# Patient Record
Sex: MALE | Race: WHITE | NOT HISPANIC OR LATINO | Employment: OTHER | URBAN - NONMETROPOLITAN AREA
[De-identification: names, ages, dates, MRNs, and addresses within clinical notes are randomized per-mention and may not be internally consistent; named-entity substitution may affect disease eponyms.]

---

## 2017-02-16 DIAGNOSIS — R69 DIAGNOSIS UNKNOWN: ICD-10-CM

## 2017-02-17 RX ORDER — BUPROPION HYDROCHLORIDE 300 MG/1
TABLET ORAL
Qty: 90 TABLET | Refills: 0 | Status: SHIPPED | OUTPATIENT
Start: 2017-02-17 | End: 2017-05-17

## 2017-03-01 ENCOUNTER — TELEPHONE (OUTPATIENT)
Dept: PEDIATRICS | Facility: OTHER | Age: 62
End: 2017-03-01

## 2017-03-01 DIAGNOSIS — K22.70 BARRETT ESOPHAGUS: ICD-10-CM

## 2017-03-01 RX ORDER — LANSOPRAZOLE 30 MG/1
CAPSULE, DELAYED RELEASE ORAL
Qty: 90 CAPSULE | Refills: 0 | Status: SHIPPED | OUTPATIENT
Start: 2017-03-01 | End: 2017-09-12

## 2017-03-01 NOTE — TELEPHONE ENCOUNTER
Reason for call:  Medication    1. Medication Name? Lansoprazole  2. Is this request for a refill? Yes  3. What Pharmacy do you use? [facility] in [place], [place]  4. Have you contacted your pharmacy? Yes    5. If yes, when?  (Please note that the turn-around-time for prescriptions is 72 business hours; I am sending your request at this time. SEND TO  [facility]  )  Description: Pt stated pharmacy had told her med needed prior auth. I informed pt I did not see a request for this med from pharmacy yet. Please call pt back at  [phone]  Was an appointment offered for this a call? No   Preferred method for responding to this messageTelephone Call  If we cannot reach you directly, may we leave a detailed response at the number you provided? Yes  Can this message wait until your PCP/Provider returns if not available today? Not applicable

## 2017-05-17 DIAGNOSIS — E78.2 MIXED HYPERLIPIDEMIA: Primary | ICD-10-CM

## 2017-05-17 DIAGNOSIS — R69 DIAGNOSIS UNKNOWN: ICD-10-CM

## 2017-05-18 NOTE — TELEPHONE ENCOUNTER
wellbutrin       Last Written Prescription Date: 2/17/17  Last Fill Quantity: 90; # refills: 0  Last Office Visit with [facility] or  Health prescribing provider:  5/11/16        Last PHQ-9 score on record= No flowsheet data found.    Lab Results   Component Value Date    AST 14 05/11/2016     Lab Results   Component Value Date    ALT 28 05/11/2016     lipitor     Last Written Prescription Date: unknown  Last Fill Quantity: 0, # refills: 0  Last Office Visit with [facility] or  Health prescribing provider: 5/11/16       Lab Results   Component Value Date    CHOL 175 05/11/2016     Lab Results   Component Value Date    HDL 52 05/11/2016     Lab Results   Component Value Date    LDL 86 05/11/2016     Lab Results   Component Value Date    TRIG 186 05/11/2016     No results found for: SYDNIE

## 2017-05-19 RX ORDER — ATORVASTATIN CALCIUM 20 MG/1
TABLET, FILM COATED ORAL
Qty: 90 TABLET | Refills: 0 | Status: SHIPPED | OUTPATIENT
Start: 2017-05-19 | End: 2017-08-26

## 2017-05-19 RX ORDER — BUPROPION HYDROCHLORIDE 300 MG/1
TABLET ORAL
Qty: 90 TABLET | Refills: 0 | Status: SHIPPED | OUTPATIENT
Start: 2017-05-19 | End: 2017-08-26

## 2017-05-19 NOTE — TELEPHONE ENCOUNTER
Due for follow up for wellbutrin, no appointment scheduled.  Lipitor not on med list. Please advise. thank you

## 2017-08-26 DIAGNOSIS — R69 DIAGNOSIS UNKNOWN: ICD-10-CM

## 2017-08-26 DIAGNOSIS — E78.2 MIXED HYPERLIPIDEMIA: ICD-10-CM

## 2017-08-28 RX ORDER — BUPROPION HYDROCHLORIDE 300 MG/1
TABLET ORAL
Qty: 30 TABLET | Refills: 0 | Status: SHIPPED | OUTPATIENT
Start: 2017-08-28 | End: 2017-09-12

## 2017-08-28 RX ORDER — ATORVASTATIN CALCIUM 20 MG/1
TABLET, FILM COATED ORAL
Qty: 30 TABLET | Refills: 0 | Status: SHIPPED | OUTPATIENT
Start: 2017-08-28 | End: 2017-09-19

## 2017-09-12 ENCOUNTER — OFFICE VISIT (OUTPATIENT)
Dept: PEDIATRICS | Facility: OTHER | Age: 62
End: 2017-09-12
Attending: INTERNAL MEDICINE
Payer: COMMERCIAL

## 2017-09-12 VITALS
DIASTOLIC BLOOD PRESSURE: 80 MMHG | TEMPERATURE: 98.8 F | HEIGHT: 69 IN | OXYGEN SATURATION: 95 % | WEIGHT: 247 LBS | HEART RATE: 78 BPM | SYSTOLIC BLOOD PRESSURE: 140 MMHG | RESPIRATION RATE: 20 BRPM | BODY MASS INDEX: 36.58 KG/M2

## 2017-09-12 DIAGNOSIS — R03.0 BORDERLINE HYPERTENSION: ICD-10-CM

## 2017-09-12 DIAGNOSIS — K22.70 BARRETT'S ESOPHAGUS WITHOUT DYSPLASIA: ICD-10-CM

## 2017-09-12 DIAGNOSIS — E78.2 MIXED HYPERLIPIDEMIA: ICD-10-CM

## 2017-09-12 DIAGNOSIS — F33.1 MODERATE EPISODE OF RECURRENT MAJOR DEPRESSIVE DISORDER (H): ICD-10-CM

## 2017-09-12 DIAGNOSIS — I71.40 ABDOMINAL AORTIC ANEURYSM (AAA) WITHOUT RUPTURE (H): Primary | ICD-10-CM

## 2017-09-12 PROCEDURE — 99214 OFFICE O/P EST MOD 30 MIN: CPT | Performed by: INTERNAL MEDICINE

## 2017-09-12 RX ORDER — BUPROPION HYDROCHLORIDE 300 MG/1
300 TABLET ORAL DAILY
Qty: 90 TABLET | Refills: 3 | Status: SHIPPED | OUTPATIENT
Start: 2017-09-12 | End: 2017-10-11

## 2017-09-12 ASSESSMENT — ENCOUNTER SYMPTOMS
ABDOMINAL PAIN: 0
HEARTBURN: 0
DIARRHEA: 0
VOMITING: 0
NAUSEA: 0
WHEEZING: 0
COUGH: 0
CONSTIPATION: 0
HEMATURIA: 0
CHILLS: 0
MYALGIAS: 1
BLOOD IN STOOL: 0
FEVER: 0
DIZZINESS: 0
DYSURIA: 0
PALPITATIONS: 0
HEADACHES: 0
SHORTNESS OF BREATH: 0

## 2017-09-12 ASSESSMENT — PAIN SCALES - GENERAL: PAINLEVEL: MILD PAIN (2)

## 2017-09-12 NOTE — MR AVS SNAPSHOT
After Visit Summary   9/12/2017    Gilberto Camilo    MRN: [id number]           Patient Information     Date Of Birth          1955        Visit Information        Provider Department      9/12/2017 2:40 PM Luiz Carcamo DO [facility]        Today's Diagnoses     Abdominal aortic aneurysm (AAA) without rupture (H)    -  1    Mixed hyperlipidemia        Diagnosis unknown        Moderate episode of recurrent major depressive disorder (H)        Mohan's esophagus without dysplasia           Follow-ups after your visit        Additional Services     GENERAL SURG ADULT REFERRAL       Your provider has referred you to: PREFERRED PROVIDERS:  [facility]     Please be aware that coverage of these services is subject to the terms and limitations of your health insurance plan.  Call member services at your health plan with any benefit or coverage questions.      Please bring the following with you to your appointment:    (1) Any X-Rays, CTs or MRIs which have been performed.  Contact the facility where they were done to arrange for  prior to your scheduled appointment.   (2) List of current medications   (3) This referral request   (4) Any documents/labs given to you for this referral                  Follow-up notes from your care team     Return in about 3 weeks (around 10/3/2017), or Hypertension.      Future tests that were ordered for you today     Open Future Orders        Priority Expected Expires Ordered    Lipid Profile (Chol, Trig, HDL, LDL calc) Routine 9/13/2017 9/12/2018 9/12/2017            Who to contact     If you have questions or need follow up information about today's clinic visit or your schedule please contact [facility] directly at [phone].  Normal or non-critical lab and imaging results will be communicated to you by MyChart, letter or phone within 4 business days after the clinic has received the results. If you do not  "hear from us within 7 days, please contact the clinic through [employer] or phone. If you have a critical or abnormal lab result, we will notify you by phone as soon as possible.  Submit refill requests through [employer] or call your pharmacy and they will forward the refill request to us. Please allow 3 business days for your refill to be completed.          Additional Information About Your Visit        [email or web address] Information     [employer] lets you send messages to your doctor, view your test results, renew your prescriptions, schedule appointments and more. To sign up, go to [email or web address] . Click on \"Log in\" on the left side of the screen, which will take you to the Welcome page. Then click on \"Sign up Now\" on the right side of the page.     You will be asked to enter the access code listed below, as well as some personal information. Please follow the directions to create your username and password.     Your access code is: [email or web address]  Expires: 2017  3:24 PM     Your access code will  in 90 days. If you need help or a new code, please call your [place] clinic or [phone].        Care EveryWhere ID     This is your Care EveryWhere ID. This could be used by other organizations to access your [facility] medical records  [id number]        Your Vitals Were     Pulse Temperature Respirations Height Pulse Oximetry BMI (Body Mass Index)    78 98.8  F (37.1  C) (Tympanic) 20 5' 9\" (1.753 m) 95% 36.48 kg/m2       Blood Pressure from Last 3 Encounters:   17 140/80   16 132/78   16 138/76    Weight from Last 3 Encounters:   17 247 lb (112 kg)   16 245 lb (111.1 kg)   16 252 lb (114.3 kg)              We Performed the Following     GENERAL SURG ADULT REFERRAL          Today's Medication Changes          These changes are accurate as of: 17  3:24 PM.  If you have any questions, ask your nurse or doctor.               These medicines have changed or have updated " prescriptions.        Dose/Directions    buPROPion 300 MG 24 hr tablet   Commonly known as:  WELLBUTRIN XL   This may have changed:  See the new instructions.   Used for:  Moderate episode of recurrent major depressive disorder (H)   Changed by:  Luiz Carcamo DO        Dose:  300 mg   Take 1 tablet (300 mg) by mouth daily   Quantity:  90 tablet   Refills:  3         Stop taking these medicines if you haven't already. Please contact your care team if you have questions.     ezetimibe-simvastatin 10-40 MG per tablet   Commonly known as:  VYTORIN   Stopped by:  Luiz Carcamo DO           heparin (porcine) 10,000 Units, gentamicin 80 mg, triamcinolone acetonide 200 mg, lidocaine (PF) 10 mL, dose administered = solution   Stopped by:  Luiz Carcamo DO           LANsoprazole 30 MG CR capsule   Commonly known as:  PREVACID   Stopped by:  Luiz Carcamo DO                Where to get your medicines      These medications were sent to [facility] [address] [facility], [place] - [address] AT [facility] of [facility] [address], [facility] [address]     Phone:  [phone]     buPROPion 300 MG 24 hr tablet                Primary Care Provider Office Phone # Fax #    Luiz Carcamo [phone]       [facility] [address]  [facility] [address]        Equal Access to Services     RASHAWN PEDRAZA AH: Hadii aad ku hadasho Soomaali, waaxda luqadaha, qaybta kaalmada adeegyada, nitin lott haykarina rojo . So [facility] [phone].    ATENCIÓN: Si habla español, tiene a caldwell disposición servicios gratuitos de asistencia lingüística. Liu al [phone].    We comply with applicable federal civil rights laws and [place] laws. We do not discriminate on the basis of race, color, national origin, age, disability sex, sexual orientation or gender identity.            Thank you!     Thank you for choosing [facility]  for  your care. Our goal is always to provide you with excellent care. Hearing back from our patients is one way we can continue to improve our services. Please take a few minutes to complete the written survey that you may receive in the mail after your visit with us. Thank you!             Your Updated Medication List - Protect others around you: Learn how to safely use, store and throw away your medicines at www.disposemymeds.org.          This list is accurate as of: 9/12/17  3:24 PM.  Always use your most recent med list.                   Brand Name Dispense Instructions for use Diagnosis    aspirin 162 MG EC tablet     30 tablet    Take 1 tablet (162 mg) by mouth daily        atorvastatin 20 MG tablet    LIPITOR    30 tablet    TAKE 1 TABLET BY MOUTH DAILY    Mixed hyperlipidemia       buPROPion 300 MG 24 hr tablet    WELLBUTRIN XL    90 tablet    Take 1 tablet (300 mg) by mouth daily    Moderate episode of recurrent major depressive disorder (H)       loratadine 10 MG tablet    CLARITIN     Take 10 mg by mouth daily        MULTIVITAMIN ADULT PO           OMEPRAZOLE PO      Take 20 mg by mouth every morning

## 2017-09-12 NOTE — NURSING NOTE
"Chief Complaint   Patient presents with     Recheck Medication       Initial /80 (BP Location: Right arm, Patient Position: Chair, Cuff Size: Adult Large)  Pulse 78  Temp 98.8  F (37.1  C) (Tympanic)  Resp 20  Ht 5' 9\" (1.753 m)  Wt 247 lb (112 kg)  SpO2 95%  BMI 36.48 kg/m2 Estimated body mass index is 36.48 kg/(m^2) as calculated from the following:    Height as of this encounter: 5' 9\" (1.753 m).    Weight as of this encounter: 247 lb (112 kg).  Medication Reconciliation: complete   Kena Temple LPN        "

## 2017-09-12 NOTE — PROGRESS NOTES
"HPI  Patient is a 61 yo male with hyperlipidemia, Mohan esophagus  and AAA who presents for and exam in order to get his medications.  He has no other concerns today.    Past Medical History:   Diagnosis Date     AAA (abdominal aortic aneurysm) (H)      Mohan esophagus      Closed rib fracture      DNS (deviated nasal septum)      Past Surgical History:   Procedure Laterality Date     APPENDECTOMY       COLONOSCOPY  2013       BP Readings from Last 3 Encounters:   09/12/17 140/80   05/11/16 132/78   04/06/16 138/76       Review of Systems   Constitutional: Negative for chills and fever.   Respiratory: Negative for cough, shortness of breath and wheezing.    Cardiovascular: Negative for chest pain, palpitations and leg swelling.   Gastrointestinal: Negative for abdominal pain, blood in stool, constipation, diarrhea, heartburn, melena, nausea and vomiting.   Genitourinary: Negative for dysuria and hematuria.   Musculoskeletal: Positive for myalgias.        Foot pain when working or walking on concrete floor.     Neurological: Negative for dizziness and headaches.       /80 (BP Location: Right arm, Patient Position: Chair, Cuff Size: Adult Large)  Pulse 78  Temp 98.8  F (37.1  C) (Tympanic)  Resp 20  Ht 5' 9\" (1.753 m)  Wt 247 lb (112 kg)  SpO2 95%  BMI 36.48 kg/m2     Physical Exam   Constitutional: He is oriented to person, place, and time and well-developed, well-nourished, and in no distress. No distress.   HENT:   Head: Normocephalic.   Mouth/Throat: No oropharyngeal exudate.   Eyes: Conjunctivae are normal. No scleral icterus.   Neck: Neck supple. Carotid bruit is not present. No thyromegaly present.   Cardiovascular: Normal rate, regular rhythm, normal heart sounds and intact distal pulses.    No murmur heard.  Pulses:       Radial pulses are 2+ on the right side, and 2+ on the left side.   Pulmonary/Chest: Effort normal and breath sounds normal. He has no wheezes. He has no rales.   Abdominal: " Soft. Bowel sounds are normal. He exhibits no shifting dullness, no distension, no abdominal bruit, no pulsatile midline mass and no mass. There is no hepatosplenomegaly. There is no tenderness.   Musculoskeletal: He exhibits no edema.   Neurological: He is alert and oriented to person, place, and time.   Psychiatric: Mood, memory, affect and judgment normal.     Labs:  Recent Labs   Lab Test  09/14/17   0912  05/11/16   1055   CHOL  181  175   HDL  50  52   LDL  97  86   TRIG  169*  186*             Imaging:    Results for orders placed or performed during the hospital encounter of 05/17/16   US Aortic Imaging    Narrative    ULTRASOUND OF ABDOMINAL AORTA    REPORT:  Ultrasound of the abdominal aorta shows maximal transverse  diameter of 4.8 cm.    IMPRESSION:  4.8 CM ABDOMINAL AORTIC ANEURYSM.    ALLOWING FOR  DIFFERENCES IN MEASURING POINTS, THERE HAS NOT BEEN A SIGNIFICANT  CHANGE FROM MARCH OF 2014.  Exam Date: May 17, 2016 01:04:00 PM  Author: NHUNG KUMAR  This report is final and signed         Repeat AAA pended      ASSESSMENT /PLAN:    (R03.0) Borderline hypertension  Comment: Patient has bordeline blood pressure control.  Ideally given that he has a AAA he should be on a beta blocker.  Plan:   Consider Toprol XL    (E78.2) Mixed hyperlipidemia  Comment: The 10-year ASCVD risk score (Leida DC Jr, et al., 2013) is: 12.7%    Values used to calculate the score:      Age: 62 years      Sex: Male      Is Non- : No      Diabetic: No      Tobacco smoker: No      Systolic Blood Pressure: 140 mmHg      Is BP treated: No      HDL Cholesterol: 50 mg/dL      Total Cholesterol: 181 mg/dL    He could have better control of his cholesterol  Plan:   Increase Lipitor to 40 mg       (F33.1) Moderate episode of recurrent major depressive disorder (H)  Comment: He reports minimal to no symptoms with his Wellbutrin.  No PHQ-9 taken today.  Plan  He will continue: buPROPion (WELLBUTRIN XL) 300 MG 24 hr  tablet      (K22.70) Mohan's esophagus without dysplasia  Comment: Patient needs a EGD to follow up on his dysplasia.  His last EGD was in in 2013.  Plan:   GENERAL SURG ADULT REFERRAL          (I71.4) Abdominal aortic aneurysm (AAA) without rupture (H)  (primary encounter diagnosis)  Comment: Patient needs a repeat US of the aorta as his last US showed a AAA of 4.8 cm.  Plan:   US Aorta imaging.  Continue  mg daily.      Follow up with Provider - 1 months for review of US aorta and hypertension.       Luizjeanne Carcamo DO

## 2017-09-14 DIAGNOSIS — E78.2 MIXED HYPERLIPIDEMIA: ICD-10-CM

## 2017-09-14 LAB
CHOLEST SERPL-MCNC: 181 MG/DL
HDLC SERPL-MCNC: 50 MG/DL
LDLC SERPL CALC-MCNC: 97 MG/DL
NONHDLC SERPL-MCNC: 131 MG/DL
TRIGL SERPL-MCNC: 169 MG/DL

## 2017-09-14 PROCEDURE — 80061 LIPID PANEL: CPT | Performed by: INTERNAL MEDICINE

## 2017-09-14 PROCEDURE — 36415 COLL VENOUS BLD VENIPUNCTURE: CPT | Performed by: INTERNAL MEDICINE

## 2017-09-19 PROBLEM — R03.0 BORDERLINE HYPERTENSION: Status: ACTIVE | Noted: 2017-09-12

## 2017-09-19 RX ORDER — ATORVASTATIN CALCIUM 40 MG/1
40 TABLET, FILM COATED ORAL DAILY
Qty: 90 TABLET | Refills: 3 | Status: SHIPPED | OUTPATIENT
Start: 2017-09-19 | End: 2017-10-11

## 2017-09-20 ENCOUNTER — HOSPITAL ENCOUNTER (OUTPATIENT)
Dept: ULTRASOUND IMAGING | Facility: HOSPITAL | Age: 62
Discharge: HOME | End: 2017-09-20
Attending: INTERNAL MEDICINE | Admitting: INTERNAL MEDICINE
Payer: COMMERCIAL

## 2017-09-20 PROCEDURE — 76775 US EXAM ABDO BACK WALL LIM: CPT | Mod: TC

## 2017-09-20 NOTE — PROGRESS NOTES
Referral faxed to [facility]. See referral for additional information.

## 2017-09-21 DIAGNOSIS — I71.40 ABDOMINAL AORTIC ANEURYSM (AAA) WITHOUT RUPTURE (H): Primary | ICD-10-CM

## 2017-09-21 DIAGNOSIS — I10 ESSENTIAL HYPERTENSION: ICD-10-CM

## 2017-09-21 RX ORDER — METOPROLOL SUCCINATE 50 MG/1
50 TABLET, EXTENDED RELEASE ORAL DAILY
Qty: 30 TABLET | Refills: 5 | Status: SHIPPED | OUTPATIENT
Start: 2017-09-21 | End: 2017-10-03 | Stop reason: SINTOL

## 2017-10-01 ENCOUNTER — TRANSFERRED RECORDS (OUTPATIENT)
Dept: HEALTH INFORMATION MANAGEMENT | Facility: HOSPITAL | Age: 62
End: 2017-10-01

## 2017-10-01 LAB
ALT SERPL-CCNC: 32 U/L (ref 0–41)
AST SERPL-CCNC: 21 U/L (ref 0–37)
CREAT SERPL-MCNC: 1 MG/DL (ref 0.7–1.2)
GLUCOSE SERPL-MCNC: 96 MG/DL (ref 64–109)
POTASSIUM SERPL-SCNC: 4.2 MMOL/L (ref 3.5–5.1)

## 2017-10-02 ENCOUNTER — TELEPHONE (OUTPATIENT)
Dept: INTERNAL MEDICINE | Facility: OTHER | Age: 62
End: 2017-10-02

## 2017-10-02 NOTE — TELEPHONE ENCOUNTER
No we will need a sooner F/U for ER.  Lets get him in 9:40 arrive 9:20 tomorrow. I did not update the patient on time yet.

## 2017-10-02 NOTE — TELEPHONE ENCOUNTER
1:09 PM    Reason for Call: Phone Call    Description:  seeing in the Er for fatigue and pulse issues really low. He does have an appt on 2/10 is this ok for his follow up from the er.    Was an appointment offered for this call? No  If yes : Appointment type              Date    Preferred method for responding to this message: Telephone Call  What is your phone number ?    If we cannot reach you directly, may we leave a detailed response at the number you provided? Yes    Can this message wait until your PCP/provider returns, if available today? No,     Yanet Hernández

## 2017-10-03 ENCOUNTER — OFFICE VISIT (OUTPATIENT)
Dept: PEDIATRICS | Facility: OTHER | Age: 62
End: 2017-10-03
Attending: INTERNAL MEDICINE
Payer: COMMERCIAL

## 2017-10-03 VITALS
DIASTOLIC BLOOD PRESSURE: 80 MMHG | BODY MASS INDEX: 35.41 KG/M2 | WEIGHT: 239.8 LBS | SYSTOLIC BLOOD PRESSURE: 148 MMHG | OXYGEN SATURATION: 97 % | HEART RATE: 55 BPM | RESPIRATION RATE: 20 BRPM | TEMPERATURE: 97.5 F

## 2017-10-03 DIAGNOSIS — I10 BENIGN ESSENTIAL HYPERTENSION: Primary | ICD-10-CM

## 2017-10-03 DIAGNOSIS — I71.40 ABDOMINAL AORTIC ANEURYSM (AAA) WITHOUT RUPTURE (H): ICD-10-CM

## 2017-10-03 PROCEDURE — 99213 OFFICE O/P EST LOW 20 MIN: CPT | Performed by: INTERNAL MEDICINE

## 2017-10-03 RX ORDER — LISINOPRIL 10 MG/1
10 TABLET ORAL DAILY
Qty: 90 TABLET | Refills: 1 | Status: SHIPPED | OUTPATIENT
Start: 2017-10-03 | End: 2018-03-21

## 2017-10-03 RX ORDER — METOPROLOL SUCCINATE 25 MG/1
25 TABLET, EXTENDED RELEASE ORAL DAILY
Qty: 90 TABLET | Refills: 3 | Status: SHIPPED | OUTPATIENT
Start: 2017-10-03 | End: 2018-06-27

## 2017-10-03 ASSESSMENT — ENCOUNTER SYMPTOMS
FEVER: 0
NAUSEA: 1
DIARRHEA: 0
ABDOMINAL PAIN: 0
PALPITATIONS: 0
WHEEZING: 0
BLOOD IN STOOL: 0
CHILLS: 0
VOMITING: 0
DIZZINESS: 1
SHORTNESS OF BREATH: 0
COUGH: 0
CONSTIPATION: 0
HEADACHES: 0

## 2017-10-03 ASSESSMENT — PAIN SCALES - GENERAL: PAINLEVEL: NO PAIN (0)

## 2017-10-03 NOTE — NURSING NOTE
"Chief Complaint   Patient presents with     ER F/U     fatigue and low pulse       Initial /80 (BP Location: Right arm, Patient Position: Chair, Cuff Size: Adult Large)  Pulse 55  Temp 97.5  F (36.4  C) (Tympanic)  Resp 20  Wt 239 lb 12.8 oz (108.8 kg)  SpO2 97%  BMI 35.41 kg/m2 Estimated body mass index is 35.41 kg/(m^2) as calculated from the following:    Height as of 9/12/17: 5' 9\" (1.753 m).    Weight as of this encounter: 239 lb 12.8 oz (108.8 kg).  Medication Reconciliation: complete   Kena Temple LPN      "

## 2017-10-03 NOTE — PROGRESS NOTES
HPI  Patient is a 63 yo male who presents for a follow up on his HTN and his AA screen.  He reports that he was seen in the ER for fatigue.  He was noted to have a low pulse in the ED.  He has been measuring his blood pressure and his pulse at home.  He has recorded 132-157/ 63-92.  His pulse has been 48 to 54 BPM.  He continues to have some fatigue.      Past Medical History:   Diagnosis Date     AAA (abdominal aortic aneurysm) (H) 09/20/2017    5.3 cm      Mohan esophagus      Closed rib fracture      DNS (deviated nasal septum)      Past Surgical History:   Procedure Laterality Date     APPENDECTOMY       COLONOSCOPY  2013       Review of Systems   Constitutional: Positive for malaise/fatigue. Negative for chills and fever.   Respiratory: Negative for cough, shortness of breath and wheezing.    Cardiovascular: Negative for chest pain, palpitations and leg swelling.   Gastrointestinal: Positive for nausea. Negative for abdominal pain, blood in stool, constipation, diarrhea and vomiting.   Neurological: Positive for dizziness. Negative for headaches.     BP Readings from Last 3 Encounters:   10/03/17 148/80   09/12/17 140/80   05/11/16 132/78     /80 (BP Location: Right arm, Patient Position: Chair, Cuff Size: Adult Large)  Pulse 55  Temp 97.5  F (36.4  C) (Tympanic)  Resp 20  Wt 239 lb 12.8 oz (108.8 kg)  SpO2 97%  BMI 35.41 kg/m2        Physical Exam   Constitutional: He is oriented to person, place, and time and well-developed, well-nourished, and in no distress. No distress.   HENT:   Head: Normocephalic.   Mouth/Throat: No oropharyngeal exudate.   Cardiovascular: Normal rate, regular rhythm, normal heart sounds and intact distal pulses.    No murmur heard.  Pulses:       Radial pulses are 2+ on the right side, and 2+ on the left side.   Pulmonary/Chest: Effort normal and breath sounds normal. He has no wheezes. He has no rales.   Abdominal: Soft. Bowel sounds are normal. He exhibits no shifting  dullness, no distension, no abdominal bruit, no pulsatile midline mass and no mass. There is no hepatosplenomegaly. There is no tenderness.   Musculoskeletal: He exhibits no edema.   Neurological: He is alert and oriented to person, place, and time.     Labs:  NA      Imaging:  NA      ASSESSMENT /PLAN:    (I10) Benign essential hypertension  (primary encounter diagnosis)  Comment: Not at goal.  He has had some issues with severe fatigue on his beta blocker and bradycardia.  Plan:   Reduce metoprolol (TOPROL-XL) from 50 to 25 MG 24 hr tablet, Start lisinopril (PRINIVIL/ZESTRIL) 10 MG tablet      (I71.4) Abdominal aortic aneurysm (AAA) without rupture (H)  Comment: As noted above.  Plan:   metoprolol (TOPROL-XL) 25 MG 24 hr tablet, lisinopril (PRINIVIL/ZESTRIL) 10 MG tablet.  Repeat Ultrasound in 6 months.      Follow up with Provider - one week for HTN        Luiz Carcamo DO

## 2017-10-03 NOTE — MR AVS SNAPSHOT
"              After Visit Summary   10/3/2017    Gilberto Camilo    MRN: [id number]           Patient Information     Date Of Birth          1955        Visit Information        Provider Department      10/3/2017 9:40 AM Luiz Carcamo DO [facility]        Today's Diagnoses     Benign essential hypertension    -  1    Abdominal aortic aneurysm (AAA) without rupture (H)           Follow-ups after your visit        Your next 10 appointments already scheduled     Oct 11, 2017 10:40 AM CDT   (Arrive by 10:20 AM)   SHORT with Luiz Carcamo DO   [facility] [place] ([facility] [place] )    [address]  [place] [address]   [phone]              Who to contact     If you have questions or need follow up information about today's clinic visit or your schedule please contact [facility] directly at [phone].  Normal or non-critical lab and imaging results will be communicated to you by MyChart, letter or phone within 4 business days after the clinic has received the results. If you do not hear from us within 7 days, please contact the clinic through MyChart or phone. If you have a critical or abnormal lab result, we will notify you by phone as soon as possible.  Submit refill requests through [employer] or call your pharmacy and they will forward the refill request to us. Please allow 3 business days for your refill to be completed.          Additional Information About Your Visit        MyChart Information     S[employer]thold lets you send messages to your doctor, view your test results, renew your prescriptions, schedule appointments and more. To sign up, go to [email or web address] . Click on \"Log in\" on the left side of the screen, which will take you to the Welcome page. Then click on \"Sign up Now\" on the right side of the page.     You will be asked to enter the access code listed below, as well as some personal information. Please follow the " directions to create your username and password.     Your access code is: [email or web address]  Expires: 2017  3:24 PM     Your access code will  in 90 days. If you need help or a new code, please call your [place] clinic or [phone].        Care EveryWhere ID     This is your Care EveryWhere ID. This could be used by other organizations to access your [place] medical records  [id number]        Your Vitals Were     Pulse Temperature Respirations Pulse Oximetry BMI (Body Mass Index)       55 97.5  F (36.4  C) (Tympanic) 20 97% 35.41 kg/m2        Blood Pressure from Last 3 Encounters:   10/03/17 148/80   17 140/80   16 132/78    Weight from Last 3 Encounters:   10/03/17 239 lb 12.8 oz (108.8 kg)   17 247 lb (112 kg)   16 245 lb (111.1 kg)              Today, you had the following     No orders found for display         Today's Medication Changes          These changes are accurate as of: 10/3/17  9:43 AM.  If you have any questions, ask your nurse or doctor.               Start taking these medicines.        Dose/Directions    lisinopril 10 MG tablet   Commonly known as:  PRINIVIL/ZESTRIL   Used for:  Benign essential hypertension, Abdominal aortic aneurysm (AAA) without rupture (H)   Started by:  Luiz Carcamo DO        Dose:  10 mg   Take 1 tablet (10 mg) by mouth daily   Quantity:  90 tablet   Refills:  1         These medicines have changed or have updated prescriptions.        Dose/Directions    metoprolol 25 MG 24 hr tablet   Commonly known as:  TOPROL-XL   This may have changed:    - medication strength  - how much to take   Used for:  Benign essential hypertension, Abdominal aortic aneurysm (AAA) without rupture (H)   Changed by:  Luiz Carcamo DO        Dose:  25 mg   Take 1 tablet (25 mg) by mouth daily   Quantity:  90 tablet   Refills:  3            Where to get your medicines      These medications were sent to [id number] - [place],  [place] - [address] AT [facility] [address]  [address], [place] [address]     Phone:  [phone]     lisinopril 10 MG tablet    metoprolol 25 MG 24 hr tablet                Primary Care Provider Office Phone # Fax #    Luiz Carcamo [phone]       [facility] [place] [address]  [place] [address]        Equal Access to Services     ANJU PEDRAZA AH: Hadii aad ku hadasho Soomaali, waaxda luqadaha, qaybta kaalmada adeegyada, waxay idiin hayaan adeeg kharash la'aan ah. So [facility] [phone].    ATENCIÓN: Si habla español, tiene a caldwell disposición servicios gratuitos de asistencia lingüística. [facility] [phone].    We comply with applicable federal civil rights laws and [place] laws. We do not discriminate on the basis of race, color, national origin, age, disability, sex, sexual orientation, or gender identity.            Thank you!     Thank you for choosing [facility]  for your care. Our goal is always to provide you with excellent care. Hearing back from our patients is one way we can continue to improve our services. Please take a few minutes to complete the written survey that you may receive in the mail after your visit with us. Thank you!             Your Updated Medication List - Protect others around you: Learn how to safely use, store and throw away your medicines at www.disposemymeds.org.          This list is accurate as of: 10/3/17  9:43 AM.  Always use your most recent med list.                   Brand Name Dispense Instructions for use Diagnosis    aspirin 162 MG EC tablet     30 tablet    Take 1 tablet (162 mg) by mouth daily        atorvastatin 40 MG tablet    LIPITOR    90 tablet    Take 1 tablet (40 mg) by mouth daily    Mixed hyperlipidemia, Abdominal aortic aneurysm (AAA) without rupture (H), Borderline hypertension       buPROPion 300 MG 24 hr tablet    WELLBUTRIN XL    90 tablet    Take 1 tablet (300 mg) by mouth daily     Moderate episode of recurrent major depressive disorder (H)       lisinopril 10 MG tablet    PRINIVIL/ZESTRIL    90 tablet    Take 1 tablet (10 mg) by mouth daily    Benign essential hypertension, Abdominal aortic aneurysm (AAA) without rupture (H)       loratadine 10 MG tablet    CLARITIN     Take 10 mg by mouth daily        metoprolol 25 MG 24 hr tablet    TOPROL-XL    90 tablet    Take 1 tablet (25 mg) by mouth daily    Benign essential hypertension, Abdominal aortic aneurysm (AAA) without rupture (H)       MULTIVITAMIN ADULT PO           OMEPRAZOLE PO      Take 20 mg by mouth every morning

## 2017-10-11 ENCOUNTER — OFFICE VISIT (OUTPATIENT)
Dept: PEDIATRICS | Facility: OTHER | Age: 62
End: 2017-10-11
Attending: INTERNAL MEDICINE
Payer: COMMERCIAL

## 2017-10-11 VITALS
WEIGHT: 242 LBS | DIASTOLIC BLOOD PRESSURE: 76 MMHG | OXYGEN SATURATION: 96 % | TEMPERATURE: 97.6 F | RESPIRATION RATE: 20 BRPM | BODY MASS INDEX: 35.74 KG/M2 | SYSTOLIC BLOOD PRESSURE: 130 MMHG | HEART RATE: 58 BPM

## 2017-10-11 DIAGNOSIS — I71.40 ABDOMINAL AORTIC ANEURYSM (AAA) WITHOUT RUPTURE (H): Chronic | ICD-10-CM

## 2017-10-11 DIAGNOSIS — I10 ESSENTIAL HYPERTENSION: Primary | ICD-10-CM

## 2017-10-11 PROCEDURE — 99213 OFFICE O/P EST LOW 20 MIN: CPT | Performed by: INTERNAL MEDICINE

## 2017-10-11 ASSESSMENT — PAIN SCALES - GENERAL: PAINLEVEL: NO PAIN (0)

## 2017-10-11 NOTE — PROGRESS NOTES
SUBJECTIVE:   Gilberto Camilo is a 62 year old male who presents to clinic today for the following health issues:      Hypertension Follow-up      Outpatient blood pressures are being checked at home.  Results are 119-154/64-93.  Heart rates have been in the mid 50 s    Low Salt Diet: no added salt    His fatigue is better.        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  Additional history: as documented    Patient Active Problem List   Diagnosis     Abdominal aortic aneurysm (H)     ED (erectile dysfunction)     ACP (advance care planning)     Routine general medical examination at a health care facility     Borderline hypertension     Past Surgical History:   Procedure Laterality Date     APPENDECTOMY       COLONOSCOPY  2013       Social History   Substance Use Topics     Smoking status: Former Smoker     Packs/day: 0.00     Types: Cigarettes     Smokeless tobacco: Never Used      Comment: Feb. 2014     Alcohol use Yes      Comment: occasional     Family History   Problem Relation Age of Onset     C.A.D. Father      CABG in late 40's     C.A.D. Brother      MI in 50's             Reviewed and updated as needed this visit by clinical staffTobacco  Allergies  Meds  Med Hx  Surg Hx  Fam Hx  Soc Hx      Reviewed and updated as needed this visit by Provider         ROS:  C: NEGATIVE for fever, chills, change in weight  E: NEGATIVE for vision changes or irritation  E/M: NEGATIVE for ear, mouth and throat problems  R: NEGATIVE for significant cough or SOB  CV: NEGATIVE for chest pain, palpitations or peripheral edema  GI: NEGATIVE for nausea, abdominal pain, heartburn, or change in bowel  habits  : NEGATIVE for frequency, dysuria, or hematuria  M: NEGATIVE for significant arthralgias or myalgia  N: NEGATIVE for weakness, dizziness or paresthesias    OBJECTIVE:     /76 (BP Location: Left arm, Patient Position: Chair, Cuff Size: Adult Large)  Pulse 58  Temp 97.6  F (36.4  C) (Tympanic)  Resp 20  Wt 242 lb (109.8 kg)  SpO2 96%  BMI 35.74 kg/m2  Body mass index is 35.74 kg/(m^2).  GENERAL: healthy, alert and no distress  EYES: Eyes grossly normal to inspection  HENT: oropharynx clear and oral mucous membranes moist  NECK: no adenopathy, no asymmetry, masses, or scars and thyroid normal to palpation  RESP: lungs clear to auscultation - no rales, rhonchi or wheezes  CV: bradycardia, normal S1 S2, no S3 or S4, no murmur, click or rub, peripheral pulses strong and no peripheral edema  ABDOMEN: soft, nontender, no hepatosplenomegaly, no masses and bowel sounds normal  ABDOMEN: no palpable or pulsatile masses and no bruits heard  MS: no gross musculoskeletal defects noted, no edema    Diagnostic Test Results:  none     ASSESSMENT/PLAN:   (I10) Essential hypertension  (primary encounter diagnosis)  Comment: At goal without further dizziness or fatigue.  He is beta blocked with heart rate goal in the 50s  Plan:   Toprol XL 25 mg and Lisinopril 10 mg.    (I71.4) Abdominal aortic aneurysm (AAA) without rupture (H)  Comment: He will need a follow up on his AAA.  His blood pressure is well controlled.  Plan:   US Aortic Imaging for May 2018.                  FUTURE APPOINTMENTS:       - Follow-up visit in 5 weeks for HTN    Luiz Carcamo DO, DO  [facility]

## 2017-10-11 NOTE — NURSING NOTE
"Chief Complaint   Patient presents with     Hypertension       Initial /76 (BP Location: Left arm, Patient Position: Chair, Cuff Size: Adult Large)  Pulse 58  Temp 97.6  F (36.4  C) (Tympanic)  Resp 20  Wt 242 lb (109.8 kg)  SpO2 96%  BMI 35.74 kg/m2 Estimated body mass index is 35.74 kg/(m^2) as calculated from the following:    Height as of 9/12/17: 5' 9\" (1.753 m).    Weight as of this encounter: 242 lb (109.8 kg).  Medication Reconciliation: complete   Kena Temple LPN      "

## 2017-10-11 NOTE — MR AVS SNAPSHOT
"              After Visit Summary   10/11/2017    Gilberto Camilo    MRN: [id number]           Patient Information     Date Of Birth          1955        Visit Information        Provider Department      10/11/2017 10:40 AM [facility]         Follow-ups after your visit        Follow-up notes from your care team     Return in about 5 weeks (around 11/15/2017), or HTN.      Who to contact     If you have questions or need follow up information about today's clinic visit or your schedule please [facility] at [phone].  Normal or non-critical lab and imaging results will be communicated to you by MyChart, letter or phone within 4 business days after the clinic has received the results. If you do not hear from us within 7 days, please contact the clinic through [employer] or phone. If you have a critical or abnormal lab result, we will notify you by phone as soon as possible.  Submit refill requests through K[employer] or call your pharmacy and they will forward the refill request to us. Please allow 3 business days for your refill to be completed.          Additional Information About Your Visit        MyChart Information     K[employer] lets you send messages to your doctor, view your test results, renew your prescriptions, schedule appointments and more. To sign up, go to [email or web address] . Click on \"Log in\" on the left side of the screen, which will take you to the Welcome page. Then click on \"Sign up Now\" on the right side of the page.     You will be asked to enter the access code listed below, as well as some personal information. Please follow the directions to create your username and password.     Your access code is: [email or web address]  Expires: 2017  3:24 PM     Your access code will  in 90 days. If you need help or a new code, please call your [facility] or [phone].        Care EveryWhere ID     This is your Care " EveryWhere ID. This could be used by other organizations to access your [place] medical records  [id number]        Your Vitals Were     Pulse Temperature Respirations Pulse Oximetry BMI (Body Mass Index)       58 97.6  F (36.4  C) (Tympanic) 20 96% 35.74 kg/m2        Blood Pressure from Last 3 Encounters:   10/11/17 130/76   10/03/17 148/80   09/12/17 140/80    Weight from Last 3 Encounters:   10/11/17 242 lb (109.8 kg)   10/03/17 239 lb 12.8 oz (108.8 kg)   09/12/17 247 lb (112 kg)              Today, you had the following     No orders found for display         Today's Medication Changes          These changes are accurate as of: 10/11/17 11:03 AM.  If you have any questions, ask your nurse or doctor.               These medicines have changed or have updated prescriptions.        Dose/Directions    atorvastatin 40 MG tablet   Commonly known as:  LIPITOR   This may have changed:  Another medication with the same name was removed. Continue taking this medication, and follow the directions you see here.   Used for:  Mixed hyperlipidemia   Changed by:  Luiz Carcamo DO        Dose:  40 mg   Take 1 tablet (40 mg) by mouth daily   Quantity:  90 tablet   Refills:  3       buPROPion 300 MG 24 hr tablet   Commonly known as:  WELLBUTRIN XL   This may have changed:  Another medication with the same name was removed. Continue taking this medication, and follow the directions you see here.   Used for:  Diagnosis unknown   Changed by:  Luiz Carcamo DO        TAKE 1 TABLET BY MOUTH DAILY   Quantity:  30 tablet   Refills:  0                Primary Care Provider Office Phone # Fax #    Luiz Carcamo [phone]       [facility] [place] [address]  [place] [address]        Equal Access to Services     [facility] MILO AH: Anoop Mckeon, waaxda luqadaha, qaybta kaalmada adenicholas, nitin bryant. So [facility] [place]  [phone].    ATENCIÓN: Si carla scanlon, tiene a caldwell disposición servicios gratuitos de asistencia lingüística. Liu connell [phone].    We comply with applicable federal civil rights laws and [place] laws. We do not discriminate on the basis of race, color, national origin, age, disability, sex, sexual orientation, or gender identity.            Thank you!     Thank you for choosing [facility]  for your [facility]. Our goal is always to provide you with excellent care. Hearing back from our patients is one way we can continue to improve our services. Please take a few minutes to complete the written survey that you may receive in the mail after your visit with us. Thank you!             Your Updated Medication List - Protect others around you: Learn how to safely use, store and throw away your medicines at www.disposemymeds.org.          This list is accurate as of: 10/11/17 11:03 AM.  Always use your most recent med list.                   Brand Name Dispense Instructions for use Diagnosis    aspirin 162 MG EC tablet     30 tablet    Take 1 tablet (162 mg) by mouth daily        atorvastatin 40 MG tablet    LIPITOR    90 tablet    Take 1 tablet (40 mg) by mouth daily    Mixed hyperlipidemia       buPROPion 300 MG 24 hr tablet    WELLBUTRIN XL    30 tablet    TAKE 1 TABLET BY MOUTH DAILY    Diagnosis unknown       lisinopril 10 MG tablet    PRINIVIL/ZESTRIL    90 tablet    Take 1 tablet (10 mg) by mouth daily    Benign essential hypertension, Abdominal aortic aneurysm (AAA) without rupture (H)       loratadine 10 MG tablet    CLARITIN     Take 10 mg by mouth daily        metoprolol 25 MG 24 hr tablet    TOPROL-XL    90 tablet    Take 1 tablet (25 mg) by mouth daily    Benign essential hypertension, Abdominal aortic aneurysm (AAA) without rupture (H)       MULTIVITAMIN ADULT PO           OMEPRAZOLE PO      Take 20 mg by mouth every morning

## 2017-11-07 DIAGNOSIS — R69 DIAGNOSIS UNKNOWN: ICD-10-CM

## 2017-11-08 NOTE — TELEPHONE ENCOUNTER
wellbutrin       Last Written Prescription Date: 10/04/2017  Last Fill Quantity: 30,  # refills: 0   Last Office Visit with [facility], [facility] or [facility] prescribing provider: 10/11/2017                                         Next 5 appointments (look out 90 days)     Nov 15, 2017 10:40 AM CST   (Arrive by 10:20 AM)   SHORT with Luiz Carcamo DO   [facility] [place] [facility] ([facility] [place] [facility] )    [address] [place] [address]   [phone]

## 2017-11-10 RX ORDER — BUPROPION HYDROCHLORIDE 300 MG/1
TABLET ORAL
Qty: 30 TABLET | Refills: 0 | Status: SHIPPED | OUTPATIENT
Start: 2017-11-10 | End: 2018-06-26

## 2017-11-13 DIAGNOSIS — R69 DIAGNOSIS UNKNOWN: ICD-10-CM

## 2017-11-14 NOTE — TELEPHONE ENCOUNTER
Wellbutrin      Last Written Prescription Date: 11/10/17  Last Fill Quantity: 30,  # refills: 0   Last Office Visit with G, P or [facility] prescribing provider: 10/11/17

## 2017-11-15 ENCOUNTER — OFFICE VISIT (OUTPATIENT)
Dept: PEDIATRICS | Facility: OTHER | Age: 62
End: 2017-11-15
Attending: INTERNAL MEDICINE
Payer: COMMERCIAL

## 2017-11-15 VITALS
WEIGHT: 240 LBS | BODY MASS INDEX: 35.44 KG/M2 | OXYGEN SATURATION: 96 % | TEMPERATURE: 98.6 F | HEART RATE: 58 BPM | SYSTOLIC BLOOD PRESSURE: 142 MMHG | DIASTOLIC BLOOD PRESSURE: 84 MMHG

## 2017-11-15 DIAGNOSIS — I10 ESSENTIAL HYPERTENSION: Primary | ICD-10-CM

## 2017-11-15 DIAGNOSIS — Z87.891 HISTORY OF TOBACCO USE: ICD-10-CM

## 2017-11-15 DIAGNOSIS — Z23 NEED FOR VACCINATION: ICD-10-CM

## 2017-11-15 PROCEDURE — 99213 OFFICE O/P EST LOW 20 MIN: CPT | Mod: 25 | Performed by: INTERNAL MEDICINE

## 2017-11-15 PROCEDURE — 90471 IMMUNIZATION ADMIN: CPT | Performed by: INTERNAL MEDICINE

## 2017-11-15 PROCEDURE — 90732 PPSV23 VACC 2 YRS+ SUBQ/IM: CPT | Performed by: INTERNAL MEDICINE

## 2017-11-15 RX ORDER — BUPROPION HYDROCHLORIDE 300 MG/1
TABLET ORAL
Qty: 30 TABLET | Refills: 4 | Status: SHIPPED | OUTPATIENT
Start: 2017-11-15 | End: 2018-06-13

## 2017-11-15 ASSESSMENT — ANXIETY QUESTIONNAIRES
3. WORRYING TOO MUCH ABOUT DIFFERENT THINGS: NOT AT ALL
4. TROUBLE RELAXING: NOT AT ALL
GAD7 TOTAL SCORE: 0
1. FEELING NERVOUS, ANXIOUS, OR ON EDGE: NOT AT ALL
5. BEING SO RESTLESS THAT IT IS HARD TO SIT STILL: NOT AT ALL
7. FEELING AFRAID AS IF SOMETHING AWFUL MIGHT HAPPEN: NOT AT ALL
6. BECOMING EASILY ANNOYED OR IRRITABLE: NOT AT ALL
2. NOT BEING ABLE TO STOP OR CONTROL WORRYING: NOT AT ALL

## 2017-11-15 ASSESSMENT — PATIENT HEALTH QUESTIONNAIRE - PHQ9: SUM OF ALL RESPONSES TO PHQ QUESTIONS 1-9: 0

## 2017-11-15 ASSESSMENT — PAIN SCALES - GENERAL: PAINLEVEL: NO PAIN (0)

## 2017-11-15 NOTE — MR AVS SNAPSHOT
"              After Visit Summary   11/15/2017    Gilberto Camilo    MRN: [id number]           Patient Information     Date Of Birth          1955        Visit Information        Provider Department      11/15/2017 10:40 AM Luiz Carcamo, DO [facility]        Today's Diagnoses     Essential hypertension    -  1    History of tobacco use        Need for vaccination           Follow-ups after your visit        Who to contact     If you have questions or need follow up information about today's clinic visit or your schedule please contact [facility] [place] directly at [phone].  Normal or non-critical lab and imaging results will be communicated to you by [employer], letter or phone within 4 business days after the clinic has received the results. If you do not hear from us within 7 days, please contact the clinic through [employer] or phone. If you have a critical or abnormal lab result, we will notify you by phone as soon as possible.  Submit refill requests through [employer] or call your pharmacy and they will forward the refill request to us. Please allow 3 business days for your refill to be completed.          Additional Information About Your Visit        MyChart Information     [employer] lets you send messages to your doctor, view your test results, renew your prescriptions, schedule appointments and more. To sign up, go to [email or web address] . Click on \"Log in\" on the left side of the screen, which will take you to the Welcome page. Then click on \"Sign up Now\" on the right side of the page.     You will be asked to enter the access code listed below, as well as some personal information. Please follow the directions to create your username and password.     Your access code is: [email or web address]  Expires: 2017  2:24 PM     Your access code will  in 90 days. If you need help or a new code, please call your [place] clinic or [phone].        Care EveryWhere ID     " This is your Care EveryWhere ID. This could be used by other organizations to access your [place] medical records  [id number]        Your Vitals Were     Pulse Temperature Pulse Oximetry BMI (Body Mass Index)          58 98.6  F (37  C) (Tympanic) 96% 35.44 kg/m2         Blood Pressure from Last 3 Encounters:   11/15/17 142/84   10/11/17 130/76   10/03/17 148/80    Weight from Last 3 Encounters:   11/15/17 240 lb (108.9 kg)   10/11/17 242 lb (109.8 kg)   10/03/17 239 lb 12.8 oz (108.8 kg)              We Performed the Following     ADMIN: Vaccine, Initial (42074)     Pneumococcal vaccine 23 valent PPSV23  (Pneumovax) [77387]        Primary Care Provider Office Phone # Fax #    Luiz Carcamo  [phone]       [facility] [place] [address]  [place] [address]        Equal Access to Services     [facility] AH: Hadii aad ku hadasho Soomaali, waaxda luqadaha, qaybta kaalmada adeegyada, waxay idiin hayaan david rojo . So [facility] [phone].    ATENCIÓN: Si habla español, tiene a caldwell disposición servicios gratuitos de asistencia lingüística. Llame al [phone].    We comply with applicable federal civil rights laws and [place] laws. We do not discriminate on the basis of race, color, national origin, age, disability, sex, sexual orientation, or gender identity.            Thank you!     Thank you for choosing [facility] [place]  for your care. Our goal is always to provide you with excellent care. Hearing back from our patients is one way we can continue to improve our services. Please take a few minutes to complete the written survey that you may receive in the mail after your visit with us. Thank you!             Your Updated Medication List - Protect others around you: Learn how to safely use, store and throw away your medicines at www.disposemymeds.org.          This list is accurate as of: 11/15/17 11:25 AM.  Always use your most recent med list.                    Brand Name Dispense Instructions for use Diagnosis    aspirin 162 MG EC tablet     30 tablet    Take 1 tablet (162 mg) by mouth daily        atorvastatin 40 MG tablet    LIPITOR    90 tablet    Take 1 tablet (40 mg) by mouth daily    Mixed hyperlipidemia       buPROPion 300 MG 24 hr tablet    WELLBUTRIN XL    30 tablet    TAKE 1 TABLET BY MOUTH DAILY    Diagnosis unknown       lisinopril 10 MG tablet    PRINIVIL/ZESTRIL    90 tablet    Take 1 tablet (10 mg) by mouth daily    Benign essential hypertension, Abdominal aortic aneurysm (AAA) without rupture (H)       loratadine 10 MG tablet    CLARITIN     Take 10 mg by mouth daily        metoprolol 25 MG 24 hr tablet    TOPROL-XL    90 tablet    Take 1 tablet (25 mg) by mouth daily    Benign essential hypertension, Abdominal aortic aneurysm (AAA) without rupture (H)       MULTIVITAMIN ADULT PO           OMEPRAZOLE PO      Take 20 mg by mouth every morning

## 2017-11-15 NOTE — NURSING NOTE
"Chief Complaint   Patient presents with     Hypertension     follow up       Initial /86 (BP Location: Right arm, Patient Position: Chair, Cuff Size: Adult Large)  Pulse 58  Temp 98.6  F (37  C) (Tympanic)  Wt 240 lb (108.9 kg)  SpO2 96%  BMI 35.44 kg/m2 Estimated body mass index is 35.44 kg/(m^2) as calculated from the following:    Height as of 9/12/17: 5' 9\" (1.753 m).    Weight as of this encounter: 240 lb (108.9 kg).  Medication Reconciliation: complete     Nguyen Stovall    "

## 2017-11-15 NOTE — PROGRESS NOTES
SUBJECTIVE:                                                    Gilberto Camilo is a 62 year old male who presents to clinic today for the following health issues:      Hypertension Follow-up      Outpatient blood pressures are being checked at home.  Results are 120-130/  lowest 90/60.    Low Salt Diet: no added salt        Amount of exercise or physical activity: 2-3 days/week for an average of 15-30 minutes    Problems taking medications regularly: No    Medication side effects: none    Diet: regular (no restrictions)    BP Readings from Last 6 Encounters:   11/15/17 138/86   10/11/17 130/76   10/03/17 148/80   09/12/17 140/80   05/11/16 132/78   04/06/16 138/76       -------------------------------------    Problem list and histories reviewed & adjusted, as indicated.  Additional history: as documented    Patient Active Problem List   Diagnosis     Abdominal aortic aneurysm (H)     ED (erectile dysfunction)     ACP (advance care planning)     Routine general medical examination at a health care facility     Borderline hypertension     Essential hypertension     Past Surgical History:   Procedure Laterality Date     APPENDECTOMY       COLONOSCOPY  2013       Social History   Substance Use Topics     Smoking status: Former Smoker     Packs/day: 0.00     Types: Cigarettes     Smokeless tobacco: Never Used      Comment: Feb. 2014     Alcohol use Yes      Comment: occasional     Family History   Problem Relation Age of Onset     C.A.D. Father      CABG in late 40's     C.A.D. Brother      MI in 50's             ROS:  C: NEGATIVE for fever, chills, change in weight  E: NEGATIVE for vision changes or irritation  E/M: NEGATIVE for ear, mouth and throat problems  R: NEGATIVE for significant cough or SOB  CV: NEGATIVE for chest pain, palpitations or peripheral edema  GI: NEGATIVE for nausea, abdominal pain, heartburn, or change in bowel habits  : NEGATIVE for frequency, dysuria, or hematuria  M: NEGATIVE for  significant arthralgias or myalgia  N: NEGATIVE for weakness, dizziness or paresthesias    OBJECTIVE:     /86 (BP Location: Right arm, Patient Position: Chair, Cuff Size: Adult Large)  Pulse 58  Temp 98.6  F (37  C) (Tympanic)  Wt 240 lb (108.9 kg)  SpO2 96%  BMI 35.44 kg/m2  Body mass index is 35.44 kg/(m^2).  GENERAL: healthy, alert and no distress  EYES: Eyes grossly normal to inspection  NECK: no adenopathy, no asymmetry, masses, or scars and thyroid normal to palpation  NECK: no carotid bruits  RESP: lungs clear to auscultation - no rales, rhonchi or wheezes  CV: regular rate and rhythm, normal S1 S2, no S3 or S4, no murmur, click or rub, no peripheral edema and peripheral pulses strong  ABDOMEN: soft, nontender, no hepatosplenomegaly, no masses and bowel sounds normal  ABDOMEN: no bruits heard  MS: no gross musculoskeletal defects noted, no edema    Diagnostic Test Results:  none     ASSESSMENT/PLAN:   (I10) Essential hypertension  (primary encounter diagnosis)  Comment: At goal on home monitoring.  Plan:   He will continue lisinopril 10 mg and Loprerssote 25 mg daily.    (Z87.891) History of tobacco use  Comment: Greater than 30 pack years  Plan:   Pneumococcal vaccine 23 valent PPSV23 (Pneumovax) [99011], He will need low dose CT scan next year which we discussed in the office today.      (Z23) Need for vaccination  Comment:   Plan: Pneumococcal vaccine 23 valent PPSV23          (Pneumovax) [24584], ADMIN: Vaccine, Initial         (71219)                  FUTURE APPOINTMENTS:       - Follow-up visit in 6 months for an annual physical.    Luiz Carcamo DO,   [facility] [place]

## 2017-11-16 ASSESSMENT — ANXIETY QUESTIONNAIRES: GAD7 TOTAL SCORE: 0

## 2018-03-05 ENCOUNTER — DOCUMENTATION ONLY (OUTPATIENT)
Dept: FAMILY MEDICINE | Facility: OTHER | Age: 63
End: 2018-03-05

## 2018-03-21 DIAGNOSIS — I10 BENIGN ESSENTIAL HYPERTENSION: ICD-10-CM

## 2018-03-21 DIAGNOSIS — I71.40 ABDOMINAL AORTIC ANEURYSM (AAA) WITHOUT RUPTURE (H): ICD-10-CM

## 2018-03-22 RX ORDER — LISINOPRIL 10 MG/1
TABLET ORAL
Qty: 90 TABLET | Refills: 0 | Status: SHIPPED | OUTPATIENT
Start: 2018-03-22 | End: 2018-06-27

## 2018-05-07 ENCOUNTER — HOSPITAL ENCOUNTER (OUTPATIENT)
Dept: ULTRASOUND IMAGING | Facility: HOSPITAL | Age: 63
Discharge: HOME | End: 2018-05-07
Attending: INTERNAL MEDICINE | Admitting: INTERNAL MEDICINE
Payer: MEDICARE

## 2018-05-07 DIAGNOSIS — I71.40 ABDOMINAL AORTIC ANEURYSM (AAA) WITHOUT RUPTURE (H): Chronic | ICD-10-CM

## 2018-05-07 PROCEDURE — 76775 US EXAM ABDO BACK WALL LIM: CPT | Mod: TC

## 2018-06-13 DIAGNOSIS — R69 DIAGNOSIS UNKNOWN: ICD-10-CM

## 2018-06-14 RX ORDER — BUPROPION HYDROCHLORIDE 300 MG/1
TABLET ORAL
Qty: 90 TABLET | Refills: 0 | Status: SHIPPED | OUTPATIENT
Start: 2018-06-14 | End: 2018-06-26

## 2018-06-26 ENCOUNTER — OFFICE VISIT (OUTPATIENT)
Dept: PEDIATRICS | Facility: OTHER | Age: 63
End: 2018-06-26
Attending: INTERNAL MEDICINE
Payer: MEDICARE

## 2018-06-26 VITALS
HEART RATE: 55 BPM | SYSTOLIC BLOOD PRESSURE: 138 MMHG | DIASTOLIC BLOOD PRESSURE: 62 MMHG | BODY MASS INDEX: 36.39 KG/M2 | OXYGEN SATURATION: 94 % | WEIGHT: 246.4 LBS | TEMPERATURE: 97.6 F

## 2018-06-26 DIAGNOSIS — I73.9 CLAUDICATION OF LEFT LOWER EXTREMITY (H): ICD-10-CM

## 2018-06-26 DIAGNOSIS — L98.9 PRECANCEROUS SKIN LESION: ICD-10-CM

## 2018-06-26 DIAGNOSIS — R39.11 URINARY HESITANCY: ICD-10-CM

## 2018-06-26 DIAGNOSIS — K21.9 GASTROESOPHAGEAL REFLUX DISEASE, ESOPHAGITIS PRESENCE NOT SPECIFIED: ICD-10-CM

## 2018-06-26 DIAGNOSIS — E78.2 MIXED HYPERLIPIDEMIA: ICD-10-CM

## 2018-06-26 DIAGNOSIS — R03.0 BORDERLINE HYPERTENSION: Primary | ICD-10-CM

## 2018-06-26 DIAGNOSIS — L98.9 SKIN LESION: ICD-10-CM

## 2018-06-26 DIAGNOSIS — Z87.891 PERSONAL HISTORY OF TOBACCO USE: ICD-10-CM

## 2018-06-26 DIAGNOSIS — Z12.5 SCREENING PSA (PROSTATE SPECIFIC ANTIGEN): ICD-10-CM

## 2018-06-26 DIAGNOSIS — I10 BENIGN ESSENTIAL HYPERTENSION: ICD-10-CM

## 2018-06-26 DIAGNOSIS — I10 ESSENTIAL HYPERTENSION: ICD-10-CM

## 2018-06-26 DIAGNOSIS — I71.40 ABDOMINAL AORTIC ANEURYSM (AAA) WITHOUT RUPTURE (H): Chronic | ICD-10-CM

## 2018-06-26 LAB
ALBUMIN SERPL-MCNC: 4.1 G/DL (ref 3.4–5)
ALP SERPL-CCNC: 93 U/L (ref 40–150)
ALT SERPL W P-5'-P-CCNC: 44 U/L (ref 0–70)
ANION GAP SERPL CALCULATED.3IONS-SCNC: 6 MMOL/L (ref 3–14)
AST SERPL W P-5'-P-CCNC: 27 U/L (ref 0–45)
BILIRUB SERPL-MCNC: 0.7 MG/DL (ref 0.2–1.3)
BUN SERPL-MCNC: 20 MG/DL (ref 7–30)
CALCIUM SERPL-MCNC: 9.1 MG/DL (ref 8.5–10.1)
CHLORIDE SERPL-SCNC: 105 MMOL/L (ref 94–109)
CO2 SERPL-SCNC: 28 MMOL/L (ref 20–32)
CREAT SERPL-MCNC: 0.95 MG/DL (ref 0.66–1.25)
ERYTHROCYTE [DISTWIDTH] IN BLOOD BY AUTOMATED COUNT: 13.1 % (ref 10–15)
GFR SERPL CREATININE-BSD FRML MDRD: 79 ML/MIN/1.7M2
GLUCOSE SERPL-MCNC: 99 MG/DL (ref 70–99)
HCT VFR BLD AUTO: 43.8 % (ref 40–53)
HGB BLD-MCNC: 14.5 G/DL (ref 13.3–17.7)
MCH RBC QN AUTO: 30 PG (ref 26.5–33)
MCHC RBC AUTO-ENTMCNC: 33.1 G/DL (ref 31.5–36.5)
MCV RBC AUTO: 91 FL (ref 78–100)
PLATELET # BLD AUTO: 239 10E9/L (ref 150–450)
POTASSIUM SERPL-SCNC: 4.3 MMOL/L (ref 3.4–5.3)
PROT SERPL-MCNC: 7.5 G/DL (ref 6.8–8.8)
PSA SERPL-ACNC: 3.43 UG/L (ref 0–4)
RBC # BLD AUTO: 4.83 10E12/L (ref 4.4–5.9)
SODIUM SERPL-SCNC: 139 MMOL/L (ref 133–144)
WBC # BLD AUTO: 9.1 10E9/L (ref 4–11)

## 2018-06-26 PROCEDURE — 85027 COMPLETE CBC AUTOMATED: CPT | Mod: ZL | Performed by: INTERNAL MEDICINE

## 2018-06-26 PROCEDURE — G0296 VISIT TO DETERM LDCT ELIG: HCPCS | Performed by: INTERNAL MEDICINE

## 2018-06-26 PROCEDURE — G0103 PSA SCREENING: HCPCS | Mod: ZL | Performed by: INTERNAL MEDICINE

## 2018-06-26 PROCEDURE — 99214 OFFICE O/P EST MOD 30 MIN: CPT | Performed by: INTERNAL MEDICINE

## 2018-06-26 PROCEDURE — G0463 HOSPITAL OUTPT CLINIC VISIT: HCPCS | Mod: 25

## 2018-06-26 PROCEDURE — G0463 HOSPITAL OUTPT CLINIC VISIT: HCPCS

## 2018-06-26 PROCEDURE — 36415 COLL VENOUS BLD VENIPUNCTURE: CPT | Mod: ZL | Performed by: INTERNAL MEDICINE

## 2018-06-26 PROCEDURE — 80053 COMPREHEN METABOLIC PANEL: CPT | Mod: ZL | Performed by: INTERNAL MEDICINE

## 2018-06-26 ASSESSMENT — ANXIETY QUESTIONNAIRES
1. FEELING NERVOUS, ANXIOUS, OR ON EDGE: NOT AT ALL
GAD7 TOTAL SCORE: 0
4. TROUBLE RELAXING: NOT AT ALL
3. WORRYING TOO MUCH ABOUT DIFFERENT THINGS: NOT AT ALL
5. BEING SO RESTLESS THAT IT IS HARD TO SIT STILL: NOT AT ALL
7. FEELING AFRAID AS IF SOMETHING AWFUL MIGHT HAPPEN: NOT AT ALL
2. NOT BEING ABLE TO STOP OR CONTROL WORRYING: NOT AT ALL
6. BECOMING EASILY ANNOYED OR IRRITABLE: NOT AT ALL

## 2018-06-26 ASSESSMENT — PAIN SCALES - GENERAL: PAINLEVEL: NO PAIN (0)

## 2018-06-26 NOTE — PATIENT INSTRUCTIONS

## 2018-06-26 NOTE — PROGRESS NOTES
SUBJECTIVE:   Gilberto Camilo is a 63 year old male who presents to clinic today for the following health issues:      Patient is being seen today in the clinic for     Hyperlipidemia Follow-Up      Rate your low fat/cholesterol diet?: fair    Taking statin?  Yes, no muscle aches from statin    Other lipid medications/supplements?:  none    Hypertension Follow-up      Outpatient blood pressures are being checked at home.  Results are 110-120s/ 70-80s    Low Salt Diet: no added salt    BP Readings from Last 6 Encounters:   06/26/18 138/62   11/15/17 142/84   10/11/17 130/76   10/03/17 148/80   09/12/17 140/80   05/11/16 132/78     Vascular Disease Follow-up:  Peripheral Vascular Disease (PVD) and  AAA      Chest pain or pressure, left side neck or arm pain: No    Shortness of breath/increased sweats/nausea with exertion: No    Pain in calves walking 1-2 blocks: No    Worsened or new symptoms since last visit: No    Nitroglycerin use: NA    Daily aspirin use: Yes    His most recent AAA screem showed a 0.1 cm increase.  He reports a history of peripheral vascular disease and he complains of burning in his left foot along with having cold feet.    Problem list and histories reviewed & adjusted, as indicated.  Additional history: as documented    Patient Active Problem List   Diagnosis     Abdominal aortic aneurysm (H)     Erectile dysfunction     ACP (advance care planning)     Routine general medical examination at a health care facility     Borderline hypertension     Essential hypertension     Mixed hyperlipidemia     Skin lesion     Past Surgical History:   Procedure Laterality Date     APPENDECTOMY       COLONOSCOPY  2013       Social History   Substance Use Topics     Smoking status: Former Smoker     Packs/day: 0.00     Types: Cigarettes     Smokeless tobacco: Never Used      Comment: Feb. 2014     Alcohol use Yes      Comment: occasional     Family History   Problem Relation Age of Onset     C.A.D. Father       CABG in late 40's     JULIETA. Brother      MI in 50's           Reviewed and updated as needed this visit by clinical staff  Tobacco  Allergies  Meds  Med Hx  Surg Hx  Fam Hx  Soc Hx      Reviewed and updated as needed this visit by Provider         ROS:  CONSTITUTIONAL: NEGATIVE for fever, chills, change in weight  EYES: NEGATIVE for vision changes or irritation  ENT/MOUTH: NEGATIVE for ear, mouth and throat problems  RESP: NEGATIVE for significant cough or SOB  CV: NEGATIVE for chest pain, palpitations or peripheral edema, he reports left lower extremity is often cold  GI: NEGATIVE for nausea, abdominal pain, heartburn, or change in bowel habits   male :negative for dysuria, hematuria, decreased urinary stream, erectile dysfunction and hesitancy  MUSCULOSKELETAL: NEGATIVE for significant arthralgias or myalgia  NEURO: NEGATIVE for weakness, dizziness Positive for paresthesias as in the HPI  ENDOCRINE: NEGATIVE for temperature intolerance, skin/hair changes  PSYCHIATRIC: NEGATIVE for changes in mood or affect    OBJECTIVE:     /62 (BP Location: Left arm, Patient Position: Chair, Cuff Size: Adult Regular)  Pulse 55  Temp 97.6  F (36.4  C) (Tympanic)  Wt 246 lb 6.4 oz (111.8 kg)  SpO2 94%  BMI 36.39 kg/m2  Body mass index is 36.39 kg/(m^2).  GENERAL: healthy, alert and no distress  EYES: Eyes grossly normal to inspection, PERRL and conjunctivae and sclerae normal  NECK: no adenopathy, no asymmetry, masses, or scars and thyroid normal to palpation  NECK: no carotid bruits  RESP: lungs clear to auscultation - no rales, rhonchi or wheezes  CV: regular rate and rhythm, normal S1 S2, no S3 or S4, no murmur, click or rub, no peripheral edema and peripheral pulses strong on the right he has1 plus pedal pulses on the left.  ABDOMEN: soft, nontender, no hepatosplenomegaly, no masses and bowel sounds normal  ABDOMEN: no palpable or pulsatile masses and no bruits heard  MS: no gross musculoskeletal defects  noted, no edema  MS: normal muscle tone, no edema   SKIN: no suspicious lesions or rashes and Left nasal lesion with kirk- like dome  NEURO: Normal strength and tone, mentation intact and speech normal  NEURO: Normal strength and tone, speech normal and cranial nerves 3-12 intact    Diagnostic Test Results:  Results for orders placed or performed in visit on 06/26/18 (from the past 24 hour(s))   PSA, screen   Result Value Ref Range    PSA 3.43 0 - 4 ug/L   Comprehensive metabolic panel   Result Value Ref Range    Sodium 139 133 - 144 mmol/L    Potassium 4.3 3.4 - 5.3 mmol/L    Chloride 105 94 - 109 mmol/L    Carbon Dioxide 28 20 - 32 mmol/L    Anion Gap 6 3 - 14 mmol/L    Glucose 99 70 - 99 mg/dL    Urea Nitrogen 20 7 - 30 mg/dL    Creatinine 0.95 0.66 - 1.25 mg/dL    GFR Estimate 79 >60 mL/min/1.7m2    GFR Estimate If Black >90 >60 mL/min/1.7m2    Calcium 9.1 8.5 - 10.1 mg/dL    Bilirubin Total 0.7 0.2 - 1.3 mg/dL    Albumin 4.1 3.4 - 5.0 g/dL    Protein Total 7.5 6.8 - 8.8 g/dL    Alkaline Phosphatase 93 40 - 150 U/L    ALT 44 0 - 70 U/L    AST 27 0 - 45 U/L   CBC with platelets   Result Value Ref Range    WBC 9.1 4.0 - 11.0 10e9/L    RBC Count 4.83 4.4 - 5.9 10e12/L    Hemoglobin 14.5 13.3 - 17.7 g/dL    Hematocrit 43.8 40.0 - 53.0 %    MCV 91 78 - 100 fl    MCH 30.0 26.5 - 33.0 pg    MCHC 33.1 31.5 - 36.5 g/dL    RDW 13.1 10.0 - 15.0 %    Platelet Count 239 150 - 450 10e9/L       Recent Labs   Lab Test  09/14/17   0912  05/11/16   1055   CHOL  181  175   HDL  50  52   LDL  97  86   TRIG  169*  186*         ASSESSMENT/PLAN:   (R03.0) Borderline hypertension  (primary encounter diagnosis)  Comment: His blood pressure is under 140 SBP and he has normal renal function.  I would like to see his BP lower due to his AAA.  He had a former problem with higher dosing of Toprol so we will need to address his IVELISSE with increasing his Lisinopril.  Plan:   Increase Lisinopril to 20 mg and continue Toprol XL 25 mg  daily.    (I71.4) Abdominal aortic aneurysm (AAA) without rupture (H)  Comment: Stable.  Repeat next spring.  Plan:   Blood pressure and betablocker as above.  He will continue Lipitor 40 mg and  mg.    (E78.2) Mixed hyperlipidemia  Comment:  His last lipid panel was not ideal and his Lipitor was raised to 40 mg   Plan:  He will continue Lipitor 40 mg and have a fasting  Lipid Profile (Chol, Trig, HDL, LDL calc) at a hospital near his home.    (L98.9) Skin lesion  Comment: This appear to be a likely basal cell lesion on the left side on his nose.  Plan:   DERMATOLOGY REFERRAL    (L98.9) Precancerous skin lesion  Comment: Basal cell lesion  Plan:   DERMATOLOGY REFERRAL    (Z12.5) Screening PSA (prostate specific antigen)  Comment: His PSA is tested today due to some hesitancy.  It is normal.  Plan:   Continue annual PSA, screen       (R39.11) Urinary hesitancy  Comment: Likely some BPH as his PSA is normal.  Plan:   No medications is needed at ths time as his stream and bladder emptying are normal.    (Z87.891) Personal history of tobacco use  Comment: Patient has a 40 plus year habit and has been tobacco free for 4-5 years.  He reports Wellbutrin had helped him quit and he would like to come off the medications.  He is agreeable to getting a CT screen of his lungs.  Plan:   Prof Fee: Shared Decision Making Visit for Lung        Cancer Screening, CT Chest Lung Cancer Scrn Low        Dose wo  He will continue Wellbutrin 150 mg daily for two weeks and then every other day for 2 weeks and then discontinue.    (I73.9) Claudication of left lower extremity (H)  Comment: He has reduced blood flow and he will get CTA of the legs when he dose his lung cancer screen.  Plan:   CTA Angiogram Lower Extremity Bilateral                        FUTURE APPOINTMENTS:       - Follow-up visit in 6- 8 weeks for HTN    Luiz Carcamo DO,   [facility] [place]    Lung Cancer Screening Shared Decision Making Visit      Gilberto Camilo is eligible for lung cancer screening on the basis of the information provided in my signed lung cancer screening order.     I have discussed with patient the risks and benefits of screening for lung cancer with low-dose CT.       The benefit of early detection of lung cancer is contingent upon adherence to annual screening or more frequent follow up if indicated.     Furthermore, reaping the benefits of screening requires Gilberto Camilo to be willing and physically able to undergo diagnostic procedures, if indicated. Although no specific guide is available for determining severity of comorbidities, it is reasonable to withhold screening in patients who have greater mortality risk from other diseases.     We did discuss that the only way to prevent lung cancer is to not smoke.     I did not offer risk estimation using a calculator such as this one:    ShouldIScreen

## 2018-06-26 NOTE — MR AVS SNAPSHOT
After Visit Summary   6/26/2018    Gilberto Camilo    MRN: [id number]           Patient Information     Date Of Birth          1955        Visit Information        Provider Department      6/26/2018 2:20 PM Luiz Carcamo, DO [facility] [place]        Today's Diagnoses     Borderline hypertension    -  1    Abdominal aortic aneurysm (AAA) without rupture (H)        Essential hypertension        Mixed hyperlipidemia        Skin lesion        Screening PSA (prostate specific antigen)        Urinary hesitancy        Personal history of tobacco use        Claudication of left lower extremity (H)        Precancerous skin lesion          Care Instructions      Lung Cancer Screening   Frequently Asked Questions  If you are at high-risk for lung cancer, getting screened with low-dose computed tomography (LDCT) every year can help save your life. This handout offers answers to some of the most common questions about lung cancer screening. If you have other questions, please call [phone] ([phone]).     What is it?  Lung cancer screening uses special X-ray technology to create an image of your lung tissue. The exam is quick and easy and takes less than 10 seconds. We don t give you any medicine or use any needles. You can eat before and after the exam. You don t need to change your clothes as long as the clothing on your chest doesn t contain metal. But, you do need to be able to hold your breath for at least 6 seconds during the exam.    What is the goal of lung cancer screening?  The goal of lung cancer screening is to save lives. Many times, lung cancer is not found until a person starts having physical symptoms. Lung cancer screening can help detect lung cancer in the earliest stages when it may be easier to treat.    Who should be screened for lung cancer?  We suggest lung cancer screening for anyone who is at high-risk for lung cancer. You are in the high-risk group if  you:      are between the ages of 55 and 79, and    have smoked at least 1 pack of cigarettes a day for 30 or more years, and    still smoke or have quit within the past 15 years.    However, if you have a new cough or shortness of breath, you should talk to your doctor before being screened.    Some national lung health advocacy groups also recommend screening for people ages 50 to 79 who have smoked an average of 1 pack of cigarettes a day for 20 years. They must also have at least 1 other risk factor for lung cancer, not including exposure to secondhand smoke. Other risk factors are having had cancer in the past, emphysema, pulmonary fibrosis, COPD, a family history of lung cancer, or exposure to certain materials such as arsenic, asbestos, beryllium, cadmium, chromium, diesel fumes, nickel, radon or silica. Your care team can help you know if you have one of these risk factors.     Why does it matter if I have symptoms?  Certain symptoms can be a sign that you have a condition in your lungs that should be checked and treated by your doctor. These symptoms include fever, chest pain, a new or changing cough, shortness of breath that you have never felt before, coughing up blood or unexplained weight loss. Having any of these symptoms can greatly affect the results of lung cancer screening.       Should all smokers get an LDCT lung cancer screening exam?  It depends. Lung cancer screening is for a very specific group of men and women who have a history of heavy smoking over a long period of time (see  Who should be screened for lung cancer  above).  I am in the high-risk group, but have been diagnosed with cancer in the past. Is LDCT lung cancer screening right for me?  In some cases, you should not have LDCT lung screening, such as when your doctor is already following your cancer with CT scan studies. Your doctor will help you decide if LDCT lung screening is right for you.  Do I need to have a screening exam  every year?  Yes. If you are in the high-risk group described earlier, you should get an LDCT lung cancer screening exam every year until you are 79, or are no longer willing or able to undergo screening and possible procedures to diagnose and treat lung cancer.  How effective is LDCT at preventing death from lung cancer?  Studies have shown that LDCT lung cancer screening can lower the risk of death from lung cancer by 20 percent in people who are at high-risk.  What are the risks?  There are some risks and limitations of LDCT lung cancer screening. We want to make sure you understand the risks and benefits, so please let us know if you have any questions. Your doctor may want to talk with you more about these risks.    Radiation exposure: As with any exam that uses radiation, there is a very small increased risk of cancer. The amount of radiation in LDCT is small--about the same amount a person would get from a mammogram. Your doctor orders the exam when he or she feels the potential benefits outweigh the risks.    False negatives: No test is perfect, including LDCT. It is possible that you may have a medical condition, including lung cancer, that is not found during your exam. This is called a false negative result.    False positives and more testing: LDCT very often finds something in the lung that could be cancer, but in fact is not. This is called a false positive result. False positive tests often cause anxiety. To make sure these findings are not cancer, you may need to have more tests. These tests will be done only if you give us permission. Sometimes patients need a treatment that can have side effects, such as a biopsy. For more information on false positives, see  What can I expect from the results?     Findings not related to lung cancer: Your LDCT exam also takes pictures of areas of your body next to your lungs. In a very small number of cases, the CT scan will show an abnormal finding in one of  these areas, such as your kidneys, adrenal glands, liver or thyroid. This finding may not be serious, but you may need more tests. Your doctor can help you decide what other tests you may need, if any.  What can I expect from the results?  About 1 out of 4 LDCT exams will find something that may need more tests. Most of the time, these findings are lung nodules. Lung nodules are very small collections of tissue in the lung. These nodules are very common, and the vast majority--more than 97 percent--are not cancer (benign). Most are normal lymph nodes or small areas of scarring from past infections.  But, if a small lung nodule is found to be cancer, the cancer can be cured more than 90 percent of the time. To know if the nodule is cancer, we may need to get more images before your next yearly screening exam. If the nodule has suspicious features (for example, it is large, has an odd shape or grows over time), we will refer you to a specialist for further testing.  Will my doctor also get the results?  Yes. Your doctor will get a copy of your results.  Is it okay to keep smoking now that there s a cancer screening exam?  No. Tobacco is one of the strongest cancer-causing agents. It causes not only lung cancer, but other cancers and cardiovascular (heart) diseases as well. The damage caused by smoking builds over time. This means that the longer you smoke, the higher your risk of disease. While it is never too late to quit, the sooner you quit, the better.  Where can I find help to quit smoking?  The best way to prevent lung cancer is to stop smoking. If you have already quit smoking, congratulations and keep it up! For help on quitting smoking, please call [employer] at 3-088-961-YUYS ([phone]) or the American Cancer Society at 1-146.393.1495 to find local resources near you.  One-on-one health coaching:  If you d prefer to work individually with a health care provider on tobacco cessation, we offer:      Medication  Therapy Management:  Our specially trained pharmacists work closely with you and your doctor to help you quit smoking.  Call [phone] or [phone] (toll free).     Can Do: Health coaching offered by [facility].  [email or web address]            Follow-ups after your visit        Additional Services     DERMATOLOGY REFERRAL       Your provider has referred you to: PREFERRED PROVIDERS:  [facility]    Please be aware that coverage of these services is subject to the terms and limitations of your health insurance plan.  Call member services at your health plan with any benefit or coverage questions.      Please bring the following with you to your appointment:    (1) Any X-Rays, CTs or MRIs which have been performed.  Contact the facility where they were done to arrange for  prior to your scheduled appointment.    (2) List of current medications  (3) This referral request   (4) Any documents/labs given to you for this referral                  Future tests that were ordered for you today     Open Future Orders        Priority Expected Expires Ordered    CTA Angiogram Lower Extremity Bilateral Routine  6/26/2019 6/26/2018    CT Chest Lung Cancer Scrn Low Dose wo Routine  6/26/2019 6/26/2018    Lipid Profile (Chol, Trig, HDL, LDL calc) Routine  6/26/2019 6/26/2018            Who to contact     If you have questions or need follow up information about today's clinic visit or your schedule please contact [facility] directly at [phone].  Normal or non-critical lab and imaging results will be communicated to you by MyChart, letter or phone within 4 business days after the clinic has received the results. If you do not hear from us within 7 days, please contact the clinic through MyChart or phone. If you have a critical or abnormal lab result, we will notify you by phone as soon as possible.  Submit refill requests through [employer] or call your pharmacy and they will forward the  refill request to us. Please allow 3 business days for your refill to be completed.          Additional Information About Your Visit        Care EveryWhere ID     This is your Care EveryWhere ID. This could be used by other organizations to access your [facility] medical records  [id number]        Your Vitals Were     Pulse Temperature Pulse Oximetry BMI (Body Mass Index)          55 97.6  F (36.4  C) (Tympanic) 94% 36.39 kg/m2         Blood Pressure from Last 3 Encounters:   06/26/18 138/62   11/15/17 142/84   10/11/17 130/76    Weight from Last 3 Encounters:   06/26/18 246 lb 6.4 oz (111.8 kg)   11/15/17 240 lb (108.9 kg)   10/11/17 242 lb (109.8 kg)              We Performed the Following     CBC with platelets     Comprehensive metabolic panel     DERMATOLOGY REFERRAL     Prof Fee: Shared Decision Making Visit for Lung Cancer Screening     PSA, screen        Primary Care Provider Office Phone # Fax #    Luiz Jason Carcamo,  [phone]       [facility] [place] [address]        Equal Access to Services     [facility] : Hadii raphael esteban hadasho Soshaina, waaxda luqadaha, qaybta kaalmada irish, nitin rojo . So [facility] [phone].    ATENCIÓN: Si habla español, tiene a [place] disposición servicios gratuitos de asistencia lingüística. Llame al [phone].    We comply with applicable federal civil rights laws and [place] laws. We do not discriminate on the basis of race, color, national origin, age, disability, sex, sexual orientation, or gender identity.            Thank you!     Thank you for choosing [facility]  for your care. Our goal is always to provide you with excellent care. Hearing back from our patients is one way we can continue to improve our services. Please take a few minutes to complete the written survey that you may receive in the mail after your visit with us. Thank you!             Your Updated Medication List - Protect  others around you: Learn how to safely use, store and throw away your medicines at www.disposemymeds.org.          This list is accurate as of 6/26/18  3:07 PM.  Always use your most recent med list.                   Brand Name Dispense Instructions for use Diagnosis    aspirin 162 MG EC tablet     30 tablet    Take 1 tablet (162 mg) by mouth daily        atorvastatin 40 MG tablet    LIPITOR    90 tablet    Take 1 tablet (40 mg) by mouth daily    Mixed hyperlipidemia       lisinopril 10 MG tablet    PRINIVIL/ZESTRIL    90 tablet    TAKE 1 TABLET(10 MG) BY MOUTH DAILY    Benign essential hypertension, Abdominal aortic aneurysm (AAA) without rupture (H)       loratadine 10 MG tablet    CLARITIN     Take 10 mg by mouth daily        metoprolol succinate 25 MG 24 hr tablet    TOPROL-XL    90 tablet    Take 1 tablet (25 mg) by mouth daily    Benign essential hypertension, Abdominal aortic aneurysm (AAA) without rupture (H)       MULTIVITAMIN ADULT PO           OMEPRAZOLE PO      Take 20 mg by mouth every morning

## 2018-06-27 ENCOUNTER — TRANSFERRED RECORDS (OUTPATIENT)
Dept: HEALTH INFORMATION MANAGEMENT | Facility: CLINIC | Age: 63
End: 2018-06-27

## 2018-06-27 DIAGNOSIS — E78.2 MIXED HYPERLIPIDEMIA: ICD-10-CM

## 2018-06-27 LAB
CHOLEST SERPL-MCNC: 183 MG/DL (ref 0–199)
HDLC SERPL-MCNC: 47 MG/DL
LDLC SERPL CALC-MCNC: ABNORMAL MG/DL (ref 0–129)
TRIGL SERPL-MCNC: 214 MG/DL (ref 0–199)

## 2018-06-27 RX ORDER — ATORVASTATIN CALCIUM 40 MG/1
40 TABLET, FILM COATED ORAL DAILY
Qty: 90 TABLET | Refills: 3 | Status: SHIPPED | OUTPATIENT
Start: 2018-06-27 | End: 2018-06-27

## 2018-06-27 RX ORDER — LISINOPRIL 20 MG/1
20 TABLET ORAL DAILY
Qty: 90 TABLET | Refills: 3 | Status: SHIPPED | OUTPATIENT
Start: 2018-06-27 | End: 2018-09-07

## 2018-06-27 RX ORDER — ATORVASTATIN CALCIUM 80 MG/1
80 TABLET, FILM COATED ORAL DAILY
Qty: 90 TABLET | Refills: 3 | Status: SHIPPED | OUTPATIENT
Start: 2018-06-27 | End: 2018-08-23

## 2018-06-27 RX ORDER — METOPROLOL SUCCINATE 25 MG/1
25 TABLET, EXTENDED RELEASE ORAL DAILY
Qty: 90 TABLET | Refills: 3 | Status: SHIPPED | OUTPATIENT
Start: 2018-06-27 | End: 2019-10-02

## 2018-06-27 ASSESSMENT — ANXIETY QUESTIONNAIRES: GAD7 TOTAL SCORE: 0

## 2018-06-27 ASSESSMENT — PATIENT HEALTH QUESTIONNAIRE - PHQ9: SUM OF ALL RESPONSES TO PHQ QUESTIONS 1-9: 0

## 2018-08-23 DIAGNOSIS — E78.2 MIXED HYPERLIPIDEMIA: ICD-10-CM

## 2018-08-23 RX ORDER — ATORVASTATIN CALCIUM 80 MG/1
80 TABLET, FILM COATED ORAL DAILY
Qty: 90 TABLET | Refills: 3 | Status: SHIPPED | OUTPATIENT
Start: 2018-08-23 | End: 2019-07-23

## 2018-08-23 NOTE — TELEPHONE ENCOUNTER
Atorvastatin 80mg (Take 1-80mg tablet by mouth daily is the current Rx)    The paper rx states 40mg but the pharmacy states that patient reported an increase in dose which is correct.        Last Written Prescription Date:  6/27/18  Last Fill Quantity: 90,   # refills: 3  Last Office Visit: 6/26/18  Future Office visit:    Next 5 appointments (look out 90 days)     Sep 07, 2018  3:20 PM CDT   (Arrive by 3:00 PM)   SHORT with Luiz Carcamo DO   [facility] ([facility] )    [address] [place] [address]   [phone]

## 2018-09-04 NOTE — PROGRESS NOTES
SUBJECTIVE:   Gilberto Camilo is a 63 year old male who presents to clinic today for the following health issues:      Hypertension Follow-up      Outpatient blood pressures are being checked at home.  Results are ( cannot remember and forgot slip of blood pressures) Is checking once a week.    Low Salt Diet: low salt      Amount of exercise or physical activity: 6-7 days/week for an average of greater than 60 minutes    Problems taking medications regularly: No    Medication side effects: none    Diet: low salt      BP Readings from Last 6 Encounters:   09/07/18 140/76   06/26/18 138/62   11/15/17 142/84   10/11/17 130/76   10/03/17 148/80   09/12/17 140/80         -------------------------------------    Problem list and histories reviewed & adjusted, as indicated.  Additional history: as documented    Patient Active Problem List   Diagnosis     Abdominal aortic aneurysm (H)     Erectile dysfunction     ACP (advance care planning)     Routine general medical examination at a health care facility     Borderline hypertension     Essential hypertension     Mixed hyperlipidemia     Skin lesion     Past Surgical History:   Procedure Laterality Date     APPENDECTOMY       COLONOSCOPY  2013       Social History   Substance Use Topics     Smoking status: Former Smoker     Packs/day: 0.00     Types: Cigarettes     Smokeless tobacco: Never Used      Comment: Feb. 2014     Alcohol use Yes      Comment: occasional     Family History   Problem Relation Age of Onset     C.A.D. Father      CABG in late 40's     C.A.D. Brother      MI in 50's           Reviewed and updated as needed this visit by clinical staff       Reviewed and updated as needed this visit by Provider         ROS:  CONSTITUTIONAL: NEGATIVE for fever, chills, change in weight  EYES: NEGATIVE for vision changes or irritation  ENT/MOUTH: POSITIVE for nasal congestion, postnasal drainage and sinus pressure and NEGATIVE for sore throat  RESP: NEGATIVE for  significant cough or SOB  CV: NEGATIVE for chest pain, palpitations or peripheral edema  GI: NEGATIVE for nausea, abdominal pain, heartburn, or change in bowel habits  : NEGATIVE for frequency, dysuria, or hematuria  MUSCULOSKELETAL: NEGATIVE for significant arthralgias or myalgia  NEURO: NEGATIVE for weakness, dizziness or paresthesias  ENDOCRINE: NEGATIVE for temperature intolerance, skin/hair changes  PSYCHIATRIC: NEGATIVE for changes in mood or affect    OBJECTIVE:     /76 (BP Location: Left arm, Patient Position: Chair, Cuff Size: Adult Large)  Pulse 58  Temp 99  F (37.2  C) (Tympanic)  Resp 20  Wt 235 lb (106.6 kg)  SpO2 96%  BMI 34.7 kg/m2  Body mass index is 34.7 kg/(m^2).  GENERAL: healthy, alert and no distress  EYES: Eyes grossly normal to inspection and conjunctivae and sclerae normal  HENT: normal cephalic/atraumatic, both ears: clear effusion, oropharynx clear and oral mucous membranes moist  NECK: no adenopathy, no asymmetry, masses, or scars and thyroid normal to palpation  RESP: lungs clear to auscultation - no rales, rhonchi or wheezes  CV: regular rate and rhythm, normal S1 S2, no S3 or S4, no murmur, click or rub, no peripheral edema and peripheral pulses strong  ABDOMEN: soft, nontender, no hepatosplenomegaly, no masses and bowel sounds normal  ABDOMEN: no palpable or pulsatile masses and no bruits heard  MS: no gross musculoskeletal defects noted, no edema  PSYCH: mentation appears normal, affect normal/bright    Diagnostic Test Results:  Results for orders placed or performed in visit on 09/07/18   Lipid Profile (Chol, Trig, HDL, LDL calc)   Result Value Ref Range    Cholesterol 156 <200 mg/dL    Triglycerides 177 (H) <150 mg/dL    HDL Cholesterol 46 >39 mg/dL    LDL Cholesterol Calculated 75 <100 mg/dL    Non HDL Cholesterol 110 <130 mg/dL   Basic metabolic panel   Result Value Ref Range    Sodium 139 133 - 144 mmol/L    Potassium 4.2 3.4 - 5.3 mmol/L    Chloride 105 94 - 109  mmol/L    Carbon Dioxide 28 20 - 32 mmol/L    Anion Gap 6 3 - 14 mmol/L    Glucose 91 70 - 99 mg/dL    Urea Nitrogen 18 7 - 30 mg/dL    Creatinine 0.93 0.66 - 1.25 mg/dL    GFR Estimate 82 >60 mL/min/1.7m2    GFR Estimate If Black >90 >60 mL/min/1.7m2    Calcium 9.7 8.5 - 10.1 mg/dL       ASSESSMENT/PLAN:   (I10) Benign essential hypertension  Comment: Not at goal and he should have strict goal requirements due to his AAA.  His renal function is normal.  Plan:   Increase lisinopril (PRINIVIL,ZESTRIL) 30 MG tablet and continue Toprol XL 25 mg daily.         (I71.4) Abdominal aortic aneurysm (AAA) without rupture (H)  Comment: As noted above.  His cholesterol ius at goal on non-fasting sample.  In May 2018 he had a 5.4 cm AAA which has been stable.  Plan:   Increase lisinopril (PRINIVIL,ZESTRIL) 30 MG tablet and continue Toprol XL 25 mg daily.  Lipitor 80 mg daily..Repeat in one year.    (E78.2) Mixed hyperlipidemia  Comment: at goal.  Plan:       He will continue Lipitor 80 mg daily.    (R09.81) Congestion of paranasal sinus  (primary encounter diagnosis)  Comment: He has heavy post nasal drainage.  Plan:   fluticasone (VERAMYST) 27.5 MCG/SPRAY spray, 2 sprays in each nostril daily.    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  He has anger which comes out in verbal outbursts  Plan:  Increase  buPROPion (WELLBUTRIN XL) 150 MG 24 hr tablet and  buPROPion (WELLBUTRIN XL) 300 MG 24 hr tablet for a total dose of 450 mg daily.      FUTURE APPOINTMENTS:       - Follow-up visit in 8 months when he returns to [facility].    Luiz Carcamo DO,   [facility]

## 2018-09-07 ENCOUNTER — OFFICE VISIT (OUTPATIENT)
Dept: PEDIATRICS | Facility: OTHER | Age: 63
End: 2018-09-07
Attending: INTERNAL MEDICINE
Payer: MEDICARE

## 2018-09-07 VITALS
RESPIRATION RATE: 20 BRPM | OXYGEN SATURATION: 96 % | TEMPERATURE: 99 F | SYSTOLIC BLOOD PRESSURE: 142 MMHG | DIASTOLIC BLOOD PRESSURE: 80 MMHG | BODY MASS INDEX: 34.7 KG/M2 | WEIGHT: 235 LBS | HEART RATE: 58 BPM

## 2018-09-07 DIAGNOSIS — I71.40 ABDOMINAL AORTIC ANEURYSM (AAA) WITHOUT RUPTURE (H): Chronic | ICD-10-CM

## 2018-09-07 DIAGNOSIS — R09.81 CONGESTION OF PARANASAL SINUS: Primary | ICD-10-CM

## 2018-09-07 DIAGNOSIS — F33.1 MODERATE RECURRENT MAJOR DEPRESSION (H): ICD-10-CM

## 2018-09-07 DIAGNOSIS — K22.719 BARRETT'S ESOPHAGUS WITH DYSPLASIA: ICD-10-CM

## 2018-09-07 DIAGNOSIS — I10 BENIGN ESSENTIAL HYPERTENSION: ICD-10-CM

## 2018-09-07 DIAGNOSIS — E78.2 MIXED HYPERLIPIDEMIA: ICD-10-CM

## 2018-09-07 LAB
ANION GAP SERPL CALCULATED.3IONS-SCNC: 6 MMOL/L (ref 3–14)
BUN SERPL-MCNC: 18 MG/DL (ref 7–30)
CALCIUM SERPL-MCNC: 9.7 MG/DL (ref 8.5–10.1)
CHLORIDE SERPL-SCNC: 105 MMOL/L (ref 94–109)
CHOLEST SERPL-MCNC: 156 MG/DL
CO2 SERPL-SCNC: 28 MMOL/L (ref 20–32)
CREAT SERPL-MCNC: 0.93 MG/DL (ref 0.66–1.25)
GFR SERPL CREATININE-BSD FRML MDRD: 82 ML/MIN/1.7M2
GLUCOSE SERPL-MCNC: 91 MG/DL (ref 70–99)
HDLC SERPL-MCNC: 46 MG/DL
LDLC SERPL CALC-MCNC: 75 MG/DL
NONHDLC SERPL-MCNC: 110 MG/DL
POTASSIUM SERPL-SCNC: 4.2 MMOL/L (ref 3.4–5.3)
SODIUM SERPL-SCNC: 139 MMOL/L (ref 133–144)
TRIGL SERPL-MCNC: 177 MG/DL

## 2018-09-07 PROCEDURE — 80048 BASIC METABOLIC PNL TOTAL CA: CPT | Mod: ZL | Performed by: INTERNAL MEDICINE

## 2018-09-07 PROCEDURE — G0463 HOSPITAL OUTPT CLINIC VISIT: HCPCS

## 2018-09-07 PROCEDURE — 80061 LIPID PANEL: CPT | Mod: ZL | Performed by: INTERNAL MEDICINE

## 2018-09-07 PROCEDURE — 83721 ASSAY OF BLOOD LIPOPROTEIN: CPT | Mod: ZL | Performed by: INTERNAL MEDICINE

## 2018-09-07 PROCEDURE — 99214 OFFICE O/P EST MOD 30 MIN: CPT | Performed by: INTERNAL MEDICINE

## 2018-09-07 PROCEDURE — 36415 COLL VENOUS BLD VENIPUNCTURE: CPT | Mod: ZL | Performed by: INTERNAL MEDICINE

## 2018-09-07 RX ORDER — BUPROPION HYDROCHLORIDE 150 MG/1
150 TABLET ORAL EVERY MORNING
Qty: 90 TABLET | Refills: 3 | Status: SHIPPED | OUTPATIENT
Start: 2018-09-07 | End: 2019-10-28

## 2018-09-07 RX ORDER — LISINOPRIL 30 MG/1
30 TABLET ORAL DAILY
Qty: 90 TABLET | Refills: 3 | Status: SHIPPED | OUTPATIENT
Start: 2018-09-07 | End: 2018-10-16

## 2018-09-07 RX ORDER — BUPROPION HYDROCHLORIDE 300 MG/1
300 TABLET ORAL DAILY
Qty: 90 TABLET | Refills: 3 | Status: SHIPPED | OUTPATIENT
Start: 2018-09-07 | End: 2019-09-24

## 2018-09-07 RX ORDER — LISINOPRIL 10 MG/1
10 TABLET ORAL DAILY
Qty: 20 TABLET | Refills: 0 | Status: SHIPPED | OUTPATIENT
Start: 2018-09-07 | End: 2018-09-07

## 2018-09-07 RX ORDER — BUPROPION HYDROCHLORIDE 300 MG/1
300 TABLET ORAL DAILY
Refills: 0 | COMMUNITY
Start: 2018-05-03 | End: 2018-09-07

## 2018-09-07 ASSESSMENT — ANXIETY QUESTIONNAIRES
5. BEING SO RESTLESS THAT IT IS HARD TO SIT STILL: SEVERAL DAYS
3. WORRYING TOO MUCH ABOUT DIFFERENT THINGS: MORE THAN HALF THE DAYS
1. FEELING NERVOUS, ANXIOUS, OR ON EDGE: MORE THAN HALF THE DAYS
GAD7 TOTAL SCORE: 11
6. BECOMING EASILY ANNOYED OR IRRITABLE: NEARLY EVERY DAY
7. FEELING AFRAID AS IF SOMETHING AWFUL MIGHT HAPPEN: MORE THAN HALF THE DAYS
IF YOU CHECKED OFF ANY PROBLEMS ON THIS QUESTIONNAIRE, HOW DIFFICULT HAVE THESE PROBLEMS MADE IT FOR YOU TO DO YOUR WORK, TAKE CARE OF THINGS AT HOME, OR GET ALONG WITH OTHER PEOPLE: SOMEWHAT DIFFICULT
2. NOT BEING ABLE TO STOP OR CONTROL WORRYING: NOT AT ALL

## 2018-09-07 ASSESSMENT — PATIENT HEALTH QUESTIONNAIRE - PHQ9: 5. POOR APPETITE OR OVEREATING: SEVERAL DAYS

## 2018-09-07 NOTE — NURSING NOTE
"Chief Complaint   Patient presents with     Hypertension       Initial /76 (BP Location: Left arm, Patient Position: Chair, Cuff Size: Adult Large)  Pulse 58  Temp 99  F (37.2  C) (Tympanic)  Resp 20  Wt 235 lb (106.6 kg)  SpO2 96%  BMI 34.7 kg/m2 Estimated body mass index is 34.7 kg/(m^2) as calculated from the following:    Height as of 9/12/17: 5' 9\" (1.753 m).    Weight as of this encounter: 235 lb (106.6 kg).  Medication Reconciliation: complete    Kena Temple LPN    "

## 2018-09-07 NOTE — MR AVS SNAPSHOT
After Visit Summary   9/7/2018    Gilberto Camilo    MRN: [id number]           Patient Information     Date Of Birth          1955        Visit Information        Provider Department      9/7/2018 3:20 PM Luiz Carcamo DO [facility]        Today's Diagnoses     Congestion of paranasal sinus    -  1    Moderate recurrent major depression (H)        Benign essential hypertension        Abdominal aortic aneurysm (AAA) without rupture (H)        Mixed hyperlipidemia           Follow-ups after your visit        Your next 10 appointments already scheduled     Oct 16, 2018  9:30 AM CDT   (Arrive by 9:15 AM)   New Visit with KEEGAN Ricketts MD   [facility] ([facility] [place] )    [address]  [place] [id number] [phone]            May 20, 2019  9:20 AM CDT   (Arrive by 9:00 AM)   SHORT with Luiz Carcamo DO   [facility] [place] ([facility] [place] )    [address]  [place] [address]   [phone]              Who to contact     If you have questions or need follow up information about today's clinic visit or your schedule please contact [facility] directly at [phone].  Normal or non-critical lab and imaging results will be communicated to you by MyChart, letter or phone within 4 business days after the clinic has received the results. If you do not hear from us within 7 days, please contact the clinic through MyChart or phone. If you have a critical or abnormal lab result, we will notify you by phone as soon as possible.  Submit refill requests through [facility] or call your pharmacy and they will forward the refill request to us. Please allow 3 business days for your refill to be completed.          Additional Information About Your Visit        Care EveryWhere ID     This is your Care EveryWhere ID. This could be used by other organizations to access your [facility]  records  [id number]        Your Vitals Were     Pulse Temperature Respirations Pulse Oximetry BMI (Body Mass Index)       58 99  F (37.2  C) (Tympanic) 20 96% 34.7 kg/m2        Blood Pressure from Last 3 Encounters:   09/07/18 142/80   06/26/18 138/62   11/15/17 142/84    Weight from Last 3 Encounters:   09/07/18 235 lb (106.6 kg)   06/26/18 246 lb 6.4 oz (111.8 kg)   11/15/17 240 lb (108.9 kg)              We Performed the Following     Basic metabolic panel     LDL cholesterol direct     Lipid Profile (Chol, Trig, HDL, LDL calc)          Today's Medication Changes          These changes are accurate as of 9/7/18  3:54 PM.  If you have any questions, ask your nurse or doctor.               Start taking these medicines.        Dose/Directions    fluticasone 27.5 MCG/SPRAY spray   Commonly known as:  VERAMYST   Used for:  Congestion of paranasal sinus   Started by:  Luiz Carcamo DO        Dose:  2 spray   Spray 2 sprays into both nostrils daily   Quantity:  30 g   Refills:  3         These medicines have changed or have updated prescriptions.        Dose/Directions    * buPROPion 150 MG 24 hr tablet   Commonly known as:  WELLBUTRIN XL   This may have changed:  You were already taking a medication with the same name, and this prescription was added. Make sure you understand how and when to take each.   Used for:  Moderate recurrent major depression (H)   Changed by:  Luiz Carcamo DO        Dose:  150 mg   Take 1 tablet (150 mg) by mouth every morning   Quantity:  90 tablet   Refills:  3       * buPROPion 300 MG 24 hr tablet   Commonly known as:  WELLBUTRIN XL   This may have changed:  Another medication with the same name was added. Make sure you understand how and when to take each.   Used for:  Moderate recurrent major depression (H)   Changed by:  Luiz Carcamo DO        Dose:  300 mg   Take 1 tablet (300 mg) by mouth daily   Quantity:  90 tablet   Refills:  3       * lisinopril  30 MG tablet   Commonly known as:  PRINIVIL,ZESTRIL   This may have changed:    - medication strength  - how much to take   Used for:  Benign essential hypertension, Abdominal aortic aneurysm (AAA) without rupture (H)   Changed by:  Luiz Carcamo DO        Dose:  30 mg   Take 1 tablet (30 mg) by mouth daily   Quantity:  90 tablet   Refills:  3       * lisinopril 10 MG tablet   Commonly known as:  PRINIVIL/ZESTRIL   This may have changed:  You were already taking a medication with the same name, and this prescription was added. Make sure you understand how and when to take each.   Used for:  Benign essential hypertension   Changed by:  Luiz Carcamo DO        Dose:  10 mg   Take 1 tablet (10 mg) by mouth daily   Quantity:  20 tablet   Refills:  0       * Notice:  This list has 4 medication(s) that are the same as other medications prescribed for you. Read the directions carefully, and ask your doctor or other care provider to review them with you.         Where to get your medicines      These medications were sent to [facility] [address] [place] [address] [address]  AT [facility] [address] [facility] [address]     Phone:  [phone]     buPROPion 150 MG 24 hr tablet    buPROPion 300 MG 24 hr tablet    fluticasone 27.5 MCG/SPRAY spray    lisinopril 10 MG tablet    lisinopril 30 MG tablet                Primary Care Provider Office Phone # Fax #    Luiz Jason Carcamo [phone] [address] [facility] [address]        Equal Access to Services     [facility] MILO AH: Hadii aad ku hadasho Soomaali, waaxda luqadaha, qaybta kaalmada adeegyada, waxay roxannein hayshawandan david bryant. So [facility] [phone].    ATENCIÓN: Si habla español, tiene a caldwell disposición servicios gratuitos de asistencia lingüística. Llame al [phone].    We comply with applicable federal civil rights laws and [place] laws. We do not discriminate on the basis of  race, color, national origin, age, disability, sex, sexual orientation, or gender identity.            Thank you!     Thank you for choosing [facility]  [facility]. Our goal is always to provide you with excellent care. Hearing back from our patients is one way we can continue to improve our services. Please take a few minutes to complete the written survey that you may receive in the mail after your visit with us. Thank you!             Your Updated Medication List - Protect others around you: Learn how to safely use, store and throw away your medicines at www.disposemymeds.org.          This list is accurate as of 9/7/18  3:54 PM.  Always use your most recent med list.                   Brand Name Dispense Instructions for use Diagnosis    aspirin 162 MG EC tablet     30 tablet    Take 1 tablet (162 mg) by mouth daily        atorvastatin 80 MG tablet    LIPITOR    90 tablet    Take 1 tablet (80 mg) by mouth daily    Mixed hyperlipidemia       * buPROPion 150 MG 24 hr tablet    WELLBUTRIN XL    90 tablet    Take 1 tablet (150 mg) by mouth every morning    Moderate recurrent major depression (H)       * buPROPion 300 MG 24 hr tablet    WELLBUTRIN XL    90 tablet    Take 1 tablet (300 mg) by mouth daily    Moderate recurrent major depression (H)       fluticasone 27.5 MCG/SPRAY spray    VERAMYST    30 g    Spray 2 sprays into both nostrils daily    Congestion of paranasal sinus       * lisinopril 30 MG tablet    PRINIVIL,ZESTRIL    90 tablet    Take 1 tablet (30 mg) by mouth daily    Benign essential hypertension, Abdominal aortic aneurysm (AAA) without rupture (H)       * lisinopril 10 MG tablet    PRINIVIL/ZESTRIL    20 tablet    Take 1 tablet (10 mg) by mouth daily    Benign essential hypertension       loratadine 10 MG tablet    CLARITIN     Take 10 mg by mouth daily        metoprolol succinate 25 MG 24 hr tablet    TOPROL-XL    90 tablet    Take 1 tablet (25 mg) by mouth daily    Benign  essential hypertension, Abdominal aortic aneurysm (AAA) without rupture (H)       MULTIVITAMIN ADULT PO           omeprazole 20 MG CR capsule    priLOSEC    90 capsule    Take 1 capsule (20 mg) by mouth every morning    Gastroesophageal reflux disease, esophagitis presence not specified       * Notice:  This list has 4 medication(s) that are the same as other medications prescribed for you. Read the directions carefully, and ask your doctor or other care provider to review them with you.

## 2018-09-08 ASSESSMENT — PATIENT HEALTH QUESTIONNAIRE - PHQ9: SUM OF ALL RESPONSES TO PHQ QUESTIONS 1-9: 13

## 2018-09-08 ASSESSMENT — ANXIETY QUESTIONNAIRES: GAD7 TOTAL SCORE: 11

## 2018-09-10 LAB — LDLC SERPL DIRECT ASSAY-MCNC: 81 MG/DL

## 2018-09-10 ASSESSMENT — ANXIETY QUESTIONNAIRES
GAD7 TOTAL SCORE: 11
5. BEING SO RESTLESS THAT IT IS HARD TO SIT STILL: SEVERAL DAYS
3. WORRYING TOO MUCH ABOUT DIFFERENT THINGS: MORE THAN HALF THE DAYS
7. FEELING AFRAID AS IF SOMETHING AWFUL MIGHT HAPPEN: MORE THAN HALF THE DAYS
2. NOT BEING ABLE TO STOP OR CONTROL WORRYING: NOT AT ALL
6. BECOMING EASILY ANNOYED OR IRRITABLE: NEARLY EVERY DAY
1. FEELING NERVOUS, ANXIOUS, OR ON EDGE: MORE THAN HALF THE DAYS

## 2018-09-10 ASSESSMENT — PATIENT HEALTH QUESTIONNAIRE - PHQ9: 5. POOR APPETITE OR OVEREATING: SEVERAL DAYS

## 2018-09-11 ENCOUNTER — TELEPHONE (OUTPATIENT)
Dept: INTERNAL MEDICINE | Facility: OTHER | Age: 63
End: 2018-09-11

## 2018-09-11 NOTE — TELEPHONE ENCOUNTER
11:11 AM    Reason for Call: Phone Call    Description: Pts wife called and states that she would like to see if she could get a call back regarding the gastro appointment that they want him to make.    Was an appointment offered for this call? No  If yes : Appointment type              Date    Preferred method for responding to this message: Telephone Call  What is your phone number ?[phone]    If we cannot reach you directly, may we leave a detailed response at the number you provided? Yes    Can this message wait until your PCP/provider returns, if available today? Not applicable, pcp is in     [facility]

## 2018-09-12 NOTE — TELEPHONE ENCOUNTER
Wife was contacted she stated that patient had an endoscopy in [place] last fall but received a phone call stating that patient is needing another endoscopy per . Patient and wife are wondering if patient really needs to have another endoscopy done and why. Please advise.  Victorina Lazcano, CMA

## 2018-10-16 ENCOUNTER — OFFICE VISIT (OUTPATIENT)
Dept: DERMATOLOGY | Facility: OTHER | Age: 63
End: 2018-10-16
Attending: INTERNAL MEDICINE
Payer: MEDICARE

## 2018-10-16 VITALS
BODY MASS INDEX: 31.83 KG/M2 | DIASTOLIC BLOOD PRESSURE: 72 MMHG | WEIGHT: 235 LBS | OXYGEN SATURATION: 96 % | SYSTOLIC BLOOD PRESSURE: 136 MMHG | HEART RATE: 63 BPM | TEMPERATURE: 98.2 F | HEIGHT: 72 IN

## 2018-10-16 DIAGNOSIS — L82.1 SEBORRHEIC KERATOSES: ICD-10-CM

## 2018-10-16 DIAGNOSIS — L57.0 AK (ACTINIC KERATOSIS): Primary | ICD-10-CM

## 2018-10-16 PROCEDURE — 17000 DESTRUCT PREMALG LESION: CPT | Performed by: DERMATOLOGY

## 2018-10-16 PROCEDURE — G0463 HOSPITAL OUTPT CLINIC VISIT: HCPCS | Mod: 25 | Performed by: DERMATOLOGY

## 2018-10-16 PROCEDURE — 99202 OFFICE O/P NEW SF 15 MIN: CPT | Mod: 25 | Performed by: DERMATOLOGY

## 2018-10-16 ASSESSMENT — PAIN SCALES - GENERAL: PAINLEVEL: NO PAIN (0)

## 2018-10-16 NOTE — PATIENT INSTRUCTIONS
The small lesion on your nose is likely a pre-cancer called an actinic keratosis. We will freeze it off with liquid nitrogen. The brownish growths on your lower face and chest are called seborrheic keratoses and are harmless.

## 2018-10-16 NOTE — MR AVS SNAPSHOT
After Visit Summary   10/16/2018    Gilberto Camilo    MRN: [id number]           Patient Information     Date Of Birth          1955        Visit Information        Provider Department      10/16/2018 9:30 AM KEEGAN Ricketts MD [facility]        Care Instructions    The small lesion on your nose is likely a pre-cancer called an actinic keratosis. We will freeze it off with liquid nitrogen. The brownish growths on your lower face and chest are called seborrheic keratoses and are harmless.           Follow-ups after your visit        Your next 10 appointments already scheduled     May 20, 2019  9:20 AM CDT   (Arrive by 9:00 AM)   SHORT with Luiz Carcamo,    [facility]    [address] [place] [address]   [phone]              Who to contact     If you have questions or need follow up information about today's clinic visit or your schedule please contact [facility] directly at [phone].  Normal or non-critical lab and imaging results will be communicated to you by MyChart, letter or phone within 4 business days after the clinic has received the results. If you do not hear from us within 7 days, please contact the clinic through MyChart or phone. If you have a critical or abnormal lab result, we will notify you by phone as soon as possible.  Submit refill requests through Track or call your pharmacy and they will forward the refill request to us. Please allow 3 business days for your refill to be completed.          Additional Information About Your Visit        Care EveryWhere ID     This is your Care EveryWhere ID. This could be used by other organizations to access your [facility] medical records  [id number]        Your Vitals Were     Pulse Temperature Height Pulse Oximetry BMI (Body Mass Index)       63 98.2  F (36.8  C) (Tympanic) 1.829 m (6') 96% 31.87 kg/m2         Blood Pressure from Last 3 Encounters:   10/16/18 136/72   09/07/18 142/80   06/26/18 138/62    Weight from Last 3 Encounters:   10/16/18 106.6 kg (235 lb)   09/07/18 106.6 kg (235 lb)   06/26/18 111.8 kg (246 lb 6.4 oz)              Today, you had the following     No orders found for display         Today's Medication Changes          These changes are accurate as of 10/16/18 10:31 AM.  If you have any questions, ask your nurse or doctor.               These medicines have changed or have updated prescriptions.        Dose/Directions    lisinopril 10 MG tablet   Commonly known as:  PRINIVIL/ZESTRIL   This may have changed:  Another medication with the same name was removed. Continue taking this medication, and follow the directions you see here.   Used for:  Benign essential hypertension   Changed by:  KEEGAN Ricketts MD        TAKE 1 TABLET BY MOUTH DAILY. TAKE WITH 20 MG TABLET.   Quantity:  90 tablet   Refills:  0         Stop taking these medicines if you haven't already. Please contact your care team if you have questions.     fluticasone 27.5 MCG/SPRAY spray   Commonly known as:  VERAMYST   Stopped by:  KEEGAN Ricketts MD                    Primary Care Provider Office Phone # Fax #    Luiz Jason DO Carlos Alberto [phone]       [facility] [place] [address]        Equal Access to Services     [facility]: Hadii raphael esteban hadasho Soomaali, waaxda luqadaha, qaybta kaalmada irish, nitin bryant. So [facility] [phone].    ATENCIÓN: Si habla español, tiene a caldwell disposición servicios gratuitos de asistencia lingüística. Liu al [phone].    We comply with applicable federal civil rights laws and [place] laws. We do not discriminate on the basis of race, color, national origin, age, disability, sex, sexual orientation, or gender identity.            Thank you!     Thank you for choosing [facility]  for your care. Our goal  is always to provide you with excellent care. Hearing back from our patients is one way we can continue to improve our services. Please take a few minutes to complete the written survey that you may receive in the mail after your visit with us. Thank you!             Your Updated Medication List - Protect others around you: Learn how to safely use, store and throw away your medicines at www.disposemymeds.org.          This list is accurate as of 10/16/18 10:31 AM.  Always use your most recent med list.                   Brand Name Dispense Instructions for use Diagnosis    aspirin 162 MG EC tablet     30 tablet    Take 1 tablet (162 mg) by mouth daily        atorvastatin 80 MG tablet    LIPITOR    90 tablet    Take 1 tablet (80 mg) by mouth daily    Mixed hyperlipidemia       * buPROPion 150 MG 24 hr tablet    WELLBUTRIN XL    90 tablet    Take 1 tablet (150 mg) by mouth every morning    Moderate recurrent major depression (H)       * buPROPion 300 MG 24 hr tablet    WELLBUTRIN XL    90 tablet    Take 1 tablet (300 mg) by mouth daily    Moderate recurrent major depression (H)       lisinopril 10 MG tablet    PRINIVIL/ZESTRIL    90 tablet    TAKE 1 TABLET BY MOUTH DAILY. TAKE WITH 20 MG TABLET.    Benign essential hypertension       loratadine 10 MG tablet    CLARITIN     Take 10 mg by mouth daily        metoprolol succinate 25 MG 24 hr tablet    TOPROL-XL    90 tablet    Take 1 tablet (25 mg) by mouth daily    Benign essential hypertension, Abdominal aortic aneurysm (AAA) without rupture (H)       MULTIVITAMIN ADULT PO           omeprazole 20 MG CR capsule    priLOSEC    90 capsule    Take 1 capsule (20 mg) by mouth every morning    Gastroesophageal reflux disease, esophagitis presence not specified       * Notice:  This list has 2 medication(s) that are the same as other medications prescribed for you. Read the directions carefully, and ask your doctor or other care provider to review them with you.

## 2018-10-16 NOTE — NURSING NOTE
Chief Complaint   Patient presents with     Skin Check     Skin lesion Right side of nose and some on his back-Dr Carcamo is primary.         Initial /72 (BP Location: Left arm, Patient Position: Chair, Cuff Size: Adult Regular)  Pulse 63  Temp 98.2  F (36.8  C) (Tympanic)  Ht 1.829 m (6')  Wt 106.6 kg (235 lb)  SpO2 96%  BMI 31.87 kg/m2 Estimated body mass index is 31.87 kg/(m^2) as calculated from the following:    Height as of this encounter: 1.829 m (6').    Weight as of this encounter: 106.6 kg (235 lb).  Medication Reconciliation: complete    SAYRA MOLINA LPN

## 2018-10-16 NOTE — LETTER
10/16/2018       RE: Gilberto Martinjude  [address] [facility] [address]     Dear Colleague,    Thank you for referring your patient, Gilberto Camilo, to the [facility]. Please see a copy of my visit note below.    Dictated and edited    Again, thank you for allowing me to participate in the care of your patient.      Sincerely,    KEEGAN Ricketts MD

## 2018-10-17 NOTE — CONSULTS
Consult Date:  10/16/2018      SUBJECTIVE:  Feliciano has noted some lesions on his face, back and chest, and he would like them to be examined.     OBJECTIVE/ASSESSMENT:  Shows a healthy gentleman in no distress.  The lesion under his left eye, which is one of most concern, appears to be a typical  4 mm. seborrheic keratosis.  On his right jawline was a small actinic keratosis.      I checked his neck, chest, and trunk  and saw a few scattered minor seborrheic keratoses but  no skin cancers. He was not aware of any lesions growing or being symptomatic.      I discussed what to look for with skin cancer, and I simply treated the actinic keratosis with cryotherapy today.        MEDICATIONS AND ALLERGIES:  Reviewed.      Return p.r.n. concerns.         H RANJITH CHOI MD             D: 10/16/2018   T: 10/16/2018   MT: FELIX      Name:     FELICIANO LITTLE   MRN:      [id number]        Account:       [id number]   :      1955           Consult Date:  10/16/2018      Document: [id number]       cc: Luiz Carcamo DO

## 2019-07-22 DIAGNOSIS — E78.2 MIXED HYPERLIPIDEMIA: ICD-10-CM

## 2019-07-22 DIAGNOSIS — K21.9 GASTROESOPHAGEAL REFLUX DISEASE, ESOPHAGITIS PRESENCE NOT SPECIFIED: ICD-10-CM

## 2019-07-22 NOTE — TELEPHONE ENCOUNTER
Omeprazole      Last Written Prescription Date:  6/27/18  Last Fill Quantity: 90,   # refills: 3  Last Office Visit: 9/7/19    Lipitor      Last Written Prescription Date:  8/23/18  Last Fill Quantity: 90,   # refills: 3  Last Office Visit: 9/7/19  Future Office visit:       Routing refill request to provider for review/approval because:

## 2019-07-23 RX ORDER — ATORVASTATIN CALCIUM 80 MG/1
80 TABLET, FILM COATED ORAL DAILY
Qty: 30 TABLET | Refills: 0 | Status: SHIPPED | OUTPATIENT
Start: 2019-07-23 | End: 2019-10-02

## 2019-07-31 DIAGNOSIS — K21.9 GASTROESOPHAGEAL REFLUX DISEASE, ESOPHAGITIS PRESENCE NOT SPECIFIED: ICD-10-CM

## 2019-07-31 NOTE — TELEPHONE ENCOUNTER
Omeprazole   Last visit date with prescribing provider: 9-7-2018  Last refill date: 7-  Quantity: 30, Refills: 0    Nikki Murray

## 2019-09-04 ENCOUNTER — TELEPHONE (OUTPATIENT)
Dept: PEDIATRICS | Facility: OTHER | Age: 64
End: 2019-09-04

## 2019-09-04 NOTE — TELEPHONE ENCOUNTER
1:53 PM    Reason for Call: OVERBOOK    Patient is having the following symptoms: med check for 1 months.    The patient is requesting an appointment for 10/09/2019 with . His wife has one at 2:40 pm would like to try to get in at the same time.     Was an appointment offered for this call? No  If yes : Appointment type              Date    Preferred method for responding to this message: Telephone Call  What is your phone number ?[phone]    If we cannot reach you directly, may we leave a detailed response at the number you provided? Yes    Can this message wait until your PCP/provider returns, if unavailable today? Not applicable, pcp is in     [place]

## 2019-09-05 DIAGNOSIS — K21.9 GASTROESOPHAGEAL REFLUX DISEASE, ESOPHAGITIS PRESENCE NOT SPECIFIED: ICD-10-CM

## 2019-09-09 ENCOUNTER — HOSPITAL ENCOUNTER (OUTPATIENT)
Dept: ULTRASOUND IMAGING | Facility: HOSPITAL | Age: 64
Discharge: HOME | End: 2019-09-09
Attending: INTERNAL MEDICINE | Admitting: INTERNAL MEDICINE
Payer: COMMERCIAL

## 2019-09-09 DIAGNOSIS — I71.40 ABDOMINAL AORTIC ANEURYSM (AAA) WITHOUT RUPTURE (H): Chronic | ICD-10-CM

## 2019-09-09 PROCEDURE — 76706 US ABDL AORTA SCREEN AAA: CPT | Mod: TC

## 2019-09-09 NOTE — TELEPHONE ENCOUNTER
Omeprazole  Last Written Prescription Date: 7/23/19  Last Fill Quantity: 30 # of Refills: 0  Last Office Visit: 10/16/18

## 2019-09-10 DIAGNOSIS — I71.40 ABDOMINAL AORTIC ANEURYSM (AAA) WITHOUT RUPTURE (H): Primary | ICD-10-CM

## 2019-09-24 DIAGNOSIS — F33.1 MODERATE RECURRENT MAJOR DEPRESSION (H): ICD-10-CM

## 2019-09-24 RX ORDER — BUPROPION HYDROCHLORIDE 300 MG/1
TABLET ORAL
Qty: 90 TABLET | Refills: 0 | Status: SHIPPED | OUTPATIENT
Start: 2019-09-24 | End: 2019-12-31

## 2019-09-26 NOTE — PROGRESS NOTES
Subjective     Gilberto Camilo is a 64 year old male who presents to clinic today for the following health issues:    HPI   Hypertension Follow-up      Do you check your blood pressure regularly outside of the clinic? No     Are you following a low salt diet? No    Are your blood pressures ever more than 140 on the top number (systolic) OR more   than 90 on the bottom number (diastolic), for example 140/90? No       BP Readings from Last 6 Encounters:   10/02/19 138/88   10/16/18 136/72   09/07/18 142/80   06/26/18 138/62   11/15/17 142/84   10/11/17 130/76     Hyperlipidemia Follow-Up      Are you having any of the following symptoms? (Select all that apply)  Pain in calves when walking 1-2 blocks    Are you regularly taking any medication or supplement to lower your cholesterol?   Yes- lipitor     Are you having muscle aches or other side effects that you think could be caused by your cholesterol lowering medication?  No          How many servings of fruits and vegetables do you eat daily?  2-3    On average, how many sweetened beverages do you drink each day (soda, juice, sweet tea, etc)?   0    How many days per week do you miss taking your medication? 0    Recent Labs   Lab Test 09/07/18  1600 06/27/18   CHOL 156 183   HDL 46 47   LDL 75  81  --    TRIG 177* 214*                  -------------------------------------    Patient Active Problem List   Diagnosis     Abdominal aortic aneurysm (H)     Erectile dysfunction     ACP (advance care planning)     Routine general medical examination at a health care facility     Essential hypertension     Mixed hyperlipidemia     Skin lesion     Past Surgical History:   Procedure Laterality Date     APPENDECTOMY       COLONOSCOPY  2013     ESOPHAGOGASTRODUODENOSCOPY  2013       Social History     Tobacco Use     Smoking status: Former Smoker     Packs/day: 0.00     Types: Cigarettes     Smokeless tobacco: Never Used     Tobacco comment: Feb. 2014   Substance Use Topics      Alcohol use: Yes     Comment: occasional     Family History   Problem Relation Age of Onset     C.A.DANA. Father         CABG in late 40's     C.AMARY BETH. Brother         MI in 50's         Left leg cramping:    He notices the pain when he is walking on uneven ground.  The pain is over the posterior lower left leg.  He denies any tingling or numbness.  He denies any limping.      -------------------------------------  Reviewed and updated as needed this visit by Provider         Review of Systems   ROS COMP: Constitutional, HEENT, cardiovascular, pulmonary, gi and gu systems are negative, except as otherwise noted.        Objective    /88 (BP Location: Left arm, Patient Position: Chair, Cuff Size: Adult Large)   Pulse 60   Temp 97.3  F (36.3  C) (Tympanic)   Wt 106.6 kg (235 lb)   SpO2 96%   BMI 31.87 kg/m    Body mass index is 31.87 kg/m .  Physical Exam   GENERAL: healthy, alert and no distress  EYES: Eyes grossly normal to inspection and conjunctivae and sclerae normal  NECK: no adenopathy, no asymmetry, masses, or scars and thyroid normal to palpation  RESP: lungs clear to auscultation - no rales, rhonchi or wheezes  CV: regular rate and rhythm, normal S1 S2, no S3 or S4, no murmur, click or rub, no peripheral edema and peripheral pulses strong  ABDOMEN: soft, nontender, no hepatosplenomegaly, no masses and bowel sounds normal  ABDOMEN: no palpable or pulsatile masses and no bruits heard  MS: no gross musculoskeletal defects noted, no edema  MS:   The left innominate is upslipped by 1-2 cm as confirmed by a superior medial malleolus, ASIS, and PSIS on the left/right.    NEURO: Normal strength and tone, mentation intact and speech normal  PSYCH: mentation appears normal, affect normal/bright    Diagnostic Test Results:  Labs reviewed in Epic  Results for orders placed or performed in visit on 10/02/19   Lipid Profile (Chol, Trig, HDL, LDL calc)   Result Value Ref Range    Cholesterol 190 <200 mg/dL     Triglycerides 226 (H) <150 mg/dL    HDL Cholesterol 48 >39 mg/dL    LDL Cholesterol Calculated 97 <100 mg/dL    Non HDL Cholesterol 142 (H) <130 mg/dL   Comprehensive metabolic panel   Result Value Ref Range    Sodium 139 133 - 144 mmol/L    Potassium 4.3 3.4 - 5.3 mmol/L    Chloride 109 94 - 109 mmol/L    Carbon Dioxide 23 20 - 32 mmol/L    Anion Gap 7 3 - 14 mmol/L    Glucose 129 (H) 70 - 99 mg/dL    Urea Nitrogen 17 7 - 30 mg/dL    Creatinine 0.91 0.66 - 1.25 mg/dL    GFR Estimate 87 >60 mL/min/[1.73_m2]    GFR Estimate If Black >90 >60 mL/min/[1.73_m2]    Calcium 9.3 8.5 - 10.1 mg/dL    Bilirubin Total 0.5 0.2 - 1.3 mg/dL    Albumin 3.8 3.4 - 5.0 g/dL    Protein Total 7.5 6.8 - 8.8 g/dL    Alkaline Phosphatase 92 40 - 150 U/L    ALT 34 0 - 70 U/L    AST 24 0 - 45 U/L             ASSESSMENT /PLAN:    (I10) Essential hypertension  (primary encounter diagnosis)  Comment: Stable and at goal.  Plan:   He will continue Lisinopril 10 mg and Toprol XL 25 mg daily     (E78.2) Mixed hyperlipidemia  Comment: Fair control.  He has been out of his medication for the past 6 weeks.  Plan:   He needs to assure that he takes his atorvastatin (LIPITOR) 80 MG tablet daily    (I71.4) Abdominal aortic aneurysm (AAA) without rupture (H)  Comment: Stable.  Plan:  US Aortic Imaging to be repeated in April 2019.    (M99.05) Somatic dysfunction of pelvis region  Comment: He has an left up slipped innominate which is causing him to walk unevenly and putting some extra strain on the left calf muscle.  Plan:   OMT of the pelvis          Innominate upslip procedure:  The left innominate is upslipped as confirmed by a superior ASIS, PSIS and medial malleolus on the indicated side.    The patient was placed in the supine position.  The examiner placed one hand on the posterior calf of the left leg and the other hand on the anterior distal tibia of the leg.  The patient was asked to breath deeply and allow the examiner to hold the full weight  of his left leg and thigh.  Traction was applied in a caudad direction until the examiner sensed that the patient had relaxed the hip.  Then, a quick caudad force was applied to the left leg.    Post treatment assessment showed that the left and right innominates were inline as confirmed by the anatomical landmarks of the ASIS, PSIS, and the medial malleoli.  The patient reported that he is unsure at this time if the manipulation helped.              Follow up with Provider - 7 months for HTN and Hyperlipidemia.         Luiz Carcamo DO, DO

## 2019-10-02 ENCOUNTER — OFFICE VISIT (OUTPATIENT)
Dept: INTERNAL MEDICINE | Facility: OTHER | Age: 64
End: 2019-10-02
Attending: INTERNAL MEDICINE
Payer: COMMERCIAL

## 2019-10-02 VITALS
WEIGHT: 235 LBS | HEART RATE: 60 BPM | TEMPERATURE: 97.3 F | DIASTOLIC BLOOD PRESSURE: 88 MMHG | SYSTOLIC BLOOD PRESSURE: 138 MMHG | BODY MASS INDEX: 31.87 KG/M2 | OXYGEN SATURATION: 96 %

## 2019-10-02 DIAGNOSIS — M99.05 SOMATIC DYSFUNCTION OF PELVIS REGION: ICD-10-CM

## 2019-10-02 DIAGNOSIS — E78.2 MIXED HYPERLIPIDEMIA: ICD-10-CM

## 2019-10-02 DIAGNOSIS — I10 BENIGN ESSENTIAL HYPERTENSION: ICD-10-CM

## 2019-10-02 DIAGNOSIS — I10 ESSENTIAL HYPERTENSION: Primary | ICD-10-CM

## 2019-10-02 DIAGNOSIS — I71.40 ABDOMINAL AORTIC ANEURYSM (AAA) WITHOUT RUPTURE (H): Chronic | ICD-10-CM

## 2019-10-02 LAB
ALBUMIN SERPL-MCNC: 3.8 G/DL (ref 3.4–5)
ALP SERPL-CCNC: 92 U/L (ref 40–150)
ALT SERPL W P-5'-P-CCNC: 34 U/L (ref 0–70)
ANION GAP SERPL CALCULATED.3IONS-SCNC: 7 MMOL/L (ref 3–14)
AST SERPL W P-5'-P-CCNC: 24 U/L (ref 0–45)
BILIRUB SERPL-MCNC: 0.5 MG/DL (ref 0.2–1.3)
BUN SERPL-MCNC: 17 MG/DL (ref 7–30)
CALCIUM SERPL-MCNC: 9.3 MG/DL (ref 8.5–10.1)
CHLORIDE SERPL-SCNC: 109 MMOL/L (ref 94–109)
CHOLEST SERPL-MCNC: 190 MG/DL
CO2 SERPL-SCNC: 23 MMOL/L (ref 20–32)
CREAT SERPL-MCNC: 0.91 MG/DL (ref 0.66–1.25)
GFR SERPL CREATININE-BSD FRML MDRD: 87 ML/MIN/{1.73_M2}
GLUCOSE SERPL-MCNC: 129 MG/DL (ref 70–99)
HDLC SERPL-MCNC: 48 MG/DL
LDLC SERPL CALC-MCNC: 97 MG/DL
NONHDLC SERPL-MCNC: 142 MG/DL
POTASSIUM SERPL-SCNC: 4.3 MMOL/L (ref 3.4–5.3)
PROT SERPL-MCNC: 7.5 G/DL (ref 6.8–8.8)
SODIUM SERPL-SCNC: 139 MMOL/L (ref 133–144)
TRIGL SERPL-MCNC: 226 MG/DL

## 2019-10-02 PROCEDURE — 80061 LIPID PANEL: CPT | Mod: ZL | Performed by: INTERNAL MEDICINE

## 2019-10-02 PROCEDURE — 98925 OSTEOPATH MANJ 1-2 REGIONS: CPT | Performed by: INTERNAL MEDICINE

## 2019-10-02 PROCEDURE — 80053 COMPREHEN METABOLIC PANEL: CPT | Mod: ZL | Performed by: INTERNAL MEDICINE

## 2019-10-02 PROCEDURE — 36415 COLL VENOUS BLD VENIPUNCTURE: CPT | Mod: ZL | Performed by: INTERNAL MEDICINE

## 2019-10-02 PROCEDURE — G0463 HOSPITAL OUTPT CLINIC VISIT: HCPCS

## 2019-10-02 PROCEDURE — G0463 HOSPITAL OUTPT CLINIC VISIT: HCPCS | Mod: 25

## 2019-10-02 PROCEDURE — 99214 OFFICE O/P EST MOD 30 MIN: CPT | Mod: 25 | Performed by: INTERNAL MEDICINE

## 2019-10-02 RX ORDER — ATORVASTATIN CALCIUM 80 MG/1
80 TABLET, FILM COATED ORAL DAILY
Qty: 30 TABLET | Refills: 0 | Status: SHIPPED | OUTPATIENT
Start: 2019-10-02 | End: 2019-10-02

## 2019-10-02 RX ORDER — LISINOPRIL 10 MG/1
TABLET ORAL
Qty: 90 TABLET | Refills: 3 | Status: SHIPPED | OUTPATIENT
Start: 2019-10-02 | End: 2020-05-15 | Stop reason: ALTCHOICE

## 2019-10-02 RX ORDER — METOPROLOL SUCCINATE 25 MG/1
25 TABLET, EXTENDED RELEASE ORAL DAILY
Qty: 90 TABLET | Refills: 3 | Status: SHIPPED | OUTPATIENT
Start: 2019-10-02 | End: 2020-09-28

## 2019-10-02 ASSESSMENT — ANXIETY QUESTIONNAIRES
3. WORRYING TOO MUCH ABOUT DIFFERENT THINGS: SEVERAL DAYS
4. TROUBLE RELAXING: NOT AT ALL
1. FEELING NERVOUS, ANXIOUS, OR ON EDGE: SEVERAL DAYS
6. BECOMING EASILY ANNOYED OR IRRITABLE: SEVERAL DAYS
5. BEING SO RESTLESS THAT IT IS HARD TO SIT STILL: NOT AT ALL
IF YOU CHECKED OFF ANY PROBLEMS ON THIS QUESTIONNAIRE, HOW DIFFICULT HAVE THESE PROBLEMS MADE IT FOR YOU TO DO YOUR WORK, TAKE CARE OF THINGS AT HOME, OR GET ALONG WITH OTHER PEOPLE: NOT DIFFICULT AT ALL
GAD7 TOTAL SCORE: 4
2. NOT BEING ABLE TO STOP OR CONTROL WORRYING: SEVERAL DAYS
7. FEELING AFRAID AS IF SOMETHING AWFUL MIGHT HAPPEN: NOT AT ALL

## 2019-10-02 ASSESSMENT — PAIN SCALES - GENERAL: PAINLEVEL: NO PAIN (0)

## 2019-10-02 ASSESSMENT — PATIENT HEALTH QUESTIONNAIRE - PHQ9: SUM OF ALL RESPONSES TO PHQ QUESTIONS 1-9: 4

## 2019-10-03 ASSESSMENT — ANXIETY QUESTIONNAIRES: GAD7 TOTAL SCORE: 4

## 2019-10-04 RX ORDER — ATORVASTATIN CALCIUM 80 MG/1
TABLET, FILM COATED ORAL
Qty: 90 TABLET | Refills: 0 | Status: SHIPPED | OUTPATIENT
Start: 2019-10-04 | End: 2019-12-31

## 2019-10-28 DIAGNOSIS — F33.1 MODERATE RECURRENT MAJOR DEPRESSION (H): ICD-10-CM

## 2019-10-29 RX ORDER — BUPROPION HYDROCHLORIDE 150 MG/1
TABLET ORAL
Qty: 180 TABLET | Refills: 0 | Status: SHIPPED | OUTPATIENT
Start: 2019-10-29 | End: 2020-05-11

## 2019-12-28 DIAGNOSIS — E78.2 MIXED HYPERLIPIDEMIA: ICD-10-CM

## 2019-12-28 DIAGNOSIS — F33.1 MODERATE RECURRENT MAJOR DEPRESSION (H): ICD-10-CM

## 2019-12-31 RX ORDER — ATORVASTATIN CALCIUM 80 MG/1
TABLET, FILM COATED ORAL
Qty: 90 TABLET | Refills: 2 | Status: SHIPPED | OUTPATIENT
Start: 2019-12-31 | End: 2020-06-19

## 2019-12-31 RX ORDER — BUPROPION HYDROCHLORIDE 300 MG/1
TABLET ORAL
Qty: 90 TABLET | Refills: 1 | Status: SHIPPED | OUTPATIENT
Start: 2019-12-31 | End: 2020-06-19

## 2019-12-31 NOTE — TELEPHONE ENCOUNTER
wellbutrin      Last Written Prescription Date:  10/29/19  Last Fill Quantity: 180,   # refills: 0  Last Office Visit: 10/2/19  Future Office visit:         lipitor      Last Written Prescription Date:  10/4/19  Last Fill Quantity: 90,   # refills: 0  Last Office Visit: 10/2/19  Future Office visit:

## 2020-03-02 DIAGNOSIS — K21.9 GASTROESOPHAGEAL REFLUX DISEASE, ESOPHAGITIS PRESENCE NOT SPECIFIED: ICD-10-CM

## 2020-03-03 NOTE — TELEPHONE ENCOUNTER
Omeprazole 20 MG       Last Written Prescription Date:  12-3-19  Last Fill Quantity: 90,   # refills: 0  Last Office Visit: 10-2-19  Future Office visit:    Next 5 appointments (look out 90 days)    May 04, 2020 10:20 AM CDT  (Arrive by 10:00 AM)  Office Visit with Luiz Carcamo DO  [facility] ([facility] [place] ) [address]  [place] [address]  [phone]           Routing refill request to provider for review/approval because:

## 2020-04-29 ENCOUNTER — VIRTUAL VISIT (OUTPATIENT)
Dept: INTERNAL MEDICINE | Facility: OTHER | Age: 65
End: 2020-04-29
Attending: INTERNAL MEDICINE
Payer: COMMERCIAL

## 2020-04-29 VITALS — WEIGHT: 240 LBS | HEIGHT: 72 IN | BODY MASS INDEX: 32.51 KG/M2

## 2020-04-29 DIAGNOSIS — I10 BENIGN ESSENTIAL HYPERTENSION: Primary | ICD-10-CM

## 2020-04-29 DIAGNOSIS — E78.2 MIXED HYPERLIPIDEMIA: ICD-10-CM

## 2020-04-29 DIAGNOSIS — I71.40 ABDOMINAL AORTIC ANEURYSM (AAA) WITHOUT RUPTURE (H): ICD-10-CM

## 2020-04-29 PROCEDURE — 99213 OFFICE O/P EST LOW 20 MIN: CPT | Mod: 95 | Performed by: INTERNAL MEDICINE

## 2020-04-29 ASSESSMENT — PAIN SCALES - GENERAL: PAINLEVEL: NO PAIN (0)

## 2020-04-29 ASSESSMENT — MIFFLIN-ST. JEOR: SCORE: 1906.63

## 2020-04-29 NOTE — PROGRESS NOTES
"Gilberto Camilo is a 64 year old male who is being evaluated via a billable telephone visit.      The patient has been notified of following:     \"This telephone visit will be conducted via a call between you and your physician/provider. We have found that certain health care needs can be provided without the need for a physical exam.  This service lets us provide the care you need with a short phone conversation.  If a prescription is necessary we can send it directly to your pharmacy.  If lab work is needed we can place an order for that and you can then stop by our lab to have the test done at a later time.    Telephone visits are billed at different rates depending on your insurance coverage. During this emergency period, for some insurers they may be billed the same as an in-person visit.  Please reach out to your insurance provider with any questions.    If during the course of the call the physician/provider feels a telephone visit is not appropriate, you will not be charged for this service.\"    Patient has given verbal consent for Telephone visit?  Yes    How would you like to obtain your AVS? Mail a copy    Subjective     Gilberto Camilo is a 64 year old male who presents to clinic today for the following health issues:    HPI  Hyperlipidemia Follow-Up      Are you regularly taking any medication or supplement to lower your cholesterol?   Yes- lipitor    Are you having muscle aches or other side effects that you think could be caused by your cholesterol lowering medication?  No     Recent Labs   Lab Test 10/02/19  1120 09/07/18  1600   CHOL 190 156   HDL 48 46   LDL 97 75  81   TRIG 226* 177*       He is taking Lipitor 80 mg daily.      Hypertension Follow-up      Do you check your blood pressure regularly outside of the clinic? No     Are you following a low salt diet? Yes    Are your blood pressures ever more than 140 on the top number (systolic) OR more   than 90 on the bottom number (diastolic), for " example 140/90? No      How many servings of fruits and vegetables do you eat daily?  4 or more    On average, how many sweetened beverages do you drink each day (Examples: soda, juice, sweet tea, etc.  Do NOT count diet or artificially sweetened beverages)?   0    How many days per week do you exercise enough to make your heart beat faster? 3 or less    How many minutes a day do you exercise enough to make your heart beat faster? 9 or less    How many days per week do you miss taking your medication? 0      BP Readings from Last 6 Encounters:   10/02/19 138/88   10/16/18 136/72   09/07/18 142/80   06/26/18 138/62   11/15/17 142/84   10/11/17 130/76          Patient Active Problem List   Diagnosis     Abdominal aortic aneurysm (H)     Erectile dysfunction     ACP (advance care planning)     Routine general medical examination at a health care facility     Essential hypertension     Mixed hyperlipidemia     Skin lesion     Past Surgical History:   Procedure Laterality Date     APPENDECTOMY       COLONOSCOPY  2013     ESOPHAGOGASTRODUODENOSCOPY  2013       Social History     Tobacco Use     Smoking status: Former Smoker     Packs/day: 0.00     Types: Cigarettes     Smokeless tobacco: Never Used     Tobacco comment: Feb. 2014   Substance Use Topics     Alcohol use: Yes     Comment: occasional     Family History   Problem Relation Age of Onset     C.A.D. Father         CABG in late 40's     C.A.D. Brother         MI in 50's           Reviewed and updated as needed this visit by Provider         Review of Systems   ROS COMP: Constitutional, HEENT, cardiovascular, pulmonary, gi and gu systems are negative, except as otherwise noted.       Objective   Reported vitals:  Ht 1.829 m (6')   Wt 108.9 kg (240 lb)   BMI 32.55 kg/m       PSYCH: Alert and oriented times 3; coherent speech, normal   rate and volume, able to articulate logical thoughts, able   to abstract reason, no tangential thoughts, no hallucinations   or  delusions  His affect is normal and pleasant  RESP: No cough, no audible wheezing, able to talk in full sentences  Remainder of exam unable to be completed due to telephone visits    Diagnostic Test Results:  Labs reviewed in Epic  none       Recent Labs   Lab Test 10/02/19  1120 09/07/18  1600    139   POTASSIUM 4.3 4.2   CHLORIDE 109 105   CO2 23 28   ANIONGAP 7 6   * 91   BUN 17 18   CR 0.91 0.93   SARI 9.3 9.7         Assessment/Plan:  (I10) Benign essential hypertension  (primary encounter diagnosis)  Comment: At goal, but borderline historically   Plan:   He will continue Lisinopril 10 mg and Toprol XL 25 mg daily.  He will monitor his BP at home daily at least 3 hours after taking medications.    (E78.2) Mixed hyperlipidemia  Comment: At goal last fall and he has been consistent in his medication administration.  Plan:   He will continue Lipitor 80 mg daily.    (I71.4) Abdominal aortic aneurysm (AAA) without rupture (H)  Comment: He is in need or his 6 month screen.  Plan:   AAA screen to be done iin the next week.        No follow-ups on file.  Follow up in 2 weeks for HTN    Phone call duration:  7 minutes    Luiz Carcamo, , DO

## 2020-05-06 ENCOUNTER — TELEPHONE (OUTPATIENT)
Dept: PEDIATRICS | Facility: OTHER | Age: 65
End: 2020-05-06

## 2020-05-06 DIAGNOSIS — I71.40 ABDOMINAL AORTIC ANEURYSM (AAA) WITHOUT RUPTURE (H): Primary | ICD-10-CM

## 2020-05-06 NOTE — TELEPHONE ENCOUNTER
Call from patient inquiring on orders for AAA Screen.     Patient in on 4/29/20 states he was to be contacted and has not received a call. Patient stating he contacted hospital staff (unsure who he spoke with) and was notified they have no orders for AAA Screen.     Please advise.

## 2020-05-07 ENCOUNTER — TELEPHONE (OUTPATIENT)
Dept: PEDIATRICS | Facility: OTHER | Age: 65
End: 2020-05-07

## 2020-05-08 ENCOUNTER — HOSPITAL ENCOUNTER (OUTPATIENT)
Dept: ULTRASOUND IMAGING | Facility: HOSPITAL | Age: 65
Discharge: HOME | End: 2020-05-08
Attending: INTERNAL MEDICINE | Admitting: INTERNAL MEDICINE
Payer: COMMERCIAL

## 2020-05-08 DIAGNOSIS — I71.40 ABDOMINAL AORTIC ANEURYSM (AAA) WITHOUT RUPTURE (H): Chronic | ICD-10-CM

## 2020-05-08 PROCEDURE — 76775 US EXAM ABDO BACK WALL LIM: CPT | Mod: TC

## 2020-05-08 NOTE — TELEPHONE ENCOUNTER
Call placed to patient to notify Dr. Carcamo has reordered testing. Patient reports he was contacted and came in to have the US completed this am (5/8/20).

## 2020-05-10 DIAGNOSIS — F33.1 MODERATE RECURRENT MAJOR DEPRESSION (H): ICD-10-CM

## 2020-05-11 RX ORDER — BUPROPION HYDROCHLORIDE 150 MG/1
TABLET ORAL
Qty: 180 TABLET | Refills: 0 | Status: SHIPPED | OUTPATIENT
Start: 2020-05-11 | End: 2020-06-19

## 2020-05-11 NOTE — TELEPHONE ENCOUNTER
Wellbutrin  Last Written Prescription Date: 12/31/19  Last Fill Quantity: 90 # of Refills: 1  Last Office Visit: 4/29/20

## 2020-05-11 NOTE — TELEPHONE ENCOUNTER
Medication failed protocol due to PHQ9  PHQ-9 score:    PHQ 10/2/2019   PHQ-9 Total Score 4   Q9: Thoughts of better off dead/self-harm past 2 weeks Not at all

## 2020-05-14 ENCOUNTER — TELEPHONE (OUTPATIENT)
Dept: PEDIATRICS | Facility: OTHER | Age: 65
End: 2020-05-14

## 2020-05-14 DIAGNOSIS — I71.40 ABDOMINAL AORTIC ANEURYSM, WITHOUT RUPTURE: ICD-10-CM

## 2020-05-14 DIAGNOSIS — I71.40 ABDOMINAL AORTIC ANEURYSM (H): Primary | Chronic | ICD-10-CM

## 2020-05-14 NOTE — TELEPHONE ENCOUNTER
Patient had AAA US done. Needs to see vascular surgery. No referral was placed.   Order pending   MALINDA MELENDEZ LPN

## 2020-05-15 ENCOUNTER — VIRTUAL VISIT (OUTPATIENT)
Dept: INTERNAL MEDICINE | Facility: OTHER | Age: 65
End: 2020-05-15
Attending: INTERNAL MEDICINE
Payer: COMMERCIAL

## 2020-05-15 VITALS — SYSTOLIC BLOOD PRESSURE: 135 MMHG | DIASTOLIC BLOOD PRESSURE: 85 MMHG | HEART RATE: 63 BPM

## 2020-05-15 DIAGNOSIS — I10 BENIGN ESSENTIAL HYPERTENSION: Primary | ICD-10-CM

## 2020-05-15 PROCEDURE — 99213 OFFICE O/P EST LOW 20 MIN: CPT | Mod: 95 | Performed by: INTERNAL MEDICINE

## 2020-05-15 RX ORDER — LISINOPRIL AND HYDROCHLOROTHIAZIDE 12.5; 2 MG/1; MG/1
1 TABLET ORAL DAILY
Qty: 90 TABLET | Refills: 1 | Status: SHIPPED | OUTPATIENT
Start: 2020-05-15 | End: 2020-11-11

## 2020-05-15 ASSESSMENT — PAIN SCALES - GENERAL: PAINLEVEL: NO PAIN (0)

## 2020-05-15 NOTE — PROGRESS NOTES
LVM to CB to schedule T-visit for 2 wk F/U Hypertension. Mailed AVS with notation to such. Spoke with [facility] and yes confirmed they have received the faxed referral.  5/15 Dereck

## 2020-05-15 NOTE — PROGRESS NOTES
"Gilberto Camilo is a 65 year old male who is being evaluated via a billable telephone visit.      The patient has been notified of following:     \"This telephone visit will be conducted via a call between you and your physician/provider. We have found that certain health care needs can be provided without the need for a physical exam.  This service lets us provide the care you need with a short phone conversation.  If a prescription is necessary we can send it directly to your pharmacy.  If lab work is needed we can place an order for that and you can then stop by our lab to have the test done at a later time.    Telephone visits are billed at different rates depending on your insurance coverage. During this emergency period, for some insurers they may be billed the same as an in-person visit.  Please reach out to your insurance provider with any questions.    If during the course of the call the physician/provider feels a telephone visit is not appropriate, you will not be charged for this service.\"    Patient has given verbal consent for Telephone visit?  Yes    What phone number would you like to be contacted at? [phone]    How would you like to obtain your AVS? Angela Sanford     Gilberto Camilo is a 65 year old male who presents to clinic today for the following health issues:    HPI  Hypertension Follow-up      Do you check your blood pressure regularly outside of the clinic? Yes     Are you following a low salt diet? Yes    Are your blood pressures ever more than 140 on the top number (systolic) OR more   than 90 on the bottom number (diastolic), for example 140/90? Yes     BP numbers    Range :  119-148/70s  Herat rate: 56-60 BPM  BP Readings from Last 6 Encounters:   05/15/20 135/85   10/02/19 138/88   10/16/18 136/72   09/07/18 142/80   06/26/18 138/62   11/15/17 142/84     He denies any shortness of breath cough , wheezing, chest pain, leg edema, dizziness, headaches or vision " changes.  -------------------------------------    Patient Active Problem List   Diagnosis     Abdominal aortic aneurysm (H)     Erectile dysfunction     ACP (advance care planning)     Routine general medical examination at a health care facility     Essential hypertension     Mixed hyperlipidemia     Skin lesion     Past Surgical History:   Procedure Laterality Date     APPENDECTOMY       COLONOSCOPY  2013     ESOPHAGOGASTRODUODENOSCOPY  2013       Social History     Tobacco Use     Smoking status: Former Smoker     Packs/day: 0.00     Types: Cigarettes     Smokeless tobacco: Never Used     Tobacco comment: Feb. 2014   Substance Use Topics     Alcohol use: Yes     Comment: occasional     Family History   Problem Relation Age of Onset     C.A.D. Father         CABG in late 40's     C.A.D. Brother         MI in 50's           Reviewed and updated as needed this visit by Provider         Review of Systems   Constitutional, HEENT, cardiovascular, pulmonary, gi and gu systems are negative, except as otherwise noted.       Objective   Reported vitals:  /85   Pulse 63      PSYCH: Alert and oriented times 3; coherent speech, normal   rate and volume, able to articulate logical thoughts, able   to abstract reason, no tangential thoughts, no hallucinations   or delusions  His affect is normal and pleasant  RESP: No cough, no audible wheezing, able to talk in full sentences  Remainder of exam unable to be completed due to telephone visits    Diagnostic Test Results:  Labs reviewed in Epic  none         Assessment/Plan:  (I10) Benign essential hypertension  (primary encounter diagnosis)  Comment: Borderline control.  He is beta blocked.  Plan:  He will continue Toprol 25 mg daily and start lisinopril-hydrochlorothiazide (ZESTORETIC) 20-12.5 MG tablet.  He will stop his Lisinopril 30 mg daily.      No follow-ups on file.  Follow up with Provider - Via telephone call in 2 weeks for HTN             Phone call duration:  9  minutes    Luiz Carcamo, DO, DO

## 2020-05-29 ENCOUNTER — VIRTUAL VISIT (OUTPATIENT)
Dept: INTERNAL MEDICINE | Facility: OTHER | Age: 65
End: 2020-05-29
Attending: INTERNAL MEDICINE
Payer: COMMERCIAL

## 2020-05-29 ENCOUNTER — TRANSFERRED RECORDS (OUTPATIENT)
Dept: HEALTH INFORMATION MANAGEMENT | Facility: CLINIC | Age: 65
End: 2020-05-29

## 2020-05-29 VITALS — DIASTOLIC BLOOD PRESSURE: 78 MMHG | SYSTOLIC BLOOD PRESSURE: 128 MMHG

## 2020-05-29 DIAGNOSIS — I10 BENIGN ESSENTIAL HYPERTENSION: Primary | ICD-10-CM

## 2020-05-29 DIAGNOSIS — F33.1 MODERATE RECURRENT MAJOR DEPRESSION (H): ICD-10-CM

## 2020-05-29 PROCEDURE — 99213 OFFICE O/P EST LOW 20 MIN: CPT | Mod: 95 | Performed by: INTERNAL MEDICINE

## 2020-05-29 RX ORDER — ARIPIPRAZOLE 5 MG/1
5 TABLET ORAL DAILY
Qty: 90 TABLET | Refills: 0 | Status: SHIPPED | OUTPATIENT
Start: 2020-05-29 | End: 2020-08-19

## 2020-05-29 ASSESSMENT — PATIENT HEALTH QUESTIONNAIRE - PHQ9: SUM OF ALL RESPONSES TO PHQ QUESTIONS 1-9: 0

## 2020-05-29 ASSESSMENT — PAIN SCALES - GENERAL: PAINLEVEL: NO PAIN (0)

## 2020-05-29 NOTE — PROGRESS NOTES
"Gilberto Camilo is a 65 year old male who is being evaluated via a billable telephone visit.      The patient has been notified of following:     \"This telephone visit will be conducted via a call between you and your physician/provider. We have found that certain health care needs can be provided without the need for a physical exam.  This service lets us provide the care you need with a short phone conversation.  If a prescription is necessary we can send it directly to your pharmacy.  If lab work is needed we can place an order for that and you can then stop by our lab to have the test done at a later time.    Telephone visits are billed at different rates depending on your insurance coverage. During this emergency period, for some insurers they may be billed the same as an in-person visit.  Please reach out to your insurance provider with any questions.    If during the course of the call the physician/provider feels a telephone visit is not appropriate, you will not be charged for this service.\"    Patient has given verbal consent for Telephone visit?  Yes    What phone number would you like to be contacted at? [phone]    How would you like to obtain your AVS? Mail a copy    Subjective     Gilberto Camilo is a 65 year old male who presents via phone visit today for the following health issues:    HPI  Hypertension Follow-up      Do you check your blood pressure regularly outside of the clinic? Yes     Are you following a low salt diet? No    Are your blood pressures ever more than 140 on the top number (systolic) OR more   than 90 on the bottom number (diastolic), for example 140/90? No      BP:  115-135/ 70-80.  Heart rate 60-70.  He denies any chest pains, palpitation, shortness of breath, dizziness or headaches.    BP Readings from Last 6 Encounters:   05/29/20 128/78   05/15/20 135/85   10/02/19 138/88   10/16/18 136/72   09/07/18 142/80   06/26/18 138/62        He will have a CT scan today and he will " see the surgeon for his AAA this afternoon        Depression;  He feels that his depression is increasing.  He is taking his Wellbutrin daily.    Patient Active Problem List   Diagnosis     Abdominal aortic aneurysm (H)     Erectile dysfunction     ACP (advance care planning)     Routine general medical examination at a health care facility     Essential hypertension     Mixed hyperlipidemia     Skin lesion     Past Surgical History:   Procedure Laterality Date     APPENDECTOMY       COLONOSCOPY  2013     ESOPHAGOGASTRODUODENOSCOPY  2013       Social History     Tobacco Use     Smoking status: Former Smoker     Packs/day: 0.00     Types: Cigarettes     Smokeless tobacco: Never Used     Tobacco comment: Feb. 2014   Substance Use Topics     Alcohol use: Yes     Comment: occasional     Family History   Problem Relation Age of Onset     C.A.D. Father         CABG in late 40's     C.A.D. Brother         MI in 50's         Current Outpatient Medications   Medication Sig Dispense Refill     atorvastatin (LIPITOR) 80 MG tablet TAKE 1 TABLET(80 MG) BY MOUTH DAILY 90 tablet 2     buPROPion (WELLBUTRIN XL) 150 MG 24 hr tablet TAKE 2 TABLETS BY MOUTH EVERY  tablet 0     buPROPion (WELLBUTRIN XL) 300 MG 24 hr tablet TAKE 1 TABLET(300 MG) BY MOUTH DAILY 90 tablet 1     lisinopril-hydrochlorothiazide (ZESTORETIC) 20-12.5 MG tablet Take 1 tablet by mouth daily 90 tablet 1     loratadine (CLARITIN) 10 MG tablet Take 10 mg by mouth daily       metoprolol succinate ER (TOPROL-XL) 25 MG 24 hr tablet Take 1 tablet (25 mg) by mouth daily 90 tablet 3     Multiple Vitamins-Minerals (MULTIVITAMIN ADULT PO)        omeprazole (PRILOSEC) 20 MG DR capsule Take 1 capsule (20 mg) by mouth every morning 30 capsule 0       Reviewed and updated as needed this visit by Provider         Review of Systems   Constitutional, HEENT, cardiovascular, pulmonary, gi and gu systems are negative, except as otherwise noted.       Objective   Reported  vitals:  There were no vitals taken for this visit.     PSYCH: Alert and oriented times 3; coherent speech, normal   rate and volume, able to articulate logical thoughts, able   to abstract reason, no tangential thoughts, no hallucinations   or delusions  His affect is normal and pleasant  RESP: No cough, no audible wheezing, able to talk in full sentences  Remainder of exam unable to be completed due to telephone visits    Diagnostic Test Results:  Labs reviewed in Epic  none         Assessment/Plan:    (I10) Benign essential hypertension  (primary encounter diagnosis)  Comment: Improved with the addition of hydrochlorothiazide   Plan:   He will continue Toprol 25 mg daily and Lisinopril - HCTZ 20-12.5 mg daily     (F33.1) Moderate recurrent major depression (H)  Comment:   Plan:   Add ARIPiprazole (ABILIFY) 5 MG tablet and continue Wellbutrin 450 mg daily        Follow up with Provider - 3 weeks for depression           Service Time  Start time : 1035  Stop Time: 1046    Phone call duration:  11 minutes    Luiz Carcamo DO, DO

## 2020-05-29 NOTE — LETTER
My Depression Action Plan  Name: Gilberto Camilo   Date of Birth 1955  Date: 5/29/2020    My doctor: Luiz Carcamo   My clinic: [facility] - [place]  [address] [place] [address]  [phone]          GREEN    ZONE   Good Control    What it looks like:     Things are going generally well. You have normal ups and downs. You may even feel depressed from time to time, but bad moods usually last less than a day.   What you need to do:  1. Continue to care for yourself (see self care plan)  2. Check your depression survival kit and update it as needed  3. Follow your physician s recommendations including any medication.  4. Do not stop taking medication unless you consult with your physician first.           YELLOW         ZONE Getting Worse    What it looks like:     Depression is starting to interfere with your life.     It may be hard to get out of bed; you may be starting to isolate yourself from others.    Symptoms of depression are starting to last most all day and this has happened for several days.     You may have suicidal thoughts but they are not constant.   What you need to do:     1. Call your care team. Your response to treatment will improve if you keep your care team informed of your progress. Yellow periods are signs an adjustment may need to be made.     2. Continue your self-care.  Just get dressed and ready for the day.  Don't give yourself time to talk yourself out of it.    3. Talk to someone in your support network.    4. Open up your Depression Self-Care Plan/Wellness Kit.           RED    ZONE Medical Alert - Get Help    What it looks like:     Depression is seriously interfering with your life.     You may experience these or other symptoms: You can t get out of bed most days, can t work or engage in other necessary activities, you have trouble taking care of basic hygiene, or basic responsibilities, thoughts of suicide or death that will not go  away, self-injurious behavior.     What you need to do:  1. Call your care team and request a same-day appointment. If they are not available (weekends or after hours) call your local crisis line, emergency room or 911.            Depression Self-Care Plan / Wellness Kit    Self-Care for Depression  Here s the deal. Your body and mind are really not as separate as most people think.  What you do and think affects how you feel and how you feel influences what you do and think. This means if you do things that people who feel good do, it will help you feel better.  Sometimes this is all it takes.  There is also a place for medication and therapy depending on how severe your depression is, so be sure to consult with your medical provider and/ or Behavioral Health Consultant if your symptoms are worsening or not improving.     In order to better manage my stress, I will:    Exercise  Get some form of exercise, every day. This will help reduce pain and release endorphins, the  feel good  chemicals in your brain. This is almost as good as taking antidepressants!  This is not the same as joining a gym and then never going! (they count on that by the way ) It can be as simple as just going for a walk or doing some gardening, anything that will get you moving.      Hygiene   Maintain good hygiene (get out of bed in the morning, make your bed, brush your teeth, take a shower, and get dressed like you were going to work, even if you are unemployed).  If your clothes don't fit try to get ones that do.    Diet  Strive to eat foods that are good for me, drink plenty of water, and avoid excessive sugar, caffeine, alcohol, and other mood-altering substances.  Some foods that are helpful in depression are: complex carbohydrates, B vitamins, flaxseed, fish or fish oil, fresh fruits and vegetables.    Psychotherapy  Agree to participate in Individual Therapy (if recommended).    Medication  If prescribed medications, I agree to take  them.  Missing doses can result in serious side effects.  I understand that drinking alcohol, or other illicit drug use, may cause potential side effects.  I will not stop my medication abruptly without first discussing it with my provider.    Staying Connected With Others  Stay in touch with my friends, family members, and my primary care provider/team.    Use your imagination  Be creative.  We all have a creative side; it doesn t matter if it s oil painting, sand castles, or mud pies! This will also kick up the endorphins.    Witness Beauty  (AKA stop and smell the roses) Take a look outside, even in mid-winter. Notice colors, textures. Watch the squirrels and birds.     Service to others  Be of service to others.  There is always someone else in need.  By helping others we can  get out of ourselves  and remember the really important things.  This also provides opportunities for practicing all the other parts of the program.    Humor  Laugh and be silly!  Adjust your TV habits for less news and crime-drama and more comedy.    Control your stress  Try breathing deep, massage therapy, biofeedback, and meditation. Find time to relax each day.     Crisis Text Line  http://www.crisistextline.org    The Crisis Text Line serves anyone, in any type of crisis, providing access to free, 24/7 support and information via the medium people already use and trust:    Here's how it works:  1.  Text 419-484 from anywhere in the [place], anytime, about any type of crisis.  2.  A live, trained Crisis Counselor receives the text and responds quickly.  3.  The volunteer Crisis Counselor will help you move from a 'hot moment to a cool moment'.    My support system    Clinic Contact:  Phone number:    Contact 1:  Phone number:    Contact 2:  Phone number:    Voodoo/:  Phone number:    Therapist:  Phone number:    Local crisis center:    Phone number:    Other community support:  Phone number:

## 2020-06-03 ENCOUNTER — ANCILLARY PROCEDURE (OUTPATIENT)
Dept: GENERAL RADIOLOGY | Facility: OTHER | Age: 65
End: 2020-06-03
Attending: INTERNAL MEDICINE
Payer: COMMERCIAL

## 2020-06-03 ENCOUNTER — OFFICE VISIT (OUTPATIENT)
Dept: INTERNAL MEDICINE | Facility: OTHER | Age: 65
End: 2020-06-03
Attending: INTERNAL MEDICINE
Payer: COMMERCIAL

## 2020-06-03 VITALS
HEIGHT: 72 IN | OXYGEN SATURATION: 96 % | HEART RATE: 59 BPM | TEMPERATURE: 99 F | WEIGHT: 248 LBS | DIASTOLIC BLOOD PRESSURE: 76 MMHG | RESPIRATION RATE: 16 BRPM | SYSTOLIC BLOOD PRESSURE: 126 MMHG | BODY MASS INDEX: 33.59 KG/M2

## 2020-06-03 DIAGNOSIS — I10 ESSENTIAL HYPERTENSION: ICD-10-CM

## 2020-06-03 DIAGNOSIS — Z01.818 PREOP GENERAL PHYSICAL EXAM: Primary | ICD-10-CM

## 2020-06-03 DIAGNOSIS — Z01.818 PREOP GENERAL PHYSICAL EXAM: ICD-10-CM

## 2020-06-03 DIAGNOSIS — I71.40 ABDOMINAL AORTIC ANEURYSM (AAA) WITHOUT RUPTURE (H): Chronic | ICD-10-CM

## 2020-06-03 DIAGNOSIS — E78.2 MIXED HYPERLIPIDEMIA: ICD-10-CM

## 2020-06-03 LAB
ALBUMIN SERPL-MCNC: 4 G/DL (ref 3.4–5)
ALP SERPL-CCNC: 92 U/L (ref 40–150)
ALT SERPL W P-5'-P-CCNC: 48 U/L (ref 0–70)
ANION GAP SERPL CALCULATED.3IONS-SCNC: 4 MMOL/L (ref 3–14)
AST SERPL W P-5'-P-CCNC: 35 U/L (ref 0–45)
BASOPHILS # BLD AUTO: 0.1 10E9/L (ref 0–0.2)
BASOPHILS NFR BLD AUTO: 0.7 %
BILIRUB SERPL-MCNC: 0.7 MG/DL (ref 0.2–1.3)
BUN SERPL-MCNC: 18 MG/DL (ref 7–30)
CALCIUM SERPL-MCNC: 9.6 MG/DL (ref 8.5–10.1)
CHLORIDE SERPL-SCNC: 103 MMOL/L (ref 94–109)
CO2 SERPL-SCNC: 29 MMOL/L (ref 20–32)
CREAT SERPL-MCNC: 0.92 MG/DL (ref 0.66–1.25)
DIFFERENTIAL METHOD BLD: NORMAL
EOSINOPHIL # BLD AUTO: 0.4 10E9/L (ref 0–0.7)
EOSINOPHIL NFR BLD AUTO: 3.4 %
ERYTHROCYTE [DISTWIDTH] IN BLOOD BY AUTOMATED COUNT: 13 % (ref 10–15)
GFR SERPL CREATININE-BSD FRML MDRD: 85 ML/MIN/{1.73_M2}
GLUCOSE SERPL-MCNC: 105 MG/DL (ref 70–99)
HCT VFR BLD AUTO: 40.8 % (ref 40–53)
HGB BLD-MCNC: 13.6 G/DL (ref 13.3–17.7)
IMM GRANULOCYTES # BLD: 0 10E9/L (ref 0–0.4)
IMM GRANULOCYTES NFR BLD: 0.4 %
LYMPHOCYTES # BLD AUTO: 2.7 10E9/L (ref 0.8–5.3)
LYMPHOCYTES NFR BLD AUTO: 26.4 %
MCH RBC QN AUTO: 30.2 PG (ref 26.5–33)
MCHC RBC AUTO-ENTMCNC: 33.3 G/DL (ref 31.5–36.5)
MCV RBC AUTO: 91 FL (ref 78–100)
MONOCYTES # BLD AUTO: 1.1 10E9/L (ref 0–1.3)
MONOCYTES NFR BLD AUTO: 10.9 %
NEUTROPHILS # BLD AUTO: 5.9 10E9/L (ref 1.6–8.3)
NEUTROPHILS NFR BLD AUTO: 58.2 %
NRBC # BLD AUTO: 0 10*3/UL
NRBC BLD AUTO-RTO: 0 /100
PLATELET # BLD AUTO: 261 10E9/L (ref 150–450)
POTASSIUM SERPL-SCNC: 4.2 MMOL/L (ref 3.4–5.3)
PROT SERPL-MCNC: 7.7 G/DL (ref 6.8–8.8)
RBC # BLD AUTO: 4.5 10E12/L (ref 4.4–5.9)
SODIUM SERPL-SCNC: 136 MMOL/L (ref 133–144)
WBC # BLD AUTO: 10.2 10E9/L (ref 4–11)

## 2020-06-03 PROCEDURE — 80053 COMPREHEN METABOLIC PANEL: CPT | Mod: ZL | Performed by: INTERNAL MEDICINE

## 2020-06-03 PROCEDURE — 93005 ELECTROCARDIOGRAM TRACING: CPT

## 2020-06-03 PROCEDURE — 99214 OFFICE O/P EST MOD 30 MIN: CPT | Mod: 25 | Performed by: INTERNAL MEDICINE

## 2020-06-03 PROCEDURE — 71046 X-RAY EXAM CHEST 2 VIEWS: CPT | Mod: TC

## 2020-06-03 PROCEDURE — 85025 COMPLETE CBC W/AUTO DIFF WBC: CPT | Mod: ZL | Performed by: INTERNAL MEDICINE

## 2020-06-03 PROCEDURE — G0463 HOSPITAL OUTPT CLINIC VISIT: HCPCS | Mod: 25

## 2020-06-03 PROCEDURE — G0463 HOSPITAL OUTPT CLINIC VISIT: HCPCS

## 2020-06-03 PROCEDURE — 36415 COLL VENOUS BLD VENIPUNCTURE: CPT | Mod: ZL | Performed by: INTERNAL MEDICINE

## 2020-06-03 PROCEDURE — 93010 ELECTROCARDIOGRAM REPORT: CPT | Performed by: INTERNAL MEDICINE

## 2020-06-03 RX ORDER — MULTIVIT WITH MINERALS/LUTEIN
1000 TABLET ORAL EVERY MORNING
COMMUNITY

## 2020-06-03 ASSESSMENT — PAIN SCALES - GENERAL: PAINLEVEL: NO PAIN (0)

## 2020-06-03 ASSESSMENT — MIFFLIN-ST. JEOR: SCORE: 1937.92

## 2020-06-03 NOTE — NURSING NOTE
Chief Complaint   Patient presents with     Pre-Op Exam       Initial /76 (BP Location: Right arm, Patient Position: Sitting, Cuff Size: Adult Regular)   Pulse 59   Temp 99  F (37.2  C) (Tympanic)   Resp 16   Ht 1.829 m (6')   Wt 112.5 kg (248 lb)   SpO2 96%   BMI 33.63 kg/m   Estimated body mass index is 33.63 kg/m  as calculated from the following:    Height as of this encounter: 1.829 m (6').    Weight as of this encounter: 112.5 kg (248 lb).  Medication Reconciliation: complete  oL Landry MA

## 2020-06-03 NOTE — PATIENT INSTRUCTIONS
Only Medication to take morning of procedure is Metoprolol.      Before Your Surgery      Call your surgeon if there is any change in your health. This includes signs of a cold or flu (such as a sore throat, runny nose, cough, rash or fever).    Do not smoke, drink alcohol or take over the counter medicine (unless your surgeon or primary care doctor tells you to) for the 24 hours before and after surgery.    If you take prescribed drugs: Follow your doctor s orders about which medicines to take and which to stop until after surgery.    Eating and drinking prior to surgery: follow the instructions from your surgeon    Take a shower or bath the night before surgery. Use the soap your surgeon gave you to gently clean your skin. If you do not have soap from your surgeon, use your regular soap. Do not shave or scrub the surgery site.  Wear clean pajamas and have clean sheets on your bed.

## 2020-06-03 NOTE — PROGRESS NOTES
[facility] - [place]  [address]  [place] [address]  [phone]  Dept: [phone]    PRE-OP EVALUATION:  Today's date: 6/3/2020    Gilbertoisaiah Camilo (: 1955) presents for pre-operative evaluation assessment as requested by Dr. Davidson.  He requires evaluation and anesthesia risk assessment prior to undergoing surgery/procedure for treatment of aneurism .    Proposed Surgery/ Procedure: Aortic Aneurism stent placement  Date of Surgery/ Procedure: 20  Time of Surgery/ Procedure: [facility]  Hospital/Surgical Facility: [facility]  Primary Physician: Luiz Carcamo  Type of Anesthesia Anticipated: to be determined    Patient has a Health Care Directive or Living Will:  NO    1. NO - Do you have a history of heart attack, stroke, stent, bypass or surgery on an artery in the head, neck, heart or legs?  2. NO - Do you ever have any pain or discomfort in your chest?  3. NO - Do you have a history of  Heart Failure?  4. YES - ARE YOUR TROUBLED BY SHORTNESS OF BREATH WHEN WALKING ON THE LEVEL, UP A SLIGHT HILL OR AT NIGHT? On slight hill  5. NO - Do you currently have a cold, bronchitis or other respiratory infection?  6. NO - Do you have a cough, shortness of breath or wheezing?  7. YES - DO YOU SOMETIMES GET PAINS IN THE CALVES OF YOUR LEGS WHEN YOU WALK?   8. NO - Do you or anyone in your family have previous history of blood clots?  9. NO - Do you or does anyone in your family have a serious bleeding problem such as prolonged bleeding following surgeries or cuts?  10. NO - Have you ever had problems with anemia or been told to take iron pills?  11. NO - Have you had any abnormal blood loss such as black, tarry or bloody stools, or abnormal vaginal bleeding?  12. NO - Have you ever had a blood transfusion?  13. NO - Have you or any of your relatives ever had problems with anesthesia?  14. NO - Do you have sleep apnea, excessive snoring or daytime drowsiness?  15. NO - Do you have any  prosthetic heart valves?  16. NO - Do you have prosthetic joints?  17. NO - Is there any chance that you may be pregnant?      HPI:     HPI related to upcoming procedure: AAA Repair       HYPERLIPIDEMIA - Patient has a long history of significant Hyperlipidemia requiring medication for treatment with recent fair control. Patient reports no problems or side effects with the medication.     HYPERTENSION - Patient has longstanding history of HTN , currently denies any symptoms referable to elevated blood pressure. Specifically denies chest pain, palpitations, dyspnea, orthopnea, PND or peripheral edema. Blood pressure readings have been in normal range. Current medication regimen is as listed below. Patient denies any side effects of medication.       MEDICAL HISTORY:     Patient Active Problem List    Diagnosis Date Noted     Mixed hyperlipidemia 06/26/2018     Priority: Medium     Skin lesion 06/26/2018     Priority: Medium     Essential hypertension 10/11/2017     Priority: Medium     ACP (advance care planning) 05/11/2016     Priority: Medium     Advance Care Planning 5/11/2016: ACP Review of Chart / Resources Provided:  Reviewed chart for advance care plan.  Gilberto Camilo has no plan or code status on file. Discussed available resources and provided with information. Confirmed code status reflects current choices pending further ACP discussions.  Confirmed/documented legally designated decision makers.  Added by Lorenza Jordan             Routine general medical examination at a health care facility 05/11/2016     Priority: Medium     Erectile dysfunction 05/18/2015     Priority: Medium     Abdominal aortic aneurysm (H) 03/18/2014     Priority: Medium     Problem list name updated by automated process. Provider to review        Past Medical History:   Diagnosis Date     AAA (abdominal aortic aneurysm) (H) 09/20/2017    5.3 cm      Mohan esophagus      Closed rib fracture      DNS (deviated nasal septum)       Past Surgical History:   Procedure Laterality Date     APPENDECTOMY       COLONOSCOPY  2013     ESOPHAGOGASTRODUODENOSCOPY  2013     Current Outpatient Medications   Medication Sig Dispense Refill     ARIPiprazole (ABILIFY) 5 MG tablet Take 1 tablet (5 mg) by mouth daily 90 tablet 0     atorvastatin (LIPITOR) 80 MG tablet TAKE 1 TABLET(80 MG) BY MOUTH DAILY 90 tablet 2     buPROPion (WELLBUTRIN XL) 150 MG 24 hr tablet TAKE 2 TABLETS BY MOUTH EVERY  tablet 0     buPROPion (WELLBUTRIN XL) 300 MG 24 hr tablet TAKE 1 TABLET(300 MG) BY MOUTH DAILY 90 tablet 1     lisinopril-hydrochlorothiazide (ZESTORETIC) 20-12.5 MG tablet Take 1 tablet by mouth daily 90 tablet 1     loratadine (CLARITIN) 10 MG tablet Take 10 mg by mouth daily       metoprolol succinate ER (TOPROL-XL) 25 MG 24 hr tablet Take 1 tablet (25 mg) by mouth daily 90 tablet 3     Multiple Vitamins-Minerals (MULTIVITAMIN ADULT PO)        omeprazole (PRILOSEC) 20 MG DR capsule Take 1 capsule (20 mg) by mouth every morning 30 capsule 0     OTC products: None, except as noted above    No Known Allergies   Latex Allergy: NO    Social History     Tobacco Use     Smoking status: Former Smoker     Packs/day: 0.00     Types: Cigarettes     Smokeless tobacco: Never Used     Tobacco comment: Feb. 2014   Substance Use Topics     Alcohol use: Yes     Comment: occasional     History   Drug Use No       REVIEW OF SYSTEMS:   Constitutional, neuro, ENT, endocrine, pulmonary, cardiac, gastrointestinal, genitourinary, musculoskeletal, integument and psychiatric systems are negative, except as otherwise noted.    EXAM:   /76 (BP Location: Right arm, Patient Position: Sitting, Cuff Size: Adult Regular)   Pulse 59   Temp 99  F (37.2  C) (Tympanic)   Resp 16   Ht 1.829 m (6')   Wt 112.5 kg (248 lb)   SpO2 96%   BMI 33.63 kg/m          GENERAL APPEARANCE: healthy, alert and no distress     EYES: EOMI,  PERRL     HENT: ear canals and TM's normal and nose and  mouth without ulcers or lesions     RESP: lungs clear to auscultation - no rales, rhonchi or wheezes     CV: regular rates and rhythm, normal S1 S2, no S3 or S4 and no murmur, click or rub     ABDOMEN:  soft, nontender, no HSM or masses and bowel sounds normal     MS: extremities normal- no gross deformities noted, no evidence of inflammation in joints, FROM in all extremities.     SKIN: no suspicious lesions or rashes     NEURO: Normal strength and tone, sensory exam grossly normal, mentation intact and speech normal     PSYCH: mentation appears normal. and affect normal/bright    DIAGNOSTICS:   EKG: Sinus BVradycardia    Recent Labs   Lab Test 10/02/19  1120 09/07/18  1600 06/26/18  1515  05/11/16  1055 03/18/14  1755   HGB  --   --  14.5  --  15.5 14.4   PLT  --   --  239  --  251 234   INR  --   --   --   --   --  0.91    139 139  --  138 135*   POTASSIUM 4.3 4.2 4.3   < > 4.3 4.1   CR 0.91 0.93 0.95   < > 0.87 0.95    < > = values in this interval not displayed.    PROCEDURE: XR CHEST 2 VW 6/3/2020 3:07 PM     HISTORY: Preop general physical exam     COMPARISONS: None.     TECHNIQUE: 2 views.     FINDINGS: Heart and pulmonary vasculature are normal. There is some  elongation of the thoracic aorta. No confluent infiltrate or pleural  effusion is seen.                                                                        IMPRESSION: No acute infiltrate.     SUKHJINDER BISOHP MD    Results for orders placed or performed in visit on 06/03/20   XR CHEST 2 VW (Clinic Performed)     Status: None    Narrative    PROCEDURE: XR CHEST 2 VW 6/3/2020 3:07 PM    HISTORY: Preop general physical exam    COMPARISONS: None.    TECHNIQUE: 2 views.    FINDINGS: Heart and pulmonary vasculature are normal. There is some  elongation of the thoracic aorta. No confluent infiltrate or pleural  effusion is seen.         Impression    IMPRESSION: No acute infiltrate.    SUKHJINDER BISHOP MD   Results for orders placed or performed  in visit on 06/03/20   Comprehensive metabolic panel (BMP + Alb, Alk Phos, ALT, AST, Total. Bili, TP)     Status: Abnormal   Result Value Ref Range    Sodium 136 133 - 144 mmol/L    Potassium 4.2 3.4 - 5.3 mmol/L    Chloride 103 94 - 109 mmol/L    Carbon Dioxide 29 20 - 32 mmol/L    Anion Gap 4 3 - 14 mmol/L    Glucose 105 (H) 70 - 99 mg/dL    Urea Nitrogen 18 7 - 30 mg/dL    Creatinine 0.92 0.66 - 1.25 mg/dL    GFR Estimate 85 >60 mL/min/[1.73_m2]    GFR Estimate If Black >90 >60 mL/min/[1.73_m2]    Calcium 9.6 8.5 - 10.1 mg/dL    Bilirubin Total 0.7 0.2 - 1.3 mg/dL    Albumin 4.0 3.4 - 5.0 g/dL    Protein Total 7.7 6.8 - 8.8 g/dL    Alkaline Phosphatase 92 40 - 150 U/L    ALT 48 0 - 70 U/L    AST 35 0 - 45 U/L   CBC with platelets and differential     Status: None   Result Value Ref Range    WBC 10.2 4.0 - 11.0 10e9/L    RBC Count 4.50 4.4 - 5.9 10e12/L    Hemoglobin 13.6 13.3 - 17.7 g/dL    Hematocrit 40.8 40.0 - 53.0 %    MCV 91 78 - 100 fl    MCH 30.2 26.5 - 33.0 pg    MCHC 33.3 31.5 - 36.5 g/dL    RDW 13.0 10.0 - 15.0 %    Platelet Count 261 150 - 450 10e9/L    Diff Method Automated Method     % Neutrophils 58.2 %    % Lymphocytes 26.4 %    % Monocytes 10.9 %    % Eosinophils 3.4 %    % Basophils 0.7 %    % Immature Granulocytes 0.4 %    Nucleated RBCs 0 0 /100    Absolute Neutrophil 5.9 1.6 - 8.3 10e9/L    Absolute Lymphocytes 2.7 0.8 - 5.3 10e9/L    Absolute Monocytes 1.1 0.0 - 1.3 10e9/L    Absolute Eosinophils 0.4 0.0 - 0.7 10e9/L    Absolute Basophils 0.1 0.0 - 0.2 10e9/L    Abs Immature Granulocytes 0.0 0 - 0.4 10e9/L    Absolute Nucleated RBC 0.0        IMPRESSION:   Reason for surgery/procedure: AAA    The proposed surgical procedure is considered INTERMEDIATE risk.    REVISED CARDIAC RISK INDEX  The patient has the following serious cardiovascular risks for perioperative complications such as (MI, PE, VFib and 3  AV Block):  No serious cardiac risks  INTERPRETATION: 1 risks: Class II (low risk - 0.9%  complication rate)    The patient has the following additional risks for perioperative complications:  No identified additional risks  The 10-year ASCVD risk score ([place] TORRES Jr., et al., 2013) is: 16.4%    Values used to calculate the score:      Age: 65 years      Sex: Male      Is Non- : No      Diabetic: No      Tobacco smoker: No      Systolic Blood Pressure: 126 mmHg      Is BP treated: Yes      HDL Cholesterol: 48 mg/dL      Total Cholesterol: 190 mg/dL    No diagnosis found.    RECOMMENDATIONS:     --Consult hospital rounder / IM to assist post-op medical management    --Patient is to take Metoprolol with a small sip of water morning of procedure    APPROVAL GIVEN to proceed with proposed procedure, without further diagnostic evaluation       Signed Electronically by: Ayaan Blackwell DO    Copy of this evaluation report is provided to requesting physician.    [facility] Preop Guidelines    Revised Cardiac Risk Index

## 2020-06-12 ENCOUNTER — TELEPHONE (OUTPATIENT)
Dept: PEDIATRICS | Facility: OTHER | Age: 65
End: 2020-06-12

## 2020-06-12 NOTE — TELEPHONE ENCOUNTER
"Rec'd Fax Request from [facility] for Preop.   Resent preop to [phone]    Called and spoke with Ember at [facility]'s to confirm PT completed COVID test; \"yes at [facility]\".   "

## 2020-06-17 ENCOUNTER — TRANSFERRED RECORDS (OUTPATIENT)
Dept: HEALTH INFORMATION MANAGEMENT | Facility: CLINIC | Age: 65
End: 2020-06-17

## 2020-06-19 ENCOUNTER — VIRTUAL VISIT (OUTPATIENT)
Dept: PEDIATRICS | Facility: OTHER | Age: 65
End: 2020-06-19
Attending: INTERNAL MEDICINE
Payer: COMMERCIAL

## 2020-06-19 DIAGNOSIS — E78.2 MIXED HYPERLIPIDEMIA: ICD-10-CM

## 2020-06-19 DIAGNOSIS — F32.1 MODERATE MAJOR DEPRESSION (H): ICD-10-CM

## 2020-06-19 DIAGNOSIS — I10 ESSENTIAL HYPERTENSION: Primary | ICD-10-CM

## 2020-06-19 DIAGNOSIS — F33.1 MODERATE RECURRENT MAJOR DEPRESSION (H): ICD-10-CM

## 2020-06-19 PROCEDURE — 99213 OFFICE O/P EST LOW 20 MIN: CPT | Mod: 95 | Performed by: INTERNAL MEDICINE

## 2020-06-19 RX ORDER — ATORVASTATIN CALCIUM 80 MG/1
TABLET, FILM COATED ORAL
Qty: 90 TABLET | Refills: 3 | Status: SHIPPED | OUTPATIENT
Start: 2020-06-19 | End: 2021-01-05

## 2020-06-19 RX ORDER — BUPROPION HYDROCHLORIDE 300 MG/1
TABLET ORAL
Qty: 90 TABLET | Refills: 3 | Status: SHIPPED | OUTPATIENT
Start: 2020-06-19 | End: 2021-06-15

## 2020-06-19 RX ORDER — BUPROPION HYDROCHLORIDE 150 MG/1
TABLET ORAL
Qty: 180 TABLET | Refills: 3 | Status: SHIPPED | OUTPATIENT
Start: 2020-06-19 | End: 2020-09-02

## 2020-06-19 ASSESSMENT — PAIN SCALES - GENERAL: PAINLEVEL: MILD PAIN (2)

## 2020-06-19 ASSESSMENT — ANXIETY QUESTIONNAIRES
6. BECOMING EASILY ANNOYED OR IRRITABLE: NOT AT ALL
5. BEING SO RESTLESS THAT IT IS HARD TO SIT STILL: NOT AT ALL
2. NOT BEING ABLE TO STOP OR CONTROL WORRYING: NOT AT ALL
1. FEELING NERVOUS, ANXIOUS, OR ON EDGE: NOT AT ALL
GAD7 TOTAL SCORE: 0
3. WORRYING TOO MUCH ABOUT DIFFERENT THINGS: NOT AT ALL
7. FEELING AFRAID AS IF SOMETHING AWFUL MIGHT HAPPEN: NOT AT ALL

## 2020-06-19 ASSESSMENT — PATIENT HEALTH QUESTIONNAIRE - PHQ9
SUM OF ALL RESPONSES TO PHQ QUESTIONS 1-9: 2
5. POOR APPETITE OR OVEREATING: NOT AT ALL

## 2020-06-19 NOTE — PROGRESS NOTES
"Gilberto Camilo is a 65 year old male who is being evaluated via a billable telephone visit.      The patient has been notified of following:     \"This telephone visit will be conducted via a call between you and your physician/provider. We have found that certain health care needs can be provided without the need for a physical exam.  This service lets us provide the care you need with a short phone conversation.  If a prescription is necessary we can send it directly to your pharmacy.  If lab work is needed we can place an order for that and you can then stop by our lab to have the test done at a later time.    Telephone visits are billed at different rates depending on your insurance coverage. During this emergency period, for some insurers they may be billed the same as an in-person visit.  Please reach out to your insurance provider with any questions.    If during the course of the call the physician/provider feels a telephone visit is not appropriate, you will not be charged for this service.\"    Patient has given verbal consent for Telephone visit?  Yes    What phone number would you like to be contacted at? [phone]    How would you like to obtain your AVS? Mail a copy    Subjective     Gilberto Camilo is a 65 year old male who presents via phone visit today for the following health issues:    HPI  Depression and Anxiety Follow-Up    How are you doing with your depression since your last visit? Improved     How are you doing with your anxiety since your last visit?  Improved     Are you having other symptoms that might be associated with depression or anxiety? No    Have you had a significant life event? No     Do you have any concerns with your use of alcohol or other drugs? No    Social History     Tobacco Use     Smoking status: Former Smoker     Packs/day: 0.00     Types: Cigarettes     Smokeless tobacco: Never Used     Tobacco comment: Feb. 2014   Substance Use Topics     Alcohol use: Yes     " Comment: occasional     Drug use: No     PHQ 10/2/2019 5/29/2020 6/19/2020   PHQ-9 Total Score 4 0 2   Q9: Thoughts of better off dead/self-harm past 2 weeks Not at all Not at all Not at all     OFELIA-7 SCORE 9/10/2018 10/2/2019 6/19/2020   Total Score 11 4 0     Last PHQ-9 6/19/2020   1.  Little interest or pleasure in doing things 0   2.  Feeling down, depressed, or hopeless 0   3.  Trouble falling or staying asleep, or sleeping too much 1   4.  Feeling tired or having little energy 1   5.  Poor appetite or overeating 0   6.  Feeling bad about yourself 0   7.  Trouble concentrating 0   8.  Moving slowly or restless 0   Q9: Thoughts of better off dead/self-harm past 2 weeks 0   PHQ-9 Total Score 2   Difficulty at work, home, or with people -     OFELIA-7  6/19/2020   1. Feeling nervous, anxious, or on edge 0   2. Not being able to stop or control worrying 0   3. Worrying too much about different things 0   4. Trouble relaxing 0   5. Being so restless that it is hard to sit still 0   6. Becoming easily annoyed or irritable 0   7. Feeling afraid, as if something awful might happen 0   OFELIA-7 Total Score 0   If you checked any problems, how difficult have they made it for you to do your work, take care of things at home, or get along with other people? -     Patient had a Recent AAA repair.  At his last phone visit, he had Abilify added to his Wellbutrin 450 mg daily.    Suicide Assessment Five-step Evaluation and Treatment (SAFE-T)        Patient Active Problem List   Diagnosis     Abdominal aortic aneurysm (H)     Erectile dysfunction     ACP (advance care planning)     Routine general medical examination at a health care facility     Essential hypertension     Mixed hyperlipidemia     Skin lesion     Past Surgical History:   Procedure Laterality Date     AAA REPAIR  06/17/2020    [facility]     APPENDECTOMY       COLONOSCOPY  2013     ESOPHAGOGASTRODUODENOSCOPY  2013       Social History     Tobacco Use     Smoking  status: Former Smoker     Packs/day: 0.00     Types: Cigarettes     Smokeless tobacco: Never Used     Tobacco comment: Feb. 2014   Substance Use Topics     Alcohol use: Yes     Comment: occasional     Family History   Problem Relation Age of Onset     C.A.D. Father         CABG in late 40's     C.A.D. Brother         MI in 50's         Current Outpatient Medications   Medication Sig Dispense Refill     ARIPiprazole (ABILIFY) 5 MG tablet Take 1 tablet (5 mg) by mouth daily 90 tablet 0     aspirin (ASA) 81 MG EC tablet Take 81 mg by mouth daily       atorvastatin (LIPITOR) 80 MG tablet TAKE 1 TABLET(80 MG) BY MOUTH DAILY 90 tablet 2     buPROPion (WELLBUTRIN XL) 150 MG 24 hr tablet TAKE 2 TABLETS BY MOUTH EVERY  tablet 0     buPROPion (WELLBUTRIN XL) 300 MG 24 hr tablet TAKE 1 TABLET(300 MG) BY MOUTH DAILY 90 tablet 1     diphenhydrAMINE HCl (BENADRYL ALLERGY PO)        lisinopril-hydrochlorothiazide (ZESTORETIC) 20-12.5 MG tablet Take 1 tablet by mouth daily 90 tablet 1     loratadine (CLARITIN) 10 MG tablet Take 10 mg by mouth daily       metoprolol succinate ER (TOPROL-XL) 25 MG 24 hr tablet Take 1 tablet (25 mg) by mouth daily 90 tablet 3     Multiple Vitamins-Minerals (MULTIVITAMIN ADULT PO)        omeprazole (PRILOSEC) 20 MG DR capsule Take 1 capsule (20 mg) by mouth every morning 30 capsule 0     vitamin C (ASCORBIC ACID) 1000 MG TABS Take 1,000 mg by mouth daily         Reviewed and updated as needed this visit by Provider     Hypertension Follow up:  He is taking his BP daily while at rest and alleast 3 hours after his medications.    His BP over the first few days of the month were low 130s/70s and the past week 110-120 /70s    Review of Systems   Constitutional, HEENT, cardiovascular, pulmonary, gi and gu systems are negative, except as otherwise noted.       Objective   Reported vitals:  There were no vitals taken for this visit.   PSYCH: Alert and oriented times 3; coherent speech, normal   rate  and volume, able to articulate logical thoughts, able   to abstract reason, no tangential thoughts, no hallucinations   or delusions  His affect is normal and pleasant  RESP: No cough, no audible wheezing, able to talk in full sentences  Remainder of exam unable to be completed due to telephone visits    Diagnostic Test Results:  Labs reviewed in Epic  none         Recent Labs   Lab Test 06/03/20  1440 10/02/19  1120    139   POTASSIUM 4.2 4.3   CHLORIDE 103 109   CO2 29 23   ANIONGAP 4 7   * 129*   BUN 18 17   CR 0.92 0.91   SARI 9.6 9.3     Recent Labs   Lab Test 10/02/19  1120 09/07/18  1600   CHOL 190 156   HDL 48 46   LDL 97 75  81   TRIG 226* 177*         Assessment/Plan:    (F32.1) Moderate major depression (H)  Comment: Improved.  He is feeling well.  He has some concerns over the cost of his Abilify.  Plan:   Continue Wellbutrin 450 mg daily and Abilify 5 mg which he may need to change the later depending on his insurance.    (I10) Essential hypertension  (primary encounter diagnosis)  Comment: Improved and at goal.  He is very sensitive to beta blockers at higher doses.  Plan:   He will continue Toprol XL 25 mg and Lisinopril 20-12.5 mg daily.      Follow up with Provider - 2 months for HTN, obesity nd depression.             No follow-ups on file.    Service Time  Start time : 0951  Stop Time: 0956      Phone call duration:  5  minutes    Luiz Carcamo DO, DO

## 2020-06-20 DIAGNOSIS — F33.1 MODERATE RECURRENT MAJOR DEPRESSION (H): ICD-10-CM

## 2020-06-20 ASSESSMENT — ANXIETY QUESTIONNAIRES: GAD7 TOTAL SCORE: 0

## 2020-06-22 ENCOUNTER — NURSE TRIAGE (OUTPATIENT)
Dept: PEDIATRICS | Facility: OTHER | Age: 65
End: 2020-06-22

## 2020-06-22 DIAGNOSIS — N39.0 URINARY TRACT INFECTION: Primary | ICD-10-CM

## 2020-06-22 DIAGNOSIS — N39.0 URINARY TRACT INFECTION: ICD-10-CM

## 2020-06-22 DIAGNOSIS — R30.0 DYSURIA: Primary | ICD-10-CM

## 2020-06-22 LAB
ALBUMIN UR-MCNC: NEGATIVE MG/DL
APPEARANCE UR: CLEAR
BILIRUB UR QL STRIP: NEGATIVE
COLOR UR AUTO: NORMAL
GLUCOSE UR STRIP-MCNC: NEGATIVE MG/DL
HGB UR QL STRIP: NEGATIVE
KETONES UR STRIP-MCNC: NEGATIVE MG/DL
LEUKOCYTE ESTERASE UR QL STRIP: NEGATIVE
NITRATE UR QL: NEGATIVE
PH UR STRIP: 6.5 PH (ref 4.7–8)
SOURCE: NORMAL
SP GR UR STRIP: 1.01 (ref 1–1.03)
UROBILINOGEN UR STRIP-MCNC: NORMAL MG/DL (ref 0–2)

## 2020-06-22 PROCEDURE — 81003 URINALYSIS AUTO W/O SCOPE: CPT | Mod: ZL | Performed by: INTERNAL MEDICINE

## 2020-06-22 RX ORDER — BUPROPION HYDROCHLORIDE 300 MG/1
TABLET ORAL
Qty: 90 TABLET | Refills: 3 | OUTPATIENT
Start: 2020-06-22

## 2020-06-22 NOTE — TELEPHONE ENCOUNTER
wellbutrin      Last Written Prescription Date:  6.19.200  Last Fill Quantity: 90,   # refills: 3  Last Office Visit: 6.19.2020

## 2020-06-22 NOTE — TELEPHONE ENCOUNTER
"Protocol advises patient to be seen within 24 hours for a UTI. Patient was at [facility] and had a catheter removed on Thursday. Patient states symptoms started after. Do you want patient to come in just for labs or telephone visit with available provider?  UA lab pended. Please advise, thank you.    Patient's phone number is [phone]    Additional Information    Negative: Shock suspected (e.g., cold/pale/clammy skin, too weak to stand, low BP, rapid pulse)    Negative: Sounds like a life-threatening emergency to the triager    Negative: Followed a genital injury (e.g., penis, scrotum)    Negative: Taking antibiotic for urinary tract infection (UTI)    Negative: Pain in scrotum is main symptom    Negative: [1] Unable to urinate (or only a few drops) > 4 hours AND     [2] bladder feels very full (e.g., feels blocked with strong urge to urinate; palpable bladder)    Negative: Swollen scrotum    Negative: [1] Constant pain in scrotum or testicle AND [2] present > 1 hour    Negative: Vomiting    Negative: Patient sounds very sick or weak to the triager    Negative: [1] SEVERE pain with urination  (e.g., excruciating) AND [2] not improved after 2 hours of pain medicine (e.g., acetaminophen or ibuprofen)    Negative: Fever > 100.5 F (38.1 C)    Negative: Side (flank) or lower back pain present    Negative: Diabetes mellitus or weak immune system (e.g., HIV positive, cancer chemo, splenectomy, organ transplant, chronic steroids)    Negative: Bedridden (e.g., nursing home patient, CVA, chronic illness, recovering from surgery)    Negative: Artificial heart valve or artificial joint    Negative: Blood in urine (red, pink, or tea-colored)    All other males with painful urination    Answer Assessment - Initial Assessment Questions  1. SEVERITY: \"How bad is the pain?\"  (e.g., Scale 1-10; mild, moderate, or severe)    - MILD (1-3): complains slightly about urination hurting    - MODERATE (4-7): interferes with " "normal activities      - SEVERE (8-10): excruciating, unwilling or unable to urinate because of the pain       8/10  2. FREQUENCY: \"How many times have you had painful urination today?\"       20  3. PATTERN: \"Is pain present every time you urinate or just sometimes?\"       everytime  4. ONSET: \"When did the painful urination start?\"       Unable to get answer patient's phone is cutting out. Had catheter taken out on Thursday and this started after.  5. FEVER: \"Do you have a fever?\" If so, ask: \"What is your temperature, how was it measured, and when did it start?\"      No  6. PAST UTI: \"Have you had a urine infection before?\" If so, ask: \"When was the last time?\" and \"What happened that time?\"       No  7. CAUSE: \"What do you think is causing the painful urination?\"       UTI  8. OTHER SYMPTOMS: \"Do you have any other symptoms?\" (e.g., flank pain, penile discharge, scrotal pain, blood in urine)      No    Protocols used: URINATION PAIN - MALE-A-AH      "

## 2020-06-23 DIAGNOSIS — R30.0 DYSURIA: Primary | ICD-10-CM

## 2020-06-23 RX ORDER — PHENAZOPYRIDINE HYDROCHLORIDE 100 MG/1
100 TABLET, FILM COATED ORAL 3 TIMES DAILY PRN
Qty: 9 TABLET | Refills: 0 | Status: SHIPPED | OUTPATIENT
Start: 2020-06-23 | End: 2020-06-26

## 2020-07-27 ENCOUNTER — TRANSFERRED RECORDS (OUTPATIENT)
Dept: HEALTH INFORMATION MANAGEMENT | Facility: CLINIC | Age: 65
End: 2020-07-27

## 2020-08-17 NOTE — PROGRESS NOTES
Subjective     Gilberto Camilo is a 65 year old male who presents to clinic today for the following health issues:    HPI     Hypertension Follow-up      Do you check your blood pressure regularly outside of the clinic? No     Are you following a low salt diet? No    Are your blood pressures ever more than 140 on the top number (systolic) OR more   than 90 on the bottom number (diastolic), for example 140/90? Yes      How many servings of fruits and vegetables do you eat daily?  2-3    On average, how many sweetened beverages do you drink each day (Examples: soda, juice, sweet tea, etc.  Do NOT count diet or artificially sweetened beverages)?   0    How many days per week do you exercise enough to make your heart beat faster? 3 or less    How many minutes a day do you exercise enough to make your heart beat faster? 9 or less    How many days per week do you miss taking your medication? 0       BP Readings from Last 6 Encounters:   08/21/20 130/62   06/03/20 126/76   05/29/20 128/78   05/15/20 135/85   10/02/19 138/88   10/16/18 136/72       Wt Readings from Last 5 Encounters:   08/21/20 118 kg (260 lb 3.2 oz)   06/03/20 112.5 kg (248 lb)   04/29/20 108.9 kg (240 lb)   10/02/19 106.6 kg (235 lb)   10/16/18 106.6 kg (235 lb)     Hyperlipidemia Follow-Up      Are you regularly taking any medication or supplement to lower your cholesterol?   Yes- statin    Are you having muscle aches or other side effects that you think could be caused by your cholesterol lowering medication?  No    Recent Labs   Lab Test 10/02/19  1120 09/07/18  1600   CHOL 190 156   HDL 48 46   LDL 97 75  81   TRIG 226* 177*       Insomnia:  He reports that he initiates sleep well.  He wakes up after 1 to 6 hours.  He has difficulty falling back to sleep.  He denies that he snores.  He denies any daytime fatigue.      Patient Active Problem List   Diagnosis     Abdominal aortic aneurysm (H)     Erectile dysfunction     ACP (advance care planning)      Routine general medical examination at a health care facility     Essential hypertension     Mixed hyperlipidemia     Skin lesion     Moderate major depression (H)     Morbid obesity (H)     Past Surgical History:   Procedure Laterality Date     [facility]     APPENDECTOMY       COLONOSCOPY  2013     ESOPHAGOGASTRODUODENOSCOPY  2013       Social History     Tobacco Use     Smoking status: Former Smoker     Packs/day: 0.00     Types: Cigarettes     Smokeless tobacco: Never Used     Tobacco comment: Feb. 2014   Substance Use Topics     Alcohol use: Yes     Comment: occasional     Family History   Problem Relation Age of Onset     C.A.D. Father         CABG in late 40's     C.A.D. Brother         MI in 50's         Current Outpatient Medications   Medication Sig Dispense Refill     ARIPiprazole (ABILIFY) 5 MG tablet TAKE 1 TABLET(5 MG) BY MOUTH DAILY 90 tablet 1     aspirin (ASA) 81 MG EC tablet Take 81 mg by mouth daily       atorvastatin (LIPITOR) 80 MG tablet TAKE 1 TABLET(80 MG) BY MOUTH DAILY 90 tablet 3     buPROPion (WELLBUTRIN XL) 150 MG 24 hr tablet TAKE 2 TABLETS BY MOUTH EVERY  tablet 3     buPROPion (WELLBUTRIN XL) 300 MG 24 hr tablet TAKE 1 TABLET(300 MG) BY MOUTH DAILY 90 tablet 3     lisinopril-hydrochlorothiazide (ZESTORETIC) 20-12.5 MG tablet Take 1 tablet by mouth daily 90 tablet 1     loratadine (CLARITIN) 10 MG tablet Take 10 mg by mouth daily       metoprolol succinate ER (TOPROL-XL) 25 MG 24 hr tablet Take 1 tablet (25 mg) by mouth daily 90 tablet 3     Multiple Vitamins-Minerals (MULTIVITAMIN ADULT PO)        omeprazole (PRILOSEC) 20 MG DR capsule Take 1 capsule (20 mg) by mouth every morning 30 capsule 0     vitamin C (ASCORBIC ACID) 1000 MG TABS Take 1,000 mg by mouth daily       diphenhydrAMINE HCl (BENADRYL ALLERGY PO)          Reviewed and updated as needed this visit by Provider         Review of Systems   CONSTITUTIONAL: NEGATIVE for fever, chills,  change in weight  EYES: NEGATIVE for vision changes or irritation  EYES: POSITIVE for blurred vision bilateral  ENT/MOUTH: NEGATIVE for ear, mouth and throat problems  RESP: NEGATIVE for significant cough or SOB  CV: NEGATIVE for chest pain, palpitations or peripheral edema  GI: NEGATIVE for nausea, abdominal pain, heartburn, or change in bowel habits  : NEGATIVE for frequency, dysuria, or hematuria  MUSCULOSKELETAL:POSITIVE  for back pain bilateral lower back,   Exacerbated by sitting too long and walking of variable distance.  The pain is sharp.  He denies leg pains.  He occasionally has buttock pain.  NEURO: NEGATIVE for weakness, dizziness or paresthesias  HEME: NEGATIVE for bleeding problems  PSYCHIATRIC: NEGATIVE for changes in mood or affect      Objective    /62 (BP Location: Right arm, Patient Position: Chair, Cuff Size: Adult Regular)   Pulse 53   Temp 96.5  F (35.8  C) (Tympanic)   Wt 118 kg (260 lb 3.2 oz)   SpO2 95%   BMI 35.29 kg/m    Body mass index is 35.29 kg/m .  Physical Exam   GENERAL: healthy, alert and no distress  EYES: Eyes grossly normal to inspection, PERRL and conjunctivae and sclerae normal  NECK: no adenopathy, no asymmetry, masses, or scars and thyroid normal to palpation  RESP: lungs clear to auscultation - no rales, rhonchi or wheezes  CV: regular rate and rhythm, normal S1 S2, no S3 or S4, no murmur, click or rub, no peripheral edema and peripheral pulses strong  ABDOMEN: soft, nontender, no hepatosplenomegaly, no masses and bowel sounds normal  ABDOMEN: no bruits heard  MS: no gross musculoskeletal defects noted, no edema  NEURO: Normal strength and tone, mentation intact and speech normal  PSYCH: mentation appears normal, affect normal/bright    Diagnostic Test Results:  Labs reviewed in Epic  Results for orders placed or performed in visit on 08/21/20   Comprehensive metabolic panel     Status: Abnormal   Result Value Ref Range    Sodium 136 133 - 144 mmol/L     Potassium 4.3 3.4 - 5.3 mmol/L    Chloride 102 94 - 109 mmol/L    Carbon Dioxide 29 20 - 32 mmol/L    Anion Gap 5 3 - 14 mmol/L    Glucose 114 (H) 70 - 99 mg/dL    Urea Nitrogen 24 7 - 30 mg/dL    Creatinine 0.92 0.66 - 1.25 mg/dL    GFR Estimate 86 >60 mL/min/[1.73_m2]    GFR Estimate If Black >90 >60 mL/min/[1.73_m2]    Calcium 9.7 8.5 - 10.1 mg/dL    Bilirubin Total 0.9 0.2 - 1.3 mg/dL    Albumin 4.1 3.4 - 5.0 g/dL    Protein Total 7.5 6.8 - 8.8 g/dL    Alkaline Phosphatase 94 40 - 150 U/L    ALT 31 0 - 70 U/L    AST 22 0 - 45 U/L   Lipid Profile (Chol, Trig, HDL, LDL calc)     Status: Abnormal   Result Value Ref Range    Cholesterol 161 <200 mg/dL    Triglycerides 194 (H) <150 mg/dL    HDL Cholesterol 54 >39 mg/dL    LDL Cholesterol Calculated 68 <100 mg/dL    Non HDL Cholesterol 107 <130 mg/dL   CK total     Status: None   Result Value Ref Range    CK Total 145 30 - 300 U/L   CBC with platelets     Status: None   Result Value Ref Range    WBC 8.4 4.0 - 11.0 10e9/L    RBC Count 4.46 4.4 - 5.9 10e12/L    Hemoglobin 13.5 13.3 - 17.7 g/dL    Hematocrit 40.5 40.0 - 53.0 %    MCV 91 78 - 100 fl    MCH 30.3 26.5 - 33.0 pg    MCHC 33.3 31.5 - 36.5 g/dL    RDW 13.3 10.0 - 15.0 %    Platelet Count 228 150 - 450 10e9/L   Prostate spec antigen screen - Future      Status: Abnormal   Result Value Ref Range    PSA 4.76 (H) 0 - 4 ug/L   UA with Microscopic reflex to Culture     Status: Abnormal    Specimen: Midstream Urine   Result Value Ref Range    Color Urine Straw     Appearance Urine Clear     Glucose Urine Negative NEG^Negative mg/dL    Bilirubin Urine Negative NEG^Negative    Ketones Urine Negative NEG^Negative mg/dL    Specific Gravity Urine 1.010 1.003 - 1.035    Blood Urine Negative NEG^Negative    pH Urine 5.5 4.7 - 8.0 pH    Protein Albumin Urine Negative NEG^Negative mg/dL    Urobilinogen mg/dL Normal 0.0 - 2.0 mg/dL    Nitrite Urine Negative NEG^Negative    Leukocyte Esterase Urine Negative NEG^Negative     Source Midstream Urine     WBC Urine <1 0 - 5 /HPF    RBC Urine <1 0 - 2 /HPF    Bacteria Urine None (A) NEG^Negative /HPF    Mucous Urine Present (A) NEG^Negative /LPF           No results found for: A1C          ASSESSMENT /PLAN:  (I10) Essential hypertension  (primary encounter diagnosis)  Comment: At goal.  His renal function is good.  Plan:   He will continue lisinopril-HCTZ 10-12.5 and Toprol -XL 25 mg daily    (E78.2) Mixed hyperlipidemia  Comment: Well controlled  Plan:  He will continue Lipitor 80 mg.  He will continue ASA 81 mg.    (F32.1) Moderate major depression (H)  Comment: He is doing well with the addition of Abilify   Plan:   He will continue Wellbutrin 450 mg and Abilify 5 mg daily    (G47.00) Mixed insomnia  Comment:   Plan:   Start zolpidem (AMBIEN) 10 MG tablet    (Z12.5) Screening for prostate cancer  Comment: His PSA is elevated  Plan:  Repeat level in 2 months.   Prostate spec antigen screen - Future     (E66.01) Morbid obesity (H)  Comment: Gilberto has truncal obesity.  I discussed the risk of diabetes, hernias, and breathing issues that may develop should he not reduce his weight.  He showed understanding.  Plan:   He will try to get at least 30 minutes of exercise in daily.    (Z23) Need for vaccination  Comment:   Plan:   Pneumococcal vaccine 13 valent PCV13 IM         (Prevnar) [86568], ADMIN: Vaccine, Initial         (45335)    (R73.09) Elevated random blood glucose level  Comment: Possible insulin resistance as he has continually gained weight for the past two years.  Plan:   Check A1c in 2 months.        Follow up with Provider - 6 months for an annual physical.    Luiz Carcamo, DO, DO

## 2020-08-21 ENCOUNTER — OFFICE VISIT (OUTPATIENT)
Dept: PEDIATRICS | Facility: OTHER | Age: 65
End: 2020-08-21
Attending: INTERNAL MEDICINE
Payer: COMMERCIAL

## 2020-08-21 VITALS
BODY MASS INDEX: 35.29 KG/M2 | OXYGEN SATURATION: 95 % | SYSTOLIC BLOOD PRESSURE: 130 MMHG | HEART RATE: 53 BPM | TEMPERATURE: 96.5 F | DIASTOLIC BLOOD PRESSURE: 62 MMHG | WEIGHT: 260.2 LBS

## 2020-08-21 DIAGNOSIS — E78.2 MIXED HYPERLIPIDEMIA: ICD-10-CM

## 2020-08-21 DIAGNOSIS — Z12.5 ENCOUNTER FOR SCREENING FOR MALIGNANT NEOPLASM OF PROSTATE: ICD-10-CM

## 2020-08-21 DIAGNOSIS — E66.01 MORBID OBESITY (H): ICD-10-CM

## 2020-08-21 DIAGNOSIS — Z23 NEED FOR VACCINATION: ICD-10-CM

## 2020-08-21 DIAGNOSIS — F32.1 MODERATE MAJOR DEPRESSION (H): ICD-10-CM

## 2020-08-21 DIAGNOSIS — G47.00 MIXED INSOMNIA: ICD-10-CM

## 2020-08-21 DIAGNOSIS — R97.20 ELEVATED PROSTATE SPECIFIC ANTIGEN (PSA): Primary | ICD-10-CM

## 2020-08-21 DIAGNOSIS — I10 ESSENTIAL HYPERTENSION: Primary | ICD-10-CM

## 2020-08-21 DIAGNOSIS — Z12.5 SCREENING FOR PROSTATE CANCER: ICD-10-CM

## 2020-08-21 DIAGNOSIS — R73.9 ELEVATED RANDOM BLOOD GLUCOSE LEVEL: ICD-10-CM

## 2020-08-21 LAB
ALBUMIN SERPL-MCNC: 4.1 G/DL (ref 3.4–5)
ALBUMIN UR-MCNC: NEGATIVE MG/DL
ALP SERPL-CCNC: 94 U/L (ref 40–150)
ALT SERPL W P-5'-P-CCNC: 31 U/L (ref 0–70)
ANION GAP SERPL CALCULATED.3IONS-SCNC: 5 MMOL/L (ref 3–14)
APPEARANCE UR: CLEAR
AST SERPL W P-5'-P-CCNC: 22 U/L (ref 0–45)
BACTERIA #/AREA URNS HPF: ABNORMAL /HPF
BILIRUB SERPL-MCNC: 0.9 MG/DL (ref 0.2–1.3)
BILIRUB UR QL STRIP: NEGATIVE
BUN SERPL-MCNC: 24 MG/DL (ref 7–30)
CALCIUM SERPL-MCNC: 9.7 MG/DL (ref 8.5–10.1)
CHLORIDE SERPL-SCNC: 102 MMOL/L (ref 94–109)
CHOLEST SERPL-MCNC: 161 MG/DL
CK SERPL-CCNC: 145 U/L (ref 30–300)
CO2 SERPL-SCNC: 29 MMOL/L (ref 20–32)
COLOR UR AUTO: ABNORMAL
CREAT SERPL-MCNC: 0.92 MG/DL (ref 0.66–1.25)
ERYTHROCYTE [DISTWIDTH] IN BLOOD BY AUTOMATED COUNT: 13.3 % (ref 10–15)
GFR SERPL CREATININE-BSD FRML MDRD: 86 ML/MIN/{1.73_M2}
GLUCOSE SERPL-MCNC: 114 MG/DL (ref 70–99)
GLUCOSE UR STRIP-MCNC: NEGATIVE MG/DL
HCT VFR BLD AUTO: 40.5 % (ref 40–53)
HDLC SERPL-MCNC: 54 MG/DL
HGB BLD-MCNC: 13.5 G/DL (ref 13.3–17.7)
HGB UR QL STRIP: NEGATIVE
KETONES UR STRIP-MCNC: NEGATIVE MG/DL
LDLC SERPL CALC-MCNC: 68 MG/DL
LEUKOCYTE ESTERASE UR QL STRIP: NEGATIVE
MCH RBC QN AUTO: 30.3 PG (ref 26.5–33)
MCHC RBC AUTO-ENTMCNC: 33.3 G/DL (ref 31.5–36.5)
MCV RBC AUTO: 91 FL (ref 78–100)
MUCOUS THREADS #/AREA URNS LPF: PRESENT /LPF
NITRATE UR QL: NEGATIVE
NONHDLC SERPL-MCNC: 107 MG/DL
PH UR STRIP: 5.5 PH (ref 4.7–8)
PLATELET # BLD AUTO: 228 10E9/L (ref 150–450)
POTASSIUM SERPL-SCNC: 4.3 MMOL/L (ref 3.4–5.3)
PROT SERPL-MCNC: 7.5 G/DL (ref 6.8–8.8)
PSA SERPL-ACNC: 4.76 UG/L (ref 0–4)
RBC # BLD AUTO: 4.46 10E12/L (ref 4.4–5.9)
RBC #/AREA URNS AUTO: <1 /HPF (ref 0–2)
SODIUM SERPL-SCNC: 136 MMOL/L (ref 133–144)
SOURCE: ABNORMAL
SP GR UR STRIP: 1.01 (ref 1–1.03)
TRIGL SERPL-MCNC: 194 MG/DL
UROBILINOGEN UR STRIP-MCNC: NORMAL MG/DL (ref 0–2)
WBC # BLD AUTO: 8.4 10E9/L (ref 4–11)
WBC #/AREA URNS AUTO: <1 /HPF (ref 0–5)

## 2020-08-21 PROCEDURE — 81001 URINALYSIS AUTO W/SCOPE: CPT | Mod: ZL | Performed by: INTERNAL MEDICINE

## 2020-08-21 PROCEDURE — G0103 PSA SCREENING: HCPCS | Mod: ZL | Performed by: INTERNAL MEDICINE

## 2020-08-21 PROCEDURE — 80053 COMPREHEN METABOLIC PANEL: CPT | Mod: ZL | Performed by: INTERNAL MEDICINE

## 2020-08-21 PROCEDURE — 80061 LIPID PANEL: CPT | Mod: ZL | Performed by: INTERNAL MEDICINE

## 2020-08-21 PROCEDURE — 36415 COLL VENOUS BLD VENIPUNCTURE: CPT | Mod: ZL | Performed by: INTERNAL MEDICINE

## 2020-08-21 PROCEDURE — G0463 HOSPITAL OUTPT CLINIC VISIT: HCPCS

## 2020-08-21 PROCEDURE — G0463 HOSPITAL OUTPT CLINIC VISIT: HCPCS | Mod: 25

## 2020-08-21 PROCEDURE — 99214 OFFICE O/P EST MOD 30 MIN: CPT | Performed by: INTERNAL MEDICINE

## 2020-08-21 PROCEDURE — 85027 COMPLETE CBC AUTOMATED: CPT | Mod: ZL | Performed by: INTERNAL MEDICINE

## 2020-08-21 PROCEDURE — 82550 ASSAY OF CK (CPK): CPT | Mod: ZL | Performed by: INTERNAL MEDICINE

## 2020-08-21 PROCEDURE — 90670 PCV13 VACCINE IM: CPT

## 2020-08-21 PROCEDURE — G0009 ADMIN PNEUMOCOCCAL VACCINE: HCPCS | Performed by: INTERNAL MEDICINE

## 2020-08-21 ASSESSMENT — PAIN SCALES - GENERAL: PAINLEVEL: SEVERE PAIN (6)

## 2020-08-21 NOTE — NURSING NOTE
Chief Complaint   Patient presents with     Hypertension     Depression       Initial /62 (BP Location: Right arm, Patient Position: Chair, Cuff Size: Adult Regular)   Pulse 53   Temp 96.5  F (35.8  C) (Tympanic)   Wt 118 kg (260 lb 3.2 oz)   SpO2 95%   BMI 35.29 kg/m   Estimated body mass index is 35.29 kg/m  as calculated from the following:    Height as of 6/3/20: 1.829 m (6').    Weight as of this encounter: 118 kg (260 lb 3.2 oz).  Medication Reconciliation: complete  Joce Wahl LPN

## 2020-08-24 ENCOUNTER — TELEPHONE (OUTPATIENT)
Dept: PEDIATRICS | Facility: OTHER | Age: 65
End: 2020-08-24

## 2020-08-24 RX ORDER — ZOLPIDEM TARTRATE 10 MG/1
10 TABLET ORAL
Qty: 30 TABLET | Refills: 0 | Status: SHIPPED | OUTPATIENT
Start: 2020-08-24 | End: 2020-12-14

## 2020-08-24 NOTE — TELEPHONE ENCOUNTER
Call back from patient to Dr. Caracmo's Nurse. Patient stating he missed a call with an update. Upon review of medical record, lab results were reviewed as per Dr. Carcamo's review/note. Patient confirmed receiving the lab results. No other encounter open to provide patient with an update. Will send a message to have Dr. Carcamo's nurse return a call to the patient.     Patient can be reached at: [phone]

## 2020-09-01 DIAGNOSIS — K21.9 GASTROESOPHAGEAL REFLUX DISEASE, ESOPHAGITIS PRESENCE NOT SPECIFIED: ICD-10-CM

## 2020-09-01 DIAGNOSIS — F33.1 MODERATE RECURRENT MAJOR DEPRESSION (H): ICD-10-CM

## 2020-09-02 RX ORDER — BUPROPION HYDROCHLORIDE 150 MG/1
TABLET ORAL
Qty: 180 TABLET | Refills: 3 | Status: SHIPPED | OUTPATIENT
Start: 2020-09-02 | End: 2021-08-17

## 2020-09-28 DIAGNOSIS — I71.40 ABDOMINAL AORTIC ANEURYSM (AAA) WITHOUT RUPTURE (H): Chronic | ICD-10-CM

## 2020-09-28 DIAGNOSIS — I10 BENIGN ESSENTIAL HYPERTENSION: ICD-10-CM

## 2020-09-28 RX ORDER — METOPROLOL SUCCINATE 25 MG/1
TABLET, EXTENDED RELEASE ORAL
Qty: 90 TABLET | Refills: 2 | Status: SHIPPED | OUTPATIENT
Start: 2020-09-28 | End: 2021-07-26

## 2020-11-10 DIAGNOSIS — I10 BENIGN ESSENTIAL HYPERTENSION: ICD-10-CM

## 2020-11-11 RX ORDER — LISINOPRIL AND HYDROCHLOROTHIAZIDE 12.5; 2 MG/1; MG/1
1 TABLET ORAL DAILY
Qty: 90 TABLET | Refills: 1 | Status: SHIPPED | OUTPATIENT
Start: 2020-11-11 | End: 2021-05-04

## 2020-11-11 NOTE — TELEPHONE ENCOUNTER
zestoric      Last Written Prescription Date:  5/15/2020  Last Fill Quantity: 90,   # refills: 1  Last Office Visit: 8/21/2020  Future Office visit:

## 2020-11-28 DIAGNOSIS — K21.9 GASTROESOPHAGEAL REFLUX DISEASE, UNSPECIFIED WHETHER ESOPHAGITIS PRESENT: ICD-10-CM

## 2020-11-30 NOTE — TELEPHONE ENCOUNTER
Omeprazole 20 mg      Last Written Prescription Date:  9/2/20  Last Fill Quantity: 90,   # refills: 0  Last Office Visit: 8/21/20  Future Office visit:    Next 5 appointments (look out 90 days)    Feb 24, 2021 10:20 AM  (Arrive by 10:05 AM)  PHYSICAL with Luiz Carcamo DO  [facility] ([facility] [place] ) [address]  [place] [address]  [phone]           Routing refill request to provider for review/approval because:

## 2020-12-07 ENCOUNTER — TELEPHONE (OUTPATIENT)
Dept: INTERNAL MEDICINE | Facility: OTHER | Age: 65
End: 2020-12-07

## 2020-12-07 NOTE — TELEPHONE ENCOUNTER
Patients wife called to schedule an appointment.  States patient was tested for Covid on 11/21 and it was negative.  Patient has had a cough ever since and it is the only symptom.  Patient has been home with wife quarantining and wife has not gotten sick.  Patients symptoms started one week before he was tested. Requesting an appointment.

## 2020-12-08 ENCOUNTER — OFFICE VISIT (OUTPATIENT)
Dept: FAMILY MEDICINE | Facility: OTHER | Age: 65
End: 2020-12-08
Attending: FAMILY MEDICINE
Payer: COMMERCIAL

## 2020-12-08 VITALS
BODY MASS INDEX: 32.78 KG/M2 | SYSTOLIC BLOOD PRESSURE: 120 MMHG | HEIGHT: 72 IN | TEMPERATURE: 101.6 F | DIASTOLIC BLOOD PRESSURE: 70 MMHG | OXYGEN SATURATION: 96 % | HEART RATE: 68 BPM | WEIGHT: 242 LBS

## 2020-12-08 DIAGNOSIS — J20.9 ACUTE BRONCHITIS, UNSPECIFIED ORGANISM: ICD-10-CM

## 2020-12-08 DIAGNOSIS — R07.0 THROAT PAIN: ICD-10-CM

## 2020-12-08 DIAGNOSIS — Z20.822 COVID-19 RULED OUT: ICD-10-CM

## 2020-12-08 DIAGNOSIS — R05.9 COUGH: Primary | ICD-10-CM

## 2020-12-08 LAB
SPECIMEN SOURCE: NORMAL
STREP GROUP A PCR: NOT DETECTED

## 2020-12-08 PROCEDURE — 87651 STREP A DNA AMP PROBE: CPT | Mod: ZL | Performed by: FAMILY MEDICINE

## 2020-12-08 PROCEDURE — G0463 HOSPITAL OUTPT CLINIC VISIT: HCPCS

## 2020-12-08 PROCEDURE — U0003 INFECTIOUS AGENT DETECTION BY NUCLEIC ACID (DNA OR RNA); SEVERE ACUTE RESPIRATORY SYNDROME CORONAVIRUS 2 (SARS-COV-2) (CORONAVIRUS DISEASE [COVID-19]), AMPLIFIED PROBE TECHNIQUE, MAKING USE OF HIGH THROUGHPUT TECHNOLOGIES AS DESCRIBED BY CMS-2020-01-R: HCPCS | Mod: ZL | Performed by: FAMILY MEDICINE

## 2020-12-08 PROCEDURE — 99213 OFFICE O/P EST LOW 20 MIN: CPT | Performed by: FAMILY MEDICINE

## 2020-12-08 RX ORDER — AZITHROMYCIN 250 MG/1
TABLET, FILM COATED ORAL
Qty: 6 TABLET | Refills: 0 | Status: SHIPPED | OUTPATIENT
Start: 2020-12-08 | End: 2021-02-15

## 2020-12-08 ASSESSMENT — PAIN SCALES - GENERAL: PAINLEVEL: NO PAIN (0)

## 2020-12-08 ASSESSMENT — MIFFLIN-ST. JEOR: SCORE: 1910.7

## 2020-12-08 NOTE — PROGRESS NOTES
Subjective     Gilberto Camilo is a 65 year old male who presents to clinic today for the following health issues:    HPI         Acute Illness  Acute illness concerns: cough  Onset/Duration: 2-3 weeks  Symptoms:  Fever: YES- 101.6 today  Chills/Sweats: no  Headache (location?): no  Sinus Pressure: YES  Conjunctivitis:  YES  Ear Pain: no  Rhinorrhea: YES  Congestion: YES  Sore Throat: no  Cough: YES  Wheeze: YES  Decreased Appetite: no  Nausea: no  Vomiting: no  Diarrhea: no  Dysuria/Freq.: no  Dysuria or Hematuria: no  Fatigue/Achiness: YES  Sick/Strep Exposure: no  Therapies tried and outcome: none      Review of Systems   Constitutional, HEENT, cardiovascular, pulmonary, gi and gu systems are negative, except as otherwise noted.      Objective    /70 (BP Location: Left arm, Patient Position: Sitting, Cuff Size: Adult Regular)   Pulse 68   Temp 101.6  F (38.7  C) (Tympanic)   Ht 1.829 m (6')   Wt 109.8 kg (242 lb)   SpO2 96%   BMI 32.82 kg/m    Body mass index is 32.82 kg/m .  Physical Exam   GENERAL: healthy, alert and no distress  EYES: Eyes grossly normal to inspection, PERRL and conjunctivae and sclerae normal  HENT: ear canals and TM's normal, nose and mouth without ulcers or lesions, oropharynx is erythematous no edema no exudate no sinus tenderness  NECK: Slight anterior adenopathy, no asymmetry, masses, or scars and thyroid normal to palpation  RESP: lungs clear to auscultation - no rales, rhonchi or wheezes  CV: regular rate and rhythm, normal S1 S2, no S3 or S4, no murmur, click or rub, no peripheral edema and peripheral pulses strong  ABDOMEN: soft, nontender, no hepatosplenomegaly, no masses and bowel sounds normal  MS: no gross musculoskeletal defects noted, no edema            Assessment & Plan     Cough      COVID-19 ruled out    - Symptomatic COVID-19 Virus (Coronavirus) by PCR  - Symptomatic COVID-19 Virus (Coronavirus) by PCR    Throat pain    - Group A Streptococcus PCR Throat Swab  ([place] ONLY)    Acute bronchitis, unspecified organism    - azithromycin (ZITHROMAX) 250 MG tablet  Dispense: 6 tablet; Refill: 0  Patient quit smoking 10 years ago.  No shortness of breath now is coughed up some green phlegm his sensation of smell and taste is still intact.  Has not been exposed to Covid.  Will check for Covid and also quick strep we will call him with both results.  For the bronchitis treat with a Z-Jong have him rest push fluids follow-up if symptoms do not resolve.  He was instructed to go to the emergency room if he develops difficulties breathing or other acute problems.     BMI:   Estimated body mass index is 32.82 kg/m  as calculated from the following:    Height as of this encounter: 1.829 m (6').    Weight as of this encounter: 109.8 kg (242 lb).                    ISIS Wall MD  [facility]

## 2020-12-09 LAB
SARS-COV-2 RNA SPEC QL NAA+PROBE: NOT DETECTED
SPECIMEN SOURCE: NORMAL

## 2020-12-12 DIAGNOSIS — G47.00 MIXED INSOMNIA: ICD-10-CM

## 2020-12-14 RX ORDER — ZOLPIDEM TARTRATE 10 MG/1
TABLET ORAL
Qty: 30 TABLET | Refills: 0 | Status: ON HOLD | OUTPATIENT
Start: 2020-12-14 | End: 2022-12-09

## 2020-12-14 NOTE — TELEPHONE ENCOUNTER
ambien 10 mg      Last Written Prescription Date:  8/24/20  Last Fill Quantity: 30,   # refills: 0  Last Office Visit: 12/8/20  Future Office visit:    Next 5 appointments (look out 90 days)    Feb 24, 2021 10:20 AM  (Arrive by 10:05 AM)  PHYSICAL with Luiz Carcamo DO  [facility] ([facility] [place] ) [address]  [place] [address]  [phone]           Routing refill request to provider for review/approval because:

## 2021-01-02 DIAGNOSIS — E78.2 MIXED HYPERLIPIDEMIA: ICD-10-CM

## 2021-01-04 NOTE — TELEPHONE ENCOUNTER
Atorvastatin 80 mg      Last Written Prescription Date:  6-  Last Fill Quantity: 90,   # refills: 3  Last Office Visit: 12-8-2020

## 2021-01-05 RX ORDER — ATORVASTATIN CALCIUM 80 MG/1
TABLET, FILM COATED ORAL
Qty: 90 TABLET | Refills: 3 | Status: SHIPPED | OUTPATIENT
Start: 2021-01-05 | End: 2022-01-24

## 2021-02-01 ENCOUNTER — TRANSFERRED RECORDS (OUTPATIENT)
Dept: HEALTH INFORMATION MANAGEMENT | Facility: CLINIC | Age: 66
End: 2021-02-01

## 2021-02-12 DIAGNOSIS — F33.1 MODERATE RECURRENT MAJOR DEPRESSION (H): ICD-10-CM

## 2021-02-12 RX ORDER — ARIPIPRAZOLE 5 MG/1
TABLET ORAL
Qty: 90 TABLET | Refills: 0 | Status: SHIPPED | OUTPATIENT
Start: 2021-02-12 | End: 2021-05-03

## 2021-02-12 NOTE — TELEPHONE ENCOUNTER
abilify      Last Written Prescription Date:  8/19/20  Last Fill Quantity: 90,   # refills: 1  Last Office Visit: 12/8/20  Future Office visit:       Routing refill request to provider for review/approval because:

## 2021-02-15 ENCOUNTER — OFFICE VISIT (OUTPATIENT)
Dept: FAMILY MEDICINE | Facility: OTHER | Age: 66
End: 2021-02-15
Attending: FAMILY MEDICINE
Payer: COMMERCIAL

## 2021-02-15 VITALS
OXYGEN SATURATION: 97 % | BODY MASS INDEX: 31.02 KG/M2 | SYSTOLIC BLOOD PRESSURE: 104 MMHG | HEART RATE: 54 BPM | DIASTOLIC BLOOD PRESSURE: 62 MMHG | TEMPERATURE: 97.6 F | HEIGHT: 72 IN | WEIGHT: 229 LBS

## 2021-02-15 DIAGNOSIS — E11.69 TYPE 2 DIABETES MELLITUS WITH OTHER SPECIFIED COMPLICATION, WITHOUT LONG-TERM CURRENT USE OF INSULIN (H): ICD-10-CM

## 2021-02-15 DIAGNOSIS — I10 ESSENTIAL HYPERTENSION: Primary | ICD-10-CM

## 2021-02-15 DIAGNOSIS — Z00.00 ENCOUNTER FOR MEDICAL EXAMINATION TO ESTABLISH CARE: ICD-10-CM

## 2021-02-15 DIAGNOSIS — R97.20 ELEVATED PROSTATE SPECIFIC ANTIGEN (PSA): ICD-10-CM

## 2021-02-15 DIAGNOSIS — R73.03 PREDIABETES: ICD-10-CM

## 2021-02-15 DIAGNOSIS — I10 ESSENTIAL HYPERTENSION: ICD-10-CM

## 2021-02-15 PROBLEM — E11.9 DIABETES MELLITUS, TYPE 2 (H): Status: ACTIVE | Noted: 2021-02-15

## 2021-02-15 LAB
ANION GAP SERPL CALCULATED.3IONS-SCNC: 2 MMOL/L (ref 3–14)
BUN SERPL-MCNC: 23 MG/DL (ref 7–30)
CALCIUM SERPL-MCNC: 9.4 MG/DL (ref 8.5–10.1)
CHLORIDE SERPL-SCNC: 102 MMOL/L (ref 94–109)
CO2 SERPL-SCNC: 31 MMOL/L (ref 20–32)
CREAT SERPL-MCNC: 1.08 MG/DL (ref 0.66–1.25)
EST. AVERAGE GLUCOSE BLD GHB EST-MCNC: 140 MG/DL
GFR SERPL CREATININE-BSD FRML MDRD: 70 ML/MIN/{1.73_M2}
GLUCOSE SERPL-MCNC: 109 MG/DL (ref 70–99)
HBA1C MFR BLD: 6.5 % (ref 0–5.6)
POTASSIUM SERPL-SCNC: 4.2 MMOL/L (ref 3.4–5.3)
PSA SERPL-MCNC: 5.41 UG/L (ref 0–4)
SODIUM SERPL-SCNC: 135 MMOL/L (ref 133–144)

## 2021-02-15 PROCEDURE — 84153 ASSAY OF PSA TOTAL: CPT | Mod: ZL | Performed by: FAMILY MEDICINE

## 2021-02-15 PROCEDURE — G0463 HOSPITAL OUTPT CLINIC VISIT: HCPCS

## 2021-02-15 PROCEDURE — 999N001182 HC STATISTIC ESTIMATED AVERAGE GLUCOSE: Mod: ZL | Performed by: FAMILY MEDICINE

## 2021-02-15 PROCEDURE — 99214 OFFICE O/P EST MOD 30 MIN: CPT | Performed by: FAMILY MEDICINE

## 2021-02-15 PROCEDURE — 83036 HEMOGLOBIN GLYCOSYLATED A1C: CPT | Mod: ZL | Performed by: FAMILY MEDICINE

## 2021-02-15 PROCEDURE — 80048 BASIC METABOLIC PNL TOTAL CA: CPT | Mod: ZL | Performed by: FAMILY MEDICINE

## 2021-02-15 PROCEDURE — 36415 COLL VENOUS BLD VENIPUNCTURE: CPT | Mod: ZL | Performed by: FAMILY MEDICINE

## 2021-02-15 ASSESSMENT — PATIENT HEALTH QUESTIONNAIRE - PHQ9: SUM OF ALL RESPONSES TO PHQ QUESTIONS 1-9: 0

## 2021-02-15 ASSESSMENT — ANXIETY QUESTIONNAIRES
7. FEELING AFRAID AS IF SOMETHING AWFUL MIGHT HAPPEN: NOT AT ALL
6. BECOMING EASILY ANNOYED OR IRRITABLE: NOT AT ALL
3. WORRYING TOO MUCH ABOUT DIFFERENT THINGS: NOT AT ALL
GAD7 TOTAL SCORE: 0
2. NOT BEING ABLE TO STOP OR CONTROL WORRYING: NOT AT ALL
4. TROUBLE RELAXING: NOT AT ALL
1. FEELING NERVOUS, ANXIOUS, OR ON EDGE: NOT AT ALL
5. BEING SO RESTLESS THAT IT IS HARD TO SIT STILL: NOT AT ALL

## 2021-02-15 ASSESSMENT — PAIN SCALES - GENERAL: PAINLEVEL: NO PAIN (0)

## 2021-02-15 ASSESSMENT — MIFFLIN-ST. JEOR: SCORE: 1851.74

## 2021-02-15 NOTE — PROGRESS NOTES
Assessment & Plan     Essential hypertension  Good control. Continue current medications and behavioral changes. F/u for minipx and fasting labs in 6 months   - Basic metabolic panel; Future    Elevated prostate specific antigen (PSA)  Still climbing. Will recheck in 6 months.   - PSA tumor marker; Future    Encounter for medical examination to establish care  Will assume care.     Type 2 diabetes mellitus with other specified complication, without long-term current use of insulin (H)  NEW diagnosis- right on the border. Discussed in detail. Handout given. Will let him try diet and exercise and follow-up in 6 month.  Long discussion on Carbs and how to lower carbs.               BMI:   Estimated body mass index is 31.06 kg/m  as calculated from the following:    Height as of this encounter: 1.829 m (6').    Weight as of this encounter: 103.9 kg (229 lb).           No follow-ups on file.    Rock Basilio MD  [facility] Garnican is a 65 year old who presents for the following health issues     HPI       New Patient/Transfer of Care  Hypertension Follow-up      Do you check your blood pressure regularly outside of the clinic? No     Are you following a low salt diet? Yes    Are your blood pressures ever more than 140 on the top number (systolic) OR more   than 90 on the bottom number (diastolic), for example 140/90? No    Vascular Disease Follow-up      How often do you take nitroglycerin? Never    Do you take an aspirin every day? Yes     Pt recovering from AAA intravascular repair in July - he is doing well.     Elevated BS and PSA in past - looks like needed a follow-up from what I can see from Dr Carcamo notes         Review of Systems   Constitutional, HEENT, cardiovascular, pulmonary, gi and gu systems are negative, except as otherwise noted.      Objective    /62   Pulse 54   Temp 97.6  F (36.4  C)   Ht 1.829 m (6')   Wt 103.9 kg (229 lb)   SpO2 97%   BMI  31.06 kg/m    Body mass index is 31.06 kg/m .  Physical Exam   GENERAL: healthy, alert and no distress  NECK: no adenopathy, no asymmetry, masses, or scars and thyroid normal to palpation  RESP: lungs clear to auscultation - no rales, rhonchi or wheezes  CV: regular rate and rhythm, normal S1 S2, no S3 or S4, 2/6 holosystolic  Murmur best heard of LLSB / apex, click or rub, no peripheral edema and peripheral pulses strong  ABDOMEN: soft, nontender, no hepatosplenomegaly, no masses and bowel sounds normal- small umbilical hernia   MS: no gross musculoskeletal defects noted, no edema    Results for orders placed or performed in visit on 02/15/21   Basic metabolic panel     Status: Abnormal   Result Value Ref Range    Sodium 135 133 - 144 mmol/L    Potassium 4.2 3.4 - 5.3 mmol/L    Chloride 102 94 - 109 mmol/L    Carbon Dioxide 31 20 - 32 mmol/L    Anion Gap 2 (L) 3 - 14 mmol/L    Glucose 109 (H) 70 - 99 mg/dL    Urea Nitrogen 23 7 - 30 mg/dL    Creatinine 1.08 0.66 - 1.25 mg/dL    GFR Estimate 70 >60 mL/min/[1.73_m2]    GFR Estimate If Black 81 >60 mL/min/[1.73_m2]    Calcium 9.4 8.5 - 10.1 mg/dL   PSA tumor marker     Status: Abnormal   Result Value Ref Range    PSA 5.41 (H) 0 - 4 ug/L   Hemoglobin A1c     Status: Abnormal   Result Value Ref Range    Hemoglobin A1C 6.5 (H) 0 - 5.6 %   Estimated Average Glucose     Status: None   Result Value Ref Range    Estimated Average Glucose 140 mg/dL

## 2021-02-15 NOTE — NURSING NOTE
Chief Complaint   Patient presents with     Establish Care       Initial /62   Pulse 54   Temp 97.6  F (36.4  C)   Ht 1.829 m (6')   Wt 103.9 kg (229 lb)   SpO2 97%   BMI 31.06 kg/m   Estimated body mass index is 31.06 kg/m  as calculated from the following:    Height as of this encounter: 1.829 m (6').    Weight as of this encounter: 103.9 kg (229 lb).  Medication Reconciliation: complete  Kaleb Paul LPN

## 2021-02-15 NOTE — PATIENT INSTRUCTIONS
Results for orders placed or performed in visit on 02/15/21   Basic metabolic panel     Status: Abnormal   Result Value Ref Range    Sodium 135 133 - 144 mmol/L    Potassium 4.2 3.4 - 5.3 mmol/L    Chloride 102 94 - 109 mmol/L    Carbon Dioxide 31 20 - 32 mmol/L    Anion Gap 2 (L) 3 - 14 mmol/L    Glucose 109 (H) 70 - 99 mg/dL    Urea Nitrogen 23 7 - 30 mg/dL    Creatinine 1.08 0.66 - 1.25 mg/dL    GFR Estimate 70 >60 mL/min/[1.73_m2]    GFR Estimate If Black 81 >60 mL/min/[1.73_m2]    Calcium 9.4 8.5 - 10.1 mg/dL   PSA tumor marker     Status: Abnormal   Result Value Ref Range    PSA 5.41 (H) 0 - 4 ug/L   Hemoglobin A1c     Status: Abnormal   Result Value Ref Range    Hemoglobin A1C 6.5 (H) 0 - 5.6 %   Estimated Average Glucose     Status: None   Result Value Ref Range    Estimated Average Glucose 140 mg/dL       Patient Education     Do You Have Diabetes?     When you have diabetes, your body has trouble using a sugar called glucose for energy. The sugar level in your blood becomes too high. Diabetes is a chronic (lifelong) condition. It can cause serious health problems I you don't get treatment. Or it can cause life-threatening conditions such as ketoacidosis.   Signs of diabetes  Are any of these questions true for you? If yes, see your healthcare provider.     Do you feel tired all the time?    Do you pee (urinate) often?    Do you feel thirsty or hungry all the time?    Are you losing weight for no reason?    Do cuts and bruises heal slowly?    Do you have numbness or tingling in your fingers or toes?    Do you have blurry vision?   What puts you at risk?  People of all backgrounds can get diabetes. But it mainly affects:     Americans    Alaskan Natives    Native Americans    Hispanics     Americans    Pacific Islanders  Other things can raise your risk. They include:    Having a family history of diabetes    Being overweight    Being over age 40    Having diabetes during pregnancy (gestational  diabetes)    Not getting enough physical activity    Taking certain medicines   Why worry about diabetes?  Here's why having diabetes is a problem:     Diabetes keeps your body from turning food into energy.    It can cause problems with your eyes, kidneys, nerves, and feet. It can also harm your heart and blood vessels.    Diabetes that is not under control can make it hard to live your life..  [employer] last reviewed this educational content on 11/1/2019 2000-2020 [employer]. [address] [place] [address], [place] [address]. All rights reserved. This information is not intended as a substitute for professional medical care. Always follow your healthcare professional's instructions.           Patient Education     Before You Start Your Diabetes Exercise Plan    Fitness has a special role for people with diabetes. Being fit means getting healthier by adding activity to your day. Talk with your healthcare provider before getting started. He or she may want you to have a checkup before you are more active. Also your provider may want you to have some tests first. This helps you and your provider learn how you will respond to a fitness program.     Your checkup  A checkup helps ensure that your fitness plan will bring you the most benefit. It also lowers the risks that may come with physical activity. As part of your checkup:     You may have a test called a hemoglobin A1C.  The A1C test measures your average sugar (glucose) level over 2 to 3 months. A1C is often given as a percentage. But it is now also given as a number that shows your estimated Average Glucose (eAG). The eAG gives you a number that is like the numbers listed on your daily glucose monitor.    Your healthcare provider may check your heart with a resting electrocardiogram.  Diabetes can cause heart problems. But a fitness program can help these problems get better. Being fit can also help improve your cholesterol and blood pressure  levels.    Your healthcare provider may check your feet.  People with diabetes can have problems with their feet. Your provider can check your feet before you become more active. Take time to find shoes that will support your feet well while you exercise. Wear socks that fit well. Wear socks that pull moisture away from your skin.  If you have an exercise stress test  An exercise stress test can show how your heart responds to activity. It can show what level of exercise is right for you. You will have small electrodes placed on your chest. You will then walk on a treadmill or ride an exercise bike. Your heart rate is checked. If you have this test, your provider will give you more details.   [employer] last reviewed this educational content on 12/1/2018 2000-2020 [employer]. [address] [place] [address], [place] [address]. All rights reserved. This information is not intended as a substitute for professional medical care. Always follow your healthcare professional's instructions.           Patient Education     Healthy Meals for Diabetes     A healthcare provider will help you develop a meal plan that fits your needs.   Ask your healthcare team to help you make a meal plan that fits your needs. Your meal plan tells you when to eat your meals and snacks, what kinds of foods to eat, and how much of each food to eat. You don t have to give up all the foods you like. But you do need to follow some guidelines.   Choose healthy carbohydrates  Starches, sugars, and fiber are all types of carbohydrates. Fiber can help lower your cholesterol and triglycerides. Fiber is also healthy for your heart. You should have 20 to 35 grams of total fiber each day. Fiber-rich foods include:     Whole-grain breads and cereals    Bulgur wheat    Brown rice   Whole-wheat pasta    Fruits and vegetables    Dry beans, and peas   Keep track of the amount of carbohydrates you eat. This can help you keep the right balance of physical  activity and medicine. The amount of carbohydrates needed will vary for each person. It depends on many things such as your health, the medicines you take, and how active you are. Your healthcare team will help you figure out the right amount of carbohydrates for you. You may start with around 45 to 60 grams of carbohydrates per meal, depending on your situation.    Here are some examples of foods containing about 15 grams of carbohydrates (1 serving of carbohydrates):     1/2 cup of canned or frozen fruit    A small piece of fresh fruit (4 ounces)    1 slice of bread    1/2 cup of oatmeal    1/3 cup of rice    4 to 6 crackers    1/2 English muffin    1/2 cup of black beans    1/4 of a large baked potato (3 ounces)    2/3 cup of plain fat-free yogurt    1 cup of soup    1/2 cup of casserole    6 chicken nuggets    2-inch-square brownie or cake without frosting    2 small cookies    1/2 cup of ice cream or sherbet  Choose healthy protein foods  Eating protein that is low in fat can help you control your weight. It also helps keep your heart healthy. Low-fat protein foods include:     Fish    Plant proteins, such as dry beans and peas, nuts, and soy products like tofu and soymilk    Lean meat with all visible fat removed    Poultry with the skin removed    Low-fat or nonfat milk, cheese, and yogurt  Limit unhealthy fats and sugar  Saturated and trans fats are unhealthy for your heart. They raise LDL (bad) cholesterol. Fat is also high in calories, so it can make you gain weight. To cut down on unhealthy fats and sugar, limit these foods:     Butter or margarine    Palm and palm kernel oils and coconut oil    Cream    Cheese    Mackey    Lunch meats   Ice cream    Sweet bakery goods such as pies, muffins, and donuts    Jams and jellies    Candy bars    Regular sodas   How much to eat  The amount of food you eat affects your blood sugar. It also affects your weight. Your healthcare team will tell you how much of each  type of food you should eat.     Use measuring cups and spoons and a food scale to measure serving sizes.    Learn what a correct serving size looks like on your plate. This will help when you are away from home and can t measure your servings. For example, a serving of meat is about the palm of your hand.    Eat only the number of servings given on your meal plan for each food. Don t take seconds.    Learn to read food labels. Be sure to look at serving size, total carbohydrates, fiber, calories, sugar, and saturated and trans fats. Look for healthier alternatives to foods that have added sugar.    Plan ahead for parties so you can still have a good time without going overboard with unhealthy food choices. Set a good example yourself by bringing a healthy dish to pot lucks.   Choose healthy snacks  When it comes to snacks, we usually think about foods with added sugar and fats. But there are many other options for healthier snack choices. Here are a few snack ideas to choose from:   Snacks with less than 5 grams of carbohydrates    1 piece of string cheese    3 celery sticks plus 1 tablespoon of peanut butter    5 cherry tomatoes plus 1 tablespoon of ranch dressing    1 hard-boiled egg    1/4 cup of fresh blueberries     5 baby carrots    1 cup of light popcorn    1/2 cup of sugar-free gelatin    15 almonds  Snacks with about 10 to 20 grams of carbohydrates    1/3 cup of hummus plus 1 cup of fresh cut nonstarchy vegetables (carrots, green peppers, broccoli, celery, or a combination)    1/2 cup of fresh or canned fruit plus 1/4 cup of cottage cheese    1/2 cup of tuna salad with 4 crackers    2 rice cakes and a tablespoon of peanut butter    1 small apple or orange    3 cups light popcorn    1/2 of a turkey sandwich (1 slice of whole-wheat bread, 2 ounces of turkey, and mustard)  Portion sizes are important to controlling your blood sugar and staying at a healthy weight. Stock up on healthy snack items so you always  have them on hand.   When to eat  Your meal plan will likely include breakfast, lunch, dinner, and some snacks.    Try to eat your meals and snacks at about the same times each day.    Eat all your meals and snacks. Skipping a meal or snack can make your blood sugar drop too low. It can also cause you to eat too much at the next meal or snack. Then your blood sugar could get too high.  [employer] last reviewed this educational content on 11/1/2018 2000-2020 [employer]. [address] [place] [id number]. All rights reserved. This information is not intended as a substitute for professional medical care. Always follow your healthcare professional's instructions.           Patient Education     Diabetes: Meal Planning    You can help keep your blood sugar level in your target range by eating healthy foods. Your healthcare team can help you create a low-fat, nutritious meal plan. Take an active role in your diabetes management. Follow your meal plan and work with your healthcare team.  Make your meal plan  A meal plan gives guidelines for the types and amounts of food you should eat. The goal is to balance food and insulin (or other diabetes medicines). That way, your blood sugars will be in your target range. Your dietitian will help you make a flexible meal plan that has many foods that you like.  Watch serving sizes  Your meal plan will group foods by servings. To learn how much a serving is, start by measuring food portions at each meal. Soon you ll know what a serving looks like on your plate. Ask your healthcare provider about how to balance servings of different foods.  Eat from all the food groups  The basis of a healthy meal plan is variety (eating lots of different foods). Choose lean meats, fresh fruits and vegetables, whole grains, and low-fat or nonfat dairy products. Eating a wide variety of foods gives your body the nutrients it needs. It can also keep you from getting bored with  "your meal plan.  Learn about carbohydrates, fats, and protein    Carbohydrates are starches, sugars, and fiber. They are found in many foods. These include fruit, bread, pasta, milk, and sweets. Of all the foods you eat, carbohydrates have the most effect on your blood sugar. Your dietitian may teach you about carb counting. This is a way to figure out the number of carbohydrates in a meal.    Fats have the most calories. They also have the most effect on your weight and your risk of heart disease. When you have diabetes, it s important to control your weight and protect your heart. Foods that are high in fat include whole milk, cheese, snack foods, and desserts. You can eat more of the \"heart-healthy\" fats such as avocados, salmon, tuna, and olive oil.    Protein is important for building and repairing muscles and bones. Choose low-fat protein sources, such as fish, egg whites, and skinless chicken.  Reduce liquid sugars  Extra calories from sodas, sports drinks, and fruit drinks make it hard to keep blood sugar in range. Cut as many liquid sugars from your meal plan as you can.  This includes most fruit juices. They are often high in natural or added sugar. Instead, drink plenty of water and other sugar-free beverages.  Eat less fat  If you need to lose weight, try to reduce the amount of fat in your diet. This can also help lower your cholesterol level to keep blood vessels healthier. Cut fat by using only small amounts of liquid oil for cooking. Read food labels carefully. Stay away from foods with unhealthy trans fats.  When it comes to blood sugar control, when you eat is as important as what you eat. You may need to eat several small meals spaced evenly throughout the day to stay in your target range. So don t skip breakfast or wait until late in the day to get most of your calories. Doing so can cause your blood sugar to rise too high or fall too low.  [employer] last reviewed this educational content on " 4/1/2019 2000-2020 [employer]. [address] [place] [address], [place] [address]. All rights reserved. This information is not intended as a substitute for professional medical care. Always follow your healthcare professional's instructions.           Patient Education     Diabetes: Learning About Serving and Portion Sizes   Servings and portions. What s the difference? These terms can be very confusing. But learning to measure serving sizes can help you figure out how many carbohydrates (carbs) and other foods you eat each day. They are also powerful tools for managing your weight.      A good rule of thumb: Devote half your plate to vegetables and green salad. Split the other half between protein and starchy carbohydrates. Fruit makes a good dessert.   Servings and portions   Many different words are used to describe amounts of food. If your healthcare provider uses a term you re not sure of, don t be afraid to ask. It helps to know the difference between servings and portions:     A serving size is a fixed size. Food producers use this term to describe their products. For example, the label on a cereal box could say that 1 cup of dry cereal = 1 serving.    A portion (also called a  helping ) is how much you eat or how much you put on your plate at a meal. For example, you might eat 2 cups of cereal at breakfast.  Watching serving sizes   The portion you choose to eat (such as 2 cups of cereal) may be more than 1 serving as listed on the food label (such as 1 cup of cereal). That s why it helps to measure or weigh the food you eat. Because the food label values are based on servings, you ll need to know how many servings you eat at 1 sitting.   When you re planning for a snack or a meal, keep servings in mind. If you don t have measuring cups or a scale handy, there are ways to  eyeball  serving sizes, such as comparing your food to the size of your hand (see pictures below).      Ounces: 2 to 3 ounces  is about the size of your palm.      1 cup: 1 cup (or a medium-sized piece) is about the size of your fist.      1/2 cup: 1/2 cup is about the size of your cupped hand.     Managing portion sizes   If your weight is a concern, reducing your portions can help. You can eat more than one serving of a food at once. But to keep from eating too much at one meal, learn how to manage your portions. A portion is the amount of each type of food on your plate.   [employer] last reviewed this educational content on 4/1/2019 2000-2020 [employer]. [address] [place] [address], [place] [address]. All rights reserved. This information is not intended as a substitute for professional medical care. Always follow your healthcare professional's instructions.           Patient Education     Diabetes: Understanding Carbohydrates, Fats, and Protein  Food is a source of fuel and nourishment for your body. It s also a source of pleasure. Having diabetes doesn t mean you have to eat special foods or give up desserts. Instead, your dietitian can show you how to plan meals to suit your body. To start, learn how different foods affect blood sugar.  Carbohydrates  Carbohydrates (carbs) are the main source of fuel for the body. They raise blood sugar. Many people think carbohydrates are only in pasta or bread. But carbohydrates are in many kinds of foods. Carbs include:    Sugars.These are naturally in foods such as fruit, milk, honey, and molasses. Sugars can also be added to many foods. They may be added to cereals and yogurt to candy and desserts. Sugars raise blood sugar.    Starches. These are in bread, cereals, pasta, and dried beans. They re found in corn, peas, potatoes, yam, acorn squash, and butternut squash. Starches raise blood sugar.     Fiber. This is in foods such as vegetables, fruits, beans, and whole grains. Unlike other carbs, fiber isn t digested or absorbed. So it doesn t raise blood sugar. In fact, fiber can help  "keep blood sugar from rising too fast. It helps keep blood cholesterol at a healthy level.  Did you know?  Even though carbohydrates raise blood sugar, it s best to have some in every meal. They are an important part of a healthy diet.  Fat  Fat is an energy source that can be stored until needed. Fat does not raise blood sugar. But it can raise blood cholesterol. This increases the risk of heart disease. Fat is high in calories. Eating too many calories can cause weight gain. Not all types of fat are the same.  More healthy:    Monounsaturated fats. These are mostly found in vegetable oils, such as olive, canola, and peanut oils. They are found in avocados and some nuts. Monounsaturated fats are healthy for your heart. That s because they lower LDL (unhealthy) cholesterol.    Polyunsaturated fats.These are mostly found in vegetable oils, such as corn, safflower, and soybean oils. They are found in some seeds, nuts, and fish. Polyunsaturated fats lower LDL (unhealthy) cholesterol. So, choosing them instead of saturated fats is healthy for your heart. Certain unsaturated fats can help lower triglycerides.   Less healthy:    Saturated fats.These are found in animal products, such as meat, poultry, whole milk, lard, and butter. Saturated fats raise LDL cholesterol.They are not healthy for your heart.    Hydrogenated oilsand trans fats. These are formed when vegetable oils are processed into solid fats. They are found in many processed foods. Hydrogenated oils and trans fats raise LDL (\"bad\") cholesterol and lower HDL (\"good\") cholesterol. They are not healthy for your heart.  Protein  Protein helps the body build and repair muscle and other tissue. Protein has little or no effect on blood sugar. But many foods that have protein also have saturated fat. By choosing low-fat protein sources, you can get the benefits of protein without the extra fat:    Plant protein. This is found in dry beans and peas, nuts, and soy " products, such as tofu and soymilk. These foods tend to have no cholesterol.Most are low in saturated fat.    Animal protein. This is found in fish, poultry, meat, cheese, milk, and eggs. These foods have cholesterol.They can be high in saturated fat. Aim for lean, lower-fat choices. Don't eat fried foods.  [employer] last reviewed this educational content on 4/1/2019 2000-2020 [employer]. [address] [place] [address], [place] [address]. All rights reserved. This information is not intended as a substitute for professional medical care. Always follow your healthcare professional's instructions.

## 2021-02-16 ASSESSMENT — ANXIETY QUESTIONNAIRES: GAD7 TOTAL SCORE: 0

## 2021-03-02 DIAGNOSIS — K21.9 GASTROESOPHAGEAL REFLUX DISEASE, UNSPECIFIED WHETHER ESOPHAGITIS PRESENT: ICD-10-CM

## 2021-03-02 NOTE — TELEPHONE ENCOUNTER
omeprazole      Last Written Prescription Date:  11/30/20  Last Fill Quantity: 90,   # refills: 0  Last Office Visit: 2/15/21  Future Office visit:

## 2021-04-01 ENCOUNTER — TRANSFERRED RECORDS (OUTPATIENT)
Dept: MULTI SPECIALTY CLINIC | Facility: CLINIC | Age: 66
End: 2021-04-01

## 2021-04-01 LAB — RETINOPATHY: NORMAL

## 2021-05-03 DIAGNOSIS — I10 BENIGN ESSENTIAL HYPERTENSION: ICD-10-CM

## 2021-05-04 RX ORDER — LISINOPRIL AND HYDROCHLOROTHIAZIDE 12.5; 2 MG/1; MG/1
1 TABLET ORAL DAILY
Qty: 90 TABLET | Refills: 1 | Status: SHIPPED | OUTPATIENT
Start: 2021-05-04 | End: 2021-11-26

## 2021-06-16 ENCOUNTER — HOSPITAL ENCOUNTER (EMERGENCY)
Facility: HOSPITAL | Age: 66
Discharge: HOME | End: 2021-06-16
Attending: NURSE PRACTITIONER | Admitting: NURSE PRACTITIONER
Payer: COMMERCIAL

## 2021-06-16 VITALS
HEART RATE: 61 BPM | TEMPERATURE: 98.1 F | DIASTOLIC BLOOD PRESSURE: 78 MMHG | OXYGEN SATURATION: 94 % | SYSTOLIC BLOOD PRESSURE: 134 MMHG | RESPIRATION RATE: 16 BRPM

## 2021-06-16 DIAGNOSIS — M54.50 ACUTE LEFT-SIDED LOW BACK PAIN, UNSPECIFIED WHETHER SCIATICA PRESENT: ICD-10-CM

## 2021-06-16 DIAGNOSIS — M54.50 ACUTE LEFT-SIDED LOW BACK PAIN: Primary | ICD-10-CM

## 2021-06-16 PROCEDURE — G0463 HOSPITAL OUTPT CLINIC VISIT: HCPCS | Mod: 25

## 2021-06-16 PROCEDURE — 250N000011 HC RX IP 250 OP 636: Performed by: NURSE PRACTITIONER

## 2021-06-16 PROCEDURE — 99213 OFFICE O/P EST LOW 20 MIN: CPT | Performed by: NURSE PRACTITIONER

## 2021-06-16 PROCEDURE — 96372 THER/PROPH/DIAG INJ SC/IM: CPT | Performed by: NURSE PRACTITIONER

## 2021-06-16 RX ORDER — METHOCARBAMOL 750 MG/1
750 TABLET, FILM COATED ORAL 3 TIMES DAILY PRN
Qty: 15 TABLET | Refills: 0 | Status: SHIPPED | OUTPATIENT
Start: 2021-06-16 | End: 2022-08-29

## 2021-06-16 RX ORDER — KETOROLAC TROMETHAMINE 15 MG/ML
15 INJECTION, SOLUTION INTRAMUSCULAR; INTRAVENOUS ONCE
Status: COMPLETED | OUTPATIENT
Start: 2021-06-16 | End: 2021-06-16

## 2021-06-16 RX ADMIN — KETOROLAC TROMETHAMINE 15 MG: 15 INJECTION, SOLUTION INTRAMUSCULAR; INTRAVENOUS at 17:08

## 2021-06-16 ASSESSMENT — ENCOUNTER SYMPTOMS: BACK PAIN: 1

## 2021-06-16 NOTE — ED PROVIDER NOTES
History     Chief Complaint   Patient presents with     Back Pain     HPI  Gilberto Camilo is a 66 year old male who presents ambulatory for evaluation of his left lower back.  Patient states he accidentally stepped into a hole in his basement and he fell forward into the side hitting an empty water cooler.  Denies hitting his head or LOC.  He has left lower back pain that is not radiating down his legs.  Denies saddle paresthesias, bowel or bladder incontinence.  He did take Advil at 1200 today.    Allergies:  No Known Allergies    Problem List:    Patient Active Problem List    Diagnosis Date Noted     Elevated prostate specific antigen (PSA) 02/15/2021     Priority: Medium     Prediabetes 02/15/2021     Priority: Medium     Diabetes mellitus, type 2 (H) 02/15/2021     Priority: Medium     Morbid obesity (H) 08/21/2020     Priority: Medium     Moderate major depression (H) 06/19/2020     Priority: Medium     Mixed hyperlipidemia 06/26/2018     Priority: Medium     Skin lesion 06/26/2018     Priority: Medium     Essential hypertension 10/11/2017     Priority: Medium     ACP (advance care planning) 05/11/2016     Priority: Medium     Advance Care Planning 5/11/2016: ACP Review of Chart / Resources Provided:  Reviewed chart for advance care plan.  Gilberto Camilo has no plan or code status on file. Discussed available resources and provided with information. Confirmed code status reflects current choices pending further ACP discussions.  Confirmed/documented legally designated decision makers.  Added by Lorenza Jordan             Routine general medical examination at a health care facility 05/11/2016     Priority: Medium     Erectile dysfunction 05/18/2015     Priority: Medium     Abdominal aortic aneurysm (H) 03/18/2014     Priority: Medium     Problem list name updated by automated process. Provider to review          Past Medical History:    Past Medical History:   Diagnosis Date     AAA (abdominal aortic  aneurysm) (H) 09/20/2017     Mohan esophagus      Closed rib fracture      DNS (deviated nasal septum)        Past Surgical History:    Past Surgical History:   Procedure Laterality Date     AAA REPAIR  06/17/2020    [facility]/ intravascular repair     APPENDECTOMY       COLONOSCOPY  2013     ESOPHAGOGASTRODUODENOSCOPY  2013       Family History:    Family History   Problem Relation Age of Onset     C.A.D. Father         CABG in late 40's     C.A.D. Brother         MI in 50's       Social History:  Marital Status:   [2]  Social History     Tobacco Use     Smoking status: Former Smoker     Packs/day: 0.00     Types: Cigarettes     Smokeless tobacco: Never Used     Tobacco comment: Feb. 2014   Substance Use Topics     Alcohol use: Yes     Comment: occasional     Drug use: Yes     Types: Marijuana        Medications:    ARIPiprazole (ABILIFY) 5 MG tablet  aspirin (ASA) 81 MG EC tablet  atorvastatin (LIPITOR) 80 MG tablet  buPROPion (WELLBUTRIN XL) 150 MG 24 hr tablet  buPROPion (WELLBUTRIN XL) 300 MG 24 hr tablet  diphenhydrAMINE HCl (BENADRYL ALLERGY PO)  lisinopril-hydrochlorothiazide (ZESTORETIC) 20-12.5 MG tablet  loratadine (CLARITIN) 10 MG tablet  methocarbamol (ROBAXIN) 750 MG tablet  metoprolol succinate ER (TOPROL-XL) 25 MG 24 hr tablet  Multiple Vitamins-Minerals (MULTIVITAMIN ADULT PO)  omeprazole (PRILOSEC) 20 MG DR capsule  vitamin C (ASCORBIC ACID) 1000 MG TABS  zolpidem (AMBIEN) 10 MG tablet          Review of Systems   Musculoskeletal: Positive for back pain. Negative for gait problem.   All other systems reviewed and are negative.      Physical Exam   BP: 134/78  Pulse: 61  Temp: 98.1  F (36.7  C)  Resp: 16  SpO2: 94 %      Physical Exam  Vitals signs and nursing note reviewed.   Constitutional:       Appearance: Normal appearance. He is not ill-appearing or toxic-appearing.   HENT:      Head: Normocephalic.   Eyes:      Pupils: Pupils are equal, round, and reactive to light.   Neck:       Musculoskeletal: Normal range of motion and neck supple. No muscular tenderness.   Cardiovascular:      Rate and Rhythm: Normal rate.   Pulmonary:      Effort: Pulmonary effort is normal.   Musculoskeletal:      Lumbar back: He exhibits decreased range of motion, tenderness and pain. He exhibits no swelling, no edema and no deformity.        Back:       Comments: TTP to left lower back.  No step-offs or edema.  No cervical spine tenderness.  Negative straight leg raise test.   Lymphadenopathy:      Cervical: No cervical adenopathy.   Skin:     General: Skin is warm and dry.      Capillary Refill: Capillary refill takes less than 2 seconds.   Neurological:      Mental Status: He is alert and oriented to person, place, and time.         ED Course        Procedures               No results found for this or any previous visit (from the past 24 hour(s)).    Medications   ketorolac (TORADOL) injection 15 mg (has no administration in time range)       Assessments & Plan (with Medical Decision Making)     I have reviewed the nursing notes.    I have reviewed the findings, diagnosis, plan and need for follow up with the patient.  66-year-old male that presented for evaluation of left lower back pain.  Patient stepped into a hole and fell forward hitting an empty water cooler with his left side.  He still ambulating with minimal difficulty.  He denies saddle paresthesias, bowel bladder incontinence.  No cervical spine tenderness.  Full range of motion to cervical spine.  No step-offs or edema.  Negative straight leg test.    No imaging done today as patient does note that it was an empty water cooler with minimal impact to his back.  Patient reports that he twisted his body when he fell.  Likely more of a muscular strain.  Low concern for fracture at this time.  Patient treated with Toradol.  Robaxin as prescribed.  Recommended he continue with APAP/ibuprofen as needed for the pain.  Apply cold compresses for the rest of  today.  Start alternating cold with heat tomorrow.  Follow-up with PCP if no improvement in symptoms.  Return to ED/UC for any concerning symptoms.  Patient verbalized understanding and is in agreement with plan of care.    This document was prepared using a combination of typing and voice generated software.  While every attempt was made for accuracy, spelling and grammatical errors may exist.    New Prescriptions    METHOCARBAMOL (ROBAXIN) 750 MG TABLET    Take 1 tablet (750 mg) by mouth 3 times daily as needed for muscle spasms       Final diagnoses:   Acute left-sided low back pain       6/16/2021   [facility]     Mpofu, Prudence, CNP  06/16/21 [id number]

## 2021-06-16 NOTE — ED TRIAGE NOTES
Stepped in a hole in the basement and fell forward to the left side. Hit the water cooler. Pain 9/10 in left flank lower back area

## 2021-06-16 NOTE — DISCHARGE INSTRUCTIONS
Continue taking Tylenol/Advil or ibuprofen as needed for your pain.  Take the muscle relaxant as prescribed.    Apply cold compresses to the affected side of your back for the rest of the day today.  Start alternating with heat tomorrow.    Follow-up with your doctor as needed if no improvement in your symptoms.    Return to emergency department for any concerning symptoms.

## 2021-07-26 DIAGNOSIS — I10 BENIGN ESSENTIAL HYPERTENSION: ICD-10-CM

## 2021-07-26 DIAGNOSIS — I71.40 ABDOMINAL AORTIC ANEURYSM (AAA) WITHOUT RUPTURE (H): Chronic | ICD-10-CM

## 2021-07-26 RX ORDER — METOPROLOL SUCCINATE 25 MG/1
TABLET, EXTENDED RELEASE ORAL
Qty: 90 TABLET | Refills: 2 | Status: SHIPPED | OUTPATIENT
Start: 2021-07-26 | End: 2022-05-04

## 2021-07-27 ENCOUNTER — TRANSFERRED RECORDS (OUTPATIENT)
Dept: HEALTH INFORMATION MANAGEMENT | Facility: CLINIC | Age: 66
End: 2021-07-27

## 2021-08-10 NOTE — PROGRESS NOTES
Assessment & Plan     Elevated prostate specific antigen (PSA)  Stable - recheck in a year   - PSA tumor marker; Future    Type 2 diabetes mellitus with other specified complication, without long-term current use of insulin (H)  Great control. Continue current medications and behavioral changes.   Follow-up in 6 months   - Comprehensive metabolic panel; Future  - Hemoglobin A1c; Future  - Lipid Profile; Future  - Albumin Random Urine Quantitative with Creat Ratio; Future    Essential hypertension  Stable - good control. Continue current medications and behavioral changes.   - Comprehensive metabolic panel; Future    Morbid obesity (H)  Wt down some - discussed . Keep up with wt loss    Hypersomnolence  High risk for EMANUEL. Will refer for sleep med eval.  - SLEEP EVALUATION & MANAGEMENT REFERRAL - ADULT -; Future    Hypertrophic toenail  Will refer Podiatry  - Orthopedic  Referral; Future             BMI:   Estimated body mass index is 31.87 kg/m  as calculated from the following:    Height as of this encounter: 1.829 m (6').    Weight as of this encounter: 106.6 kg (235 lb).           No follow-ups on file.    Rock Basilio MD  [facility] - KAIDEN Macias is a 66 year old who presents for the following health issues     HPI     Diabetes Follow-up      How often are you checking your blood sugar? Not at all    What concerns do you have today about your diabetes? None     Do you have any of these symptoms? (Select all that apply)  No numbness or tingling in feet.  No redness, sores or blisters on feet.  No complaints of excessive thirst.  No reports of blurry vision.  No significant changes to weight.    Have you had a diabetic eye exam in the last 12 months? Yes- Date of last eye exam: spring,  Location: [facility]    1. foot deformity   No  2. Current or previous foot ulceration     No  3. Current or previous pre-ulcerative calluses     No  4. previous partial amputation of one  or both feet or complete amputation of one foot     No  5. peripheral neuropathy with evidence of callus formation     No  6. poor circulation     No          Hyperlipidemia Follow-Up      Are you regularly taking any medication or supplement to lower your cholesterol?   Yes- lipitor    Are you having muscle aches or other side effects that you think could be caused by your cholesterol lowering medication?  No    Hypertension Follow-up      Do you check your blood pressure regularly outside of the clinic? Yes     Are you following a low salt diet? No    Are your blood pressures ever more than 140 on the top number (systolic) OR more   than 90 on the bottom number (diastolic), for example 140/90? No    BP Readings from Last 2 Encounters:   06/16/21 134/78   02/15/21 104/62     Hemoglobin A1C (%)   Date Value   02/15/2021 6.5 (H)     LDL Cholesterol Calculated (mg/dL)   Date Value   08/21/2020 68   10/02/2019 97             Review of Systems   Constitutional, HEENT, cardiovascular, pulmonary, gi and gu systems are negative, except as otherwise noted.    c/o daytime tiredness times 12 months or so.  Takes naps for 2-3 hrs.  Snores at night - no witnessed EMANUEL. No change in meds.   Objective    /68   Pulse 64   Temp (!) 96.3  F (35.7  C)   Resp 20   Ht 1.829 m (6')   Wt 106.6 kg (235 lb)   SpO2 98%   BMI 31.87 kg/m    Body mass index is 31.87 kg/m .  Physical Exam   GENERAL: healthy, alert and no distress  NECK: no adenopathy, no asymmetry, masses, or scars and thyroid normal to palpation  Very small oral-pharyngeal airway   RESP: lungs clear to auscultation - no rales, rhonchi or wheezes  CV: regular rate and rhythm, normal S1 S2, no S3 or S4, no murmur, click or rub, no peripheral edema and peripheral pulses strong  ABDOMEN: soft, nontender, no hepatosplenomegaly, no masses and bowel sounds normal  MS: no gross musculoskeletal defects noted, no edema  Diabetic foot exam: normal DP and PT pulses, no trophic  changes or ulcerative lesions, normal sensory exam and normal monofilament exam-- toenail hypertrophy    Results for orders placed or performed in visit on 08/17/21   Hemoglobin A1c     Status: Abnormal   Result Value Ref Range    Estimated Average Glucose 146 mg/dL    Hemoglobin A1C 6.7 (H) 0.0 - 5.6 %

## 2021-08-17 ENCOUNTER — LAB (OUTPATIENT)
Dept: LAB | Facility: OTHER | Age: 66
End: 2021-08-17
Attending: FAMILY MEDICINE
Payer: COMMERCIAL

## 2021-08-17 ENCOUNTER — OFFICE VISIT (OUTPATIENT)
Dept: FAMILY MEDICINE | Facility: OTHER | Age: 66
End: 2021-08-17
Attending: FAMILY MEDICINE
Payer: COMMERCIAL

## 2021-08-17 VITALS
HEART RATE: 64 BPM | HEIGHT: 72 IN | OXYGEN SATURATION: 98 % | WEIGHT: 235 LBS | RESPIRATION RATE: 20 BRPM | BODY MASS INDEX: 31.83 KG/M2 | SYSTOLIC BLOOD PRESSURE: 118 MMHG | DIASTOLIC BLOOD PRESSURE: 68 MMHG | TEMPERATURE: 96.3 F

## 2021-08-17 DIAGNOSIS — R97.20 ELEVATED PROSTATE SPECIFIC ANTIGEN (PSA): ICD-10-CM

## 2021-08-17 DIAGNOSIS — G47.10 HYPERSOMNOLENCE: ICD-10-CM

## 2021-08-17 DIAGNOSIS — E66.01 MORBID OBESITY (H): ICD-10-CM

## 2021-08-17 DIAGNOSIS — R97.20 ELEVATED PROSTATE SPECIFIC ANTIGEN (PSA): Primary | ICD-10-CM

## 2021-08-17 DIAGNOSIS — E11.69 TYPE 2 DIABETES MELLITUS WITH OTHER SPECIFIED COMPLICATION, WITHOUT LONG-TERM CURRENT USE OF INSULIN (H): ICD-10-CM

## 2021-08-17 DIAGNOSIS — I10 ESSENTIAL HYPERTENSION: ICD-10-CM

## 2021-08-17 DIAGNOSIS — L60.2 HYPERTROPHIC TOENAIL: ICD-10-CM

## 2021-08-17 LAB
ALBUMIN SERPL-MCNC: 3.6 G/DL (ref 3.4–5)
ALP SERPL-CCNC: 93 U/L (ref 40–150)
ALT SERPL W P-5'-P-CCNC: 28 U/L (ref 0–70)
ANION GAP SERPL CALCULATED.3IONS-SCNC: 5 MMOL/L (ref 3–14)
AST SERPL W P-5'-P-CCNC: 21 U/L (ref 0–45)
BILIRUB SERPL-MCNC: 0.6 MG/DL (ref 0.2–1.3)
BUN SERPL-MCNC: 19 MG/DL (ref 7–30)
CALCIUM SERPL-MCNC: 9.4 MG/DL (ref 8.5–10.1)
CHLORIDE BLD-SCNC: 105 MMOL/L (ref 94–109)
CHOLEST SERPL-MCNC: 168 MG/DL
CO2 SERPL-SCNC: 30 MMOL/L (ref 20–32)
CREAT SERPL-MCNC: 1.07 MG/DL (ref 0.66–1.25)
CREAT UR-MCNC: 211 MG/DL
EST. AVERAGE GLUCOSE BLD GHB EST-MCNC: 146 MG/DL
FASTING STATUS PATIENT QL REPORTED: YES
GFR SERPL CREATININE-BSD FRML MDRD: 71 ML/MIN/1.73M2
GLUCOSE BLD-MCNC: 132 MG/DL (ref 70–99)
HBA1C MFR BLD: 6.7 % (ref 0–5.6)
HDLC SERPL-MCNC: 49 MG/DL
LDLC SERPL CALC-MCNC: 76 MG/DL
MICROALBUMIN UR-MCNC: 11 MG/L
MICROALBUMIN/CREAT UR: 5.21 MG/G CR (ref 0–17)
NONHDLC SERPL-MCNC: 119 MG/DL
POTASSIUM BLD-SCNC: 4 MMOL/L (ref 3.4–5.3)
PROT SERPL-MCNC: 7 G/DL (ref 6.8–8.8)
PSA SERPL-MCNC: 5.52 UG/L (ref 0–4)
SODIUM SERPL-SCNC: 140 MMOL/L (ref 133–144)
TRIGL SERPL-MCNC: 214 MG/DL

## 2021-08-17 PROCEDURE — 84153 ASSAY OF PSA TOTAL: CPT | Mod: ZL

## 2021-08-17 PROCEDURE — 82043 UR ALBUMIN QUANTITATIVE: CPT | Mod: ZL

## 2021-08-17 PROCEDURE — 80053 COMPREHEN METABOLIC PANEL: CPT | Mod: ZL

## 2021-08-17 PROCEDURE — 99214 OFFICE O/P EST MOD 30 MIN: CPT | Performed by: FAMILY MEDICINE

## 2021-08-17 PROCEDURE — 83036 HEMOGLOBIN GLYCOSYLATED A1C: CPT | Mod: ZL

## 2021-08-17 PROCEDURE — 82465 ASSAY BLD/SERUM CHOLESTEROL: CPT | Mod: ZL

## 2021-08-17 PROCEDURE — G0463 HOSPITAL OUTPT CLINIC VISIT: HCPCS

## 2021-08-17 ASSESSMENT — ANXIETY QUESTIONNAIRES
3. WORRYING TOO MUCH ABOUT DIFFERENT THINGS: NOT AT ALL
1. FEELING NERVOUS, ANXIOUS, OR ON EDGE: NOT AT ALL
7. FEELING AFRAID AS IF SOMETHING AWFUL MIGHT HAPPEN: NOT AT ALL
GAD7 TOTAL SCORE: 0
6. BECOMING EASILY ANNOYED OR IRRITABLE: NOT AT ALL
2. NOT BEING ABLE TO STOP OR CONTROL WORRYING: NOT AT ALL
4. TROUBLE RELAXING: NOT AT ALL
IF YOU CHECKED OFF ANY PROBLEMS ON THIS QUESTIONNAIRE, HOW DIFFICULT HAVE THESE PROBLEMS MADE IT FOR YOU TO DO YOUR WORK, TAKE CARE OF THINGS AT HOME, OR GET ALONG WITH OTHER PEOPLE: NOT DIFFICULT AT ALL
5. BEING SO RESTLESS THAT IT IS HARD TO SIT STILL: NOT AT ALL

## 2021-08-17 ASSESSMENT — PATIENT HEALTH QUESTIONNAIRE - PHQ9: SUM OF ALL RESPONSES TO PHQ QUESTIONS 1-9: 4

## 2021-08-17 ASSESSMENT — MIFFLIN-ST. JEOR: SCORE: 1873.95

## 2021-08-17 ASSESSMENT — PAIN SCALES - GENERAL: PAINLEVEL: NO PAIN (0)

## 2021-08-17 NOTE — PATIENT INSTRUCTIONS
Results for orders placed or performed in visit on 08/17/21   Comprehensive metabolic panel     Status: Abnormal   Result Value Ref Range    Sodium 140 133 - 144 mmol/L    Potassium 4.0 3.4 - 5.3 mmol/L    Chloride 105 94 - 109 mmol/L    Carbon Dioxide (CO2) 30 20 - 32 mmol/L    Anion Gap 5 3 - 14 mmol/L    Urea Nitrogen 19 7 - 30 mg/dL    Creatinine 1.07 0.66 - 1.25 mg/dL    Calcium 9.4 8.5 - 10.1 mg/dL    Glucose 132 (H) 70 - 99 mg/dL    Alkaline Phosphatase 93 40 - 150 U/L    AST 21 0 - 45 U/L    ALT 28 0 - 70 U/L    Protein Total 7.0 6.8 - 8.8 g/dL    Albumin 3.6 3.4 - 5.0 g/dL    Bilirubin Total 0.6 0.2 - 1.3 mg/dL    GFR Estimate 71 >60 mL/min/1.73m2   Hemoglobin A1c     Status: Abnormal   Result Value Ref Range    Estimated Average Glucose 146 mg/dL    Hemoglobin A1C 6.7 (H) 0.0 - 5.6 %   Lipid Profile     Status: Abnormal   Result Value Ref Range    Cholesterol 168 <200 mg/dL    Triglycerides 214 (H) <150 mg/dL    Direct Measure HDL 49 >=40 mg/dL    LDL Cholesterol Calculated 76 <=100 mg/dL    Non HDL Cholesterol 119 <130 mg/dL    Patient Fasting > 8hrs? Yes    Albumin Random Urine Quantitative with Creat Ratio     Status: None   Result Value Ref Range    Creatinine Urine mg/dL 211 mg/dL    Albumin Urine mg/L 11 mg/L    Albumin Urine mg/g Cr 5.21 0.00 - 17.00 mg/g Cr

## 2021-08-17 NOTE — NURSING NOTE
Chief Complaint   Patient presents with     Diabetes       Initial /68   Pulse 64   Temp (!) 96.3  F (35.7  C)   Resp 20   Ht 1.829 m (6')   Wt 106.6 kg (235 lb)   SpO2 98%   BMI 31.87 kg/m   Estimated body mass index is 31.87 kg/m  as calculated from the following:    Height as of this encounter: 1.829 m (6').    Weight as of this encounter: 106.6 kg (235 lb).  Medication Reconciliation: complete  Kaleb Paul LPN

## 2021-08-18 ASSESSMENT — ANXIETY QUESTIONNAIRES: GAD7 TOTAL SCORE: 0

## 2021-08-23 ENCOUNTER — DOCUMENTATION ONLY (OUTPATIENT)
Dept: SLEEP MEDICINE | Facility: HOSPITAL | Age: 66
End: 2021-08-23
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
## 2021-08-23 NOTE — PROGRESS NOTES
STOP NYA       Name: Gilberto Camilo MRN# [id number]   Age: 66 year old YOB: 1955     Stop Bang questionnaire completed with a score of >3 to allow for HST     Have you been told you snore loudly (louder than talking or loud enough to be heard through doors)? YES    Do you often feel tired, fatigued, or sleepy during the daytime? YES    Has anyone observed you stop breathing during your sleep? YES    Do you have or are you being treated for high blood pressure? YES    Is your BMI greater than 35? NO    Is your neck size circumference 16 inches or greater? NO    Are you over 50 years old? YES    Stop Bang Score (# of yes): 6

## 2021-08-30 ENCOUNTER — OFFICE VISIT (OUTPATIENT)
Dept: PODIATRY | Facility: OTHER | Age: 66
End: 2021-08-30
Attending: FAMILY MEDICINE
Payer: COMMERCIAL

## 2021-08-30 VITALS
RESPIRATION RATE: 10 BRPM | TEMPERATURE: 97.9 F | BODY MASS INDEX: 31.83 KG/M2 | HEART RATE: 67 BPM | OXYGEN SATURATION: 95 % | SYSTOLIC BLOOD PRESSURE: 128 MMHG | WEIGHT: 235 LBS | DIASTOLIC BLOOD PRESSURE: 68 MMHG | HEIGHT: 72 IN

## 2021-08-30 DIAGNOSIS — L60.3 ONYCHODYSTROPHY: Primary | ICD-10-CM

## 2021-08-30 DIAGNOSIS — I73.9 PERIPHERAL ARTERIAL DISEASE (H): ICD-10-CM

## 2021-08-30 DIAGNOSIS — E11.9 DIABETES MELLITUS TYPE 2, NONINSULIN DEPENDENT (H): ICD-10-CM

## 2021-08-30 DIAGNOSIS — I70.213 INTERMITTENT CLAUDICATION OF BOTH LOWER EXTREMITIES DUE TO ATHEROSCLEROSIS (H): ICD-10-CM

## 2021-08-30 PROCEDURE — 11721 DEBRIDE NAIL 6 OR MORE: CPT | Performed by: PODIATRIST

## 2021-08-30 PROCEDURE — G0463 HOSPITAL OUTPT CLINIC VISIT: HCPCS | Mod: 25

## 2021-08-30 PROCEDURE — 99203 OFFICE O/P NEW LOW 30 MIN: CPT | Mod: 25 | Performed by: PODIATRIST

## 2021-08-30 ASSESSMENT — MIFFLIN-ST. JEOR: SCORE: 1873.95

## 2021-08-30 ASSESSMENT — PAIN SCALES - GENERAL: PAINLEVEL: NO PAIN (0)

## 2021-08-30 NOTE — LETTER
8/30/2021         RE: Gilberto Minayatom  [address] [facility] [place] [address]        Dear Colleague,    Thank you for referring your patient, Gilberto Camilo, to the [facility]. Please see a copy of my visit note below.    Chief complaint: Patient presents with:  Toenail      History of Present Illness: This 66 year old NIDDM II male is seen at the request of Dr. Basilio for evaluation and suggestions of management of elongated toenails. He says the toenails cause him pain when they get wet. The toenails are difficult to reach and to trim because of the thickness of the toenails.    He denies burning, tingling, and numbness in his feet. He does get leg cramps consistently every time he walks about one block. Patient follows up with Dr. Mcgrath at [facility]. Patient says he fixed his aortic aneurysm in the past and he is monitoring the leg cramps. He says he was told he doesn't have enough of a blockage to require intervention yet. He still follows up with Dr. Mcgrath every six months.    Patient denies burning, tingling, and numbness in his feet.    Last HbA1C was 6.7% on 08/17/2021.      No further pedal complaints today.       Patient quit smoking around 2011.        /68 (BP Location: Left arm, Patient Position: Sitting, Cuff Size: Adult Regular)   Pulse 67   Temp 97.9  F (36.6  C) (Tympanic)   Resp 10   Ht 1.829 m (6')   Wt 106.6 kg (235 lb)   SpO2 95%   BMI 31.87 kg/m      Patient Active Problem List   Diagnosis     Abdominal aortic aneurysm (H)     Erectile dysfunction     ACP (advance care planning)     Routine general medical examination at a health care facility     Essential hypertension     Mixed hyperlipidemia     Skin lesion     Moderate major depression (H)     Morbid obesity (H)     Elevated prostate specific antigen (PSA)     Prediabetes     Diabetes mellitus, type 2 (H)       Past Surgical History:   Procedure Laterality Date     AAA REPAIR  06/17/2020    [facility] [place]/  intravascular repair     APPENDECTOMY       COLONOSCOPY  2013     ESOPHAGOGASTRODUODENOSCOPY  2013       Current Outpatient Medications   Medication     ARIPiprazole (ABILIFY) 5 MG tablet     aspirin (ASA) 81 MG EC tablet     atorvastatin (LIPITOR) 80 MG tablet     buPROPion (WELLBUTRIN XL) 300 MG 24 hr tablet     diphenhydrAMINE HCl (BENADRYL ALLERGY PO)     lisinopril-hydrochlorothiazide (ZESTORETIC) 20-12.5 MG tablet     loratadine (CLARITIN) 10 MG tablet     methocarbamol (ROBAXIN) 750 MG tablet     metoprolol succinate ER (TOPROL-XL) 25 MG 24 hr tablet     Multiple Vitamins-Minerals (MULTIVITAMIN ADULT PO)     omeprazole (PRILOSEC) 20 MG DR capsule     vitamin C (ASCORBIC ACID) 1000 MG TABS     zolpidem (AMBIEN) 10 MG tablet     No current facility-administered medications for this visit.        No Known Allergies    Family History   Problem Relation Age of Onset     C.A.D. Father         CABG in late 40's     C.A.D. Brother         MI in 50's       Social History     Socioeconomic History     Marital status:      Spouse name: None     Number of children: None     Years of education: None     Highest education level: None   Occupational History     None   Tobacco Use     Smoking status: Former Smoker     Packs/day: 0.00     Types: Cigarettes     Smokeless tobacco: Never Used     Tobacco comment: Feb. 2014   Substance and Sexual Activity     Alcohol use: Yes     Comment: occasional     Drug use: Yes     Types: Marijuana     Sexual activity: Yes     Partners: Female   Other Topics Concern     Parent/sibling w/ CABG, MI or angioplasty before 65F 55M? Yes     Comment: dad had heart attack before 55. brother also had heart attack before 55.   Social History Narrative    .      Social Determinants of Health     Financial Resource Strain:      Difficulty of Paying Living Expenses:    Food Insecurity:      Worried About Running Out of Food in the Last Year:      Ran Out of Food in the Last Year:     Transportation Needs:      Lack of Transportation (Medical):      Lack of Transportation (Non-Medical):    Physical Activity:      Days of Exercise per Week:      Minutes of Exercise per Session:    Stress:      Feeling of Stress :    Social Connections:      Frequency of Communication with Friends and Family:      Frequency of Social Gatherings with Friends and Family:      Attends Alevism Services:      Active Member of Clubs or Organizations:      Attends Club or Organization Meetings:      Marital Status:    Intimate Partner Violence:      Fear of Current or Ex-Partner:      Emotionally Abused:      Physically Abused:      Sexually Abused:        ROS: 10 point ROS neg other than the symptoms noted above in the HPI.  EXAM  Constitutional: healthy, alert and no distress    Psychiatric: mentation appears normal and affect normal/bright    VASCULAR:  -Dorsalis pedis pulse +1/4 b/l  -Posterior tibial pulse +1/4 b/l  -Capillary refill time < 3 seconds to b/l hallux  -Hair growth Absent to b/l anterior legs and ankles  NEURO:  -Protective sensation intact with SWM +10/10 RIGHT and +10/10 LEFT on 08/30/2021  -Light touch sensation intact to b/l plantar forefoot  DERM:  -Skin temperature cool to bilateral foot  -Mild rubor with purple discoloration to bilateral foot  -Toenails elongated, thickened, dystrophic and discolored x 10  ---Bilateral hallux toenails significantly dystrophic  ---Mild incurvation of the bilateral toenail borders of the RIGHT hallux without open wounds to the bilateral toenail borders  MSK:  -Muscle strength of ankles +5/5 for dorsiflexion, plantarflexion, ABDUction and ADDuction b/l    ============================================================    ASSESSMENT:  (L60.3) Onychodystrophy  (primary encounter diagnosis)    (I70.213) Intermittent claudication of both lower extremities due to atherosclerosis (H)    (E11.9) Diabetes mellitus type 2, noninsulin dependent (H)    (I73.9) Peripheral  arterial disease (H)      PLAN:  -Patient evaluated and examined. Treatment options discussed with no educational barriers noted.  -High risk toenail debridement x 10 toenails without incident    -Diabetic Foot Education provided. This included checking the feet daily looking for new new blisters or wounds, wearing shoes at all times when walking including around the house, and avoiding lotion application between the toes. If there are any signs of infection, the patient should present to the ED as soon as possible. Infections of the foot can be life threatening or lead to amputations of the foot or leg.    -Intermittent claudication: Discussed the cause and treatment options for intermittent claudication. He says he has pain after one block of walking. This is only relieved with sitting. He says he recently had an DAVID study but he doesn't remember the results. He says he has already informed his vascular surgeon about this concern and he is following up with him every six months. Patient is advised to return to his vascular surgeon sooner if his pain and/or leg cramps worsen / become more frequent / become more painful. Discussed with the patient how the claudication affects the feet.    -Vascular appointment 7/28/2021 with Dr. Mcgrath:  ---Dr. Mcgrath's note:   Reviewed his DAVID. The right was 0.79, the left 0.59. This corresponds to his complaint of his symptoms.    Moderate disease bilaterally, worse on the left. Discussed that he has fairly long segment disease on the left. I would not recommend intervention unless he truly has lifestyle limiting/disabling claudication. Discussed that any rest pain or tissue loss would lead to prompt evaluation for intervention. I discussed structured exercise and the significant benefit to be had from this in his pain free walking distance. However, given the distance that he has to walk, this may not be practical for him.     -Patient in agreement with the above treatment plan  and all of patient's questions were answered.      RTC 3 months for diabetic foot exam and high risk nail debridement         Sierra Velásquez DPM      Again, thank you for allowing me to participate in the care of your patient.        Sincerely,        Sierra Velásquez DPM

## 2021-08-30 NOTE — PROGRESS NOTES
Chief complaint: Patient presents with:  Toenail      History of Present Illness: This 66 year old NIDDM II male is seen at the request of Dr. Basilio for evaluation and suggestions of management of elongated toenails. He says the toenails cause him pain when they get wet. The toenails are difficult to reach and to trim because of the thickness of the toenails.    He denies burning, tingling, and numbness in his feet. He does get leg cramps consistently every time he walks about one block. Patient follows up with Dr. Mcgrath at [facility]. Patient says he fixed his aortic aneurysm in the past and he is monitoring the leg cramps. He says he was told he doesn't have enough of a blockage to require intervention yet. He still follows up with Dr. Mcgrath every six months.    Patient denies burning, tingling, and numbness in his feet.    Last HbA1C was 6.7% on 08/17/2021.      No further pedal complaints today.       Patient quit smoking around 2011.        /68 (BP Location: Left arm, Patient Position: Sitting, Cuff Size: Adult Regular)   Pulse 67   Temp 97.9  F (36.6  C) (Tympanic)   Resp 10   Ht 1.829 m (6')   Wt 106.6 kg (235 lb)   SpO2 95%   BMI 31.87 kg/m      Patient Active Problem List   Diagnosis     Abdominal aortic aneurysm (H)     Erectile dysfunction     ACP (advance care planning)     Routine general medical examination at a health care facility     Essential hypertension     Mixed hyperlipidemia     Skin lesion     Moderate major depression (H)     Morbid obesity (H)     Elevated prostate specific antigen (PSA)     Prediabetes     Diabetes mellitus, type 2 (H)       Past Surgical History:   Procedure Laterality Date     AAA REPAIR  06/17/2020    [facility] [place]/ intravascular repair     APPENDECTOMY       COLONOSCOPY  2013     ESOPHAGOGASTRODUODENOSCOPY  2013       Current Outpatient Medications   Medication     ARIPiprazole (ABILIFY) 5 MG tablet     aspirin (ASA) 81 MG EC tablet     atorvastatin  (LIPITOR) 80 MG tablet     buPROPion (WELLBUTRIN XL) 300 MG 24 hr tablet     diphenhydrAMINE HCl (BENADRYL ALLERGY PO)     lisinopril-hydrochlorothiazide (ZESTORETIC) 20-12.5 MG tablet     loratadine (CLARITIN) 10 MG tablet     methocarbamol (ROBAXIN) 750 MG tablet     metoprolol succinate ER (TOPROL-XL) 25 MG 24 hr tablet     Multiple Vitamins-Minerals (MULTIVITAMIN ADULT PO)     omeprazole (PRILOSEC) 20 MG DR capsule     vitamin C (ASCORBIC ACID) 1000 MG TABS     zolpidem (AMBIEN) 10 MG tablet     No current facility-administered medications for this visit.        No Known Allergies    Family History   Problem Relation Age of Onset     C.A.D. Father         CABG in late 40's     C.A.D. Brother         MI in 50's       Social History     Socioeconomic History     Marital status:      Spouse name: None     Number of children: None     Years of education: None     Highest education level: None   Occupational History     None   Tobacco Use     Smoking status: Former Smoker     Packs/day: 0.00     Types: Cigarettes     Smokeless tobacco: Never Used     Tobacco comment: Feb. 2014   Substance and Sexual Activity     Alcohol use: Yes     Comment: occasional     Drug use: Yes     Types: Marijuana     Sexual activity: Yes     Partners: Female   Other Topics Concern     Parent/sibling w/ CABG, MI or angioplasty before 65F 55M? Yes     Comment: dad had heart attack before 55. brother also had heart attack before 55.   Social History Narrative    .      Social Determinants of Health     Financial Resource Strain:      Difficulty of Paying Living Expenses:    Food Insecurity:      Worried About Running Out of Food in the Last Year:      Ran Out of Food in the Last Year:    Transportation Needs:      Lack of Transportation (Medical):      Lack of Transportation (Non-Medical):    Physical Activity:      Days of Exercise per Week:      Minutes of Exercise per Session:    Stress:      Feeling of Stress :    Social  Connections:      Frequency of Communication with Friends and Family:      Frequency of Social Gatherings with Friends and Family:      Attends Hindu Services:      Active Member of Clubs or Organizations:      Attends Club or Organization Meetings:      Marital Status:    Intimate Partner Violence:      Fear of Current or Ex-Partner:      Emotionally Abused:      Physically Abused:      Sexually Abused:        ROS: 10 point ROS neg other than the symptoms noted above in the HPI.  EXAM  Constitutional: healthy, alert and no distress    Psychiatric: mentation appears normal and affect normal/bright    VASCULAR:  -Dorsalis pedis pulse +1/4 b/l  -Posterior tibial pulse +1/4 b/l  -Capillary refill time < 3 seconds to b/l hallux  -Hair growth Absent to b/l anterior legs and ankles  NEURO:  -Protective sensation intact with SWM +10/10 RIGHT and +10/10 LEFT on 08/30/2021  -Light touch sensation intact to b/l plantar forefoot  DERM:  -Skin temperature cool to bilateral foot  -Mild rubor with purple discoloration to bilateral foot  -Toenails elongated, thickened, dystrophic and discolored x 10  ---Bilateral hallux toenails significantly dystrophic  ---Mild incurvation of the bilateral toenail borders of the RIGHT hallux without open wounds to the bilateral toenail borders  MSK:  -Muscle strength of ankles +5/5 for dorsiflexion, plantarflexion, ABDUction and ADDuction b/l    ============================================================    ASSESSMENT:  (L60.3) Onychodystrophy  (primary encounter diagnosis)    (I70.213) Intermittent claudication of both lower extremities due to atherosclerosis (H)    (E11.9) Diabetes mellitus type 2, noninsulin dependent (H)    (I73.9) Peripheral arterial disease (H)      PLAN:  -Patient evaluated and examined. Treatment options discussed with no educational barriers noted.  -High risk toenail debridement x 10 toenails without incident    -Diabetic Foot Education provided. This included  checking the feet daily looking for new new blisters or wounds, wearing shoes at all times when walking including around the house, and avoiding lotion application between the toes. If there are any signs of infection, the patient should present to the ED as soon as possible. Infections of the foot can be life threatening or lead to amputations of the foot or leg.    -Intermittent claudication: Discussed the cause and treatment options for intermittent claudication. He says he has pain after one block of walking. This is only relieved with sitting. He says he recently had an DAVID study but he doesn't remember the results. He says he has already informed his vascular surgeon about this concern and he is following up with him every six months. Patient is advised to return to his vascular surgeon sooner if his pain and/or leg cramps worsen / become more frequent / become more painful. Discussed with the patient how the claudication affects the feet.    -Vascular appointment 7/28/2021 with Dr. Mcgrath:  ---Dr. Mcgrath's note:   Reviewed his DAVID. The right was 0.79, the left 0.59. This corresponds to his complaint of his symptoms.    Moderate disease bilaterally, worse on the left. Discussed that he has fairly long segment disease on the left. I would not recommend intervention unless he truly has lifestyle limiting/disabling claudication. Discussed that any rest pain or tissue loss would lead to prompt evaluation for intervention. I discussed structured exercise and the significant benefit to be had from this in his pain free walking distance. However, given the distance that he has to walk, this may not be practical for him.     -Patient in agreement with the above treatment plan and all of patient's questions were answered.      RTC 3 months for diabetic foot exam and high risk nail debridement         Sierra Velásquez DPM

## 2021-08-30 NOTE — NURSING NOTE
Chief Complaint   Patient presents with     Toenail       Initial /68 (BP Location: Left arm, Patient Position: Sitting, Cuff Size: Adult Regular)   Pulse 67   Temp 97.9  F (36.6  C) (Tympanic)   Resp 10   Ht 1.829 m (6')   Wt 106.6 kg (235 lb)   SpO2 95%   BMI 31.87 kg/m   Estimated body mass index is 31.87 kg/m  as calculated from the following:    Height as of this encounter: 1.829 m (6').    Weight as of this encounter: 106.6 kg (235 lb).  Medication Reconciliation: complete  ELIJAH MCCONNELL LPN

## 2021-09-21 ENCOUNTER — VIRTUAL VISIT (OUTPATIENT)
Dept: SLEEP MEDICINE | Facility: HOSPITAL | Age: 66
End: 2021-09-21
Attending: FAMILY MEDICINE
Payer: COMMERCIAL

## 2021-09-21 DIAGNOSIS — R06.81 APNEA: ICD-10-CM

## 2021-09-21 DIAGNOSIS — E11.9 TYPE 2 DIABETES MELLITUS WITHOUT COMPLICATION, UNSPECIFIED WHETHER LONG TERM INSULIN USE (H): ICD-10-CM

## 2021-09-21 DIAGNOSIS — I10 ESSENTIAL HYPERTENSION: Primary | ICD-10-CM

## 2021-09-21 DIAGNOSIS — R06.83 SNORING: ICD-10-CM

## 2021-09-21 DIAGNOSIS — E66.01 MORBID OBESITY (H): ICD-10-CM

## 2021-09-21 DIAGNOSIS — F32.1 MODERATE MAJOR DEPRESSION (H): ICD-10-CM

## 2021-09-21 PROCEDURE — 99204 OFFICE O/P NEW MOD 45 MIN: CPT | Mod: 95 | Performed by: FAMILY MEDICINE
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
## 2021-09-21 NOTE — PROGRESS NOTES
"Gilberto is a 66 year old who is being evaluated via a billable video visit.      How would you like to obtain your AVS? MyChart  If the video visit is dropped, the invitation should be resent by: Text to cell phone: [phone]  Will anyone else be joining your video visit? No  {If patient encounters technical issues they should call [phone] :[id number]}    Video Start Time: {video visit start/end time for provider to select:152948}    {PROVIDER CHARTING PREFERENCE:128904}    Subjective   Gilberto is a 66 year old who presents for the following health issues {ACCOMPANIED BY STATEMENT (Optional):700540}    HPI     ***    Review of Systems   {ROS COMP (Optional):481529}      Objective           Vitals:  No vitals were obtained today due to virtual visit.    Physical Exam   {video visit exam brief selected:588967::\"GENERAL: Healthy, alert and no distress\",\"EYES: Eyes grossly normal to inspection.  No discharge or erythema, or obvious scleral/conjunctival abnormalities.\",\"RESP: No audible wheeze, cough, or visible cyanosis.  No visible retractions or increased work of breathing.  \",\"SKIN: Visible skin clear. No significant rash, abnormal pigmentation or lesions.\",\"NEURO: Cranial nerves grossly intact.  Mentation and speech appropriate for age.\",\"PSYCH: Mentation appears normal, affect normal/bright, judgement and insight intact, normal speech and appearance well-groomed.\"}    {Diagnostic Test Results (Optional):564984}    {AMBULATORY ATTESTATION (Optional):457931}        Video-Visit Details    Type of service:  Video Visit    Video End Time:{video visit start/end time for provider to select:152948}    Originating Location (pt. Location): {video visit patient location:261167::\"Home\"}    Distant Location (provider location):  [facility]     Platform used for Video Visit: {Virtual Visit Platforms:181118::\"AmWell\"}    "

## 2021-10-19 PROBLEM — F32.9 MAJOR DEPRESSION: Status: ACTIVE | Noted: 2020-06-19

## 2021-11-23 DIAGNOSIS — I10 BENIGN ESSENTIAL HYPERTENSION: ICD-10-CM

## 2021-11-23 DIAGNOSIS — F33.1 MODERATE RECURRENT MAJOR DEPRESSION (H): ICD-10-CM

## 2021-11-23 RX ORDER — ARIPIPRAZOLE 5 MG/1
TABLET ORAL
Qty: 90 TABLET | Refills: 1 | Status: SHIPPED | OUTPATIENT
Start: 2021-11-23 | End: 2022-05-23

## 2021-11-23 NOTE — TELEPHONE ENCOUNTER
abilify      Last Written Prescription Date:  5/3/21  Last Fill Quantity: 90,   # refills: 1  Last Office Visit: 8/17/21  Future Office visit:    Next 5 appointments (look out 90 days)    Nov 30, 2021  9:00 AM  (Arrive by 8:45 AM)  Return Visit with Sierra Velásquez DPM  [facility] ([facility] ) [address] [place] [id number] [phone]   Feb 17, 2022  8:30 AM  (Arrive by 8:15 AM)  SHORT with Rock Basilio MD  [facility] ([facility] ) [address] [place] [id number]  [phone]

## 2021-11-26 RX ORDER — LISINOPRIL AND HYDROCHLOROTHIAZIDE 12.5; 2 MG/1; MG/1
1 TABLET ORAL DAILY
Qty: 90 TABLET | Refills: 1 | Status: SHIPPED | OUTPATIENT
Start: 2021-11-26 | End: 2022-05-23

## 2021-11-30 ENCOUNTER — OFFICE VISIT (OUTPATIENT)
Dept: PODIATRY | Facility: OTHER | Age: 66
End: 2021-11-30
Attending: PODIATRIST
Payer: COMMERCIAL

## 2021-11-30 VITALS
TEMPERATURE: 97.9 F | DIASTOLIC BLOOD PRESSURE: 89 MMHG | OXYGEN SATURATION: 98 % | HEART RATE: 66 BPM | SYSTOLIC BLOOD PRESSURE: 135 MMHG

## 2021-11-30 DIAGNOSIS — E11.9 DIABETES MELLITUS TYPE 2, NONINSULIN DEPENDENT (H): ICD-10-CM

## 2021-11-30 DIAGNOSIS — L60.0 INGROWN TOENAIL: ICD-10-CM

## 2021-11-30 DIAGNOSIS — E11.42 DIABETIC POLYNEUROPATHY ASSOCIATED WITH TYPE 2 DIABETES MELLITUS (H): ICD-10-CM

## 2021-11-30 DIAGNOSIS — L85.3 XEROSIS OF SKIN: ICD-10-CM

## 2021-11-30 DIAGNOSIS — I73.9 PERIPHERAL ARTERIAL DISEASE (H): ICD-10-CM

## 2021-11-30 DIAGNOSIS — L60.3 ONYCHODYSTROPHY: Primary | ICD-10-CM

## 2021-11-30 DIAGNOSIS — I70.213 INTERMITTENT CLAUDICATION OF BOTH LOWER EXTREMITIES DUE TO ATHEROSCLEROSIS (H): ICD-10-CM

## 2021-11-30 PROCEDURE — G0463 HOSPITAL OUTPT CLINIC VISIT: HCPCS | Mod: 25

## 2021-11-30 PROCEDURE — 11721 DEBRIDE NAIL 6 OR MORE: CPT | Performed by: PODIATRIST

## 2021-11-30 PROCEDURE — 99213 OFFICE O/P EST LOW 20 MIN: CPT | Mod: 25 | Performed by: PODIATRIST

## 2021-11-30 ASSESSMENT — PAIN SCALES - GENERAL: PAINLEVEL: MILD PAIN (2)

## 2021-11-30 NOTE — PROGRESS NOTES
Chief complaint: Patient presents with:  Toenail: Hypertrophic      History of Present Illness: This 66 year old NIDDM II male is seen for follow-up management of elongated toenails. The toenails are difficult to reach and to trim because of the thickness of the toenails.    He has been getting consistent tingling in the toes the past few months.    He does get leg cramps consistently every time he walks about one block. He says this has been about the same.  Patient follows up with Dr. Mcgrath at [facility] every six months (last appointment in July, 2021).    He has had an increased pain from the RIGHT hallux ingrown toenail. It hurts worse when he is wearing shoes. He would like to discuss treatment options to treat the ingrown.     Patients says he has not been applying lotion to his feet. He has had dry feet.      Last HbA1C was 6.7% on 08/17/2021.      No further pedal complaints today.     Patient quit smoking around 2011.        /89 (BP Location: Left arm, Patient Position: Sitting, Cuff Size: Adult Regular)   Pulse 66   Temp 97.9  F (36.6  C) (Tympanic)   SpO2 98%     Patient Active Problem List   Diagnosis     Abdominal aortic aneurysm (H)     Erectile dysfunction     ACP (advance care planning)     Routine general medical examination at a health care facility     Essential hypertension     Mixed hyperlipidemia     Skin lesion     Moderate major depression (H)     Morbid obesity (H)     Elevated prostate specific antigen (PSA)     Prediabetes     Diabetes mellitus, type 2 (H)       Past Surgical History:   Procedure Laterality Date     AAA REPAIR  06/17/2020    [facility] [place]/ intravascular repair     APPENDECTOMY       COLONOSCOPY  2013     ESOPHAGOGASTRODUODENOSCOPY  2013       Current Outpatient Medications   Medication     ARIPiprazole (ABILIFY) 5 MG tablet     aspirin (ASA) 81 MG EC tablet     atorvastatin (LIPITOR) 80 MG tablet     buPROPion (WELLBUTRIN XL) 300 MG 24 hr tablet      diphenhydrAMINE HCl (BENADRYL ALLERGY PO)     lisinopril-hydrochlorothiazide (ZESTORETIC) 20-12.5 MG tablet     loratadine (CLARITIN) 10 MG tablet     methocarbamol (ROBAXIN) 750 MG tablet     metoprolol succinate ER (TOPROL-XL) 25 MG 24 hr tablet     Multiple Vitamins-Minerals (MULTIVITAMIN ADULT PO)     omeprazole (PRILOSEC) 20 MG DR capsule     vitamin C (ASCORBIC ACID) 1000 MG TABS     zolpidem (AMBIEN) 10 MG tablet     No current facility-administered medications for this visit.        No Known Allergies    Family History   Problem Relation Age of Onset     C.A.D. Father         CABG in late 40's     C.A.D. Brother         MI in 50's       Social History     Socioeconomic History     Marital status:      Spouse name: None     Number of children: None     Years of education: None     Highest education level: None   Occupational History     None   Tobacco Use     Smoking status: Former Smoker     Packs/day: 0.00     Types: Cigarettes     Smokeless tobacco: Never Used     Tobacco comment: Feb. 2014   Substance and Sexual Activity     Alcohol use: Yes     Comment: occasional     Drug use: Yes     Types: Marijuana     Sexual activity: Yes     Partners: Female   Other Topics Concern     Parent/sibling w/ CABG, MI or angioplasty before 65F 55M? Yes     Comment: dad had heart attack before 55. brother also had heart attack before 55.   Social History Narrative    .      Social Determinants of Health     Financial Resource Strain:      Difficulty of Paying Living Expenses:    Food Insecurity:      Worried About Running Out of Food in the Last Year:      Ran Out of Food in the Last Year:    Transportation Needs:      Lack of Transportation (Medical):      Lack of Transportation (Non-Medical):    Physical Activity:      Days of Exercise per Week:      Minutes of Exercise per Session:    Stress:      Feeling of Stress :    Social Connections:      Frequency of Communication with Friends and Family:       Frequency of Social Gatherings with Friends and Family:      Attends Taoism Services:      Active Member of Clubs or Organizations:      Attends Club or Organization Meetings:      Marital Status:    Intimate Partner Violence:      Fear of Current or Ex-Partner:      Emotionally Abused:      Physically Abused:      Sexually Abused:        ROS: 10 point ROS neg other than the symptoms noted above in the HPI.  EXAM  Constitutional: healthy, alert and no distress    Psychiatric: mentation appears normal and affect normal/bright    VASCULAR:  -Dorsalis pedis pulse +1/4 b/l  -Posterior tibial pulse +1/4 b/l  -Capillary refill time < 3 seconds to b/l hallux  -Hair growth Absent to b/l anterior legs and ankles  NEURO:  -Protective sensation intact with SWM +9/10 RIGHT and +10/10 LEFT on 11/30/2021  -Protective sensation intact with SWM +10/10 RIGHT and +10/10 LEFT on 08/30/2021  -Light touch sensation intact to b/l plantar forefoot  DERM:  -Skin temperature cool to bilateral foot  -Mild rubor with purple discoloration to bilateral foot  -Skin diffusely dry, cracking and flaking to plantar foot (most cracking on heels) without open wounds or drainage at this time  -Toenails elongated, thickened, dystrophic and discolored x 10  ---Bilateral hallux toenails significantly dystrophic  ---Moderate incurvation of the lateral toenail border of the RIGHT hallux -- no open wounds, mild edema, no erythema, no drainage (No severe erythema, no ascending erythema, no calor, no purulence, no malodor, no other signs of infection).  MSK:  -Muscle strength of ankles +5/5 for dorsiflexion, plantarflexion, ABDUction and ADDuction b/l    ============================================================    ASSESSMENT:  (L60.3) Onychodystrophy  (primary encounter diagnosis)    (L60.0) Ingrown toenail    (L85.3) Xerosis of skin    (I70.213) Intermittent claudication of both lower extremities due to atherosclerosis (H)    (E11.9) Diabetes mellitus  type 2, noninsulin dependent (H)    (E11.42) Diabetic polyneuropathy associated with type 2 diabetes mellitus (H)    (I73.9) Peripheral arterial disease (H)      PLAN:  -Patient evaluated and examined. Treatment options discussed with no educational barriers noted.  -High risk toenail debridement x 10 toenails without incident    Ingrown toenail:  ---Moderate slant back with nail nipper and curette to the RIGHT hallux lateral toenail border. A significant corner of the ingrown toenail was successfully removed from the distal lateral toenail border. There was no bleeding post slant back, but a small amount of triple antibiotic cream and a band aid were applied to soften the nail and decrease irritation. Patient may remove this band aid tonight.  ---Due to patient's compromised arterial blood flow to the feet, a matrixectomy places him at high risk of a non-healing or infected wound which then places him at high risk for amputation. If the nail becomes intolerably painful, he is advised to call and the patient's vascular surgeon, Dr. Mcgrath, will be called for recommendations. If the ingrown becomes infected, it is advised to remove the toenail. At this time, there are no open wounds, there is no erythema or drainage or calor, and the pain from the ingrown toenail is only mild. He had adequate pain relief post slant back. Will continue to treat with a slant back unless the pain or ingrown toenail worsens. Patient expressed understanding and agreed with this plan.    -Xerosis of skin: Discussed xerosis of the skin including the risks of dry, cracking skin creating ulcerations on the feet. These can be painful or can become infected which can then potentially lead to life threatening wounds or amputation. It is important to continue with daily moisturizing lotion to prevent this. Patient expressed understanding.   ---Patient says he has a good moisturizing lotion at home, but he has not been using it. He will try this  before considering a prescription cream.    -Diabetic Foot Education provided. This included checking the feet daily looking for new new blisters or wounds, wearing shoes at all times when walking including around the house, and avoiding lotion application between the toes. If there are any signs of infection, the patient should present to the ED as soon as possible. Infections of the foot can be life threatening or lead to amputations of the foot or leg.       -Vascular appointment 7/28/2021 with Dr. Mcgrath:  ---Dr. Mcgrath's note:   Reviewed his DAVID. The right was 0.79, the left 0.59. This corresponds to his complaint of his symptoms.    Moderate disease bilaterally, worse on the left. Discussed that he has fairly long segment disease on the left. I would not recommend intervention unless he truly has lifestyle limiting/disabling claudication. Discussed that any rest pain or tissue loss would lead to prompt evaluation for intervention. I discussed structured exercise and the significant benefit to be had from this in his pain free walking distance. However, given the distance that he has to walk, this may not be practical for him.   ---Patient says he follows up with Dr. Mcgrath about every six months    -Patient in agreement with the above treatment plan and all of patient's questions were answered.      RTC 3 months for diabetic foot exam and high risk nail debridement         Sierra Velásquez DPM

## 2021-11-30 NOTE — NURSING NOTE
Chief Complaint   Patient presents with     Toenail     Hypertrophic       Initial /89 (BP Location: Left arm, Patient Position: Sitting, Cuff Size: Adult Regular)   Pulse 66   Temp 97.9  F (36.6  C) (Tympanic)   SpO2 98%  Estimated body mass index is 31.87 kg/m  as calculated from the following:    Height as of 8/30/21: 1.829 m (6').    Weight as of 8/30/21: 106.6 kg (235 lb).  Medication Reconciliation: yanet Schmidt

## 2022-01-22 DIAGNOSIS — E78.2 MIXED HYPERLIPIDEMIA: ICD-10-CM

## 2022-01-24 RX ORDER — ATORVASTATIN CALCIUM 80 MG/1
TABLET, FILM COATED ORAL
Qty: 90 TABLET | Refills: 3 | Status: SHIPPED | OUTPATIENT
Start: 2022-01-24 | End: 2022-08-29

## 2022-01-24 NOTE — TELEPHONE ENCOUNTER
LIPITOR 890 MG      Last Written Prescription Date:  1-5-2021  Last Fill Quantity: 90,   # refills: 3  Last Office Visit: 8-  Future Office visit:    Next 5 appointments (look out 90 days)    Feb 17, 2022  8:30 AM  (Arrive by 8:15 AM)  SHORT with Rock Basilio MD  [facility] ([facility] ) [address] [place] [id number]  [phone]   Mar 01, 2022  1:00 PM  (Arrive by 12:45 PM)  Return Visit with Sierra Velásquez DPM  [facility] ([facility] ) [address] [place] [id number] [phone]           Routing refill request to provider for review/approval because:

## 2022-02-17 ENCOUNTER — OFFICE VISIT (OUTPATIENT)
Dept: FAMILY MEDICINE | Facility: OTHER | Age: 67
End: 2022-02-17
Attending: FAMILY MEDICINE
Payer: COMMERCIAL

## 2022-02-17 VITALS
BODY MASS INDEX: 32.78 KG/M2 | TEMPERATURE: 97.3 F | DIASTOLIC BLOOD PRESSURE: 62 MMHG | SYSTOLIC BLOOD PRESSURE: 110 MMHG | WEIGHT: 242 LBS | HEIGHT: 72 IN | OXYGEN SATURATION: 97 % | HEART RATE: 57 BPM

## 2022-02-17 DIAGNOSIS — E11.69 TYPE 2 DIABETES MELLITUS WITH OTHER SPECIFIED COMPLICATION, WITHOUT LONG-TERM CURRENT USE OF INSULIN (H): Primary | ICD-10-CM

## 2022-02-17 DIAGNOSIS — I10 ESSENTIAL HYPERTENSION: ICD-10-CM

## 2022-02-17 LAB
EST. AVERAGE GLUCOSE BLD GHB EST-MCNC: 163 MG/DL
HBA1C MFR BLD: 7.3 % (ref 0–5.6)

## 2022-02-17 PROCEDURE — 36415 COLL VENOUS BLD VENIPUNCTURE: CPT | Mod: ZL | Performed by: FAMILY MEDICINE

## 2022-02-17 PROCEDURE — G0463 HOSPITAL OUTPT CLINIC VISIT: HCPCS

## 2022-02-17 PROCEDURE — 99213 OFFICE O/P EST LOW 20 MIN: CPT | Performed by: FAMILY MEDICINE

## 2022-02-17 PROCEDURE — 83036 HEMOGLOBIN GLYCOSYLATED A1C: CPT | Mod: ZL | Performed by: FAMILY MEDICINE

## 2022-02-17 RX ORDER — METFORMIN HCL 500 MG
1000 TABLET, EXTENDED RELEASE 24 HR ORAL
Qty: 180 TABLET | Refills: 2 | Status: SHIPPED | OUTPATIENT
Start: 2022-02-17 | End: 2022-08-29

## 2022-02-17 ASSESSMENT — ANXIETY QUESTIONNAIRES
5. BEING SO RESTLESS THAT IT IS HARD TO SIT STILL: NOT AT ALL
1. FEELING NERVOUS, ANXIOUS, OR ON EDGE: NOT AT ALL
4. TROUBLE RELAXING: NOT AT ALL
GAD7 TOTAL SCORE: 0
3. WORRYING TOO MUCH ABOUT DIFFERENT THINGS: NOT AT ALL
7. FEELING AFRAID AS IF SOMETHING AWFUL MIGHT HAPPEN: NOT AT ALL
2. NOT BEING ABLE TO STOP OR CONTROL WORRYING: NOT AT ALL
6. BECOMING EASILY ANNOYED OR IRRITABLE: NOT AT ALL

## 2022-02-17 ASSESSMENT — PATIENT HEALTH QUESTIONNAIRE - PHQ9: SUM OF ALL RESPONSES TO PHQ QUESTIONS 1-9: 0

## 2022-02-17 ASSESSMENT — PAIN SCALES - GENERAL: PAINLEVEL: NO PAIN (0)

## 2022-02-17 NOTE — NURSING NOTE
Chief Complaint   Patient presents with     Diabetes       Initial /62   Pulse 57   Temp 97.3  F (36.3  C)   Ht 1.829 m (6')   Wt 109.8 kg (242 lb)   SpO2 97%   BMI 32.82 kg/m   Estimated body mass index is 32.82 kg/m  as calculated from the following:    Height as of this encounter: 1.829 m (6').    Weight as of this encounter: 109.8 kg (242 lb).  Medication Reconciliation: complete  Kaleb Paul LPN

## 2022-02-17 NOTE — PROGRESS NOTES
Assessment & Plan     Type 2 diabetes mellitus with other specified complication, without long-term current use of insulin (H)  Stable in past. A1C climbing -- Will add Glucophage  Continue current medications and behavioral changes.   Follow-up in 6 months   - Hemoglobin A1c; Future  - Hemoglobin A1c    Essential hypertension  Stable - Continue current medications and behavioral changes.       SLEEP STUDY - delay looks like . Says has not been contacted. Will send msg.          BMI:   Estimated body mass index is 32.82 kg/m  as calculated from the following:    Height as of this encounter: 1.829 m (6').    Weight as of this encounter: 109.8 kg (242 lb).           No follow-ups on file.    Rock Basilio MD  [facility] [place]    Subjective   Gilberto is a 66 year old who presents for the following health issues     HPI     Diabetes Follow-up      How often are you checking your blood sugar? Not at all    What concerns do you have today about your diabetes? None     Do you have any of these symptoms? (Select all that apply)  Numbness in feet    Does not check sugars - watches diet  Doing well   No issues  ROS negative  Fairly active   BP Readings from Last 2 Encounters:   02/17/22 110/62   11/30/21 135/89     Hemoglobin A1C POCT (%)   Date Value   02/15/2021 6.5 (H)     Hemoglobin A1C (%)   Date Value   08/17/2021 6.7 (H)     LDL Cholesterol Calculated (mg/dL)   Date Value   08/17/2021 76   08/21/2020 68   10/02/2019 97                       Review of Systems   Constitutional, HEENT, cardiovascular, pulmonary, gi and gu systems are negative, except as otherwise noted.      Objective    /62   Pulse 57   Temp 97.3  F (36.3  C)   Ht 1.829 m (6')   Wt 109.8 kg (242 lb)   SpO2 97%   BMI 32.82 kg/m    Body mass index is 32.82 kg/m .  Physical Exam   GENERAL: healthy, alert and no distress  NECK: no adenopathy, no asymmetry, masses, or scars and thyroid normal to palpation  RESP: lungs clear to  auscultation - no rales, rhonchi or wheezes  CV: regular rate and rhythm, normal S1 S2, no S3 or S4, no murmur, click or rub, no peripheral edema and peripheral pulses strong  ABDOMEN: soft, nontender, no hepatosplenomegaly, no masses and bowel sounds normal  MS: no gross musculoskeletal defects noted, no edema

## 2022-02-18 ASSESSMENT — ANXIETY QUESTIONNAIRES: GAD7 TOTAL SCORE: 0

## 2022-03-01 ENCOUNTER — OFFICE VISIT (OUTPATIENT)
Dept: PODIATRY | Facility: OTHER | Age: 67
End: 2022-03-01
Attending: PODIATRIST
Payer: COMMERCIAL

## 2022-03-01 VITALS
TEMPERATURE: 98 F | WEIGHT: 242 LBS | DIASTOLIC BLOOD PRESSURE: 62 MMHG | HEART RATE: 59 BPM | SYSTOLIC BLOOD PRESSURE: 116 MMHG | BODY MASS INDEX: 32.82 KG/M2 | OXYGEN SATURATION: 95 % | RESPIRATION RATE: 20 BRPM

## 2022-03-01 DIAGNOSIS — E11.42 DIABETIC POLYNEUROPATHY ASSOCIATED WITH TYPE 2 DIABETES MELLITUS (H): ICD-10-CM

## 2022-03-01 DIAGNOSIS — L60.3 ONYCHODYSTROPHY: Primary | ICD-10-CM

## 2022-03-01 DIAGNOSIS — I73.9 PERIPHERAL ARTERIAL DISEASE (H): ICD-10-CM

## 2022-03-01 DIAGNOSIS — L60.0 INGROWN TOENAIL: ICD-10-CM

## 2022-03-01 DIAGNOSIS — L85.3 XEROSIS OF SKIN: ICD-10-CM

## 2022-03-01 DIAGNOSIS — I70.213 INTERMITTENT CLAUDICATION OF BOTH LOWER EXTREMITIES DUE TO ATHEROSCLEROSIS (H): ICD-10-CM

## 2022-03-01 PROCEDURE — 11721 DEBRIDE NAIL 6 OR MORE: CPT | Performed by: PODIATRIST

## 2022-03-01 PROCEDURE — G0463 HOSPITAL OUTPT CLINIC VISIT: HCPCS

## 2022-03-01 PROCEDURE — 99213 OFFICE O/P EST LOW 20 MIN: CPT | Mod: 25 | Performed by: PODIATRIST

## 2022-03-01 PROCEDURE — G0463 HOSPITAL OUTPT CLINIC VISIT: HCPCS | Mod: 25

## 2022-03-01 ASSESSMENT — PAIN SCALES - GENERAL: PAINLEVEL: NO PAIN (0)

## 2022-03-01 NOTE — PROGRESS NOTES
Chief complaint: Patient presents with:  RECHECK: Toenail trimming      History of Present Illness: This 66 year old NIDDM II male is seen for follow-up management of elongated toenails. The toenails are difficult to reach and to trim because of the thickness of the toenails.    He has been getting consistent tingling in the toes the past few months.    He does get leg cramps consistently every time he walks about one block. He says this has been about the same.  Patient follows up with Dr. Mcgrath at [facility] every six months (last appointment in July, 2021).    He has had an increased pain from the RIGHT hallux ingrown toenail. It hurts worse when he is wearing shoes. He would like to discuss treatment options to treat the ingrown.     Patients says he has not been applying lotion to his feet. He has had dry feet.      Last HbA1C was 6.7% on 08/17/2021.      No further pedal complaints today.     Patient quit smoking around 2011.        /62 (BP Location: Left arm, Patient Position: Sitting, Cuff Size: Adult Regular)   Pulse 59   Temp 98  F (36.7  C) (Tympanic)   Resp 20   Wt 109.8 kg (242 lb)   SpO2 95%   BMI 32.82 kg/m      Patient Active Problem List   Diagnosis     Abdominal aortic aneurysm (H)     Erectile dysfunction     ACP (advance care planning)     Routine general medical examination at a health care facility     Essential hypertension     Mixed hyperlipidemia     Skin lesion     Moderate major depression (H)     Morbid obesity (H)     Elevated prostate specific antigen (PSA)     Prediabetes     Diabetes mellitus, type 2 (H)       Past Surgical History:   Procedure Laterality Date     AAA REPAIR  06/17/2020    [facility] [place]/ intravascular repair     APPENDECTOMY       COLONOSCOPY  2013     ESOPHAGOGASTRODUODENOSCOPY  2013       Current Outpatient Medications   Medication     ARIPiprazole (ABILIFY) 5 MG tablet     aspirin (ASA) 81 MG EC tablet     atorvastatin (LIPITOR) 80 MG tablet      buPROPion (WELLBUTRIN XL) 300 MG 24 hr tablet     diphenhydrAMINE HCl (BENADRYL ALLERGY PO)     lisinopril-hydrochlorothiazide (ZESTORETIC) 20-12.5 MG tablet     loratadine (CLARITIN) 10 MG tablet     metFORMIN (GLUCOPHAGE-XR) 500 MG 24 hr tablet     methocarbamol (ROBAXIN) 750 MG tablet     metoprolol succinate ER (TOPROL-XL) 25 MG 24 hr tablet     Multiple Vitamins-Minerals (MULTIVITAMIN ADULT PO)     omeprazole (PRILOSEC) 20 MG DR capsule     vitamin C (ASCORBIC ACID) 1000 MG TABS     zolpidem (AMBIEN) 10 MG tablet     No current facility-administered medications for this visit.        No Known Allergies    Family History   Problem Relation Age of Onset     C.A.D. Father         CABG in late 40's     C.A.D. Brother         MI in 50's       Social History     Socioeconomic History     Marital status:      Spouse name: None     Number of children: None     Years of education: None     Highest education level: None   Occupational History     None   Tobacco Use     Smoking status: Former Smoker     Packs/day: 0.00     Types: Cigarettes     Smokeless tobacco: Never Used     Tobacco comment: Feb. 2014   Substance and Sexual Activity     Alcohol use: Yes     Comment: occasional     Drug use: Yes     Types: Marijuana     Sexual activity: Yes     Partners: Female   Other Topics Concern     Parent/sibling w/ CABG, MI or angioplasty before 65F 55M? Yes     Comment: dad had heart attack before 55. brother also had heart attack before 55.   Social History Narrative    .      Social Determinants of Health     Financial Resource Strain:      Difficulty of Paying Living Expenses:    Food Insecurity:      Worried About Running Out of Food in the Last Year:      Ran Out of Food in the Last Year:    Transportation Needs:      Lack of Transportation (Medical):      Lack of Transportation (Non-Medical):    Physical Activity:      Days of Exercise per Week:      Minutes of Exercise per Session:    Stress:       Feeling of Stress :    Social Connections:      Frequency of Communication with Friends and Family:      Frequency of Social Gatherings with Friends and Family:      Attends Buddhist Services:      Active Member of Clubs or Organizations:      Attends Club or Organization Meetings:      Marital Status:    Intimate Partner Violence:      Fear of Current or Ex-Partner:      Emotionally Abused:      Physically Abused:      Sexually Abused:        ROS: 10 point ROS neg other than the symptoms noted above in the HPI.  EXAM  Constitutional: healthy, alert and no distress    Psychiatric: mentation appears normal and affect normal/bright    VASCULAR:  -Dorsalis pedis pulse +1/4 b/l  -Posterior tibial pulse +1/4 b/l  -Capillary refill time < 3 seconds to b/l hallux  -Hair growth Absent to b/l anterior legs and ankles  NEURO:  -Positive for paresthesias to bilateral foot  -Protective sensation intact with SWM +9/10 RIGHT and +10/10 LEFT on 03/01/2022  -Protective sensation intact with SWM +9/10 RIGHT and +10/10 LEFT on 11/30/2021  -Protective sensation intact with SWM +10/10 RIGHT and +10/10 LEFT on 08/30/2021  -Light touch sensation intact to b/l plantar forefoot  DERM:  -Skin temperature cool to bilateral foot  -Mild rubor with purple discoloration to bilateral foot  -Skin diffusely dry, cracking and flaking to plantar foot (most cracking on heels) without open wounds or drainage at this time  -Toenails elongated, thickened, dystrophic and discolored x 10  ---Bilateral hallux toenails significantly dystrophic  ---Moderate incurvation of the lateral toenail border of the RIGHT hallux -- no open wounds, mild edema, no erythema, no drainage (No severe erythema, no ascending erythema, no calor, no purulence, no malodor, no other signs of infection).  MSK:  -Muscle strength of ankles +5/5 for dorsiflexion, plantarflexion, ABDUction and ADDuction  b/l    ============================================================    ASSESSMENT:  (L60.3) Onychodystrophy  (primary encounter diagnosis)    (L60.0) Ingrown toenail    (L85.3) Xerosis of skin    (I70.213) Intermittent claudication of both lower extremities due to atherosclerosis (H)    (E11.9) Diabetes mellitus type 2, noninsulin dependent (H)    (E11.42) Diabetic polyneuropathy associated with type 2 diabetes mellitus (H)    (I73.9) Peripheral arterial disease (H)      PLAN:  -Patient evaluated and examined. Treatment options discussed with no educational barriers noted.  -High risk toenail debridement x 10 toenails without incident    Ingrown toenail:  ---Moderate slant back with nail nipper and curette to the RIGHT hallux lateral toenail border. A significant corner of the ingrown toenail was successfully removed from the distal lateral toenail border. There was no bleeding post slant back, but a small amount of triple antibiotic cream and a band aid were applied to soften the nail and decrease irritation. Patient may remove this band aid tonight.  ---Due to patient's compromised arterial blood flow to the feet, a matrixectomy places him at high risk of a non-healing or infected wound which then places him at high risk for amputation. If the nail becomes intolerably painful, he is advised to call and the patient's vascular surgeon, Dr. Mcgrath, will be called for recommendations. If the ingrown becomes infected, it is advised to remove the toenail. At this time, there are no open wounds, there is no erythema or drainage or calor, and the pain from the ingrown toenail is only mild. He had adequate pain relief post slant back. Will continue to treat with a slant back unless the pain or ingrown toenail worsens. Patient expressed understanding and agreed with this plan.    -Diabetic Foot Education provided. This included checking the feet daily looking for new new blisters or wounds, wearing shoes at all times when  walking including around the house, and avoiding lotion application between the toes. If there are any signs of infection, the patient should present to the ED as soon as possible. Infections of the foot can be life threatening or lead to amputations of the foot or leg.     -Vascular appointment 7/28/2021 with Dr. Mcgrath:  ---Dr. Mcgrath's note:   Reviewed his DAVID. The right was 0.79, the left 0.59. This corresponds to his complaint of his symptoms.    Moderate disease bilaterally, worse on the left. Discussed that he has fairly long segment disease on the left. I would not recommend intervention unless he truly has lifestyle limiting/disabling claudication. Discussed that any rest pain or tissue loss would lead to prompt evaluation for intervention. I discussed structured exercise and the significant benefit to be had from this in his pain free walking distance. However, given the distance that he has to walk, this may not be practical for him.   ---Patient says he follows up with Dr. Mcgrath about every six months    -Patient in agreement with the above treatment plan and all of patient's questions were answered.      RTC 3 months for diabetic foot exam and high risk nail debridement         Sierra Velásquez DPM

## 2022-03-01 NOTE — NURSING NOTE
Chief Complaint   Patient presents with     RECHECK     Toenail trimming       Initial /62 (BP Location: Left arm, Patient Position: Sitting, Cuff Size: Adult Regular)   Pulse 59   Temp 98  F (36.7  C) (Tympanic)   Resp 20   Wt 109.8 kg (242 lb)   SpO2 95%   BMI 32.82 kg/m   Estimated body mass index is 32.82 kg/m  as calculated from the following:    Height as of 2/17/22: 1.829 m (6').    Weight as of this encounter: 109.8 kg (242 lb).  Medication Reconciliation: complete  DEBRA BILLS LPN

## 2022-03-07 DIAGNOSIS — K21.9 GASTROESOPHAGEAL REFLUX DISEASE, UNSPECIFIED WHETHER ESOPHAGITIS PRESENT: ICD-10-CM

## 2022-03-07 NOTE — TELEPHONE ENCOUNTER
Omeprazole      Last Written Prescription Date:  3/2/21  Last Fill Quantity: 90,   # refills: 3  Last Office Visit: 2/17/22  Future Office visit:    Next 5 appointments (look out 90 days)    Jun 02, 2022  1:30 PM  (Arrive by 1:15 PM)  Return Visit with Sierra Velásquez DPM  [facility] ([facility] [address] [place] [id number] [phone]           Routing refill request to provider for review/approval because:

## 2022-04-24 DIAGNOSIS — F33.1 MODERATE RECURRENT MAJOR DEPRESSION (H): ICD-10-CM

## 2022-04-27 RX ORDER — BUPROPION HYDROCHLORIDE 300 MG/1
TABLET ORAL
Qty: 90 TABLET | Refills: 1 | Status: SHIPPED | OUTPATIENT
Start: 2022-04-27 | End: 2022-08-29

## 2022-05-01 DIAGNOSIS — I71.40 ABDOMINAL AORTIC ANEURYSM (AAA) WITHOUT RUPTURE (H): Chronic | ICD-10-CM

## 2022-05-01 DIAGNOSIS — I10 BENIGN ESSENTIAL HYPERTENSION: ICD-10-CM

## 2022-05-04 RX ORDER — METOPROLOL SUCCINATE 25 MG/1
TABLET, EXTENDED RELEASE ORAL
Qty: 90 TABLET | Refills: 2 | Status: ON HOLD | OUTPATIENT
Start: 2022-05-04 | End: 2023-01-31

## 2022-05-19 DIAGNOSIS — F33.1 MODERATE RECURRENT MAJOR DEPRESSION (H): ICD-10-CM

## 2022-05-19 DIAGNOSIS — I10 BENIGN ESSENTIAL HYPERTENSION: ICD-10-CM

## 2022-05-23 RX ORDER — LISINOPRIL AND HYDROCHLOROTHIAZIDE 12.5; 2 MG/1; MG/1
1 TABLET ORAL DAILY
Qty: 90 TABLET | Refills: 0 | Status: SHIPPED | OUTPATIENT
Start: 2022-05-23 | End: 2022-08-29

## 2022-05-23 RX ORDER — ARIPIPRAZOLE 5 MG/1
TABLET ORAL
Qty: 90 TABLET | Refills: 1 | Status: SHIPPED | OUTPATIENT
Start: 2022-05-23 | End: 2022-08-29

## 2022-05-23 NOTE — TELEPHONE ENCOUNTER
Abilify      Last Written Prescription Date:  2.20.22  Last Fill Quantity: #90,   # refills: 0  Last Office Visit: 2.17.22  Future Office visit:    Next 5 appointments (look out 90 days)    Jun 02, 2022  1:30 PM  (Arrive by 1:15 PM)  Return Visit with Sierra Velásquez DPM  [facility] ([facility] ) [address] [place] [id number] [phone]           Routing refill request to provider for review/approval because:

## 2022-06-02 ENCOUNTER — OFFICE VISIT (OUTPATIENT)
Dept: PODIATRY | Facility: OTHER | Age: 67
End: 2022-06-02
Attending: PODIATRIST
Payer: COMMERCIAL

## 2022-06-02 VITALS
TEMPERATURE: 96.6 F | SYSTOLIC BLOOD PRESSURE: 112 MMHG | OXYGEN SATURATION: 95 % | HEART RATE: 52 BPM | DIASTOLIC BLOOD PRESSURE: 74 MMHG

## 2022-06-02 DIAGNOSIS — L85.3 XEROSIS OF SKIN: ICD-10-CM

## 2022-06-02 DIAGNOSIS — L60.3 ONYCHODYSTROPHY: Primary | ICD-10-CM

## 2022-06-02 DIAGNOSIS — L60.0 INGROWN TOENAIL: ICD-10-CM

## 2022-06-02 DIAGNOSIS — I70.213 INTERMITTENT CLAUDICATION OF BOTH LOWER EXTREMITIES DUE TO ATHEROSCLEROSIS (H): ICD-10-CM

## 2022-06-02 DIAGNOSIS — E11.42 DIABETIC POLYNEUROPATHY ASSOCIATED WITH TYPE 2 DIABETES MELLITUS (H): ICD-10-CM

## 2022-06-02 DIAGNOSIS — I73.9 PERIPHERAL ARTERIAL DISEASE (H): ICD-10-CM

## 2022-06-02 PROCEDURE — 11721 DEBRIDE NAIL 6 OR MORE: CPT | Performed by: PODIATRIST

## 2022-06-02 PROCEDURE — G0463 HOSPITAL OUTPT CLINIC VISIT: HCPCS | Mod: 25

## 2022-06-02 ASSESSMENT — PAIN SCALES - GENERAL: PAINLEVEL: NO PAIN (0)

## 2022-06-02 NOTE — PROGRESS NOTES
Chief complaint: Patient presents with:  Toenail: trimming      History of Present Illness: This 67 year old NIDDM II male is seen for follow-up management of elongated toenails. The toenails are difficult to reach and to trim because of the thickness of the toenails.    He has had consistent tingling in the toes, but it improved in the spring of 2022.    He does get leg cramps consistently every time he walks about one block. He says this has been about the same.  He says he should be called soon to schedule his appointment.    The RIGHT hallux ingrown toenail has not been painful.    Patients says he has not been applying lotion to his feet. He has had dry feet.      Last HbA1C was 7.3% on 02/17/2022.    ---Previously 6.7% on 08/17/2021.      No further pedal complaints today.     Patient quit smoking around 2011.        /74 (BP Location: Left arm, Patient Position: Sitting, Cuff Size: Adult Regular)   Pulse 52   Temp (!) 96.6  F (35.9  C) (Tympanic)   SpO2 95%     Patient Active Problem List   Diagnosis     Abdominal aortic aneurysm (H)     Erectile dysfunction     ACP (advance care planning)     Routine general medical examination at a health care facility     Essential hypertension     Mixed hyperlipidemia     Skin lesion     Moderate major depression (H)     Morbid obesity (H)     Elevated prostate specific antigen (PSA)     Prediabetes     Diabetes mellitus, type 2 (H)       Past Surgical History:   Procedure Laterality Date     AAA REPAIR  06/17/2020    [facility] [place]/ intravascular repair     APPENDECTOMY       COLONOSCOPY  2013     ESOPHAGOGASTRODUODENOSCOPY  2013       Current Outpatient Medications   Medication     ARIPiprazole (ABILIFY) 5 MG tablet     aspirin (ASA) 81 MG EC tablet     atorvastatin (LIPITOR) 80 MG tablet     buPROPion (WELLBUTRIN XL) 300 MG 24 hr tablet     diphenhydrAMINE HCl (BENADRYL ALLERGY PO)     lisinopril-hydrochlorothiazide (ZESTORETIC) 20-12.5 MG tablet     loratadine  (CLARITIN) 10 MG tablet     metFORMIN (GLUCOPHAGE-XR) 500 MG 24 hr tablet     methocarbamol (ROBAXIN) 750 MG tablet     metoprolol succinate ER (TOPROL-XL) 25 MG 24 hr tablet     Multiple Vitamins-Minerals (MULTIVITAMIN ADULT PO)     omeprazole (PRILOSEC) 20 MG DR capsule     vitamin C (ASCORBIC ACID) 1000 MG TABS     zolpidem (AMBIEN) 10 MG tablet     No current facility-administered medications for this visit.        No Known Allergies    Family History   Problem Relation Age of Onset     C.A.D. Father         CABG in late 40's     C.A.D. Brother         MI in 50's       Social History     Socioeconomic History     Marital status:      Spouse name: None     Number of children: None     Years of education: None     Highest education level: None   Occupational History     None   Tobacco Use     Smoking status: Former Smoker     Packs/day: 0.00     Types: Cigarettes     Smokeless tobacco: Never Used     Tobacco comment: Feb. 2014   Substance and Sexual Activity     Alcohol use: Yes     Comment: occasional     Drug use: Yes     Types: Marijuana     Sexual activity: Yes     Partners: Female   Other Topics Concern     Parent/sibling w/ CABG, MI or angioplasty before 65F 55M? Yes     Comment: dad had heart attack before 55. brother also had heart attack before 55.   Social History Narrative    .      Social Determinants of Health     Financial Resource Strain:      Difficulty of Paying Living Expenses:    Food Insecurity:      Worried About Running Out of Food in the Last Year:      Ran Out of Food in the Last Year:    Transportation Needs:      Lack of Transportation (Medical):      Lack of Transportation (Non-Medical):    Physical Activity:      Days of Exercise per Week:      Minutes of Exercise per Session:    Stress:      Feeling of Stress :    Social Connections:      Frequency of Communication with Friends and Family:      Frequency of Social Gatherings with Friends and Family:      Attends Yazdanism  Services:      Active Member of Clubs or Organizations:      Attends Club or Organization Meetings:      Marital Status:    Intimate Partner Violence:      Fear of Current or Ex-Partner:      Emotionally Abused:      Physically Abused:      Sexually Abused:        ROS: 10 point ROS neg other than the symptoms noted above in the HPI.  EXAM  Constitutional: healthy, alert and no distress    Psychiatric: mentation appears normal and affect normal/bright    VASCULAR:  -Dorsalis pedis pulse +1/4 b/l  -Posterior tibial pulse +1/4 b/l  -Capillary refill time < 3 seconds to b/l hallux  -Hair growth Absent to b/l anterior legs and ankles  NEURO:  -Positive for paresthesias to bilateral foot  -Protective sensation intact with SWM +10/10 RIGHT and +10/10 LEFT on 06/02/2022  -Protective sensation intact with SWM +9/10 RIGHT and +10/10 LEFT on 03/01/2022  -Protective sensation intact with SWM +9/10 RIGHT and +10/10 LEFT on 11/30/2021  -Protective sensation intact with SWM +10/10 RIGHT and +10/10 LEFT on 08/30/2021  -Light touch sensation intact to b/l plantar forefoot  DERM:  -Skin temperature cool to bilateral foot  -Mild rubor with purple discoloration to bilateral foot  -Skin diffusely dry, cracking and flaking to plantar foot (most cracking on heels) without open wounds or drainage at this time  -Toenails elongated, thickened, dystrophic and discolored x 10  ---Bilateral hallux toenails significantly dystrophic  ---Mild-to-moderate incurvation of the lateral toenail border of the RIGHT hallux -- no open wounds, mild edema, no erythema, no drainage (No severe erythema, no ascending erythema, no calor, no purulence, no malodor, no other signs of infection).  MSK:  -Muscle strength of ankles +5/5 for dorsiflexion, plantarflexion, ABDUction and ADDuction b/l    ============================================================    ASSESSMENT:  (L60.3) Onychodystrophy  (primary encounter diagnosis)    (L60.0) Ingrown toenail    (L85.3)  Xerosis of skin    (I70.213) Intermittent claudication of both lower extremities due to atherosclerosis (H)    (E11.9) Diabetes mellitus type 2, noninsulin dependent (H)    (E11.42) Diabetic polyneuropathy associated with type 2 diabetes mellitus (H)    (I73.9) Peripheral arterial disease (H)      PLAN:  -Patient evaluated and examined. Treatment options discussed with no educational barriers noted.  -High risk toenail debridement x 10 toenails without incident  ---Moderate slant back to bilateral borders of bilateral hallux  ---No open wounds and no signs infection (No severe erythema, no ascending erythema, no calor, no purulence, no malodor, no other signs of infection).    -Diabetic Foot Education provided. This included checking the feet daily looking for new new blisters or wounds, wearing shoes at all times when walking including around the house, and avoiding lotion application between the toes. If there are any signs of infection, the patient should present to the ED as soon as possible. Infections of the foot can be life threatening or lead to amputations of the foot or leg.     -Vascular appointment 7/28/2021 with Dr. Mcgrath:  ---Dr. Mcgrath's note:   Reviewed his DAVID. The right was 0.79, the left 0.59. This corresponds to his complaint of his symptoms.    Moderate disease bilaterally, worse on the left. Discussed that he has fairly long segment disease on the left. I would not recommend intervention unless he truly has lifestyle limiting/disabling claudication. Discussed that any rest pain or tissue loss would lead to prompt evaluation for intervention. I discussed structured exercise and the significant benefit to be had from this in his pain free walking distance. However, given the distance that he has to walk, this may not be practical for him.   ---Patient says he follows up with Dr. Mcgrath about every six months. He is waiting for a call for his summer appointment in 2022.    -Patient in  agreement with the above treatment plan and all of patient's questions were answered.      RTC 3 months for diabetic foot exam and high risk nail debridement         Sierra Velásquez DPM, NISHANTM

## 2022-06-02 NOTE — NURSING NOTE
Chief Complaint   Patient presents with     Toenail     trimming       Initial /74 (BP Location: Left arm, Patient Position: Sitting, Cuff Size: Adult Regular)   Pulse 52   Temp (!) 96.6  F (35.9  C) (Tympanic)   SpO2 95%  Estimated body mass index is 32.82 kg/m  as calculated from the following:    Height as of 2/17/22: 1.829 m (6').    Weight as of 3/1/22: 109.8 kg (242 lb).  Medication Reconciliation: yanet Schmidt

## 2022-06-08 ENCOUNTER — OFFICE VISIT (OUTPATIENT)
Dept: FAMILY MEDICINE | Facility: OTHER | Age: 67
End: 2022-06-08
Attending: FAMILY MEDICINE
Payer: COMMERCIAL

## 2022-06-08 ENCOUNTER — TELEPHONE (OUTPATIENT)
Dept: FAMILY MEDICINE | Facility: OTHER | Age: 67
End: 2022-06-08

## 2022-06-08 DIAGNOSIS — Z20.822 SUSPECTED COVID-19 VIRUS INFECTION: ICD-10-CM

## 2022-06-08 LAB — SARS-COV-2 RNA RESP QL NAA+PROBE: NEGATIVE

## 2022-06-08 PROCEDURE — U0003 INFECTIOUS AGENT DETECTION BY NUCLEIC ACID (DNA OR RNA); SEVERE ACUTE RESPIRATORY SYNDROME CORONAVIRUS 2 (SARS-COV-2) (CORONAVIRUS DISEASE [COVID-19]), AMPLIFIED PROBE TECHNIQUE, MAKING USE OF HIGH THROUGHPUT TECHNOLOGIES AS DESCRIBED BY CMS-2020-01-R: HCPCS | Mod: ZL

## 2022-06-08 NOTE — TELEPHONE ENCOUNTER
Call from patients wife inquiring on covid 19 results from 6/8/22.    Consent to Communicate located on file to communicate with patients wife, Marcella.     Notified of negative covid 19 result. Marcella verbalized understanding.

## 2022-06-10 ENCOUNTER — OFFICE VISIT (OUTPATIENT)
Dept: FAMILY MEDICINE | Facility: OTHER | Age: 67
End: 2022-06-10
Attending: NURSE PRACTITIONER
Payer: COMMERCIAL

## 2022-06-10 ENCOUNTER — NURSE TRIAGE (OUTPATIENT)
Dept: FAMILY MEDICINE | Facility: OTHER | Age: 67
End: 2022-06-10
Payer: COMMERCIAL

## 2022-06-10 VITALS
TEMPERATURE: 97.3 F | BODY MASS INDEX: 33.23 KG/M2 | HEART RATE: 65 BPM | DIASTOLIC BLOOD PRESSURE: 60 MMHG | OXYGEN SATURATION: 94 % | SYSTOLIC BLOOD PRESSURE: 110 MMHG | WEIGHT: 245 LBS

## 2022-06-10 DIAGNOSIS — H65.93 OME (OTITIS MEDIA WITH EFFUSION), BILATERAL: ICD-10-CM

## 2022-06-10 DIAGNOSIS — J06.9 VIRAL URI WITH COUGH: Primary | ICD-10-CM

## 2022-06-10 PROCEDURE — 99212 OFFICE O/P EST SF 10 MIN: CPT | Performed by: NURSE PRACTITIONER

## 2022-06-10 PROCEDURE — G0463 HOSPITAL OUTPT CLINIC VISIT: HCPCS

## 2022-06-10 ASSESSMENT — PAIN SCALES - GENERAL: PAINLEVEL: NO PAIN (0)

## 2022-06-10 NOTE — PROGRESS NOTES
Assessment & Plan     1. Viral URI with cough  No need for antibiotics at this time. However, instructed to call clinic if symptoms do not improve. Expect continued improvement over weekend. Supportive cares discussed and handout given. Highly encouraged sinus rinses and hydration.     No follow-ups on file.    Shazia Urena, CNP  [facility]   Gilberto is a 67 year old who presents for the following health issues     HPI     Acute Illness  Acute illness concerns: URI  Onset/Duration: Monday. Reports symptoms are generally improving.   Symptoms:  Fever: YES- no measurement. Felt warm to wife.   Chills/Sweats: no  Headache (location?): YES- forehead sinus area  Sinus Pressure: YES  Conjunctivitis:  YES  Ear Pain: YES: bilateral  Rhinorrhea: YES  Congestion: YES  Sore Throat: YES  Cough: YES  Wheeze: YES  Decreased Appetite: YES  Nausea: no  Vomiting: no  Diarrhea: no  Dysuria/Freq.: no  Dysuria or Hematuria: no  Fatigue/Achiness: no  Sick/Strep Exposure: no  Therapies tried and outcome: Oscillococcinum and black elderberry extract     No known sick contact.     Review of Systems   Constitutional, HEENT, cardiovascular, pulmonary, gi and gu systems are negative, except as otherwise noted.      Objective    /60 (BP Location: Right arm, Patient Position: Chair, Cuff Size: Adult Large)   Pulse 65   Temp 97.3  F (36.3  C) (Tympanic)   Wt 111.1 kg (245 lb)   SpO2 94%   BMI 33.23 kg/m    Body mass index is 33.23 kg/m .  Physical Exam   GENERAL: healthy, alert and no distress  EYES: Eyes grossly normal to inspection, PERRL and conjunctivae and sclerae normal  HENT: normal cephalic/atraumatic, ear canals normal. Clear effusion noted without retraction, bulge, or erythema. Nose and mouth without ulcers or lesions, oropharynx clear, oral mucous membranes moist, sinuses: not tender  NECK: no adenopathy, no asymmetry, masses, or scars and thyroid normal to palpation  RESP: lungs  clear to auscultation - no rales, rhonchi or wheezes  CV: regular rate and rhythm, normal S1 S2, no S3 or S4, no murmur, click or rub, no peripheral edema and peripheral pulses strong  NEURO: Normal strength and tone, mentation intact and speech normal  PSYCH: mentation appears normal, affect normal/bright

## 2022-06-10 NOTE — NURSING NOTE
Chief Complaint   Patient presents with     URI       Initial /60 (BP Location: Right arm, Patient Position: Chair, Cuff Size: Adult Large)   Pulse 65   Temp 97.3  F (36.3  C) (Tympanic)   Wt 111.1 kg (245 lb)   SpO2 94%   BMI 33.23 kg/m   Estimated body mass index is 33.23 kg/m  as calculated from the following:    Height as of 2/17/22: 1.829 m (6').    Weight as of this encounter: 111.1 kg (245 lb).  Medication Reconciliation: complete  MALINDA MELENDEZ LPN

## 2022-06-10 NOTE — TELEPHONE ENCOUNTER
"Patient tested negative for covid on 6/8/22    Answer Assessment - Initial Assessment Questions  1. COVID-19 DIAGNOSIS: \"Who made your COVID-19 diagnosis?\" \"Was it confirmed by a positive lab test or self-test?\" If not diagnosed by a doctor (or NP/PA), ask \"Are there lots of cases (community spread) where you live?\" Note: See public health department website, if unsure.      no  2. COVID-19 EXPOSURE: \"Was there any known exposure to COVID before the symptoms began?\" CDC Definition of close contact: within 6 feet (2 meters) for a total of 15 minutes or more over a 24-hour period.       no  3. ONSET: \"When did the COVID-19 symptoms start?\"       Tuesday  4. WORST SYMPTOM: \"What is your worst symptom?\" (e.g., cough, fever, shortness of breath, muscle aches)      Cough fever body aches  5. COUGH: \"Do you have a cough?\" If Yes, ask: \"How bad is the cough?\"        yes  6. FEVER: \"Do you have a fever?\" If Yes, ask: \"What is your temperature, how was it measured, and when did it start?\"      fever  7. RESPIRATORY STATUS: \"Describe your breathing?\" (e.g., shortness of breath, wheezing, unable to speak)       wheezing  8. BETTER-SAME-WORSE: \"Are you getting better, staying the same or getting worse compared to yesterday?\"  If getting worse, ask, \"In what way?\"      same  9. HIGH RISK DISEASE: \"Do you have any chronic medical problems?\" (e.g., asthma, heart or lung disease, weak immune system, obesity, etc.)      Pre diabetic heart stent AAA  10. VACCINE: \"Have you had the COVID-19 vaccine?\" If Yes, ask: \"Which one, how many shots, when did you get it?\"        Yes Pfizer  11. BOOSTER: \"Have you received your COVID-19 booster?\" If Yes, ask: \"Which one and when did you get it?\"        One booster  12. PREGNANCY: \"Is there any chance you are pregnant?\" \"When was your last menstrual period?\"        no  13. OTHER SYMPTOMS: \"Do you have any other symptoms?\"  (e.g., chills, fatigue, headache, loss of smell or taste, muscle pain, sore " "throat)        Fatigue headache sore throat   14. O2 SATURATION MONITOR:  \"Do you use an oxygen saturation monitor (pulse oximeter) at home?\" If Yes, ask \"What is your reading (oxygen level) today?\" \"What is your usual oxygen saturation reading?\" (e.g., 95%)        no    Protocols used: CORONAVIRUS (COVID-19) DIAGNOSED OR [id number] 1.18.2022    COVID 19 Nurse Triage Plan/Patient Instructions    Please be aware that novel coronavirus (COVID-19) may be circulating in the community. If you develop symptoms such as fever, cough, or SOB or if you have concerns about the presence of another infection including coronavirus (COVID-19), please contact your health care provider or visit [email or web address].     Disposition/Instructions    Additional COVID19 information to add for patients.   How can I protect others?  If you have symptoms (fever, cough, body aches or trouble breathing): Stay home and away from others (self-isolate) until:    At least 10 days have passed since your symptoms started, And     You ve had no fever--and no medicine that reduces fever--for 1 full day (24 hours), And      Your other symptoms have resolved (gotten better).     If you don t have symptoms, but a test showed that you have COVID-19 (you tested positive):    Stay home and away from others (self-isolate). Follow the tips under \"How do I self-isolate?\" below for 10 days (20 days if you have a weak immune system).    You don't need to be retested for COVID-19 before going back to school or work. As long as you're fever-free and feeling better, you can go back to school, work and other activities after waiting the 10 or 20 days.     How do I self-isolate?    Stay in your own room, even for meals. Use your own bathroom if you can.     Stay away from others in your home. No hugging, kissing or shaking hands. No visitors.    Don t go to work, school or anywhere else.     Clean  high touch  surfaces often (doorknobs, counters, " handles, etc.). Use a household cleaning spray or wipes. You ll find a full list on the EPA website:  www.epa.gov/pesticide-registration/list-n-disinfectants-use-against-sars-cov-2.    Cover your mouth and nose with a mask, tissue or washcloth to avoid spreading germs.    Wash your hands and face often. Use soap and water.    Caregivers in these groups are at risk for severe illness due to COVID-19:  o People 65 years and older  o People who live in a nursing home or long-term care facility  o People with chronic disease (lung, heart, cancer, diabetes, kidney, liver, immunologic)  o People who have a weakened immune system, including those who:  - Are in cancer treatment  - Take medicine that weakens the immune system, such as corticosteroids  - Had a bone marrow or organ transplant  - Have an immune deficiency  - Have poorly controlled HIV or AIDS  - Are obese (body mass index of 40 or higher)  - Smoke regularly    Caregivers should wear gloves while washing dishes, handling laundry and cleaning bedrooms and bathrooms.    Use caution when washing and drying laundry: Don t shake dirty laundry, and use the warmest water setting that you can.    For more tips, go to www.cdc.gov/coronavirus/2019-ncov/downloads/10Things.pdf.    How can I take care of myself?  1. Get lots of rest. Drink extra fluids (unless a doctor has told you not to).     2. Take Tylenol (acetaminophen) for fever or pain. If you have liver or kidney problems, ask your family doctor if it s okay to take Tylenol.     Adults can take either:     650 mg (two 325 mg pills) every 4 to 6 hours, or     1,000 mg (two 500 mg pills) every 8 hours as needed.     Note: Don t take more than 3,000 mg in one day.   Acetaminophen is found in many medicines (both prescribed and over-the-counter medicines). Read all labels to be sure you don t take too much.     For children, check the Tylenol bottle for the right dose. The dose is based on the child s age or  weight.    3. If you have other health problems (like cancer, heart failure, an organ transplant or severe kidney disease): Call your specialty clinic if you don t feel better in the next 2 days.    4. Know when to call 911: Emergency warning signs include:    Trouble breathing or shortness of breath    Pain or pressure in the chest that doesn t go away    Feeling confused like you haven t felt before, or not being able to wake up    Bluish-colored lips or face    What are the symptoms of COVID-19?     The most common symptoms are cough, fever and trouble breathing.     Less common symptoms include body aches, chills, diarrhea (loose, watery poops), fatigue (feeling very tired), headache, runny nose, sore throat and loss of smell.    COVID-19 can cause severe coughing (bronchitis) and lung infection (pneumonia).    How does it spread?     The virus may spread when a person coughs or sneezes into the air. The virus can travel about 6 feet this way, and it can live on surfaces.      Common  (household disinfectants) will kill the virus.    Who is at risk?  Anyone can catch COVID-19 if they re around someone who has the virus.    How can others protect themselves?     Stay away from people who have COVID-19 (or symptoms of COVID-19).    Wash hands often with soap and water. Or, use hand  with at least 60% alcohol.    Avoid touching the eyes, nose or mouth.     Wear a face mask when you go out in public, when sick or when caring for a sick person.    Where can I get more information?     [facility]: About COVID-19: [email or web address]    CDC: What to Do If You re Sick: www.cdc.gov/coronavirus/2019-ncov/about/steps-when-sick.html    CDC: Ending Home Isolation: www.cdc.gov/coronavirus/2019-ncov/hcp/disposition-in-home-patients.html     CDC: Caring for Someone: www.cdc.gov/coronavirus/2019-ncov/if-you-are-sick/care-for-someone.html     MD: Interim Guidance for Hospital Discharge to  Home: www.[facility]./diseases/coronavirus/hcp/hospdischarge.pdf    [facility] clinical trials (COVID-19 research studies): [email or web address]-clinical-trials     Below are the COVID-19 hotlines at the [place] Department of Health ([facility]). Interpreters are available.   o For health questions: Call 744-379-2481 or 1-613.998.6607 (7 a.m. to 7 p.m.)  o For questions about schools and childcare: Call 040-801-4332 or 1-581.624.9810 (7 a.m. to 7 p.m.)          Thank you for taking steps to prevent the spread of this virus.  o Limit your contact with others.  o Wear a simple mask to cover your cough.  o Wash your hands well and often.    Resources    [facility]: About COVID-19: [email or web address]    CDC: What to Do If You're Sick: www.cdc.gov/coronavirus/2019-ncov/about/steps-when-sick.html    CDC: Ending Home Isolation: www.cdc.gov/coronavirus/2019-ncov/hcp/disposition-in-home-patients.html     CDC: Caring for Someone: www.cdc.gov/coronavirus/2019-ncov/if-you-are-sick/care-for-someone.html     [facility]: Interim Guidance for Hospital Discharge to Home: www.[facility]./diseases/coronavirus/hcp/hospdischarge.pdf    [facility] clinical trials (COVID-19 research studies): [email or web address]-clinical-[email or web address]     Below are the COVID-19 hotlines at the [place] Department of [facility] ([facility]). Interpreters are available.   o For health questions: Call 010-343-9053 or 1-206.345.2576 (7 a.m. to 7 p.m.)  o For questions about schools and childcare: Call 026-239-6762 or 1-813.721.1549 (7 a.m. to 7 p.m.)

## 2022-08-25 NOTE — PROGRESS NOTES
Assessment & Plan     Type 2 diabetes mellitus with other specified complication, without long-term current use of insulin (H)  A1C ok /slight higher. Move more eat and sleep less. NO change in meds. Repeat in 6 months   - Comprehensive metabolic panel; Future  - CBC with Platelets & Differential; Future  - Lipid Profile; Future  - Hemoglobin A1c; Future  - TSH; Future  - Albumin Random Urine Quantitative with Creat Ratio; Future  - metFORMIN (GLUCOPHAGE XR) 500 MG 24 hr tablet; Take 2 tablets (1,000 mg) by mouth daily (with dinner)    Essential hypertension  Stable - Continue current medications and behavioral changes.   - Comprehensive metabolic panel; Future  - CBC with Platelets & Differential; Future  - Lipid Profile; Future  - Hemoglobin A1c; Future  - TSH; Future  - Albumin Random Urine Quantitative with Creat Ratio; Future    Elevated prostate specific antigen (PSA)  Slightly higher than 10-15% growth. Will recheck in 6 months   - PSA tumor marker; Future    Morbid obesity (H)  Discussed. Move more and less sleeping will help   - Comprehensive metabolic panel; Future  - CBC with Platelets & Differential; Future  - Lipid Profile; Future  - Hemoglobin A1c; Future  - TSH; Future    Recurrent major depressive disorder, in full remission (H)  Stable / Good control.  Continue current medications and behavioral changes.   - Comprehensive metabolic panel; Future  - CBC with Platelets & Differential; Future  - Lipid Profile; Future  - Hemoglobin A1c; Future  - TSH; Future    S/P AAA repair  Seeing Vascular on regular basis    Tobacco dependence in remission   discussed LDCT scan.  Pt agreed  - CT Chest Lung Cancer Scrn Low Dose wo; Future    Benign essential hypertension  Stable - RF given   - lisinopril-hydrochlorothiazide (ZESTORETIC) 20-12.5 MG tablet; Take 1 tablet by mouth daily    Mixed hyperlipidemia  Stable on statin. Continue current medications and behavioral changes.   - atorvastatin (LIPITOR) 80 MG  tablet; TAKE 1 TABLET(80 MG) BY MOUTH DAILY    Moderate recurrent major depression (H)  As above RF done   - buPROPion (WELLBUTRIN XL) 300 MG 24 hr tablet; Take 1 tablet (300 mg) by mouth every morning  - ARIPiprazole (ABILIFY) 5 MG tablet; TAKE 1 TABLET(5 MG) BY MOUTH DAILY    Personal history of nicotine dependence   As above. CT agreed on   - CT Chest Lung Cancer Scrn Low Dose wo; Future    Undiagnosed cardiac murmurs  Never heard before- pt newer to me. Last echo in 2014. Will repeat Echo.   - Echocardiogram Complete; Future             BMI:   Estimated body mass index is 32.69 kg/m  as calculated from the following:    Height as of 2/17/22: 1.829 m (6').    Weight as of this encounter: 109.3 kg (241 lb).           No follow-ups on file.    Rock Basilio MD  [facility] - KAIDEN Macias is a 67 year old, presenting for the following health issues:  Recheck Medication      HPI     Diabetes Follow-up    How often are you checking your blood sugar? A few times a month  What time of day are you checking your blood sugars (select all that apply)?  Before meals  Have you had any blood sugars above 200?  No  Have you had any blood sugars below 70?  No    What symptoms do you notice when your blood sugar is low?  None    What concerns do you have today about your diabetes? None     Do you have any of these symptoms? (Select all that apply)  Numbness in feet and Excessive thirst    Have you had a diabetic eye exam in the last 12 months? No          Hyperlipidemia Follow-Up      Are you regularly taking any medication or supplement to lower your cholesterol?   Yes- atorvastatin    Are you having muscle aches or other side effects that you think could be caused by your cholesterol lowering medication?  No    Hypertension Follow-up      Do you check your blood pressure regularly outside of the clinic? No     Are you following a low salt diet? No    Are your blood pressures ever more than 140 on  the top number (systolic) OR more   than 90 on the bottom number (diastolic), for example 140/90? No    BP Readings from Last 2 Encounters:   08/29/22 110/57   06/10/22 110/60     Hemoglobin A1C (%)   Date Value   02/17/2022 7.3 (H)   08/17/2021 6.7 (H)   02/15/2021 6.5 (H)     LDL Cholesterol Calculated (mg/dL)   Date Value   08/17/2021 76   08/21/2020 68   10/02/2019 97       Depression Followup    How are you doing with your depression since your last visit? No change    Are you having other symptoms that might be associated with depression? No    Have you had a significant life event?  No     Are you feeling anxious or having panic attacks?   No    Do you have any concerns with your use of alcohol or other drugs? No    Social History     Tobacco Use     Smoking status: Former Smoker     Packs/day: 0.00     Types: Cigarettes     Smokeless tobacco: Never Used     Tobacco comment: Feb. 2014   Vaping Use     Vaping Use: Never used   Substance Use Topics     Alcohol use: Yes     Comment: occasional     Drug use: Yes     Types: Marijuana     PHQ 2/15/2021 8/17/2021 2/17/2022   PHQ-9 Total Score 0 4 0   Q9: Thoughts of better off dead/self-harm past 2 weeks Not at all Not at all Not at all     OFELIA-7 SCORE 2/15/2021 8/17/2021 2/17/2022   Total Score 0 0 0         Suicide Assessment Five-step Evaluation and Treatment (SAFE-T)            Review of Systems   Constitutional, HEENT, cardiovascular, pulmonary, GI, , musculoskeletal, neuro, skin, endocrine and psych systems are negative, except as otherwise noted.    c/o sleeps a lot . napping plus 10 hrs at night - sounds to be more of boredom   Objective    /57 (BP Location: Right arm, Patient Position: Sitting, Cuff Size: Adult Large)   Pulse 52   Temp 96.8  F (36  C) (Tympanic)   Resp 20   Wt 109.3 kg (241 lb)   SpO2 98%   BMI 32.69 kg/m    Body mass index is 32.69 kg/m .  Physical Exam   GENERAL: healthy, alert and no distress  EYES: Eyes grossly normal to  inspection, PERRL and conjunctivae and sclerae normal  HENT: ear canals and TM's normal, nose and mouth without ulcers or lesions  NECK: no adenopathy, no asymmetry, masses, or scars and thyroid normal to palpation  RESP: lungs clear to auscultation - no rales, rhonchi or wheezes  CV: regular rate and rhythm, normal S1 S2, no S3 or S4, 2-3 high pitched systolic apical murmur, no click or rub, no peripheral edema and peripheral pulses strong  ABDOMEN: soft, nontender, no hepatosplenomegaly, no masses and bowel sounds normal   (male): normal male genitalia without lesions or urethral discharge, no hernia  MS: no gross musculoskeletal defects noted, no edema  SKIN: no suspicious lesions or rashes  NEURO: Normal strength and tone, mentation intact and speech normal  PSYCH: mentation appears normal, affect normal/bright  LYMPH: no cervical, supraclavicular, axillary, or inguinal adenopathy        Results for orders placed or performed in visit on 08/29/22   Albumin Random Urine Quantitative with Creat Ratio     Status: None   Result Value Ref Range    Creatinine Urine mg/dL 64 mg/dL    Albumin Urine mg/L 6 mg/L    Albumin Urine mg/g Cr 9.38 0.00 - 17.00 mg/g Cr   PSA tumor marker     Status: Abnormal   Result Value Ref Range    PSA Tumor Marker 6.80 (H) 0.00 - 4.00 ug/L    Narrative    Assay Method:  Chemiluminescence using Siemens   Vista analyzer.   TSH     Status: Normal   Result Value Ref Range    TSH 0.74 0.40 - 4.00 mU/L   Hemoglobin A1c     Status: Abnormal   Result Value Ref Range    Estimated Average Glucose 169 mg/dL    Hemoglobin A1C 7.5 (H) 0.0 - 5.6 %   Lipid Profile     Status: Abnormal   Result Value Ref Range    Cholesterol 153 <200 mg/dL    Triglycerides 198 (H) <150 mg/dL    Direct Measure HDL 57 >=40 mg/dL    LDL Cholesterol Calculated 56 <=100 mg/dL    Non HDL Cholesterol 96 <130 mg/dL    Patient Fasting > 8hrs? Yes     Narrative    Cholesterol  Desirable:  <200 mg/dL    Triglycerides  Normal:  Less  than 150 mg/dL  Borderline High:  150-199 mg/dL  High:  200-499 mg/dL  Very High:  Greater than or equal to 500 mg/dL    Direct Measure HDL  Female:  Greater than or equal to 50 mg/dL   Male:  Greater than or equal to 40 mg/dL    LDL Cholesterol  Desirable:  <100mg/dL  Above Desirable:  100-129 mg/dL   Borderline High:  130-159 mg/dL   High:  160-189 mg/dL   Very High:  >= 190 mg/dL    Non HDL Cholesterol  Desirable:  130 mg/dL  Above Desirable:  130-159 mg/dL  Borderline High:  160-189 mg/dL  High:  190-219 mg/dL  Very High:  Greater than or equal to 220 mg/dL   Comprehensive metabolic panel     Status: Abnormal   Result Value Ref Range    Sodium 136 133 - 144 mmol/L    Potassium 4.1 3.4 - 5.3 mmol/L    Chloride 102 94 - 109 mmol/L    Carbon Dioxide (CO2) 29 20 - 32 mmol/L    Anion Gap 5 3 - 14 mmol/L    Urea Nitrogen 24 7 - 30 mg/dL    Creatinine 0.92 0.66 - 1.25 mg/dL    Calcium 10.0 8.5 - 10.1 mg/dL    Glucose 140 (H) 70 - 99 mg/dL    Alkaline Phosphatase 100 40 - 150 U/L    AST 22 0 - 45 U/L    ALT 31 0 - 70 U/L    Protein Total 7.6 6.8 - 8.8 g/dL    Albumin 4.0 3.4 - 5.0 g/dL    Bilirubin Total 1.2 0.2 - 1.3 mg/dL    GFR Estimate 90 >60 mL/min/1.73m2   CBC with platelets and differential     Status: Abnormal   Result Value Ref Range    WBC Count 12.2 (H) 4.0 - 11.0 10e3/uL    RBC Count 4.86 4.40 - 5.90 10e6/uL    Hemoglobin 14.2 13.3 - 17.7 g/dL    Hematocrit 44.3 40.0 - 53.0 %    MCV 91 78 - 100 fL    MCH 29.2 26.5 - 33.0 pg    MCHC 32.1 31.5 - 36.5 g/dL    RDW 13.3 10.0 - 15.0 %    Platelet Count 249 150 - 450 10e3/uL    % Neutrophils 60 %    % Lymphocytes 27 %    % Monocytes 8 %    % Eosinophils 4 %    % Basophils 1 %    % Immature Granulocytes 0 %    NRBCs per 100 WBC 0 <1 /100    Absolute Neutrophils 7.2 1.6 - 8.3 10e3/uL    Absolute Lymphocytes 3.3 0.8 - 5.3 10e3/uL    Absolute Monocytes 1.0 0.0 - 1.3 10e3/uL    Absolute Eosinophils 0.5 0.0 - 0.7 10e3/uL    Absolute Basophils 0.1 0.0 - 0.2 10e3/uL     Absolute Immature Granulocytes 0.0 <=0.4 10e3/uL    Absolute NRBCs 0.0 10e3/uL   CBC with Platelets & Differential     Status: Abnormal    Narrative    The following orders were created for panel order CBC with Platelets & Differential.  Procedure                               Abnormality         Status                     ---------                               -----------         ------                     CBC with platelets and d...[[id number]]  Abnormal            Final result                 Please view results for these tests on the individual orders.                 .  ..

## 2022-08-29 ENCOUNTER — OFFICE VISIT (OUTPATIENT)
Dept: FAMILY MEDICINE | Facility: OTHER | Age: 67
End: 2022-08-29
Attending: FAMILY MEDICINE
Payer: COMMERCIAL

## 2022-08-29 ENCOUNTER — LAB (OUTPATIENT)
Dept: LAB | Facility: OTHER | Age: 67
End: 2022-08-29
Payer: COMMERCIAL

## 2022-08-29 VITALS
BODY MASS INDEX: 32.69 KG/M2 | SYSTOLIC BLOOD PRESSURE: 110 MMHG | HEART RATE: 52 BPM | TEMPERATURE: 96.8 F | RESPIRATION RATE: 20 BRPM | DIASTOLIC BLOOD PRESSURE: 57 MMHG | WEIGHT: 241 LBS | OXYGEN SATURATION: 98 %

## 2022-08-29 DIAGNOSIS — E66.01 MORBID OBESITY (H): ICD-10-CM

## 2022-08-29 DIAGNOSIS — F33.42 RECURRENT MAJOR DEPRESSIVE DISORDER, IN FULL REMISSION (H): ICD-10-CM

## 2022-08-29 DIAGNOSIS — Z86.79 S/P AAA REPAIR: ICD-10-CM

## 2022-08-29 DIAGNOSIS — I10 BENIGN ESSENTIAL HYPERTENSION: ICD-10-CM

## 2022-08-29 DIAGNOSIS — F33.1 MODERATE RECURRENT MAJOR DEPRESSION (H): ICD-10-CM

## 2022-08-29 DIAGNOSIS — I10 ESSENTIAL HYPERTENSION: ICD-10-CM

## 2022-08-29 DIAGNOSIS — F17.201 TOBACCO DEPENDENCE IN REMISSION: ICD-10-CM

## 2022-08-29 DIAGNOSIS — E78.2 MIXED HYPERLIPIDEMIA: ICD-10-CM

## 2022-08-29 DIAGNOSIS — Z98.890 S/P AAA REPAIR: ICD-10-CM

## 2022-08-29 DIAGNOSIS — R97.20 ELEVATED PROSTATE SPECIFIC ANTIGEN (PSA): ICD-10-CM

## 2022-08-29 DIAGNOSIS — E11.69 TYPE 2 DIABETES MELLITUS WITH OTHER SPECIFIED COMPLICATION, WITHOUT LONG-TERM CURRENT USE OF INSULIN (H): Primary | ICD-10-CM

## 2022-08-29 DIAGNOSIS — R01.1 UNDIAGNOSED CARDIAC MURMURS: ICD-10-CM

## 2022-08-29 DIAGNOSIS — Z87.891 PERSONAL HISTORY OF NICOTINE DEPENDENCE: ICD-10-CM

## 2022-08-29 DIAGNOSIS — E11.69 TYPE 2 DIABETES MELLITUS WITH OTHER SPECIFIED COMPLICATION, WITHOUT LONG-TERM CURRENT USE OF INSULIN (H): ICD-10-CM

## 2022-08-29 LAB
ALBUMIN SERPL-MCNC: 4 G/DL (ref 3.4–5)
ALP SERPL-CCNC: 100 U/L (ref 40–150)
ALT SERPL W P-5'-P-CCNC: 31 U/L (ref 0–70)
ANION GAP SERPL CALCULATED.3IONS-SCNC: 5 MMOL/L (ref 3–14)
AST SERPL W P-5'-P-CCNC: 22 U/L (ref 0–45)
BASOPHILS # BLD AUTO: 0.1 10E3/UL (ref 0–0.2)
BASOPHILS NFR BLD AUTO: 1 %
BILIRUB SERPL-MCNC: 1.2 MG/DL (ref 0.2–1.3)
BUN SERPL-MCNC: 24 MG/DL (ref 7–30)
CALCIUM SERPL-MCNC: 10 MG/DL (ref 8.5–10.1)
CHLORIDE BLD-SCNC: 102 MMOL/L (ref 94–109)
CHOLEST SERPL-MCNC: 153 MG/DL
CO2 SERPL-SCNC: 29 MMOL/L (ref 20–32)
CREAT SERPL-MCNC: 0.92 MG/DL (ref 0.66–1.25)
CREAT UR-MCNC: 64 MG/DL
EOSINOPHIL # BLD AUTO: 0.5 10E3/UL (ref 0–0.7)
EOSINOPHIL NFR BLD AUTO: 4 %
ERYTHROCYTE [DISTWIDTH] IN BLOOD BY AUTOMATED COUNT: 13.3 % (ref 10–15)
EST. AVERAGE GLUCOSE BLD GHB EST-MCNC: 169 MG/DL
FASTING STATUS PATIENT QL REPORTED: YES
GFR SERPL CREATININE-BSD FRML MDRD: 90 ML/MIN/1.73M2
GLUCOSE BLD-MCNC: 140 MG/DL (ref 70–99)
HBA1C MFR BLD: 7.5 % (ref 0–5.6)
HCT VFR BLD AUTO: 44.3 % (ref 40–53)
HDLC SERPL-MCNC: 57 MG/DL
HGB BLD-MCNC: 14.2 G/DL (ref 13.3–17.7)
IMM GRANULOCYTES # BLD: 0 10E3/UL
IMM GRANULOCYTES NFR BLD: 0 %
LDLC SERPL CALC-MCNC: 56 MG/DL
LYMPHOCYTES # BLD AUTO: 3.3 10E3/UL (ref 0.8–5.3)
LYMPHOCYTES NFR BLD AUTO: 27 %
MCH RBC QN AUTO: 29.2 PG (ref 26.5–33)
MCHC RBC AUTO-ENTMCNC: 32.1 G/DL (ref 31.5–36.5)
MCV RBC AUTO: 91 FL (ref 78–100)
MICROALBUMIN UR-MCNC: 6 MG/L
MICROALBUMIN/CREAT UR: 9.38 MG/G CR (ref 0–17)
MONOCYTES # BLD AUTO: 1 10E3/UL (ref 0–1.3)
MONOCYTES NFR BLD AUTO: 8 %
NEUTROPHILS # BLD AUTO: 7.2 10E3/UL (ref 1.6–8.3)
NEUTROPHILS NFR BLD AUTO: 60 %
NONHDLC SERPL-MCNC: 96 MG/DL
NRBC # BLD AUTO: 0 10E3/UL
NRBC BLD AUTO-RTO: 0 /100
PLATELET # BLD AUTO: 249 10E3/UL (ref 150–450)
POTASSIUM BLD-SCNC: 4.1 MMOL/L (ref 3.4–5.3)
PROT SERPL-MCNC: 7.6 G/DL (ref 6.8–8.8)
PSA SERPL-MCNC: 6.8 UG/L (ref 0–4)
RBC # BLD AUTO: 4.86 10E6/UL (ref 4.4–5.9)
SODIUM SERPL-SCNC: 136 MMOL/L (ref 133–144)
TRIGL SERPL-MCNC: 198 MG/DL
TSH SERPL DL<=0.005 MIU/L-ACNC: 0.74 MU/L (ref 0.4–4)
WBC # BLD AUTO: 12.2 10E3/UL (ref 4–11)

## 2022-08-29 PROCEDURE — 84443 ASSAY THYROID STIM HORMONE: CPT | Mod: ZL

## 2022-08-29 PROCEDURE — 80053 COMPREHEN METABOLIC PANEL: CPT | Mod: ZL

## 2022-08-29 PROCEDURE — 80061 LIPID PANEL: CPT | Mod: ZL

## 2022-08-29 PROCEDURE — 84153 ASSAY OF PSA TOTAL: CPT | Mod: ZL

## 2022-08-29 PROCEDURE — 99214 OFFICE O/P EST MOD 30 MIN: CPT | Performed by: FAMILY MEDICINE

## 2022-08-29 PROCEDURE — 36415 COLL VENOUS BLD VENIPUNCTURE: CPT | Mod: ZL

## 2022-08-29 PROCEDURE — 82043 UR ALBUMIN QUANTITATIVE: CPT | Mod: ZL

## 2022-08-29 PROCEDURE — 83036 HEMOGLOBIN GLYCOSYLATED A1C: CPT | Mod: ZL

## 2022-08-29 PROCEDURE — G0463 HOSPITAL OUTPT CLINIC VISIT: HCPCS

## 2022-08-29 PROCEDURE — 85014 HEMATOCRIT: CPT | Mod: ZL

## 2022-08-29 RX ORDER — LISINOPRIL AND HYDROCHLOROTHIAZIDE 12.5; 2 MG/1; MG/1
1 TABLET ORAL DAILY
Qty: 90 TABLET | Refills: 3 | Status: ON HOLD | OUTPATIENT
Start: 2022-08-29 | End: 2022-12-09

## 2022-08-29 RX ORDER — ARIPIPRAZOLE 5 MG/1
TABLET ORAL
Qty: 90 TABLET | Refills: 3 | Status: ON HOLD | OUTPATIENT
Start: 2022-08-29 | End: 2023-01-31

## 2022-08-29 RX ORDER — ATORVASTATIN CALCIUM 80 MG/1
TABLET, FILM COATED ORAL
Qty: 90 TABLET | Refills: 3 | Status: ON HOLD | OUTPATIENT
Start: 2022-08-29 | End: 2023-01-31

## 2022-08-29 RX ORDER — METFORMIN HCL 500 MG
1000 TABLET, EXTENDED RELEASE 24 HR ORAL
Qty: 180 TABLET | Refills: 2 | Status: ON HOLD | OUTPATIENT
Start: 2022-08-29 | End: 2023-01-31

## 2022-08-29 RX ORDER — BUPROPION HYDROCHLORIDE 300 MG/1
300 TABLET ORAL EVERY MORNING
Qty: 90 TABLET | Refills: 3 | Status: ON HOLD | OUTPATIENT
Start: 2022-08-29 | End: 2023-01-31

## 2022-08-29 ASSESSMENT — PAIN SCALES - GENERAL: PAINLEVEL: NO PAIN (0)

## 2022-08-29 NOTE — PATIENT INSTRUCTIONS
Results for orders placed or performed in visit on 08/29/22   Albumin Random Urine Quantitative with Creat Ratio     Status: None   Result Value Ref Range    Creatinine Urine mg/dL 64 mg/dL    Albumin Urine mg/L 6 mg/L    Albumin Urine mg/g Cr 9.38 0.00 - 17.00 mg/g Cr   PSA tumor marker     Status: Abnormal   Result Value Ref Range    PSA Tumor Marker 6.80 (H) 0.00 - 4.00 ug/L    Narrative    Assay Method:  Chemiluminescence using Siemens   Vista analyzer.   TSH     Status: Normal   Result Value Ref Range    TSH 0.74 0.40 - 4.00 mU/L   Lipid Profile     Status: Abnormal   Result Value Ref Range    Cholesterol 153 <200 mg/dL    Triglycerides 198 (H) <150 mg/dL    Direct Measure HDL 57 >=40 mg/dL    LDL Cholesterol Calculated 56 <=100 mg/dL    Non HDL Cholesterol 96 <130 mg/dL    Patient Fasting > 8hrs? Yes     Narrative    Cholesterol  Desirable:  <200 mg/dL    Triglycerides  Normal:  Less than 150 mg/dL  Borderline High:  150-199 mg/dL  High:  200-499 mg/dL  Very High:  Greater than or equal to 500 mg/dL    Direct Measure HDL  Female:  Greater than or equal to 50 mg/dL   Male:  Greater than or equal to 40 mg/dL    LDL Cholesterol  Desirable:  <100mg/dL  Above Desirable:  100-129 mg/dL   Borderline High:  130-159 mg/dL   High:  160-189 mg/dL   Very High:  >= 190 mg/dL    Non HDL Cholesterol  Desirable:  130 mg/dL  Above Desirable:  130-159 mg/dL  Borderline High:  160-189 mg/dL  High:  190-219 mg/dL  Very High:  Greater than or equal to 220 mg/dL   Comprehensive metabolic panel     Status: Abnormal   Result Value Ref Range    Sodium 136 133 - 144 mmol/L    Potassium 4.1 3.4 - 5.3 mmol/L    Chloride 102 94 - 109 mmol/L    Carbon Dioxide (CO2) 29 20 - 32 mmol/L    Anion Gap 5 3 - 14 mmol/L    Urea Nitrogen 24 7 - 30 mg/dL    Creatinine 0.92 0.66 - 1.25 mg/dL    Calcium 10.0 8.5 - 10.1 mg/dL    Glucose 140 (H) 70 - 99 mg/dL    Alkaline Phosphatase 100 40 - 150 U/L    AST 22 0 - 45 U/L    ALT 31 0 - 70 U/L    Protein  Total 7.6 6.8 - 8.8 g/dL    Albumin 4.0 3.4 - 5.0 g/dL    Bilirubin Total 1.2 0.2 - 1.3 mg/dL    GFR Estimate 90 >60 mL/min/1.73m2   CBC with platelets and differential     Status: Abnormal   Result Value Ref Range    WBC Count 12.2 (H) 4.0 - 11.0 10e3/uL    RBC Count 4.86 4.40 - 5.90 10e6/uL    Hemoglobin 14.2 13.3 - 17.7 g/dL    Hematocrit 44.3 40.0 - 53.0 %    MCV 91 78 - 100 fL    MCH 29.2 26.5 - 33.0 pg    MCHC 32.1 31.5 - 36.5 g/dL    RDW 13.3 10.0 - 15.0 %    Platelet Count 249 150 - 450 10e3/uL    % Neutrophils 60 %    % Lymphocytes 27 %    % Monocytes 8 %    % Eosinophils 4 %    % Basophils 1 %    % Immature Granulocytes 0 %    NRBCs per 100 WBC 0 <1 /100    Absolute Neutrophils 7.2 1.6 - 8.3 10e3/uL    Absolute Lymphocytes 3.3 0.8 - 5.3 10e3/uL    Absolute Monocytes 1.0 0.0 - 1.3 10e3/uL    Absolute Eosinophils 0.5 0.0 - 0.7 10e3/uL    Absolute Basophils 0.1 0.0 - 0.2 10e3/uL    Absolute Immature Granulocytes 0.0 <=0.4 10e3/uL    Absolute NRBCs 0.0 10e3/uL   CBC with Platelets & Differential     Status: Abnormal    Narrative    The following orders were created for panel order CBC with Platelets & Differential.  Procedure                               Abnormality         Status                     ---------                               -----------         ------                     CBC with platelets and d...[[id number]]  Abnormal            Final result                 Please view results for these tests on the individual orders.

## 2022-08-29 NOTE — NURSING NOTE
Chief Complaint   Patient presents with     Recheck Medication       Initial /57 (BP Location: Right arm, Patient Position: Sitting, Cuff Size: Adult Large)   Pulse 52   Temp 96.8  F (36  C) (Tympanic)   Resp 20   Wt 109.3 kg (241 lb)   SpO2 98%   BMI 32.69 kg/m   Estimated body mass index is 32.69 kg/m  as calculated from the following:    Height as of 2/17/22: 1.829 m (6').    Weight as of this encounter: 109.3 kg (241 lb).  Medication Reconciliation: complete  Jose Mckeon LPN

## 2022-08-30 DIAGNOSIS — I10 BENIGN ESSENTIAL HYPERTENSION: ICD-10-CM

## 2022-08-31 RX ORDER — LISINOPRIL AND HYDROCHLOROTHIAZIDE 12.5; 2 MG/1; MG/1
1 TABLET ORAL DAILY
Qty: 90 TABLET | Refills: 3 | OUTPATIENT
Start: 2022-08-31

## 2022-09-06 ENCOUNTER — OFFICE VISIT (OUTPATIENT)
Dept: PODIATRY | Facility: OTHER | Age: 67
End: 2022-09-06
Attending: PODIATRIST
Payer: COMMERCIAL

## 2022-09-06 VITALS
SYSTOLIC BLOOD PRESSURE: 113 MMHG | OXYGEN SATURATION: 95 % | TEMPERATURE: 98.1 F | DIASTOLIC BLOOD PRESSURE: 74 MMHG | HEART RATE: 62 BPM

## 2022-09-06 DIAGNOSIS — I73.9 PERIPHERAL ARTERIAL DISEASE (H): ICD-10-CM

## 2022-09-06 DIAGNOSIS — I70.213 INTERMITTENT CLAUDICATION OF BOTH LOWER EXTREMITIES DUE TO ATHEROSCLEROSIS (H): ICD-10-CM

## 2022-09-06 DIAGNOSIS — L85.3 XEROSIS OF SKIN: ICD-10-CM

## 2022-09-06 DIAGNOSIS — L60.3 ONYCHODYSTROPHY: ICD-10-CM

## 2022-09-06 DIAGNOSIS — L60.0 INGROWN TOENAIL: ICD-10-CM

## 2022-09-06 DIAGNOSIS — E11.9 DIABETES MELLITUS TYPE 2, NONINSULIN DEPENDENT (H): Primary | ICD-10-CM

## 2022-09-06 DIAGNOSIS — E11.42 DIABETIC POLYNEUROPATHY ASSOCIATED WITH TYPE 2 DIABETES MELLITUS (H): ICD-10-CM

## 2022-09-06 PROCEDURE — 11721 DEBRIDE NAIL 6 OR MORE: CPT | Performed by: PODIATRIST

## 2022-09-06 PROCEDURE — G0463 HOSPITAL OUTPT CLINIC VISIT: HCPCS | Mod: 25

## 2022-09-06 ASSESSMENT — PAIN SCALES - GENERAL: PAINLEVEL: NO PAIN (0)

## 2022-09-06 NOTE — PROGRESS NOTES
Chief complaint: Patient presents with:  Toenail: Trimming        History of Present Illness: This 67 year old NIDDM II male is seen for follow-up management of elongated toenails. The toenails are difficult to reach and to trim because of the thickness of the toenails.    He has had consistent tingling in the toes.    He has a vascular follow-up with Dr. Mcgrath at [facility] on 09/15/2022.    He still gets some pain from bilateral hallux ingrown toenails, but he says it usually only hurts when his dogs or goats step on his toes.    Patients says he has not been applying lotion to his feet.        Last HbA1C was 7.5% on 08/29/2022.    ---Previously 7.3% on 02/17/2022.    ---Previously 6.7% on 08/17/2021.      No further pedal complaints today.     Patient quit smoking around 2011.        /74 (BP Location: Left arm, Patient Position: Sitting, Cuff Size: Adult Regular)   Pulse 62   Temp 98.1  F (36.7  C) (Tympanic)   SpO2 95%     Patient Active Problem List   Diagnosis     Abdominal aortic aneurysm (H)     Erectile dysfunction     ACP (advance care planning)     Routine general medical examination at a health care facility     Essential hypertension     Mixed hyperlipidemia     Skin lesion     Moderate major depression (H)     Morbid obesity (H)     Elevated prostate specific antigen (PSA)     Prediabetes     Diabetes mellitus, type 2 (H)     S/P AAA repair     Tobacco dependence in remission       Past Surgical History:   Procedure Laterality Date     AAA REPAIR  06/17/2020    [facility] [place]/ intravascular repair     APPENDECTOMY       COLONOSCOPY  2013     ESOPHAGOGASTRODUODENOSCOPY  2013       Current Outpatient Medications   Medication     ARIPiprazole (ABILIFY) 5 MG tablet     aspirin (ASA) 81 MG EC tablet     atorvastatin (LIPITOR) 80 MG tablet     buPROPion (WELLBUTRIN XL) 300 MG 24 hr tablet     diphenhydrAMINE HCl (BENADRYL ALLERGY PO)     lisinopril-hydrochlorothiazide (ZESTORETIC) 20-12.5 MG  tablet     loratadine (CLARITIN) 10 MG tablet     metFORMIN (GLUCOPHAGE XR) 500 MG 24 hr tablet     metoprolol succinate ER (TOPROL-XL) 25 MG 24 hr tablet     Multiple Vitamins-Minerals (MULTIVITAMIN ADULT PO)     omeprazole (PRILOSEC) 20 MG DR capsule     vitamin C (ASCORBIC ACID) 1000 MG TABS     zolpidem (AMBIEN) 10 MG tablet     No current facility-administered medications for this visit.        No Known Allergies    Family History   Problem Relation Age of Onset     C.A.D. Father         CABG in late 40's     C.A.D. Brother         MI in 50's       Social History     Socioeconomic History     Marital status:      Spouse name: None     Number of children: None     Years of education: None     Highest education level: None   Occupational History     None   Tobacco Use     Smoking status: Former Smoker     Packs/day: 0.00     Types: Cigarettes     Smokeless tobacco: Never Used     Tobacco comment: Feb. 2014   Substance and Sexual Activity     Alcohol use: Yes     Comment: occasional     Drug use: Yes     Types: Marijuana     Sexual activity: Yes     Partners: Female   Other Topics Concern     Parent/sibling w/ CABG, MI or angioplasty before 65F 55M? Yes     Comment: dad had heart attack before 55. brother also had heart attack before 55.   Social History Narrative    .      Social Determinants of Health     Financial Resource Strain:      Difficulty of Paying Living Expenses:    Food Insecurity:      Worried About Running Out of Food in the Last Year:      Ran Out of Food in the Last Year:    Transportation Needs:      Lack of Transportation (Medical):      Lack of Transportation (Non-Medical):    Physical Activity:      Days of Exercise per Week:      Minutes of Exercise per Session:    Stress:      Feeling of Stress :    Social Connections:      Frequency of Communication with Friends and Family:      Frequency of Social Gatherings with Friends and Family:      Attends Shinto Services:       Active Member of Clubs or Organizations:      Attends Club or Organization Meetings:      Marital Status:    Intimate Partner Violence:      Fear of Current or Ex-Partner:      Emotionally Abused:      Physically Abused:      Sexually Abused:        ROS: 10 point ROS neg other than the symptoms noted above in the HPI.  EXAM  Constitutional: healthy, alert and no distress    Psychiatric: mentation appears normal and affect normal/bright    VASCULAR:  -Dorsalis pedis pulse +1/4 b/l  -Posterior tibial pulse +1/4 b/l  -Capillary refill time < 3 seconds to b/l hallux  -Hair growth Absent to b/l anterior legs and ankles  NEURO:  -Positive for paresthesias to bilateral foot  -Protective sensation intact with SWM +10/10 RIGHT and +10/10 LEFT on 06/02/2022  -Protective sensation intact with SWM +9/10 RIGHT and +10/10 LEFT on 03/01/2022  -Protective sensation intact with SWM +9/10 RIGHT and +10/10 LEFT on 11/30/2021  -Protective sensation intact with SWM +10/10 RIGHT and +10/10 LEFT on 08/30/2021  -Light touch sensation intact to b/l plantar forefoot  DERM:  -Skin temperature cool to bilateral foot  -Mild rubor with purple discoloration to bilateral foot  -Skin diffusely dry, cracking and flaking to plantar foot (most cracking on heels) without open wounds or drainage at this time  -Toenails elongated, thickened, dystrophic and discolored x 10  ---Bilateral hallux toenails significantly dystrophic  ---Mild-to-moderate incurvation of the lateral toenail border of the RIGHT hallux -- no open wounds, mild edema, no erythema, no drainage (No severe erythema, no ascending erythema, no calor, no purulence, no malodor, no other signs of infection).  MSK:  -Muscle strength of ankles +5/5 for dorsiflexion, plantarflexion, ABDUction and ADDuction b/l    ============================================================    ASSESSMENT:  (L60.3) Onychodystrophy  (primary encounter diagnosis)    (L60.0) Ingrown toenail    (L85.3) Xerosis of  skin    (I70.213) Intermittent claudication of both lower extremities due to atherosclerosis (H)    (E11.9) Diabetes mellitus type 2, noninsulin dependent (H)    (E11.42) Diabetic polyneuropathy associated with type 2 diabetes mellitus (H)    (I73.9) Peripheral arterial disease (H)      PLAN:  -Patient evaluated and examined. Treatment options discussed with no educational barriers noted.  -High risk toenail debridement x 10 toenails without incident  ---Moderate slant back to bilateral borders of bilateral hallux  ---No open wounds and no signs infection (No severe erythema, no ascending erythema, no calor, no purulence, no malodor, no other signs of infection).    -Diabetic Foot Education provided. This included checking the feet daily looking for new new blisters or wounds, wearing shoes at all times when walking including around the house, and avoiding lotion application between the toes. If there are any signs of infection, the patient should present to the ED as soon as possible. Infections of the foot can be life threatening or lead to amputations of the foot or leg.     -Vascular appointment -- coming up on 09/15/2021 with Dr. Mcgrath:    ---Dr. Mcgrath's note in July, 2021:   Reviewed his DAVID. The right was 0.79, the left 0.59. This corresponds to his complaint of his symptoms.    Moderate disease bilaterally, worse on the left. Discussed that he has fairly long segment disease on the left. I would not recommend intervention unless he truly has lifestyle limiting/disabling claudication. Discussed that any rest pain or tissue loss would lead to prompt evaluation for intervention. I discussed structured exercise and the significant benefit to be had from this in his pain free walking distance. However, given the distance that he has to walk, this may not be practical for him.   ---Patient says he follows up with Dr. Mcgrath about every six months. He is waiting for a call for his summer appointment in  2022.    -Patient in agreement with the above treatment plan and all of patient's questions were answered.      RTC 3 months for diabetic foot exam and high risk nail debridement         Sierra Velásquez, NAP[place], DPM

## 2022-09-06 NOTE — NURSING NOTE
Chief Complaint   Patient presents with     Toenail     Trimming         Initial /74 (BP Location: Left arm, Patient Position: Sitting, Cuff Size: Adult Regular)   Pulse 62   Temp 98.1  F (36.7  C) (Tympanic)   SpO2 95%  Estimated body mass index is 32.69 kg/m  as calculated from the following:    Height as of 2/17/22: 1.829 m (6').    Weight as of 8/29/22: 109.3 kg (241 lb).  Medication Reconciliation: complete  Yamilet Paniagua LPN

## 2022-09-06 NOTE — PATIENT INSTRUCTIONS
Thank you for allowing  and our Podiatry team to participate in your care. Please call our office at [phone] with scheduling questions or with any other questions or concerns.

## 2022-09-15 ENCOUNTER — TRANSFERRED RECORDS (OUTPATIENT)
Dept: HEALTH INFORMATION MANAGEMENT | Facility: CLINIC | Age: 67
End: 2022-09-15

## 2022-09-29 ENCOUNTER — HOSPITAL ENCOUNTER (OUTPATIENT)
Dept: CARDIOLOGY | Facility: HOSPITAL | Age: 67
Discharge: HOME | End: 2022-09-29
Attending: FAMILY MEDICINE
Payer: COMMERCIAL

## 2022-09-29 ENCOUNTER — HOSPITAL ENCOUNTER (OUTPATIENT)
Dept: CT IMAGING | Facility: HOSPITAL | Age: 67
Discharge: HOME | End: 2022-09-29
Attending: FAMILY MEDICINE
Payer: COMMERCIAL

## 2022-09-29 DIAGNOSIS — R01.1 UNDIAGNOSED CARDIAC MURMURS: ICD-10-CM

## 2022-09-29 DIAGNOSIS — F17.201 TOBACCO DEPENDENCE IN REMISSION: ICD-10-CM

## 2022-09-29 DIAGNOSIS — Z87.891 PERSONAL HISTORY OF NICOTINE DEPENDENCE: ICD-10-CM

## 2022-09-29 LAB — LVEF ECHO: NORMAL

## 2022-09-29 PROCEDURE — 93306 TTE W/DOPPLER COMPLETE: CPT

## 2022-09-29 PROCEDURE — 93306 TTE W/DOPPLER COMPLETE: CPT | Mod: 26 | Performed by: INTERNAL MEDICINE

## 2022-09-29 PROCEDURE — 71271 CT THORAX LUNG CANCER SCR C-: CPT

## 2022-09-30 DIAGNOSIS — I34.1 MITRAL VALVE PROLAPSE: Primary | ICD-10-CM

## 2022-10-11 NOTE — PROGRESS NOTES
[facility] HEART CARE   CARDIOLOGY CONSULT     Gilberto aCmilo   [address]  [facility] [address]      [place]     Chief Complaint   Patient presents with     New Patient        HPI:   Gilberto Camilo is a 67-year old male who presents for cardiology consult with recent moderate-severe mitral regurgitation on echocardiogram. He has a cardiac history including abdominal aortic aneurysm s/p repair in 2017 at [facility] in [facility], essential hypertension, mixed hyperlipidemia, coronary calcifications on CT, PAD with claudication (follows with [facility] vascular). He has a noncardiac history including DM- type II, ED, depression, former smoker.       Mr. Camilo has had fatigue in the last 6 months. Per wife- increased napping. Sleeps at least 10 hours per night and several naps throughout the day. This is new and worrisome to wife Marcella. She endorses that he previously would sleep 8 hours and be up and ready to go, rare naps.     He denies chest pain, exertional chest pain, dyspnea at rest or with conversation. He has had dyspnea on exertion which seems to be progressively getting worse. He [place] about [place].25 miles and he had to stop multiple times due to dyspnea. Per his wife he previously had to stop due to symptoms of bilateral LE claudication versus dyspnea; however, now he is becoming dyspneic before the claudication starts. Denies any LE edema, abdominal bloating.     Per his wife he has had some lightheaded dizziness with postural change from sitting to standing and with bending over to standing. This is newer for him. He has felt near-syncopal a couple times. He has not had a syncopal episode.      Smoking History: Started smoking at age 18, quit around 2015 (about age 60 years old). Smoking up to 3 ppd.    Alcohol History: None    Substance Abuse History: None    Sleep History: Sleeps in bed, naps in the chair. Typically goes to bed around 10 pm, wakes  "up around 8 am. Wakes up a couple times per night to void, able to fall right back to sleep. He does snore \"if allergies are acting up.\"   Per his wife he was supposed to have a sleep study but never went.  She has witnessed apneic periods. Wakes up most mornings feeling rested but within 2-3 hours feeling tired and ready for a nap. Denies orthopnea, PND.       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  Global and regional left ventricular function is normal with an EF of 55-60%.  Moderate left ventricular dilation is present.  The right ventricle is normal size.  Global right ventricular function is normal.  Moderate left atrial enlargement is present.  The posterior MV leaflet is slightly thickened and shortened with prolapse and  the valves do not coapt. There is significant MR jet towrds the interatrial  septum.  Moderate to severe mitral insufficiency is present.   Trace aortic insufficiency is present.  No aortic stenosis is present.  Trace tricuspid insufficiency is present.  Trace pulmonic insufficiency is present.       PAST MEDICAL HISTORY:   Past Medical History:   Diagnosis Date     AAA (abdominal aortic aneurysm) 2017    5.3 cm      Mohan esophagus      Closed rib fracture      DNS (deviated nasal septum)           FAMILY HISTORY:   Family History   Problem Relation Age of Onset     C.A.D. Father         CABG in late 40's     C.A.D. Brother         MI in 50's          PAST SURGICAL HISTORY:   Past Surgical History:   Procedure Laterality Date     AAA REPAIR  2020     [facility]/ " intravascular repair     APPENDECTOMY       COLONOSCOPY  2013     ESOPHAGOGASTRODUODENOSCOPY  2013          SOCIAL HISTORY:   Social History     Socioeconomic History     Marital status:    Tobacco Use     Smoking status: Former     Packs/day: 1.00     Years: 25.00     Pack years: 25.00     Types: Cigarettes     Smokeless tobacco: Never     Tobacco comments:     Feb. 2014   Vaping Use     Vaping Use: Never used   Substance and Sexual Activity     Alcohol use: Yes     Comment: occasional     Drug use: Yes     Types: Marijuana     Sexual activity: Yes     Partners: Female   Other Topics Concern     Parent/sibling w/ CABG, MI or angioplasty before 65F 55M? Yes     Comment: dad had heart attack before 55. brother also had heart attack before 55.   Social History Narrative    .           CURRENT MEDICATIONS:   Current Outpatient Medications   Medication     ARIPiprazole (ABILIFY) 5 MG tablet     aspirin (ASA) 81 MG EC tablet     atorvastatin (LIPITOR) 80 MG tablet     buPROPion (WELLBUTRIN XL) 300 MG 24 hr tablet     diphenhydrAMINE HCl (BENADRYL ALLERGY PO)     lisinopril (ZESTRIL) 10 MG tablet     lisinopril-hydrochlorothiazide (ZESTORETIC) 20-12.5 MG tablet     loratadine (CLARITIN) 10 MG tablet     metFORMIN (GLUCOPHAGE XR) 500 MG 24 hr tablet     metFORMIN (GLUCOPHAGE XR) 500 MG 24 hr tablet     metoprolol succinate ER (TOPROL-XL) 25 MG 24 hr tablet     Multiple Vitamins-Minerals (MULTIVITAMIN ADULT PO)     omeprazole (PRILOSEC) 20 MG DR capsule     vitamin C (ASCORBIC ACID) 1000 MG TABS     zolpidem (AMBIEN) 10 MG tablet     No current facility-administered medications for this visit.            ALLERGIES:   No Known Allergies       ROS:   CONSTITUTIONAL: +fatigue. No reported fever or chills. No changes in weight.  ENT: No visual disturbance, ear ache, epistaxis or sore throat.   CARDIOVASCULAR: +claudication bilateral LE. No chest pain, chest pressure or chest discomfort. No palpitations or lower  extremity edema.   RESPIRATORY: +Dyspnea on exertion. No cough, wheezing or hemoptysis.   GI: No abdominal pain. No nausea, vomiting or diarrhea. No melena or hematochezia. No changes in bowel habits.   : No hematuria or dysuria. No hesitancy or incontinence.   NEUROLOGICAL: No headache, lightheadedness, dizziness, syncope, ataxia, paresthesias or weakness.   HEMATOLOGIC: No bleeding or excessive bruising. No history of blood clots.   MUSCULOSKELETAL: No joint pain or swelling, no muscle pain.  ENDOCRINOLOGIC: No temperature intolerance. No hair or skin changes.  SKIN: No abnormal rashes or sores, no unusual itching.  PSYCHIATRIC: No changes in mood, feeling down or anxious. No changes in sleep.      PHYSICAL EXAM:     Vitals: /63 (BP Location: Right arm)   Pulse 51   Temp 98  F (36.7  C) (Tympanic)   Ht 1.829 m (6')   Wt 105.2 kg (232 lb)   SpO2 96%   BMI 31.46 kg/m    BMI= Body mass index is 31.46 kg/m .    GENERAL: The patient is a well-developed, well-nourished, in no apparent distress.  HEENT: Head is normocephalic and atraumatic. Eyes are symmetrical with normal visual tracking. No icterus, no xanthelasmas.   NECK: Supple. No adenopathy, asymmetry or masses. Carotid upstroke are normal bilaterally, no cervical bruits, JVP not visible.   CHEST/ LUNGS: Lungs clear to auscultation, no rales, rhonchi or wheezes, no use of accessory muscles, no retractions, respirations unlabored and normal respiratory rate.   CARDIO: Regular rate and rhythm normal. 3/6 systolic murmur   ABD: Abdomen is soft and nontender, nondistended. no palpable masses and no abdominal bruits heard.   EXTREMITIES: No clubbing, cyanosis or edema present.   VASC: Radial, femoral, popliteal pulses normal and symmetric with no vascular bruits heard. 0+posterior tibialis pulses, 1+ DP pulses. Feet cooler than legs with normal capillary refill.   MUSCULOSKELETAL: No joint swelling.   NEUROLOGIC: Alert and oriented X3. Normal speech, gait  and affect. No focal neurologic deficits.   SKIN: No jaundice. No rashes or visible skin lesions present. No ecchymosis.            EKG Interpretation:      Rhythm: sinus  Rate: bradycardia- 53 bpm  Axis: LEFT  Ectopy: None  Conduction: normal KLEBER 176 ms, normal QRS duration 118 ms,normal  ms, normal QTc 435 ms  ST Segments/ T Waves: No ST-T wave changes and No acute ischemic changes  Q Waves: None      LAB RESULTS:   Orders placed or performed in visit on 10/18/22     metFORMIN (GLUCOPHAGE XR) 500 MG 24 hr tablet     lisinopril (ZESTRIL) 10 MG tablet          ASSESSMENT:   Gilberto Camilo is a 67-year old male who presents for cardiology consult with recent moderate-severe mitral regurgitation on echocardiogram. He has a cardiac history including abdominal aortic aneurysm s/p repair in 2017 at [facility] in [facility], essential hypertension, mixed hyperlipidemia, coronary calcifications on CT, PAD with claudication (follows with [facility] vascular). He has a noncardiac history including DM- type II, ED, depression, former smoker.     Mr. Camilo has had progressive fatigue and dyspnea on exertion over the last several months. Recently had an echocardiogram showing moderate-severe mitral regurgitation. Family history of father needing mitral valve surgery.     PLAN:   1. Mitral valve prolapse (moderate-severe) with left ventricular dilation (moderate) on TTE 9/2022    Symptomatic with increasing fatigue, dyspnea on exertion    Plan to scheduled with structural heart clinic in November at [facility]    2. Left ventricular dilation    Continue with metoprolol XL 25 mg     3. Left atrial enlargement    Risk factor modification    Moderate-severe mitral regurgitation    4. Abdominal aortic aneurysm s/p AAA repair 2017 at [facility]    Recent MR at [facility] and per patient report no change in size    Continue to follow with vascular at [facility]    5. Essential hypertension, controlled   6. Orthostatic hypotension  lying 97/58, HR 51; sitting 89/54, HR 53; standing 71/36, HR 61    Stop combination hydrochlorothiazide-lisinopril    Increase hydration: drinks coffee and coke during the day. Increase hydrating fluids- water, flavored water.    Start lisinopril 10 mg once daily once blood pressure is above 110 systolic.       7. Mixed hyperlipidemia with LDL goal less than 70, controlled  8. coronary calcification (moderte-severe) on recent chest CT    Continue Atorvastatin 80 mg once daily    Aspirin 81 mg once daily  Recent Labs   Lab Test 08/29/22  1441 08/17/21  0826   CHOL 153 168   HDL 57 49   LDL 56 76   TRIG 198* 214*       9. Diabetes Mellitus, type II    Poorly controlled with recent A1c of 7.5%    He and his wife are concerned with his symptoms he will not be able to increase physical activity as recommended by PCP to manage DM. Metformin 1,000 mg once daily increased to metformin 1,000 mg twice daily.     10. Claudication    Follows with vascular at [facility]     11. Former smoker    Longstanding history, quit around age 60.       Thank you for allowing me to participate in the care of your patient. Please do not hesitate to contact me if you have any questions.     Total time spent on day of visit, including review of tests, obtaining/reviewing separately obtained history, ordering medications/tests/procedures, communicating with PCP/consultants, and documenting in electronic medical record: 68 minutes.       Zoe Carr, CNP

## 2022-10-18 ENCOUNTER — OFFICE VISIT (OUTPATIENT)
Dept: CARDIOLOGY | Facility: OTHER | Age: 67
End: 2022-10-18
Attending: FAMILY MEDICINE
Payer: COMMERCIAL

## 2022-10-18 VITALS
BODY MASS INDEX: 31.42 KG/M2 | WEIGHT: 232 LBS | SYSTOLIC BLOOD PRESSURE: 104 MMHG | HEART RATE: 51 BPM | DIASTOLIC BLOOD PRESSURE: 63 MMHG | HEIGHT: 72 IN | TEMPERATURE: 98 F | OXYGEN SATURATION: 96 %

## 2022-10-18 DIAGNOSIS — I34.1 MVP (MITRAL VALVE PROLAPSE): Primary | ICD-10-CM

## 2022-10-18 DIAGNOSIS — I95.1 ORTHOSTATIC HYPOTENSION: ICD-10-CM

## 2022-10-18 DIAGNOSIS — Z98.890 S/P AAA REPAIR: ICD-10-CM

## 2022-10-18 DIAGNOSIS — E66.01 MORBID OBESITY (H): ICD-10-CM

## 2022-10-18 DIAGNOSIS — I73.9 CLAUDICATION OF BOTH LOWER EXTREMITIES (H): ICD-10-CM

## 2022-10-18 DIAGNOSIS — Z86.79 S/P AAA REPAIR: ICD-10-CM

## 2022-10-18 DIAGNOSIS — I51.7 LEFT VENTRICULAR DILATATION: ICD-10-CM

## 2022-10-18 DIAGNOSIS — I51.7 LAE (LEFT ATRIAL ENLARGEMENT): ICD-10-CM

## 2022-10-18 DIAGNOSIS — E11.69 TYPE 2 DIABETES MELLITUS WITH OTHER SPECIFIED COMPLICATION, WITHOUT LONG-TERM CURRENT USE OF INSULIN (H): ICD-10-CM

## 2022-10-18 DIAGNOSIS — E78.2 MIXED HYPERLIPIDEMIA: ICD-10-CM

## 2022-10-18 DIAGNOSIS — I34.1 MITRAL VALVE PROLAPSE: Primary | ICD-10-CM

## 2022-10-18 DIAGNOSIS — I34.0 MITRAL VALVE INSUFFICIENCY, UNSPECIFIED ETIOLOGY: ICD-10-CM

## 2022-10-18 DIAGNOSIS — E11.9 TYPE 2 DIABETES MELLITUS WITHOUT COMPLICATION, WITHOUT LONG-TERM CURRENT USE OF INSULIN (H): ICD-10-CM

## 2022-10-18 DIAGNOSIS — F17.201 TOBACCO DEPENDENCE IN REMISSION: ICD-10-CM

## 2022-10-18 DIAGNOSIS — I34.1 MITRAL VALVE PROLAPSE: ICD-10-CM

## 2022-10-18 DIAGNOSIS — I10 ESSENTIAL HYPERTENSION: ICD-10-CM

## 2022-10-18 PROCEDURE — G0463 HOSPITAL OUTPT CLINIC VISIT: HCPCS

## 2022-10-18 PROCEDURE — 99215 OFFICE O/P EST HI 40 MIN: CPT | Performed by: NURSE PRACTITIONER

## 2022-10-18 PROCEDURE — G0463 HOSPITAL OUTPT CLINIC VISIT: HCPCS | Mod: 25

## 2022-10-18 PROCEDURE — 93010 ELECTROCARDIOGRAM REPORT: CPT | Performed by: INTERNAL MEDICINE

## 2022-10-18 PROCEDURE — 93005 ELECTROCARDIOGRAM TRACING: CPT

## 2022-10-18 RX ORDER — LISINOPRIL 10 MG/1
10 TABLET ORAL DAILY
Qty: 90 TABLET | Refills: 0 | Status: ON HOLD | OUTPATIENT
Start: 2022-10-18 | End: 2023-01-17

## 2022-10-18 RX ORDER — METFORMIN HCL 500 MG
1000 TABLET, EXTENDED RELEASE 24 HR ORAL 2 TIMES DAILY WITH MEALS
Qty: 360 TABLET | Refills: 0 | Status: ON HOLD | OUTPATIENT
Start: 2022-10-18 | End: 2023-01-22

## 2022-10-18 ASSESSMENT — PAIN SCALES - GENERAL: PAINLEVEL: NO PAIN (0)

## 2022-10-18 NOTE — NURSING NOTE
Chief Complaint   Patient presents with     New Patient       Initial /63 (BP Location: Right arm)   Pulse 51   Temp 98  F (36.7  C) (Tympanic)   Ht 1.829 m (6')   Wt 105.2 kg (232 lb)   SpO2 96%   BMI 31.46 kg/m   Estimated body mass index is 31.46 kg/m  as calculated from the following:    Height as of this encounter: 1.829 m (6').    Weight as of this encounter: 105.2 kg (232 lb).  Medication Reconciliation: complete  Krys Barnes LPN

## 2022-10-18 NOTE — PATIENT INSTRUCTIONS
Thank you for allowing our team to participate in your care. Please call our office at [phone] with scheduling questions or if you need to cancel or change your appointment. With any other questions or concerns you may call Krys cardiology nurse at [phone].       If you experience chest pain, chest pressure, chest tightness, shortness of breath, fainting, lightheadedness, nausea, vomiting, or other concerning symptoms, please report to the Emergency Department or call 911. These symptoms may be emergent, and best treated in the Emergency Department.        Mitral valve prolapse (moderate-severe) with left ventricular dilation (moderate) on TTE 9/2022  Plan to scheduled with structural heart clinic in November at [facility]    Left ventricular dilation  Continue with metoprolol XL 25 mg     Left atrial enlargement  Risk factor modification    Abdominal aortic aneurysm s/p AAA repair 2017 at [facility]  Recent MR at [facility] and per patient report no change in size  Continue to follow with vascular at [facility]    Essential hypertension, controlled   Orthostatic hypotension lying 97/58, HR 51; sitting 89/54, HR 53; standing 71/36, HR 61  Stop hydrochlorothiazide  Start lisinopril 10 mg once daily  Monitor home blood pressures. Goal is less than 130 systolic (top number) and less than 90 diastolic (bottom number). If concerns for elevated blood pressure call/send [employer] message.       Mixed hyperlipidemia with LDL goal less than 70, controlled  coronary calcification (moderte-severe) on recent chest CT  Continue Atorvastatin 80 mg once daily  Aspirin 81 mg once daily  Recent Labs   Lab Test 08/29/22  1441 08/17/21  0826   CHOL 153 168   HDL 57 49   LDL 56 76   TRIG 198* 214*       Diabetes Mellitus, type II  Poorly controlled with recent A1c of 7.5%  Metformin 1,000 mg once daily increased to metformin 1,000 mg twice daily      Claudication  Follows with vascular at [facility]     Former smoker  Longstanding  history, quit around age 60.     Follow-up with cardiology after seeing structural heart team.    Zoe Carr, CNP

## 2022-10-21 NOTE — TELEPHONE ENCOUNTER
zestoric      Last Written Prescription Date:  11/11/2020  Last Fill Quantity: 90,   # refills: 1  Last Office Visit: 2/15/21  Future Office visit:        No assistance needed

## 2022-10-27 DIAGNOSIS — I51.7 LAE (LEFT ATRIAL ENLARGEMENT): ICD-10-CM

## 2022-10-27 DIAGNOSIS — I51.7 LEFT VENTRICULAR DILATATION: ICD-10-CM

## 2022-10-27 DIAGNOSIS — I34.1 MITRAL VALVE PROLAPSE: Primary | ICD-10-CM

## 2022-10-27 DIAGNOSIS — I34.0 MITRAL VALVE INSUFFICIENCY, UNSPECIFIED ETIOLOGY: ICD-10-CM

## 2022-11-02 DIAGNOSIS — I34.1 MITRAL VALVE PROLAPSE: Primary | ICD-10-CM

## 2022-11-09 ENCOUNTER — TRANSFERRED RECORDS (OUTPATIENT)
Dept: HEALTH INFORMATION MANAGEMENT | Facility: CLINIC | Age: 67
End: 2022-11-09

## 2022-11-09 LAB — RETINOPATHY: NEGATIVE

## 2022-11-11 ENCOUNTER — HOSPITAL ENCOUNTER (OUTPATIENT)
Dept: CARDIOLOGY | Facility: CLINIC | Age: 67
Discharge: HOME | End: 2022-11-11
Attending: INTERNAL MEDICINE | Admitting: INTERNAL MEDICINE
Payer: COMMERCIAL

## 2022-11-11 VITALS
DIASTOLIC BLOOD PRESSURE: 71 MMHG | OXYGEN SATURATION: 93 % | SYSTOLIC BLOOD PRESSURE: 101 MMHG | RESPIRATION RATE: 12 BRPM | HEART RATE: 54 BPM

## 2022-11-11 DIAGNOSIS — I34.1 MITRAL VALVE PROLAPSE: ICD-10-CM

## 2022-11-11 LAB — LVEF ECHO: NORMAL

## 2022-11-11 PROCEDURE — 258N000003 HC RX IP 258 OP 636: Performed by: INTERNAL MEDICINE

## 2022-11-11 PROCEDURE — 93325 DOPPLER ECHO COLOR FLOW MAPG: CPT

## 2022-11-11 PROCEDURE — 76376 3D RENDER W/INTRP POSTPROCES: CPT

## 2022-11-11 PROCEDURE — 93312 ECHO TRANSESOPHAGEAL: CPT | Mod: 26 | Performed by: INTERNAL MEDICINE

## 2022-11-11 PROCEDURE — 93320 DOPPLER ECHO COMPLETE: CPT | Mod: 26 | Performed by: INTERNAL MEDICINE

## 2022-11-11 PROCEDURE — 250N000011 HC RX IP 250 OP 636: Performed by: INTERNAL MEDICINE

## 2022-11-11 PROCEDURE — 99152 MOD SED SAME PHYS/QHP 5/>YRS: CPT | Performed by: INTERNAL MEDICINE

## 2022-11-11 PROCEDURE — 76376 3D RENDER W/INTRP POSTPROCES: CPT | Mod: 26 | Performed by: INTERNAL MEDICINE

## 2022-11-11 PROCEDURE — 93325 DOPPLER ECHO COLOR FLOW MAPG: CPT | Mod: 26 | Performed by: INTERNAL MEDICINE

## 2022-11-11 PROCEDURE — 250N000009 HC RX 250: Performed by: INTERNAL MEDICINE

## 2022-11-11 RX ORDER — NALOXONE HYDROCHLORIDE 0.4 MG/ML
0.4 INJECTION, SOLUTION INTRAMUSCULAR; INTRAVENOUS; SUBCUTANEOUS
Status: DISCONTINUED | OUTPATIENT
Start: 2022-11-11 | End: 2022-11-12 | Stop reason: HOSPADM

## 2022-11-11 RX ORDER — LIDOCAINE 40 MG/G
CREAM TOPICAL
Status: DISCONTINUED | OUTPATIENT
Start: 2022-11-11 | End: 2022-11-12 | Stop reason: HOSPADM

## 2022-11-11 RX ORDER — NALOXONE HYDROCHLORIDE 0.4 MG/ML
0.2 INJECTION, SOLUTION INTRAMUSCULAR; INTRAVENOUS; SUBCUTANEOUS
Status: DISCONTINUED | OUTPATIENT
Start: 2022-11-11 | End: 2022-11-12 | Stop reason: HOSPADM

## 2022-11-11 RX ORDER — FENTANYL CITRATE 50 UG/ML
25 INJECTION, SOLUTION INTRAMUSCULAR; INTRAVENOUS
Status: DISCONTINUED | OUTPATIENT
Start: 2022-11-11 | End: 2022-11-12 | Stop reason: HOSPADM

## 2022-11-11 RX ORDER — FLUMAZENIL 0.1 MG/ML
0.2 INJECTION, SOLUTION INTRAVENOUS
Status: DISCONTINUED | OUTPATIENT
Start: 2022-11-11 | End: 2022-11-12 | Stop reason: HOSPADM

## 2022-11-11 RX ORDER — SODIUM CHLORIDE 9 MG/ML
INJECTION, SOLUTION INTRAVENOUS CONTINUOUS PRN
Status: DISCONTINUED | OUTPATIENT
Start: 2022-11-11 | End: 2022-11-12 | Stop reason: HOSPADM

## 2022-11-11 RX ORDER — ACYCLOVIR 200 MG/1
9.5 CAPSULE ORAL
Status: DISCONTINUED | OUTPATIENT
Start: 2022-11-11 | End: 2022-11-12 | Stop reason: HOSPADM

## 2022-11-11 RX ORDER — LIDOCAINE HYDROCHLORIDE 20 MG/ML
15 SOLUTION OROPHARYNGEAL ONCE
Status: COMPLETED | OUTPATIENT
Start: 2022-11-11 | End: 2022-11-11

## 2022-11-11 RX ADMIN — FENTANYL CITRATE 50 MCG: 50 INJECTION INTRAMUSCULAR; INTRAVENOUS at 13:09

## 2022-11-11 RX ADMIN — TOPICAL ANESTHETIC 0.5 ML: 200 SPRAY DENTAL; PERIODONTAL at 13:03

## 2022-11-11 RX ADMIN — LIDOCAINE HYDROCHLORIDE 30 ML: 20 SOLUTION ORAL; TOPICAL at 12:57

## 2022-11-11 RX ADMIN — SODIUM CHLORIDE 300 ML/HR: 9 INJECTION, SOLUTION INTRAVENOUS at 14:04

## 2022-11-11 RX ADMIN — MIDAZOLAM HYDROCHLORIDE 2 MG: 1 INJECTION, SOLUTION INTRAMUSCULAR; INTRAVENOUS at 13:08

## 2022-11-11 NOTE — PRE-PROCEDURE
GENERAL PRE-PROCEDURE:   Procedure:  Transesophageal echocardiogram  Date/Time:  11/11/2022 1:05 PM    Verbal consent obtained?: Yes    Written consent obtained?: Yes    Risks and benefits: Risks, benefits and alternatives were discussed    Consent given by:  Patient  Patient states understanding of procedure being performed: Yes    Patient's understanding of procedure matches consent: Yes    Procedure consent matches procedure scheduled: Yes    Expected level of sedation:  Moderate  Appropriately NPO:  Yes  ASA Class:  3  Mallampati  :  Grade 3- soft palate visible, posterior pharyngeal wall not visible  Lungs:  Lungs clear with good breath sounds bilaterally  Heart:  Normal heart sounds and rate  History & Physical reviewed:  History and physical reviewed and no updates needed  Statement of review:  I have reviewed the lab findings, diagnostic data, medications, and the plan for sedation

## 2022-11-11 NOTE — SEDATION DOCUMENTATION
Pt arrived in ECHO department  for scheduled TRENT.   Procedure explained, questions answered and consent signed. Discharge instructions discussed with patient and wife.  Pt's throat sprayed at 1300, therefore pt will not be able to eat or drink until 2 hours after at 1500 .  Informed pt of this time and encouraged to start with warm fluids and soft foods.    Pt tolerated procedure well, and was given a total of 50 mcg IV fentanyl and 2 mg IV versed for conscious sedation.  Pt denied throat or chest pain after TRENT complete.   TRENT probe 64 used for procedure.  Pt denied throat pain after procedure and was D/C home after awake and VSS.  Escorted out to front lobby by staff in w/c to meet pt's ride home.

## 2022-11-17 ENCOUNTER — VIRTUAL VISIT (OUTPATIENT)
Dept: CARDIOLOGY | Facility: CLINIC | Age: 67
End: 2022-11-17
Attending: INTERNAL MEDICINE
Payer: COMMERCIAL

## 2022-11-17 ENCOUNTER — VIRTUAL VISIT (OUTPATIENT)
Dept: CARDIOLOGY | Facility: CLINIC | Age: 67
End: 2022-11-17
Attending: SURGERY
Payer: COMMERCIAL

## 2022-11-17 DIAGNOSIS — R79.1 ABNORMAL COAGULATION PROFILE: ICD-10-CM

## 2022-11-17 DIAGNOSIS — I51.89 OTHER ILL-DEFINED HEART DISEASES: ICD-10-CM

## 2022-11-17 DIAGNOSIS — I34.1 MITRAL VALVE PROLAPSE: Primary | ICD-10-CM

## 2022-11-17 DIAGNOSIS — I34.0 MITRAL VALVE INSUFFICIENCY, UNSPECIFIED ETIOLOGY: Primary | ICD-10-CM

## 2022-11-17 DIAGNOSIS — Z01.810 PRE-OPERATIVE CARDIOVASCULAR EXAMINATION: ICD-10-CM

## 2022-11-17 PROCEDURE — 99204 OFFICE O/P NEW MOD 45 MIN: CPT | Mod: 95 | Performed by: SURGERY

## 2022-11-17 PROCEDURE — 99205 OFFICE O/P NEW HI 60 MIN: CPT | Mod: GC | Performed by: INTERNAL MEDICINE

## 2022-11-17 RX ORDER — LIDOCAINE 40 MG/G
CREAM TOPICAL
Status: CANCELLED | OUTPATIENT
Start: 2022-11-17

## 2022-11-17 RX ORDER — SODIUM CHLORIDE 9 MG/ML
INJECTION, SOLUTION INTRAVENOUS CONTINUOUS
Status: CANCELLED | OUTPATIENT
Start: 2022-11-17

## 2022-11-17 RX ORDER — ASPIRIN 325 MG
325 TABLET ORAL ONCE
Status: CANCELLED | OUTPATIENT
Start: 2022-11-17 | End: 2022-11-17

## 2022-11-17 RX ORDER — ASPIRIN 81 MG/1
243 TABLET, CHEWABLE ORAL ONCE
Status: CANCELLED | OUTPATIENT
Start: 2022-11-17

## 2022-11-17 NOTE — PROGRESS NOTES
[facility] HEART CARE  CARDIOVASCULAR DIVISION    VALVE CLINIC INITIAL CONSULTATION    PRIMARY CARDIOLOGIST: Dr. Carr      PERTINENT CLINICAL HISTORY:     Gilberto Camilo is a very pleasant 67 year old male with severe mitral valve regurgitation d/t prolapse referred to our clinic for evaluation and consideration for potential transcatheter edge to edge mitral valve repair (ANNE MARIE). His severe mitral regurgitation is eccentric anteriorly-directed MR due to P2 flail characterized with an EROA 0.57 cm2, RVol 78cc, mean gradient 2 mmHg at 52 bpm with LVEF 55-60% by TRENT on 11/11/22.  Additional notable medical history of abdominal aortic aneurysm s/p repair in 2017 at [facility] in [facility], essential hypertension, mixed hyperlipidemia, coronary calcifications on CT, PAD with claudication (follows with [facility] vascular). He has a noncardiac history including DM- type II, ED, depression, former smoker.     Patient notes fatigue x 6 months and sleeping more. He denies chest pain, exertional chest pain, dyspnea at rest or with conversation. He has had dyspnea on exertion which has been progressively getting worse. He previously had to stop due to symptoms of bilateral LE claudication versus dyspnea; however, now he is becoming dyspneic before the claudication starts. Denies any LE edema, abdominal bloating. He has felt near-syncopal a couple times. He has not had a syncopal episode.     PAST MEDICAL HISTORY:     Past Medical History:   Diagnosis Date     AAA (abdominal aortic aneurysm) 09/20/2017    5.3 cm      Mohan esophagus      Closed rib fracture      DNS (deviated nasal septum)         PAST SURGICAL HISTORY:     Past Surgical History:   Procedure Laterality Date     AAA REPAIR  06/17/2020    [facility]/ intravascular repair     APPENDECTOMY       COLONOSCOPY  2013     ESOPHAGOGASTRODUODENOSCOPY  2013        CURRENT MEDICATIONS:     Current Outpatient Medications   Medication Sig Dispense Refill      ARIPiprazole (ABILIFY) 5 MG tablet TAKE 1 TABLET(5 MG) BY MOUTH DAILY 90 tablet 3     aspirin (ASA) 81 MG EC tablet Take 81 mg by mouth daily       atorvastatin (LIPITOR) 80 MG tablet TAKE 1 TABLET(80 MG) BY MOUTH DAILY 90 tablet 3     buPROPion (WELLBUTRIN XL) 300 MG 24 hr tablet Take 1 tablet (300 mg) by mouth every morning 90 tablet 3     diphenhydrAMINE HCl (BENADRYL ALLERGY PO)        lisinopril (ZESTRIL) 10 MG tablet Take 1 tablet (10 mg) by mouth daily 90 tablet 0     loratadine (CLARITIN) 10 MG tablet Take 10 mg by mouth daily       metFORMIN (GLUCOPHAGE XR) 500 MG 24 hr tablet Take 2 tablets (1,000 mg) by mouth 2 times daily (with meals) for 90 days 360 tablet 0     metoprolol succinate ER (TOPROL-XL) 25 MG 24 hr tablet TAKE 1 TABLET(25 MG) BY MOUTH DAILY 90 tablet 2     Multiple Vitamins-Minerals (MULTIVITAMIN ADULT PO)        omeprazole (PRILOSEC) 20 MG DR capsule TAKE 1 CAPSULE(20 MG) BY MOUTH EVERY MORNING 90 capsule 3     vitamin C (ASCORBIC ACID) 1000 MG TABS Take 1,000 mg by mouth daily       zolpidem (AMBIEN) 10 MG tablet TAKE 1 TABLET(10 MG) BY MOUTH EVERY NIGHT AS NEEDED FOR SLEEP 30 tablet 0     lisinopril-hydrochlorothiazide (ZESTORETIC) 20-12.5 MG tablet Take 1 tablet by mouth daily (Patient not taking: Reported on 11/17/2022) 90 tablet 3     metFORMIN (GLUCOPHAGE XR) 500 MG 24 hr tablet Take 2 tablets (1,000 mg) by mouth daily (with dinner) (Patient not taking: Reported on 11/17/2022) 180 tablet 2        ALLERGIES:   No Known Allergies     FAMILY HISTORY:     Family History   Problem Relation Age of Onset     C.A.D. Father         CABG in late 40's     C.A.D. Brother         MI in 50's        SOCIAL HISTORY:     Social History     Socioeconomic History     Marital status:    Tobacco Use     Smoking status: Former     Packs/day: 1.00     Years: 25.00     Pack years: 25.00     Types: Cigarettes     Smokeless tobacco: Never     Tobacco comments:     Feb. 2014   Vaping Use     Vaping Use:  Never used   Substance and Sexual Activity     Alcohol use: Yes     Comment: occasional     Drug use: Yes     Types: Marijuana     Sexual activity: Yes     Partners: Female   Other Topics Concern     Parent/sibling w/ CABG, MI or angioplasty before 65F 55M? Yes     Comment: dad had heart attack before 55. brother also had heart attack before 55.   Social History Narrative    .         REVIEW OF SYSTEMS:     Constitutional: No fevers or chills  Skin: No new rash or itching  Eyes: No acute change in vision  Ears/Nose/Throat: No purulent rhinorrhea, new hearing loss, or new vertigo  Respiratory: No cough or hemoptysis  Cardiovascular: See HPI  Gastrointestinal: No change in appetite, vomiting, hematemesis or diarrhea  Genitourinary: No dysuria or hematuria  Musculoskeletal: No new back pain, neck pain or muscle pain  Neurologic: No new headaches, focal weakness or behavior changes  Psychiatric: No hallucinations, excessive alcohol consumption or illegal drug usage  Hematologic/Lymphatic/Immunologic: No bleeding, chills, fever, night sweats or weight loss  Endocrine: No new cold intolerance, heat intolerance, polyphagia, polydipsia or polyuria      PHYSICAL EXAMINATION:     There were no vitals taken for this visit. due to video visit.      GENERAL: No acute distress.  HEENT: EOMI. Sclerae white, not injected. Nares clear. Pharynx without erythema or exudate.   Neck:  No jugular venous distension.   Lungs: non labored breathing ent.  Extremities: No clubbing, cyanosis, or edema.   Neurologic: Alert and oriented to person/place/time, normal speech and affect. No focal deficits.  Skin: No petechiae, purpura or rash.     LABORATORY DATA:     LIPID RESULTS:  Lab Results   Component Value Date    CHOL 153 08/29/2022    CHOL 161 08/21/2020    HDL 57 08/29/2022    HDL 54 08/21/2020    LDL 56 08/29/2022    LDL 68 08/21/2020    TRIG 198 (H) 08/29/2022    TRIG 194 (H) 08/21/2020       LIVER ENZYME RESULTS:  Lab Results    Component Value Date    AST 22 08/29/2022    AST 22 08/21/2020    ALT 31 08/29/2022    ALT 31 08/21/2020       CBC RESULTS:  Lab Results   Component Value Date    WBC 12.2 (H) 08/29/2022    WBC 8.4 08/21/2020    RBC 4.86 08/29/2022    RBC 4.46 08/21/2020    HGB 14.2 08/29/2022    HGB 13.5 08/21/2020    HCT 44.3 08/29/2022    HCT 40.5 08/21/2020    MCV 91 08/29/2022    MCV 91 08/21/2020    MCH 29.2 08/29/2022    MCH 30.3 08/21/2020    MCHC 32.1 08/29/2022    MCHC 33.3 08/21/2020    RDW 13.3 08/29/2022    RDW 13.3 08/21/2020     08/29/2022     08/21/2020       BMP RESULTS:  Lab Results   Component Value Date     08/29/2022     02/15/2021    POTASSIUM 4.1 08/29/2022    POTASSIUM 4.2 02/15/2021    CHLORIDE 102 08/29/2022    CHLORIDE 102 02/15/2021    CO2 29 08/29/2022    CO2 31 02/15/2021    ANIONGAP 5 08/29/2022    ANIONGAP 2 (L) 02/15/2021     (H) 08/29/2022     (H) 02/15/2021    BUN 24 08/29/2022    BUN 23 02/15/2021    CR 0.92 08/29/2022    CR 1.08 02/15/2021    GFRESTIMATED 90 08/29/2022    GFRESTIMATED 70 02/15/2021    GFRESTBLACK 81 02/15/2021    SARI 10.0 08/29/2022    SARI 9.4 02/15/2021        A1C RESULTS:  Lab Results   Component Value Date    A1C 7.5 (H) 08/29/2022    A1C 6.5 (H) 02/15/2021       INR RESULTS:  Lab Results   Component Value Date    INR 0.91 03/18/2014          PROCEDURES & FURTHER ASSESSMENTS:     ECG: 10/18/2022- sinus mariah with PVCs    TRENT:  11/11/22  Interpretation Summary  Severe eccentric anteriorly-directed MR due to P2 flail, EROA 0.57cm RVol 78  mL. Percutaneous transcatheter [id number] mitral valve repair appears  feasible. Leaflet lengths are adequate (anterior 2.3 cm, posterior 2.2 cm.)  There is no mitral stenosis (mean gradient 2 mmHg at 52 bpm, MVA>8 cm2 by 3D  planimetry.)     This study was compared with the study from 9/29/22 (TTE): The mitral valve is  better imaged and MR is better characterized. Flail and severe MR were  likely  also present on the prior study but are better demonstrated on today's TRENT.      Coronary Angiogram and Right Heart Catheterization:    STS RISK SCORE: replacement 1.7%, repair 0.6%  FRAILTY SCORE: Pending  NYHA CLASS: III     CLINICAL IMPRESSION:     Gilberto Camilo is a 67 year old male with symptomatic severe mitral regurgitation who is a good candidate for either surgical mitral valve repair/replacment or MitraClip based on age, frailty, co-morbidities and overall surgical mortality risk estimation of 0.6% based on the STS score.      The pre-procedural MitraClip/MVR evaluation currently requires coronary angiogram and RHC prior to presentation to the Heart team for discussion during our multi-disciplinary conference for consensus decision and procedural planning.    Plan Summary:  1) Severe Mitral Regurgitation  - coronary angiogram and RHC  - patient and heart care team would like to pursue mitral valve repair given low STS risk    Patient was evaluated in clinic with Dr. Thompson of interventional cardiology and Dr. Kim of cardiothoracic surgery.    Darcy Baca MD  Interventional Cardiology Fellow    CC  Patient Care Team:  Rock Basilio MD as PCP - General (Family Medicine)  ISIS Wall MD as MD (Family Medicine)  Rock Basilio MD as Assigned PCP  Vargas Murphy MD as Assigned Sleep Provider      Patient seen and examined with Cardiovascular fellow and agree with the assessment and plan described above.     Won Thompson M.D.  Interventional Cardiology  [facility]

## 2022-11-17 NOTE — PATIENT INSTRUCTIONS
You were seen today in the Cardiovascular Clinic at the [facility].      Cardiology Providers you saw during your visit:  Dr. Thompson    Recommendations:  Have an Angiogram and Right heart cath.    You will/have be scheduled for a coronary angiogram on at the [facility].     Address:   [address] [place] [id number]     Instructions:   1. Please make arrangements to have a  as you will not be allowed to drive following the procedure.  is available at no additional cost in front of the building.   2. 8 hours prior to arrival stop all food, drink, milk and chewing tobacco.   3. Take a 325 mg aspirin the day before and the day of your procedure.  4. Make arrangements to have someone stay with you the night after your procedure.   5. Hold your Metformin the morning of your angiogram.  6. You will need to take a home COVID test the day prior to your angiogram. Please take a picture of the results to bring with to show the nursing staff.     Please arrive at the Gold Waiting Room at.     You will be escorted back to the pre procedure area called 2A. Here they will insert an IV, draw labs, and obtain a short medical history. Please bring an updated list of your current medications.     A physician will come and talk with you about the procedure and obtain consent.     A nurse from the Cardiac Catheterization Lab will then escort you to the procedure area. You will be receiving sedation during the procedure so you will need someone to drive you to and from the hospital.     After the procedure you will recover for a short period (2 - 6 hours). You will be discharged with instructions for post procedure care.     However, depending on what the angiogram shows you may have to have stents placed. Most patient are able to go home the same day but sometimes it might require an overnight stay. We ask that you bring a small bag of necessities for your comfort if you  would need to stay overnight. DO NOT BRING ANY VALUABLES!        Thank you for your visit today!     Demi Pimentel, RN  Lead [facility] Specialists  TAVR, MitraClip and Watchman Programs  [facility]    Nani Cifuentes, Lead [facility] Office: [phone]

## 2022-11-17 NOTE — LETTER
11/17/2022      RE: Gilberto Camilo  [address] [facility] [address]       Dear Colleague,    Thank you for the opportunity to participate in the care of your patient, Gilberto Camilo, at the [facility] HEART CLINIC [place] [facility]. Please see a copy of my visit note below.    CV Surgery      Linda Kim MD    Service Date: 11/17/2022    VIDEO VISIT    REFERRING CARDIOLOGIST:  Won Thompson MD    REASON FOR CONSULTATION:  Evaluation for minimally invasive mitral valve repair.    HISTORY OF PRESENT ILLNESS:  Mr. Camilo is a 67-year-old otherwise healthy gentleman who was recently found to have severe degenerative mitral valve regurgitation from a flail P2 segment.  He was referred to the Valve Clinic for evaluation for possible transcatheter [id number] repair and I was asked to also provide a surgical opinion and to consider possible surgical repair as well.    The patient notes that he has been more fatigued over the last 6 months, but denies any chest pain, but he also has been experiencing dyspnea with mild to moderate exertion as well.    PAST SURGICAL HISTORY:  Includes 5.3 cm AAA that [facility] [place] [facility], Mohan esophagus, closed rib fracture, appendectomy.    ALLERGIES:  No known drug allergies.    CURRENT OUTPATIENT MEDICATIONS:  Reviewed in Epic chart.    FAMILY HISTORY:  Noncontributory.    SOCIAL HISTORY:  He is .  Former pack a day smoker for 25 years.  Drinks alcohol occasionally.    REVIEW OF SYSTEMS:  As per HPI.  All other 10-point review of systems are completed and were otherwise negative unless stated above.    PHYSICAL EXAMINATION:  Deferred due to the virtual nature of this visit.    PERTINENT LABORATORY STUDIES:  Transesophageal echo performed on 11/11/2022 demonstrates normal LV systolic size and function with severe mitral regurgitation with a very eccentric anteriorly  directed jet from the P2 flail.  Normal trileaflet aortic valve with mild insufficiency, trace TR.  Moderate left atrial enlargement, no PFO identified.    IMPRESSION AND PLAN:  Mr. Camilo is a 67-year-old gentleman with a previous endo AAA repair in , now has severe symptomatic primary mitral regurgitation from a flail P2 segment.  His LV function is preserved.  Given his young age and low STS score of under 1%, I think he is most appropriate for a surgical mitral valve repair.  This could likely be done via a minimally invasive approach via a right mini thoracotomy.  I explained to him the diagnosis in detail and reason for recommending minimally invasive mitral valve repair.  I went over the risks and benefits of the operation and the potential complications associated with the surgery.  These complications include but are not strictly limited to infection, bleeding, stroke, postop MI, postop arrhythmias, pulmonary or renal complications.  I think overall he is an excellent surgical candidate.  We will await for the left and right heart cath, and if that is negative, we will get a MICS protocol CT scan, particularly because he has had an endo AAA repair in the past to make sure that his aortic and iliofemoral anatomy is conducive for a right minithoracotomy approach with peripheral cannulation and peripheral cardiopulmonary bypass.  We will wait for the studies and hope to schedule him for a right minithoracotomy and mitral valve repair in the near future.  I did quote him a greater than 95% repair rate, and in the very unlikely event that the repair is not feasible, we will replace the mitral valve with a bioprosthetic valve.  Thank you very much for this referral.      Linda Kim MD        D: 2022   T: 2022   MT: kay    Name:     ANABELFELICIANO  MRN:      [id number]        Account:      [id number]   :      1955           Service Date: 2022       Document:  [id number]

## 2022-11-17 NOTE — Clinical Note
11/17/2022      RE: Gilberto Camilo  [address] [facility] [address]       Dear Colleague,    Thank you for the opportunity to participate in the care of your patient, Gilberto Camilo, at the [facility] HEART CLINIC [facility]. Please see a copy of my visit note below.    No notes on file    Please do not hesitate to contact me if you have any questions/concerns.     Sincerely,     Won Thompson MD

## 2022-11-21 ENCOUNTER — PREP FOR PROCEDURE (OUTPATIENT)
Dept: CARDIOLOGY | Facility: CLINIC | Age: 67
End: 2022-11-21

## 2022-11-21 NOTE — PROGRESS NOTES
Service Date: 11/17/2022    VIDEO VISIT    REFERRING CARDIOLOGIST:  Won Thompson MD    REASON FOR CONSULTATION:  Evaluation for minimally invasive mitral valve repair.    HISTORY OF PRESENT ILLNESS:  Mr. Camilo is a 67-year-old otherwise healthy gentleman who was recently found to have severe degenerative mitral valve regurgitation from a flail P2 segment.  He was referred to the Valve Clinic for evaluation for possible transcatheter [id number] repair and I was asked to also provide a surgical opinion and to consider possible surgical repair as well.    The patient notes that he has been more fatigued over the last 6 months, but denies any chest pain, but he also has been experiencing dyspnea with mild to moderate exertion as well.    PAST SURGICAL HISTORY:  Includes 5.3 cm AAA that was repaired in [facility] at [facility] [place] endovascular[facility], Mohan esophagus, closed rib fracture, appendectomy.    ALLERGIES:  No known drug allergies.    CURRENT OUTPATIENT MEDICATIONS:  Reviewed in Epic chart.    FAMILY HISTORY:  Noncontributory.    SOCIAL HISTORY:  He is .  Former pack a day smoker for 25 years.  Drinks alcohol occasionally.    REVIEW OF SYSTEMS:  As per HPI.  All other 10-point review of systems are completed and were otherwise negative unless stated above.    PHYSICAL EXAMINATION:  Deferred due to the virtual nature of this visit.    PERTINENT LABORATORY STUDIES:  Transesophageal echo performed on 11/11/2022 demonstrates normal LV systolic size and function with severe mitral regurgitation with a very eccentric anteriorly directed jet from the P2 flail.  Normal trileaflet aortic valve with mild insufficiency, trace TR.  Moderate left atrial enlargement, no PFO identified.    IMPRESSION AND PLAN:  Mr. Camilo is a 67-year-old gentleman with a previous endo AAA repair in 2020, now has severe symptomatic primary mitral regurgitation from a flail P2 segment.  His LV function is preserved.   Given his young age and low STS score of under 1%, I think he is most appropriate for a surgical mitral valve repair.  This could likely be done via a minimally invasive approach via a right mini thoracotomy.  I explained to him the diagnosis in detail and reason for recommending minimally invasive mitral valve repair.  I went over the risks and benefits of the operation and the potential complications associated with the surgery.  These complications include but are not strictly limited to infection, bleeding, stroke, postop MI, postop arrhythmias, pulmonary or renal complications.  I think overall he is an excellent surgical candidate.  We will await for the left and right heart cath, and if that is negative, we will get a MICS protocol CT scan, particularly because he has had an endo AAA repair in the past to make sure that his aortic and iliofemoral anatomy is conducive for a right minithoracotomy approach with peripheral cannulation and peripheral cardiopulmonary bypass.  We will wait for the studies and hope to schedule him for a right minithoracotomy and mitral valve repair in the near future.  I did quote him a greater than 95% repair rate, and in the very unlikely event that the repair is not feasible, we will replace the mitral valve with a bioprosthetic valve.  Thank you very much for this referral.      Linda Kim MD        D: 2022   T: 2022   MT: kay    Name:     FELICIANO LITTLE  MRN:      [id number]        Account:      [id number]   :      1955           Service Date: 2022       Document: [id number]

## 2022-11-22 ENCOUNTER — TELEPHONE (OUTPATIENT)
Dept: CARDIOLOGY | Facility: CLINIC | Age: 67
End: 2022-11-22

## 2022-11-22 ENCOUNTER — PREP FOR PROCEDURE (OUTPATIENT)
Dept: CARDIOLOGY | Facility: CLINIC | Age: 67
End: 2022-11-22

## 2022-11-22 DIAGNOSIS — I34.0 MITRAL REGURGITATION: Primary | ICD-10-CM

## 2022-11-22 NOTE — TELEPHONE ENCOUNTER
Per task, pt needs to schedule surgery with . Talked to pt and scheduled him for 1/17.will call if anything changes

## 2022-11-23 ENCOUNTER — TELEPHONE (OUTPATIENT)
Dept: CARDIOLOGY | Facility: CLINIC | Age: 67
End: 2022-11-23

## 2022-11-23 DIAGNOSIS — I34.0 MITRAL VALVE INSUFFICIENCY, UNSPECIFIED ETIOLOGY: Primary | ICD-10-CM

## 2022-11-23 DIAGNOSIS — I51.89 OTHER ILL-DEFINED HEART DISEASES: ICD-10-CM

## 2022-11-23 RX ORDER — GABAPENTIN 100 MG/1
100 CAPSULE ORAL
Status: CANCELLED | OUTPATIENT
Start: 2022-11-23

## 2022-11-23 RX ORDER — LIDOCAINE 40 MG/G
CREAM TOPICAL
Status: CANCELLED | OUTPATIENT
Start: 2022-11-23

## 2022-11-23 RX ORDER — NOREPINEPHRINE BITARTRATE 0.06 MG/ML
.01-.1 INJECTION, SOLUTION INTRAVENOUS CONTINUOUS
Status: CANCELLED | OUTPATIENT
Start: 2023-01-17

## 2022-11-23 RX ORDER — DEXMEDETOMIDINE HYDROCHLORIDE 4 UG/ML
.2-1.2 INJECTION, SOLUTION INTRAVENOUS CONTINUOUS
Status: CANCELLED | OUTPATIENT
Start: 2023-01-17

## 2022-11-23 RX ORDER — ACETAMINOPHEN 325 MG/1
975 TABLET ORAL ONCE
Status: CANCELLED | OUTPATIENT
Start: 2022-11-23 | End: 2022-11-23

## 2022-11-23 RX ORDER — FAMOTIDINE 20 MG/1
20 TABLET, FILM COATED ORAL
Status: CANCELLED | OUTPATIENT
Start: 2022-11-23

## 2022-11-23 RX ORDER — CEFAZOLIN SODIUM 2 G/50ML
2 SOLUTION INTRAVENOUS
Status: CANCELLED | OUTPATIENT
Start: 2022-11-23

## 2022-11-23 RX ORDER — CEFAZOLIN SODIUM 2 G/50ML
2 SOLUTION INTRAVENOUS SEE ADMIN INSTRUCTIONS
Status: CANCELLED | OUTPATIENT
Start: 2022-11-23

## 2022-11-23 RX ORDER — PHENYLEPHRINE HCL IN 0.9% NACL 50MG/250ML
.1-6 PLASTIC BAG, INJECTION (ML) INTRAVENOUS CONTINUOUS
Status: CANCELLED | OUTPATIENT
Start: 2023-01-17

## 2022-11-23 RX ORDER — CHLORHEXIDINE GLUCONATE ORAL RINSE 1.2 MG/ML
10 SOLUTION DENTAL ONCE
Status: CANCELLED | OUTPATIENT
Start: 2022-11-23 | End: 2022-11-23

## 2022-11-23 NOTE — TELEPHONE ENCOUNTER
FUTURE VISIT INFORMATION      SURGERY INFORMATION:    Date: 23    Location: uu or    Surgeon:  Linda Kim MD    Anesthesia Type:  General    Procedure: MINIMALLY INVASIVE MITRAL VALVE REPAIR, transesophageal echocardiogram (TRENT) AND ANY ASSOCIATED PROCEDURES possible REPLACEMENT, MITRAL VALVE, MINIMALLY INVASIVE    Consult: virtual visit 22    RECORDS REQUESTED FROM:       Primary Care Provider: alexandre    Pertinent Medical History: hypertension, mitral valve prolapse     Most recent EKG+ Tracin22    Most recent ECHO: 22    Most recent Cardiac Stress Test: 3/20/14

## 2022-11-23 NOTE — TELEPHONE ENCOUNTER
Pt called today asking if 12/9 appointments culd be moved closer to his home. After speaking with Nurse Emilee it was explained his blood draw needs to be done at the location of surgery.   I offered to send pt hotel information and they agreed. I will mail it today.

## 2022-12-06 ENCOUNTER — OFFICE VISIT (OUTPATIENT)
Dept: PODIATRY | Facility: OTHER | Age: 67
End: 2022-12-06
Attending: PODIATRIST
Payer: COMMERCIAL

## 2022-12-06 VITALS
TEMPERATURE: 97.9 F | HEART RATE: 72 BPM | SYSTOLIC BLOOD PRESSURE: 97 MMHG | DIASTOLIC BLOOD PRESSURE: 66 MMHG | OXYGEN SATURATION: 94 %

## 2022-12-06 DIAGNOSIS — L60.0 INGROWN TOENAIL: ICD-10-CM

## 2022-12-06 DIAGNOSIS — I70.213 INTERMITTENT CLAUDICATION OF BOTH LOWER EXTREMITIES DUE TO ATHEROSCLEROSIS (H): ICD-10-CM

## 2022-12-06 DIAGNOSIS — E11.9 DIABETES MELLITUS TYPE 2, NONINSULIN DEPENDENT (H): ICD-10-CM

## 2022-12-06 DIAGNOSIS — L60.3 ONYCHODYSTROPHY: Primary | ICD-10-CM

## 2022-12-06 DIAGNOSIS — E11.42 DIABETIC POLYNEUROPATHY ASSOCIATED WITH TYPE 2 DIABETES MELLITUS (H): ICD-10-CM

## 2022-12-06 DIAGNOSIS — L85.3 XEROSIS OF SKIN: ICD-10-CM

## 2022-12-06 DIAGNOSIS — I73.9 PERIPHERAL ARTERIAL DISEASE (H): ICD-10-CM

## 2022-12-06 PROCEDURE — G0463 HOSPITAL OUTPT CLINIC VISIT: HCPCS | Mod: 25

## 2022-12-06 PROCEDURE — 11721 DEBRIDE NAIL 6 OR MORE: CPT | Performed by: PODIATRIST

## 2022-12-06 ASSESSMENT — PAIN SCALES - GENERAL: PAINLEVEL: NO PAIN (0)

## 2022-12-06 NOTE — PROGRESS NOTES
Chief complaint: Patient presents with:  Toenail: DFE      History of Present Illness: This 67 year old NIDDM II male is seen for follow-up management of elongated toenails. The toenails are difficult to reach and to trim because of the thickness of the toenails.    He has had consistent tingling in the toes.    He has a vascular follow-up with Dr. Mcgrath at [facility] on 09/15/2022. He is having open heart surgery on 01/17/2022. He has an exam this upcoming week to check the arteries around his heart.    He has not had a lot of pain from the bilateral hallux ingrown toenails. The nails hurt when a dog or goat step on his toes.    Patients says he has not been applying lotion to his feet.        Last HbA1C was 7.5% on 08/29/2022.    ---Previously 7.3% on 02/17/2022.    ---Previously 6.7% on 08/17/2021.      No further pedal complaints today.     Patient quit smoking around 2011.        BP 97/66 (BP Location: Left arm, Patient Position: Sitting, Cuff Size: Adult Regular)   Pulse 72   Temp 97.9  F (36.6  C) (Tympanic)   SpO2 94%     Patient Active Problem List   Diagnosis     Abdominal aortic aneurysm     Erectile dysfunction     ACP (advance care planning)     Routine general medical examination at a health care facility     Essential hypertension     Mixed hyperlipidemia     Skin lesion     Moderate major depression (H)     Morbid obesity (H)     Elevated prostate specific antigen (PSA)     Prediabetes     Diabetes mellitus, type 2 (H)     S/P AAA repair     Tobacco dependence in remission     Mitral valve prolapse       Past Surgical History:   Procedure Laterality Date     AAA REPAIR  06/17/2020    [facility] [place]/ intravascular repair     APPENDECTOMY       COLONOSCOPY  2013     ESOPHAGOGASTRODUODENOSCOPY  2013       Current Outpatient Medications   Medication     ARIPiprazole (ABILIFY) 5 MG tablet     aspirin (ASA) 81 MG EC tablet     atorvastatin (LIPITOR) 80 MG tablet     buPROPion (WELLBUTRIN XL) 300 MG 24  hr tablet     diphenhydrAMINE HCl (BENADRYL ALLERGY PO)     lisinopril (ZESTRIL) 10 MG tablet     lisinopril-hydrochlorothiazide (ZESTORETIC) 20-12.5 MG tablet     loratadine (CLARITIN) 10 MG tablet     metFORMIN (GLUCOPHAGE XR) 500 MG 24 hr tablet     metFORMIN (GLUCOPHAGE XR) 500 MG 24 hr tablet     metoprolol succinate ER (TOPROL-XL) 25 MG 24 hr tablet     Multiple Vitamins-Minerals (MULTIVITAMIN ADULT PO)     omeprazole (PRILOSEC) 20 MG DR capsule     vitamin C (ASCORBIC ACID) 1000 MG TABS     zolpidem (AMBIEN) 10 MG tablet     No current facility-administered medications for this visit.        No Known Allergies    Family History   Problem Relation Age of Onset     C.A.D. Father         CABG in late 40's     C.A.D. Brother         MI in 50's       Social History     Socioeconomic History     Marital status:      Spouse name: None     Number of children: None     Years of education: None     Highest education level: None   Occupational History     None   Tobacco Use     Smoking status: Former Smoker     Packs/day: 0.00     Types: Cigarettes     Smokeless tobacco: Never Used     Tobacco comment: Feb. 2014   Substance and Sexual Activity     Alcohol use: Yes     Comment: occasional     Drug use: Yes     Types: Marijuana     Sexual activity: Yes     Partners: Female   Other Topics Concern     Parent/sibling w/ CABG, MI or angioplasty before 65F 55M? Yes     Comment: dad had heart attack before 55. brother also had heart attack before 55.   Social History Narrative    .      Social Determinants of Health     Financial Resource Strain:      Difficulty of Paying Living Expenses:    Food Insecurity:      Worried About Running Out of Food in the Last Year:      Ran Out of Food in the Last Year:    Transportation Needs:      Lack of Transportation (Medical):      Lack of Transportation (Non-Medical):    Physical Activity:      Days of Exercise per Week:      Minutes of Exercise per Session:    Stress:       Feeling of Stress :    Social Connections:      Frequency of Communication with Friends and Family:      Frequency of Social Gatherings with Friends and Family:      Attends [employer] Services:      Active Member of Clubs or Organizations:      Attends Club or Organization Meetings:      Marital Status:    Intimate Partner Violence:      Fear of Current or Ex-Partner:      Emotionally Abused:      Physically Abused:      Sexually Abused:        ROS: 10 point ROS neg other than the symptoms noted above in the HPI.  EXAM  Constitutional: healthy, alert and no distress    Psychiatric: mentation appears normal and affect normal/bright    VASCULAR:  -Dorsalis pedis pulse +1/4 b/l  -Posterior tibial pulse +1/4 b/l  -Capillary refill time < 3 seconds to b/l hallux  -Hair growth Absent to b/l anterior legs and ankles  NEURO:  -Positive for paresthesias to bilateral foot  -Epicritic and protective sensation diminished to the bilateral foot  DERM:  -Skin temperature cool to bilateral foot  -Mild rubor with purple discoloration to bilateral foot  -Skin diffusely dry, cracking and flaking to plantar foot (most cracking on heels) without open wounds or drainage at this time  -Toenails elongated, thickened, dystrophic and discolored x 10  ---Bilateral hallux toenails significantly dystrophic  ---Mild-to-moderate incurvation of the lateral toenail border of the RIGHT hallux -- no open wounds, mild edema, no erythema, no drainage (No severe erythema, no ascending erythema, no calor, no purulence, no malodor, no other signs of infection).  MSK:  -Muscle strength of ankles +5/5 for dorsiflexion, plantarflexion, ABDUction and ADDuction b/l    ============================================================    ASSESSMENT:  (L60.3) Onychodystrophy  (primary encounter diagnosis)    (L60.0) Ingrown toenail    (L85.3) Xerosis of skin    (I70.213) Intermittent claudication of both lower extremities due to atherosclerosis (H)    (E11.9) Diabetes  mellitus type 2, noninsulin dependent (H)    (E11.42) Diabetic polyneuropathy associated with type 2 diabetes mellitus (H)    (I73.9) Peripheral arterial disease (H)      PLAN:  -Patient evaluated and examined. Treatment options discussed with no educational barriers noted.  -High risk toenail debridement x 10 toenails without incident  ---Moderate slant back to bilateral borders of bilateral hallux  ---No open wounds and no signs infection (No severe erythema, no ascending erythema, no calor, no purulence, no malodor, no other signs of infection).    -Diabetic Foot Education provided. This included checking the feet daily looking for new new blisters or wounds, wearing shoes at all times when walking including around the house, and avoiding lotion application between the toes. If there are any signs of infection, the patient should present to the ED as soon as possible. Infections of the foot can be life threatening or lead to amputations of the foot or leg.     -Vascular appointment -- coming up on 09/15/2021 with Dr. Mcgrath:    ---Dr. Mcgrath's note in July, 2021:   Reviewed his DAVID. The right was 0.79, the left 0.59. This corresponds to his complaint of his symptoms.    Moderate disease bilaterally, worse on the left. Discussed that he has fairly long segment disease on the left. I would not recommend intervention unless he truly has lifestyle limiting/disabling claudication. Discussed that any rest pain or tissue loss would lead to prompt evaluation for intervention. I discussed structured exercise and the significant benefit to be had from this in his pain free walking distance. However, given the distance that he has to walk, this may not be practical for him.   ---Patient says he follows up with Dr. Mcgrath about every six months. He is waiting for a call for his summer appointment in 2022.    -Will see if patient's more recent vascular updates are available to determine patient's ability to heal if the  ingrown toenails go through the skin of the toes. At this time, there are no open wounds on the toes.    -Patient in agreement with the above treatment plan and all of patient's questions were answered.      RTC 3 months for diabetic foot exam and high risk nail debridement         Sierra Velásquez DPM

## 2022-12-09 ENCOUNTER — APPOINTMENT (OUTPATIENT)
Dept: MEDSURG UNIT | Facility: CLINIC | Age: 67
End: 2022-12-09
Attending: INTERNAL MEDICINE
Payer: COMMERCIAL

## 2022-12-09 ENCOUNTER — APPOINTMENT (OUTPATIENT)
Dept: LAB | Facility: CLINIC | Age: 67
End: 2022-12-09
Attending: INTERNAL MEDICINE
Payer: COMMERCIAL

## 2022-12-09 ENCOUNTER — PREP FOR PROCEDURE (OUTPATIENT)
Dept: CARDIOLOGY | Facility: CLINIC | Age: 67
End: 2022-12-09

## 2022-12-09 ENCOUNTER — HOSPITAL ENCOUNTER (OUTPATIENT)
Facility: CLINIC | Age: 67
Discharge: HOME | End: 2022-12-09
Attending: INTERNAL MEDICINE | Admitting: INTERNAL MEDICINE
Payer: COMMERCIAL

## 2022-12-09 VITALS
HEART RATE: 61 BPM | DIASTOLIC BLOOD PRESSURE: 57 MMHG | RESPIRATION RATE: 11 BRPM | SYSTOLIC BLOOD PRESSURE: 107 MMHG | TEMPERATURE: 98.4 F | OXYGEN SATURATION: 95 %

## 2022-12-09 DIAGNOSIS — R79.1 ABNORMAL COAGULATION PROFILE: ICD-10-CM

## 2022-12-09 DIAGNOSIS — I34.0 MITRAL VALVE INSUFFICIENCY, UNSPECIFIED ETIOLOGY: ICD-10-CM

## 2022-12-09 DIAGNOSIS — Z01.810 PRE-OPERATIVE CARDIOVASCULAR EXAMINATION: ICD-10-CM

## 2022-12-09 DIAGNOSIS — I51.89 OTHER ILL-DEFINED HEART DISEASES: ICD-10-CM

## 2022-12-09 DIAGNOSIS — I34.1 MITRAL VALVE PROLAPSE: ICD-10-CM

## 2022-12-09 PROBLEM — Z98.890 STATUS POST CORONARY ANGIOGRAM: Status: ACTIVE | Noted: 2022-12-09

## 2022-12-09 LAB
ABO/RH(D): NORMAL
ALBUMIN SERPL BCG-MCNC: 4.2 G/DL (ref 3.5–5.2)
ALBUMIN UR-MCNC: 10 MG/DL
ALP SERPL-CCNC: 93 U/L (ref 40–129)
ALT SERPL W P-5'-P-CCNC: 18 U/L (ref 10–50)
ANION GAP SERPL CALCULATED.3IONS-SCNC: 15 MMOL/L (ref 7–15)
ANTIBODY SCREEN: NEGATIVE
APPEARANCE UR: CLEAR
APTT PPP: 27 SECONDS (ref 22–38)
AST SERPL W P-5'-P-CCNC: 20 U/L (ref 10–50)
BILIRUB SERPL-MCNC: 0.7 MG/DL
BILIRUB UR QL STRIP: NEGATIVE
BUN SERPL-MCNC: 19.4 MG/DL (ref 8–23)
CALCIUM SERPL-MCNC: 9.5 MG/DL (ref 8.8–10.2)
CHLORIDE SERPL-SCNC: 103 MMOL/L (ref 98–107)
COLOR UR AUTO: YELLOW
CREAT SERPL-MCNC: 0.97 MG/DL (ref 0.67–1.17)
DEPRECATED HCO3 PLAS-SCNC: 23 MMOL/L (ref 22–29)
ERYTHROCYTE [DISTWIDTH] IN BLOOD BY AUTOMATED COUNT: 13.1 % (ref 10–15)
GFR SERPL CREATININE-BSD FRML MDRD: 85 ML/MIN/1.73M2
GLUCOSE SERPL-MCNC: 133 MG/DL (ref 70–99)
GLUCOSE UR STRIP-MCNC: NEGATIVE MG/DL
HCT VFR BLD AUTO: 43.3 % (ref 40–53)
HGB BLD-MCNC: 12.8 G/DL (ref 13.3–17.7)
HGB BLD-MCNC: 13.6 G/DL (ref 13.3–17.7)
HGB UR QL STRIP: NEGATIVE
INR PPP: 0.93 (ref 0.85–1.15)
KETONES UR STRIP-MCNC: NEGATIVE MG/DL
LEUKOCYTE ESTERASE UR QL STRIP: NEGATIVE
MAGNESIUM SERPL-MCNC: 2.1 MG/DL (ref 1.7–2.3)
MCH RBC QN AUTO: 29.6 PG (ref 26.5–33)
MCHC RBC AUTO-ENTMCNC: 31.4 G/DL (ref 31.5–36.5)
MCV RBC AUTO: 94 FL (ref 78–100)
MUCOUS THREADS #/AREA URNS LPF: PRESENT /LPF
NITRATE UR QL: NEGATIVE
OXYHGB MFR BLDV: 59 % (ref 92–100)
PH UR STRIP: 6.5 [PH] (ref 5–7)
PLATELET # BLD AUTO: 234 10E3/UL (ref 150–450)
POTASSIUM SERPL-SCNC: 4.6 MMOL/L (ref 3.4–5.3)
PREALB SERPL IA-MCNC: 30 MG/DL (ref 15–45)
PROT SERPL-MCNC: 6.7 G/DL (ref 6.4–8.3)
RBC # BLD AUTO: 4.6 10E6/UL (ref 4.4–5.9)
RBC URINE: 3 /HPF
SODIUM SERPL-SCNC: 141 MMOL/L (ref 136–145)
SP GR UR STRIP: 1.02 (ref 1–1.03)
SPECIMEN EXPIRATION DATE: NORMAL
UROBILINOGEN UR STRIP-MCNC: NORMAL MG/DL
WBC # BLD AUTO: 11.1 10E3/UL (ref 4–11)
WBC URINE: <1 /HPF

## 2022-12-09 PROCEDURE — 93456 R HRT CORONARY ARTERY ANGIO: CPT | Performed by: INTERNAL MEDICINE

## 2022-12-09 PROCEDURE — 99152 MOD SED SAME PHYS/QHP 5/>YRS: CPT | Performed by: INTERNAL MEDICINE

## 2022-12-09 PROCEDURE — C1887 CATHETER, GUIDING: HCPCS | Performed by: INTERNAL MEDICINE

## 2022-12-09 PROCEDURE — 86850 RBC ANTIBODY SCREEN: CPT | Performed by: SURGERY

## 2022-12-09 PROCEDURE — 999N000134 HC STATISTIC PP CARE STAGE 3

## 2022-12-09 PROCEDURE — C1894 INTRO/SHEATH, NON-LASER: HCPCS | Performed by: INTERNAL MEDICINE

## 2022-12-09 PROCEDURE — 93010 ELECTROCARDIOGRAM REPORT: CPT | Performed by: INTERNAL MEDICINE

## 2022-12-09 PROCEDURE — 36415 COLL VENOUS BLD VENIPUNCTURE: CPT | Performed by: SURGERY

## 2022-12-09 PROCEDURE — 85730 THROMBOPLASTIN TIME PARTIAL: CPT | Performed by: INTERNAL MEDICINE

## 2022-12-09 PROCEDURE — 250N000011 HC RX IP 250 OP 636: Performed by: INTERNAL MEDICINE

## 2022-12-09 PROCEDURE — 99153 MOD SED SAME PHYS/QHP EA: CPT | Performed by: INTERNAL MEDICINE

## 2022-12-09 PROCEDURE — 99152 MOD SED SAME PHYS/QHP 5/>YRS: CPT | Mod: GC | Performed by: INTERNAL MEDICINE

## 2022-12-09 PROCEDURE — 93005 ELECTROCARDIOGRAM TRACING: CPT

## 2022-12-09 PROCEDURE — 84134 ASSAY OF PREALBUMIN: CPT | Performed by: SURGERY

## 2022-12-09 PROCEDURE — 83735 ASSAY OF MAGNESIUM: CPT | Performed by: SURGERY

## 2022-12-09 PROCEDURE — 250N000013 HC RX MED GY IP 250 OP 250 PS 637: Performed by: INTERNAL MEDICINE

## 2022-12-09 PROCEDURE — 999N000054 HC STATISTIC EKG NON-CHARGEABLE

## 2022-12-09 PROCEDURE — 86901 BLOOD TYPING SEROLOGIC RH(D): CPT | Performed by: SURGERY

## 2022-12-09 PROCEDURE — 250N000009 HC RX 250: Performed by: INTERNAL MEDICINE

## 2022-12-09 PROCEDURE — 85610 PROTHROMBIN TIME: CPT | Performed by: INTERNAL MEDICINE

## 2022-12-09 PROCEDURE — 80053 COMPREHEN METABOLIC PANEL: CPT | Performed by: SURGERY

## 2022-12-09 PROCEDURE — 85014 HEMATOCRIT: CPT | Performed by: INTERNAL MEDICINE

## 2022-12-09 PROCEDURE — 272N000001 HC OR GENERAL SUPPLY STERILE: Performed by: INTERNAL MEDICINE

## 2022-12-09 PROCEDURE — 82810 BLOOD GASES O2 SAT ONLY: CPT

## 2022-12-09 PROCEDURE — 999N000142 HC STATISTIC PROCEDURE PREP ONLY

## 2022-12-09 PROCEDURE — 85018 HEMOGLOBIN: CPT

## 2022-12-09 PROCEDURE — 93456 R HRT CORONARY ARTERY ANGIO: CPT | Mod: 26 | Performed by: INTERNAL MEDICINE

## 2022-12-09 PROCEDURE — 81001 URINALYSIS AUTO W/SCOPE: CPT | Performed by: SURGERY

## 2022-12-09 RX ORDER — SODIUM CHLORIDE 9 MG/ML
INJECTION, SOLUTION INTRAVENOUS CONTINUOUS
Status: DISCONTINUED | OUTPATIENT
Start: 2022-12-09 | End: 2022-12-09 | Stop reason: HOSPADM

## 2022-12-09 RX ORDER — LIDOCAINE 40 MG/G
CREAM TOPICAL
Status: DISCONTINUED | OUTPATIENT
Start: 2022-12-09 | End: 2022-12-09 | Stop reason: HOSPADM

## 2022-12-09 RX ORDER — FLUMAZENIL 0.1 MG/ML
0.2 INJECTION, SOLUTION INTRAVENOUS
Status: DISCONTINUED | OUTPATIENT
Start: 2022-12-09 | End: 2022-12-09 | Stop reason: HOSPADM

## 2022-12-09 RX ORDER — ASPIRIN 325 MG
325 TABLET ORAL ONCE
Status: COMPLETED | OUTPATIENT
Start: 2022-12-09 | End: 2022-12-09

## 2022-12-09 RX ORDER — NALOXONE HYDROCHLORIDE 0.4 MG/ML
0.2 INJECTION, SOLUTION INTRAMUSCULAR; INTRAVENOUS; SUBCUTANEOUS
Status: DISCONTINUED | OUTPATIENT
Start: 2022-12-09 | End: 2022-12-09 | Stop reason: HOSPADM

## 2022-12-09 RX ORDER — ASPIRIN 81 MG/1
243 TABLET, CHEWABLE ORAL ONCE
Status: COMPLETED | OUTPATIENT
Start: 2022-12-09 | End: 2022-12-09

## 2022-12-09 RX ORDER — HEPARIN SODIUM 1000 [USP'U]/ML
INJECTION, SOLUTION INTRAVENOUS; SUBCUTANEOUS
Status: DISCONTINUED | OUTPATIENT
Start: 2022-12-09 | End: 2022-12-09 | Stop reason: HOSPADM

## 2022-12-09 RX ORDER — NITROGLYCERIN 5 MG/ML
VIAL (ML) INTRAVENOUS
Status: DISCONTINUED | OUTPATIENT
Start: 2022-12-09 | End: 2022-12-09 | Stop reason: HOSPADM

## 2022-12-09 RX ORDER — FENTANYL CITRATE 50 UG/ML
25 INJECTION, SOLUTION INTRAMUSCULAR; INTRAVENOUS
Status: DISCONTINUED | OUTPATIENT
Start: 2022-12-09 | End: 2022-12-09 | Stop reason: HOSPADM

## 2022-12-09 RX ORDER — OXYCODONE HYDROCHLORIDE 5 MG/1
5 TABLET ORAL EVERY 4 HOURS PRN
Status: DISCONTINUED | OUTPATIENT
Start: 2022-12-09 | End: 2022-12-09 | Stop reason: HOSPADM

## 2022-12-09 RX ORDER — NALOXONE HYDROCHLORIDE 0.4 MG/ML
0.4 INJECTION, SOLUTION INTRAMUSCULAR; INTRAVENOUS; SUBCUTANEOUS
Status: DISCONTINUED | OUTPATIENT
Start: 2022-12-09 | End: 2022-12-09 | Stop reason: HOSPADM

## 2022-12-09 RX ORDER — NICARDIPINE HYDROCHLORIDE 2.5 MG/ML
INJECTION INTRAVENOUS
Status: DISCONTINUED | OUTPATIENT
Start: 2022-12-09 | End: 2022-12-09 | Stop reason: HOSPADM

## 2022-12-09 RX ORDER — OXYCODONE HYDROCHLORIDE 10 MG/1
10 TABLET ORAL EVERY 4 HOURS PRN
Status: DISCONTINUED | OUTPATIENT
Start: 2022-12-09 | End: 2022-12-09 | Stop reason: HOSPADM

## 2022-12-09 RX ORDER — ATROPINE SULFATE 0.1 MG/ML
0.5 INJECTION INTRAVENOUS
Status: DISCONTINUED | OUTPATIENT
Start: 2022-12-09 | End: 2022-12-09 | Stop reason: HOSPADM

## 2022-12-09 RX ORDER — IOPAMIDOL 755 MG/ML
INJECTION, SOLUTION INTRAVASCULAR
Status: DISCONTINUED | OUTPATIENT
Start: 2022-12-09 | End: 2022-12-09 | Stop reason: HOSPADM

## 2022-12-09 RX ORDER — FENTANYL CITRATE 50 UG/ML
INJECTION, SOLUTION INTRAMUSCULAR; INTRAVENOUS
Status: DISCONTINUED | OUTPATIENT
Start: 2022-12-09 | End: 2022-12-09 | Stop reason: HOSPADM

## 2022-12-09 RX ADMIN — ASPIRIN 325 MG ORAL TABLET 325 MG: 325 PILL ORAL at 10:19

## 2022-12-09 ASSESSMENT — ACTIVITIES OF DAILY LIVING (ADL)
ADLS_ACUITY_SCORE: 35

## 2022-12-09 NOTE — PROGRESS NOTES
D/I/A: Pt roomed on 3C in [address].  Arrived via litter and accompanied by CCL Rn and Wife Mracella On/Off: Off monitor.  VSSA.  Rhythm upon arrival Sinus mariah on monitor.  Denies pain or sob.  Reviewed activity restrictions and when to notify RN, ie-changes to breathing or increased chest pressure or chest pain.  CCL access:  Right radial-TR band in place with 10 ml of air, start deflation at 1230 and RIJ-CDI.  P: Continue to monitor status.  Discharge to home once meeting criteria.

## 2022-12-09 NOTE — DISCHARGE INSTRUCTIONS
Going Home after a Coronary Angiogram and Right Heart Cath      After you go home:  Have an adult stay with you for 24 hours.  Drink plenty of fluids.  You may eat your normal diet, unless your doctor tells you otherwise.  For 24 hours:  - Relax and take it easy.  - Do NOT smoke.  - Do NOT make any important or legal decisions.  - Do NOT drive or operate machines at home or at work.  - Do NOT drink alcohol.    Remove the Band-Aid after 24 hours. If there is minor oozing, apply another Band-aid and remove it after 12 hours.  For 2 days, do NOT have sex or do any heavy exercise.  Do NOT take a bath, or use a hot tub or pool for at least 3 days. You may shower.    Care of wrist or arm site  It is normal to have soreness at the puncture site and mild tingling in your hand for up to 3 days.  For 2 days, do not use your hand or arm to support your weight (such as rising from a chair) or bend your wrist (such as lifting a garage door).  For 2 days, do not lift more than 5 pounds or exercise your arm (tennis, golf or bowling).      If you start bleeding from the site in your arm:  Sit down and press firmly on the site with your fingers for 10 minutes. Call your doctor as soon as you can.  If the bleeding stops, sit still and keep your wrist straight for 2 hours.    Right Heart Cath/Neck site: Monitor neck site for bleeding, swelling, or voice changes. If you notice bleeding or swelling immediately apply pressure to the site and call number below to speak with Cardiology Fellow.  If you experience any changes in your breathing you should call your doctor immediately or come to the closest Emergency Department.  Do not drive yourself.    Medicines  If you are on metformin (Glucophage), do not restart it until you have blood tests (within 2 to 3 days after discharge). When your doctor tells you it is safe, you may restart the metformin.  Call your doctor if:  You have a large or growing hard lump around the site.    The site  is red, swollen, hot or tender.  Blood or fluid is draining from the site.  You have chills or a fever greater than 101 F (38 C).  Your leg or arm turns bluish, feels numb or cool.  You have hives, a rash or unusual itching.  Call 911 right away if you have:   Bleeding that does not stop.   Heavy bleeding.    [facility] [place]:  [phone] (7 days a week)

## 2022-12-09 NOTE — PROGRESS NOTES
D/I/A:  Patient is tolerating liquids and foods, ambulating, urinating, puncture sites are stable (no bleeding and no hematoma) and patient has a .  A+O x4 and making needs known.  CCL access sites C/D/I; no bleeding or hematoma; CMS intact.  VSSA.  SB/SR on monitor.  IV access removed.  Education completed and outlined in AVS or handout: medications reviewed with patient.  Questions answered prior to discharge.  Belongings returned to patient at discharge.    P: Discharged to self care.  Patient to follow up with appts as per discharge instruction.

## 2022-12-12 LAB
ATRIAL RATE - MUSE: 55 BPM
DIASTOLIC BLOOD PRESSURE - MUSE: NORMAL MMHG
INTERPRETATION ECG - MUSE: NORMAL
P AXIS - MUSE: 59 DEGREES
PR INTERVAL - MUSE: 198 MS
QRS DURATION - MUSE: 110 MS
QT - MUSE: 460 MS
QTC - MUSE: 440 MS
R AXIS - MUSE: 32 DEGREES
SYSTOLIC BLOOD PRESSURE - MUSE: NORMAL MMHG
T AXIS - MUSE: 48 DEGREES
VENTRICULAR RATE- MUSE: 55 BPM

## 2023-01-05 ENCOUNTER — TELEPHONE (OUTPATIENT)
Dept: CARDIOLOGY | Facility: CLINIC | Age: 68
End: 2023-01-05

## 2023-01-05 NOTE — TELEPHONE ENCOUNTER
Called patient back and addressed questions regarding lift chair, and pre-op COVID testing. All questions/concerns addressed.    ----- Message from Krys Morales sent at 1/5/2023  9:57 AM CST -----    This pt just called asking me if he needs a covid test for his CT on the 9th? I didn't think so but thought I should ask first.  I also told him he needs to have a test before his surgery and he said he would call his clinic up there to do it on the 13th.  He also had questions about a lift chair being prescribed after his surgery? Do they do that?  I can call him back with answers unless you want to talk to him

## 2023-01-06 ENCOUNTER — OFFICE VISIT (OUTPATIENT)
Dept: FAMILY MEDICINE | Facility: OTHER | Age: 68
End: 2023-01-06
Attending: STUDENT IN AN ORGANIZED HEALTH CARE EDUCATION/TRAINING PROGRAM
Payer: COMMERCIAL

## 2023-01-06 ENCOUNTER — TELEPHONE (OUTPATIENT)
Dept: FAMILY MEDICINE | Facility: OTHER | Age: 68
End: 2023-01-06

## 2023-01-06 VITALS
DIASTOLIC BLOOD PRESSURE: 70 MMHG | SYSTOLIC BLOOD PRESSURE: 116 MMHG | HEART RATE: 66 BPM | OXYGEN SATURATION: 96 % | BODY MASS INDEX: 31.87 KG/M2 | TEMPERATURE: 97.2 F | WEIGHT: 235 LBS

## 2023-01-06 DIAGNOSIS — J06.9 VIRAL URI: Primary | ICD-10-CM

## 2023-01-06 LAB
FLUAV RNA SPEC QL NAA+PROBE: NEGATIVE
FLUBV RNA RESP QL NAA+PROBE: NEGATIVE
RSV RNA SPEC NAA+PROBE: NEGATIVE
SARS-COV-2 RNA RESP QL NAA+PROBE: NEGATIVE

## 2023-01-06 PROCEDURE — 99213 OFFICE O/P EST LOW 20 MIN: CPT | Performed by: STUDENT IN AN ORGANIZED HEALTH CARE EDUCATION/TRAINING PROGRAM

## 2023-01-06 PROCEDURE — G0463 HOSPITAL OUTPT CLINIC VISIT: HCPCS

## 2023-01-06 PROCEDURE — 87637 SARSCOV2&INF A&B&RSV AMP PRB: CPT | Mod: ZL | Performed by: STUDENT IN AN ORGANIZED HEALTH CARE EDUCATION/TRAINING PROGRAM

## 2023-01-06 ASSESSMENT — ANXIETY QUESTIONNAIRES
6. BECOMING EASILY ANNOYED OR IRRITABLE: NOT AT ALL
GAD7 TOTAL SCORE: 0
GAD7 TOTAL SCORE: 0
3. WORRYING TOO MUCH ABOUT DIFFERENT THINGS: NOT AT ALL
IF YOU CHECKED OFF ANY PROBLEMS ON THIS QUESTIONNAIRE, HOW DIFFICULT HAVE THESE PROBLEMS MADE IT FOR YOU TO DO YOUR WORK, TAKE CARE OF THINGS AT HOME, OR GET ALONG WITH OTHER PEOPLE: NOT DIFFICULT AT ALL
5. BEING SO RESTLESS THAT IT IS HARD TO SIT STILL: NOT AT ALL
4. TROUBLE RELAXING: NOT AT ALL
7. FEELING AFRAID AS IF SOMETHING AWFUL MIGHT HAPPEN: NOT AT ALL
1. FEELING NERVOUS, ANXIOUS, OR ON EDGE: NOT AT ALL
2. NOT BEING ABLE TO STOP OR CONTROL WORRYING: NOT AT ALL

## 2023-01-06 ASSESSMENT — PATIENT HEALTH QUESTIONNAIRE - PHQ9: SUM OF ALL RESPONSES TO PHQ QUESTIONS 1-9: 0

## 2023-01-06 ASSESSMENT — ENCOUNTER SYMPTOMS
COUGH: 0
DIZZINESS: 0
RHINORRHEA: 1
VOMITING: 0
FATIGUE: 0
CHILLS: 0
WHEEZING: 0
FEVER: 1
SINUS PRESSURE: 1
LIGHT-HEADEDNESS: 0
ABDOMINAL PAIN: 0
ARTHRALGIAS: 0
CHEST TIGHTNESS: 0
NAUSEA: 0
FACIAL SWELLING: 0
DIARRHEA: 0
DYSURIA: 0
SORE THROAT: 1
DIAPHORESIS: 0
EYE DISCHARGE: 0
HEADACHES: 0
SHORTNESS OF BREATH: 0

## 2023-01-06 ASSESSMENT — PAIN SCALES - GENERAL: PAINLEVEL: NO PAIN (0)

## 2023-01-06 NOTE — TELEPHONE ENCOUNTER
Ruddy Needs to be evaluated with a colleague here today and this may cause his surgery to be delayed or not.-- needs good evaluation .

## 2023-01-06 NOTE — TELEPHONE ENCOUNTER
Next 5 appointments (look out 90 days)      Jan 06, 2023  2:00 PM  (Arrive by 1:45 PM)  SHORT with Benedicto Castillo MD  [facility] ([facility] [place] ) [address]  [place] [address]  [phone]

## 2023-01-06 NOTE — Clinical Note
Nicho Marcano, this patient listed you as his primary contact for his cardiology team.  He was an acute care add-on today for viral sinusitis.  Fairly mild presentation at this point but understandably is quite concerned about impact on upcoming surgery.  It sounds like he will be doing his preop down there so I just wanted to loop the team in. Thanks, Benedicto Castillo

## 2023-01-06 NOTE — TELEPHONE ENCOUNTER
9:52 AM    Reason for Call: Phone Call    Description: Patient called in stating that he is having open heart surgery on 1-17-23 and currently has sinus congestion and sore throat. He would like Dr Basilio to call in a prescription for him. Patient was offered to have an appointment made for him with a different provider, but he declined. Please call patient back.    Was an appointment offered for this call? Yes  If yes : Appointment type - none made - declined              Date    Preferred method for responding to this message: Telephone Call  What is your phone number ? [phone]    If we cannot reach you directly, may we leave a detailed response at the number you provided? Yes    Can this message wait until your PCP/provider returns, if available today? Not applicable, provider in clinic today    Carolyn Hernández

## 2023-01-06 NOTE — PROGRESS NOTES
Assessment & Plan     Viral URI  67-year-old male with past medical history most notable for mitral valve prolapse with upcoming MVR 1/17/2023, s/p AAA repair, HTN, and DM2, who presents with 48 to 72-hour history of isolated viral sinusitis.  Vitals and exam reassuring.  Discussed ongoing supportive cares and monitoring for viral URI.  Does have chest CTA and preop visit at [facility] on 1/9/2023 and will forward this note on to his cardiology team as well.  - Symptomatic Influenza A/B & SARS-CoV2 (COVID-19) Virus PCR Multiplex Nose  - Reviewed S/S that warrant reevaluation    24 minutes spent on the date of the encounter doing chart review, history and exam, documentation and further activities per the note       BMI:   Estimated body mass index is 31.87 kg/m  as calculated from the following:    Height as of 10/18/22: 1.829 m (6').    Weight as of this encounter: 106.6 kg (235 lb).   Weight management plan: Discussed healthy diet and exercise guidelines    No follow-ups on file.    Benedicto Castillo MD  [facility]    Subjective   Gilberto is a 67 year old, presenting for the following health issues:  Sinus Problem (/)      HPI     Acute Illness  Acute illness concerns: sinus pressure, sore throat   Onset/Duration: 2-3 days   Symptoms:  Fever: No  Chills/Sweats: No  Headache (location?): No  Sinus Pressure: YES  Conjunctivitis:  No  Ear Pain: no  Rhinorrhea: YES  Congestion: YES  Sore Throat: YES  Cough: no  Wheeze: No  Decreased Appetite: No  Nausea: No  Vomiting: No  Diarrhea: YES  Dysuria/Freq.: No  Dysuria or Hematuria: No  Fatigue/Achiness: No  Sick/Strep Exposure: No  Therapies tried and outcome: None    67-year-old male with past medical history most remarkable for mitral valve prolapse with upcoming MVR, s/p AAA repair, HTN, and type 2 diabetes who presents with 2 to 3-day history of acute onset sinus congestion/pressure, rhinorrhea, postnasal drip leading to right throat, and  most recently 1 day of loose stool.  No fevers, chills, night sweats, body aches.  No cough, wheeze, shortness of breath, chest pain, palpitations.  Tolerating normal p.o. intake.  Denies dysuria.  No rashes.  No known sick contacts, recent travel.  Has not tried any home remedies or over-the-counter treatments.    Scheduled for after valve replacement versus repair on 1/17 with preparatory CT angiogram chest scheduled for 1/9/2023.  Also has preop appointment scheduled for that same day.     Review of Systems   Constitutional: Positive for fever. Negative for chills, diaphoresis and fatigue.   HENT: Positive for congestion, postnasal drip, rhinorrhea, sinus pressure and sore throat. Negative for ear pain and facial swelling.    Eyes: Negative for discharge.   Respiratory: Negative for cough, chest tightness, shortness of breath and wheezing.    Cardiovascular: Negative for chest pain.   Gastrointestinal: Negative for abdominal pain, diarrhea, nausea and vomiting.   Genitourinary: Negative for dysuria.   Musculoskeletal: Negative for arthralgias.   Neurological: Negative for dizziness, light-headedness and headaches.          Objective    /70   Pulse 66   Temp 97.2  F (36.2  C) (Tympanic)   Wt 106.6 kg (235 lb)   SpO2 96%   BMI 31.87 kg/m    Body mass index is 31.87 kg/m .  Physical Exam  Vitals reviewed.   Constitutional:       General: He is not in acute distress.     Appearance: Normal appearance. He is not toxic-appearing.   HENT:      Head: Normocephalic and atraumatic.      Right Ear: Tympanic membrane, ear canal and external ear normal.      Left Ear: Tympanic membrane, ear canal and external ear normal.      Nose: Congestion present.      Mouth/Throat:      Mouth: Mucous membranes are moist.      Pharynx: Oropharynx is clear. No oropharyngeal exudate.   Eyes:      General: No scleral icterus.     Pupils: Pupils are equal, round, and reactive to light.   Neck:      Comments: Mild anterior cervical  chain tenderness bilaterally without focal lymphadenopathy  Cardiovascular:      Rate and Rhythm: Normal rate and regular rhythm.      Heart sounds: Murmur heard.   Pulmonary:      Effort: No respiratory distress.      Breath sounds: Normal breath sounds. No stridor. No rhonchi or rales.   Abdominal:      General: Abdomen is flat.      Palpations: Abdomen is soft.   Musculoskeletal:         General: Normal range of motion.      Cervical back: Normal range of motion. Tenderness present.   Lymphadenopathy:      Cervical: No cervical adenopathy.   Skin:     General: Skin is warm and dry.   Neurological:      General: No focal deficit present.      Mental Status: He is alert and oriented to person, place, and time.   Psychiatric:         Mood and Affect: Mood normal.         Behavior: Behavior normal.

## 2023-01-08 ENCOUNTER — ANESTHESIA EVENT (OUTPATIENT)
Dept: SURGERY | Facility: CLINIC | Age: 68
DRG: 219 | End: 2023-01-08
Payer: COMMERCIAL

## 2023-01-08 LAB
ABO/RH(D): NORMAL
ANTIBODY SCREEN: NEGATIVE
SPECIMEN EXPIRATION DATE: NORMAL

## 2023-01-08 NOTE — H&P
Pre-Operative H & P     CC:  Preoperative exam to assess for increased cardiopulmonary risk while undergoing surgery and anesthesia.    Date of Encounter: 1/8/2023  Primary Care Physician:  Rock Basilio     Reason for visit: Pre-operative evaluation-op    HPI  Gilberto Camilo is a 67 year old male who presents for pre-operative H & P in preparation for  Procedure Information     Case: [id number] Date/Time: 01/17/23 0800    Procedure: MITRAL VALVE REPAIR possible replacement, Coronary Artery Bypass Graft, transesophageal echocardiogram (TRENT) AND ANY ASSOCIATED PROCEDURES (Heart)    Anesthesia type: General    Diagnosis: Mitral regurgitation [I34.0]    Pre-op diagnosis: Mitral regurgitation [I34.0]    Location:  OR  /  OR    Providers: Linda Kim MD          Gilberto Camilo is a 67 year old male with a PMH significant for HTN, HL, AAA s/p repair 2017, PAD with claudication (follows with [facility]) DM2, former smoker and depression. He was recently found to have severe degenerative mitral valve regurgitation due to prolapse.  Symptoms included increasing fatigue and PÉREZ over the previous 6 months. Underwent coronary angiogram as workup for valve surgery and was found to have severe 3 vessel obstructive coronary artery disease. He met with Dr. Kim and presents today in preparation for the above procedure.     History is obtained from the patient and chart review    Hx of abnormal bleeding or anti-platelet use: yes, aspirin      Past Medical History  Past Medical History:   Diagnosis Date     AAA (abdominal aortic aneurysm) 09/20/2017    5.3 cm      Mohan esophagus      Closed rib fracture      DNS (deviated nasal septum)        Past Surgical History  Past Surgical History:   Procedure Laterality Date     AAA REPAIR  06/17/2020    [facility]/ intravascular repair     APPENDECTOMY       COLONOSCOPY  2013     CV CORONARY ANGIOGRAM N/A 12/9/2022    Procedure: Coronary Angiogram with  possible intervention;  Surgeon: Porfirio Herrera MD;  Location:  HEART CARDIAC CATH LAB     CV RIGHT HEART CATH MEASUREMENTS RECORDED N/A 12/9/2022    Procedure: Right Heart Cath;  Surgeon: Porfirio Herrera MD;  Location:  HEART CARDIAC CATH LAB     ESOPHAGOGASTRODUODENOSCOPY  2013       Prior to Admission Medications  Current Outpatient Medications   Medication Sig Dispense Refill     ARIPiprazole (ABILIFY) 5 MG tablet TAKE 1 TABLET(5 MG) BY MOUTH DAILY (Patient taking differently: Take 5 mg by mouth every morning TAKE 1 TABLET(5 MG) BY MOUTH DAILY) 90 tablet 3     aspirin (ASA) 81 MG EC tablet Take 81 mg by mouth every morning       atorvastatin (LIPITOR) 80 MG tablet TAKE 1 TABLET(80 MG) BY MOUTH DAILY (Patient taking differently: Take 80 mg by mouth every morning TAKE 1 TABLET(80 MG) BY MOUTH DAILY) 90 tablet 3     buPROPion (WELLBUTRIN XL) 300 MG 24 hr tablet Take 1 tablet (300 mg) by mouth every morning 90 tablet 3     diphenhydrAMINE HCl (BENADRYL ALLERGY PO) Take by mouth as needed       lisinopril (ZESTRIL) 10 MG tablet Take 1 tablet (10 mg) by mouth daily (Patient taking differently: Take 10 mg by mouth every morning) 90 tablet 0     loratadine (CLARITIN) 10 MG tablet Take 10 mg by mouth every morning       metFORMIN (GLUCOPHAGE XR) 500 MG 24 hr tablet Take 2 tablets (1,000 mg) by mouth 2 times daily (with meals) for 90 days 360 tablet 0     metFORMIN (GLUCOPHAGE XR) 500 MG 24 hr tablet Take 2 tablets (1,000 mg) by mouth daily (with dinner) (Patient taking differently: Take 1,000 mg by mouth 2 times daily (with meals)) 180 tablet 2     metoprolol succinate ER (TOPROL-XL) 25 MG 24 hr tablet TAKE 1 TABLET(25 MG) BY MOUTH DAILY (Patient taking differently: Take 25 mg by mouth every morning) 90 tablet 2     Multiple Vitamins-Minerals (MULTIVITAMIN ADULT PO) Take by mouth every morning       omeprazole (PRILOSEC) 20 MG DR capsule TAKE 1 CAPSULE(20 MG) BY MOUTH EVERY MORNING (Patient taking  differently: Take 20 mg by mouth every morning TAKE 1 CAPSULE(20 MG) BY MOUTH EVERY MORNING) 90 capsule 3     vitamin C (ASCORBIC ACID) 1000 MG TABS Take 1,000 mg by mouth every morning         Allergies  No Known Allergies    Social History  Social History     Socioeconomic History     Marital status:      Spouse name: Not on file     Number of children: Not on file     Years of education: Not on file     Highest education level: Not on file   Occupational History     Not on file   Tobacco Use     Smoking status: Former     Packs/day: 1.00     Years: 25.00     Pack years: 25.00     Types: Cigarettes     Smokeless tobacco: Never     Tobacco comments:     Feb. 2014   Vaping Use     Vaping Use: Never used   Substance and Sexual Activity     Alcohol use: Yes     Comment: occasional     Drug use: Not Currently     Types: Marijuana     Sexual activity: Yes     Partners: Female   Other Topics Concern     Parent/sibling w/ CABG, MI or angioplasty before 65F 55M? Yes     Comment: dad had heart attack before 55. brother also had heart attack before 55.   Social History Narrative    .      Social Determinants of Health     Financial Resource Strain: Not on file   Food Insecurity: Not on file   Transportation Needs: Not on file   Physical Activity: Not on file   Stress: Not on file   Social Connections: Not on file   Intimate Partner Violence: Not on file   Housing Stability: Not on file       Family History  Family History   Problem Relation Age of Onset     C.A.D. Father         CABG in late 40's     C.A.D. Brother         MI in 50's       Review of Systems  The complete review of systems is negative other than noted in the HPI or here.   Anesthesia Evaluation   Pt has had prior anesthetic. Type: General.    No history of anesthetic complications       ROS/MED HX  ENT/Pulmonary:     (+) EMANUEL risk factors, snores loudly, hypertension, daytime somnolence, tobacco use, Past use,     Neurologic:     (+) peripheral  neuropathy, - BL feet tingling.     Cardiovascular: Comment: AAA s/p Repair 2017    (+) Dyslipidemia hypertension--CAD ---Taking blood thinners Pt has received instructions: Instructions Given to patient: Hold DOS. PÉREZ. orthopnea/PND, Previous cardiac testing   Echo: Date: 11/11/2022 Results:  TRENT 11/11/2022  Interpretation Summary  Severe eccentric anteriorly-directed MR due to P2 flail, EROA 0.57cm RVol 78  mL. Percutaneous transcatheter [id number] mitral valve repair appears  feasible. Leaflet lengths are adequate (anterior 2.3 cm, posterior 2.2 cm.)  There is no mitral stenosis (mean gradient 2 mmHg at 52 bpm, MVA>8 cm2 by 3D  planimetry.)     This study was compared with the study from 9/29/22 (TTE): The mitral valve is  better imaged and MR is better characterized. Flail and severe MR were likely  also present on the prior study but are better demonstrated on today's TRENT.  Stress Test: Date: Results:    ECG Reviewed: Date: Results:  Sinus mariah with PVCs    Cath: Date: 12/9/2022 Results:  Coronary angiogram 12/9/2022    Conclusion    Right sided filling pressures are normal.  Mild elevated pulmonary hypertension.  Left sided filling pressures are moderately elevated.  Normal cardiac output level.  Hemodynamic data has been modified in Epic per physician review.  RPDA lesion is 100% stenosed.  Prox RCA to Mid RCA lesion is 95% stenosed.  1st Diag lesion is 90% stenosed. (-) syncope and irregular heartbeat/palpitations   METS/Exercise Tolerance: 3 - Able to walk 1-2 blocks without stopping    Hematologic:  - neg hematologic  ROS     Musculoskeletal:  - neg musculoskeletal ROS     GI/Hepatic: Comment: Mohan's esophagus    (+) GERD, Asymptomatic on medication, appendicitis,     Renal/Genitourinary:  - neg Renal ROS     Endo:     (+) type II DM, Last HgA1c: 7.3, date: 2/2022, Not using insulin, Normal glucose range: doesn't check, not previously admitted for DM/DKA. Obesity,     Psychiatric/Substance Use:      (+) psychiatric history anxiety and depression     Infectious Disease: Comment: Seen by PCP for cough and sore throat 1/6/2023   Tested - for COVID               Malignancy:       Other:            /68 (BP Location: Right arm, Patient Position: Sitting, Cuff Size: Adult Regular)   Pulse 64   Temp 97.6  F (36.4  C) (Oral)   Resp 16   Ht 1.829 m (6')   Wt 107.3 kg (236 lb 9.6 oz)   SpO2 95%   BMI 32.09 kg/m      Physical Exam   Constitutional: Awake, alert, cooperative, no apparent distress, and appears stated age.  Eyes: Pupils equal, round and reactive to light, extra ocular muscles intact, sclera clear, conjunctiva normal.  HENT: Normocephalic, oral pharynx with moist mucus membranes, good dentition. No goiter appreciated.   Respiratory: Clear to auscultation bilaterally, no crackles or wheezing.  Cardiovascular: Regular rate and rhythm, normal S1 and S2, and 3/6 FESTUS noted.  Carotids +2, no bruits. No edema. Palpable pulses to radial  DP and PT arteries.   GI: Normal bowel sounds, soft, non-distended, non-tender, no masses palpated, no hepatosplenomegaly.    Lymph/Hematologic: No cervical lymphadenopathy and no supraclavicular lymphadenopathy.  Genitourinary:  deferred  Skin: Warm and dry.  No rashes at anticipated surgical site.   Musculoskeletal: Full ROM of neck. There is no redness, warmth, or swelling of the joints. Gross motor strength is normal.    Neurologic: Awake, alert, oriented to name, place and time. Cranial nerves II-XII are grossly intact. Gait is normal.   Neuropsychiatric: Calm, cooperative. Normal affect.     Prior Labs/Diagnostic Studies   All labs and imaging personally reviewed   Lab Results   Component Value Date    A1C 7.5 08/29/2022    A1C 7.3 02/17/2022    A1C 6.7 08/17/2021    A1C 6.5 02/15/2021     Last Comprehensive Metabolic Panel:    Coronary angiogram 12/9/2022    Conclusion      Right sided filling pressures are normal.    Mild elevated pulmonary hypertension.    Left  sided filling pressures are moderately elevated.    Normal cardiac output level.    Hemodynamic data has been modified in Epic per physician review.    RPDA lesion is 100% stenosed.    Prox RCA to Mid RCA lesion is 95% stenosed.    1st Diag lesion is 90% stenosed.      Coronary Findings    Diagnostic  Dominance: Right  Left Main   The vessel is moderate in size and is angiographically normal.      Left Anterior Descending   There was small aneurysm vessel disease. Small aneurysmal segment just distal to 1st diagonal      First Diagonal Branch   The vessel is moderate in size. There is moderate diffuse disease throughout the vessel.   1st Diag lesion is 90% stenosed. The stenosis was measured by a visual reading.      Second Diagonal Branch   The vessel is moderate in size and is angiographically normal.      Third Diagonal Branch   The vessel is small and is angiographically normal.      Left Circumflex   The vessel is small and is angiographically normal.      First Obtuse Marginal Branch   The vessel is large. There is moderate diffuse disease throughout the vessel.      Lateral First Obtuse Marginal Branch   The vessel is small and is angiographically normal.      Right Coronary Artery   The vessel is moderate in size.   Prox RCA to Mid RCA lesion is 95% stenosed. The lesion is severely calcified. The stenosis was measured by a visual reading.      Right Ventricular Branch   The vessel is small. There is mild diffuse disease throughout the vessel.      Right Posterior Descending Artery   Collaterals   RPDA filled by collaterals from Dist LAD.      RPDA lesion is 100% stenosed.      Right Posterior Atrioventricular Artery   The vessel is small. There is moderate diffuse disease throughout the vessel.      First Right Posterolateral Branch   The vessel is small. There is mild diffuse disease throughout the vessel.      Second Right Posterolateral Branch   The vessel is small. There is mild diffuse disease  throughout the vessel              TRENT 11/11/2022  Interpretation Summary  Severe eccentric anteriorly-directed MR due to P2 flail, EROA 0.57cm RVol 78  mL. Percutaneous transcatheter [id number] mitral valve repair appears  feasible. Leaflet lengths are adequate (anterior 2.3 cm, posterior 2.2 cm.)  There is no mitral stenosis (mean gradient 2 mmHg at 52 bpm, MVA>8 cm2 by 3D  planimetry.)     This study was compared with the study from 9/29/22 (TTE): The mitral valve is  better imaged and MR is better characterized. Flail and severe MR were likely  also present on the prior study but are better demonstrated on today's TRENT.      Left Ventricle  Mild left ventricular dilation is present. Global and regional left  ventricular function is normal with an EF of 60-65%. Left ventricular wall  thickness is normal.     Right Ventricle  Right ventricular function, chamber size, wall motion, and thickness are  normal.     Atria  The right atria appears normal. The left atrial appendage is normal. It is  free of spontaneous echo contrast and thrombus. The left atrial appendage  Doppler velocities are normal. Moderate left atrial enlargement is present.  The atrial septum is intact as assessed by color Doppler .     Mitral Valve  Severe eccentric anteriorly-directed MR due to P2 flail, EROA 0.57cm RVol 78  mL. Percutaneous transcatheter hmxo-sv-wdpt mitral valve repair appears  feasible. Leaflet lengths are adequate (anterior 2.3 cm, posterior 2.2 cm.)  There is no mitral stenosis (mean gradient 2 mmHg at 52 bpm, MVA>8 cm2 by 3D  planimetry.).     Aortic Valve  The aortic valve is tricuspid. Mild aortic insufficiency is present.     Tricuspid Valve  The tricuspid valve is normal. Trace tricuspid insufficiency is present. The  peak velocity of the tricuspid regurgitant jet is not obtainable. Pulmonary  artery systolic pressure cannot be assessed.     Pulmonic Valve  The pulmonic valve is normal. Trace pulmonic insufficiency is  present.     Vessels  The aorta root is normal. Complex atheroma of the aorta are present in the  arch and descending thoracic aorta. The LUPV Doppler shows systolic blunting .  The LLPV Doppler shows systolic reversal . The RUPV Doppler shows systolic  reversal . The RLPV Doppler shows systolic reversal .     Pericardium  No pericardial effusion is present.     Compared to Previous Study  This study was compared with the study from 9/29/22 (TTE) . The mitral valve  is better imaged and MR is better characterized. Flail and severe MR were  likely also present on the prior study but are better demonstrated on today's  TRENT.        Component Ref Range & Units 2 d ago 7 mo ago    Influenza A PCR Negative Negative      Influenza B PCR Negative Negative      RSV PCR Negative Negative      SARS CoV2 PCR Negative Negative  Negative CM    Comment: NEGATIVE: SARS-CoV-2 (COVID-19) RNA not detected, presumed negative.   Resulting Agency  [facility]                       EKG/ stress test - if available please see in ROS above   Echo result w/o MOPS: Interpretation SummaryNo pericardial effusion is present.Global and regional left ventricular function is normal with an EF of 55-60%.Moderate left ventricular dilation is present.The right ventricle is normal size.Global right ventricular function is normal.Moderate left atrial enlargement is present.The posterior MV leaflet is slightly thickened and shortened with prolapse andthe valves do not coapt. There is significant MR jet towrds the interatrialseptum.Moderate to severe mitral insufficiency is present.Trace aortic insufficiency is present.No aortic stenosis is present.Trace tricuspid insufficiency is present.Trace pulmonic insufficiency is present.      No flowsheet data found.      The patient's records and results personally reviewed by this provider.     Outside records reviewed from: Care Everywhere    LAB/DIAGNOSTIC STUDIES TODAY:    Lab Results   Component Value  Date    WBC 10.8 01/09/2023    WBC 8.4 08/21/2020     Lab Results   Component Value Date    RBC 4.80 01/09/2023    RBC 4.46 08/21/2020     Lab Results   Component Value Date    HGB 14.1 01/09/2023    HGB 13.5 08/21/2020     Lab Results   Component Value Date    HCT 43.9 01/09/2023    HCT 40.5 08/21/2020     No components found for: MCT  Lab Results   Component Value Date    MCV 92 01/09/2023    MCV 91 08/21/2020     Lab Results   Component Value Date    MCH 29.4 01/09/2023    MCH 30.3 08/21/2020     Lab Results   Component Value Date    MCHC 32.1 01/09/2023    MCHC 33.3 08/21/2020     Lab Results   Component Value Date    RDW 13.2 01/09/2023    RDW 13.3 08/21/2020     Lab Results   Component Value Date     01/09/2023     08/21/2020     Lab Results   Component Value Date    AST 23 01/09/2023    AST 22 08/21/2020     Lab Results   Component Value Date    ALT 18 01/09/2023    ALT 31 08/21/2020     No results found for: BILICONJ   Lab Results   Component Value Date    BILITOTAL 0.8 01/09/2023    BILITOTAL 0.9 08/21/2020     Lab Results   Component Value Date    ALBUMIN 4.4 01/09/2023    ALBUMIN 4.0 08/29/2022    ALBUMIN 4.1 08/21/2020     Lab Results   Component Value Date    PROTTOTAL 7.3 01/09/2023    PROTTOTAL 7.5 08/21/2020      Lab Results   Component Value Date    ALKPHOS 106 01/09/2023    ALKPHOS 94 08/21/2020       Lab Results   Component Value Date    WBC 10.8 01/09/2023    WBC 8.4 08/21/2020     Lab Results   Component Value Date    RBC 4.80 01/09/2023    RBC 4.46 08/21/2020     Lab Results   Component Value Date    HGB 14.1 01/09/2023    HGB 13.5 08/21/2020     Lab Results   Component Value Date    HCT 43.9 01/09/2023    HCT 40.5 08/21/2020     No components found for: MCT  Lab Results   Component Value Date    MCV 92 01/09/2023    MCV 91 08/21/2020     Lab Results   Component Value Date    MCH 29.4 01/09/2023    MCH 30.3 08/21/2020     Lab Results   Component Value Date    [facility] 32.1 01/09/2023     MCHC 33.3 08/21/2020     Lab Results   Component Value Date    RDW 13.2 01/09/2023    RDW 13.3 08/21/2020     Lab Results   Component Value Date     01/09/2023     08/21/2020       Component Ref Range & Units 1 d ago 1 mo ago 8 yr ago    INR 0.85 - 1.15 0.98  0.93  0.91 R    Resulting Agency  [facility]              Specimen Collected: 01/09/23  3:08 PM Last Resulted: 01/09/23              PTT   Date Value Ref Range Status   03/18/2014 21 (L) 24.00 - 37.00 sec Final     aPTT   Date Value Ref Range Status   01/09/2023 28 22 - 38 Seconds Final        Assessment      Gilberto Camilo is a 67 year old male seen as a PAC referral for risk assessment and optimization for anesthesia.    Plan/Recommendations  Pt will be optimized for the proposed procedure.  See below for details on the assessment, risk, and preoperative recommendations    NEUROLOGY  - No history of TIA, CVA or seizure    -Post Op delirium risk factors:  No risk identified   - bilateral feet tingling    ENT  - No current airway concerns.  Will need to be reassessed day of surgery.  Mallampati: III  TM: > 3  Patient has extensive facial and neck hair/ beard. Recommended patient trim as much back as possible.       CARDIAC  severe MR - Procedure scheduled as above.   CAD- severe 3 vessel obstructive disease- Procedure scheduled as above.   On Statin, BB and aspirin. Will Hold Asa DOS  HTN- well managed on lisinopril   + PÉREZ and fatigue over the past 6 months  Denies CP, Denies palpitations, PND, dizziness or syncope.     AAA s/p repair 2017    - METS (Metabolic Equivalents) 3 able to walk 1-2 blocks without stopping.     PULMONARY  - Obstructive Sleep Apnea  EMANUEL risk with no formal diagnosis  EMANUEL High Risk            Total Score: 6    EMANUEL: Snores loudly    EMANUEL: Often tired    EMANUEL: Observed stopped breathing    EMANUEL: Hypertension    EMANUEL: Over 50 ys old    EMANUEL: Male       Seen by primary for cough and sore throat  1/6. Currently asymptomatic cough and sore throat resolved.     Component Ref Range & Units 3 d ago    Influenza A PCR Negative      Influenza B PCR Negative    RSV PCR Negative    SARS CoV2 PCR    - Denies asthma or inhaler use  - Tobacco History      History   Smoking Status     Former     Packs/day: 1.00     Years: 25.00     Types: Cigarettes   Smokeless Tobacco     Never       GI  - GERD  Controlled on medications: Proton Pump Inhibitor  PONV Medium Risk  Total Score: 2           1 AN PONV: Patient is not a current smoker    1 AN PONV: Intended Post Op Opioids        /RENAL  - Baseline Creatinine  0.97    ENDOCRINE    - BMI: Estimated body mass index is 32.09 kg/m  as calculated from the following:    Height as of this encounter: 1.829 m (6').    Weight as of this encounter: 107.3 kg (236 lb 9.6 oz).  Obesity (BMI >30)  - Diabetes  Lab Results   Component Value Date    A1C 7.9 01/09/2023    A1C 7.5 08/29/2022    A1C 7.3 02/17/2022    A1C 6.7 08/17/2021    A1C 6.5 02/15/2021         Diabetes Mellitus, Type 2, non-insulin dependent.  Hold morning oral hypoglycemic medications. Recommend close monitoring of the patient's blood glucose levels throughout the perioperative period.    HEME  VTE Low Risk 0.5%            Total Score: 3    VTE: Greater than 59 yrs old    VTE: Male      - Platelet disfunction second to Aspirin (Chantal, many others)  Holding DOS    MSK  Patient is NOT Frail            Total Score: 1    Frailty: Decrease in strength          PSYCH  - depression and anxiety managed with Abilify and Wellbutrin.    Different anesthesia methods/types have been discussed with the patient, but they are aware that the final plan will be decided by the assigned anesthesia provider on the date of service.      The patient is optimized for their procedure. AVS with information on surgery time/arrival time, meds and NPO status given by nursing staff. No further diagnostic testing indicated.      On the day of service:      Prep time: 20 minutes  Visit time: 20 minutes  Documentation time: 20 minutes  ------------------------------------------  Total time: 60 minutes      TORI Bell CNP  Preoperative Assessment Center  [facility]  [facility]  Phone: [phone]  Fax: [phone]

## 2023-01-09 ENCOUNTER — HOSPITAL ENCOUNTER (OUTPATIENT)
Dept: CT IMAGING | Facility: CLINIC | Age: 68
Discharge: HOME | End: 2023-01-09
Attending: SURGERY | Admitting: SURGERY
Payer: COMMERCIAL

## 2023-01-09 ENCOUNTER — PRE VISIT (OUTPATIENT)
Dept: SURGERY | Facility: CLINIC | Age: 68
End: 2023-01-09

## 2023-01-09 ENCOUNTER — OFFICE VISIT (OUTPATIENT)
Dept: SURGERY | Facility: CLINIC | Age: 68
End: 2023-01-09
Payer: COMMERCIAL

## 2023-01-09 ENCOUNTER — LAB (OUTPATIENT)
Dept: LAB | Facility: CLINIC | Age: 68
End: 2023-01-09
Payer: COMMERCIAL

## 2023-01-09 VITALS
OXYGEN SATURATION: 95 % | WEIGHT: 236.6 LBS | HEART RATE: 64 BPM | RESPIRATION RATE: 16 BRPM | DIASTOLIC BLOOD PRESSURE: 68 MMHG | HEIGHT: 72 IN | SYSTOLIC BLOOD PRESSURE: 107 MMHG | BODY MASS INDEX: 32.05 KG/M2 | TEMPERATURE: 97.6 F

## 2023-01-09 DIAGNOSIS — Z01.810 PRE-OPERATIVE CARDIOVASCULAR EXAMINATION: ICD-10-CM

## 2023-01-09 DIAGNOSIS — Z01.818 PRE-OP EVALUATION: Primary | ICD-10-CM

## 2023-01-09 DIAGNOSIS — R06.02 SHORTNESS OF BREATH: ICD-10-CM

## 2023-01-09 DIAGNOSIS — Z01.818 PRE-OP EVALUATION: ICD-10-CM

## 2023-01-09 DIAGNOSIS — I34.1 MITRAL VALVE PROLAPSE: ICD-10-CM

## 2023-01-09 DIAGNOSIS — I34.1 MITRAL VALVE PROLAPSE: Primary | ICD-10-CM

## 2023-01-09 DIAGNOSIS — R79.89 OTHER SPECIFIED ABNORMAL FINDINGS OF BLOOD CHEMISTRY: ICD-10-CM

## 2023-01-09 LAB
ALBUMIN SERPL BCG-MCNC: 4.4 G/DL (ref 3.5–5.2)
ALP SERPL-CCNC: 106 U/L (ref 40–129)
ALT SERPL W P-5'-P-CCNC: 18 U/L (ref 10–50)
ANION GAP SERPL CALCULATED.3IONS-SCNC: 11 MMOL/L (ref 7–15)
APTT PPP: 28 SECONDS (ref 22–38)
AST SERPL W P-5'-P-CCNC: 23 U/L (ref 10–50)
BILIRUB SERPL-MCNC: 0.8 MG/DL
BUN SERPL-MCNC: 16.6 MG/DL (ref 8–23)
CALCIUM SERPL-MCNC: 10 MG/DL (ref 8.8–10.2)
CHLORIDE SERPL-SCNC: 99 MMOL/L (ref 98–107)
CREAT SERPL-MCNC: 1.02 MG/DL (ref 0.67–1.17)
DEPRECATED HCO3 PLAS-SCNC: 27 MMOL/L (ref 22–29)
ERYTHROCYTE [DISTWIDTH] IN BLOOD BY AUTOMATED COUNT: 13.2 % (ref 10–15)
GFR SERPL CREATININE-BSD FRML MDRD: 80 ML/MIN/1.73M2
GLUCOSE SERPL-MCNC: 130 MG/DL (ref 70–99)
HBA1C MFR BLD: 7.9 %
HCT VFR BLD AUTO: 43.9 % (ref 40–53)
HGB BLD-MCNC: 14.1 G/DL (ref 13.3–17.7)
HOLD SPECIMEN: NORMAL
INR PPP: 0.98 (ref 0.85–1.15)
MAGNESIUM SERPL-MCNC: 1.9 MG/DL (ref 1.7–2.3)
MCH RBC QN AUTO: 29.4 PG (ref 26.5–33)
MCHC RBC AUTO-ENTMCNC: 32.1 G/DL (ref 31.5–36.5)
MCV RBC AUTO: 92 FL (ref 78–100)
PLATELET # BLD AUTO: 243 10E3/UL (ref 150–450)
POTASSIUM SERPL-SCNC: 4.8 MMOL/L (ref 3.4–5.3)
PROT SERPL-MCNC: 7.3 G/DL (ref 6.4–8.3)
RBC # BLD AUTO: 4.8 10E6/UL (ref 4.4–5.9)
SODIUM SERPL-SCNC: 137 MMOL/L (ref 136–145)
WBC # BLD AUTO: 10.8 10E3/UL (ref 4–11)

## 2023-01-09 PROCEDURE — 85027 COMPLETE CBC AUTOMATED: CPT | Performed by: PATHOLOGY

## 2023-01-09 PROCEDURE — 74174 CTA ABD&PLVS W/CONTRAST: CPT | Mod: 26 | Performed by: STUDENT IN AN ORGANIZED HEALTH CARE EDUCATION/TRAINING PROGRAM

## 2023-01-09 PROCEDURE — 85610 PROTHROMBIN TIME: CPT | Performed by: PATHOLOGY

## 2023-01-09 PROCEDURE — 36415 COLL VENOUS BLD VENIPUNCTURE: CPT | Performed by: PATHOLOGY

## 2023-01-09 PROCEDURE — 80053 COMPREHEN METABOLIC PANEL: CPT | Performed by: PATHOLOGY

## 2023-01-09 PROCEDURE — 83036 HEMOGLOBIN GLYCOSYLATED A1C: CPT | Performed by: SURGERY

## 2023-01-09 PROCEDURE — 85730 THROMBOPLASTIN TIME PARTIAL: CPT | Performed by: PATHOLOGY

## 2023-01-09 PROCEDURE — 86901 BLOOD TYPING SEROLOGIC RH(D): CPT | Performed by: NURSE PRACTITIONER

## 2023-01-09 PROCEDURE — 250N000011 HC RX IP 250 OP 636: Performed by: SURGERY

## 2023-01-09 PROCEDURE — 84134 ASSAY OF PREALBUMIN: CPT | Performed by: SURGERY

## 2023-01-09 PROCEDURE — 83735 ASSAY OF MAGNESIUM: CPT | Performed by: PATHOLOGY

## 2023-01-09 PROCEDURE — 99205 OFFICE O/P NEW HI 60 MIN: CPT | Performed by: NURSE PRACTITIONER

## 2023-01-09 PROCEDURE — 71275 CT ANGIOGRAPHY CHEST: CPT | Mod: 26 | Performed by: STUDENT IN AN ORGANIZED HEALTH CARE EDUCATION/TRAINING PROGRAM

## 2023-01-09 PROCEDURE — 74174 CTA ABD&PLVS W/CONTRAST: CPT

## 2023-01-09 RX ORDER — IOPAMIDOL 755 MG/ML
120 INJECTION, SOLUTION INTRAVASCULAR ONCE
Status: COMPLETED | OUTPATIENT
Start: 2023-01-09 | End: 2023-01-09

## 2023-01-09 RX ADMIN — IOPAMIDOL 120 ML: 755 INJECTION, SOLUTION INTRAVENOUS at 11:35

## 2023-01-09 ASSESSMENT — LIFESTYLE VARIABLES: TOBACCO_USE: 1

## 2023-01-09 ASSESSMENT — PAIN SCALES - GENERAL: PAINLEVEL: NO PAIN (0)

## 2023-01-09 ASSESSMENT — ENCOUNTER SYMPTOMS: ORTHOPNEA: 1

## 2023-01-09 NOTE — PATIENT INSTRUCTIONS
Preparing for Your Surgery      Name:  Gilberto Camilo   MRN:  [id number]   :  1955   Today's Date:  2023       Arriving for surgery:  Surgery date:  23  Arrival time:  5:30 am     Surgeries and procedures: Adult patients can have 2 visitors all through the surgery process.     Visiting hours: 8 a.m. to 8:30 p.m.     Hospital: Adult patients and children under age 18 can have 4 visitor at a time     No visitors under the age of 5 are allowed for hospital patients.  Double occupancy rooms: Patients can have only two visitors at a time.     Patients with disabilities: Can have a support person with them (family member, service provider     Or someone well informed about their needs) plus the allowed number of visitors     Patients confirmed or suspected to have symptoms of COVID 19 or flu:     No visitors allowed for adult patients.   Children (under age 18) can have 1 named visitor.     People who are sick or showing symptoms of COVID 19 or flu:    Are not allowed to visit patients--we can only make exceptions in special situations.       Please follow these guidelines for your visit:   Arrive wearing a mask over your mouth and nose; we will give you a medical mask to wear    If you arrive wearing a cloth mask.   Keep it on during your entire visit, even when in patient's room.   If you don't wear a mask we'll ask you to leave.     Clean your hands with alcohol hand . Do this when you arrive at and leave the building and patient room,    And again after you touch your mask or anything in the room.     You can t visit if you have a fever, cough, shortness of breath, muscle aches, headaches, sore throat    Or diarrhea      Stay 6 feet away from others during your visit and between visits     Go directly to and from the room you are visiting.     Stay in the patient s room during your visit. Limit going to other places in the hospital as much as possible     Leave bags and jackets at home or in  the car.     For everyone s health, please don t come and go during your visit. That includes for smoking   during your visit.     Please come to:     [facility] [address]  [address] [place]  [address]    -   parking is available in front of the hospital for those with mobility difficulties.     -  Parking is also available at the Patient Visitor Ramp on [address] and [facility].    -  When entering the hospital, you will be asked some Covid screening questions and directed to Patient Registration. Patient Registration will then direct you to the 3rd floor Surgery Waiting Room.  [phone]?     - ?If you are in need of directions, wheelchair or escort please stop at the Information Desk in the lobby.  Inform the information person that you are here for surgery; a wheelchair and escort to [address] will be provided.?     What can I eat or drink?  -  You may eat and drink normally up to 8 hours prior to arrival time. (Until 9:30 pm 1-16-23)  -  You may have clear liquids until 2 hours prior to arrival time. (Until 3:30 am)    Examples of clear liquids:  Water  Clear broth  Juices (apple, white grape, white cranberry  and cider) without pulp  Noncarbonated, powder based beverages  (lemonade and Santo-Aid)  Sodas (Sprite, 7-Up, ginger ale and seltzer)  Coffee or tea (without milk or cream)  Gatorade    -  No Alcohol for at least 24 hours before surgery.     Which medicines can I take?  Hold Multivitamins for 10 days before surgery.  Hold Supplements for 10 days before surgery.  Hold Ibuprofen (Advil, Motrin) for 10 days before surgery--unless otherwise directed by surgeon.  Hold Naproxen (Aleve) for 10 days before surgery.    Aspirin- Take your last dose of Aspirin on 1/16/23 and hold Aspirin the morning of surgery.     -  DO NOT take these medications the day of surgery:  Aspirin, Lisinopril, Metformin, Vitamin C,     -  PLEASE TAKE these  medications the day of surgery:  Aripiprazole (Abilify), Atorvastatin (Lipitor), Bupropion (Wellbutrin XL), Loratadine (Claritin), Metoprolol, Omeprazole (Prilosec),   Acetaminophen (Tylenol) if needed    How do I prepare myself?  - Please take 2 showers before surgery using Scrubcare or Hibiclens soap.    Use this soap only from the neck to your toes.     Leave the soap on your skin for one minute--then rinse thoroughly.      You may use your own shampoo and conditioner. No other hair products.   - Please remove all jewelry and body piercings.  - No lotions, deodorants or fragrance.  - Bring your ID and insurance card.    -If you have a Deep Brain Stimulator, Spinal Cord Stimulator, or any Neuro Stimulator device---you must bring the remote control to the hospital.      ALL PATIENTS GOING HOME THE SAME DAY OF SURGERY ARE REQUIRED TO HAVE A RESPONSIBLE ADULT TO DRIVE AND BE IN ATTENDANCE WITH THEM FOR 24 HOURS FOLLOWING SURGERY.    Covid testing policy as of 12/06/2022  Your surgeon will notify and schedule you for a COVID test if one is needed before surgery--please direct any questions or COVID symptoms to your surgeon      Questions or Concerns:    - For any questions regarding the day of surgery or your hospital stay, please contact the Pre Admission Nursing Office at [phone].       - If you have health changes between today and your surgery, please call your surgeon.       - For questions after surgery, please call your surgeons office.

## 2023-01-12 ENCOUNTER — TELEPHONE (OUTPATIENT)
Dept: CARDIOLOGY | Facility: CLINIC | Age: 68
End: 2023-01-12

## 2023-01-13 ENCOUNTER — TELEPHONE (OUTPATIENT)
Dept: CARDIOLOGY | Facility: CLINIC | Age: 68
End: 2023-01-13

## 2023-01-13 NOTE — TELEPHONE ENCOUNTER
Called patient today who states he is feeling much better. Patient had a viral respiratory illness earlier this week that his primary physician gave him some medication to manage his symptoms with.    Patient is getting his COVID test done today in [place]. He has no questions about surgery at this time.

## 2023-01-16 NOTE — ANESTHESIA PREPROCEDURE EVALUATION
Anesthesia Pre-Procedure Evaluation    Patient: Gilberto Camilo   MRN: [id number] : 1955        Procedure : Procedure(s):  MITRAL VALVE REPAIR possible replacement, Coronary Artery Bypass Graft, transesophageal echocardiogram (TRENT) AND ANY ASSOCIATED PROCEDURES          Past Medical History:   Diagnosis Date     AAA (abdominal aortic aneurysm) 2017    5.3 cm      Mohan esophagus      Closed rib fracture      DNS (deviated nasal septum)       Past Surgical History:   Procedure Laterality Date     AAA REPAIR  2020    [facility]/ intravascular repair     APPENDECTOMY       COLONOSCOPY  2013     CV CORONARY ANGIOGRAM N/A 2022    Procedure: Coronary Angiogram with possible intervention;  Surgeon: Porfirio Herrera MD;  [facility]     CV RIGHT HEART CATH MEASUREMENTS RECORDED N/A 2022    Procedure: Right Heart Cath;  Surgeon: Porfirio Herrera MD;  [facility]     ESOPHAGOGASTRODUODENOSCOPY        No Known Allergies   Social History     Tobacco Use     Smoking status: Former     Packs/day: 1.00     Years: 25.00     Pack years: 25.00     Types: Cigarettes     Smokeless tobacco: Never     Tobacco comments:     2014   Substance Use Topics     Alcohol use: Yes     Comment: occasional      Wt Readings from Last 1 Encounters:   23 107.3 kg (236 lb 9.6 oz)        Anesthesia Evaluation            ROS/MED HX  ENT/Pulmonary:  - neg pulmonary ROS     Neurologic:  - neg neurologic ROS     Cardiovascular: Comment: AAA s/p repair in     (+) Dyslipidemia hypertension-----    METS/Exercise Tolerance:     Hematologic:  - neg hematologic  ROS     Musculoskeletal:  - neg musculoskeletal ROS     GI/Hepatic: Comment: Mohan esophagus      Renal/Genitourinary:  - neg Renal ROS     Endo:     (+) type II DM, Obesity,     Psychiatric/Substance Use:     (+) psychiatric history depression     Infectious Disease:       Malignancy:        Other:  - neg other ROS             OUTSIDE LABS:  CBC:   Lab Results   Component Value Date    WBC 10.8 01/09/2023    WBC 11.1 (H) 12/09/2022    HGB 14.1 01/09/2023    HGB 12.8 (L) 12/09/2022    HCT 43.9 01/09/2023    HCT 43.3 12/09/2022     01/09/2023     12/09/2022     BMP:   Lab Results   Component Value Date     01/09/2023     12/09/2022    POTASSIUM 4.8 01/09/2023    POTASSIUM 4.6 12/09/2022    CHLORIDE 99 01/09/2023    CHLORIDE 103 12/09/2022    CO2 27 01/09/2023    CO2 23 12/09/2022    BUN 16.6 01/09/2023    BUN 19.4 12/09/2022    CR 1.02 01/09/2023    CR 0.97 12/09/2022     (H) 01/09/2023     (H) 12/09/2022     COAGS:   Lab Results   Component Value Date    PTT 28 01/09/2023    INR 0.98 01/09/2023     POC: No results found for: BGM, HCG, HCGS  HEPATIC:   Lab Results   Component Value Date    ALBUMIN 4.4 01/09/2023    PROTTOTAL 7.3 01/09/2023    ALT 18 01/09/2023    AST 23 01/09/2023    ALKPHOS 106 01/09/2023    BILITOTAL 0.8 01/09/2023     OTHER:   Lab Results   Component Value Date    A1C 7.9 (H) 01/09/2023    SARI 10.0 01/09/2023    MAG 1.9 01/09/2023    TSH 0.74 08/29/2022    CRP 2.7 03/18/2014       Anesthesia Plan    ASA Status:  4      Anesthesia Type: General.     - Airway: ETT   Induction: Intravenous.   Maintenance: Balanced.   Techniques and Equipment:     - Lines/Monitors: 2nd IV, Arterial Line, Central Line, PAC, CVP, BIS, NIRS, TRENT            TRENT Absolute Contra-indication: NONE            TRENT Relative Contra-indication: Mohan Esophagus     Consents            Postoperative Care    Pain management: IV analgesics, Oral pain medications, Multi-modal analgesia.        Comments:                Estefany Reyes MD

## 2023-01-17 ENCOUNTER — ANESTHESIA EVENT (OUTPATIENT)
Dept: SURGERY | Facility: CLINIC | Age: 68
DRG: 219 | End: 2023-01-17
Payer: COMMERCIAL

## 2023-01-17 ENCOUNTER — HOSPITAL ENCOUNTER (INPATIENT)
Facility: CLINIC | Age: 68
Setting detail: SURGERY ADMIT
DRG: 219 | End: 2023-01-17
Attending: SURGERY | Admitting: SURGERY
Payer: COMMERCIAL

## 2023-01-17 ENCOUNTER — PREP FOR PROCEDURE (OUTPATIENT)
Dept: CARDIOLOGY | Facility: CLINIC | Age: 68
End: 2023-01-17
Payer: COMMERCIAL

## 2023-01-17 ENCOUNTER — ANESTHESIA (OUTPATIENT)
Dept: SURGERY | Facility: CLINIC | Age: 68
DRG: 219 | End: 2023-01-17
Payer: COMMERCIAL

## 2023-01-17 ENCOUNTER — HOSPITAL ENCOUNTER (OUTPATIENT)
Facility: CLINIC | Age: 68
Discharge: ANOTHER HOSPITAL | DRG: 219 | End: 2023-01-17
Attending: SURGERY | Admitting: SURGERY
Payer: COMMERCIAL

## 2023-01-17 VITALS — BODY MASS INDEX: 32.61 KG/M2 | HEIGHT: 72 IN | WEIGHT: 240.74 LBS

## 2023-01-17 DIAGNOSIS — I51.89 OTHER ILL-DEFINED HEART DISEASES: ICD-10-CM

## 2023-01-17 DIAGNOSIS — I10 ESSENTIAL HYPERTENSION: ICD-10-CM

## 2023-01-17 DIAGNOSIS — I34.0 MITRAL VALVE INSUFFICIENCY, UNSPECIFIED ETIOLOGY: ICD-10-CM

## 2023-01-17 DIAGNOSIS — I25.10 CORONARY ARTERY DISEASE INVOLVING NATIVE HEART, UNSPECIFIED VESSEL OR LESION TYPE, UNSPECIFIED WHETHER ANGINA PRESENT: ICD-10-CM

## 2023-01-17 DIAGNOSIS — I34.1 MITRAL VALVE PROLAPSE: Primary | ICD-10-CM

## 2023-01-17 LAB — GLUCOSE BLDC GLUCOMTR-MCNC: 152 MG/DL (ref 70–99)

## 2023-01-17 PROCEDURE — 250N000009 HC RX 250: Performed by: SURGERY

## 2023-01-17 PROCEDURE — 999N000001 HC CANCELLED SURGERY UP TO 15 MINS: Performed by: SURGERY

## 2023-01-17 PROCEDURE — 250N000011 HC RX IP 250 OP 636: Performed by: SURGERY

## 2023-01-17 PROCEDURE — 258N000003 HC RX IP 258 OP 636: Performed by: SURGERY

## 2023-01-17 PROCEDURE — 999N000141 HC STATISTIC PRE-PROCEDURE NURSING ASSESSMENT: Performed by: SURGERY

## 2023-01-17 RX ORDER — FLUMAZENIL 0.1 MG/ML
0.2 INJECTION, SOLUTION INTRAVENOUS
Status: DISCONTINUED | OUTPATIENT
Start: 2023-01-17 | End: 2023-01-18 | Stop reason: HOSPADM

## 2023-01-17 RX ORDER — NALOXONE HYDROCHLORIDE 0.4 MG/ML
0.2 INJECTION, SOLUTION INTRAMUSCULAR; INTRAVENOUS; SUBCUTANEOUS
Status: DISCONTINUED | OUTPATIENT
Start: 2023-01-17 | End: 2023-01-18 | Stop reason: HOSPADM

## 2023-01-17 RX ORDER — CHLORHEXIDINE GLUCONATE ORAL RINSE 1.2 MG/ML
10 SOLUTION DENTAL ONCE
Status: DISCONTINUED | OUTPATIENT
Start: 2023-01-17 | End: 2023-01-18 | Stop reason: HOSPADM

## 2023-01-17 RX ORDER — CEFAZOLIN SODIUM 2 G/50ML
2 SOLUTION INTRAVENOUS
Status: CANCELLED | OUTPATIENT
Start: 2023-01-17

## 2023-01-17 RX ORDER — GABAPENTIN 100 MG/1
100 CAPSULE ORAL
Status: CANCELLED | OUTPATIENT
Start: 2023-01-17

## 2023-01-17 RX ORDER — CEFAZOLIN SODIUM/WATER 2 G/20 ML
2 SYRINGE (ML) INTRAVENOUS
Status: DISCONTINUED | OUTPATIENT
Start: 2023-01-17 | End: 2023-01-18 | Stop reason: HOSPADM

## 2023-01-17 RX ORDER — LIDOCAINE 40 MG/G
CREAM TOPICAL
Status: CANCELLED | OUTPATIENT
Start: 2023-01-17

## 2023-01-17 RX ORDER — LIDOCAINE 40 MG/G
CREAM TOPICAL
Status: DISCONTINUED | OUTPATIENT
Start: 2023-01-17 | End: 2023-01-18 | Stop reason: HOSPADM

## 2023-01-17 RX ORDER — ASPIRIN 81 MG/1
81 TABLET, CHEWABLE ORAL
Status: CANCELLED | OUTPATIENT
Start: 2023-01-17

## 2023-01-17 RX ORDER — NOREPINEPHRINE BITARTRATE 0.06 MG/ML
.01-.1 INJECTION, SOLUTION INTRAVENOUS CONTINUOUS
Status: DISCONTINUED | OUTPATIENT
Start: 2023-01-17 | End: 2023-01-18 | Stop reason: HOSPADM

## 2023-01-17 RX ORDER — PHENYLEPHRINE HCL IN 0.9% NACL 50MG/250ML
.1-6 PLASTIC BAG, INJECTION (ML) INTRAVENOUS CONTINUOUS
Status: DISCONTINUED | OUTPATIENT
Start: 2023-01-17 | End: 2023-01-18

## 2023-01-17 RX ORDER — DEXMEDETOMIDINE HYDROCHLORIDE 4 UG/ML
.2-1.2 INJECTION, SOLUTION INTRAVENOUS CONTINUOUS
Status: CANCELLED | OUTPATIENT
Start: 2023-01-18

## 2023-01-17 RX ORDER — ASPIRIN 81 MG/1
162 TABLET, CHEWABLE ORAL
Status: CANCELLED | OUTPATIENT
Start: 2023-01-17

## 2023-01-17 RX ORDER — GABAPENTIN 100 MG/1
100 CAPSULE ORAL
Status: DISCONTINUED | OUTPATIENT
Start: 2023-01-17 | End: 2023-01-18 | Stop reason: HOSPADM

## 2023-01-17 RX ORDER — CEFAZOLIN SODIUM 2 G/50ML
2 SOLUTION INTRAVENOUS SEE ADMIN INSTRUCTIONS
Status: CANCELLED | OUTPATIENT
Start: 2023-01-17

## 2023-01-17 RX ORDER — FAMOTIDINE 20 MG/1
20 TABLET, FILM COATED ORAL
Status: DISCONTINUED | OUTPATIENT
Start: 2023-01-17 | End: 2023-01-18 | Stop reason: HOSPADM

## 2023-01-17 RX ORDER — ACETAMINOPHEN 325 MG/1
975 TABLET ORAL ONCE
Status: DISCONTINUED | OUTPATIENT
Start: 2023-01-17 | End: 2023-01-18 | Stop reason: HOSPADM

## 2023-01-17 RX ORDER — CHLORHEXIDINE GLUCONATE ORAL RINSE 1.2 MG/ML
10 SOLUTION DENTAL ONCE
Status: CANCELLED | OUTPATIENT
Start: 2023-01-17 | End: 2023-01-17

## 2023-01-17 RX ORDER — ACETAMINOPHEN 325 MG/1
975 TABLET ORAL ONCE
Status: CANCELLED | OUTPATIENT
Start: 2023-01-17 | End: 2023-01-17

## 2023-01-17 RX ORDER — LISINOPRIL 10 MG/1
10 TABLET ORAL DAILY
Qty: 90 TABLET | Refills: 3 | Status: ON HOLD | OUTPATIENT
Start: 2023-01-17 | End: 2023-01-31

## 2023-01-17 RX ORDER — FENTANYL CITRATE 50 UG/ML
25-50 INJECTION, SOLUTION INTRAMUSCULAR; INTRAVENOUS
Status: DISCONTINUED | OUTPATIENT
Start: 2023-01-17 | End: 2023-01-18 | Stop reason: HOSPADM

## 2023-01-17 RX ORDER — FAMOTIDINE 20 MG/1
20 TABLET, FILM COATED ORAL
Status: CANCELLED | OUTPATIENT
Start: 2023-01-17

## 2023-01-17 RX ORDER — CEFAZOLIN SODIUM/WATER 2 G/20 ML
2 SYRINGE (ML) INTRAVENOUS SEE ADMIN INSTRUCTIONS
Status: DISCONTINUED | OUTPATIENT
Start: 2023-01-17 | End: 2023-01-18 | Stop reason: HOSPADM

## 2023-01-17 RX ORDER — NALOXONE HYDROCHLORIDE 0.4 MG/ML
0.4 INJECTION, SOLUTION INTRAMUSCULAR; INTRAVENOUS; SUBCUTANEOUS
Status: DISCONTINUED | OUTPATIENT
Start: 2023-01-17 | End: 2023-01-18 | Stop reason: HOSPADM

## 2023-01-17 RX ORDER — PHENYLEPHRINE HCL IN 0.9% NACL 50MG/250ML
.1-6 PLASTIC BAG, INJECTION (ML) INTRAVENOUS CONTINUOUS
Status: CANCELLED | OUTPATIENT
Start: 2023-01-18

## 2023-01-17 RX ORDER — DEXMEDETOMIDINE HYDROCHLORIDE 4 UG/ML
.2-1.2 INJECTION, SOLUTION INTRAVENOUS CONTINUOUS
Status: DISCONTINUED | OUTPATIENT
Start: 2023-01-17 | End: 2023-01-18

## 2023-01-17 RX ORDER — NOREPINEPHRINE BITARTRATE 0.06 MG/ML
.01-.1 INJECTION, SOLUTION INTRAVENOUS CONTINUOUS
Status: CANCELLED | OUTPATIENT
Start: 2023-01-18

## 2023-01-17 ASSESSMENT — ACTIVITIES OF DAILY LIVING (ADL)
ADLS_ACUITY_SCORE: 20

## 2023-01-17 NOTE — TELEPHONE ENCOUNTER
Lisinopril 10mg  Last Written Prescription Date:  10/18/2022  Last Fill Quantity: 90 tablet,   # refills: 0  Last Office Visit: 8/29/2022  Future Office visit:    Next 5 appointments (look out 90 days)    Jan 30, 2023  3:15 PM  (Arrive by 3:00 PM)  SHORT with Rock Basilio MD  [facility] ([facility] ) [address] [place] [id number]  [phone]   Mar 07, 2023  2:00 PM  (Arrive by 1:45 PM)  Return Visit with Sierra Velásquez DPM  [facility] ([facility] ) [address] [facility] [id number] [phone]

## 2023-01-17 NOTE — PROGRESS NOTES
All documentation completed by Lane Almeida RN but accidentally signed in under other staff member.     Per OR RN: Dr. Kim has heart transplant case around noon today so pt surgery may be canceled or postponed until later in the day or later date. Awaiting return phone call from OR RN regarding the situation.     Informed the pt of situation. Pausing pre-op care of PT until further information is received from OR RN.

## 2023-01-17 NOTE — PROGRESS NOTES
Case being canceled per Dr. Kim d/t heart transplant case conflicting schedule today.     Awaiting to speak to Dr. Kim before leaving.

## 2023-01-18 ENCOUNTER — APPOINTMENT (OUTPATIENT)
Dept: GENERAL RADIOLOGY | Facility: CLINIC | Age: 68
DRG: 219 | End: 2023-01-18
Attending: STUDENT IN AN ORGANIZED HEALTH CARE EDUCATION/TRAINING PROGRAM
Payer: COMMERCIAL

## 2023-01-18 ENCOUNTER — ANESTHESIA (OUTPATIENT)
Dept: SURGERY | Facility: CLINIC | Age: 68
DRG: 219 | End: 2023-01-18
Payer: COMMERCIAL

## 2023-01-18 ENCOUNTER — APPOINTMENT (OUTPATIENT)
Dept: GENERAL RADIOLOGY | Facility: CLINIC | Age: 68
DRG: 219 | End: 2023-01-18
Attending: SURGERY
Payer: COMMERCIAL

## 2023-01-18 ENCOUNTER — HOSPITAL ENCOUNTER (INPATIENT)
Facility: CLINIC | Age: 68
LOS: 13 days | Discharge: HOME | DRG: 219 | End: 2023-01-31
Attending: SURGERY | Admitting: SURGERY
Payer: COMMERCIAL

## 2023-01-18 DIAGNOSIS — I25.10 CORONARY ARTERY DISEASE INVOLVING NATIVE HEART, UNSPECIFIED VESSEL OR LESION TYPE, UNSPECIFIED WHETHER ANGINA PRESENT: ICD-10-CM

## 2023-01-18 DIAGNOSIS — Z95.1 S/P CABG (CORONARY ARTERY BYPASS GRAFT): Primary | ICD-10-CM

## 2023-01-18 DIAGNOSIS — Z95.2 S/P MVR (MITRAL VALVE REPLACEMENT): ICD-10-CM

## 2023-01-18 DIAGNOSIS — I34.1 MITRAL VALVE PROLAPSE: ICD-10-CM

## 2023-01-18 DIAGNOSIS — I71.40 ABDOMINAL AORTIC ANEURYSM (AAA) WITHOUT RUPTURE (H): Chronic | ICD-10-CM

## 2023-01-18 DIAGNOSIS — I10 BENIGN ESSENTIAL HYPERTENSION: ICD-10-CM

## 2023-01-18 DIAGNOSIS — I51.89 OTHER ILL-DEFINED HEART DISEASES: ICD-10-CM

## 2023-01-18 LAB
ALBUMIN SERPL BCG-MCNC: 3.4 G/DL (ref 3.5–5.2)
ALLEN'S TEST: ABNORMAL
ALLEN'S TEST: ABNORMAL
ALP SERPL-CCNC: 65 U/L (ref 40–129)
ALT SERPL W P-5'-P-CCNC: 20 U/L (ref 10–50)
ANION GAP SERPL CALCULATED.3IONS-SCNC: 16 MMOL/L (ref 7–15)
APTT PPP: 109 SECONDS (ref 22–38)
APTT PPP: 40 SECONDS (ref 22–38)
APTT PPP: 56 SECONDS (ref 22–38)
AST SERPL W P-5'-P-CCNC: ABNORMAL U/L
BASE EXCESS BLDA CALC-SCNC: -0.7 MMOL/L (ref -9.6–2)
BASE EXCESS BLDA CALC-SCNC: -2.1 MMOL/L (ref -9.6–2)
BASE EXCESS BLDA CALC-SCNC: -2.4 MMOL/L (ref -9–1.8)
BASE EXCESS BLDA CALC-SCNC: -2.6 MMOL/L (ref -9.6–2)
BASE EXCESS BLDA CALC-SCNC: -4 MMOL/L (ref -9–1.8)
BASE EXCESS BLDA CALC-SCNC: 0.2 MMOL/L (ref -9.6–2)
BASE EXCESS BLDA CALC-SCNC: 1.2 MMOL/L (ref -9.6–2)
BASE EXCESS BLDA CALC-SCNC: 1.9 MMOL/L (ref -9.6–2)
BASE EXCESS BLDA CALC-SCNC: 2 MMOL/L (ref -9.6–2)
BASE EXCESS BLDA CALC-SCNC: 2.2 MMOL/L (ref -9.6–2)
BASE EXCESS BLDA CALC-SCNC: 2.9 MMOL/L (ref -9.6–2)
BASE EXCESS BLDV CALC-SCNC: -3.9 MMOL/L (ref -7.7–1.9)
BASE EXCESS BLDV CALC-SCNC: 3.3 MMOL/L (ref -8.1–1.9)
BILIRUB SERPL-MCNC: 0.8 MG/DL
BLD PROD TYP BPU: NORMAL
BLOOD COMPONENT TYPE: NORMAL
BUN SERPL-MCNC: 14.5 MG/DL (ref 8–23)
CA-I BLD-MCNC: 4.1 MG/DL (ref 4.4–5.2)
CA-I BLD-MCNC: 4.2 MG/DL (ref 4.4–5.2)
CA-I BLD-MCNC: 4.2 MG/DL (ref 4.4–5.2)
CA-I BLD-MCNC: 4.3 MG/DL (ref 4.4–5.2)
CA-I BLD-MCNC: 4.3 MG/DL (ref 4.4–5.2)
CA-I BLD-MCNC: 4.4 MG/DL (ref 4.4–5.2)
CA-I BLD-MCNC: 4.4 MG/DL (ref 4.4–5.2)
CA-I BLD-MCNC: 4.6 MG/DL (ref 4.4–5.2)
CA-I BLD-MCNC: 4.8 MG/DL (ref 4.4–5.2)
CA-I BLD-MCNC: 5 MG/DL (ref 4.4–5.2)
CA-I BLD-MCNC: 5 MG/DL (ref 4.4–5.2)
CALCIUM SERPL-MCNC: 8.1 MG/DL (ref 8.8–10.2)
CHLORIDE SERPL-SCNC: 108 MMOL/L (ref 98–107)
CODING SYSTEM: NORMAL
CREAT SERPL-MCNC: 1.01 MG/DL (ref 0.67–1.17)
CROSSMATCH: NORMAL
CROSSMATCH: NORMAL
DEPRECATED HCO3 PLAS-SCNC: 21 MMOL/L (ref 22–29)
ERYTHROCYTE [DISTWIDTH] IN BLOOD BY AUTOMATED COUNT: 13.4 % (ref 10–15)
ERYTHROCYTE [DISTWIDTH] IN BLOOD BY AUTOMATED COUNT: 13.4 % (ref 10–15)
FIBRINOGEN PPP-MCNC: 239 MG/DL (ref 170–490)
FIBRINOGEN PPP-MCNC: 241 MG/DL (ref 170–490)
GFR SERPL CREATININE-BSD FRML MDRD: 81 ML/MIN/1.73M2
GLUCOSE BLD-MCNC: 120 MG/DL (ref 70–99)
GLUCOSE BLD-MCNC: 123 MG/DL (ref 70–99)
GLUCOSE BLD-MCNC: 129 MG/DL (ref 70–99)
GLUCOSE BLD-MCNC: 131 MG/DL (ref 70–99)
GLUCOSE BLD-MCNC: 154 MG/DL (ref 70–99)
GLUCOSE BLD-MCNC: 155 MG/DL (ref 70–99)
GLUCOSE BLD-MCNC: 174 MG/DL (ref 70–99)
GLUCOSE BLD-MCNC: 186 MG/DL (ref 70–99)
GLUCOSE BLD-MCNC: 190 MG/DL (ref 70–99)
GLUCOSE BLD-MCNC: 199 MG/DL (ref 70–99)
GLUCOSE BLDC GLUCOMTR-MCNC: 127 MG/DL (ref 70–99)
GLUCOSE BLDC GLUCOMTR-MCNC: 147 MG/DL (ref 70–99)
GLUCOSE BLDC GLUCOMTR-MCNC: 163 MG/DL (ref 70–99)
GLUCOSE BLDC GLUCOMTR-MCNC: 179 MG/DL (ref 70–99)
GLUCOSE BLDC GLUCOMTR-MCNC: 197 MG/DL (ref 70–99)
GLUCOSE BLDC GLUCOMTR-MCNC: 205 MG/DL (ref 70–99)
GLUCOSE SERPL-MCNC: 158 MG/DL (ref 70–99)
HCO3 BLD-SCNC: 23 MMOL/L (ref 21–28)
HCO3 BLD-SCNC: 25 MMOL/L (ref 21–28)
HCO3 BLDA-SCNC: 25 MMOL/L (ref 21–28)
HCO3 BLDA-SCNC: 26 MMOL/L (ref 21–28)
HCO3 BLDA-SCNC: 27 MMOL/L (ref 21–28)
HCO3 BLDA-SCNC: 27 MMOL/L (ref 21–28)
HCO3 BLDA-SCNC: 28 MMOL/L (ref 21–28)
HCO3 BLDA-SCNC: 28 MMOL/L (ref 21–28)
HCO3 BLDA-SCNC: 29 MMOL/L (ref 21–28)
HCO3 BLDV-SCNC: 24 MMOL/L (ref 21–28)
HCO3 BLDV-SCNC: 31 MMOL/L (ref 21–28)
HCT VFR BLD AUTO: 32.3 % (ref 40–53)
HCT VFR BLD AUTO: 34.6 % (ref 40–53)
HGB BLD-MCNC: 10 G/DL (ref 13.3–17.7)
HGB BLD-MCNC: 10.1 G/DL (ref 13.3–17.7)
HGB BLD-MCNC: 10.3 G/DL (ref 13.3–17.7)
HGB BLD-MCNC: 10.7 G/DL (ref 13.3–17.7)
HGB BLD-MCNC: 10.9 G/DL (ref 13.3–17.7)
HGB BLD-MCNC: 11.7 G/DL (ref 13.3–17.7)
HGB BLD-MCNC: 13 G/DL (ref 13.3–17.7)
HGB BLD-MCNC: 9.5 G/DL (ref 13.3–17.7)
HGB BLD-MCNC: 9.5 G/DL (ref 13.3–17.7)
HGB BLD-MCNC: 9.7 G/DL (ref 13.3–17.7)
HGB BLD-MCNC: 9.8 G/DL (ref 13.3–17.7)
HGB BLD-MCNC: 9.9 G/DL (ref 13.3–17.7)
INR PPP: 1.21 (ref 0.85–1.15)
INR PPP: 1.25 (ref 0.85–1.15)
INR PPP: 1.4 (ref 0.85–1.15)
ISSUE DATE AND TIME: NORMAL
LACTATE BLD-SCNC: 1.3 MMOL/L
LACTATE BLD-SCNC: 1.7 MMOL/L
LACTATE BLD-SCNC: 1.8 MMOL/L
LACTATE BLD-SCNC: 1.8 MMOL/L
LACTATE BLD-SCNC: 2 MMOL/L
LACTATE BLD-SCNC: 2.5 MMOL/L
LACTATE BLD-SCNC: 2.6 MMOL/L
LACTATE BLD-SCNC: 2.6 MMOL/L
LACTATE BLD-SCNC: 3.2 MMOL/L
LACTATE BLD-SCNC: 3.4 MMOL/L
LACTATE SERPL-SCNC: 3.1 MMOL/L (ref 0.7–2)
LACTATE SERPL-SCNC: 4.8 MMOL/L (ref 0.7–2)
MAGNESIUM SERPL-MCNC: 2.6 MG/DL (ref 1.7–2.3)
MCH RBC QN AUTO: 29.6 PG (ref 26.5–33)
MCH RBC QN AUTO: 29.9 PG (ref 26.5–33)
MCHC RBC AUTO-ENTMCNC: 31 G/DL (ref 31.5–36.5)
MCHC RBC AUTO-ENTMCNC: 31.5 G/DL (ref 31.5–36.5)
MCV RBC AUTO: 95 FL (ref 78–100)
MCV RBC AUTO: 96 FL (ref 78–100)
O2/TOTAL GAS SETTING VFR VENT: 100 %
O2/TOTAL GAS SETTING VFR VENT: 100 %
O2/TOTAL GAS SETTING VFR VENT: 60 %
O2/TOTAL GAS SETTING VFR VENT: 70 %
O2/TOTAL GAS SETTING VFR VENT: 80 %
OXYHGB MFR BLD: 96 % (ref 92–100)
OXYHGB MFR BLD: 97 % (ref 92–100)
OXYHGB MFR BLDA: 98 % (ref 92–100)
OXYHGB MFR BLDV: 71 % (ref 70–75)
OXYHGB MFR BLDV: 80 % (ref 92–100)
PCO2 BLD: 51 MM HG (ref 35–45)
PCO2 BLD: 55 MM HG (ref 35–45)
PCO2 BLDA: 44 MM HG (ref 35–45)
PCO2 BLDA: 45 MM HG (ref 35–45)
PCO2 BLDA: 46 MM HG (ref 35–45)
PCO2 BLDA: 46 MM HG (ref 35–45)
PCO2 BLDA: 49 MM HG (ref 35–45)
PCO2 BLDA: 49 MM HG (ref 35–45)
PCO2 BLDA: 51 MM HG (ref 35–45)
PCO2 BLDA: 54 MM HG (ref 35–45)
PCO2 BLDA: 55 MM HG (ref 35–45)
PCO2 BLDV: 59 MM HG (ref 40–50)
PCO2 BLDV: 60 MM HG (ref 40–50)
PH BLD: 7.26 [PH] (ref 7.35–7.45)
PH BLD: 7.27 [PH] (ref 7.35–7.45)
PH BLDA: 7.26 [PH] (ref 7.35–7.45)
PH BLDA: 7.29 [PH] (ref 7.35–7.45)
PH BLDA: 7.34 [PH] (ref 7.35–7.45)
PH BLDA: 7.35 [PH] (ref 7.35–7.45)
PH BLDA: 7.37 [PH] (ref 7.35–7.45)
PH BLDA: 7.38 [PH] (ref 7.35–7.45)
PH BLDA: 7.4 [PH] (ref 7.35–7.45)
PH BLDV: 7.22 [PH] (ref 7.32–7.43)
PH BLDV: 7.31 [PH] (ref 7.32–7.43)
PHOSPHATE SERPL-MCNC: 2.3 MG/DL (ref 2.5–4.5)
PLATELET # BLD AUTO: 154 10E3/UL (ref 150–450)
PLATELET # BLD AUTO: 180 10E3/UL (ref 150–450)
PLATELET # BLD AUTO: 187 10E3/UL (ref 150–450)
PO2 BLD: 111 MM HG (ref 80–105)
PO2 BLD: 140 MM HG (ref 80–105)
PO2 BLDA: 294 MM HG (ref 80–105)
PO2 BLDA: 301 MM HG (ref 80–105)
PO2 BLDA: 311 MM HG (ref 80–105)
PO2 BLDA: 313 MM HG (ref 80–105)
PO2 BLDA: 341 MM HG (ref 80–105)
PO2 BLDA: 379 MM HG (ref 80–105)
PO2 BLDA: 392 MM HG (ref 80–105)
PO2 BLDA: 406 MM HG (ref 80–105)
PO2 BLDA: 410 MM HG (ref 80–105)
PO2 BLDV: 44 MM HG (ref 25–47)
PO2 BLDV: 48 MM HG (ref 25–47)
POTASSIUM BLD-SCNC: 4 MMOL/L (ref 3.5–5)
POTASSIUM BLD-SCNC: 4 MMOL/L (ref 3.5–5)
POTASSIUM BLD-SCNC: 4.1 MMOL/L (ref 3.5–5)
POTASSIUM BLD-SCNC: 4.2 MMOL/L (ref 3.5–5)
POTASSIUM BLD-SCNC: 4.4 MMOL/L (ref 3.5–5)
POTASSIUM BLD-SCNC: 4.5 MMOL/L (ref 3.5–5)
POTASSIUM BLD-SCNC: 5 MMOL/L (ref 3.5–5)
POTASSIUM BLD-SCNC: 5.2 MMOL/L (ref 3.5–5)
POTASSIUM BLD-SCNC: 5.3 MMOL/L (ref 3.5–5)
POTASSIUM BLD-SCNC: 5.4 MMOL/L (ref 3.5–5)
POTASSIUM SERPL-SCNC: 4.4 MMOL/L (ref 3.4–5.3)
PREALB SERPL IA-MCNC: 27 MG/DL (ref 15–45)
PROT SERPL-MCNC: 5.2 G/DL (ref 6.4–8.3)
RADIOLOGIST FLAGS: ABNORMAL
RBC # BLD AUTO: 3.38 10E6/UL (ref 4.4–5.9)
RBC # BLD AUTO: 3.64 10E6/UL (ref 4.4–5.9)
SODIUM BLD-SCNC: 141 MMOL/L (ref 133–144)
SODIUM BLD-SCNC: 141 MMOL/L (ref 133–144)
SODIUM BLD-SCNC: 142 MMOL/L (ref 133–144)
SODIUM BLD-SCNC: 143 MMOL/L (ref 133–144)
SODIUM BLD-SCNC: 143 MMOL/L (ref 133–144)
SODIUM BLD-SCNC: 144 MMOL/L (ref 133–144)
SODIUM SERPL-SCNC: 145 MMOL/L (ref 136–145)
UNIT ABO/RH: NORMAL
UNIT NUMBER: NORMAL
UNIT STATUS: NORMAL
UNIT TYPE ISBT: 6200
WBC # BLD AUTO: 19.2 10E3/UL (ref 4–11)
WBC # BLD AUTO: 22.1 10E3/UL (ref 4–11)

## 2023-01-18 PROCEDURE — 250N000009 HC RX 250: Performed by: NURSE ANESTHETIST, CERTIFIED REGISTERED

## 2023-01-18 PROCEDURE — 02UG0JZ SUPPLEMENT MITRAL VALVE WITH SYNTHETIC SUBSTITUTE, OPEN APPROACH: ICD-10-PCS | Performed by: SURGERY

## 2023-01-18 PROCEDURE — 250N000011 HC RX IP 250 OP 636: Performed by: SURGERY

## 2023-01-18 PROCEDURE — 272N000001 HC OR GENERAL SUPPLY STERILE: Performed by: SURGERY

## 2023-01-18 PROCEDURE — 250N000009 HC RX 250: Performed by: SURGERY

## 2023-01-18 PROCEDURE — 250N000011 HC RX IP 250 OP 636: Performed by: STUDENT IN AN ORGANIZED HEALTH CARE EDUCATION/TRAINING PROGRAM

## 2023-01-18 PROCEDURE — 82330 ASSAY OF CALCIUM: CPT | Performed by: SURGERY

## 2023-01-18 PROCEDURE — 83605 ASSAY OF LACTIC ACID: CPT | Performed by: STUDENT IN AN ORGANIZED HEALTH CARE EDUCATION/TRAINING PROGRAM

## 2023-01-18 PROCEDURE — C1713 ANCHOR/SCREW BN/BN,TIS/BN: HCPCS | Performed by: SURGERY

## 2023-01-18 PROCEDURE — 82805 BLOOD GASES W/O2 SATURATION: CPT

## 2023-01-18 PROCEDURE — 250N000011 HC RX IP 250 OP 636: Performed by: NURSE ANESTHETIST, CERTIFIED REGISTERED

## 2023-01-18 PROCEDURE — 93005 ELECTROCARDIOGRAM TRACING: CPT

## 2023-01-18 PROCEDURE — 86923 COMPATIBILITY TEST ELECTRIC: CPT | Performed by: SURGERY

## 2023-01-18 PROCEDURE — 84155 ASSAY OF PROTEIN SERUM: CPT | Performed by: SURGERY

## 2023-01-18 PROCEDURE — 82330 ASSAY OF CALCIUM: CPT | Performed by: STUDENT IN AN ORGANIZED HEALTH CARE EDUCATION/TRAINING PROGRAM

## 2023-01-18 PROCEDURE — 250N000011 HC RX IP 250 OP 636

## 2023-01-18 PROCEDURE — 71045 X-RAY EXAM CHEST 1 VIEW: CPT | Mod: 26 | Performed by: RADIOLOGY

## 2023-01-18 PROCEDURE — 85730 THROMBOPLASTIN TIME PARTIAL: CPT | Performed by: STUDENT IN AN ORGANIZED HEALTH CARE EDUCATION/TRAINING PROGRAM

## 2023-01-18 PROCEDURE — C1898 LEAD, PMKR, OTHER THAN TRANS: HCPCS | Performed by: SURGERY

## 2023-01-18 PROCEDURE — 33508 ENDOSCOPIC VEIN HARVEST: CPT | Mod: XU | Performed by: SURGERY

## 2023-01-18 PROCEDURE — 86923 COMPATIBILITY TEST ELECTRIC: CPT

## 2023-01-18 PROCEDURE — 258N000003 HC RX IP 258 OP 636: Performed by: SURGERY

## 2023-01-18 PROCEDURE — 84295 ASSAY OF SERUM SODIUM: CPT

## 2023-01-18 PROCEDURE — 85396 CLOTTING ASSAY WHOLE BLOOD: CPT

## 2023-01-18 PROCEDURE — 85610 PROTHROMBIN TIME: CPT | Performed by: SURGERY

## 2023-01-18 PROCEDURE — 06BN4ZZ EXCISION OF LEFT FEMORAL VEIN, PERCUTANEOUS ENDOSCOPIC APPROACH: ICD-10-PCS | Performed by: SURGERY

## 2023-01-18 PROCEDURE — 999N000065 XR CHEST PORT 1 VIEW

## 2023-01-18 PROCEDURE — 85730 THROMBOPLASTIN TIME PARTIAL: CPT | Performed by: SURGERY

## 2023-01-18 PROCEDURE — 200N000002 HC R&B ICU UMMC

## 2023-01-18 PROCEDURE — 999N000248 HC STATISTIC IV INSERT WITH US BY RN

## 2023-01-18 PROCEDURE — 250N000009 HC RX 250: Performed by: STUDENT IN AN ORGANIZED HEALTH CARE EDUCATION/TRAINING PROGRAM

## 2023-01-18 PROCEDURE — 272N000088 HC PUMP APP ADULT PERFUSION: Performed by: SURGERY

## 2023-01-18 PROCEDURE — 85049 AUTOMATED PLATELET COUNT: CPT | Performed by: SURGERY

## 2023-01-18 PROCEDURE — 999N000157 HC STATISTIC RCP TIME EA 10 MIN

## 2023-01-18 PROCEDURE — 83605 ASSAY OF LACTIC ACID: CPT | Performed by: SURGERY

## 2023-01-18 PROCEDURE — 83735 ASSAY OF MAGNESIUM: CPT | Performed by: SURGERY

## 2023-01-18 PROCEDURE — 85384 FIBRINOGEN ACTIVITY: CPT | Performed by: STUDENT IN AN ORGANIZED HEALTH CARE EDUCATION/TRAINING PROGRAM

## 2023-01-18 PROCEDURE — 272N000002 HC OR SUPPLY OTHER OPNP: Performed by: SURGERY

## 2023-01-18 PROCEDURE — P9045 ALBUMIN (HUMAN), 5%, 250 ML: HCPCS | Performed by: STUDENT IN AN ORGANIZED HEALTH CARE EDUCATION/TRAINING PROGRAM

## 2023-01-18 PROCEDURE — 83605 ASSAY OF LACTIC ACID: CPT

## 2023-01-18 PROCEDURE — 86923 COMPATIBILITY TEST ELECTRIC: CPT | Performed by: STUDENT IN AN ORGANIZED HEALTH CARE EDUCATION/TRAINING PROGRAM

## 2023-01-18 PROCEDURE — 83735 ASSAY OF MAGNESIUM: CPT | Performed by: STUDENT IN AN ORGANIZED HEALTH CARE EDUCATION/TRAINING PROGRAM

## 2023-01-18 PROCEDURE — 272N000085 HC PACK CELL SAVER CSP: Performed by: SURGERY

## 2023-01-18 PROCEDURE — 258N000003 HC RX IP 258 OP 636

## 2023-01-18 PROCEDURE — 02R90JZ REPLACEMENT OF CHORDAE TENDINEAE WITH SYNTHETIC SUBSTITUTE, OPEN APPROACH: ICD-10-PCS | Performed by: SURGERY

## 2023-01-18 PROCEDURE — 82947 ASSAY GLUCOSE BLOOD QUANT: CPT

## 2023-01-18 PROCEDURE — 5A1221Z PERFORMANCE OF CARDIAC OUTPUT, CONTINUOUS: ICD-10-PCS | Performed by: SURGERY

## 2023-01-18 PROCEDURE — 278N000051 HC OR IMPLANT GENERAL: Performed by: SURGERY

## 2023-01-18 PROCEDURE — 82805 BLOOD GASES W/O2 SATURATION: CPT | Performed by: STUDENT IN AN ORGANIZED HEALTH CARE EDUCATION/TRAINING PROGRAM

## 2023-01-18 PROCEDURE — 410N000004: Performed by: SURGERY

## 2023-01-18 PROCEDURE — 3E043XZ INTRODUCTION OF VASOPRESSOR INTO CENTRAL VEIN, PERCUTANEOUS APPROACH: ICD-10-PCS | Performed by: SURGERY

## 2023-01-18 PROCEDURE — 74018 RADEX ABDOMEN 1 VIEW: CPT | Mod: 26 | Performed by: RADIOLOGY

## 2023-01-18 PROCEDURE — 85049 AUTOMATED PLATELET COUNT: CPT | Performed by: STUDENT IN AN ORGANIZED HEALTH CARE EDUCATION/TRAINING PROGRAM

## 2023-01-18 PROCEDURE — 271N000002 HC RX 271

## 2023-01-18 PROCEDURE — 93010 ELECTROCARDIOGRAM REPORT: CPT | Mod: 59 | Performed by: INTERNAL MEDICINE

## 2023-01-18 PROCEDURE — 250N000024 HC ISOFLURANE, PER MIN: Performed by: SURGERY

## 2023-01-18 PROCEDURE — 370N000017 HC ANESTHESIA TECHNICAL FEE, PER MIN: Performed by: SURGERY

## 2023-01-18 PROCEDURE — 94002 VENT MGMT INPAT INIT DAY: CPT

## 2023-01-18 PROCEDURE — 250N000009 HC RX 250

## 2023-01-18 PROCEDURE — 258N000003 HC RX IP 258 OP 636: Performed by: STUDENT IN AN ORGANIZED HEALTH CARE EDUCATION/TRAINING PROGRAM

## 2023-01-18 PROCEDURE — 85384 FIBRINOGEN ACTIVITY: CPT | Performed by: SURGERY

## 2023-01-18 PROCEDURE — 85396 CLOTTING ASSAY WHOLE BLOOD: CPT | Performed by: SURGERY

## 2023-01-18 PROCEDURE — 410N000003 HC PER-PERFUSION 1ST 30 MIN: Performed by: SURGERY

## 2023-01-18 PROCEDURE — 85610 PROTHROMBIN TIME: CPT | Performed by: STUDENT IN AN ORGANIZED HEALTH CARE EDUCATION/TRAINING PROGRAM

## 2023-01-18 PROCEDURE — 82330 ASSAY OF CALCIUM: CPT

## 2023-01-18 PROCEDURE — 360N000079 HC SURGERY LEVEL 6, PER MIN: Performed by: SURGERY

## 2023-01-18 PROCEDURE — 33426 REPAIR OF MITRAL VALVE: CPT | Performed by: SURGERY

## 2023-01-18 PROCEDURE — 84132 ASSAY OF SERUM POTASSIUM: CPT

## 2023-01-18 PROCEDURE — 82810 BLOOD GASES O2 SAT ONLY: CPT

## 2023-01-18 PROCEDURE — 82962 GLUCOSE BLOOD TEST: CPT

## 2023-01-18 PROCEDURE — 02110A3 BYPASS CORONARY ARTERY, TWO ARTERIES FROM CORONARY ARTERY WITH AUTOLOGOUS ARTERIAL TISSUE, OPEN APPROACH: ICD-10-PCS | Performed by: SURGERY

## 2023-01-18 PROCEDURE — 84100 ASSAY OF PHOSPHORUS: CPT | Performed by: SURGERY

## 2023-01-18 PROCEDURE — 999N000065 XR ABDOMEN PORT 1 VIEW

## 2023-01-18 PROCEDURE — 85018 HEMOGLOBIN: CPT

## 2023-01-18 PROCEDURE — 33511 CABG VEIN TWO: CPT | Mod: 51 | Performed by: SURGERY

## 2023-01-18 PROCEDURE — 250N000013 HC RX MED GY IP 250 OP 250 PS 637: Performed by: SURGERY

## 2023-01-18 PROCEDURE — P9037 PLATE PHERES LEUKOREDU IRRAD: HCPCS | Performed by: STUDENT IN AN ORGANIZED HEALTH CARE EDUCATION/TRAINING PROGRAM

## 2023-01-18 PROCEDURE — 999N000141 HC STATISTIC PRE-PROCEDURE NURSING ASSESSMENT: Performed by: SURGERY

## 2023-01-18 DEVICE — CARPENTIER-EDWARDS PHYSIO II ANNULOPLASTY RING
Type: IMPLANTABLE DEVICE | Site: HEART | Status: FUNCTIONAL
Brand: CARPENTIER-EDWARDS PHYSIO II ANNULOPLASTY RING

## 2023-01-18 DEVICE — COR-KNOT® QUICK LOAD® SINGLES
Type: IMPLANTABLE DEVICE | Site: HEART | Status: FUNCTIONAL
Brand: COR-KNOT® QUICK LOAD®

## 2023-01-18 DEVICE — COR-KNOT MINI® COMBO KIT
Type: IMPLANTABLE DEVICE | Site: HEART | Status: FUNCTIONAL
Brand: COR-KNOT MINI®

## 2023-01-18 RX ORDER — PROTAMINE SULFATE 10 MG/ML
INJECTION, SOLUTION INTRAVENOUS PRN
Status: DISCONTINUED | OUTPATIENT
Start: 2023-01-18 | End: 2023-01-18

## 2023-01-18 RX ORDER — AMOXICILLIN 250 MG
1 CAPSULE ORAL 2 TIMES DAILY
Status: DISCONTINUED | OUTPATIENT
Start: 2023-01-18 | End: 2023-01-21

## 2023-01-18 RX ORDER — ACETAMINOPHEN 325 MG/1
975 TABLET ORAL EVERY 8 HOURS
Status: COMPLETED | OUTPATIENT
Start: 2023-01-18 | End: 2023-01-21

## 2023-01-18 RX ORDER — LIDOCAINE 40 MG/G
CREAM TOPICAL
Status: DISCONTINUED | OUTPATIENT
Start: 2023-01-18 | End: 2023-01-18 | Stop reason: HOSPADM

## 2023-01-18 RX ORDER — FENTANYL CITRATE 50 UG/ML
50 INJECTION, SOLUTION INTRAMUSCULAR; INTRAVENOUS ONCE
Status: COMPLETED | OUTPATIENT
Start: 2023-01-18 | End: 2023-01-18

## 2023-01-18 RX ORDER — CEFAZOLIN SODIUM 2 G/100ML
2 INJECTION, SOLUTION INTRAVENOUS EVERY 8 HOURS
Status: COMPLETED | OUTPATIENT
Start: 2023-01-18 | End: 2023-01-19

## 2023-01-18 RX ORDER — SODIUM CHLORIDE, SODIUM LACTATE, POTASSIUM CHLORIDE, CALCIUM CHLORIDE 600; 310; 30; 20 MG/100ML; MG/100ML; MG/100ML; MG/100ML
INJECTION, SOLUTION INTRAVENOUS CONTINUOUS PRN
Status: DISCONTINUED | OUTPATIENT
Start: 2023-01-18 | End: 2023-01-18

## 2023-01-18 RX ORDER — DEXTROSE MONOHYDRATE 25 G/50ML
25-50 INJECTION, SOLUTION INTRAVENOUS
Status: DISCONTINUED | OUTPATIENT
Start: 2023-01-18 | End: 2023-01-18

## 2023-01-18 RX ORDER — DEXMEDETOMIDINE HYDROCHLORIDE 4 UG/ML
.2-1.2 INJECTION, SOLUTION INTRAVENOUS CONTINUOUS
Status: DISCONTINUED | OUTPATIENT
Start: 2023-01-18 | End: 2023-01-18

## 2023-01-18 RX ORDER — MULTIVITAMIN,THERAPEUTIC
1 TABLET ORAL EVERY MORNING
Status: DISCONTINUED | OUTPATIENT
Start: 2023-01-20 | End: 2023-01-31 | Stop reason: HOSPADM

## 2023-01-18 RX ORDER — SODIUM CHLORIDE, SODIUM GLUCONATE, SODIUM ACETATE, POTASSIUM CHLORIDE AND MAGNESIUM CHLORIDE 526; 502; 368; 37; 30 MG/100ML; MG/100ML; MG/100ML; MG/100ML; MG/100ML
INJECTION, SOLUTION INTRAVENOUS CONTINUOUS PRN
Status: DISCONTINUED | OUTPATIENT
Start: 2023-01-18 | End: 2023-01-18

## 2023-01-18 RX ORDER — NICOTINE POLACRILEX 4 MG
15-30 LOZENGE BUCCAL
Status: DISCONTINUED | OUTPATIENT
Start: 2023-01-18 | End: 2023-01-18

## 2023-01-18 RX ORDER — DEXMEDETOMIDINE HYDROCHLORIDE 4 UG/ML
.2-.7 INJECTION, SOLUTION INTRAVENOUS CONTINUOUS
Status: DISCONTINUED | OUTPATIENT
Start: 2023-01-18 | End: 2023-01-20

## 2023-01-18 RX ORDER — ONDANSETRON 2 MG/ML
4 INJECTION INTRAMUSCULAR; INTRAVENOUS EVERY 6 HOURS PRN
Status: DISCONTINUED | OUTPATIENT
Start: 2023-01-18 | End: 2023-01-31 | Stop reason: HOSPADM

## 2023-01-18 RX ORDER — GABAPENTIN 100 MG/1
100 CAPSULE ORAL
Status: COMPLETED | OUTPATIENT
Start: 2023-01-18 | End: 2023-01-18

## 2023-01-18 RX ORDER — ACETAMINOPHEN 325 MG/1
650 TABLET ORAL EVERY 4 HOURS PRN
Status: DISCONTINUED | OUTPATIENT
Start: 2023-01-21 | End: 2023-01-31 | Stop reason: HOSPADM

## 2023-01-18 RX ORDER — ASPIRIN 81 MG/1
162 TABLET, CHEWABLE ORAL
Status: COMPLETED | OUTPATIENT
Start: 2023-01-18 | End: 2023-01-18

## 2023-01-18 RX ORDER — OXYCODONE HYDROCHLORIDE 10 MG/1
10 TABLET ORAL EVERY 4 HOURS PRN
Status: DISCONTINUED | OUTPATIENT
Start: 2023-01-18 | End: 2023-01-31 | Stop reason: HOSPADM

## 2023-01-18 RX ORDER — DEXTROSE MONOHYDRATE 25 G/50ML
25-50 INJECTION, SOLUTION INTRAVENOUS
Status: DISCONTINUED | OUTPATIENT
Start: 2023-01-18 | End: 2023-01-31 | Stop reason: HOSPADM

## 2023-01-18 RX ORDER — NOREPINEPHRINE BITARTRATE 0.06 MG/ML
.01-.6 INJECTION, SOLUTION INTRAVENOUS CONTINUOUS
Status: DISCONTINUED | OUTPATIENT
Start: 2023-01-18 | End: 2023-01-21

## 2023-01-18 RX ORDER — HYDRALAZINE HYDROCHLORIDE 20 MG/ML
10 INJECTION INTRAMUSCULAR; INTRAVENOUS EVERY 30 MIN PRN
Status: DISCONTINUED | OUTPATIENT
Start: 2023-01-18 | End: 2023-01-27

## 2023-01-18 RX ORDER — NOREPINEPHRINE BITARTRATE 0.06 MG/ML
.01-.1 INJECTION, SOLUTION INTRAVENOUS CONTINUOUS
Status: DISCONTINUED | OUTPATIENT
Start: 2023-01-18 | End: 2023-01-18 | Stop reason: HOSPADM

## 2023-01-18 RX ORDER — PROPOFOL 10 MG/ML
INJECTION, EMULSION INTRAVENOUS PRN
Status: DISCONTINUED | OUTPATIENT
Start: 2023-01-18 | End: 2023-01-18

## 2023-01-18 RX ORDER — CEFAZOLIN SODIUM/WATER 2 G/20 ML
2 SYRINGE (ML) INTRAVENOUS SEE ADMIN INSTRUCTIONS
Status: DISCONTINUED | OUTPATIENT
Start: 2023-01-18 | End: 2023-01-18 | Stop reason: HOSPADM

## 2023-01-18 RX ORDER — NALOXONE HYDROCHLORIDE 0.4 MG/ML
0.4 INJECTION, SOLUTION INTRAMUSCULAR; INTRAVENOUS; SUBCUTANEOUS
Status: DISCONTINUED | OUTPATIENT
Start: 2023-01-18 | End: 2023-01-31 | Stop reason: HOSPADM

## 2023-01-18 RX ORDER — HYDROMORPHONE HCL IN WATER/PF 6 MG/30 ML
0.2 PATIENT CONTROLLED ANALGESIA SYRINGE INTRAVENOUS
Status: DISCONTINUED | OUTPATIENT
Start: 2023-01-18 | End: 2023-01-27

## 2023-01-18 RX ORDER — CHLORHEXIDINE GLUCONATE ORAL RINSE 1.2 MG/ML
10 SOLUTION DENTAL ONCE
Status: COMPLETED | OUTPATIENT
Start: 2023-01-18 | End: 2023-01-18

## 2023-01-18 RX ORDER — ASPIRIN 81 MG/1
162 TABLET, CHEWABLE ORAL DAILY
Status: DISCONTINUED | OUTPATIENT
Start: 2023-01-19 | End: 2023-01-31 | Stop reason: HOSPADM

## 2023-01-18 RX ORDER — PHENYLEPHRINE HCL IN 0.9% NACL 50MG/250ML
.1-6 PLASTIC BAG, INJECTION (ML) INTRAVENOUS CONTINUOUS
Status: DISCONTINUED | OUTPATIENT
Start: 2023-01-18 | End: 2023-01-18

## 2023-01-18 RX ORDER — LIDOCAINE HYDROCHLORIDE 20 MG/ML
INJECTION, SOLUTION INFILTRATION; PERINEURAL PRN
Status: DISCONTINUED | OUTPATIENT
Start: 2023-01-18 | End: 2023-01-18

## 2023-01-18 RX ORDER — HEPARIN SODIUM 1000 [USP'U]/ML
INJECTION, SOLUTION INTRAVENOUS; SUBCUTANEOUS PRN
Status: DISCONTINUED | OUTPATIENT
Start: 2023-01-18 | End: 2023-01-18

## 2023-01-18 RX ORDER — BISACODYL 10 MG
10 SUPPOSITORY, RECTAL RECTAL DAILY PRN
Status: DISCONTINUED | OUTPATIENT
Start: 2023-01-18 | End: 2023-01-31 | Stop reason: HOSPADM

## 2023-01-18 RX ORDER — HEPARIN SODIUM 5000 [USP'U]/.5ML
5000 INJECTION, SOLUTION INTRAVENOUS; SUBCUTANEOUS EVERY 8 HOURS
Status: DISCONTINUED | OUTPATIENT
Start: 2023-01-19 | End: 2023-01-31 | Stop reason: HOSPADM

## 2023-01-18 RX ORDER — HYDROMORPHONE HCL IN WATER/PF 6 MG/30 ML
0.4 PATIENT CONTROLLED ANALGESIA SYRINGE INTRAVENOUS
Status: DISCONTINUED | OUTPATIENT
Start: 2023-01-18 | End: 2023-01-27

## 2023-01-18 RX ORDER — PROPOFOL 10 MG/ML
5-75 INJECTION, EMULSION INTRAVENOUS CONTINUOUS
Status: DISCONTINUED | OUTPATIENT
Start: 2023-01-18 | End: 2023-01-19

## 2023-01-18 RX ORDER — OXYCODONE HYDROCHLORIDE 5 MG/1
5 TABLET ORAL EVERY 4 HOURS PRN
Status: DISCONTINUED | OUTPATIENT
Start: 2023-01-18 | End: 2023-01-31 | Stop reason: HOSPADM

## 2023-01-18 RX ORDER — PROPOFOL 10 MG/ML
INJECTION, EMULSION INTRAVENOUS CONTINUOUS PRN
Status: DISCONTINUED | OUTPATIENT
Start: 2023-01-18 | End: 2023-01-18

## 2023-01-18 RX ORDER — ACETAMINOPHEN 325 MG/1
975 TABLET ORAL ONCE
Status: COMPLETED | OUTPATIENT
Start: 2023-01-18 | End: 2023-01-18

## 2023-01-18 RX ORDER — DEXTROSE MONOHYDRATE 100 MG/ML
INJECTION, SOLUTION INTRAVENOUS CONTINUOUS PRN
Status: DISCONTINUED | OUTPATIENT
Start: 2023-01-18 | End: 2023-01-31 | Stop reason: HOSPADM

## 2023-01-18 RX ORDER — NALOXONE HYDROCHLORIDE 0.4 MG/ML
0.2 INJECTION, SOLUTION INTRAMUSCULAR; INTRAVENOUS; SUBCUTANEOUS
Status: DISCONTINUED | OUTPATIENT
Start: 2023-01-18 | End: 2023-01-31 | Stop reason: HOSPADM

## 2023-01-18 RX ORDER — PROCHLORPERAZINE MALEATE 5 MG
5 TABLET ORAL EVERY 6 HOURS PRN
Status: DISCONTINUED | OUTPATIENT
Start: 2023-01-18 | End: 2023-01-29

## 2023-01-18 RX ORDER — ARIPIPRAZOLE 5 MG/1
5 TABLET ORAL EVERY MORNING
Status: DISCONTINUED | OUTPATIENT
Start: 2023-01-20 | End: 2023-01-31 | Stop reason: HOSPADM

## 2023-01-18 RX ORDER — METHOCARBAMOL 500 MG/1
500 TABLET, FILM COATED ORAL EVERY 6 HOURS PRN
Status: DISCONTINUED | OUTPATIENT
Start: 2023-01-18 | End: 2023-01-31 | Stop reason: HOSPADM

## 2023-01-18 RX ORDER — FAMOTIDINE 20 MG/1
20 TABLET, FILM COATED ORAL
Status: COMPLETED | OUTPATIENT
Start: 2023-01-18 | End: 2023-01-18

## 2023-01-18 RX ORDER — POLYETHYLENE GLYCOL 3350 17 G/17G
17 POWDER, FOR SOLUTION ORAL DAILY
Status: DISCONTINUED | OUTPATIENT
Start: 2023-01-19 | End: 2023-01-21

## 2023-01-18 RX ORDER — NICOTINE POLACRILEX 4 MG
15-30 LOZENGE BUCCAL
Status: DISCONTINUED | OUTPATIENT
Start: 2023-01-18 | End: 2023-01-31 | Stop reason: HOSPADM

## 2023-01-18 RX ORDER — LIDOCAINE HYDROCHLORIDE 10 MG/ML
INJECTION, SOLUTION INFILTRATION; PERINEURAL
Status: COMPLETED | OUTPATIENT
Start: 2023-01-18 | End: 2023-01-18

## 2023-01-18 RX ORDER — PROTAMINE SULFATE 10 MG/ML
50 INJECTION, SOLUTION INTRAVENOUS ONCE
Status: COMPLETED | OUTPATIENT
Start: 2023-01-18 | End: 2023-01-18

## 2023-01-18 RX ORDER — PANTOPRAZOLE SODIUM 40 MG/1
40 TABLET, DELAYED RELEASE ORAL DAILY
Status: DISCONTINUED | OUTPATIENT
Start: 2023-01-19 | End: 2023-01-19

## 2023-01-18 RX ORDER — CEFAZOLIN SODIUM/WATER 2 G/20 ML
2 SYRINGE (ML) INTRAVENOUS
Status: DISCONTINUED | OUTPATIENT
Start: 2023-01-18 | End: 2023-01-18 | Stop reason: HOSPADM

## 2023-01-18 RX ORDER — BUPROPION HYDROCHLORIDE 300 MG/1
300 TABLET ORAL EVERY MORNING
Status: DISCONTINUED | OUTPATIENT
Start: 2023-01-19 | End: 2023-01-19

## 2023-01-18 RX ORDER — LIDOCAINE 40 MG/G
CREAM TOPICAL
Status: DISCONTINUED | OUTPATIENT
Start: 2023-01-18 | End: 2023-01-29

## 2023-01-18 RX ORDER — FENTANYL CITRATE 50 UG/ML
INJECTION, SOLUTION INTRAMUSCULAR; INTRAVENOUS PRN
Status: DISCONTINUED | OUTPATIENT
Start: 2023-01-18 | End: 2023-01-18

## 2023-01-18 RX ORDER — ASPIRIN 81 MG/1
81 TABLET, CHEWABLE ORAL
Status: COMPLETED | OUTPATIENT
Start: 2023-01-18 | End: 2023-01-18

## 2023-01-18 RX ORDER — CALCIUM GLUCONATE 20 MG/ML
2 INJECTION, SOLUTION INTRAVENOUS
Status: DISCONTINUED | OUTPATIENT
Start: 2023-01-18 | End: 2023-01-24

## 2023-01-18 RX ORDER — MUPIROCIN 20 MG/G
0.5 OINTMENT TOPICAL 2 TIMES DAILY
Status: DISCONTINUED | OUTPATIENT
Start: 2023-01-18 | End: 2023-01-22

## 2023-01-18 RX ORDER — ONDANSETRON 4 MG/1
4 TABLET, ORALLY DISINTEGRATING ORAL EVERY 6 HOURS PRN
Status: DISCONTINUED | OUTPATIENT
Start: 2023-01-18 | End: 2023-01-31 | Stop reason: HOSPADM

## 2023-01-18 RX ORDER — BUPIVACAINE HYDROCHLORIDE 2.5 MG/ML
INJECTION, SOLUTION EPIDURAL; INFILTRATION; INTRACAUDAL
Status: COMPLETED | OUTPATIENT
Start: 2023-01-18 | End: 2023-01-18

## 2023-01-18 RX ORDER — CALCIUM GLUCONATE 20 MG/ML
1 INJECTION, SOLUTION INTRAVENOUS
Status: DISCONTINUED | OUTPATIENT
Start: 2023-01-18 | End: 2023-01-24

## 2023-01-18 RX ADMIN — ASPIRIN 162 MG: 81 TABLET, CHEWABLE ORAL at 11:18

## 2023-01-18 RX ADMIN — PROPOFOL 200 MG: 10 INJECTION, EMULSION INTRAVENOUS at 12:51

## 2023-01-18 RX ADMIN — METOPROLOL TARTRATE 12.5 MG: 25 TABLET ORAL at 11:18

## 2023-01-18 RX ADMIN — Medication 7 ML/HR: at 14:24

## 2023-01-18 RX ADMIN — Medication 50 MCG/HR: at 20:40

## 2023-01-18 RX ADMIN — LIDOCAINE HYDROCHLORIDE 100 MG: 20 INJECTION, SOLUTION INFILTRATION; PERINEURAL at 12:49

## 2023-01-18 RX ADMIN — FENTANYL CITRATE 150 MCG: 50 INJECTION, SOLUTION INTRAMUSCULAR; INTRAVENOUS at 17:34

## 2023-01-18 RX ADMIN — VANCOMYCIN HYDROCHLORIDE 1500 MG: 10 INJECTION, POWDER, LYOPHILIZED, FOR SOLUTION INTRAVENOUS at 11:40

## 2023-01-18 RX ADMIN — LIDOCAINE HYDROCHLORIDE 5 ML: 10 INJECTION, SOLUTION INFILTRATION; PERINEURAL at 12:43

## 2023-01-18 RX ADMIN — SODIUM CHLORIDE, POTASSIUM CHLORIDE, SODIUM LACTATE AND CALCIUM CHLORIDE 1000 ML: 600; 310; 30; 20 INJECTION, SOLUTION INTRAVENOUS at 21:03

## 2023-01-18 RX ADMIN — Medication 50 MG: at 16:30

## 2023-01-18 RX ADMIN — VANCOMYCIN HYDROCHLORIDE 1500 MG: 10 INJECTION, POWDER, LYOPHILIZED, FOR SOLUTION INTRAVENOUS at 22:32

## 2023-01-18 RX ADMIN — EPINEPHRINE 0.03 MCG/KG/MIN: 1 INJECTION INTRAMUSCULAR; INTRAVENOUS; SUBCUTANEOUS at 20:45

## 2023-01-18 RX ADMIN — SODIUM CHLORIDE, POTASSIUM CHLORIDE, SODIUM LACTATE AND CALCIUM CHLORIDE 250 ML: 600; 310; 30; 20 INJECTION, SOLUTION INTRAVENOUS at 20:14

## 2023-01-18 RX ADMIN — MIDAZOLAM HYDROCHLORIDE 1 MG: 1 INJECTION, SOLUTION INTRAMUSCULAR; INTRAVENOUS at 11:56

## 2023-01-18 RX ADMIN — PROPOFOL 50 MCG/KG/MIN: 10 INJECTION, EMULSION INTRAVENOUS at 20:46

## 2023-01-18 RX ADMIN — SODIUM CHLORIDE, SODIUM GLUCONATE, SODIUM ACETATE, POTASSIUM CHLORIDE AND MAGNESIUM CHLORIDE: 526; 502; 368; 37; 30 INJECTION, SOLUTION INTRAVENOUS at 12:43

## 2023-01-18 RX ADMIN — FENTANYL CITRATE 100 MCG: 50 INJECTION, SOLUTION INTRAMUSCULAR; INTRAVENOUS at 14:15

## 2023-01-18 RX ADMIN — MUPIROCIN 0.5 G: 20 OINTMENT TOPICAL at 21:16

## 2023-01-18 RX ADMIN — Medication 0.01 MCG/KG/MIN: at 20:48

## 2023-01-18 RX ADMIN — NOREPINEPHRINE BITARTRATE 0.03 MCG/KG/MIN: 1 INJECTION, SOLUTION, CONCENTRATE INTRAVENOUS at 13:18

## 2023-01-18 RX ADMIN — GABAPENTIN 100 MG: 100 CAPSULE ORAL at 11:18

## 2023-01-18 RX ADMIN — HUMAN INSULIN 3 UNITS/HR: 100 INJECTION, SOLUTION SUBCUTANEOUS at 20:47

## 2023-01-18 RX ADMIN — SODIUM CHLORIDE, POTASSIUM CHLORIDE, SODIUM LACTATE AND CALCIUM CHLORIDE: 600; 310; 30; 20 INJECTION, SOLUTION INTRAVENOUS at 12:43

## 2023-01-18 RX ADMIN — Medication 50 MG: at 13:37

## 2023-01-18 RX ADMIN — Medication 50 MG: at 15:10

## 2023-01-18 RX ADMIN — PROTAMINE SULFATE 250 MG: 10 INJECTION, SOLUTION INTRAVENOUS at 17:28

## 2023-01-18 RX ADMIN — SODIUM CHLORIDE, SODIUM GLUCONATE, SODIUM ACETATE, POTASSIUM CHLORIDE AND MAGNESIUM CHLORIDE: 526; 502; 368; 37; 30 INJECTION, SOLUTION INTRAVENOUS at 17:24

## 2023-01-18 RX ADMIN — CEFAZOLIN SODIUM 2 G: 2 INJECTION, SOLUTION INTRAVENOUS at 20:04

## 2023-01-18 RX ADMIN — AMINOCAPROIC ACID 7.5 G: 250 INJECTION, SOLUTION INTRAVENOUS at 13:18

## 2023-01-18 RX ADMIN — Medication 95 MG: at 12:51

## 2023-01-18 RX ADMIN — PROTAMINE SULFATE 50 MG: 10 INJECTION, SOLUTION INTRAVENOUS at 23:37

## 2023-01-18 RX ADMIN — FENTANYL CITRATE 50 MCG: 50 INJECTION, SOLUTION INTRAMUSCULAR; INTRAVENOUS at 11:55

## 2023-01-18 RX ADMIN — CALCIUM GLUCONATE 1 G: 20 INJECTION, SOLUTION INTRAVENOUS at 21:13

## 2023-01-18 RX ADMIN — EPINEPHRINE 0.03 MCG/KG/MIN: 1 INJECTION INTRAMUSCULAR; INTRAVENOUS; SUBCUTANEOUS at 13:18

## 2023-01-18 RX ADMIN — CHLORHEXIDINE GLUCONATE 10 ML: 1.2 SOLUTION ORAL at 11:32

## 2023-01-18 RX ADMIN — Medication 5 MG: at 12:49

## 2023-01-18 RX ADMIN — PROPOFOL 50 MCG/KG/MIN: 10 INJECTION, EMULSION INTRAVENOUS at 18:28

## 2023-01-18 RX ADMIN — POTASSIUM PHOSPHATE, MONOBASIC POTASSIUM PHOSPHATE, DIBASIC 15 MMOL: 224; 236 INJECTION, SOLUTION, CONCENTRATE INTRAVENOUS at 21:28

## 2023-01-18 RX ADMIN — FENTANYL CITRATE 150 MCG: 50 INJECTION, SOLUTION INTRAMUSCULAR; INTRAVENOUS at 13:43

## 2023-01-18 RX ADMIN — AMINOCAPROIC ACID 1.25 G/HR: 250 INJECTION, SOLUTION INTRAVENOUS at 13:53

## 2023-01-18 RX ADMIN — FAMOTIDINE 20 MG: 20 TABLET, FILM COATED ORAL at 11:18

## 2023-01-18 RX ADMIN — ALBUMIN HUMAN 25 G: 0.05 INJECTION, SOLUTION INTRAVENOUS at 22:48

## 2023-01-18 RX ADMIN — BUPIVACAINE HYDROCHLORIDE 20 ML: 2.5 INJECTION, SOLUTION EPIDURAL; INFILTRATION; INTRACAUDAL; PERINEURAL at 12:32

## 2023-01-18 RX ADMIN — PROPOFOL 50 MCG/KG/MIN: 10 INJECTION, EMULSION INTRAVENOUS at 21:04

## 2023-01-18 RX ADMIN — FENTANYL CITRATE 100 MCG: 50 INJECTION, SOLUTION INTRAMUSCULAR; INTRAVENOUS at 12:48

## 2023-01-18 RX ADMIN — SUGAMMADEX 200 MG: 100 INJECTION, SOLUTION INTRAVENOUS at 19:00

## 2023-01-18 RX ADMIN — ACETAMINOPHEN 975 MG: 500 TABLET ORAL at 11:18

## 2023-01-18 RX ADMIN — HUMAN INSULIN 2 UNITS/HR: 100 INJECTION, SOLUTION SUBCUTANEOUS at 16:03

## 2023-01-18 RX ADMIN — HEPARIN SODIUM 35000 UNITS: 1000 INJECTION INTRAVENOUS; SUBCUTANEOUS at 14:32

## 2023-01-18 ASSESSMENT — ACTIVITIES OF DAILY LIVING (ADL)
ADLS_ACUITY_SCORE: 35
ADLS_ACUITY_SCORE: 20
ADLS_ACUITY_SCORE: 26
ADLS_ACUITY_SCORE: 20

## 2023-01-18 ASSESSMENT — ENCOUNTER SYMPTOMS
ORTHOPNEA: 0
DYSRHYTHMIAS: 0

## 2023-01-18 NOTE — ANESTHESIA PROCEDURE NOTES
Airway       Patient location during procedure: OR       Procedure Start/Stop Times: 1/18/2023 12:53 PM  Staff -        Anesthesiologist:  Wilson Palacios MD       Resident/Fellow: Janee Hawkins MD       Performed By: resident  Consent for Airway        Urgency: elective  Indications and Patient Condition       Indications for airway management: eliezer-procedural       Induction type:intravenous       Mask difficulty assessment: 1 - vent by mask    Final Airway Details       Final airway type: endotracheal airway       Successful airway: ETT - single  Endotracheal Airway Details        ETT size (mm): 8.0       Cuffed: yes       Successful intubation technique: direct laryngoscopy       DL Blade Type: MAC 4       Grade View of Cords: 2       Adjucts: stylet       Position: Right       Measured from: gums/teeth       Bite block used: None    Post intubation assessment        Placement verified by: capnometry, equal breath sounds and chest rise        Number of attempts at approach: 1       Number of other approaches attempted: 0       Secured with: pink tape       Ease of procedure: easy       Dentition: Intact and Unchanged    Medication(s) Administered   Medication Administration Time: 1/18/2023 12:53 PM

## 2023-01-18 NOTE — ANESTHESIA PROCEDURE NOTES
Perioperative TRENT Procedure Note    Staff -        Anesthesiologist:  Wilson Palacios MD       Resident/Fellow: Romie Mann MD       Performed By: fellow  Preanesthesia Checklist:  Patient identified, IV assessed, risks and benefits discussed, monitors and equipment assessed, procedure being performed at surgeon's request and anesthesia consent obtained.    TRENT Probe Insertion    Probe Status PRE Insertion: NO obvious damage  Probe type:  Adult 3D  Bite block used:   Soft  Insertion Technique: Easy, no oropharyngeal manipulation  Insertion complications: None obvious  Billing Report:TRENT report by Anesthesiologist (See Separate Report note)  Probe Status POST Removal: NO obvious damage    TRENT Report  General Procedure Information  Images for this study have been archived.  Modalities: 2D, 3D, CW Doppler, PW Doppler and Color flow mapping  Diagnostic indications for TRENT:   Non-rheumatic mitral regurgitation  Echocardiographic and Doppler Measurements  Right Ventricle:  Cavity size normal.    Hypertrophy not present.   Thrombus not present.    Global function normal.   Ejection Fraction 45%.    Left Ventricle:  Cavity size normal.    Hypertrophy not present.   Thrombus not present.   Global Function normal.   Ejection Fraction 55%.      Ventricular Regional Function:  1- Basal Anteroseptal:  normal  2- Basal Anterior:  normal  3- Basal Anterolateral:  normal  4- Basal Inferolateral:  normal  5- Basal Inferior:  normal  6- Basal Inferoseptal:  normal  7- Mid Anteroseptal:  normal  8- Mid Anterior:  normal  9- Mid Anterolateral:  normal  10- Mid Inferolateral:  normal  11- Mid Inferior:  normal  12- Mid Inferoseptal:  normal  13- Apical Anterior:  normal  14- Apical Lateral:  normal  15- Apical Inferior:  normal  16- Apical Septal:  normal  17- Earlimart:  normal    Valves  Aortic Valve: Annulus normal.  Stenosis not present.  Area: 2.77 cm .  Mean Gradient: 2 mmHg.  Regurgitation +1.  Leaflets normal.  Leaflet  motions normal.    Mitral Valve: Annulus normal.  Stenosis not present.  Regurgitation +4.  Leaflet motions prolapsed.  Specific leaflet segments with abnormal motions:  P2  Tricuspid Valve: Annulus normal.  Stenosis not present.  Regurgitation +1.  Leaflets normal.  Leaflet motions normal.    Pulmonic Valve: Annulus normal.  Stenosis not present.  Regurgitation +1.      Aorta: Ascending Aorta: Size normal.  Diameter 3.7 cm.    Aortic Arch: Size normal.   Diameter 2.5 cm.       Descending Aorta: Size normal.   Diameter 3 cm.    Plaque thickness greater than 3 mm.   Mobile plaque not present.      Right Atrium:  Size normal.   Spontaneous echo contrast not present.   Thrombus not present.   Tumor not present.   Device not present.     Left Atrium: Size normal.  Spontaneous echo contrast not present.  Thrombus not present.  Tumor not present.  Device not present.    Left atrial appendage normal.     Atrial Septum: Intra-atrial septal morphology normal.       Ventricular Septum: Intra-ventricular septum morphology normal.      Diastolic Function Measurements:  Diastolic Dysfunction Grade= indeterminate.  E=  ms.  A=  ms.  E/A Ratio= .  DT=  ms.  S/D= .  IVRT= ms.  Other Findings:    Pleural Effusion:  none.  Pulmonary Venous Flow:  systolic flow reversal.     Post Intervention Findings  Procedure(s) performed:  Mitral Valve Repair/Replace and CABG.  Regional wall motion:. Surgeon(s) notified of all postintervention findings: Yes.             Post Intervention comments: Slightly diminished LVEF now at 45%, Trace MR, with mitral ring gradient 2, the aortic valve is tri-leaflet. There is trace central Aortic insufficiency.There is trace TR with [place] Lianet catheter in place. Mild PI. No echogenic structure in the JENY with good exit/entry velocity. There is no aortic dissection. Aortic plaque is present on the descending thoracic aorta. There is no PFO by color flow dopper.     .    Echocardiogram Comments  Echocardiogram  comments: Pre - LV size and function are normal. The RV size and function are normal. No RWMA. The aortic valve is tri-leaflet. There is trace central Aortic insufficiency. There is severe eccentric anterior wall hugging mitral regurgitant jet. There is pulmonary vein reversal of flow. P2 flail. There is trace TR with [place] Lianet catheter in place. Mild PI. No echogenic structure in the JENY with good exit/entry velocity. There is no aortic dissection. Aortic plaque is present on the descending thoracic aorta. There is no PFO by color flow dopper. .

## 2023-01-18 NOTE — ANESTHESIA PROCEDURE NOTES
Central Line/PA Catheter Placement    Pre-Procedure   Staff -        Anesthesiologist:  Wilson Palacios MD       Resident/Fellow: Janee Hawkins MD       Performed By: resident       Location: OR       Pre-Anesthestic Checklist: patient identified, IV checked, site marked, risks and benefits discussed, informed consent, monitors and equipment checked and pre-op evaluation  Timeout:       Correct Patient: Yes        Correct Procedure: Yes        Correct Site: Yes        Correct Position: Yes        Correct Laterality: Yes   Line Placement:   This line was placed Post Induction    Procedure   Procedure: central line       Laterality: right       Insertion Site: internal jugular.       Patient Position: Trendelenburg  Sterile Prep        All elements of maximal sterile barrier technique followed       Patient Prep/Sterile Barriers: draped, hand hygiene, gloves , hat , mask , draped, gown, sterile gel and probe cover       Skin prep: Chloraprep  Insertion/Injection        Technique: ultrasound guided and Seldinger Technique        1. Ultrasound was used to evaluate the access site.       2. Vein evaluated via ultrasound for patency/adequacy.       3. Using real-time ultrasound the needle/catheter was observed entering the artery/vein.       5. The visualized structures were anatomically normal.       6. There were no apparent abnormal pathologic findings.       Introducer Type: 9 Fr, 2-lumen MAC        Type: PA/CVC with Introducer       Catheter Size: 9 Fr       Catheter Length: 11.5       Number of Lumens: double lumen       PA Catheter Type: CCO         Appropriate RV, RA and PA waveforms noted:  Yes            Distance to Wedge Position cm: 48            Balloon down at end of the procedure:   Narrative         Secured by: suture       Tegaderm and Biopatch dressing used.       Complications: None apparent,        blood aspirated from all lumens,        All lumens flushed: Yes       Verification method:  TRENT       Tip termination: right atrium

## 2023-01-18 NOTE — ANESTHESIA PROCEDURE NOTES
Arterial Line Procedure Note    Pre-Procedure   Staff -        Anesthesiologist:  Wilson Palacios MD       Resident/Fellow: Janee Hawkins MD       Performed By: resident       Location: OR       Pre-Anesthestic Checklist: patient identified, IV checked, risks and benefits discussed, informed consent, monitors and equipment checked and pre-op evaluation  Timeout:       Correct Patient: Yes        Correct Procedure: Yes        Correct Site: Yes        Correct Position: Yes   Line Placement:   This line was placed Pre Induction starting at 1/18/2023 12:43 PM and ending at 1/18/2023 12:53 PM  Procedure   Procedure: arterial line and new line       Laterality: left       Insertion Site: radial (axillary).  Sterile Prep        Standard elements of sterile barrier followed       Skin prep: Chloraprep  Insertion/Injection        Technique: ultrasound guided and Seldinger Technique        1. Ultrasound was used to evaluate the access site.       2. Artery evaluated via ultrasound for patency/adequacy.       3. Using real-time ultrasound the needle/catheter was observed entering the artery/vein.       5. The visualized structures were anatomically normal.       6. There were no apparent abnormal pathologic findings.       Catheter Type/Size: 20 G, 12 cm  Narrative         Secured by: suture       Tegaderm dressing used.       Complications: None apparent,        Arterial waveform: Yes        IBP within 10% of NIBP: Yes

## 2023-01-18 NOTE — ANESTHESIA PROCEDURE NOTES
Erector spinae Procedure Note    Pre-Procedure   Staff -        Anesthesiologist:  Don Bhagat MD       Resident/Fellow: Germán Rosen MD       Performed By: resident       Location: pre-op       Pre-Anesthestic Checklist: patient identified, IV checked, site marked, risks and benefits discussed, informed consent, monitors and equipment checked, pre-op evaluation, at physician/surgeon's request and post-op pain management  Timeout:       Correct Patient: Yes        Correct Procedure: Yes        Correct Site: Yes        Correct Position: Yes        Correct Laterality: Yes        Site Marked: Yes  Procedure Documentation  Procedure: Erector spinae       Laterality: bilateral       Patient Position: prone       Patient Prep/Sterile Barriers: sterile gloves, mask, patient draped       Skin prep: Chloraprep       Needle Type: Touhy needle       Needle Gauge: 17.        Needle Length (millimeters): 80        Catheter: 19 G.          Catheter threaded easily.             Ultrasound guided       1. Ultrasound was used to identify targeted nerve, plexus, vascular marker, or fascial plane and place a needle adjacent to it in real-time.       2. Ultrasound was used to visualize the spread of anesthetic in close proximity to the above referenced structure.       3. A permanent image is entered into the patient's record.       4. The visualized anatomic structures appeared normal.       5. There were no apparent abnormal pathologic findings.    Assessment/Narrative         The placement was negative for: blood aspirated, painful injection and site bleeding       Paresthesias: No.       Bolus given via catheter. no blood aspirated via catheter.        Secured via Tegaderm.        Insertion/Infusion Method: Continuous Infusion       Complications: none    Medication(s) Administered   Bupivacaine 0.25% PF (Infiltration) - Infiltration   20 mL - 1/18/2023 12:32:00 PM    FOR [facility] ([facility]) ONLY:   Pain Team  "Contact information: please page the Pain Team Via [facility]. Search \"Pain\". During daytime hours, please page the attending first. At night please page the resident first.    "

## 2023-01-19 ENCOUNTER — APPOINTMENT (OUTPATIENT)
Dept: GENERAL RADIOLOGY | Facility: CLINIC | Age: 68
DRG: 219 | End: 2023-01-19
Attending: STUDENT IN AN ORGANIZED HEALTH CARE EDUCATION/TRAINING PROGRAM
Payer: COMMERCIAL

## 2023-01-19 ENCOUNTER — ANESTHESIA EVENT (OUTPATIENT)
Dept: SURGERY | Facility: CLINIC | Age: 68
DRG: 219 | End: 2023-01-19
Payer: COMMERCIAL

## 2023-01-19 ENCOUNTER — ANESTHESIA (OUTPATIENT)
Dept: SURGERY | Facility: CLINIC | Age: 68
DRG: 219 | End: 2023-01-19
Payer: COMMERCIAL

## 2023-01-19 ENCOUNTER — APPOINTMENT (OUTPATIENT)
Dept: OCCUPATIONAL THERAPY | Facility: CLINIC | Age: 68
DRG: 219 | End: 2023-01-19
Attending: SURGERY
Payer: COMMERCIAL

## 2023-01-19 ENCOUNTER — APPOINTMENT (OUTPATIENT)
Dept: GENERAL RADIOLOGY | Facility: CLINIC | Age: 68
DRG: 219 | End: 2023-01-19
Attending: SURGERY
Payer: COMMERCIAL

## 2023-01-19 LAB
ALBUMIN SERPL BCG-MCNC: 3.5 G/DL (ref 3.5–5.2)
ALBUMIN SERPL BCG-MCNC: 3.5 G/DL (ref 3.5–5.2)
ALBUMIN SERPL BCG-MCNC: 3.6 G/DL (ref 3.5–5.2)
ALLEN'S TEST: ABNORMAL
ALLEN'S TEST: NORMAL
ALP SERPL-CCNC: 56 U/L (ref 40–129)
ALP SERPL-CCNC: 58 U/L (ref 40–129)
ALP SERPL-CCNC: 59 U/L (ref 40–129)
ALT SERPL W P-5'-P-CCNC: 16 U/L (ref 10–50)
ALT SERPL W P-5'-P-CCNC: 20 U/L (ref 10–50)
ALT SERPL W P-5'-P-CCNC: 21 U/L (ref 10–50)
ANION GAP SERPL CALCULATED.3IONS-SCNC: 12 MMOL/L (ref 7–15)
ANION GAP SERPL CALCULATED.3IONS-SCNC: 13 MMOL/L (ref 7–15)
ANION GAP SERPL CALCULATED.3IONS-SCNC: 15 MMOL/L (ref 7–15)
ANION GAP SERPL CALCULATED.3IONS-SCNC: 17 MMOL/L (ref 7–15)
APTT PPP: 46 SECONDS (ref 22–38)
AST SERPL W P-5'-P-CCNC: 58 U/L (ref 10–50)
AST SERPL W P-5'-P-CCNC: 75 U/L (ref 10–50)
AST SERPL W P-5'-P-CCNC: 76 U/L (ref 10–50)
ATRIAL RATE - MUSE: 46 BPM
ATRIAL RATE - MUSE: 62 BPM
ATRIAL RATE - MUSE: NORMAL BPM
ATRIAL RATE - MUSE: NORMAL BPM
BASE EXCESS BLDA CALC-SCNC: -1 MMOL/L (ref -9–1.8)
BASE EXCESS BLDA CALC-SCNC: -1.2 MMOL/L (ref -9.6–2)
BASE EXCESS BLDA CALC-SCNC: -2.5 MMOL/L (ref -9–1.8)
BASE EXCESS BLDA CALC-SCNC: -3.7 MMOL/L (ref -9–1.8)
BASE EXCESS BLDA CALC-SCNC: 0.8 MMOL/L (ref -9–1.8)
BASE EXCESS BLDA CALC-SCNC: 0.8 MMOL/L (ref -9–1.8)
BASE EXCESS BLDA CALC-SCNC: 0.9 MMOL/L (ref -9–1.8)
BASE EXCESS BLDV CALC-SCNC: -2.7 MMOL/L (ref -7.7–1.9)
BASE EXCESS BLDV CALC-SCNC: 0.1 MMOL/L (ref -7.7–1.9)
BASE EXCESS BLDV CALC-SCNC: 0.8 MMOL/L (ref -7.7–1.9)
BASE EXCESS BLDV CALC-SCNC: 1.1 MMOL/L (ref -7.7–1.9)
BASE EXCESS BLDV CALC-SCNC: 1.6 MMOL/L (ref -7.7–1.9)
BASE EXCESS BLDV CALC-SCNC: 1.7 MMOL/L (ref -7.7–1.9)
BASE EXCESS BLDV CALC-SCNC: 1.9 MMOL/L (ref -7.7–1.9)
BILIRUB SERPL-MCNC: 0.6 MG/DL
BILIRUB SERPL-MCNC: 0.7 MG/DL
BILIRUB SERPL-MCNC: 0.8 MG/DL
BLD PROD TYP BPU: NORMAL
BLD PROD TYP BPU: NORMAL
BLOOD COMPONENT TYPE: NORMAL
BLOOD COMPONENT TYPE: NORMAL
BUN SERPL-MCNC: 14.9 MG/DL (ref 8–23)
BUN SERPL-MCNC: 16.7 MG/DL (ref 8–23)
BUN SERPL-MCNC: 20.6 MG/DL (ref 8–23)
BUN SERPL-MCNC: 23.6 MG/DL (ref 8–23)
CA-I BLD-MCNC: 4.5 MG/DL (ref 4.4–5.2)
CA-I BLD-MCNC: 4.7 MG/DL (ref 4.4–5.2)
CA-I BLD-MCNC: 4.8 MG/DL (ref 4.4–5.2)
CALCIUM SERPL-MCNC: 8.3 MG/DL (ref 8.8–10.2)
CALCIUM SERPL-MCNC: 8.3 MG/DL (ref 8.8–10.2)
CALCIUM SERPL-MCNC: 8.4 MG/DL (ref 8.8–10.2)
CALCIUM SERPL-MCNC: 8.5 MG/DL (ref 8.8–10.2)
CHLORIDE SERPL-SCNC: 106 MMOL/L (ref 98–107)
CHLORIDE SERPL-SCNC: 108 MMOL/L (ref 98–107)
CHLORIDE SERPL-SCNC: 108 MMOL/L (ref 98–107)
CHLORIDE SERPL-SCNC: 109 MMOL/L (ref 98–107)
CLOT INIT KAOL IND TO POST HEP NEUT TRTO: 1.1 {RATIO}
CLOT INIT KAOLIN IND BLD US: 130 SEC (ref 113–166)
CLOT INIT KAOLIN IND P HEP NEUT BLD US: 123 SEC (ref 103–153)
CLOT STIFF PLT CONT BLD CALC: 21.3 HPA (ref 11.9–29.8)
CLOT STIFF TF IND P HEP NEUT BLD US: 24.4 HPA (ref 13–33.2)
CLOT STIFF TF IND+IIB-IIIA INH P HEP NEU: 3.1 HPA (ref 1–3.7)
CODING SYSTEM: NORMAL
CODING SYSTEM: NORMAL
CREAT SERPL-MCNC: 1.01 MG/DL (ref 0.67–1.17)
CREAT SERPL-MCNC: 1.03 MG/DL (ref 0.67–1.17)
CREAT SERPL-MCNC: 1.09 MG/DL (ref 0.67–1.17)
CREAT SERPL-MCNC: 1.34 MG/DL (ref 0.67–1.17)
CROSSMATCH: NORMAL
CROSSMATCH: NORMAL
DEPRECATED HCO3 PLAS-SCNC: 18 MMOL/L (ref 22–29)
DEPRECATED HCO3 PLAS-SCNC: 19 MMOL/L (ref 22–29)
DEPRECATED HCO3 PLAS-SCNC: 21 MMOL/L (ref 22–29)
DEPRECATED HCO3 PLAS-SCNC: 23 MMOL/L (ref 22–29)
DIASTOLIC BLOOD PRESSURE - MUSE: NORMAL MMHG
ERYTHROCYTE [DISTWIDTH] IN BLOOD BY AUTOMATED COUNT: 13.6 % (ref 10–15)
ERYTHROCYTE [DISTWIDTH] IN BLOOD BY AUTOMATED COUNT: 13.9 % (ref 10–15)
FIBRINOGEN PPP-MCNC: 404 MG/DL (ref 170–490)
GFR SERPL CREATININE-BSD FRML MDRD: 57 ML/MIN/1.73M2
GFR SERPL CREATININE-BSD FRML MDRD: 73 ML/MIN/1.73M2
GFR SERPL CREATININE-BSD FRML MDRD: 79 ML/MIN/1.73M2
GFR SERPL CREATININE-BSD FRML MDRD: 81 ML/MIN/1.73M2
GLUCOSE BLD-MCNC: 144 MG/DL (ref 70–99)
GLUCOSE BLDC GLUCOMTR-MCNC: 121 MG/DL (ref 70–99)
GLUCOSE BLDC GLUCOMTR-MCNC: 122 MG/DL (ref 70–99)
GLUCOSE BLDC GLUCOMTR-MCNC: 123 MG/DL (ref 70–99)
GLUCOSE BLDC GLUCOMTR-MCNC: 129 MG/DL (ref 70–99)
GLUCOSE BLDC GLUCOMTR-MCNC: 141 MG/DL (ref 70–99)
GLUCOSE BLDC GLUCOMTR-MCNC: 141 MG/DL (ref 70–99)
GLUCOSE BLDC GLUCOMTR-MCNC: 142 MG/DL (ref 70–99)
GLUCOSE BLDC GLUCOMTR-MCNC: 146 MG/DL (ref 70–99)
GLUCOSE BLDC GLUCOMTR-MCNC: 147 MG/DL (ref 70–99)
GLUCOSE BLDC GLUCOMTR-MCNC: 147 MG/DL (ref 70–99)
GLUCOSE BLDC GLUCOMTR-MCNC: 148 MG/DL (ref 70–99)
GLUCOSE BLDC GLUCOMTR-MCNC: 168 MG/DL (ref 70–99)
GLUCOSE BLDC GLUCOMTR-MCNC: 169 MG/DL (ref 70–99)
GLUCOSE BLDC GLUCOMTR-MCNC: 172 MG/DL (ref 70–99)
GLUCOSE BLDC GLUCOMTR-MCNC: 186 MG/DL (ref 70–99)
GLUCOSE BLDC GLUCOMTR-MCNC: 196 MG/DL (ref 70–99)
GLUCOSE BLDC GLUCOMTR-MCNC: 99 MG/DL (ref 70–99)
GLUCOSE SERPL-MCNC: 119 MG/DL (ref 70–99)
GLUCOSE SERPL-MCNC: 153 MG/DL (ref 70–99)
GLUCOSE SERPL-MCNC: 172 MG/DL (ref 70–99)
GLUCOSE SERPL-MCNC: 198 MG/DL (ref 70–99)
HCO3 BLD-SCNC: 23 MMOL/L (ref 21–28)
HCO3 BLD-SCNC: 23 MMOL/L (ref 21–28)
HCO3 BLD-SCNC: 24 MMOL/L (ref 21–28)
HCO3 BLD-SCNC: 26 MMOL/L (ref 21–28)
HCO3 BLDA-SCNC: 25 MMOL/L (ref 21–28)
HCO3 BLDV-SCNC: 25 MMOL/L (ref 21–28)
HCO3 BLDV-SCNC: 26 MMOL/L (ref 21–28)
HCO3 BLDV-SCNC: 27 MMOL/L (ref 21–28)
HCO3 BLDV-SCNC: 29 MMOL/L (ref 21–28)
HCO3 BLDV-SCNC: 29 MMOL/L (ref 21–28)
HCT VFR BLD AUTO: 25.8 % (ref 40–53)
HCT VFR BLD AUTO: 29.4 % (ref 40–53)
HGB BLD-MCNC: 8 G/DL (ref 13.3–17.7)
HGB BLD-MCNC: 8.8 G/DL (ref 13.3–17.7)
HGB BLD-MCNC: 9.1 G/DL (ref 13.3–17.7)
INR PPP: 1.19 (ref 0.85–1.15)
INTERPRETATION ECG - MUSE: NORMAL
ISSUE DATE AND TIME: NORMAL
ISSUE DATE AND TIME: NORMAL
LACTATE BLD-SCNC: 2.3 MMOL/L
LACTATE SERPL-SCNC: 1.7 MMOL/L (ref 0.7–2)
LACTATE SERPL-SCNC: 1.9 MMOL/L (ref 0.7–2)
LACTATE SERPL-SCNC: 3 MMOL/L (ref 0.7–2)
LACTATE SERPL-SCNC: 3.1 MMOL/L (ref 0.7–2)
LACTATE SERPL-SCNC: 3.4 MMOL/L (ref 0.7–2)
LACTATE SERPL-SCNC: 3.9 MMOL/L (ref 0.7–2)
LACTATE SERPL-SCNC: 4.8 MMOL/L (ref 0.7–2)
MAGNESIUM SERPL-MCNC: 2 MG/DL (ref 1.7–2.3)
MAGNESIUM SERPL-MCNC: 2.1 MG/DL (ref 1.7–2.3)
MCH RBC QN AUTO: 29.3 PG (ref 26.5–33)
MCH RBC QN AUTO: 29.5 PG (ref 26.5–33)
MCHC RBC AUTO-ENTMCNC: 31 G/DL (ref 31.5–36.5)
MCHC RBC AUTO-ENTMCNC: 31 G/DL (ref 31.5–36.5)
MCV RBC AUTO: 95 FL (ref 78–100)
MCV RBC AUTO: 95 FL (ref 78–100)
O2/TOTAL GAS SETTING VFR VENT: 32 %
O2/TOTAL GAS SETTING VFR VENT: 32 %
O2/TOTAL GAS SETTING VFR VENT: 35 %
O2/TOTAL GAS SETTING VFR VENT: 5 %
O2/TOTAL GAS SETTING VFR VENT: 5 %
O2/TOTAL GAS SETTING VFR VENT: 50 %
O2/TOTAL GAS SETTING VFR VENT: 50 %
O2/TOTAL GAS SETTING VFR VENT: 91 %
OXYHGB MFR BLD: 93 % (ref 92–100)
OXYHGB MFR BLD: 93 % (ref 92–100)
OXYHGB MFR BLD: 95 % (ref 92–100)
OXYHGB MFR BLD: 95 % (ref 92–100)
OXYHGB MFR BLD: 96 % (ref 92–100)
OXYHGB MFR BLD: 97 % (ref 92–100)
OXYHGB MFR BLDV: 41 % (ref 70–75)
OXYHGB MFR BLDV: 42 % (ref 70–75)
OXYHGB MFR BLDV: 42 % (ref 70–75)
OXYHGB MFR BLDV: 47 % (ref 70–75)
OXYHGB MFR BLDV: 48 % (ref 70–75)
OXYHGB MFR BLDV: 50 % (ref 70–75)
OXYHGB MFR BLDV: 52 % (ref 70–75)
P AXIS - MUSE: 48 DEGREES
P AXIS - MUSE: NORMAL DEGREES
PCO2 BLD: 40 MM HG (ref 35–45)
PCO2 BLD: 40 MM HG (ref 35–45)
PCO2 BLD: 45 MM HG (ref 35–45)
PCO2 BLD: 45 MM HG (ref 35–45)
PCO2 BLD: 46 MM HG (ref 35–45)
PCO2 BLD: 46 MM HG (ref 35–45)
PCO2 BLDA: 48 MM HG (ref 35–45)
PCO2 BLDV: 47 MM HG (ref 40–50)
PCO2 BLDV: 48 MM HG (ref 40–50)
PCO2 BLDV: 49 MM HG (ref 40–50)
PCO2 BLDV: 49 MM HG (ref 40–50)
PCO2 BLDV: 55 MM HG (ref 40–50)
PCO2 BLDV: 56 MM HG (ref 40–50)
PCO2 BLDV: 56 MM HG (ref 40–50)
PH BLD: 7.3 [PH] (ref 7.35–7.45)
PH BLD: 7.36 [PH] (ref 7.35–7.45)
PH BLD: 7.37 [PH] (ref 7.35–7.45)
PH BLD: 7.37 [PH] (ref 7.35–7.45)
PH BLD: 7.38 [PH] (ref 7.35–7.45)
PH BLD: 7.39 [PH] (ref 7.35–7.45)
PH BLDA: 7.33 [PH] (ref 7.35–7.45)
PH BLDV: 7.26 [PH] (ref 7.32–7.43)
PH BLDV: 7.31 [PH] (ref 7.32–7.43)
PH BLDV: 7.32 [PH] (ref 7.32–7.43)
PH BLDV: 7.34 [PH] (ref 7.32–7.43)
PH BLDV: 7.35 [PH] (ref 7.32–7.43)
PH BLDV: 7.35 [PH] (ref 7.32–7.43)
PH BLDV: 7.37 [PH] (ref 7.32–7.43)
PHOSPHATE SERPL-MCNC: 2.9 MG/DL (ref 2.5–4.5)
PLATELET # BLD AUTO: 165 10E3/UL (ref 150–450)
PLATELET # BLD AUTO: 225 10E3/UL (ref 150–450)
PO2 BLD: 104 MM HG (ref 80–105)
PO2 BLD: 120 MM HG (ref 80–105)
PO2 BLD: 73 MM HG (ref 80–105)
PO2 BLD: 77 MM HG (ref 80–105)
PO2 BLD: 83 MM HG (ref 80–105)
PO2 BLD: 86 MM HG (ref 80–105)
PO2 BLDA: 269 MM HG (ref 80–105)
PO2 BLDV: 25 MM HG (ref 25–47)
PO2 BLDV: 26 MM HG (ref 25–47)
PO2 BLDV: 26 MM HG (ref 25–47)
PO2 BLDV: 27 MM HG (ref 25–47)
PO2 BLDV: 27 MM HG (ref 25–47)
PO2 BLDV: 28 MM HG (ref 25–47)
PO2 BLDV: 31 MM HG (ref 25–47)
POTASSIUM BLD-SCNC: 4.5 MMOL/L (ref 3.5–5)
POTASSIUM SERPL-SCNC: 4.4 MMOL/L (ref 3.4–5.3)
POTASSIUM SERPL-SCNC: 4.5 MMOL/L (ref 3.4–5.3)
POTASSIUM SERPL-SCNC: 4.6 MMOL/L (ref 3.4–5.3)
POTASSIUM SERPL-SCNC: 5.1 MMOL/L (ref 3.4–5.3)
PR INTERVAL - MUSE: 212 MS
PR INTERVAL - MUSE: NORMAL MS
PROT SERPL-MCNC: 5 G/DL (ref 6.4–8.3)
PROT SERPL-MCNC: 5.2 G/DL (ref 6.4–8.3)
PROT SERPL-MCNC: 5.3 G/DL (ref 6.4–8.3)
QRS DURATION - MUSE: 110 MS
QRS DURATION - MUSE: 140 MS
QRS DURATION - MUSE: 146 MS
QRS DURATION - MUSE: 168 MS
QT - MUSE: 394 MS
QT - MUSE: 460 MS
QT - MUSE: 462 MS
QT - MUSE: 470 MS
QTC - MUSE: 451 MS
QTC - MUSE: 451 MS
QTC - MUSE: 477 MS
QTC - MUSE: 536 MS
R AXIS - MUSE: -15 DEGREES
R AXIS - MUSE: -16 DEGREES
R AXIS - MUSE: -36 DEGREES
R AXIS - MUSE: 28 DEGREES
RBC # BLD AUTO: 2.71 10E6/UL (ref 4.4–5.9)
RBC # BLD AUTO: 3.11 10E6/UL (ref 4.4–5.9)
SODIUM BLD-SCNC: 139 MMOL/L (ref 133–144)
SODIUM SERPL-SCNC: 141 MMOL/L (ref 136–145)
SODIUM SERPL-SCNC: 142 MMOL/L (ref 136–145)
SODIUM SERPL-SCNC: 143 MMOL/L (ref 136–145)
SODIUM SERPL-SCNC: 143 MMOL/L (ref 136–145)
SYSTOLIC BLOOD PRESSURE - MUSE: NORMAL MMHG
T AXIS - MUSE: -2 DEGREES
T AXIS - MUSE: -39 DEGREES
T AXIS - MUSE: -7 DEGREES
T AXIS - MUSE: 18 DEGREES
UNIT ABO/RH: NORMAL
UNIT ABO/RH: NORMAL
UNIT NUMBER: NORMAL
UNIT NUMBER: NORMAL
UNIT STATUS: NORMAL
UNIT STATUS: NORMAL
UNIT TYPE ISBT: 6200
UNIT TYPE ISBT: 6200
VENTRICULAR RATE- MUSE: 58 BPM
VENTRICULAR RATE- MUSE: 62 BPM
VENTRICULAR RATE- MUSE: 79 BPM
VENTRICULAR RATE- MUSE: 81 BPM
WBC # BLD AUTO: 14.3 10E3/UL (ref 4–11)
WBC # BLD AUTO: 16.8 10E3/UL (ref 4–11)

## 2023-01-19 PROCEDURE — 93005 ELECTROCARDIOGRAM TRACING: CPT

## 2023-01-19 PROCEDURE — 410N000003 HC PER-PERFUSION 1ST 30 MIN: Performed by: SURGERY

## 2023-01-19 PROCEDURE — 999N000253 HC STATISTIC WEANING TRIALS

## 2023-01-19 PROCEDURE — P9016 RBC LEUKOCYTES REDUCED: HCPCS | Performed by: SURGERY

## 2023-01-19 PROCEDURE — 74018 RADEX ABDOMEN 1 VIEW: CPT | Mod: 26 | Performed by: RADIOLOGY

## 2023-01-19 PROCEDURE — 250N000011 HC RX IP 250 OP 636: Performed by: NURSE ANESTHETIST, CERTIFIED REGISTERED

## 2023-01-19 PROCEDURE — 250N000013 HC RX MED GY IP 250 OP 250 PS 637: Performed by: SURGERY

## 2023-01-19 PROCEDURE — 94003 VENT MGMT INPAT SUBQ DAY: CPT

## 2023-01-19 PROCEDURE — 82805 BLOOD GASES W/O2 SATURATION: CPT | Performed by: SURGERY

## 2023-01-19 PROCEDURE — 370N000017 HC ANESTHESIA TECHNICAL FEE, PER MIN: Performed by: SURGERY

## 2023-01-19 PROCEDURE — 82805 BLOOD GASES W/O2 SATURATION: CPT | Performed by: STUDENT IN AN ORGANIZED HEALTH CARE EDUCATION/TRAINING PROGRAM

## 2023-01-19 PROCEDURE — 85027 COMPLETE CBC AUTOMATED: CPT | Performed by: STUDENT IN AN ORGANIZED HEALTH CARE EDUCATION/TRAINING PROGRAM

## 2023-01-19 PROCEDURE — 272N000001 HC OR GENERAL SUPPLY STERILE: Performed by: SURGERY

## 2023-01-19 PROCEDURE — 999N000157 HC STATISTIC RCP TIME EA 10 MIN

## 2023-01-19 PROCEDURE — 82330 ASSAY OF CALCIUM: CPT | Performed by: SURGERY

## 2023-01-19 PROCEDURE — 97165 OT EVAL LOW COMPLEX 30 MIN: CPT | Mod: GO

## 2023-01-19 PROCEDURE — P9016 RBC LEUKOCYTES REDUCED: HCPCS | Performed by: STUDENT IN AN ORGANIZED HEALTH CARE EDUCATION/TRAINING PROGRAM

## 2023-01-19 PROCEDURE — 250N000011 HC RX IP 250 OP 636: Performed by: STUDENT IN AN ORGANIZED HEALTH CARE EDUCATION/TRAINING PROGRAM

## 2023-01-19 PROCEDURE — 71045 X-RAY EXAM CHEST 1 VIEW: CPT | Mod: 26 | Performed by: RADIOLOGY

## 2023-01-19 PROCEDURE — 97530 THERAPEUTIC ACTIVITIES: CPT | Mod: GO

## 2023-01-19 PROCEDURE — 258N000003 HC RX IP 258 OP 636: Performed by: NURSE ANESTHETIST, CERTIFIED REGISTERED

## 2023-01-19 PROCEDURE — 999N000015 HC STATISTIC ARTERIAL MONITORING DAILY

## 2023-01-19 PROCEDURE — 85027 COMPLETE CBC AUTOMATED: CPT | Performed by: SURGERY

## 2023-01-19 PROCEDURE — 03HY32Z INSERTION OF MONITORING DEVICE INTO UPPER ARTERY, PERCUTANEOUS APPROACH: ICD-10-PCS | Performed by: ANESTHESIOLOGY

## 2023-01-19 PROCEDURE — 83605 ASSAY OF LACTIC ACID: CPT | Performed by: STUDENT IN AN ORGANIZED HEALTH CARE EDUCATION/TRAINING PROGRAM

## 2023-01-19 PROCEDURE — 99232 SBSQ HOSP IP/OBS MODERATE 35: CPT | Performed by: ANESTHESIOLOGY

## 2023-01-19 PROCEDURE — 82330 ASSAY OF CALCIUM: CPT

## 2023-01-19 PROCEDURE — 99291 CRITICAL CARE FIRST HOUR: CPT | Mod: 24 | Performed by: ANESTHESIOLOGY

## 2023-01-19 PROCEDURE — 71045 X-RAY EXAM CHEST 1 VIEW: CPT

## 2023-01-19 PROCEDURE — 250N000009 HC RX 250: Performed by: SURGERY

## 2023-01-19 PROCEDURE — 82805 BLOOD GASES W/O2 SATURATION: CPT

## 2023-01-19 PROCEDURE — P9045 ALBUMIN (HUMAN), 5%, 250 ML: HCPCS | Performed by: STUDENT IN AN ORGANIZED HEALTH CARE EDUCATION/TRAINING PROGRAM

## 2023-01-19 PROCEDURE — 71045 X-RAY EXAM CHEST 1 VIEW: CPT | Mod: 77

## 2023-01-19 PROCEDURE — 250N000011 HC RX IP 250 OP 636: Performed by: SURGERY

## 2023-01-19 PROCEDURE — 200N000002 HC R&B ICU UMMC

## 2023-01-19 PROCEDURE — 258N000003 HC RX IP 258 OP 636: Performed by: STUDENT IN AN ORGANIZED HEALTH CARE EDUCATION/TRAINING PROGRAM

## 2023-01-19 PROCEDURE — 85730 THROMBOPLASTIN TIME PARTIAL: CPT | Performed by: STUDENT IN AN ORGANIZED HEALTH CARE EDUCATION/TRAINING PROGRAM

## 2023-01-19 PROCEDURE — 999N000045 HC STATISTIC DAILY SWAN MONITORING

## 2023-01-19 PROCEDURE — 258N000003 HC RX IP 258 OP 636: Performed by: SURGERY

## 2023-01-19 PROCEDURE — 250N000013 HC RX MED GY IP 250 OP 250 PS 637: Performed by: STUDENT IN AN ORGANIZED HEALTH CARE EDUCATION/TRAINING PROGRAM

## 2023-01-19 PROCEDURE — 83605 ASSAY OF LACTIC ACID: CPT

## 2023-01-19 PROCEDURE — 85610 PROTHROMBIN TIME: CPT | Performed by: STUDENT IN AN ORGANIZED HEALTH CARE EDUCATION/TRAINING PROGRAM

## 2023-01-19 PROCEDURE — 250N000024 HC ISOFLURANE, PER MIN: Performed by: SURGERY

## 2023-01-19 PROCEDURE — 250N000009 HC RX 250: Performed by: NURSE ANESTHETIST, CERTIFIED REGISTERED

## 2023-01-19 PROCEDURE — 84100 ASSAY OF PHOSPHORUS: CPT | Performed by: SURGERY

## 2023-01-19 PROCEDURE — 93010 ELECTROCARDIOGRAM REPORT: CPT | Mod: 76 | Performed by: INTERNAL MEDICINE

## 2023-01-19 PROCEDURE — 84155 ASSAY OF PROTEIN SERUM: CPT | Performed by: STUDENT IN AN ORGANIZED HEALTH CARE EDUCATION/TRAINING PROGRAM

## 2023-01-19 PROCEDURE — 80053 COMPREHEN METABOLIC PANEL: CPT | Performed by: STUDENT IN AN ORGANIZED HEALTH CARE EDUCATION/TRAINING PROGRAM

## 2023-01-19 PROCEDURE — 83735 ASSAY OF MAGNESIUM: CPT | Performed by: SURGERY

## 2023-01-19 PROCEDURE — 360N000076 HC SURGERY LEVEL 3, PER MIN: Performed by: SURGERY

## 2023-01-19 PROCEDURE — 999N000259 HC STATISTIC EXTUBATION

## 2023-01-19 PROCEDURE — 250N000009 HC RX 250: Performed by: STUDENT IN AN ORGANIZED HEALTH CARE EDUCATION/TRAINING PROGRAM

## 2023-01-19 PROCEDURE — 999N000065 XR ABDOMEN PORT 1 VIEW

## 2023-01-19 PROCEDURE — 85384 FIBRINOGEN ACTIVITY: CPT | Performed by: STUDENT IN AN ORGANIZED HEALTH CARE EDUCATION/TRAINING PROGRAM

## 2023-01-19 PROCEDURE — 82310 ASSAY OF CALCIUM: CPT | Performed by: STUDENT IN AN ORGANIZED HEALTH CARE EDUCATION/TRAINING PROGRAM

## 2023-01-19 RX ORDER — CEFAZOLIN SODIUM 1 G/3ML
INJECTION, POWDER, FOR SOLUTION INTRAMUSCULAR; INTRAVENOUS PRN
Status: DISCONTINUED | OUTPATIENT
Start: 2023-01-19 | End: 2023-01-20

## 2023-01-19 RX ORDER — ATORVASTATIN CALCIUM 80 MG/1
80 TABLET, FILM COATED ORAL EVERY MORNING
Status: DISCONTINUED | OUTPATIENT
Start: 2023-01-19 | End: 2023-01-31 | Stop reason: HOSPADM

## 2023-01-19 RX ORDER — BUPROPION HYDROCHLORIDE 100 MG/1
100 TABLET ORAL 3 TIMES DAILY
Status: DISCONTINUED | OUTPATIENT
Start: 2023-01-19 | End: 2023-01-20

## 2023-01-19 RX ORDER — ONDANSETRON 2 MG/ML
INJECTION INTRAMUSCULAR; INTRAVENOUS PRN
Status: DISCONTINUED | OUTPATIENT
Start: 2023-01-19 | End: 2023-01-20

## 2023-01-19 RX ORDER — FENTANYL CITRATE 50 UG/ML
INJECTION, SOLUTION INTRAMUSCULAR; INTRAVENOUS PRN
Status: DISCONTINUED | OUTPATIENT
Start: 2023-01-19 | End: 2023-01-20

## 2023-01-19 RX ORDER — PROPOFOL 10 MG/ML
INJECTION, EMULSION INTRAVENOUS PRN
Status: DISCONTINUED | OUTPATIENT
Start: 2023-01-19 | End: 2023-01-20

## 2023-01-19 RX ORDER — MAGNESIUM SULFATE HEPTAHYDRATE 40 MG/ML
2 INJECTION, SOLUTION INTRAVENOUS ONCE
Status: COMPLETED | OUTPATIENT
Start: 2023-01-19 | End: 2023-01-19

## 2023-01-19 RX ORDER — FUROSEMIDE 10 MG/ML
40 INJECTION INTRAMUSCULAR; INTRAVENOUS ONCE
Status: COMPLETED | OUTPATIENT
Start: 2023-01-19 | End: 2023-01-19

## 2023-01-19 RX ORDER — FUROSEMIDE 10 MG/ML
20 INJECTION INTRAMUSCULAR; INTRAVENOUS ONCE
Status: COMPLETED | OUTPATIENT
Start: 2023-01-19 | End: 2023-01-19

## 2023-01-19 RX ORDER — SODIUM CHLORIDE, SODIUM GLUCONATE, SODIUM ACETATE, POTASSIUM CHLORIDE AND MAGNESIUM CHLORIDE 526; 502; 368; 37; 30 MG/100ML; MG/100ML; MG/100ML; MG/100ML; MG/100ML
INJECTION, SOLUTION INTRAVENOUS CONTINUOUS PRN
Status: DISCONTINUED | OUTPATIENT
Start: 2023-01-19 | End: 2023-01-20

## 2023-01-19 RX ADMIN — SODIUM CHLORIDE, POTASSIUM CHLORIDE, SODIUM LACTATE AND CALCIUM CHLORIDE 500 ML: 600; 310; 30; 20 INJECTION, SOLUTION INTRAVENOUS at 12:55

## 2023-01-19 RX ADMIN — NOREPINEPHRINE BITARTRATE 64 MCG: 1 INJECTION, SOLUTION, CONCENTRATE INTRAVENOUS at 23:33

## 2023-01-19 RX ADMIN — SUCCINYLCHOLINE CHLORIDE 80 MG: 20 INJECTION, SOLUTION INTRAMUSCULAR; INTRAVENOUS; PARENTERAL at 23:12

## 2023-01-19 RX ADMIN — EPINEPHRINE 0.05 MCG/KG/MIN: 1 INJECTION INTRAMUSCULAR; INTRAVENOUS; SUBCUTANEOUS at 16:56

## 2023-01-19 RX ADMIN — ACETAMINOPHEN 975 MG: 325 TABLET, FILM COATED ORAL at 12:18

## 2023-01-19 RX ADMIN — FENTANYL CITRATE 100 MCG: 50 INJECTION, SOLUTION INTRAMUSCULAR; INTRAVENOUS at 23:31

## 2023-01-19 RX ADMIN — DEXMEDETOMIDINE HYDROCHLORIDE 0.2 MCG/KG/HR: 400 INJECTION INTRAVENOUS at 11:00

## 2023-01-19 RX ADMIN — BUPROPION HYDROCHLORIDE 100 MG: 100 TABLET, FILM COATED ORAL at 20:17

## 2023-01-19 RX ADMIN — FUROSEMIDE 20 MG: 10 INJECTION, SOLUTION INTRAVENOUS at 16:49

## 2023-01-19 RX ADMIN — CEFAZOLIN SODIUM 2 G: 2 INJECTION, SOLUTION INTRAVENOUS at 11:43

## 2023-01-19 RX ADMIN — ALBUMIN HUMAN 25 G: 50 SOLUTION INTRAVENOUS at 22:43

## 2023-01-19 RX ADMIN — FENTANYL CITRATE 50 MCG: 50 INJECTION, SOLUTION INTRAMUSCULAR; INTRAVENOUS at 23:51

## 2023-01-19 RX ADMIN — HUMAN INSULIN 6 UNITS/HR: 100 INJECTION, SOLUTION SUBCUTANEOUS at 04:06

## 2023-01-19 RX ADMIN — PROPOFOL 20 MCG/KG/MIN: 10 INJECTION, EMULSION INTRAVENOUS at 06:27

## 2023-01-19 RX ADMIN — MUPIROCIN 0.5 G: 20 OINTMENT TOPICAL at 09:18

## 2023-01-19 RX ADMIN — ASPIRIN 162 MG: 81 TABLET, CHEWABLE ORAL at 09:17

## 2023-01-19 RX ADMIN — HEPARIN SODIUM 5000 UNITS: 5000 INJECTION, SOLUTION INTRAVENOUS; SUBCUTANEOUS at 20:16

## 2023-01-19 RX ADMIN — SODIUM CHLORIDE, SODIUM GLUCONATE, SODIUM ACETATE, POTASSIUM CHLORIDE AND MAGNESIUM CHLORIDE: 526; 502; 368; 37; 30 INJECTION, SOLUTION INTRAVENOUS at 23:03

## 2023-01-19 RX ADMIN — ATORVASTATIN CALCIUM 80 MG: 80 TABLET, FILM COATED ORAL at 09:20

## 2023-01-19 RX ADMIN — OXYCODONE HYDROCHLORIDE 5 MG: 5 TABLET ORAL at 21:00

## 2023-01-19 RX ADMIN — FUROSEMIDE 40 MG: 10 INJECTION, SOLUTION INTRAVENOUS at 18:58

## 2023-01-19 RX ADMIN — BUPROPION HYDROCHLORIDE 100 MG: 100 TABLET, FILM COATED ORAL at 13:07

## 2023-01-19 RX ADMIN — SENNOSIDES AND DOCUSATE SODIUM 1 TABLET: 50; 8.6 TABLET ORAL at 20:16

## 2023-01-19 RX ADMIN — Medication 50 MG: at 23:16

## 2023-01-19 RX ADMIN — NOREPINEPHRINE BITARTRATE 6.4 MCG: 1 INJECTION, SOLUTION, CONCENTRATE INTRAVENOUS at 23:29

## 2023-01-19 RX ADMIN — CEFAZOLIN SODIUM 2 G: 2 INJECTION, SOLUTION INTRAVENOUS at 04:19

## 2023-01-19 RX ADMIN — HEPARIN SODIUM 5000 UNITS: 5000 INJECTION, SOLUTION INTRAVENOUS; SUBCUTANEOUS at 12:22

## 2023-01-19 RX ADMIN — ONDANSETRON 4 MG: 2 INJECTION INTRAMUSCULAR; INTRAVENOUS at 11:43

## 2023-01-19 RX ADMIN — HUMAN INSULIN 7 UNITS/HR: 100 INJECTION, SOLUTION SUBCUTANEOUS at 11:44

## 2023-01-19 RX ADMIN — NOREPINEPHRINE BITARTRATE 6.4 MCG: 1 INJECTION, SOLUTION, CONCENTRATE INTRAVENOUS at 23:31

## 2023-01-19 RX ADMIN — MUPIROCIN 0.5 G: 20 OINTMENT TOPICAL at 20:17

## 2023-01-19 RX ADMIN — FENTANYL CITRATE 100 MCG: 50 INJECTION, SOLUTION INTRAMUSCULAR; INTRAVENOUS at 23:12

## 2023-01-19 RX ADMIN — SENNOSIDES AND DOCUSATE SODIUM 1 TABLET: 50; 8.6 TABLET ORAL at 09:18

## 2023-01-19 RX ADMIN — EPINEPHRINE 5 MCG: 1 INJECTION INTRAMUSCULAR; INTRAVENOUS; SUBCUTANEOUS at 23:49

## 2023-01-19 RX ADMIN — AMIODARONE HYDROCHLORIDE 150 MG: 1.5 INJECTION, SOLUTION INTRAVENOUS at 09:12

## 2023-01-19 RX ADMIN — POLYETHYLENE GLYCOL 3350 17 G: 17 POWDER, FOR SOLUTION ORAL at 09:18

## 2023-01-19 RX ADMIN — SODIUM CHLORIDE, POTASSIUM CHLORIDE, SODIUM LACTATE AND CALCIUM CHLORIDE 500 ML: 600; 310; 30; 20 INJECTION, SOLUTION INTRAVENOUS at 00:37

## 2023-01-19 RX ADMIN — PROPOFOL 100 MG: 10 INJECTION, EMULSION INTRAVENOUS at 23:12

## 2023-01-19 RX ADMIN — ACETAMINOPHEN 975 MG: 325 TABLET, FILM COATED ORAL at 20:15

## 2023-01-19 RX ADMIN — HYDROMORPHONE HYDROCHLORIDE 0.2 MG: 0.2 INJECTION, SOLUTION INTRAMUSCULAR; INTRAVENOUS; SUBCUTANEOUS at 14:58

## 2023-01-19 RX ADMIN — Medication 0.02 MCG/KG/MIN: at 11:01

## 2023-01-19 RX ADMIN — AMIODARONE HYDROCHLORIDE 1 MG/MIN: 50 INJECTION, SOLUTION INTRAVENOUS at 09:33

## 2023-01-19 RX ADMIN — OXYCODONE HYDROCHLORIDE 5 MG: 5 TABLET ORAL at 12:18

## 2023-01-19 RX ADMIN — EPINEPHRINE 10 MCG: 1 INJECTION INTRAMUSCULAR; INTRAVENOUS; SUBCUTANEOUS at 23:44

## 2023-01-19 RX ADMIN — BUPROPION HYDROCHLORIDE 100 MG: 100 TABLET, FILM COATED ORAL at 09:26

## 2023-01-19 RX ADMIN — ONDANSETRON 4 MG: 2 INJECTION INTRAMUSCULAR; INTRAVENOUS at 23:53

## 2023-01-19 RX ADMIN — FENTANYL CITRATE 150 MCG: 50 INJECTION, SOLUTION INTRAMUSCULAR; INTRAVENOUS at 23:36

## 2023-01-19 RX ADMIN — SODIUM CHLORIDE, POTASSIUM CHLORIDE, SODIUM LACTATE AND CALCIUM CHLORIDE 500 ML: 600; 310; 30; 20 INJECTION, SOLUTION INTRAVENOUS at 09:19

## 2023-01-19 RX ADMIN — VANCOMYCIN HYDROCHLORIDE 1500 MG: 10 INJECTION, POWDER, LYOPHILIZED, FOR SOLUTION INTRAVENOUS at 12:22

## 2023-01-19 RX ADMIN — MAGNESIUM SULFATE IN WATER 2 G: 40 INJECTION, SOLUTION INTRAVENOUS at 06:08

## 2023-01-19 RX ADMIN — HUMAN INSULIN 5 UNITS/HR: 100 INJECTION, SOLUTION SUBCUTANEOUS at 20:29

## 2023-01-19 RX ADMIN — Medication 40 MG: at 09:17

## 2023-01-19 RX ADMIN — CEFAZOLIN 2 G: 1 INJECTION, POWDER, FOR SOLUTION INTRAMUSCULAR; INTRAVENOUS at 23:30

## 2023-01-19 ASSESSMENT — ACTIVITIES OF DAILY LIVING (ADL)
IADL_COMMENTS: SPOUSE CAN A
ADLS_ACUITY_SCORE: 26
ADLS_ACUITY_SCORE: 26
CONCENTRATING,_REMEMBERING_OR_MAKING_DECISIONS_DIFFICULTY: NO
ADLS_ACUITY_SCORE: 26
DIFFICULTY_EATING/SWALLOWING: NO
ADLS_ACUITY_SCORE: 26
CHANGE_IN_FUNCTIONAL_STATUS_SINCE_ONSET_OF_CURRENT_ILLNESS/INJURY: NO
TOILETING_ISSUES: NO
ADLS_ACUITY_SCORE: 26
ADLS_ACUITY_SCORE: 26
FALL_HISTORY_WITHIN_LAST_SIX_MONTHS: NO
WALKING_OR_CLIMBING_STAIRS_DIFFICULTY: NO
ADLS_ACUITY_SCORE: 27
WEAR_GLASSES_OR_BLIND: NO
DRESSING/BATHING_DIFFICULTY: NO
ADLS_ACUITY_SCORE: 26
ADLS_ACUITY_SCORE: 27
DOING_ERRANDS_INDEPENDENTLY_DIFFICULTY: NO
ADLS_ACUITY_SCORE: 27

## 2023-01-19 NOTE — H&P
CV ICU H&P  1/16/2023      CO-MORBIDITIES:   Patient Active Problem List   Diagnosis     Abdominal aortic aneurysm     Erectile dysfunction     ACP (advance care planning)     Routine general medical examination at a health care facility     Essential hypertension     Mixed hyperlipidemia     Skin lesion     Moderate major depression (H)     Morbid obesity (H)     Elevated prostate specific antigen (PSA)     Prediabetes     Diabetes mellitus, type 2 (H)     S/P AAA repair     Tobacco dependence in remission     Mitral valve prolapse     Status post coronary angiogram     Other ill-defined heart diseases     Coronary artery disease involving native heart, unspecified vessel or lesion type, unspecified whether angina present       ASSESSMENT: Gilberto Camilo is a 67 year old male with PMH of HTN, HLD, AAA s/p repair in 2017, PAD with claudication (follows at [facility]), DM2, previous smoker, found to have severe degenerative mitral valve regurgitation due to prolaps who underwent CABG x 2 (SVG to PDA, SVG to D1) and mitral valve repair on 1/17/23 by Dr. Kim.    PRE CBP ECHO    POST CBP ECHO : Normal biventricular function with no residual MR.     Intraoperative fluids:  Crystalloid:2 L  EBL 1000  Cell saver 315      PLAN:  Neuro/ pain/ sedation:  #Acute Postoperative pain  #Peripheral Neuropathy  - Monitor neurological status. Notify the MD for any acute changes in exam.  - Pain: fentanyl gtt. Scheduled tylenol. PRN tylenol, oxycodone, dilaudid.  - Sedation: propofol gtt    Pulmonary care:   #Postoperative ventilation management  Resp: 16     - Intubated, ventilated  - Titrate supplemental oxygen to maintain saturation above 92%.  - Pulmonary hygiene: Incentive spirometer every 15- 30 minutes when awake, flutter valve, C&DB    Cardiovascular:    #S/p CABG x 2 on 1/17/23 by Dr. Kim  #S/p Mitral Valve Repair 1/17/23  #CAD  #HLD  #S/p AAA repair 2017  Recent echo on 11/11/22 with LVEF of 60-65%  -  Monitor hemodynamic status.   - Goal MAP>65, SBP<140  - Statin hold  - BB hold  - ASA: 1/18/23  - Epi, norepi, vaso gtt; wean as tolerated    GI care/ Nutrition:   - NPO   - PPI  - Continue bowel regimen: miralax, senna    Renal/ Fluid Balance/ Electrolytes:   BL creat appears to be ~ 1.0  - Strict I/O, daily weights  - Avoid/limit nephrotoxins as able  - Replete lytes PRN per protocol    Endocrine:    Stress induced hyperglycemia  Preop A1c 7.9  - Insulin gtt  - Goal BG <180 for optimal healing  - Hold PTA metformin    ID/ Antibiotics:  #Stress induced leukocytosis  - To complete perioperative regimen  - Continue to monitor fever curve, WBC and inflammatory markers as appropriate    Heme:     #Stress induced leukocytosis  #Acute blood loss anemia  #Acute blood loss thrombocytopenia  No s/sx active bleeding  - Continue to monitor  - CBC     MSK/ Skin:  #Sternotomy  #Surgical Incision  - Sternal precautions  - Postoperative incision management per protocol  - PT/OT/CR    Prophylaxis:    - Mechanical prophylaxis for DVT  - Chemical DVT prophylaxis - start subcutaneous heparin tomorrow  - PPI    Lines/ tubes/ drains:  - ETT (1/17/23)  - RIJ CVC, PA catheter (1/17/23)  - Arterial Line (1/17/23)  - CTs x2 meds (1/17/23)  - Arredondo (1/17/23)  - OG (1/17/23)    Disposition:  - CVICU    ICU Checklist  F - Feeding:   A - Analgesia:   S - Sedation:   T - Thromboembolic prophylaxis:      H - Head of bed elevated  U - Ulcer prophylaxis:  G - Glycemic control  S - SBT     B - Bowel regimen  I - Indwelling catheter  D - De-escalation of antibiotics    Patient seen, findings and plan discussed with CVICU staff.    Juan Johnson MD  Anesthesiology PGY-2/ CA-1  Ascom *[id number]     ====================================    HPI: Gilberto Camilo is a 67 year old male with PMH of HTN, HLD, AAA s/p repair in 2017, PAD with claudication (follows at [facility]), DM2, previous smoker, found to have severe degenerative mitral valve  regurgitation due to prolaps who underwent CABG x 2 and mitral valve repair on 1/17/23 by Dr. Kim.    PAST MEDICAL HISTORY:   Past Medical History:   Diagnosis Date     AAA (abdominal aortic aneurysm) 09/20/2017    5.3 cm      Mohan esophagus      Closed rib fracture      DNS (deviated nasal septum)        PAST SURGICAL HISTORY:   Past Surgical History:   Procedure Laterality Date     AAA REPAIR  06/17/2020    [facility]/ intravascular repair     APPENDECTOMY       COLONOSCOPY  2013     CV CORONARY ANGIOGRAM N/A 12/9/2022    Procedure: Coronary Angiogram with possible intervention;  Surgeon: Porfirio Herrera MD;  [facility]     CV RIGHT HEART CATH MEASUREMENTS RECORDED N/A 12/9/2022    Procedure: Right Heart Cath;  Surgeon: Porfirio Herrera MD;  [facility]     ESOPHAGOGASTRODUODENOSCOPY  2013       FAMILY HISTORY:   Family History   Problem Relation Age of Onset     C.A.D. Father         CABG in late 40's     C.A.D. Brother         MI in 50's       SOCIAL HISTORY:   Social History     Tobacco Use     Smoking status: Former     Packs/day: 1.00     Years: 25.00     Pack years: 25.00     Types: Cigarettes     Smokeless tobacco: Never     Tobacco comments:     Feb. 2014   Substance Use Topics     Alcohol use: Yes     Comment: occasional         OBJECTIVE:   1. VITAL SIGNS:   Temp:  [97.3  F (36.3  C)-98  F (36.7  C)] 97.3  F (36.3  C)  Pulse:  [56-62] 56  Resp:  [14-18] 16  BP: (111-137)/(71-89) 117/76  Cuff Mean (mmHg):  [83-99] 89  SpO2:  [97 %-99 %] 99 %    Resp: 16        2. INTAKE/ OUTPUT:   I/O last 3 completed shifts:  In: -   Out: 200 [Urine:200]       3. PHYSICAL EXAMINATION:   General: sedated  Neuro: sedated  Resp: intubated, ventilated  CV: S1, S2, RRR, no m/r/g   Abdomen: Soft, non-distended, non-tender  Incisions: c/d/i  Extremities: warm and well perfused  CT: To suction, serosang output, no airleak, crepitus      4. INVESTIGATIONS:    Arterial Blood Gases   Recent Labs   Lab 01/18/23 1918 01/18/23 1816 01/18/23 1741 01/18/23  1657   PH 7.27* 7.26* 7.29* 7.35   PCO2 55* 55* 51* 46*   PO2 140* 410* 379* 301*   HCO3 25 25 25 25     Complete Blood Count   Recent Labs   Lab 01/18/23 1917 01/18/23 1816 01/18/23  1741 01/18/23  1740 01/18/23  1657   WBC 22.1*  --   --   --   --    HGB 10.9* 10.7* 10.1*  --  10.3*     --   --  154  --      Basic Metabolic Panel  Recent Labs   Lab 01/18/23 1922 01/18/23 1816 01/18/23 1741 01/18/23 1657 01/18/23  1628   NA  --  143 142 142 141   POTASSIUM  --  4.1 4.2 5.2* 5.3*   * 154* 174* 186* 199*     Liver Function Tests  Recent Labs   Lab 01/18/23 1917 01/18/23  1740   INR 1.25* 1.40*     Pancreatic Enzymes  No lab results found in last 7 days.  Coagulation Profile  Recent Labs   Lab 01/18/23 1917 01/18/23 1740   INR 1.25* 1.40*   PTT 56* 40*         5. RADIOLOGY:   Recent Results (from the past 24 hour(s))   POC US Guidance Needle Placement    Impression    Bilateral erector spinae catheter   XR Abdomen Port 1 View    Narrative    Exam: XR ABDOMEN PORT 1 VIEW, 1/18/2023 7:55 PM    Indication: OG    Comparison: X-ray same day    Findings:   No frontal radiograph of the abdomen. Partial visualization of gastric  tube with side hole likely at the level of the GE junction. Aortoiliac  stent graft. Paucity of bowel gas.  No pneumatosis. No aggressive  osseous lesions.      Impression    Impression: Partial visualization of gastric tube with side hole  likely at the level of the GE junction. Advancement should be  considered.    I have personally reviewed the examination and initial interpretation  and I agree with the findings.    JORDANA PAREKH MD         SYSTEM ID:  [id number]   XR Chest Port 1 View    Impression    RESIDENT PRELIMINARY INTERPRETATION  IMPRESSION:   1.  Endotracheal tube tip projects in the right mainstem bronchus.  2.  Postsurgical chest with otherwise appropriately  positioned  surgical hardware.  3.  Moderate left pleural effusion and bilateral pulmonary edema with  scattered atelectasis.    [Urgent Result: Endotracheal tube within the right mainstem bronchus]    Finding was identified on 1/18/2023 8:11 PM.     Philip Martins MD was contacted by Dr. Germán Dickinson at 1/18/2023 8:16  PM and verbalized understanding of the urgent finding.        =========================================

## 2023-01-19 NOTE — PROGRESS NOTES
"Pain Service Progress Note  [facility]  Date: 01/19/2023       Patient Name: Gilberto Camilo  MRN: [id number]  Age: 67 year old  Sex: male      Assessment:  66yo male with history of HTN, AAA s/p repair (2017), PAD with claudication, DM2, severe mitral valve regurgitation now s/p CABG x2 and mitral valve repair on 1/18/23 with Dr. Kim.     Procedure: CABG, mitral valve repair    Date of Surgery: 1/18/23    Date of Catheter Placement: 1/18/23    Plan/Recommendations:  1. Regional Anesthesia/Analgesia  -Continuous Catheter Type/Site: bilateral erector spinae (ES) T6-7  Infusate: ropivacaine 0.2%  Programmed Intermittent Bolus (PIB) at 7 mL Q60 min via each catheter, total infusion rate of 14 mL/hr    Plan to maintain catheters, max of 7 days    2. Anticoagulation  -Please contact Inpatient Pain Service before ordering or making any anticoagulation changes       3. Multimodal Analgesia  - Per primary team    Pain Service will continue to follow.    Please Page the Pain Team Via [facility]: \"PAIN MANAGEMENT ACUTE INPATIENT/ [facility]\"    Francisca Mcmillan MD  01/19/2023     Overnight Events: No acute events.     Tubes/Drains: Yes  ETT, keon, CVC, CTx2    Subjective: Intubated. Opening eyes to voice.   Nausea: Unable to assess  Vomiting: Unable to assess  Pruritus: Unable to assess  Symptoms of LAST: None apparent, unable to assess    Pain Location:  Anterior chest    Pain Intensity:    Pain at Rest: NA   Pain with Activity: NA  Comfort Goal: NA   Baseline Pain: NA   Satisfied with your level of pain control: Yes - patient still intubated/sedated, appears in no distress    Diet: Advance Diet as Tolerated: Clear Liquid Diet    Relevant Labs:  Recent Labs   Lab Test 01/19/23  0356 01/18/23  2345 01/18/23  2212   INR  --   --  1.21*     --   --    PTT  --   --  109*   BUN 16.7   < >  --     < > = values in this interval not displayed.       Physical Exam:  Vitals: BP 90/54   Pulse 89   " Temp (!) 100.9  F (38.3  C)   Resp 18   Ht (P) 1.829 m (6')   Wt 112.4 kg (247 lb 12.8 oz)   SpO2 97%   BMI (P) 33.61 kg/m      Physical Exam:   Orientation:  Alert, oriented, and in no acute distress: No  Sedation: Yes    Motor Examination:  Moving all extremities to command    Catheter Site:   Catheter entry site is clean/dry/intact: Yes    Tender: No - no apparent distress to palpation      Relevant Medications:  Current Pain Medications:  Medications related to Pain Management (From now, onward)    Start     Dose/Rate Route Frequency Ordered Stop    01/21/23 0000  acetaminophen (TYLENOL) tablet 650 mg         650 mg Oral EVERY 4 HOURS PRN 01/18/23 1907 01/19/23 0800  aspirin (ASA) chewable tablet 162 mg         162 mg Oral or NG Tube DAILY 01/18/23 1907 01/19/23 0800  polyethylene glycol (MIRALAX) Packet 17 g         17 g Oral DAILY 01/18/23 1907 01/18/23 2030  fentaNYL (SUBLIMAZE) infusion          mcg/hr  1-2 mL/hr  Intravenous CONTINUOUS 01/18/23 2025 01/18/23 2030  propofol (DIPRIVAN) infusion         5-75 mcg/kg/min × 109.2 kg  3.3-49.1 mL/hr  Intravenous CONTINUOUS 01/18/23 2025 01/18/23 2000  acetaminophen (TYLENOL) tablet 975 mg         975 mg Oral or Feeding Tube EVERY 8 HOURS 01/18/23 1907 01/21/23 1959 01/18/23 2000  senna-docusate (SENOKOT-S/PERICOLACE) 8.6-50 MG per tablet 1 tablet         1 tablet Oral or Feeding Tube 2 TIMES DAILY 01/18/23 1907 01/18/23 1930  dexmedetomidine (PRECEDEX) 400 mcg in 0.9% sodium chloride 100 mL         0.2-0.7 mcg/kg/hr × 109.2 kg  5.5-19.1 mL/hr  Intravenous CONTINUOUS 01/18/23 1907 01/18/23 1907  magnesium hydroxide (MILK OF MAGNESIA) suspension 30 mL         30 mL Oral DAILY PRN 01/18/23 1907 01/18/23 1907  bisacodyl (DULCOLAX) suppository 10 mg         10 mg Rectal DAILY PRN 01/18/23 1907      01/18/23 1907  HYDROmorphone (DILAUDID) injection 0.2 mg        See Hyperspace for full Linked Orders Report.    0.2 mg  Intravenous EVERY 2 HOURS PRN 01/18/23 1907      01/18/23 1907  HYDROmorphone (DILAUDID) injection 0.4 mg        See Hyperspace for full Linked Orders Report.    0.4 mg Intravenous EVERY 2 HOURS PRN 01/18/23 1907      01/18/23 1907  oxyCODONE (ROXICODONE) tablet 5 mg        See Hyperspace for full Linked Orders Report.    5 mg Oral EVERY 4 HOURS PRN 01/18/23 1907      01/18/23 1907  oxyCODONE IR (ROXICODONE) tablet 10 mg        See Hyperspace for full Linked Orders Report.    10 mg Oral EVERY 4 HOURS PRN 01/18/23 1907      01/18/23 1907  methocarbamol (ROBAXIN) tablet 500 mg         500 mg Oral or Feeding Tube EVERY 6 HOURS PRN 01/18/23 1907      01/18/23 1907  lidocaine 1 % 0.1-1 mL         0.1-1 mL Other EVERY 1 HOUR PRN 01/18/23 1907      01/18/23 1907  lidocaine (LMX4) cream          Topical EVERY 1 HOUR PRN 01/18/23 1907      01/18/23 1230  ropivacaine 0.2% in NS perineural infusion simple          Perineural Continuous Nerve Block 01/18/23 1227      01/18/23 1230  ropivacaine 0.2% in NS perineural infusion simple          Perineural Continuous Nerve Block 01/18/23 1227            Primary Service Contacted with Recommendations? Yes      Please see A&P for additional details of medical decision making.  Tests REVIEWED in the past 24 hours:  - CBC  - Coags/INR  40 MINUTES SPENT BY ME on the date of service doing chart review, history, exam, documentation & further activities per the note.

## 2023-01-19 NOTE — PROGRESS NOTES
CV ICU PROGRESS NOTE  January 19, 2023   Gilberto Camilo  [id number]  Admitted: 1/18/2023 10:32 AM    CO-MORBIDITIES:   S/P CABG (coronary artery bypass graft)  (primary encounter diagnosis)  Mitral valve prolapse  Other ill-defined heart diseases  Coronary artery disease involving native heart, unspecified vessel or lesion type, unspecified whether angina present  S/P MVR (mitral valve replacement)    ASSESSMENT: Gilberto Camilo is a 67 year old male with PMH of HTN, HLD, AAA s/p repair in 2017, PAD with claudication (follows at [facility]), DM2, previous smoker, found to have severe degenerative mitral valve regurgitation due to prolapse who underwent CABG x 2 (SVG to PDA, SVG to D1) and mitral valve repair on 1/18/23 by Dr. Kim.    Changes for today:  A fib with junctional rhythm so given amiodarone bolus and infusion  Pacing after recovered from atrial fibrillation   PST   Wean to extubation    PLAN:  Neuro/ pain/ sedation:  #Acute Postoperative pain  #Peripheral Neuropathy  - Monitor neurological status. Notify the MD for any acute changes in exam.  - Pain: fentanyl gtt. Scheduled tylenol. PRN tylenol, oxycodone, dilaudid.  - Sedation: propofol gtt  - Wean sedation      Pulmonary care:   #Postoperative ventilation management  - Intubated, ventilated  - Titrate supplemental oxygen to maintain saturation above 92%.  - Pulmonary hygiene: Incentive spirometer every 15- 30 minutes when awake, flutter valve, C&DB    Vent Mode: CMV/AC  (Continuous Mandatory Ventilation/ Assist Control)  FiO2 (%): 35 %  Resp Rate (Set): 20 breaths/min  Tidal Volume (Set, mL): 450 mL  PEEP (cm H2O): 5 cmH2O  Resp: 20        Cardiovascular:    #S/p CABG x 2 on 1/18/23 by Dr. Kim  #S/p Mitral Valve Repair 1/18/23  #CAD  #HLD  #S/p AAA repair 2017  #Atrial fibrillation with competing junctional rhythm  Recent echo on 11/11/22 with LVEF of 60-65%  - Monitor hemodynamic status.   - Goal MAP>65, SBP<140  - Statin hold  - BB  hold  - ASA: 1/19/23  - Epi, norepi, vaso gtt; wean as tolerated  - Amiodarone bolus and infusion  - Pacing after recovered from atrial fibrillation      GI care/ Nutrition:   - NPO   - PPI  - Continue bowel regimen: miralax, senna    Renal/ Fluid Balance/ Electrolytes:   BL creat appears to be ~ 1.0  - Strict I/O, daily weights  - Avoid/limit nephrotoxins as able  - Replete lytes PRN per protocol     Endocrine:    Stress induced hyperglycemia  Preop A1c 7.9  - Insulin gtt  - Goal BG <180 for optimal healing  - Hold PTA metformin     ID/ Antibiotics:  #Stress induced leukocytosis  - To complete perioperative regimen  - Continue to monitor fever curve, WBC and inflammatory markers as appropriate     Heme:     #Stress induced leukocytosis  #Acute blood loss anemia  #Acute blood loss thrombocytopenia  #High chest tube output  No s/sx active bleeding  - Continue to monitor  - CBC   - Given platelets overnight, no need for further platelet transfusion      MSK/ Skin:  #Sternotomy  #Surgical Incision  - Sternal precautions  - Postoperative incision management per protocol  - PT/OT/CR     Prophylaxis:    - Mechanical prophylaxis for DVT  - Chemical DVT prophylaxis - start subcutaneous heparin tomorrow  - PPI     Lines/ tubes/ drains:  - ETT (1/17/23)  - RIJ CVC, PA catheter (1/17/23)  - Arterial Line (1/17/23)  - CTs x2 meds (1/17/23)  - Arredondo (1/17/23)  - OG (1/17/23)     Disposition:  - CVICU    Patient seen, findings and plan discussed with CVICU staff.    Wilson Barrera  Anesthesiology, PGY-3/CA-2  Ascom *[phone]     ====================================    TODAY'S PROGRESS  SUBJECTIVE  Resuscitated overnight and respiratory rate increased to account for a respiratory acidosis. Given platelets for high chest tube output. Normal neuro exam on sedation holiday.     OBJECTIVE  1. VITAL SIGNS  Temp:  [97.2  F (36.2  C)-100.8  F (38.2  C)] 100.8  F (38.2  C)  Pulse:  [51-79] 79  Resp:  [10-29] 20  BP: (111-137)/(71-89)  117/76  Cuff Mean (mmHg):  [83-99] 89  MAP:  [57 mmHg-88 mmHg] 75 mmHg  Arterial Line BP: ()/(46-71) 106/62  FiO2 (%):  [35 %-60 %] 35 %  SpO2:  [95 %-100 %] 95 %  Vent Mode: CMV/AC  (Continuous Mandatory Ventilation/ Assist Control)  FiO2 (%): 35 %  Resp Rate (Set): 20 breaths/min  Tidal Volume (Set, mL): 450 mL  PEEP (cm H2O): 5 cmH2O  Resp: 20      2. INTAKE/ OUTPUT  I/O last 3 completed shifts:  In: 3639.38 [I.V.:2074.38; Other:315; IV Piggyback:1250]  Out: 1250 [Urine:750; Chest Tube:500]    3. PHYSICAL EXAMINATION  General: sedated  Neuro: sedated  Resp: intubated, ventilated  CV: S1, S2, RRR, no m/r/g   Abdomen: Soft, non-distended, non-tender  Incisions: c/d/i  Extremities: warm and well perfused  CT: To suction, serosang output, no airleak, crepitus    4. INVESTIGATIONS  Arterial Blood Gases   Recent Labs   Lab 01/19/23  0355 01/19/23  0208 01/18/23 2345 01/18/23 2147   PH 7.39 7.36 7.30* 7.26*   PCO2 40 40 46* 51*   PO2 86 104 120* 111*   HCO3 24 23 23 23     Complete Blood Count   Recent Labs   Lab 01/19/23  0356 01/18/23  2148 01/18/23 1917 01/18/23  1816 01/18/23  1741 01/18/23  1740   WBC 14.3* 19.2* 22.1*  --   --   --    HGB 9.1* 10.0* 10.9* 10.7*   < >  --     180 187  --   --  154    < > = values in this interval not displayed.     Basic Metabolic Panel  Recent Labs   Lab 01/19/23  0606 01/19/23  0455 01/19/23  0401 01/19/23  0356 01/18/23  2348 01/18/23  2345 01/18/23  1922 01/18/23  1917 01/18/23  1816   0000   NA  --   --   --  142  --  143  --  145 143  --    POTASSIUM  --   --   --  5.1  --  4.4  --  4.4 4.1  --    CHLORIDE  --   --   --  108*  --  108*  --  108*  --   --    CO2  --   --   --  19*  --  18*  --  21*  --   --    BUN  --   --   --  16.7  --  14.9  --  14.5  --   --    CR  --   --   --  1.01  --  1.03  --  1.01  --   --    * 186* 169* 172*   < > 198*   < > 158* 154*   < >    < > = values in this interval not displayed.     Liver Function Tests  Recent Labs    Lab 01/19/23  0356 01/18/23  2345 01/18/23 2212 01/18/23 1917 01/18/23  1740   AST 75* 76*  --   --   --    ALT 20 21  --  20  --    ALKPHOS 59 56  --  65  --    BILITOTAL 0.8 0.7  --  0.8  --    ALBUMIN 3.6 3.5  --  3.4*  --    INR  --   --  1.21* 1.25* 1.40*     Pancreatic Enzymes  No lab results found in last 7 days.  Coagulation Profile  Recent Labs   Lab 01/18/23 2212 01/18/23 1917 01/18/23  1740   INR 1.21* 1.25* 1.40*   * 56* 40*     Lactate  Invalid input(s): LACTATE    5. RADIOLOGY  Recent Results (from the past 24 hour(s))   POC US Guidance Needle Placement    Impression    Bilateral erector spinae catheter   XR Abdomen Port 1 View    Narrative    Exam: XR ABDOMEN PORT 1 VIEW, 1/18/2023 7:55 PM    Indication: OG    Comparison: X-ray same day    Findings:   No frontal radiograph of the abdomen. Partial visualization of gastric  tube with side hole likely at the level of the GE junction. Aortoiliac  stent graft. Paucity of bowel gas.  No pneumatosis. No aggressive  osseous lesions.      Impression    Impression: Partial visualization of gastric tube with side hole  likely at the level of the GE junction. Advancement should be  considered.    I have personally reviewed the examination and initial interpretation  and I agree with the findings.    JORDANA PAREKH MD         SYSTEM ID:  [id number]   XR Chest Port 1 View   Result Value    Radiologist flags (Urgent)     Endotracheal tube within the right mainstem bronchus    Narrative    EXAM: XR CHEST PORT 1 VIEW 1/18/2023 7:56 PM    HISTORY: 67 years Male Post Op CVTS Surgery.     COMPARISON: CT chest 9/29/2022.    TECHNIQUE: Single portable AP view of the chest.    FINDINGS:   Post surgical chest with intact median sternotomy wires and mitral  valve replacement. Endotracheal tube projects in the right mainstem  bronchus. Right IJ [place]-Lianet catheter projects over the right main  pulmonary artery. Spinal anesthetic catheters in place. Gastric  tube  tip projects over the stomach with side port projecting at the lower  esophageal stricture. Dual mediastinal drains in place.    Midline trachea. Obscured cardiac silhouette. Mediastinum is within  normal limits. Distinct pulmonary vasculature. Moderate left pleural  effusion. No discernible pneumothorax. Low lung volumes. Mixed  interstitial and airspace opacities bilaterally, predominantly in the  perihilar regions. Unremarkable upper abdomen. No acute or suspicious  osseous abnormalities. Unremarkable soft tissues.      Impression    IMPRESSION:   1.  Endotracheal tube tip projects in the right mainstem bronchus.  2.  Postsurgical chest with otherwise appropriately positioned  surgical hardware.  3.  Moderate left pleural effusion and bilateral pulmonary edema with  scattered atelectasis.    [Urgent Result: Endotracheal tube within the right mainstem bronchus]    Finding was identified on 1/18/2023 8:11 PM.     Philip Martins MD was contacted by Dr. Germán Dickinson at 1/18/2023 8:16  PM and verbalized understanding of the urgent finding.     I have personally reviewed the examination and initial interpretation  and I agree with the findings.    JORDANA PAREKH MD         SYSTEM ID:  [id number]   XR Chest Port 1 View    Narrative    Exam: Chest x-ray, 1 view 1/18/2023 9:09 PM    Indication: Endotracheal tube    Comparison: Xray: 1/18/2023    Findings:   Single frontal radiograph of the chest. Postoperative chest with  cardiac valve replacement and [place]-Lianet catheter over the right main  pulmonary artery. The ET tube projects 2.3 cm above the kathrine.     Interstitial and airspace opacities bilaterally, predominantly in the  perihilar and basilar regions. No pneumothorax. Small left pleural  effusion. Retrocardiac opacities. Visualized upper abdomen and bones  are grossly unremarkable.      Impression    Impression:   1. Endotracheal tube projects 2.3 cm above the kathrine.  2. Pericardiac opacities and mixed  interstitial and airspace opacities  bilaterally, likely pulmonary edema with overlying atelectasis.  3. Small left pleural effusion.    I have personally reviewed the examination and initial interpretation  and I agree with the findings.    JORDANA PAREKH MD         SYSTEM ID:  [id number]   XR Abdomen Port 1 View    Narrative    EXAMINATION: XR ABDOMEN PORT 1 VIEW, 1/18/2023 9:23 PM    COMPARISON: 1/18/2023    HISTORY: OG placement    FINDINGS: No orogastric tube is seen. Possible orogastric tube coiled  in the hypopharynx which is not well visualized. Endotracheal tube in  the mid thoracic trachea. [place]-Lianet catheter in the pulmonary outflow  tract. Mediastinal drains. Posterior gas in the bowel. Vascular stent.      Impression    IMPRESSION: No orogastric tube is clearly seen, though there may be  one partially visualized in the hypopharynx.     JORDANA PAREKH MD         SYSTEM ID:  [id number]   XR Abdomen Port 1 View    Impression    RESIDENT PRELIMINARY INTERPRETATION  IMPRESSION: Slightly progressed enteric tube with sidehole estimated  to project over the gastroesophageal junction. Consider advancement.

## 2023-01-19 NOTE — BRIEF OP NOTE
[facility]    Brief Operative Note    Pre-operative diagnosis: Mitral regurgitation [I34.0]  Post-operative diagnosis Same as pre-operative diagnosis    Procedure: Procedure(s):  MEDIAN STERNOTOMY, ON PUMP OXGENATION, Left endovascular saphaneous vein harvest, CORONARY ARTERY BYPASS GRAFT (CABG x 2), Mitral Valve Repair, transesophageal echocardiogram (TRENT) performed by anesthesia  Surgeon: Surgeon(s) and Role:     * Linda Kim MD - Primary     * Bennie Pineda PA-C - Assisting     * Philip Martins MD - Fellow - Assisting  Anesthesia: General with Block   Estimated Blood Loss: 1L    Drains: Two mediastinal  Specimens: * No specimens in log *  Findings:   see op note.  Complications: None.  Implants:   Implant Name Type Inv. Item Serial No.  Lot No. LRB No. Used Action   IMP KIT SUTURE COR-KNOT MINI [id number] - SNA Metallic Hardware/[place] IMP KIT SUTURE COR-KNOT MINI 4X14MM [id number] NA [employer] [id number] N/A 1 Implanted   DEVICE BUSH ENDO COR KNOT QUICK LOAD [id number] - SNA Wire DEVICE BUSH ENDO COR KNOT QUICK LOAD [id number] NA [employer] [id number] N/A 3 Implanted   ANNULOPLASTY RING FLETCHER MURILLO PHYSIO II 34MM [id number] - [id number] Annuloplasty Ring ANNULOPLASTY RING FLETCHER MURILLO PHYSIO II 34MM [id number] [employer] NA N/A 1 Implanted     Signed:    Philip Martins MD 1/18/2023 at 6:32 PM  Cardiothoracic Surgery Fellow  Pager: [phone]

## 2023-01-19 NOTE — ANESTHESIA CARE TRANSFER NOTE
Patient: Gilberto Camilo    Procedure: Procedure(s):  MEDIAN STERNOTOMY, ON PUMP OXGENATION, Left endovascular saphaneous vein harvest, CORONARY ARTERY BYPASS GRAFT (CABG x 2), Mitral Valve Repair, transesophageal echocardiogram (TRENT) performed by anesthesia       Diagnosis: Mitral regurgitation [I34.0]  Diagnosis Additional Information: No value filed.    Anesthesia Type:   General     Note:    Oropharynx: endotracheal tube in place  Level of Consciousness: iatrogenic sedation      Independent Airway: airway patency not satisfactory and stable  Dentition: dentition unchanged  Vital Signs Stable: post-procedure vital signs reviewed and stable  Report to RN Given: handoff report given  Patient transferred to: ICU  Comments: Pt to ICU, stable throughout transport. Report to RN at bedside.  ICU Handoff: Call for PAUSE to initiate/utilize ICU HANDOFF, Identified Patient, Identified Responsible Provider, Reviewed the Pertinent Medical History, Discussed Surgical Course, Reviewed Intra-OP Anesthesia Management and Issues during Anesthesia, Set Expectations for Post Procedure Period and Allowed Opportunity for Questions and Acknowledgement of Understanding      Vitals:  Vitals Value Taken Time   BP     Temp     Pulse 64 01/18/23 1912   Resp 22 01/18/23 1912   SpO2 98 % 01/18/23 1912   Vitals shown include unvalidated device data.    Electronically Signed By: TORI Felix CRNA  January 18, 2023  7:14 PM

## 2023-01-19 NOTE — PROGRESS NOTES
01/19/23 1609   Appointment Info   Signing Clinician's Name / Credentials (OT) Karli Valiente OTRL   Living Environment   People in Home spouse   Current Living Arrangements house   Home Accessibility no concerns   Transportation Anticipated car, drives self;family or friend will provide   Living Environment Comments Pt and wife live in basement level of son's home, no stairs to access (walk-out basement). pt with walk-in shower. Spouse can A PRN at baseline.   Self-Care   Usual Activity Tolerance moderate   Current Activity Tolerance poor   Regular Exercise No   Equipment Currently Used at Home none   Fall history within last six months no   Activity/Exercise/Self-Care Comment Pt reports IND at baseline with ADLs/IADLs. Pt wife reports decreased activity leading up to surgery due to significant fatigue.   Instrumental Activities of Daily Living (IADL)   IADL Comments Spouse can A   General Information   Onset of Illness/Injury or Date of Surgery 01/18/23   Referring Physician Deep De Leon, NP   Patient/Family Therapy Goal Statement (OT) to return home   Additional Occupational Profile Info/Pertinent History of Current Problem Gilberto Camilo is a 67 year old male with PMH of HTN, HLD, AAA s/p repair in 2017, PAD with claudication (follows at [facility]), DM2, previous smoker, found to have severe degenerative mitral valve regurgitation due to prolapse who underwent CABG x 2 (SVG to PDA, SVG to D1) and mitral valve repair on 1/18/23 by Dr. Kim.   Existing Precautions/Restrictions fall;cardiac;sternal   Limitations/Impairments hearing   Left Upper Extremity (Weight-bearing Status) partial weight-bearing (PWB)   Right Upper Extremity (Weight-bearing Status) partial weight-bearing (PWB)   Left Lower Extremity (Weight-bearing Status) full weight-bearing (FWB)   Right Lower Extremity (Weight-bearing Status) full weight-bearing (FWB)   Cognitive Status Examination   Orientation Status orientation to  person, place and time   Cognitive Status Comments Pt very lethargic, limited engagement in session, however suspect cognition intact, will cont to monitor   Visual Perception   Visual Impairment/Limitations WFL   Sensory   Sensory Quick Adds sensation intact   Posture   Posture protracted shoulders;forward head position   Range of Motion Comprehensive   General Range of Motion bilateral upper extremity ROM WFL   Strength Comprehensive (MMT)   General Manual Muscle Testing (MMT) Assessment no strength deficits identified   Coordination   Upper Extremity Coordination No deficits were identified   Bed Mobility   Bed Mobility supine-sit   Supine-Sit [place] (Bed Mobility) 2 person assist;moderate assist (50% patient effort)   Transfers   Transfers sit-stand transfer;toilet transfer;shower transfer   Sit-Stand Transfer   Sit-Stand [place] (Transfers) moderate assist (50% patient effort);2 person assist   Shower Transfer   Type (Shower Transfer) lateral   [place] Level (Shower Transfer) maximum assist (25% patient effort);2 person assist   Shower Transfer Comments per clinical judgement   Toilet Transfer   Type (Toilet Transfer) sit-stand   [place] Level (Toilet Transfer) moderate assist (50% patient effort);2 person assist   Toilet Transfer Comments per clinical judgement   Balance   Balance Comments CGA for seated balance, CGA - min A x 2 for standing balance   Activities of Daily Living   BADL Assessment/Intervention bathing;lower body dressing;toileting   Bathing Assessment/Intervention   [place] Level (Bathing) maximum assist (25% patient effort)   Comment, (Bathing) per clinical judgement   Lower Body Dressing Assessment/Training   Comment, (Lower Body Dressing) per clinical judgement   [place] Level (Lower Body Dressing) maximum assist (25% patient effort)   Toileting   Comment, (Toileting) per clinical judgement   [place] Level (Toileting) maximum assist (25% patient effort)    Clinical Impression   Criteria for Skilled Therapeutic Interventions Met (OT) Yes, treatment indicated   OT Diagnosis Pt is below baseline for ADLs   OT Problem List-Impairments impacting ADL problems related to;activity tolerance impaired;pain;post-surgical precautions;balance   Assessment of Occupational Performance 3-5 Performance Deficits   Identified Performance Deficits dressing, bathing, toileting, functional mobility and endurance   Planned Therapy Interventions (OT) ADL retraining;IADL retraining;bed mobility training;transfer training;home program guidelines;progressive activity/exercise;strengthening   Clinical Decision Making Complexity (OT) low complexity   Anticipated Equipment Needs Upon Discharge (OT) shower chair   Risk & Benefits of therapy have been explained evaluation/treatment results reviewed;care plan/treatment goals reviewed;risks/benefits reviewed;current/potential barriers reviewed;participants voiced agreement with care plan;participants included;patient;spouse/significant other   Clinical Impression Comments Pt presents below baseline, limited this date by lethargy, activity tolerance, pain, and post surgical precautions. pt will benefit from skilled OT to progress toward PLOF   OT Total Evaluation Time   OT Eval, Low Complexity Minutes (42789) 8   OT Goals   Therapy Frequency (OT) 5 times/wk   OT Predicted Duration/Target Date for Goal Attainment 01/25/23   OT Goals Lower Body Dressing;Lower Body Bathing;Transfers;Toilet Transfer/Toileting;Cardiac Phase 1   OT: Lower Body Dressing Modified independent;within precautions;using adaptive equipment   OT: Lower Body Bathing Modified independent;using adaptive equipment;with precautions   OT: Transfer Independent  (shower transfer)   OT: Toilet Transfer/Toileting Independent;toilet transfer;cleaning and garment management;within precautions   OT: Understanding of cardiac education to maximize quality of life, condition management, and  health outcomes Patient;Demonstrate;Verbalize   OT: Perform aerobic activity with stable cardiovascular response intermittent;15 minutes;ambulation;NuStep   OT: Functional/aerobic ambulation tolerance with stable cardiovascular response in order to return to home and community environment   (defer to PT)   OT: Navigation of stairs simulating home set up with stable cardiovascular response in order to return to home and community environment   (defer to PT)   Interventions                      OT Discharge Planning   OT Plan STS from EOB, trial pivot pending pt improved alertness, seated ADLs, review precautions   OT Discharge Recommendation (DC Rec) Transitional Care Facility   OT Rationale for DC Rec Pt requiring Ax2 for STS this date, per spouse decreased activity leading up to surgery. Recommending rehab stay at this time to progress pt activity tolerance and IND prior to d/c home. Pending progress and improved tolerance with IP, pt may be good ARU candidate.   OT Brief overview of current status mod A x 2 STS, OH lift bed <> chair   Total Session Time   Timed Code Treatment Minutes 45   Total Session Time (sum of timed and untimed services) 53

## 2023-01-19 NOTE — PROGRESS NOTES
Patient suctioned and electively extubated per physician order. Placed on LFNC @ 5 LPM, breath sounds were clear, diminished, labs pending. Patient tolerated procedure well without any immediate complications.    Marilee Perkins, ZULEIMA, RT  1/19/2023 1:27 PM

## 2023-01-19 NOTE — PROGRESS NOTES
Retracted ETT 4cm per MD request. Tube is now secured at 23cm at the lips. Will verify location via chest xray.    1/18/2023  Sammie Weller RT

## 2023-01-19 NOTE — ANESTHESIA POSTPROCEDURE EVALUATION
Patient: Gilberto Camilo    Procedure: Procedure(s):  MEDIAN STERNOTOMY, ON PUMP OXGENATION, Left endovascular saphaneous vein harvest, CORONARY ARTERY BYPASS GRAFT (CABG x 2), Mitral Valve Repair, transesophageal echocardiogram (TRENT) performed by anesthesia       Anesthesia Type:  General    Note:  Disposition: Inpatient; ICU            ICU Sign Out: Anesthesiologist/ICU physician sign out WAS performed   Postop Pain Control: Uneventful            Sign Out: Well controlled pain   PONV: No   Neuro/Psych: Uneventful            Sign Out: Acceptable/Baseline neuro status   Airway/Respiratory: Uneventful            Sign Out: AIRWAY IN SITU/Resp. Support   CV/Hemodynamics: Uneventful            Sign Out: Acceptable CV status; No obvious hypovolemia; No obvious fluid overload   Other NRE: NONE   DID A NON-ROUTINE EVENT OCCUR? No           Last vitals:  Vitals:    01/18/23 1209 01/18/23 1212 01/18/23 1215   BP: 115/74 115/80 117/76   Pulse:   56   Resp: 14 16    Temp:      SpO2: 99% 99% 99%       Electronically Signed By: Jefferson Wharton MD  January 18, 2023  7:31 PM

## 2023-01-19 NOTE — PROGRESS NOTES
CLINICAL NUTRITION SERVICES - BRIEF NOTE    Received provider consult for nutrition education with comments post op cardiovascular surgery (automatic consult on post-op order set). S/p CABG x 2 and mitral valve repair on 1/18. Nutrition education to be completed as able/appropriate (as pt s/p CABG and/or initial VAD).    RD will follow per LOS protocol or if re-consulted.     Naomi Castro, MS, RD, LD  4E (CVICU) RD pager: [phone]  Ascom: [phone]  Weekend/Holiday RD pager: [phone]

## 2023-01-19 NOTE — OP NOTE
Procedure Date: 01/17/2023    REFERRING CARDIOLOGIST:  Dr. Won Thompson.    PREOPERATIVE DIAGNOSIS:  Severe symptomatic mitral valve regurgitation and severe 2-vessel coronary artery disease.    POSTOPERATIVE DIAGNOSIS:  Severe symptomatic mitral valve regurgitation and severe 2-vessel coronary artery disease.    SURGEON:  Linda Kim MD    ASSISTANT:  THAD Vazquez    NAME OF OPERATION:  Mitral valve repair with [place]-Reynaldo NeoChords to the flail P2 segment, implantation of a 34 mm Avilez Physio II annuloplasty ring, closure of P1/P2 cleft, coronary artery bypass grafting x2 with reverse saphenous vein graft to the posterior ascending artery, reverse saphenous vein graft to the diagonal artery, endoscopic vein harvest from the left lower extremity, intraoperative TRENT.    ANESTHESIA:  General endotracheal.    INDICATIONS FOR PROCEDURE:  Mr. Camilo is very pleasant 67-year-old gentleman who was recently referred to me for severe symptomatic mitral regurgitation.  Coronary angiogram demonstrated severe 2-vessel coronary artery disease.  He was taken to the operating room today for a mitral valve repair and coronary artery bypass grafting.    OPERATIVE FINDINGS:  The patient had an overall normal LV systolic function.  He had severe left atrial enlargement.  He had an obvious flail P2 segment with multiple ruptured chordae, and a small area of calcification along the base of the posterior leaflet.  No other abnormalities were seen.  The left greater saphenous vein was a good quality conduit, measuring 3-4 mm in diameter.  The posterior descending artery had mild to moderate disease and the probe size was 1.5 mm.  The diagonal artery also had mild to moderate disease and the probe size was 1.5 mm as well.    DESCRIPTION OF PROCEDURE:  After informed consent was obtained, the patient was brought down to the operating room and was placed on the OR table in a supine position.  Intravenous and intra-arterial  lines were begun.  While monitoring his blood pressure and EKG tracing, he was anesthetized and intubated using a single lumen endotracheal tube.  His entire chest, abdomen, both groins and legs were prepped down to the toes using multiple layers of DuraPrep.  He was draped in a sterile field.  A median sternotomy was performed and the sternal edges were retracted laterally.  The pericardium was opened to suspend the heart in a pericardial cradle.  The left greater saphenous vein was harvested endoscopically.  The patient was fully heparinized.  The ascending aorta and the SVC and IVC were cannulated.  A retrograde cardioplegia catheter was placed into coronary sinus without difficulty.  An antegrade needle/aortic root vent was placed in mediastinum as well.  After appropriate ACT level was achieved, cardiopulmonary bypass was established and the patient was kept normothermic during the entire operation.  Carbon dioxide was flooded into the chest to prevent air embolism.  The aorta was then crossclamped and antegrade cold blood cardioplegia was given to fully arrest the heart.  The patient went into good diastolic arrest without any LV distention.  Following this, intermittent retrograde cardioplegia doses were given on average every 15 minutes for myocardial protection while the aorta was crossclamped.    First, distal coronary anastomoses were performed.  The first coronary vessel grafted was the posterior descending artery.  Conduit used was a saphenous vein.  This anastomosis was performed in an end-to-side fashion using running 7-0 Prolene.  Next, the diagonal artery was grafted.  Another saphenous vein was used as a conduit.  This anastomosis was performed in an end-to-side fashion using running 7-0 Prolene.  Next, the interatrial groove was dissected out and a standard left atriotomy was made to expose the mitral valve.  Our exposure was excellent.  We began by placing annular simple interrupted 2-0 Ethibond  sutures along the mitral valve annulus.  The valve was carefully inspected.  Findings are noted above.  Several ruptured loose chordae were trimmed off the flail P2 segment edge.  We decided to proceed with a mitral valve repair using a nonresectional technique with artificial chords.  The 16 mm [employer] premeasure loop system was brought to the field, and the system was anchored down to the anterolateral papillary muscle head.  The three 16 mm chords were then sewn to the leaflet edge of the P2 segment and  the sutures were tied.  This eliminated the flail segment.  The annulus was then true sized to a 34 mm Physio II ring.  The ring was brought to the field and all sutures were brought through the sewing ring, and the ring was seated down without difficulty.  All sutures were tied down securely using the Cor-Knot device.  The valve was tested and there was a residual leak along a cleft between the P1 and P2 segments, which we closed using multiple interrupted CV-4 [place]-Reynaldo simple sutures.  Final valve test demonstrated good leaflet coaptation with no significant regurgitation.  The left atriotomy was then closed in 2 layers of running 4-0 Prolene.  Next, two 4 mm aortotomies were created in the ascending aorta and the 2 proximal vein anastomoses were performed in an end-to-side fashion using running 6-0 Prolene.  Retrograde hot shot was given and the aortic crossclamp was removed.  Aortic cross-clamp time was 127 minutes.  The ascending aorta was continuously vented to deair the heart.  The patient was weaned off cardiopulmonary bypass with low-dose epinephrine and Levophed drips.  LV function was back to his baseline.  The heart was adequately de-aired.  The mitral valve repair looked good with only trace residual insufficiency with a mean gradient of 2 mmHg.  Once the patient remained stable off bypass, the venous cannulae were removed and protamine was given.  The aortic cannula was subsequently removed as  well.  Temporary ventricular pacer wire was placed in the RV muscle.  32-Wolof straight chest tubes were placed, 2 in the mediastinum as well.  These were all brought out percutaneously below the sternotomy incision and secured to the skin using 2-0 Ethibond.  Both pleural spaces were slightly opened.  The mediastinum was irrigated with antibiotic saline and hemostasis was achieved.  The sternum was reapproximated using multiple interrupted single and double wires.  The incision was closed in layers of running Vicryl suture.  Skin was closed using 3-0 Vicryl and was sealed using Dermabond.    Total cardiopulmonary bypass time was 149 minutes.  There were no intraoperative complications and the patient tolerated the operation well.  No blood products were given intraoperatively.  All sponge counts, needle counts and instrument counts were correct x 2 at the end of the operation.    ESTIMATED BLOOD LOSS:  Unknown.    SPECIMEN REMOVED:  None.    The patient was brought to the ICU in hemodynamically stable condition and remained intubated.    Linda Kim MD        D: 2023   T: 2023   MT: WALESKA    Name:     FELICIANO LITTLEN:      [id number]        Account:        [id number]   :      1955           Procedure Date: 2023     Document: [id number]

## 2023-01-19 NOTE — PLAN OF CARE
Major Shift Events:      Follows commands Pupils Equil & reactive. Moves all extremities. Fent & prop infusing per order. Arrived on unit in SR 70-80's. Pt converted to a. Fib in the 50's at 0200. Pressor requirements increased @0400 & CVTS increased PPM rate to 80 BMP. Pt not 100% V paced @ 80BPM. See chart for PAT numbers. 0400 (before pacing) CVP 12. PA 47/16. Svo2 42. CI 1.8. Rechecked numbers @ 0600 improved. SVO2 50 & CI 2.2. Levo & Epi titrated to MAP goal of 65. ABG on arrivial to unit indicating respiratory acidosis. Respiratory rate increased from 18 to 20 & VT increased from 400 to 450. ABGs now WNL. Chest tube output 100-150 mL/hr for first 4 hours post op. 1 U of platelets & 50mg of protamine. Chest tube output now 40-60mL/hr.  Insulin gtt infusing per MAR. OG placed x4 due to coiling. Current OG needs to be verified. Urine output 40-80/hr.     Lactic peaked @ 4.8 now trending down    Total fluids & replacements given  - 1,750 mL LR  - 500mL albumin   - 1 U of platelets  - 50mg protamine.   - Mag x1  - Phos x1  - 1g calcium gluconate x1    Plan: wean pressors and extubate as able.   For vital signs and complete assessments, please see documentation flowsheets.

## 2023-01-19 NOTE — PROCEDURES
CVICU Procedure Note     Name of Procedure: R.-Sided Radial Arterial Line Placement    Date of Procedure: January 19, 2023    Description of Procedure  Consent for the procedure was obtained from the patients wife after its risks and benefits were thoroughly explained. The patient was placed in the supine position and his right wrist was hyperextended. He  was then given The puncture site was then prepped and draped in the usual sterile fashion and 2 mL of 1% Lidocaine was infiltrated into skin and subcutaneous tissue overlying the radial artery. A 20-gauge needle was then advanced through the patient's skin and subcutaneous tissue and entered into the patient's radial artery. Strong pulsatile blood flow was immediately observed coming from the needle. A guidewire was then advanced through the needle. The needle was subsequently removed over the guidewire, after which an arterial catheter was advanced over the guidewire. The guidewire was then removed and the catheter was attached to a transducer. It was subsequently sutured into place. After cleaning the site of entry, the patient's wrist was relaxed and a sterile tegaderm dressing was applied. There were not any immediate complications and the patient tolerated the procedure well.    Findings: A good waveform was observed on the monitor and the arterial line blood pressure readings matched those obtained via the blood pressure cuff.    Juan Johnson MD  Anesthesiology, CA1, PGY2

## 2023-01-20 ENCOUNTER — APPOINTMENT (OUTPATIENT)
Dept: OCCUPATIONAL THERAPY | Facility: CLINIC | Age: 68
DRG: 219 | End: 2023-01-20
Attending: SURGERY
Payer: COMMERCIAL

## 2023-01-20 ENCOUNTER — APPOINTMENT (OUTPATIENT)
Dept: GENERAL RADIOLOGY | Facility: CLINIC | Age: 68
DRG: 219 | End: 2023-01-20
Attending: SURGERY
Payer: COMMERCIAL

## 2023-01-20 ENCOUNTER — APPOINTMENT (OUTPATIENT)
Dept: SPEECH THERAPY | Facility: CLINIC | Age: 68
DRG: 219 | End: 2023-01-20
Attending: SURGERY
Payer: COMMERCIAL

## 2023-01-20 ENCOUNTER — APPOINTMENT (OUTPATIENT)
Dept: CARDIOLOGY | Facility: CLINIC | Age: 68
DRG: 219 | End: 2023-01-20
Attending: SURGERY
Payer: COMMERCIAL

## 2023-01-20 LAB
ALBUMIN SERPL BCG-MCNC: 2.9 G/DL (ref 3.5–5.2)
ALBUMIN SERPL BCG-MCNC: 3.3 G/DL (ref 3.5–5.2)
ALBUMIN UR-MCNC: NEGATIVE MG/DL
ALLEN'S TEST: ABNORMAL
ALP SERPL-CCNC: 53 U/L (ref 40–129)
ALP SERPL-CCNC: 53 U/L (ref 40–129)
ALT SERPL W P-5'-P-CCNC: 15 U/L (ref 10–50)
ALT SERPL W P-5'-P-CCNC: 16 U/L (ref 10–50)
ANION GAP SERPL CALCULATED.3IONS-SCNC: 12 MMOL/L (ref 7–15)
ANION GAP SERPL CALCULATED.3IONS-SCNC: 13 MMOL/L (ref 7–15)
ANION GAP SERPL CALCULATED.3IONS-SCNC: 13 MMOL/L (ref 7–15)
APPEARANCE UR: CLEAR
APTT PPP: 45 SECONDS (ref 22–38)
AST SERPL W P-5'-P-CCNC: 55 U/L (ref 10–50)
AST SERPL W P-5'-P-CCNC: 57 U/L (ref 10–50)
ATRIAL RATE - MUSE: NORMAL BPM
BASE EXCESS BLDA CALC-SCNC: -0.3 MMOL/L (ref -9–1.8)
BASE EXCESS BLDA CALC-SCNC: -1 MMOL/L (ref -9–1.8)
BASE EXCESS BLDA CALC-SCNC: -1.9 MMOL/L (ref -9–1.8)
BASE EXCESS BLDA CALC-SCNC: -2.6 MMOL/L (ref -9–1.8)
BASE EXCESS BLDA CALC-SCNC: 2.1 MMOL/L (ref -9–1.8)
BASE EXCESS BLDV CALC-SCNC: -0.2 MMOL/L (ref -7.7–1.9)
BASE EXCESS BLDV CALC-SCNC: -0.2 MMOL/L (ref -7.7–1.9)
BASE EXCESS BLDV CALC-SCNC: -1.4 MMOL/L (ref -7.7–1.9)
BASE EXCESS BLDV CALC-SCNC: -1.8 MMOL/L (ref -7.7–1.9)
BASE EXCESS BLDV CALC-SCNC: -2.2 MMOL/L (ref -7.7–1.9)
BASE EXCESS BLDV CALC-SCNC: 3.5 MMOL/L (ref -7.7–1.9)
BASE EXCESS BLDV CALC-SCNC: NORMAL MMOL/L
BILIRUB SERPL-MCNC: 0.8 MG/DL
BILIRUB SERPL-MCNC: 0.8 MG/DL
BILIRUB UR QL STRIP: NEGATIVE
BLD PROD TYP BPU: NORMAL
BLOOD COMPONENT TYPE: NORMAL
BUN SERPL-MCNC: 24 MG/DL (ref 8–23)
BUN SERPL-MCNC: 27 MG/DL (ref 8–23)
BUN SERPL-MCNC: 29.5 MG/DL (ref 8–23)
CA-I BLD-MCNC: 4.6 MG/DL (ref 4.4–5.2)
CA-I BLD-MCNC: 4.6 MG/DL (ref 4.4–5.2)
CALCIUM SERPL-MCNC: 7.7 MG/DL (ref 8.8–10.2)
CALCIUM SERPL-MCNC: 7.9 MG/DL (ref 8.8–10.2)
CALCIUM SERPL-MCNC: 8.1 MG/DL (ref 8.8–10.2)
CHLORIDE SERPL-SCNC: 104 MMOL/L (ref 98–107)
CHLORIDE SERPL-SCNC: 107 MMOL/L (ref 98–107)
CHLORIDE SERPL-SCNC: 108 MMOL/L (ref 98–107)
CLOT INIT KAOL IND TO POST HEP NEUT TRTO: ABNORMAL {RATIO}
CLOT INIT KAOLIN IND BLD US: ABNORMAL S
CLOT INIT KAOLIN IND P HEP NEUT BLD US: 154 SEC (ref 103–153)
CLOT STIFF PLT CONT BLD CALC: 19.1 HPA (ref 11.9–29.8)
CLOT STIFF TF IND P HEP NEUT BLD US: 21.8 HPA (ref 13–33.2)
CLOT STIFF TF IND+IIB-IIIA INH P HEP NEU: 2.7 HPA (ref 1–3.7)
CODING SYSTEM: NORMAL
COLOR UR AUTO: ABNORMAL
CREAT SERPL-MCNC: 1.42 MG/DL (ref 0.67–1.17)
CREAT SERPL-MCNC: 1.54 MG/DL (ref 0.67–1.17)
CREAT SERPL-MCNC: 1.74 MG/DL (ref 0.67–1.17)
CROSSMATCH: NORMAL
DEPRECATED HCO3 PLAS-SCNC: 20 MMOL/L (ref 22–29)
DEPRECATED HCO3 PLAS-SCNC: 21 MMOL/L (ref 22–29)
DEPRECATED HCO3 PLAS-SCNC: 23 MMOL/L (ref 22–29)
DIASTOLIC BLOOD PRESSURE - MUSE: NORMAL MMHG
ERYTHROCYTE [DISTWIDTH] IN BLOOD BY AUTOMATED COUNT: 14.3 % (ref 10–15)
ERYTHROCYTE [DISTWIDTH] IN BLOOD BY AUTOMATED COUNT: 14.6 % (ref 10–15)
FIBRINOGEN PPP-MCNC: 382 MG/DL (ref 170–490)
GFR SERPL CREATININE-BSD FRML MDRD: 42 ML/MIN/1.73M2
GFR SERPL CREATININE-BSD FRML MDRD: 49 ML/MIN/1.73M2
GFR SERPL CREATININE-BSD FRML MDRD: 53 ML/MIN/1.73M2
GLUCOSE BLDC GLUCOMTR-MCNC: 104 MG/DL (ref 70–99)
GLUCOSE BLDC GLUCOMTR-MCNC: 106 MG/DL (ref 70–99)
GLUCOSE BLDC GLUCOMTR-MCNC: 117 MG/DL (ref 70–99)
GLUCOSE BLDC GLUCOMTR-MCNC: 119 MG/DL (ref 70–99)
GLUCOSE BLDC GLUCOMTR-MCNC: 122 MG/DL (ref 70–99)
GLUCOSE BLDC GLUCOMTR-MCNC: 122 MG/DL (ref 70–99)
GLUCOSE BLDC GLUCOMTR-MCNC: 130 MG/DL (ref 70–99)
GLUCOSE BLDC GLUCOMTR-MCNC: 132 MG/DL (ref 70–99)
GLUCOSE BLDC GLUCOMTR-MCNC: 139 MG/DL (ref 70–99)
GLUCOSE BLDC GLUCOMTR-MCNC: 141 MG/DL (ref 70–99)
GLUCOSE BLDC GLUCOMTR-MCNC: 153 MG/DL (ref 70–99)
GLUCOSE BLDC GLUCOMTR-MCNC: 155 MG/DL (ref 70–99)
GLUCOSE BLDC GLUCOMTR-MCNC: 181 MG/DL (ref 70–99)
GLUCOSE BLDC GLUCOMTR-MCNC: 195 MG/DL (ref 70–99)
GLUCOSE BLDC GLUCOMTR-MCNC: 201 MG/DL (ref 70–99)
GLUCOSE BLDC GLUCOMTR-MCNC: 238 MG/DL (ref 70–99)
GLUCOSE BLDC GLUCOMTR-MCNC: 79 MG/DL (ref 70–99)
GLUCOSE BLDC GLUCOMTR-MCNC: 84 MG/DL (ref 70–99)
GLUCOSE SERPL-MCNC: 116 MG/DL (ref 70–99)
GLUCOSE SERPL-MCNC: 131 MG/DL (ref 70–99)
GLUCOSE SERPL-MCNC: 153 MG/DL (ref 70–99)
GLUCOSE UR STRIP-MCNC: NEGATIVE MG/DL
HCO3 BLD-SCNC: 25 MMOL/L (ref 21–28)
HCO3 BLD-SCNC: 27 MMOL/L (ref 21–28)
HCO3 BLDV-SCNC: 24 MMOL/L (ref 21–28)
HCO3 BLDV-SCNC: 24 MMOL/L (ref 21–28)
HCO3 BLDV-SCNC: 26 MMOL/L (ref 21–28)
HCO3 BLDV-SCNC: 27 MMOL/L (ref 21–28)
HCO3 BLDV-SCNC: 27 MMOL/L (ref 21–28)
HCO3 BLDV-SCNC: 29 MMOL/L (ref 21–28)
HCO3 BLDV-SCNC: NORMAL MMOL/L
HCT VFR BLD AUTO: 27 % (ref 40–53)
HCT VFR BLD AUTO: 27.9 % (ref 40–53)
HGB BLD-MCNC: 7.5 G/DL (ref 13.3–17.7)
HGB BLD-MCNC: 8.4 G/DL (ref 13.3–17.7)
HGB BLD-MCNC: 8.7 G/DL (ref 13.3–17.7)
HGB UR QL STRIP: NEGATIVE
INR PPP: 1.28 (ref 0.85–1.15)
INTERPRETATION ECG - MUSE: NORMAL
ISSUE DATE AND TIME: NORMAL
KETONES UR STRIP-MCNC: 20 MG/DL
LACTATE SERPL-SCNC: 2 MMOL/L (ref 0.7–2)
LACTATE SERPL-SCNC: 2.3 MMOL/L (ref 0.7–2)
LEUKOCYTE ESTERASE UR QL STRIP: NEGATIVE
LVEF ECHO: NORMAL
MAGNESIUM SERPL-MCNC: 2 MG/DL (ref 1.7–2.3)
MAGNESIUM SERPL-MCNC: 2.3 MG/DL (ref 1.7–2.3)
MAGNESIUM SERPL-MCNC: 2.3 MG/DL (ref 1.7–2.3)
MAGNESIUM SERPL-MCNC: 2.5 MG/DL (ref 1.7–2.3)
MCH RBC QN AUTO: 29 PG (ref 26.5–33)
MCH RBC QN AUTO: 29.5 PG (ref 26.5–33)
MCHC RBC AUTO-ENTMCNC: 31.1 G/DL (ref 31.5–36.5)
MCHC RBC AUTO-ENTMCNC: 31.2 G/DL (ref 31.5–36.5)
MCV RBC AUTO: 93 FL (ref 78–100)
MCV RBC AUTO: 95 FL (ref 78–100)
MRSA DNA SPEC QL NAA+PROBE: NEGATIVE
NITRATE UR QL: NEGATIVE
O2/TOTAL GAS SETTING VFR VENT: 15 %
O2/TOTAL GAS SETTING VFR VENT: 15 %
O2/TOTAL GAS SETTING VFR VENT: 35 %
O2/TOTAL GAS SETTING VFR VENT: 5 %
O2/TOTAL GAS SETTING VFR VENT: 55 %
OXYHGB MFR BLD: 91 % (ref 92–100)
OXYHGB MFR BLD: 93 % (ref 92–100)
OXYHGB MFR BLD: 94 % (ref 92–100)
OXYHGB MFR BLD: 96 % (ref 92–100)
OXYHGB MFR BLD: 96 % (ref 92–100)
OXYHGB MFR BLDV: 37 % (ref 70–75)
OXYHGB MFR BLDV: 44 % (ref 70–75)
OXYHGB MFR BLDV: 44 % (ref 70–75)
OXYHGB MFR BLDV: 45 % (ref 70–75)
OXYHGB MFR BLDV: 46 % (ref 70–75)
OXYHGB MFR BLDV: 49 % (ref 70–75)
OXYHGB MFR BLDV: NORMAL %
P AXIS - MUSE: NORMAL DEGREES
PCO2 BLD: 43 MM HG (ref 35–45)
PCO2 BLD: 44 MM HG (ref 35–45)
PCO2 BLD: 46 MM HG (ref 35–45)
PCO2 BLD: 50 MM HG (ref 35–45)
PCO2 BLD: 60 MM HG (ref 35–45)
PCO2 BLDV: 46 MM HG (ref 40–50)
PCO2 BLDV: 47 MM HG (ref 40–50)
PCO2 BLDV: 50 MM HG (ref 40–50)
PCO2 BLDV: 50 MM HG (ref 40–50)
PCO2 BLDV: 56 MM HG (ref 40–50)
PCO2 BLDV: 67 MM HG (ref 40–50)
PCO2 BLDV: NORMAL MM[HG]
PH BLD: 7.23 [PH] (ref 7.35–7.45)
PH BLD: 7.3 [PH] (ref 7.35–7.45)
PH BLD: 7.34 [PH] (ref 7.35–7.45)
PH BLD: 7.36 [PH] (ref 7.35–7.45)
PH BLD: 7.41 [PH] (ref 7.35–7.45)
PH BLDV: 7.22 [PH] (ref 7.32–7.43)
PH BLDV: 7.29 [PH] (ref 7.32–7.43)
PH BLDV: 7.32 [PH] (ref 7.32–7.43)
PH BLDV: 7.32 [PH] (ref 7.32–7.43)
PH BLDV: 7.33 [PH] (ref 7.32–7.43)
PH BLDV: 7.38 [PH] (ref 7.32–7.43)
PH BLDV: NORMAL [PH]
PH UR STRIP: 5 [PH] (ref 5–7)
PHOSPHATE SERPL-MCNC: 2.4 MG/DL (ref 2.5–4.5)
PHOSPHATE SERPL-MCNC: 4.3 MG/DL (ref 2.5–4.5)
PHOSPHATE SERPL-MCNC: 4.5 MG/DL (ref 2.5–4.5)
PLATELET # BLD AUTO: 164 10E3/UL (ref 150–450)
PLATELET # BLD AUTO: 166 10E3/UL (ref 150–450)
PO2 BLD: 102 MM HG (ref 80–105)
PO2 BLD: 64 MM HG (ref 80–105)
PO2 BLD: 69 MM HG (ref 80–105)
PO2 BLD: 81 MM HG (ref 80–105)
PO2 BLD: 97 MM HG (ref 80–105)
PO2 BLDV: 24 MM HG (ref 25–47)
PO2 BLDV: 25 MM HG (ref 25–47)
PO2 BLDV: 26 MM HG (ref 25–47)
PO2 BLDV: 27 MM HG (ref 25–47)
PO2 BLDV: 29 MM HG (ref 25–47)
PO2 BLDV: 38 MM HG (ref 25–47)
PO2 BLDV: NORMAL MM[HG]
POTASSIUM SERPL-SCNC: 3.7 MMOL/L (ref 3.4–5.3)
POTASSIUM SERPL-SCNC: 4.5 MMOL/L (ref 3.4–5.3)
POTASSIUM SERPL-SCNC: 4.8 MMOL/L (ref 3.4–5.3)
POTASSIUM SERPL-SCNC: 4.9 MMOL/L (ref 3.4–5.3)
PR INTERVAL - MUSE: NORMAL MS
PROT SERPL-MCNC: 4.6 G/DL (ref 6.4–8.3)
PROT SERPL-MCNC: 5.1 G/DL (ref 6.4–8.3)
QRS DURATION - MUSE: 108 MS
QT - MUSE: 406 MS
QTC - MUSE: 474 MS
R AXIS - MUSE: -15 DEGREES
RBC # BLD AUTO: 2.9 10E6/UL (ref 4.4–5.9)
RBC # BLD AUTO: 2.95 10E6/UL (ref 4.4–5.9)
SA TARGET DNA: NEGATIVE
SODIUM SERPL-SCNC: 140 MMOL/L (ref 136–145)
SODIUM SERPL-SCNC: 140 MMOL/L (ref 136–145)
SODIUM SERPL-SCNC: 141 MMOL/L (ref 136–145)
SP GR UR STRIP: 1.01 (ref 1–1.03)
SYSTOLIC BLOOD PRESSURE - MUSE: NORMAL MMHG
T AXIS - MUSE: 39 DEGREES
UNIT ABO/RH: NORMAL
UNIT NUMBER: NORMAL
UNIT STATUS: NORMAL
UNIT TYPE ISBT: 6200
UROBILINOGEN UR STRIP-MCNC: NORMAL MG/DL
VENTRICULAR RATE- MUSE: 82 BPM
WBC # BLD AUTO: 17.4 10E3/UL (ref 4–11)
WBC # BLD AUTO: 17.8 10E3/UL (ref 4–11)

## 2023-01-20 PROCEDURE — 71045 X-RAY EXAM CHEST 1 VIEW: CPT | Mod: 26 | Performed by: RADIOLOGY

## 2023-01-20 PROCEDURE — 87641 MR-STAPH DNA AMP PROBE: CPT | Performed by: STUDENT IN AN ORGANIZED HEALTH CARE EDUCATION/TRAINING PROGRAM

## 2023-01-20 PROCEDURE — 99232 SBSQ HOSP IP/OBS MODERATE 35: CPT | Performed by: ANESTHESIOLOGY

## 2023-01-20 PROCEDURE — 84100 ASSAY OF PHOSPHORUS: CPT | Performed by: SURGERY

## 2023-01-20 PROCEDURE — 85018 HEMOGLOBIN: CPT | Performed by: STUDENT IN AN ORGANIZED HEALTH CARE EDUCATION/TRAINING PROGRAM

## 2023-01-20 PROCEDURE — 258N000003 HC RX IP 258 OP 636: Performed by: SURGERY

## 2023-01-20 PROCEDURE — 999N000065 XR CHEST PORT 1 VIEW

## 2023-01-20 PROCEDURE — 250N000013 HC RX MED GY IP 250 OP 250 PS 637: Performed by: SURGERY

## 2023-01-20 PROCEDURE — 83735 ASSAY OF MAGNESIUM: CPT | Performed by: STUDENT IN AN ORGANIZED HEALTH CARE EDUCATION/TRAINING PROGRAM

## 2023-01-20 PROCEDURE — 82805 BLOOD GASES W/O2 SATURATION: CPT | Performed by: SURGERY

## 2023-01-20 PROCEDURE — 85384 FIBRINOGEN ACTIVITY: CPT | Performed by: STUDENT IN AN ORGANIZED HEALTH CARE EDUCATION/TRAINING PROGRAM

## 2023-01-20 PROCEDURE — 250N000011 HC RX IP 250 OP 636: Performed by: SURGERY

## 2023-01-20 PROCEDURE — 82330 ASSAY OF CALCIUM: CPT | Performed by: ANESTHESIOLOGY

## 2023-01-20 PROCEDURE — 97530 THERAPEUTIC ACTIVITIES: CPT | Mod: GO

## 2023-01-20 PROCEDURE — 83735 ASSAY OF MAGNESIUM: CPT | Performed by: SURGERY

## 2023-01-20 PROCEDURE — 255N000002 HC RX 255 OP 636: Performed by: INTERNAL MEDICINE

## 2023-01-20 PROCEDURE — C9113 INJ PANTOPRAZOLE SODIUM, VIA: HCPCS | Performed by: SURGERY

## 2023-01-20 PROCEDURE — 93306 TTE W/DOPPLER COMPLETE: CPT | Mod: 26 | Performed by: INTERNAL MEDICINE

## 2023-01-20 PROCEDURE — 85027 COMPLETE CBC AUTOMATED: CPT | Performed by: STUDENT IN AN ORGANIZED HEALTH CARE EDUCATION/TRAINING PROGRAM

## 2023-01-20 PROCEDURE — 94660 CPAP INITIATION&MGMT: CPT

## 2023-01-20 PROCEDURE — P9016 RBC LEUKOCYTES REDUCED: HCPCS

## 2023-01-20 PROCEDURE — 200N000002 HC R&B ICU UMMC

## 2023-01-20 PROCEDURE — 250N000009 HC RX 250: Performed by: STUDENT IN AN ORGANIZED HEALTH CARE EDUCATION/TRAINING PROGRAM

## 2023-01-20 PROCEDURE — 99291 CRITICAL CARE FIRST HOUR: CPT | Mod: 24 | Performed by: ANESTHESIOLOGY

## 2023-01-20 PROCEDURE — 83605 ASSAY OF LACTIC ACID: CPT | Performed by: STUDENT IN AN ORGANIZED HEALTH CARE EDUCATION/TRAINING PROGRAM

## 2023-01-20 PROCEDURE — 82805 BLOOD GASES W/O2 SATURATION: CPT | Performed by: STUDENT IN AN ORGANIZED HEALTH CARE EDUCATION/TRAINING PROGRAM

## 2023-01-20 PROCEDURE — 93010 ELECTROCARDIOGRAM REPORT: CPT | Performed by: INTERNAL MEDICINE

## 2023-01-20 PROCEDURE — 80053 COMPREHEN METABOLIC PANEL: CPT | Performed by: STUDENT IN AN ORGANIZED HEALTH CARE EDUCATION/TRAINING PROGRAM

## 2023-01-20 PROCEDURE — 82435 ASSAY OF BLOOD CHLORIDE: CPT | Performed by: SURGERY

## 2023-01-20 PROCEDURE — 80048 BASIC METABOLIC PNL TOTAL CA: CPT | Performed by: SURGERY

## 2023-01-20 PROCEDURE — 84100 ASSAY OF PHOSPHORUS: CPT | Performed by: STUDENT IN AN ORGANIZED HEALTH CARE EDUCATION/TRAINING PROGRAM

## 2023-01-20 PROCEDURE — 0W3C0ZZ CONTROL BLEEDING IN MEDIASTINUM, OPEN APPROACH: ICD-10-PCS | Performed by: SURGERY

## 2023-01-20 PROCEDURE — 250N000009 HC RX 250: Performed by: NURSE ANESTHETIST, CERTIFIED REGISTERED

## 2023-01-20 PROCEDURE — 84155 ASSAY OF PROTEIN SERUM: CPT | Performed by: SURGERY

## 2023-01-20 PROCEDURE — 92526 ORAL FUNCTION THERAPY: CPT | Mod: GN

## 2023-01-20 PROCEDURE — 84100 ASSAY OF PHOSPHORUS: CPT | Performed by: ANESTHESIOLOGY

## 2023-01-20 PROCEDURE — 35820 EXPLORE CHEST VESSELS: CPT | Mod: 78 | Performed by: SURGERY

## 2023-01-20 PROCEDURE — 82330 ASSAY OF CALCIUM: CPT | Performed by: STUDENT IN AN ORGANIZED HEALTH CARE EDUCATION/TRAINING PROGRAM

## 2023-01-20 PROCEDURE — 250N000011 HC RX IP 250 OP 636: Performed by: STUDENT IN AN ORGANIZED HEALTH CARE EDUCATION/TRAINING PROGRAM

## 2023-01-20 PROCEDURE — 85027 COMPLETE CBC AUTOMATED: CPT | Performed by: SURGERY

## 2023-01-20 PROCEDURE — 85730 THROMBOPLASTIN TIME PARTIAL: CPT | Performed by: STUDENT IN AN ORGANIZED HEALTH CARE EDUCATION/TRAINING PROGRAM

## 2023-01-20 PROCEDURE — 258N000003 HC RX IP 258 OP 636: Performed by: NURSE ANESTHETIST, CERTIFIED REGISTERED

## 2023-01-20 PROCEDURE — 999N000157 HC STATISTIC RCP TIME EA 10 MIN

## 2023-01-20 PROCEDURE — 97535 SELF CARE MNGMENT TRAINING: CPT | Mod: GO

## 2023-01-20 PROCEDURE — 93005 ELECTROCARDIOGRAM TRACING: CPT

## 2023-01-20 PROCEDURE — 250N000011 HC RX IP 250 OP 636: Performed by: NURSE ANESTHETIST, CERTIFIED REGISTERED

## 2023-01-20 PROCEDURE — 250N000009 HC RX 250: Performed by: SURGERY

## 2023-01-20 PROCEDURE — 36415 COLL VENOUS BLD VENIPUNCTURE: CPT | Performed by: ANESTHESIOLOGY

## 2023-01-20 PROCEDURE — 85610 PROTHROMBIN TIME: CPT | Performed by: STUDENT IN AN ORGANIZED HEALTH CARE EDUCATION/TRAINING PROGRAM

## 2023-01-20 PROCEDURE — 81003 URINALYSIS AUTO W/O SCOPE: CPT | Performed by: STUDENT IN AN ORGANIZED HEALTH CARE EDUCATION/TRAINING PROGRAM

## 2023-01-20 PROCEDURE — 999N000208 ECHOCARDIOGRAM COMPLETE

## 2023-01-20 PROCEDURE — 92610 EVALUATE SWALLOWING FUNCTION: CPT | Mod: GN

## 2023-01-20 PROCEDURE — 87040 BLOOD CULTURE FOR BACTERIA: CPT | Performed by: ANESTHESIOLOGY

## 2023-01-20 PROCEDURE — 83735 ASSAY OF MAGNESIUM: CPT | Performed by: ANESTHESIOLOGY

## 2023-01-20 RX ORDER — DOBUTAMINE HYDROCHLORIDE 200 MG/100ML
1.25-2.5 INJECTION INTRAVENOUS CONTINUOUS
Status: DISCONTINUED | OUTPATIENT
Start: 2023-01-20 | End: 2023-01-23

## 2023-01-20 RX ORDER — BUMETANIDE 0.25 MG/ML
1 INJECTION INTRAMUSCULAR; INTRAVENOUS ONCE
Status: DISCONTINUED | OUTPATIENT
Start: 2023-01-20 | End: 2023-01-20

## 2023-01-20 RX ORDER — PIPERACILLIN SODIUM, TAZOBACTAM SODIUM 4; .5 G/20ML; G/20ML
4.5 INJECTION, POWDER, LYOPHILIZED, FOR SOLUTION INTRAVENOUS EVERY 6 HOURS
Status: DISCONTINUED | OUTPATIENT
Start: 2023-01-20 | End: 2023-01-22

## 2023-01-20 RX ORDER — MAGNESIUM SULFATE HEPTAHYDRATE 40 MG/ML
2 INJECTION, SOLUTION INTRAVENOUS ONCE
Status: COMPLETED | OUTPATIENT
Start: 2023-01-20 | End: 2023-01-20

## 2023-01-20 RX ORDER — FUROSEMIDE 10 MG/ML
40 INJECTION INTRAMUSCULAR; INTRAVENOUS ONCE
Status: COMPLETED | OUTPATIENT
Start: 2023-01-20 | End: 2023-01-20

## 2023-01-20 RX ORDER — PIPERACILLIN SODIUM, TAZOBACTAM SODIUM 3; .375 G/15ML; G/15ML
3.38 INJECTION, POWDER, LYOPHILIZED, FOR SOLUTION INTRAVENOUS EVERY 8 HOURS
Status: DISCONTINUED | OUTPATIENT
Start: 2023-01-20 | End: 2023-01-20

## 2023-01-20 RX ORDER — PANTOPRAZOLE SODIUM 40 MG/1
40 TABLET, DELAYED RELEASE ORAL DAILY
Status: DISCONTINUED | OUTPATIENT
Start: 2023-01-21 | End: 2023-01-31 | Stop reason: HOSPADM

## 2023-01-20 RX ORDER — BUPROPION HYDROCHLORIDE 150 MG/1
300 TABLET ORAL DAILY
Status: DISCONTINUED | OUTPATIENT
Start: 2023-01-20 | End: 2023-01-31 | Stop reason: HOSPADM

## 2023-01-20 RX ORDER — POTASSIUM CHLORIDE 29.8 MG/ML
20 INJECTION INTRAVENOUS ONCE
Status: COMPLETED | OUTPATIENT
Start: 2023-01-20 | End: 2023-01-21

## 2023-01-20 RX ADMIN — EPINEPHRINE 0.07 MCG/KG/MIN: 1 INJECTION INTRAMUSCULAR; INTRAVENOUS; SUBCUTANEOUS at 21:07

## 2023-01-20 RX ADMIN — MUPIROCIN 0.5 G: 20 OINTMENT TOPICAL at 19:56

## 2023-01-20 RX ADMIN — HEPARIN SODIUM 5000 UNITS: 5000 INJECTION, SOLUTION INTRAVENOUS; SUBCUTANEOUS at 19:55

## 2023-01-20 RX ADMIN — PIPERACILLIN SODIUM AND TAZOBACTAM SODIUM 4.5 G: 4; .5 INJECTION, POWDER, LYOPHILIZED, FOR SOLUTION INTRAVENOUS at 21:41

## 2023-01-20 RX ADMIN — ACETAMINOPHEN 975 MG: 325 TABLET, FILM COATED ORAL at 06:26

## 2023-01-20 RX ADMIN — ASPIRIN 162 MG: 81 TABLET, CHEWABLE ORAL at 09:49

## 2023-01-20 RX ADMIN — SUGAMMADEX 200 MG: 100 INJECTION, SOLUTION INTRAVENOUS at 00:12

## 2023-01-20 RX ADMIN — HUMAN INSULIN 6 UNITS/HR: 100 INJECTION, SOLUTION SUBCUTANEOUS at 19:55

## 2023-01-20 RX ADMIN — NOREPINEPHRINE BITARTRATE 6.4 MCG: 1 INJECTION, SOLUTION, CONCENTRATE INTRAVENOUS at 00:06

## 2023-01-20 RX ADMIN — POTASSIUM CHLORIDE 20 MEQ: 29.8 INJECTION, SOLUTION INTRAVENOUS at 23:34

## 2023-01-20 RX ADMIN — HUMAN ALBUMIN MICROSPHERES AND PERFLUTREN 5 ML: 10; .22 INJECTION, SOLUTION INTRAVENOUS at 08:54

## 2023-01-20 RX ADMIN — Medication: at 03:20

## 2023-01-20 RX ADMIN — PANTOPRAZOLE SODIUM 40 MG: 40 INJECTION, POWDER, FOR SOLUTION INTRAVENOUS at 09:49

## 2023-01-20 RX ADMIN — BUMETANIDE 1 MG/HR: 0.25 INJECTION, SOLUTION INTRAMUSCULAR; INTRAVENOUS at 16:29

## 2023-01-20 RX ADMIN — SENNOSIDES AND DOCUSATE SODIUM 1 TABLET: 50; 8.6 TABLET ORAL at 19:55

## 2023-01-20 RX ADMIN — EPINEPHRINE 0.06 MCG/KG/MIN: 1 INJECTION INTRAMUSCULAR; INTRAVENOUS; SUBCUTANEOUS at 09:13

## 2023-01-20 RX ADMIN — HEPARIN SODIUM 5000 UNITS: 5000 INJECTION, SOLUTION INTRAVENOUS; SUBCUTANEOUS at 03:24

## 2023-01-20 RX ADMIN — SODIUM PHOSPHATE, MONOBASIC, MONOHYDRATE AND SODIUM PHOSPHATE, DIBASIC, ANHYDROUS 15 MMOL: 276; 142 INJECTION, SOLUTION INTRAVENOUS at 19:02

## 2023-01-20 RX ADMIN — ACETAMINOPHEN 975 MG: 325 TABLET, FILM COATED ORAL at 21:42

## 2023-01-20 RX ADMIN — FUROSEMIDE 40 MG: 10 INJECTION, SOLUTION INTRAVENOUS at 13:24

## 2023-01-20 RX ADMIN — FENTANYL CITRATE 50 MCG: 50 INJECTION, SOLUTION INTRAMUSCULAR; INTRAVENOUS at 00:09

## 2023-01-20 RX ADMIN — AMIODARONE HYDROCHLORIDE 0.5 MG/MIN: 50 INJECTION, SOLUTION INTRAVENOUS at 06:19

## 2023-01-20 RX ADMIN — HEPARIN SODIUM 5000 UNITS: 5000 INJECTION, SOLUTION INTRAVENOUS; SUBCUTANEOUS at 11:37

## 2023-01-20 RX ADMIN — HYDROMORPHONE HYDROCHLORIDE 0.2 MG: 0.2 INJECTION, SOLUTION INTRAMUSCULAR; INTRAVENOUS; SUBCUTANEOUS at 23:38

## 2023-01-20 RX ADMIN — Medication: at 03:21

## 2023-01-20 RX ADMIN — BUPROPION HYDROCHLORIDE 300 MG: 300 TABLET, FILM COATED, EXTENDED RELEASE ORAL at 09:49

## 2023-01-20 RX ADMIN — HUMAN INSULIN 3 UNITS/HR: 100 INJECTION, SOLUTION SUBCUTANEOUS at 06:21

## 2023-01-20 RX ADMIN — MUPIROCIN 0.5 G: 20 OINTMENT TOPICAL at 08:58

## 2023-01-20 RX ADMIN — ARIPIPRAZOLE 5 MG: 5 TABLET ORAL at 09:49

## 2023-01-20 RX ADMIN — DOBUTAMINE HYDROCHLORIDE 2.5 MCG/KG/MIN: 200 INJECTION INTRAVENOUS at 07:02

## 2023-01-20 RX ADMIN — AMIODARONE HYDROCHLORIDE 0.5 MG/MIN: 50 INJECTION, SOLUTION INTRAVENOUS at 08:35

## 2023-01-20 RX ADMIN — PIPERACILLIN SODIUM AND TAZOBACTAM SODIUM 4.5 G: 4; .5 INJECTION, POWDER, LYOPHILIZED, FOR SOLUTION INTRAVENOUS at 16:24

## 2023-01-20 RX ADMIN — MAGNESIUM SULFATE IN WATER 2 G: 40 INJECTION, SOLUTION INTRAVENOUS at 18:28

## 2023-01-20 ASSESSMENT — ACTIVITIES OF DAILY LIVING (ADL)
ADLS_ACUITY_SCORE: 27
ADLS_ACUITY_SCORE: 28
ADLS_ACUITY_SCORE: 28
ADLS_ACUITY_SCORE: 27
ADLS_ACUITY_SCORE: 28
ADLS_ACUITY_SCORE: 27
ADLS_ACUITY_SCORE: 28
ADLS_ACUITY_SCORE: 27

## 2023-01-20 NOTE — PROGRESS NOTES
Brief note:    Pt's SvO2 and CI was getting worse at the start of the shift around 2000. His SvO2 dropped to 42 (48) and CI to 1.6. CVP was 16. He was given lasix earlier and had a good response (60-70 ml/hr) Pressures were in 110-120. He was on epi 0.03. Given his worsening svo2 and CI, got the bedside ECHO which showed normal biventricular function and no evidence of pericardial fluid. CXR showed widened mediastinum. Hgb was 8 (from 9.3), coags and plt were ok. Ordered a unit of blood to see if that improves SVO2.     Around 2130, pt dumped 400 ml from his chest tube. Drainage was thick and sanguinous. With in next 30 minutes, he dumped another 350 ml. His pressures dropped down to 90s with MAPs in 58-62 range. Started NE to help with pressures. Also, ordered another unit of blood and 500 ml albumin.     Discussed with the fellow and staff; decision was made to take the patient back to OR for exploration at 2300.

## 2023-01-20 NOTE — ANESTHESIA PROCEDURE NOTES
Airway       Patient location during procedure: OR       Procedure Start/Stop Times: 1/19/2023 11:13 PM  Staff -        CRNA: Devi Pitt APRN CRNA       Performed By: CRNAIndications and Patient Condition       Indications for airway management: eliezer-procedural       Induction type:intravenous      Final Airway Details       Final airway type: endotracheal airway       Successful airway: ETT - single  Endotracheal Airway Details        ETT size (mm): 7.5       Cuffed: yes       Adjucts: stylet       Position: Right       Measured from: gums/teeth       Secured at (cm): 22       Bite block used: None    Post intubation assessment        Placement verified by: capnometry        Number of attempts at approach: 1       Number of other approaches attempted: 0       Secured with: pink tape       Ease of procedure: easy       Dentition: Intact and Unchanged    Medication(s) Administered   Medication Administration Time: 1/19/2023 11:13 PM

## 2023-01-20 NOTE — ANESTHESIA PREPROCEDURE EVALUATION
Anesthesia Pre-Procedure Evaluation    Patient: Gilberto Camilo   MRN: [id number] : 1955        Procedure : Procedure(s):  INCISION AND DRAINAGE, WOUND, CHEST, WITH IRRIGATION, Re-do sternotomy          Past Medical History:   Diagnosis Date     AAA (abdominal aortic aneurysm) 2017    5.3 cm      Mohan esophagus      Closed rib fracture      DNS (deviated nasal septum)       Past Surgical History:   Procedure Laterality Date     AAA REPAIR  2020    [facility]/ intravascular repair     APPENDECTOMY       BYPASS GRAFT ARTERY CORONARY, REPAIR VALVE MITRAL, COMBINED N/A 2023    Procedure: MEDIAN STERNOTOMY, ON PUMP OXGENATION, Left endovascular saphaneous vein harvest, CORONARY ARTERY BYPASS GRAFT (CABG x 2), Mitral Valve Repair, transesophageal echocardiogram (TRENT) performed by anesthesia;  Surgeon: Linda Kim MD;  Location: [facility] OR     COLONOSCOPY       CV CORONARY ANGIOGRAM N/A 2022    Procedure: Coronary Angiogram with possible intervention;  Surgeon: Porfirio Herrera MD;  Location:  HEART CARDIAC CATH LAB     CV RIGHT HEART CATH MEASUREMENTS RECORDED N/A 2022    Procedure: Right Heart Cath;  Surgeon: Porfirio Herrera MD;  Location:  HEART CARDIAC CATH LAB     ESOPHAGOGASTRODUODENOSCOPY        No Known Allergies   Social History     Tobacco Use     Smoking status: Former     Packs/day: 1.00     Years: 25.00     Pack years: 25.00     Types: Cigarettes     Smokeless tobacco: Never     Tobacco comments:     2014   Substance Use Topics     Alcohol use: Yes     Comment: occasional      Wt Readings from Last 1 Encounters:   23 112.4 kg (247 lb 12.8 oz)              OUTSIDE LABS:  CBC:   Lab Results   Component Value Date    WBC 16.8 (H) 2023    WBC 14.3 (H) 2023    HGB 8.8 (L) 2023    HGB 8.0 (L) 2023    HCT 25.8 (L) 2023    HCT 29.4 (L) 2023     2023     2023     BMP:   Lab  Results   Component Value Date     01/19/2023     01/19/2023    POTASSIUM 4.5 01/19/2023    POTASSIUM 4.6 01/19/2023    CHLORIDE 106 01/19/2023    CHLORIDE 109 (H) 01/19/2023    CO2 23 01/19/2023    CO2 21 (L) 01/19/2023    BUN 23.6 (H) 01/19/2023    BUN 20.6 01/19/2023    CR 1.34 (H) 01/19/2023    CR 1.09 01/19/2023     (H) 01/19/2023     (H) 01/19/2023     COAGS:   Lab Results   Component Value Date    PTT 46 (H) 01/19/2023    INR 1.19 (H) 01/19/2023    FIBR 404 01/19/2023     POC: No results found for: BGM, HCG, HCGS  HEPATIC:   Lab Results   Component Value Date    ALBUMIN 3.5 01/19/2023    PROTTOTAL 5.3 (L) 01/19/2023    ALT 16 01/19/2023    AST 58 (H) 01/19/2023    ALKPHOS 58 01/19/2023    BILITOTAL 0.6 01/19/2023     OTHER:   Lab Results   Component Value Date    PH 7.33 (L) 01/19/2023    LACT 2.3 (H) 01/19/2023    A1C 7.9 (H) 01/09/2023    SARI 8.3 (L) 01/19/2023    PHOS 2.9 01/19/2023    MAG 2.0 01/19/2023    TSH 0.74 08/29/2022    CRP 2.7 03/18/2014       Anesthesia Plan    ASA Status:  5, emergent       Anesthesia Type: General.     - Airway: ETT      Maintenance: Balanced.        Consents            Postoperative Care            Comments:                Naty Angelo MD

## 2023-01-20 NOTE — PLAN OF CARE
Major Shift Events:  Extubated at 1315 to NC 3-5 LPM. Splinted cough. Encouraged coughing/deep breathing. Pacer turned from 90 to 60 bpm, patient's rhythm mid 60s, a fib/junctional. BP did not tolerate, pacer turned back up to 80 bpm. Amio bolus and gtt started. Epi with on/off levo for MAP <65. Very labile BP. 500mL bolus x2 this AM. 20mg IV lasix this afternoon with little response, another 40mg given now. Arredondo in place, borderline UOP throughout shift. Bedside swallow not completed d/t patient being pretty drowsy. Pt is oriented and moving all extremities/following commands. Minimal complaints of pain since extubation. Wife, Marcella, at bedside most of day. Got up to chair with therapy for ~2 hours this afternoon.  Plan: Monitor I&Os. Implement delirium precautions. Encourage pulmonary toilet. Bedside swallow study needed. Continue current POC.   For vital signs and complete assessments, please see documentation flowsheets.     Goal Outcome Evaluation:    Plan of Care Reviewed With: patient and spouse    Overall Patient Progress: improving    Outcome Evaluation: Extubated. Continues to require pressors.

## 2023-01-20 NOTE — PROGRESS NOTES
CV ICU PROGRESS NOTE  January 20, 2023   Gilberto Camilo  [id number]  Admitted: 1/18/2023 10:32 AM    CO-MORBIDITIES:   S/P CABG (coronary artery bypass graft)  (primary encounter diagnosis)  Mitral valve prolapse  Other ill-defined heart diseases  Coronary artery disease involving native heart, unspecified vessel or lesion type, unspecified whether angina present  S/P MVR (mitral valve replacement)    ASSESSMENT: Gilberto Camilo is a 67 year old male with PMH of HTN, HLD, AAA s/p repair in 2017, PAD with claudication (follows at [facility]), DM2, previous smoker, found to have severe degenerative mitral valve regurgitation due to prolapse who underwent CABG x 2 (SVG to PDA, SVG to D1) and mitral valve repair on 1/18/23 by Dr. Kim.    Changes for today:  - Diurese with 40 mg lasix   - ECHO  - Dobutamine and epinephrine gtt   - Bedside swallow study     PLAN:  Neuro/ pain/ sedation:  #Acute Postoperative pain  #Peripheral Neuropathy  - Monitor neurological status. Notify the MD for any acute changes in exam.  - Pain: Scheduled tylenol. PRN tylenol, oxycodone, dilaudid. Robaxin prn.   - Sedation: none     Pulmonary care:   #Postoperative ventilation management  #Hypoxemic respiratory failure   - Titrate supplemental oxygen to maintain saturation above 92%.  - Pulmonary hygiene: Incentive spirometer every 15- 30 minutes when awake, flutter valve, C&DB    Vent Mode: (S) CPAP/PS  (Continuous positive airway pressure with Pressure Support)  FiO2 (%): 35 %  Resp Rate (Set): 20 breaths/min  Tidal Volume (Set, mL): 450 mL  PEEP (cm H2O): 5 cmH2O  Pressure Support (cm H2O): 7 cmH2O  Resp: 22      Cardiovascular:    #S/p CABG x 2 on 1/18/23 by Dr. Kim  #S/p Mitral Valve Repair 1/18/23  #CAD  #HLD  #S/p AAA repair 2017  #Atrial fibrillation with competing junctional rhythm  #Cardiogenic shock   Recent echo on 11/11/22 with LVEF of 60-65%  - Monitor hemodynamic status.   - Goal MAP>65, SBP<140  - Statin hold  -  BB hold  - ASA: 1/19/23  - Epi, norepi, vaso gtt; wean as tolerated  - Amiodarone bolus and infusion starting 1/19  - Goal net negative, diurese with 40mg lasix   - Taken back to OR overnight on 1/19 for high chest tube output and worsening FICKS  - ECHO 1/20 with acutely reduced LVEF, 40%. Normal RV function.     GI care/ Nutrition:   - NPO   - Bedside swallow study with plan for XR swallow study  - PPI  - Continue bowel regimen: miralax, senna    Renal/ Fluid Balance/ Electrolytes:   #AVERY  BL creat appears to be ~ 1.0  - Strict I/O, daily weights  - Avoid/limit nephrotoxins as able  - Replete lytes PRN per protocol     Endocrine:    Stress induced hyperglycemia  Preop A1c 7.9  - Insulin gtt  - Goal BG <180 for optimal healing  - Hold PTA metformin     ID/ Antibiotics:  #Stress induced leukocytosis  - To complete perioperative regimen  - Continue to monitor fever curve, WBC and inflammatory markers as appropriate     Heme:     #Stress induced leukocytosis  #Acute blood loss anemia  #Acute blood loss thrombocytopenia  #High chest tube output  No s/sx active bleeding  - Continue to monitor  - CBC   - 1u platelets overnight on 1/19  - 2u PRBC overnight on 1/20     MSK/ Skin:  #Sternotomy  #Surgical Incision  - Sternal precautions  - Postoperative incision management per protocol  - PT/OT/CR     Prophylaxis:    - Mechanical prophylaxis for DVT  - Chemical DVT prophylaxis - start subcutaneous heparin tomorrow  - PPI     Lines/ tubes/ drains:  - ETT (1/17/23)  - RIJ CVC, PA catheter (1/17/23)  - Arterial Line (1/17/23)  - CTs x2 meds (1/17/23)  - Arredondo (1/17/23)     Disposition:  - CVICU    Patient seen, findings and plan discussed with CVICU staff.    Wilson Barrera  Anesthesiology, PGY-3/CA-2  Ascom *[id number]     ====================================    TODAY'S PROGRESS  SUBJECTIVE  Worsening overnight with new bleeding. Taken back to OR for bleeding. 2u pRBCs. Increasing pressor needs overnight.     OBJECTIVE  1.  VITAL SIGNS  Temp:  [100.2  F (37.9  C)-101.3  F (38.5  C)] 101.3  F (38.5  C)  Pulse:  [64-89] 68  Resp:  [8-34] 22  BP: ()/(52-60) 90/54  MAP:  [53 mmHg-95 mmHg] 73 mmHg  Arterial Line BP: ()/(41-83) 117/58  FiO2 (%):  [35 %-55 %] 35 %  SpO2:  [91 %-100 %] 94 %  Vent Mode: (S) CPAP/PS  (Continuous positive airway pressure with Pressure Support)  FiO2 (%): 35 %  Resp Rate (Set): 20 breaths/min  Tidal Volume (Set, mL): 450 mL  PEEP (cm H2O): 5 cmH2O  Pressure Support (cm H2O): 7 cmH2O  Resp: 22    2. INTAKE/ OUTPUT  I/O last 3 completed shifts:  In: 4316.36 [P.O.:300; I.V.:2711.36; NG/GT:205; IV Piggyback:500]  Out: 3185 [Urine:1595; Emesis/NG output:150; Blood:300; Chest Tube:1140]    3. PHYSICAL EXAMINATION  General: awake  Neuro: following commands, sleepy   Resp: non-labored breathing  CV: S1, S2, RRR, no m/r/g   Abdomen: Soft, non-distended, non-tender  Incisions: c/d/i  Extremities: warm and well perfused  CT: To suction, serosang output, no airleak, crepitus    4. INVESTIGATIONS  Arterial Blood Gases   Recent Labs   Lab 01/20/23  0521 01/20/23 0340 01/20/23 0046 01/19/23  2331   PH 7.34* 7.30* 7.23* 7.33*   PCO2 46* 50* 60* 48*   PO2 81 97 102 269*   HCO3 25 25 25 25     Complete Blood Count   Recent Labs   Lab 01/20/23  0340 01/20/23  0046 01/19/23  2331 01/19/23  2044 01/19/23  0356   WBC 17.8* 17.4*  --  16.8* 14.3*   HGB 8.4* 8.7* 8.8* 8.0* 9.1*    166  --  165 225     Basic Metabolic Panel  Recent Labs   Lab 01/20/23  0657 01/20/23  0553 01/20/23  0500 01/20/23  0415 01/20/23  0340 01/20/23  0154 01/20/23  0046 01/20/23  0044 01/19/23  2331 01/19/23  2104 01/19/23  2049 01/19/23  1412 01/19/23  1411   NA  --   --   --   --  141  --  140  --  139  --  141  --  143   POTASSIUM  --   --   --   --  4.8  --  4.9  --  4.5  --  4.6  --  4.5   CHLORIDE  --   --   --   --  108*  --  107  --   --   --  106  --  109*   CO2  --   --   --   --  21*  --  20*  --   --   --  23  --  21*   BUN  --    --   --   --  27.0*  --  24.0*  --   --   --  23.6*  --  20.6   CR  --   --   --   --  1.74*  --  1.54*  --   --   --  1.34*  --  1.09   * 132* 130* 106* 116*   < > 131*   < > 144*   < > 153*   < > 119*    < > = values in this interval not displayed.     Liver Function Tests  Recent Labs   Lab 01/20/23  0340 01/20/23  0046 01/19/23 2049 01/19/23  0356 01/18/23  2345 01/18/23 2212 01/18/23 1917   AST 57* 55* 58* 75*   < >  --   --    ALT 16 15 16 20   < >  --  20   ALKPHOS 53 53 58 59   < >  --  65   BILITOTAL 0.8 0.8 0.6 0.8   < >  --  0.8   ALBUMIN 3.3* 2.9* 3.5 3.6   < >  --  3.4*   INR  --  1.28* 1.19*  --   --  1.21* 1.25*    < > = values in this interval not displayed.     Pancreatic Enzymes  No lab results found in last 7 days.  Coagulation Profile  Recent Labs   Lab 01/20/23 0046 01/19/23 2049 01/18/23 2212 01/18/23 1917   INR 1.28* 1.19* 1.21* 1.25*   PTT 45* 46* 109* 56*     Lactate  Invalid input(s): LACTATE    5. RADIOLOGY  Recent Results (from the past 24 hour(s))   POC US Guidance Needle Placement    Impression    Bilateral erector spinae catheter   XR Abdomen Port 1 View    Narrative    Exam: XR ABDOMEN PORT 1 VIEW, 1/18/2023 7:55 PM    Indication: OG    Comparison: X-ray same day    Findings:   No frontal radiograph of the abdomen. Partial visualization of gastric  tube with side hole likely at the level of the GE junction. Aortoiliac  stent graft. Paucity of bowel gas.  No pneumatosis. No aggressive  osseous lesions.      Impression    Impression: Partial visualization of gastric tube with side hole  likely at the level of the GE junction. Advancement should be  considered.    I have personally reviewed the examination and initial interpretation  and I agree with the findings.    JORDANA PAREKH MD         SYSTEM ID:  [id number]   XR Chest Port 1 View   Result Value    Radiologist flags (Urgent)     Endotracheal tube within the right mainstem bronchus    Narrative    EXAM: XR CHEST  PORT 1 VIEW 1/18/2023 7:56 PM    HISTORY: 67 years Male Post Op CVTS Surgery.     COMPARISON: CT chest 9/29/2022.    TECHNIQUE: Single portable AP view of the chest.    FINDINGS:   Post surgical chest with intact median sternotomy wires and mitral  valve replacement. Endotracheal tube projects in the right mainstem  bronchus. Right IJ [place]-Lianet catheter projects over the right main  pulmonary artery. Spinal anesthetic catheters in place. Gastric tube  tip projects over the stomach with side port projecting at the lower  esophageal stricture. Dual mediastinal drains in place.    Midline trachea. Obscured cardiac silhouette. Mediastinum is within  normal limits. Distinct pulmonary vasculature. Moderate left pleural  effusion. No discernible pneumothorax. Low lung volumes. Mixed  interstitial and airspace opacities bilaterally, predominantly in the  perihilar regions. Unremarkable upper abdomen. No acute or suspicious  osseous abnormalities. Unremarkable soft tissues.      Impression    IMPRESSION:   1.  Endotracheal tube tip projects in the right mainstem bronchus.  2.  Postsurgical chest with otherwise appropriately positioned  surgical hardware.  3.  Moderate left pleural effusion and bilateral pulmonary edema with  scattered atelectasis.    [Urgent Result: Endotracheal tube within the right mainstem bronchus]    Finding was identified on 1/18/2023 8:11 PM.     Philip Martins MD was contacted by Dr. Germán Dickinson at 1/18/2023 8:16  PM and verbalized understanding of the urgent finding.     I have personally reviewed the examination and initial interpretation  and I agree with the findings.    JORDANA PAREKH MD         SYSTEM ID:  [id number]   XR Chest Port 1 View    Narrative    Exam: Chest x-ray, 1 view 1/18/2023 9:09 PM    Indication: Endotracheal tube    Comparison: Xray: 1/18/2023    Findings:   Single frontal radiograph of the chest. Postoperative chest with  cardiac valve replacement and [place]-Lianet catheter  over the right main  pulmonary artery. The ET tube projects 2.3 cm above the kathrine.     Interstitial and airspace opacities bilaterally, predominantly in the  perihilar and basilar regions. No pneumothorax. Small left pleural  effusion. Retrocardiac opacities. Visualized upper abdomen and bones  are grossly unremarkable.      Impression    Impression:   1. Endotracheal tube projects 2.3 cm above the kathrine.  2. Pericardiac opacities and mixed interstitial and airspace opacities  bilaterally, likely pulmonary edema with overlying atelectasis.  3. Small left pleural effusion.    I have personally reviewed the examination and initial interpretation  and I agree with the findings.    JORDANA PAREKH MD         SYSTEM ID:  [id number]   XR Abdomen Port 1 View    Narrative    EXAMINATION: XR ABDOMEN PORT 1 VIEW, 1/18/2023 9:23 PM    COMPARISON: 1/18/2023    HISTORY: OG placement    FINDINGS: No orogastric tube is seen. Possible orogastric tube coiled  in the hypopharynx which is not well visualized. Endotracheal tube in  the mid thoracic trachea. [place]-Lianet catheter in the pulmonary outflow  tract. Mediastinal drains. Posterior gas in the bowel. Vascular stent.      Impression    IMPRESSION: No orogastric tube is clearly seen, though there may be  one partially visualized in the hypopharynx.     JORDANA PAREKH MD         SYSTEM ID:  [id number]   XR Abdomen Port 1 View    Impression    RESIDENT PRELIMINARY INTERPRETATION  IMPRESSION: Slightly progressed enteric tube with sidehole estimated  to project over the gastroesophageal junction. Consider advancement.

## 2023-01-20 NOTE — PROGRESS NOTES
"Pain Service Progress Note  [facility]  Date: 01/20/2023       Patient Name: Gilberto Camilo  MRN: [id number]  Age: 67 year old  Sex: male      Assessment:  66yo male with history of HTN, AAA s/p repair (2017), PAD with claudication, DM2, severe mitral valve regurgitation now s/p CABG x2 and mitral valve repair on 1/18/23 with Dr. Kim.     Procedure: CABG, mitral valve repair    Date of Surgery: 1/18/23    Date of Catheter Placement: 1/18/23    Plan/Recommendations:  1. Regional Anesthesia/Analgesia  -Continuous Catheter Type/Site: bilateral erector spinae (ES) T6-7  Infusate: ropivacaine 0.2%  Programmed Intermittent Bolus (PIB) at 7 mL Q60 min via each catheter, total infusion rate of 14 mL/hr    Plan to maintain catheters, max of 7 days    2. Anticoagulation  -Please contact Inpatient Pain Service before ordering or making any anticoagulation changes       3. Multimodal Analgesia  - Per primary team    Pain Service will continue to follow.    Please Page the Pain Team [facility]: \"PAIN MANAGEMENT ACUTE INPATIENT/ [facility]\"    Francisca Mcmillan MD  01/20/2023     Overnight Events: Taken back to OR overnight for bleeding.    Tubes/Drains: Yes  keon, CVC, CTx2    Subjective: Sleepy this AM.   Nausea: No  Vomiting: No  Pruritus: No  Symptoms of LAST: No    Pain Location:  Anterior chest    Pain Intensity:    Pain at Rest: 3/10  Pain with Activity: NA  Satisfied with your level of pain control: Yes     Diet: Full Liquid Diet    Relevant Labs:  Recent Labs   Lab Test 01/19/23  0356 01/18/23  2345 01/18/23  2212   INR  --   --  1.21*     --   --    PTT  --   --  109*   BUN 16.7   < >  --     < > = values in this interval not displayed.       Physical Exam:  Vitals: BP 90/54   Pulse 90   Temp 100.4  F (38  C)   Resp 20   Ht (P) 1.829 m (6')   Wt 113.5 kg (250 lb 3.6 oz)   SpO2 96%   BMI (P) 33.94 kg/m      Physical Exam:   Orientation:  Alert, and in no acute distress: " Yes  Sedation: No    Motor Examination:  Moving all extremities spontaneously    Catheter Site:   Catheter entry site is clean/dry/intact: Yes - Catheter dressing was rolling up at edges, insertion sites still intact. Dressing was changed with sterile technique.     Tender: No       Relevant Medications:  Current Pain Medications:  Medications related to Pain Management (From now, onward)    Start     Dose/Rate Route Frequency Ordered Stop    01/21/23 0000  acetaminophen (TYLENOL) tablet 650 mg         650 mg Oral EVERY 4 HOURS PRN 01/18/23 1907 01/19/23 0800  aspirin (ASA) chewable tablet 162 mg         162 mg Oral or NG Tube DAILY 01/18/23 1907 01/19/23 0800  polyethylene glycol (MIRALAX) Packet 17 g         17 g Oral DAILY 01/18/23 1907 01/18/23 2030  fentaNYL (SUBLIMAZE) infusion          mcg/hr  1-2 mL/hr  Intravenous CONTINUOUS 01/18/23 2025 01/18/23 2030  propofol (DIPRIVAN) infusion         5-75 mcg/kg/min × 109.2 kg  3.3-49.1 mL/hr  Intravenous CONTINUOUS 01/18/23 2025 01/18/23 2000  acetaminophen (TYLENOL) tablet 975 mg         975 mg Oral or Feeding Tube EVERY 8 HOURS 01/18/23 1907 01/21/23 1959 01/18/23 2000  senna-docusate (SENOKOT-S/PERICOLACE) 8.6-50 MG per tablet 1 tablet         1 tablet Oral or Feeding Tube 2 TIMES DAILY 01/18/23 1907 01/18/23 1930  dexmedetomidine (PRECEDEX) 400 mcg in 0.9% sodium chloride 100 mL         0.2-0.7 mcg/kg/hr × 109.2 kg  5.5-19.1 mL/hr  Intravenous CONTINUOUS 01/18/23 1907 01/18/23 1907  magnesium hydroxide (MILK OF MAGNESIA) suspension 30 mL         30 mL Oral DAILY PRN 01/18/23 1907 01/18/23 1907  bisacodyl (DULCOLAX) suppository 10 mg         10 mg Rectal DAILY PRN 01/18/23 1907 01/18/23 1907  HYDROmorphone (DILAUDID) injection 0.2 mg        See Hyperspace for full Linked Orders Report.    0.2 mg Intravenous EVERY 2 HOURS PRN 01/18/23 1907 01/18/23 1907  HYDROmorphone (DILAUDID) injection 0.4 mg        See  Hyperspace for full Linked Orders Report.    0.4 mg Intravenous EVERY 2 HOURS PRN 01/18/23 1907      01/18/23 1907  oxyCODONE (ROXICODONE) tablet 5 mg        See Hyperspace for full Linked Orders Report.    5 mg Oral EVERY 4 HOURS PRN 01/18/23 1907      01/18/23 1907  oxyCODONE IR (ROXICODONE) tablet 10 mg        See Hyperspace for full Linked Orders Report.    10 mg Oral EVERY 4 HOURS PRN 01/18/23 1907      01/18/23 1907  methocarbamol (ROBAXIN) tablet 500 mg         500 mg Oral or Feeding Tube EVERY 6 HOURS PRN 01/18/23 1907      01/18/23 1907  lidocaine 1 % 0.1-1 mL         0.1-1 mL Other EVERY 1 HOUR PRN 01/18/23 1907      01/18/23 1907  lidocaine (LMX4) cream          Topical EVERY 1 HOUR PRN 01/18/23 1907      01/18/23 1230  ropivacaine 0.2% in NS perineural infusion simple          Perineural Continuous Nerve Block 01/18/23 1227      01/18/23 1230  ropivacaine 0.2% in NS perineural infusion simple          Perineural Continuous Nerve Block 01/18/23 1227            Primary Service Contacted with Recommendations? Yes      Please see A&P for additional details of medical decision making.  Tests REVIEWED in the past 24 hours:  - CBC  - Coags/INR  40 MINUTES SPENT BY ME on the date of service doing chart review, history, exam, documentation & further activities per the note.

## 2023-01-20 NOTE — PROGRESS NOTES
Mediastinal chest tubes milked at bedside by MD. Large clot was broken up and CT were draining. approximately 10 minutes after milking CT's pt had significant increase in output. 420mL in 1 hr. MD called to bedside to assess. 1U RBC's ordered and released Large clots found in both CT's  Where tubes met the skin. MD donned sterile gloves and with assistance of writer and other RN MD suctioned clots from CT's with a sterile suction kit. MD ordered 2nd unit of RBC's. Post suctioning CT's drained pt put out 85 mL with visible clots remaining in CT's. Surgical team called to bedside to assess. Decision was made by CVTS team to bring pt back to OR. Marcella (pt's wife) was called per MD for consent. Hand off report given to CRNA. 2nd unit of blood infusing as well as 25G Albumin. Taken back to OR @ 9951.

## 2023-01-20 NOTE — BRIEF OP NOTE
[facility]    Brief Operative Note    Pre-operative diagnosis: Cardiac tamponade [I31.4]  Post-operative diagnosis Same as pre-operative diagnosis    Procedure: Procedure(s):  chest washout, Re-do sternotomy, repair of vein graft  Surgeon: Surgeon(s) and Role:     * Linda Kim MD - Primary  Anesthesia: General   Estimated Blood Loss: 300 ml    Drains: Unchanged  Specimens: * No specimens in log *  Findings:   bleeding from side of vein graft near proximal anastomosis of the OM graft.  Complications: None.  Implants: * No implants in log *    Signed:    Philip Martins MD 1/20/2023 at 12:14 AM  Cardiothoracic Surgery Fellow  Pager: [phone]

## 2023-01-20 NOTE — OP NOTE
Procedure Date: 01/19/2023    PREOPERATIVE DIAGNOSIS:  Mediastinal bleeding, status post mitral valve repair and coronary bypass grafting x 2.    POSTOPERATIVE DIAGNOSIS:  Mediastinal bleeding, status post mitral valve repair and coronary bypass grafting x 2.    SURGEON:  Linda Kim MD    ASSISTANT:  Philip Martins MD    NAME OF OPERATION:  Emergent reexploration of the chest.    ANESTHESIA:  General endotracheal.    INDICATIONS FOR PROCEDURE:  Mr. Camilo is a 67-year-old gentleman who underwent a mitral valve repair, coronary artery bypass grafting x 2 with saphenous vein graft to the PDA and the diagonal arteries.  He did well postoperatively, but he was noted to have sudden onset high output chest tube with increasing vasopressor requirement.  Chest x-ray also demonstrated widening of the cardiac silhouette.  He was therefore taken back to the operating room emergently for mediastinal reexploration.    DESCRIPTION OF PROCEDURE:  After informed consent was obtained, the patient was brought down to the operating room emergently and was placed on the OR table in supine position.  Intravenous and intra-arterial access had already been obtained.  While monitoring his blood pressure and EKG tracing, he was anesthetized and intubated using a single lumen endotracheal tube.  His entire chest, abdomen and groins were then prepped and draped in the usual sterile fashion.  Ancef was given IV prior to skin incision.  Previous sternotomy incision was reopened and the sternal wires removed.  The sternal edges retracted laterally.  A significant amount of mediastinal hematoma was evacuated, and all cardiotomy sites were examined.  There was brisk bleeding from the vein graft to the diagonal artery about a centimeter distal to the proximal anastomosis.  This could have been either a clip that had blown off or it possibly could have been bleeding from de-airing site from the original operation.  The bleeding was fixed by  placing a single figure-of-eight 7-0 Prolene suture to the vein graft.  The mediastinum was irrigated again and after hemostasis was confirmed again, the sternum was reapproximated using multiple interrupted single and double wires.  The incision was closed in layers of running Vicryl suture.  The skin was closed using 3-0 Vicryl and was sealed using Dermabond.    There were no intraoperative complications and the patient tolerated the operation well.  No blood products were given intraoperatively.  All sponge counts, needle counts, instrument counts were correct times x 2 at the end of the  operation.    ESTIMATED BLOOD LOSS:  Unknown.    SPECIMEN REMOVED:  None.    The patient was brought to the ICU in hemodynamically stable condition.    Linda Kim MD        D: 2023   T: 2023   MT: CHRISTEL    Name:     FELICIANO LITTLE  MRN:      [id number]        Account:        [id number]   :      1955           Procedure Date: 2023     Document: [id number]

## 2023-01-20 NOTE — PROGRESS NOTES
01/20/23 1510   Appointment Info   Signing Clinician's Name / Credentials (SLP) Winnie Farah MA CCC-SLP   General Information   Onset of Illness/Injury or Date of Surgery 01/18/23   Referring Physician Charla Leo MD   Patient/Family Therapy Goal Statement (SLP) Pt would like water   Pertinent History of Current Problem Pt is a 67 year old male with PMH of HTN, HLD, AAA s/p repair in 2017, PAD with claudication (follows at [facility]), DM2, previous smoker, found to have severe degenerative mitral valve regurgitation due to prolapse who underwent CABG x 2 (SVG to PDA, SVG to D1) and mitral valve repair on 1/18/23 by Dr. Kim. Clinical swallow eval completed per MD order.   General Observations Upon SLP arrival, pt positioned upright in chair, fatigued, but willing to participate. Pt's family present.   Type of Evaluation   Type of Evaluation Swallow Evaluation   Oral Motor   Oral Musculature generally intact   Mucosal Quality good   Dentition (Oral Motor)   Dentition (Oral Motor) adequate dentition;dental appliance/dentures   Dental Appliance/Denture (Oral Motor) dentures, partial   Facial Symmetry (Oral Motor)   Facial Symmetry (Oral Motor) WNL   Lip Function (Oral Motor)   Lip Range of Motion (Oral Motor) WNL   Tongue Function (Oral Motor)   Tongue ROM (Oral Motor) WNL   Jaw Function (Oral Motor)   Jaw Function (Oral Motor) WNL   Cough/Swallow/Gag Reflex (Oral Motor)   Volitional Throat Clear/Cough (Oral Motor) reduced strength   Vocal Quality/Secretion Management (Oral Motor)   Vocal Quality (Oral Motor) hoarse   Secretion Management (Oral Motor) oral suctioning required   General Swallowing Observations   Past History of Dysphagia Pt reports hx of chen's esophagus.   Respiratory Support (General Swallowing Observations) oxygen mask  (5L O2)   Current Diet/Method of Nutritional Intake (General Swallowing Observations, NIS) NPO   Swallowing Evaluation Clinical swallow evaluation   Clinical  Swallow Evaluation   Feeding Assistance frequent cues/help required   Clinical Swallow Evaluation Textures Trialed thin liquids;pureed   Clinical Swallow Eval: Thin Liquid Texture Trial   Mode of Presentation, Thin Liquids cup;spoon;straw;fed by clinician   Volume of Liquid or Food Presented 4 oz   Oral Phase of Swallow WFL   Pharyngeal Phase of Swallow intact   Diagnostic Statement Oral phase WFL. No overt s/sx of aspiration evident.   Clinical Swallow Evaluation: Puree Solid Texture Trial   Mode of Presentation, Puree spoon;fed by clinician   Volume of Puree Presented 2 oz   Oral Phase, Puree WFL   Pharyngeal Phase, Puree throat clearing   Diagnostic Statement Oral phase WFL. Throat clearing x1 evident.   Esophageal Phase of Swallow   Patient reports or presents with symptoms of esophageal dysphagia Yes  (Hx of chen's esophagus)   Swallowing Recommendations   Diet Consistency Recommendations full liquid diet   Supervision Level for Intake 1:1 supervision needed   Monitoring/Assistance Required (Eating/Swallowing) stop eating activities when fatigue is present;monitor for cough or change in vocal quality with intake   Recommended Feeding/Eating Techniques (Swallow Eval) maintain upright sitting position for eating;maintain upright posture during/after eating for 30 minutes;minimize distractions during oral intake;set-up and prepare tray;provide assist with feeding   Medication Administration Recommendations, Swallowing (SLP) Crushed in puree   Instrumental Assessment Recommendations instrumental evaluation not recommended at this time   General Therapy Interventions   Planned Therapy Interventions Dysphagia Treatment   Dysphagia treatment Oropharyngeal exercise training;Modified diet education;Instruction of safe swallow strategies;Compensatory strategies for swallowing   Clinical Impression   Criteria for Skilled Therapeutic Interventions Met (SLP Eval) Yes, treatment indicated   SLP Diagnosis Dysphagia   Risks  & Benefits of therapy have been explained evaluation/treatment results reviewed;care plan/treatment goals reviewed;risks/benefits reviewed;current/potential barriers reviewed;participants voiced agreement with care plan;participants included;patient   Clinical Impression Comments Clinical swallow eval completed per MD order. Pt presents w/ oropharyngeal dysphagia impacted by fatigue/lethargy s/p extubation. Oral mech exam remarkable for reduced cough strength, hoarse vocal quality, and partials. Per RN, pt requiring some suctioning. Pt assessed w/ ice chips, thin liquids via tsp, cup, and straw, and puree. Oral phase unremarkable. Pharyngeal phase remarkable for overt s/sx of aspiration w/ puree eivdence by throat clearing x1. Pt denies globus sensation. No additional trials given as pt had difficult time maintaining alertness. Recommend cautious initation of full liquids diet (thin consistency) w/ 1:1 supervision. Meds OK crushed in puree. Pt MUST be fully upright and alert, taking small sips/bites at a slow rate. Please hold PO if pt is lethargic, has difficulty managing secretions, or oxygen requirements increase. SLP to follow.   SLP Total Evaluation Time   Eval: oral/pharyngeal swallow function, clinical swallow Minutes ([id number]) 18   SLP Discharge Planning   SLP Plan Diet tolerance/upgrade   SLP Discharge Recommendation Transitional Care Facility;home with home care speech therapy   SLP Rationale for DC Rec Dysphagia   SLP Brief overview of current status  Recommend cautious initation of full liquids diet (thin consistency) w/ 1:1 supervision. Meds OK crushed in puree. Pt MUST be fully upright and alert, taking small sips/bites at a slow rate. Please hold PO if pt is lethargic, has difficulty managing secretions, or oxygen requirements increase. SLP to follow.   Total Session Time   Total Session Time (sum of timed and untimed services) 23

## 2023-01-20 NOTE — PLAN OF CARE
ICU End of Shift Summary. See flowsheets for vital signs and detailed assessment.    Changes this shift: On arrival of shift VSS on epi. 100% VVI paced. Intrinsic rhythm assessed with MD at bedside and appeared to be BBB A fib with PVC's. BP could not tolerate off pacer. Febrile 101.3 Tmax. PT A&O x4 but pretty drowsy. Aroused to voice and fallowed commands. Answering questions appropriately. While assessing CT o/p. RN noticed clot in tubing and freed it. PT then dumped 400 mL of sanguinous o/p in 1 hr. See previous progress notes for details. PAT #'s done when pt returned from OR MD notified of high Co2, CVP, and increased Lactic. No new orders received. RN recommended Bipap and MD agreed. PT placed on 10/5 55%. ABG rechecked with some improvement. FiO2 weaned down to 35%. See results. CT o/p stable post OR. About 10-40 mL/hr. UOP suboptimal after OR. About 25 mL/hr. MD aware. Dobutamine ordered and started this AM.     Plan: Continue to monitor CT o/p. Monitor mental status. Was pretty drowsy (Rass -1) at start of shift. PAT Q4.           Goal Outcome Evaluation:      Plan of Care Reviewed With: patient    Overall Patient Progress: no changeOverall Patient Progress: no change    Outcome Evaluation: returned to OR d/t intrathoracic bleed. Received 2U RBC's 25G albumin. VSS on levo and epi post OR. Drowsy, lethargic. Placed on Bipap 10/5 35%.

## 2023-01-20 NOTE — PROGRESS NOTES
PT returned from OR @ 0030. VSS on return with levo and epi infusing. PT drowsy with oral airway in place per anesthesia. On 15L oxymask weaned to 10L. CT o/p stable. See I&O. Lucia #'s ran. See advanced hemodynamic monitoring in flowsheets. MD notified of #'s and high CVP of 27.

## 2023-01-20 NOTE — ANESTHESIA CARE TRANSFER NOTE
Patient: Gilberto Camilo    Procedure: Procedure(s):  chest washout, Re-do sternotomy, repair of vein graft       Diagnosis: Cardiac tamponade [I31.4]  Diagnosis Additional Information: No value filed.    Anesthesia Type:   No value filed.     Note:    Oropharynx: oral airway in place  Level of Consciousness: drowsy  Oxygen Supplementation: face mask  Level of Supplemental Oxygen (L/min / FiO2): 15  Independent Airway: airway patency satisfactory and stable  Dentition: dentition unchanged  Vital Signs Stable: post-procedure vital signs reviewed and stable  Report to RN Given: handoff report given  Patient transferred to: ICU    ICU Handoff: Call for PAUSE to initiate/utilize ICU HANDOFF, Identified Patient, Identified Responsible Provider, Reviewed the Pertinent Medical History, Discussed Surgical Course, Reviewed Intra-OP Anesthesia Management and Issues during Anesthesia, Set Expectations for Post Procedure Period and Allowed Opportunity for Questions and Acknowledgement of Understanding      Vitals:  Vitals Value Taken Time   BP     Temp     Pulse     Resp     SpO2         Electronically Signed By: TORI Troy CRNA  January 20, 2023  12:37 AM

## 2023-01-21 ENCOUNTER — APPOINTMENT (OUTPATIENT)
Dept: GENERAL RADIOLOGY | Facility: CLINIC | Age: 68
DRG: 219 | End: 2023-01-21
Attending: SURGERY
Payer: COMMERCIAL

## 2023-01-21 ENCOUNTER — APPOINTMENT (OUTPATIENT)
Dept: SPEECH THERAPY | Facility: CLINIC | Age: 68
DRG: 219 | End: 2023-01-21
Attending: SURGERY
Payer: COMMERCIAL

## 2023-01-21 LAB
ALBUMIN SERPL BCG-MCNC: 3 G/DL (ref 3.5–5.2)
ALLEN'S TEST: ABNORMAL
ALP SERPL-CCNC: 56 U/L (ref 40–129)
ALT SERPL W P-5'-P-CCNC: 16 U/L (ref 10–50)
ANION GAP SERPL CALCULATED.3IONS-SCNC: 11 MMOL/L (ref 7–15)
AST SERPL W P-5'-P-CCNC: 44 U/L (ref 10–50)
BASE EXCESS BLDA CALC-SCNC: 4.1 MMOL/L (ref -9–1.8)
BASE EXCESS BLDA CALC-SCNC: 4.1 MMOL/L (ref -9–1.8)
BASE EXCESS BLDA CALC-SCNC: 6.1 MMOL/L (ref -9–1.8)
BASE EXCESS BLDV CALC-SCNC: 4.6 MMOL/L (ref -7.7–1.9)
BASE EXCESS BLDV CALC-SCNC: 4.9 MMOL/L (ref -7.7–1.9)
BASE EXCESS BLDV CALC-SCNC: 5.2 MMOL/L (ref -7.7–1.9)
BASE EXCESS BLDV CALC-SCNC: 6.4 MMOL/L (ref -7.7–1.9)
BILIRUB SERPL-MCNC: 0.7 MG/DL
BLD PROD TYP BPU: NORMAL
BLOOD COMPONENT TYPE: NORMAL
BUN SERPL-MCNC: 28.1 MG/DL (ref 8–23)
CA-I BLD-MCNC: 4 MG/DL (ref 4.4–5.2)
CA-I BLD-MCNC: 4.4 MG/DL (ref 4.4–5.2)
CALCIUM SERPL-MCNC: 7.9 MG/DL (ref 8.8–10.2)
CHLORIDE SERPL-SCNC: 106 MMOL/L (ref 98–107)
CODING SYSTEM: NORMAL
CREAT SERPL-MCNC: 1.34 MG/DL (ref 0.67–1.17)
CROSSMATCH: NORMAL
DEPRECATED HCO3 PLAS-SCNC: 24 MMOL/L (ref 22–29)
ERYTHROCYTE [DISTWIDTH] IN BLOOD BY AUTOMATED COUNT: 14.6 % (ref 10–15)
GFR SERPL CREATININE-BSD FRML MDRD: 57 ML/MIN/1.73M2
GLUCOSE BLDC GLUCOMTR-MCNC: 111 MG/DL (ref 70–99)
GLUCOSE BLDC GLUCOMTR-MCNC: 112 MG/DL (ref 70–99)
GLUCOSE BLDC GLUCOMTR-MCNC: 119 MG/DL (ref 70–99)
GLUCOSE BLDC GLUCOMTR-MCNC: 121 MG/DL (ref 70–99)
GLUCOSE BLDC GLUCOMTR-MCNC: 123 MG/DL (ref 70–99)
GLUCOSE BLDC GLUCOMTR-MCNC: 124 MG/DL (ref 70–99)
GLUCOSE BLDC GLUCOMTR-MCNC: 126 MG/DL (ref 70–99)
GLUCOSE BLDC GLUCOMTR-MCNC: 130 MG/DL (ref 70–99)
GLUCOSE BLDC GLUCOMTR-MCNC: 136 MG/DL (ref 70–99)
GLUCOSE BLDC GLUCOMTR-MCNC: 143 MG/DL (ref 70–99)
GLUCOSE BLDC GLUCOMTR-MCNC: 144 MG/DL (ref 70–99)
GLUCOSE BLDC GLUCOMTR-MCNC: 152 MG/DL (ref 70–99)
GLUCOSE BLDC GLUCOMTR-MCNC: 152 MG/DL (ref 70–99)
GLUCOSE BLDC GLUCOMTR-MCNC: 153 MG/DL (ref 70–99)
GLUCOSE BLDC GLUCOMTR-MCNC: 153 MG/DL (ref 70–99)
GLUCOSE BLDC GLUCOMTR-MCNC: 154 MG/DL (ref 70–99)
GLUCOSE BLDC GLUCOMTR-MCNC: 156 MG/DL (ref 70–99)
GLUCOSE BLDC GLUCOMTR-MCNC: 166 MG/DL (ref 70–99)
GLUCOSE BLDC GLUCOMTR-MCNC: 167 MG/DL (ref 70–99)
GLUCOSE BLDC GLUCOMTR-MCNC: 89 MG/DL (ref 70–99)
GLUCOSE SERPL-MCNC: 125 MG/DL (ref 70–99)
HCO3 BLD-SCNC: 29 MMOL/L (ref 21–28)
HCO3 BLD-SCNC: 29 MMOL/L (ref 21–28)
HCO3 BLD-SCNC: 31 MMOL/L (ref 21–28)
HCO3 BLDV-SCNC: 30 MMOL/L (ref 21–28)
HCO3 BLDV-SCNC: 31 MMOL/L (ref 21–28)
HCO3 BLDV-SCNC: 31 MMOL/L (ref 21–28)
HCO3 BLDV-SCNC: 32 MMOL/L (ref 21–28)
HCT VFR BLD AUTO: 23.6 % (ref 40–53)
HGB BLD-MCNC: 7.1 G/DL (ref 13.3–17.7)
HGB BLD-MCNC: 7.5 G/DL (ref 13.3–17.7)
ISSUE DATE AND TIME: NORMAL
MAGNESIUM SERPL-MCNC: 1.6 MG/DL (ref 1.7–2.3)
MAGNESIUM SERPL-MCNC: 2.2 MG/DL (ref 1.7–2.3)
MCH RBC QN AUTO: 29.2 PG (ref 26.5–33)
MCHC RBC AUTO-ENTMCNC: 31.8 G/DL (ref 31.5–36.5)
MCV RBC AUTO: 92 FL (ref 78–100)
O2/TOTAL GAS SETTING VFR VENT: 3 %
O2/TOTAL GAS SETTING VFR VENT: 32 %
O2/TOTAL GAS SETTING VFR VENT: 32 %
O2/TOTAL GAS SETTING VFR VENT: 5 %
O2/TOTAL GAS SETTING VFR VENT: 5 %
OXYHGB MFR BLD: 91 % (ref 92–100)
OXYHGB MFR BLD: 95 % (ref 92–100)
OXYHGB MFR BLD: 97 % (ref 92–100)
OXYHGB MFR BLDV: 47 % (ref 70–75)
OXYHGB MFR BLDV: 48 % (ref 70–75)
OXYHGB MFR BLDV: 51 % (ref 70–75)
OXYHGB MFR BLDV: 55 % (ref 70–75)
PCO2 BLD: 45 MM HG (ref 35–45)
PCO2 BLD: 46 MM HG (ref 35–45)
PCO2 BLD: 46 MM HG (ref 35–45)
PCO2 BLDV: 50 MM HG (ref 40–50)
PCO2 BLDV: 51 MM HG (ref 40–50)
PCO2 BLDV: 52 MM HG (ref 40–50)
PCO2 BLDV: 53 MM HG (ref 40–50)
PH BLD: 7.41 [PH] (ref 7.35–7.45)
PH BLD: 7.42 [PH] (ref 7.35–7.45)
PH BLD: 7.44 [PH] (ref 7.35–7.45)
PH BLDV: 7.38 [PH] (ref 7.32–7.43)
PH BLDV: 7.39 [PH] (ref 7.32–7.43)
PH BLDV: 7.39 [PH] (ref 7.32–7.43)
PH BLDV: 7.4 [PH] (ref 7.32–7.43)
PHOSPHATE SERPL-MCNC: 2.1 MG/DL (ref 2.5–4.5)
PHOSPHATE SERPL-MCNC: 2.6 MG/DL (ref 2.5–4.5)
PLATELET # BLD AUTO: 110 10E3/UL (ref 150–450)
PO2 BLD: 113 MM HG (ref 80–105)
PO2 BLD: 63 MM HG (ref 80–105)
PO2 BLD: 79 MM HG (ref 80–105)
PO2 BLDV: 27 MM HG (ref 25–47)
PO2 BLDV: 27 MM HG (ref 25–47)
PO2 BLDV: 28 MM HG (ref 25–47)
PO2 BLDV: 30 MM HG (ref 25–47)
POTASSIUM SERPL-SCNC: 3.3 MMOL/L (ref 3.4–5.3)
POTASSIUM SERPL-SCNC: 3.8 MMOL/L (ref 3.4–5.3)
PROT SERPL-MCNC: 5 G/DL (ref 6.4–8.3)
RBC # BLD AUTO: 2.57 10E6/UL (ref 4.4–5.9)
SODIUM SERPL-SCNC: 141 MMOL/L (ref 136–145)
UNIT ABO/RH: NORMAL
UNIT NUMBER: NORMAL
UNIT STATUS: NORMAL
UNIT TYPE ISBT: 6200
WBC # BLD AUTO: 14.8 10E3/UL (ref 4–11)

## 2023-01-21 PROCEDURE — 85018 HEMOGLOBIN: CPT | Performed by: SURGERY

## 2023-01-21 PROCEDURE — 250N000009 HC RX 250: Performed by: SURGERY

## 2023-01-21 PROCEDURE — 93005 ELECTROCARDIOGRAM TRACING: CPT

## 2023-01-21 PROCEDURE — 82805 BLOOD GASES W/O2 SATURATION: CPT | Performed by: SURGERY

## 2023-01-21 PROCEDURE — 250N000011 HC RX IP 250 OP 636: Performed by: SURGERY

## 2023-01-21 PROCEDURE — 84100 ASSAY OF PHOSPHORUS: CPT | Performed by: SURGERY

## 2023-01-21 PROCEDURE — 94640 AIRWAY INHALATION TREATMENT: CPT

## 2023-01-21 PROCEDURE — 99291 CRITICAL CARE FIRST HOUR: CPT | Mod: 24 | Performed by: ANESTHESIOLOGY

## 2023-01-21 PROCEDURE — 250N000013 HC RX MED GY IP 250 OP 250 PS 637: Performed by: SURGERY

## 2023-01-21 PROCEDURE — 92526 ORAL FUNCTION THERAPY: CPT | Mod: GN

## 2023-01-21 PROCEDURE — 250N000013 HC RX MED GY IP 250 OP 250 PS 637: Performed by: STUDENT IN AN ORGANIZED HEALTH CARE EDUCATION/TRAINING PROGRAM

## 2023-01-21 PROCEDURE — 200N000002 HC R&B ICU UMMC

## 2023-01-21 PROCEDURE — 83735 ASSAY OF MAGNESIUM: CPT | Performed by: ANESTHESIOLOGY

## 2023-01-21 PROCEDURE — 250N000011 HC RX IP 250 OP 636: Performed by: STUDENT IN AN ORGANIZED HEALTH CARE EDUCATION/TRAINING PROGRAM

## 2023-01-21 PROCEDURE — 71045 X-RAY EXAM CHEST 1 VIEW: CPT | Mod: 26 | Performed by: RADIOLOGY

## 2023-01-21 PROCEDURE — 84132 ASSAY OF SERUM POTASSIUM: CPT | Performed by: SURGERY

## 2023-01-21 PROCEDURE — 71045 X-RAY EXAM CHEST 1 VIEW: CPT

## 2023-01-21 PROCEDURE — 80053 COMPREHEN METABOLIC PANEL: CPT | Performed by: SURGERY

## 2023-01-21 PROCEDURE — 999N000157 HC STATISTIC RCP TIME EA 10 MIN

## 2023-01-21 PROCEDURE — 271N000002 HC RX 271: Performed by: SURGERY

## 2023-01-21 PROCEDURE — 84100 ASSAY OF PHOSPHORUS: CPT | Performed by: ANESTHESIOLOGY

## 2023-01-21 PROCEDURE — 83735 ASSAY OF MAGNESIUM: CPT | Performed by: SURGERY

## 2023-01-21 PROCEDURE — 82330 ASSAY OF CALCIUM: CPT | Performed by: SURGERY

## 2023-01-21 PROCEDURE — 258N000003 HC RX IP 258 OP 636: Performed by: SURGERY

## 2023-01-21 PROCEDURE — 93010 ELECTROCARDIOGRAM REPORT: CPT | Performed by: INTERNAL MEDICINE

## 2023-01-21 PROCEDURE — 250N000009 HC RX 250: Performed by: STUDENT IN AN ORGANIZED HEALTH CARE EDUCATION/TRAINING PROGRAM

## 2023-01-21 PROCEDURE — P9016 RBC LEUKOCYTES REDUCED: HCPCS | Performed by: SURGERY

## 2023-01-21 RX ORDER — IPRATROPIUM BROMIDE AND ALBUTEROL SULFATE 2.5; .5 MG/3ML; MG/3ML
3 SOLUTION RESPIRATORY (INHALATION) EVERY 4 HOURS PRN
Status: DISCONTINUED | OUTPATIENT
Start: 2023-01-21 | End: 2023-01-31 | Stop reason: HOSPADM

## 2023-01-21 RX ORDER — POTASSIUM CHLORIDE 750 MG/1
40 TABLET, EXTENDED RELEASE ORAL ONCE
Status: COMPLETED | OUTPATIENT
Start: 2023-01-21 | End: 2023-01-21

## 2023-01-21 RX ORDER — AMIODARONE HYDROCHLORIDE 200 MG/1
400 TABLET ORAL 2 TIMES DAILY
Status: DISCONTINUED | OUTPATIENT
Start: 2023-01-21 | End: 2023-01-29

## 2023-01-21 RX ORDER — POTASSIUM CHLORIDE 29.8 MG/ML
20 INJECTION INTRAVENOUS ONCE
Status: COMPLETED | OUTPATIENT
Start: 2023-01-21 | End: 2023-01-21

## 2023-01-21 RX ORDER — MAGNESIUM SULFATE HEPTAHYDRATE 40 MG/ML
2 INJECTION, SOLUTION INTRAVENOUS ONCE
Status: COMPLETED | OUTPATIENT
Start: 2023-01-21 | End: 2023-01-21

## 2023-01-21 RX ORDER — AMOXICILLIN 250 MG
2 CAPSULE ORAL 2 TIMES DAILY
Status: DISCONTINUED | OUTPATIENT
Start: 2023-01-21 | End: 2023-01-26

## 2023-01-21 RX ORDER — POLYETHYLENE GLYCOL 3350 17 G/17G
17 POWDER, FOR SOLUTION ORAL 2 TIMES DAILY
Status: DISCONTINUED | OUTPATIENT
Start: 2023-01-21 | End: 2023-01-24

## 2023-01-21 RX ADMIN — SENNOSIDES AND DOCUSATE SODIUM 2 TABLET: 50; 8.6 TABLET ORAL at 20:18

## 2023-01-21 RX ADMIN — PIPERACILLIN SODIUM AND TAZOBACTAM SODIUM 4.5 G: 4; .5 INJECTION, POWDER, LYOPHILIZED, FOR SOLUTION INTRAVENOUS at 15:06

## 2023-01-21 RX ADMIN — OXYCODONE HYDROCHLORIDE 5 MG: 5 TABLET ORAL at 15:24

## 2023-01-21 RX ADMIN — PIPERACILLIN SODIUM AND TAZOBACTAM SODIUM 4.5 G: 4; .5 INJECTION, POWDER, LYOPHILIZED, FOR SOLUTION INTRAVENOUS at 09:41

## 2023-01-21 RX ADMIN — HYDROMORPHONE HYDROCHLORIDE 0.2 MG: 0.2 INJECTION, SOLUTION INTRAMUSCULAR; INTRAVENOUS; SUBCUTANEOUS at 15:24

## 2023-01-21 RX ADMIN — ARIPIPRAZOLE 5 MG: 5 TABLET ORAL at 07:48

## 2023-01-21 RX ADMIN — THERA TABS 1 TABLET: TAB at 07:45

## 2023-01-21 RX ADMIN — PIPERACILLIN SODIUM AND TAZOBACTAM SODIUM 4.5 G: 4; .5 INJECTION, POWDER, LYOPHILIZED, FOR SOLUTION INTRAVENOUS at 03:31

## 2023-01-21 RX ADMIN — POTASSIUM & SODIUM PHOSPHATES POWDER PACK 280-160-250 MG 1 PACKET: 280-160-250 PACK at 04:56

## 2023-01-21 RX ADMIN — POTASSIUM CHLORIDE 40 MEQ: 750 TABLET, EXTENDED RELEASE ORAL at 21:33

## 2023-01-21 RX ADMIN — CALCIUM GLUCONATE 1 G: 20 INJECTION, SOLUTION INTRAVENOUS at 20:26

## 2023-01-21 RX ADMIN — POLYETHYLENE GLYCOL 3350 17 G: 17 POWDER, FOR SOLUTION ORAL at 20:18

## 2023-01-21 RX ADMIN — ASPIRIN 162 MG: 81 TABLET, CHEWABLE ORAL at 07:45

## 2023-01-21 RX ADMIN — Medication 500 MG: at 14:33

## 2023-01-21 RX ADMIN — POLYETHYLENE GLYCOL 3350 17 G: 17 POWDER, FOR SOLUTION ORAL at 07:45

## 2023-01-21 RX ADMIN — ATORVASTATIN CALCIUM 80 MG: 80 TABLET, FILM COATED ORAL at 07:45

## 2023-01-21 RX ADMIN — POTASSIUM & SODIUM PHOSPHATES POWDER PACK 280-160-250 MG 1 PACKET: 280-160-250 PACK at 13:56

## 2023-01-21 RX ADMIN — MUPIROCIN 0.5 G: 20 OINTMENT TOPICAL at 20:18

## 2023-01-21 RX ADMIN — HEPARIN SODIUM 5000 UNITS: 5000 INJECTION, SOLUTION INTRAVENOUS; SUBCUTANEOUS at 20:18

## 2023-01-21 RX ADMIN — POTASSIUM PHOSPHATE, MONOBASIC POTASSIUM PHOSPHATE, DIBASIC 9 MMOL: 224; 236 INJECTION, SOLUTION, CONCENTRATE INTRAVENOUS at 23:21

## 2023-01-21 RX ADMIN — AMIODARONE HYDROCHLORIDE 400 MG: 200 TABLET ORAL at 20:18

## 2023-01-21 RX ADMIN — AMIODARONE HYDROCHLORIDE 400 MG: 200 TABLET ORAL at 09:38

## 2023-01-21 RX ADMIN — PANTOPRAZOLE SODIUM 40 MG: 40 TABLET, DELAYED RELEASE ORAL at 07:45

## 2023-01-21 RX ADMIN — METHOCARBAMOL 500 MG: 500 TABLET ORAL at 22:50

## 2023-01-21 RX ADMIN — OXYCODONE HYDROCHLORIDE 5 MG: 5 TABLET ORAL at 07:43

## 2023-01-21 RX ADMIN — PIPERACILLIN SODIUM AND TAZOBACTAM SODIUM 4.5 G: 4; .5 INJECTION, POWDER, LYOPHILIZED, FOR SOLUTION INTRAVENOUS at 22:48

## 2023-01-21 RX ADMIN — HEPARIN SODIUM 5000 UNITS: 5000 INJECTION, SOLUTION INTRAVENOUS; SUBCUTANEOUS at 11:55

## 2023-01-21 RX ADMIN — POTASSIUM CHLORIDE 20 MEQ: 29.8 INJECTION, SOLUTION INTRAVENOUS at 04:56

## 2023-01-21 RX ADMIN — POTASSIUM & SODIUM PHOSPHATES POWDER PACK 280-160-250 MG 1 PACKET: 280-160-250 PACK at 08:02

## 2023-01-21 RX ADMIN — SENNOSIDES AND DOCUSATE SODIUM 1 TABLET: 50; 8.6 TABLET ORAL at 07:45

## 2023-01-21 RX ADMIN — HEPARIN SODIUM 5000 UNITS: 5000 INJECTION, SOLUTION INTRAVENOUS; SUBCUTANEOUS at 04:10

## 2023-01-21 RX ADMIN — OXYCODONE HYDROCHLORIDE 5 MG: 5 TABLET ORAL at 21:33

## 2023-01-21 RX ADMIN — ACETAMINOPHEN 650 MG: 325 TABLET, FILM COATED ORAL at 15:24

## 2023-01-21 RX ADMIN — ACETAMINOPHEN 975 MG: 325 TABLET, FILM COATED ORAL at 06:00

## 2023-01-21 RX ADMIN — MAGNESIUM SULFATE IN WATER 2 G: 40 INJECTION, SOLUTION INTRAVENOUS at 21:33

## 2023-01-21 RX ADMIN — Medication 500 MG: at 14:32

## 2023-01-21 RX ADMIN — DOBUTAMINE HYDROCHLORIDE 2.5 MCG/KG/MIN: 200 INJECTION INTRAVENOUS at 09:44

## 2023-01-21 RX ADMIN — HUMAN INSULIN 4 UNITS/HR: 100 INJECTION, SOLUTION SUBCUTANEOUS at 09:59

## 2023-01-21 RX ADMIN — IPRATROPIUM BROMIDE AND ALBUTEROL SULFATE 3 ML: 2.5; .5 SOLUTION RESPIRATORY (INHALATION) at 22:28

## 2023-01-21 RX ADMIN — BUPROPION HYDROCHLORIDE 300 MG: 300 TABLET, FILM COATED, EXTENDED RELEASE ORAL at 07:46

## 2023-01-21 RX ADMIN — OXYCODONE HYDROCHLORIDE 5 MG: 5 TABLET ORAL at 20:18

## 2023-01-21 RX ADMIN — HYDROMORPHONE HYDROCHLORIDE 0.2 MG: 0.2 INJECTION, SOLUTION INTRAMUSCULAR; INTRAVENOUS; SUBCUTANEOUS at 07:43

## 2023-01-21 RX ADMIN — BUMETANIDE 2 MG/HR: 0.25 INJECTION, SOLUTION INTRAMUSCULAR; INTRAVENOUS at 21:54

## 2023-01-21 ASSESSMENT — ACTIVITIES OF DAILY LIVING (ADL)
ADLS_ACUITY_SCORE: 28

## 2023-01-21 NOTE — PROGRESS NOTES
Pt remains very drowsy throughout day, WARE and follows commands. Unable to wean pressors. Urine and blood cultures sent for Tmax 101.5, zosyn started. Lasix given x1 with moderate response. CVP went from 15 to 9 after lasix, team notified, Bumex gtt ordered. Pt occasionally requiring oropharyngeal suctioning, encouraging IS and coughing. BiPap ordered for overnight. Chest tube output stable, last Hgb 7.5. 1 PRBC ordered and released, will transfuse once delivered. Pt coughing with morning PO meds, kept NPO until SLP eval done (see note). Pt was up in chair x3hrs, tolerated well. Pt's wife present throughout day, updated by myself and CVTS.

## 2023-01-21 NOTE — PLAN OF CARE
Appropriate neuro status. Alert and oriented x 4. PERRL. Had Bipap on for 1.5 ours but then refused it. On oxymask 3LPM O2 flow with high PO2 this am. Lungs are clear. Occasional self-clearing strong cough. Chest tubes output =20-40ml every 2hrs. T-max 38.4  One unit of PRBC given at start of shift. Flips between accelerated junctional rhythm and A-fib; on Amio drip. Weaned off Nor-epi and Epi is down to 0.03. Dobutamine at 2.5mcg/kg/min. Stopped Bumex drip per MD due to large amounts of urine output and decreased CVP. CVP 11 and 12. PA=54/12 and 51/10. CI=2.3 and 2.8. SVO2=45 and 55%. Potassium replaced x 2. Phos replaced at start of shift and this am. BS present. Drinking water with no swallowing issues. On insulin drip.

## 2023-01-21 NOTE — PROGRESS NOTES
"Pain Service Progress Note  [facility]  Date: 01/21/2023       Patient Name: Gilberto Camilo  MRN: [id number]  Age: 67 year old  Sex: male      Assessment:  68yo male with history of HTN, AAA s/p repair (2017), PAD with claudication, DM2, severe mitral valve regurgitation now s/p CABG x2 and mitral valve repair on 1/18/23 with Dr. Kim.     Procedure: CABG, mitral valve repair    Date of Surgery: 1/18/23    Date of Catheter Placement: 1/18/23    Plan/Recommendations:  1. Regional Anesthesia/Analgesia  -Continuous Catheter Type/Site: bilateral erector spinae (ES) T6-7  Infusate: ropivacaine 0.2%  Programmed Intermittent Bolus (PIB) at 7 mL Q60 min via each catheter, total infusion rate of 14 mL/hr    Plan to maintain catheters, max of 7 days    2. Anticoagulation  -Please contact Inpatient Pain Service before ordering or making any anticoagulation changes       3. Multimodal Analgesia  - Per primary team    Pain Service will continue to follow.    Please Page the Pain Team Via [employer]: \"PAIN MANAGEMENT ACUTE INPATIENT/ [facility]\"      Yaneth Leavitt MD  PGY-4  Anesthesia    Francisca Mcmillan MD  01/21/2023     Overnight Events: No acute events    Tubes/Drains: Yes      Subjective: Sleepy this AM.   Nausea: No  Vomiting: No  Pruritus: No  Symptoms of LAST: No    Pain Location:  Anterior chest    Pain Intensity:    Pain at Rest: 5/10  Pain with Activity: NA  Satisfied with your level of pain control: Yes     Diet: Easy to Chew Diet (level 7) Thin Liquids (level 0)    Relevant Labs:  Recent Labs   Lab Test 01/19/23  0356 01/18/23  2345 01/18/23  2212   INR  --   --  1.21*     --   --    PTT  --   --  109*   BUN 16.7   < >  --     < > = values in this interval not displayed.       Physical Exam:  Vitals: BP 90/54   Pulse 67   Temp 36.9  C (98.4  F) (Oral)   Resp 20   Ht (P) 1.829 m (6')   Wt 111.2 kg (245 lb 2.4 oz)   SpO2 93%   BMI (P) 33.25 kg/m      Physical Exam: "   Orientation:  Alert, and in no acute distress: Yes  Sedation: No    Motor Examination:  Moving all extremities spontaneously    Catheter Site:   Catheter entry site is clean/dry/intact: Yes      Tender: No       Relevant Medications:  Current Pain Medications:  Medications related to Pain Management (From now, onward)    Start     Dose/Rate Route Frequency Ordered Stop    01/21/23 0000  acetaminophen (TYLENOL) tablet 650 mg         650 mg Oral EVERY 4 HOURS PRN 01/18/23 1907      01/19/23 0800  aspirin (ASA) chewable tablet 162 mg         162 mg Oral or NG Tube DAILY 01/18/23 1907 01/19/23 0800  polyethylene glycol (MIRALAX) Packet 17 g         17 g Oral DAILY 01/18/23 1907 01/18/23 2030  fentaNYL (SUBLIMAZE) infusion          mcg/hr  1-2 mL/hr  Intravenous CONTINUOUS 01/18/23 2025 01/18/23 2030  propofol (DIPRIVAN) infusion         5-75 mcg/kg/min × 109.2 kg  3.3-49.1 mL/hr  Intravenous CONTINUOUS 01/18/23 2025 01/18/23 2000  acetaminophen (TYLENOL) tablet 975 mg         975 mg Oral or Feeding Tube EVERY 8 HOURS 01/18/23 1907 01/21/23 1959 01/18/23 2000  senna-docusate (SENOKOT-S/PERICOLACE) 8.6-50 MG per tablet 1 tablet         1 tablet Oral or Feeding Tube 2 TIMES DAILY 01/18/23 1907 01/18/23 1930  dexmedetomidine (PRECEDEX) 400 mcg in 0.9% sodium chloride 100 mL         0.2-0.7 mcg/kg/hr × 109.2 kg  5.5-19.1 mL/hr  Intravenous CONTINUOUS 01/18/23 1907 01/18/23 1907  magnesium hydroxide (MILK OF MAGNESIA) suspension 30 mL         30 mL Oral DAILY PRN 01/18/23 1907 01/18/23 1907  bisacodyl (DULCOLAX) suppository 10 mg         10 mg Rectal DAILY PRN 01/18/23 1907 01/18/23 1907  HYDROmorphone (DILAUDID) injection 0.2 mg        See Hyperspace for full Linked Orders Report.    0.2 mg Intravenous EVERY 2 HOURS PRN 01/18/23 1907 01/18/23 1907  HYDROmorphone (DILAUDID) injection 0.4 mg        See Hyperspace for full Linked Orders Report.    0.4 mg Intravenous EVERY  2 HOURS PRN 01/18/23 1907      01/18/23 1907  oxyCODONE (ROXICODONE) tablet 5 mg        See Hyperspace for full Linked Orders Report.    5 mg Oral EVERY 4 HOURS PRN 01/18/23 1907      01/18/23 1907  oxyCODONE IR (ROXICODONE) tablet 10 mg        See Hyperspace for full Linked Orders Report.    10 mg Oral EVERY 4 HOURS PRN 01/18/23 1907      01/18/23 1907  methocarbamol (ROBAXIN) tablet 500 mg         500 mg Oral or Feeding Tube EVERY 6 HOURS PRN 01/18/23 1907      01/18/23 1907  lidocaine 1 % 0.1-1 mL         0.1-1 mL Other EVERY 1 HOUR PRN 01/18/23 1907      01/18/23 1907  lidocaine (LMX4) cream          Topical EVERY 1 HOUR PRN 01/18/23 1907      01/18/23 1230  ropivacaine 0.2% in NS perineural infusion simple          Perineural Continuous Nerve Block 01/18/23 1227      01/18/23 1230  ropivacaine 0.2% in NS perineural infusion simple          Perineural Continuous Nerve Block 01/18/23 1227            Primary Service Contacted with Recommendations? Yes      Please see A&P for additional details of medical decision making.  Tests REVIEWED in the past 24 hours:  - CBC  - Coags/INR  40 MINUTES SPENT BY ME on the date of service doing chart review, history, exam, documentation & further activities per the note.

## 2023-01-21 NOTE — PLAN OF CARE
Patient continues to complain of post-op site pain. Refer to emar for prn analgesics. Continues on dobutamine gtt. SWAN in place and monitoring advance hemodynamics. Refer to flowsheets for specific numbers. Was up chair for about 5 hours. Requires mechanical lift for transfers. Refer to flowsheets for documentation.

## 2023-01-21 NOTE — PROGRESS NOTES
CV ICU PROGRESS NOTE  1/21/23      ASSESSMENT: Gilberto Camilo is a 67 year old male with PMH of HTN, HLD, AAA s/p repair in 2017, PAD with claudication (follows at [facility]), DM2, previous smoker, found to have severe degenerative mitral valve regurgitation due to prolapse who underwent CABG x 2 (SVG to PDA, SVG to D1) and mitral valve repair on 1/18/23 by Dr. Kim.    Changes for today:  Bumex gtt -1 L  Discontinue amio gtt. Switch to PO amio.  Start soft diet with thin liquids.   May need NJT if poor eating      PLAN:  Neuro/ pain/ sedation:  #Acute Postoperative pain  #Peripheral Neuropathy  - Monitor neurological status. Notify the MD for any acute changes in exam.  - Pain: Scheduled tylenol. PRN tylenol, oxycodone, dilaudid. Robaxin prn. ES catheters        Pulmonary care:   #Postoperative ventilation management  #Hypoxemic respiratory failure   - 3 L oxymask. Titrate supplemental oxygen to maintain saturation above 92%.  - Pulmonary hygiene: Incentive spirometer every 15- 30 minutes when awake, flutter valve  -Restart bumex gtt. Goal -1 L      Cardiovascular:    #S/p CABG x 2 on 1/18/23 by Dr. Kim  #S/p Mitral Valve Repair 1/18/23  #CAD  #HLD  #S/p AAA repair 2017  #Atrial fibrillation with competing junctional rhythm  #Cardiogenic shock   Recent ECHO 1/20 with acutely reduced LVEF 40-45% and mildly reduced RV function but difficult study   - Monitor hemodynamic status.   - Goal MAP>65, SBP<140  - Continue statin  - Continue ASA  - Wean Epi @0.03. Keep dobutamine @ 2.5.   - Transition to PO amio from infusion  - Bumex for goal -1 L    GI care/ Nutrition:   - Soft diet with thin liquids. SLP following for dysphagia  - PPI  - Continue bowel regimen: miralax, senna    Renal/ Fluid Balance/ Electrolytes:   #AVERY  BL creat appears to be ~ 1.0  - Cr improving 1.34<1.42.   - Diuresis today goal -1 L  - Strict I/O, daily weights  - Avoid/limit nephrotoxins as able  - Replete lytes PRN per  protocol     Endocrine:    Stress induced hyperglycemia  Preop A1c 7.9  - Continue Insulin gtt  - Goal BG <180 for optimal healing  - Hold PTA metformin     ID/ Antibiotics:  #Stress induced leukocytosis  - Completed perioperative antibiotics  - Febrile TMax 101.7 F. Blood cultures NGTD. Started on Zosyn (1/21- TBD)     Heme:     #Stress induced leukocytosis  #Acute blood loss anemia  #Acute blood loss thrombocytopenia  #High chest tube output  No s/sx active bleeding  - Hgb 7.5. Continue to monitor       MSK/ Skin:  #Sternotomy  #Surgical Incision  - Sternal precautions  - Postoperative incision management per protocol  - PT/OT     Prophylaxis:    - Mechanical prophylaxis for DVT  - Chemical DVT prophylaxis -  SQH  - PPI     Lines/ tubes/ drains:  - ETT (1/17/23)  - RIJ CVC, PA catheter (1/17/23)  - Arterial Line (1/17/23)  - CTs x2 meds (1/17/23)  - Arredondo (1/17/23)     Disposition:  - CVICU    Patient seen, findings and plan discussed with CVICU staff.    Charla Leo  General Surgery Resident, PGY-3    ====================================    TODAY'S PROGRESS  SUBJECTIVE  Weaning off pressors. Diuresing with improvement in heart function. Pain well controlled. No shortness of breath    OBJECTIVE  1. VITAL SIGNS  Temp:  [100  F (37.8  C)-101.7  F (38.7  C)] 100  F (37.8  C)  Pulse:  [] 66  Resp:  [11-25] 18  MAP:  [53 mmHg-83 mmHg] 70 mmHg  Arterial Line BP: ()/(41-63) 125/50  FiO2 (%):  [35 %] 35 %  SpO2:  [90 %-99 %] 97 %  FiO2 (%): 35 %  Resp: 18    2. INTAKE/ OUTPUT  I/O last 3 completed shifts:  In: 3422.6 [P.O.:100; I.V.:2672.6]  Out: 4777 [Urine:3697; Blood:300; Chest Tube:780]    3. PHYSICAL EXAMINATION  General: awake, alert, NAD  Neuro:no focal neural deficits  Resp: non-labored breathing on nasal cannula  CV: RRR  Abdomen: Soft, non-distended, non-tender  Incisions: c/d/i  Extremities: warm and well perfused  CT: To suction, serosang output, no airleak    4. INVESTIGATIONS  Arterial Blood  Gases   Recent Labs   Lab 01/21/23  0335 01/20/23 2003 01/20/23  0827 01/20/23  0521   PH 7.42 7.41 7.36 7.34*   PCO2 45 43 44 46*   PO2 113* 69* 64* 81   HCO3 29* 27 25 25     Complete Blood Count   Recent Labs   Lab 01/21/23  0335 01/20/23  1542 01/20/23 0340 01/20/23 0046 01/19/23  2331 01/19/23 2044   WBC 14.8*  --  17.8* 17.4*  --  16.8*   HGB 7.5* 7.5* 8.4* 8.7*   < > 8.0*   *  --  164 166  --  165    < > = values in this interval not displayed.     Basic Metabolic Panel  Recent Labs   Lab 01/21/23  0606 01/21/23 0342 01/21/23 0335 01/21/23 0224 01/20/23 2151 01/20/23 2149 01/20/23  1552 01/20/23  1542 01/20/23 0415 01/20/23 0340 01/20/23  0154 01/20/23  0046   NA  --   --  141  --   --  140  --   --   --  141  --  140   POTASSIUM  --   --  3.8  --   --  3.7  --  4.5  --  4.8  --  4.9   CHLORIDE  --   --  106  --   --  104  --   --   --  108*  --  107   CO2  --   --  24  --   --  23  --   --   --  21*  --  20*   BUN  --   --  28.1*  --   --  29.5*  --   --   --  27.0*  --  24.0*   CR  --   --  1.34*  --   --  1.42*  --   --   --  1.74*  --  1.54*   * 111* 125* 130*   < > 153*   < >  --    < > 116*   < > 131*    < > = values in this interval not displayed.     Liver Function Tests  Recent Labs   Lab 01/21/23 0335 01/20/23 0340 01/20/23 0046 01/19/23 2049 01/18/23  2345 01/18/23 2212 01/18/23 1917   AST 44 57* 55* 58*   < >  --   --    ALT 16 16 15 16   < >  --  20   ALKPHOS 56 53 53 58   < >  --  65   BILITOTAL 0.7 0.8 0.8 0.6   < >  --  0.8   ALBUMIN 3.0* 3.3* 2.9* 3.5   < >  --  3.4*   INR  --   --  1.28* 1.19*  --  1.21* 1.25*    < > = values in this interval not displayed.     Pancreatic Enzymes  No lab results found in last 7 days.  Coagulation Profile  Recent Labs   Lab 01/20/23  0046 01/19/23 2049 01/18/23 2212 01/18/23 1917   INR 1.28* 1.19* 1.21* 1.25*   PTT 45* 46* 109* 56*     Lactate  Invalid input(s): LACTATE    5. RADIOLOGY  Recent Results (from the past 24  hour(s))   POC US Guidance Needle Placement    Impression    Bilateral erector spinae catheter   XR Abdomen Port 1 View    Narrative    Exam: XR ABDOMEN PORT 1 VIEW, 1/18/2023 7:55 PM    Indication: OG    Comparison: X-ray same day    Findings:   No frontal radiograph of the abdomen. Partial visualization of gastric  tube with side hole likely at the level of the GE junction. Aortoiliac  stent graft. Paucity of bowel gas.  No pneumatosis. No aggressive  osseous lesions.      Impression    Impression: Partial visualization of gastric tube with side hole  likely at the level of the GE junction. Advancement should be  considered.    I have personally reviewed the examination and initial interpretation  and I agree with the findings.    JORDANA PAREKH MD         SYSTEM ID:  [id number]   XR Chest Port 1 View   Result Value    Radiologist flags (Urgent)     Endotracheal tube within the right mainstem bronchus    Narrative    EXAM: XR CHEST PORT 1 VIEW 1/18/2023 7:56 PM    HISTORY: 67 years Male Post Op CVTS Surgery.     COMPARISON: CT chest 9/29/2022.    TECHNIQUE: Single portable AP view of the chest.    FINDINGS:   Post surgical chest with intact median sternotomy wires and mitral  valve replacement. Endotracheal tube projects in the right mainstem  bronchus. Right IJ [place]-Lianet catheter projects over the right main  pulmonary artery. Spinal anesthetic catheters in place. Gastric tube  tip projects over the stomach with side port projecting at the lower  esophageal stricture. Dual mediastinal drains in place.    Midline trachea. Obscured cardiac silhouette. Mediastinum is within  normal limits. Distinct pulmonary vasculature. Moderate left pleural  effusion. No discernible pneumothorax. Low lung volumes. Mixed  interstitial and airspace opacities bilaterally, predominantly in the  perihilar regions. Unremarkable upper abdomen. No acute or suspicious  osseous abnormalities. Unremarkable soft tissues.      Impression     IMPRESSION:   1.  Endotracheal tube tip projects in the right mainstem bronchus.  2.  Postsurgical chest with otherwise appropriately positioned  surgical hardware.  3.  Moderate left pleural effusion and bilateral pulmonary edema with  scattered atelectasis.    [Urgent Result: Endotracheal tube within the right mainstem bronchus]    Finding was identified on 1/18/2023 8:11 PM.     Philip Martins MD was contacted by Dr. Germán Dickinson at 1/18/2023 8:16  PM and verbalized understanding of the urgent finding.     I have personally reviewed the examination and initial interpretation  and I agree with the findings.    JORDANA PAREKH MD         SYSTEM ID:  [id number]   XR Chest Port 1 View    Narrative    Exam: Chest x-ray, 1 view 1/18/2023 9:09 PM    Indication: Endotracheal tube    Comparison: Xray: 1/18/2023    Findings:   Single frontal radiograph of the chest. Postoperative chest with  cardiac valve replacement and [place]-Lianet catheter over the right main  pulmonary artery. The ET tube projects 2.3 cm above the kathrine.     Interstitial and airspace opacities bilaterally, predominantly in the  perihilar and basilar regions. No pneumothorax. Small left pleural  effusion. Retrocardiac opacities. Visualized upper abdomen and bones  are grossly unremarkable.      Impression    Impression:   1. Endotracheal tube projects 2.3 cm above the kathrine.  2. Pericardiac opacities and mixed interstitial and airspace opacities  bilaterally, likely pulmonary edema with overlying atelectasis.  3. Small left pleural effusion.    I have personally reviewed the examination and initial interpretation  and I agree with the findings.    JORDANA PAREKH MD         SYSTEM ID:  [id number]   XR Abdomen Port 1 View    Narrative    EXAMINATION: XR ABDOMEN PORT 1 VIEW, 1/18/2023 9:23 PM    COMPARISON: 1/18/2023    HISTORY: OG placement    FINDINGS: No orogastric tube is seen. Possible orogastric tube coiled  in the hypopharynx which is not well  visualized. Endotracheal tube in  the mid thoracic trachea. [place]-Lianet catheter in the pulmonary outflow  tract. Mediastinal drains. Posterior gas in the bowel. Vascular stent.      Impression    IMPRESSION: No orogastric tube is clearly seen, though there may be  one partially visualized in the hypopharynx.     JORDANA PAREKH MD         SYSTEM ID:  [id number]   XR Abdomen Port 1 View    Impression    RESIDENT PRELIMINARY INTERPRETATION  IMPRESSION: Slightly progressed enteric tube with sidehole estimated  to project over the gastroesophageal junction. Consider advancement.

## 2023-01-22 ENCOUNTER — APPOINTMENT (OUTPATIENT)
Dept: GENERAL RADIOLOGY | Facility: CLINIC | Age: 68
DRG: 219 | End: 2023-01-22
Attending: SURGERY
Payer: COMMERCIAL

## 2023-01-22 ENCOUNTER — APPOINTMENT (OUTPATIENT)
Dept: PHYSICAL THERAPY | Facility: CLINIC | Age: 68
DRG: 219 | End: 2023-01-22
Attending: SURGERY
Payer: COMMERCIAL

## 2023-01-22 LAB
ABO/RH(D): NORMAL
ALBUMIN SERPL BCG-MCNC: 3 G/DL (ref 3.5–5.2)
ALLEN'S TEST: ABNORMAL
ALP SERPL-CCNC: 62 U/L (ref 40–129)
ALT SERPL W P-5'-P-CCNC: 14 U/L (ref 10–50)
ANION GAP SERPL CALCULATED.3IONS-SCNC: 16 MMOL/L (ref 7–15)
ANION GAP SERPL CALCULATED.3IONS-SCNC: 7 MMOL/L (ref 7–15)
ANTIBODY SCREEN: NEGATIVE
AST SERPL W P-5'-P-CCNC: 35 U/L (ref 10–50)
BASE EXCESS BLDA CALC-SCNC: 10.5 MMOL/L (ref -9–1.8)
BASE EXCESS BLDA CALC-SCNC: 8.8 MMOL/L (ref -9–1.8)
BASE EXCESS BLDA CALC-SCNC: 8.9 MMOL/L (ref -9–1.8)
BASE EXCESS BLDV CALC-SCNC: 11.3 MMOL/L (ref -7.7–1.9)
BASE EXCESS BLDV CALC-SCNC: 12.8 MMOL/L (ref -7.7–1.9)
BASE EXCESS BLDV CALC-SCNC: 7.4 MMOL/L (ref -7.7–1.9)
BASE EXCESS BLDV CALC-SCNC: 9.4 MMOL/L (ref -7.7–1.9)
BILIRUB SERPL-MCNC: 1 MG/DL
BUN SERPL-MCNC: 19.5 MG/DL (ref 8–23)
BUN SERPL-MCNC: 20.7 MG/DL (ref 8–23)
CA-I BLD-MCNC: 4.1 MG/DL (ref 4.4–5.2)
CA-I BLD-MCNC: 4.2 MG/DL (ref 4.4–5.2)
CA-I BLD-MCNC: 4.5 MG/DL (ref 4.4–5.2)
CALCIUM SERPL-MCNC: 7.6 MG/DL (ref 8.8–10.2)
CALCIUM SERPL-MCNC: 8.1 MG/DL (ref 8.8–10.2)
CHLORIDE SERPL-SCNC: 91 MMOL/L (ref 98–107)
CHLORIDE SERPL-SCNC: 92 MMOL/L (ref 98–107)
CREAT SERPL-MCNC: 1.05 MG/DL (ref 0.67–1.17)
CREAT SERPL-MCNC: 1.13 MG/DL (ref 0.67–1.17)
DEPRECATED HCO3 PLAS-SCNC: 28 MMOL/L (ref 22–29)
DEPRECATED HCO3 PLAS-SCNC: 33 MMOL/L (ref 22–29)
ERYTHROCYTE [DISTWIDTH] IN BLOOD BY AUTOMATED COUNT: 14.2 % (ref 10–15)
GFR SERPL CREATININE-BSD FRML MDRD: 70 ML/MIN/1.73M2
GFR SERPL CREATININE-BSD FRML MDRD: 77 ML/MIN/1.73M2
GLUCOSE BLDC GLUCOMTR-MCNC: 110 MG/DL (ref 70–99)
GLUCOSE BLDC GLUCOMTR-MCNC: 113 MG/DL (ref 70–99)
GLUCOSE BLDC GLUCOMTR-MCNC: 114 MG/DL (ref 70–99)
GLUCOSE BLDC GLUCOMTR-MCNC: 138 MG/DL (ref 70–99)
GLUCOSE BLDC GLUCOMTR-MCNC: 138 MG/DL (ref 70–99)
GLUCOSE BLDC GLUCOMTR-MCNC: 145 MG/DL (ref 70–99)
GLUCOSE BLDC GLUCOMTR-MCNC: 157 MG/DL (ref 70–99)
GLUCOSE BLDC GLUCOMTR-MCNC: 177 MG/DL (ref 70–99)
GLUCOSE SERPL-MCNC: 142 MG/DL (ref 70–99)
GLUCOSE SERPL-MCNC: 187 MG/DL (ref 70–99)
HCO3 BLD-SCNC: 34 MMOL/L (ref 21–28)
HCO3 BLD-SCNC: 35 MMOL/L (ref 21–28)
HCO3 BLD-SCNC: 36 MMOL/L (ref 21–28)
HCO3 BLDV-SCNC: 33 MMOL/L (ref 21–28)
HCO3 BLDV-SCNC: 36 MMOL/L (ref 21–28)
HCO3 BLDV-SCNC: 37 MMOL/L (ref 21–28)
HCO3 BLDV-SCNC: 39 MMOL/L (ref 21–28)
HCT VFR BLD AUTO: 24 % (ref 40–53)
HGB BLD-MCNC: 8 G/DL (ref 13.3–17.7)
LACTATE SERPL-SCNC: 1 MMOL/L (ref 0.7–2)
MAGNESIUM SERPL-MCNC: 1.9 MG/DL (ref 1.7–2.3)
MAGNESIUM SERPL-MCNC: 2.1 MG/DL (ref 1.7–2.3)
MAGNESIUM SERPL-MCNC: 2.3 MG/DL (ref 1.7–2.3)
MCH RBC QN AUTO: 29.6 PG (ref 26.5–33)
MCHC RBC AUTO-ENTMCNC: 33.3 G/DL (ref 31.5–36.5)
MCV RBC AUTO: 89 FL (ref 78–100)
O2/TOTAL GAS SETTING VFR VENT: 2 %
O2/TOTAL GAS SETTING VFR VENT: 3 %
O2/TOTAL GAS SETTING VFR VENT: 3 %
O2/TOTAL GAS SETTING VFR VENT: 32 %
O2/TOTAL GAS SETTING VFR VENT: 32 %
O2/TOTAL GAS SETTING VFR VENT: 40 %
O2/TOTAL GAS SETTING VFR VENT: 40 %
OXYHGB MFR BLD: 92 % (ref 92–100)
OXYHGB MFR BLD: 97 % (ref 92–100)
OXYHGB MFR BLD: 97 % (ref 92–100)
OXYHGB MFR BLDV: 49 % (ref 70–75)
OXYHGB MFR BLDV: 56 % (ref 70–75)
OXYHGB MFR BLDV: 59 % (ref 70–75)
OXYHGB MFR BLDV: 61 % (ref 70–75)
PCO2 BLD: 50 MM HG (ref 35–45)
PCO2 BLD: 50 MM HG (ref 35–45)
PCO2 BLD: 53 MM HG (ref 35–45)
PCO2 BLDV: 55 MM HG (ref 40–50)
PCO2 BLDV: 55 MM HG (ref 40–50)
PCO2 BLDV: 56 MM HG (ref 40–50)
PCO2 BLDV: 61 MM HG (ref 40–50)
PH BLD: 7.42 [PH] (ref 7.35–7.45)
PH BLD: 7.44 [PH] (ref 7.35–7.45)
PH BLD: 7.46 [PH] (ref 7.35–7.45)
PH BLDV: 7.38 [PH] (ref 7.32–7.43)
PH BLDV: 7.39 [PH] (ref 7.32–7.43)
PH BLDV: 7.43 [PH] (ref 7.32–7.43)
PH BLDV: 7.45 [PH] (ref 7.32–7.43)
PHOSPHATE SERPL-MCNC: 2.2 MG/DL (ref 2.5–4.5)
PHOSPHATE SERPL-MCNC: 2.8 MG/DL (ref 2.5–4.5)
PHOSPHATE SERPL-MCNC: 2.8 MG/DL (ref 2.5–4.5)
PLATELET # BLD AUTO: 114 10E3/UL (ref 150–450)
PO2 BLD: 104 MM HG (ref 80–105)
PO2 BLD: 66 MM HG (ref 80–105)
PO2 BLD: 94 MM HG (ref 80–105)
PO2 BLDV: 28 MM HG (ref 25–47)
PO2 BLDV: 31 MM HG (ref 25–47)
PO2 BLDV: 32 MM HG (ref 25–47)
PO2 BLDV: 33 MM HG (ref 25–47)
POTASSIUM SERPL-SCNC: 3.3 MMOL/L (ref 3.4–5.3)
POTASSIUM SERPL-SCNC: 3.4 MMOL/L (ref 3.4–5.3)
POTASSIUM SERPL-SCNC: 3.5 MMOL/L (ref 3.4–5.3)
POTASSIUM SERPL-SCNC: 3.5 MMOL/L (ref 3.4–5.3)
PROT SERPL-MCNC: 5.1 G/DL (ref 6.4–8.3)
RBC # BLD AUTO: 2.7 10E6/UL (ref 4.4–5.9)
SODIUM SERPL-SCNC: 132 MMOL/L (ref 136–145)
SODIUM SERPL-SCNC: 134 MMOL/L (ref 133–144)
SODIUM SERPL-SCNC: 135 MMOL/L (ref 136–145)
SPECIMEN EXPIRATION DATE: NORMAL
WBC # BLD AUTO: 11.7 10E3/UL (ref 4–11)

## 2023-01-22 PROCEDURE — 84100 ASSAY OF PHOSPHORUS: CPT | Performed by: SURGERY

## 2023-01-22 PROCEDURE — 250N000009 HC RX 250: Performed by: STUDENT IN AN ORGANIZED HEALTH CARE EDUCATION/TRAINING PROGRAM

## 2023-01-22 PROCEDURE — 999N000045 HC STATISTIC DAILY SWAN MONITORING

## 2023-01-22 PROCEDURE — 250N000009 HC RX 250: Performed by: SURGERY

## 2023-01-22 PROCEDURE — 271N000002 HC RX 271: Performed by: SURGERY

## 2023-01-22 PROCEDURE — 250N000013 HC RX MED GY IP 250 OP 250 PS 637: Performed by: SURGERY

## 2023-01-22 PROCEDURE — 99291 CRITICAL CARE FIRST HOUR: CPT | Mod: 24 | Performed by: ANESTHESIOLOGY

## 2023-01-22 PROCEDURE — 71045 X-RAY EXAM CHEST 1 VIEW: CPT | Mod: 77

## 2023-01-22 PROCEDURE — 84132 ASSAY OF SERUM POTASSIUM: CPT | Performed by: SURGERY

## 2023-01-22 PROCEDURE — 200N000002 HC R&B ICU UMMC

## 2023-01-22 PROCEDURE — 999N000155 HC STATISTIC RAPCV CVP MONITORING

## 2023-01-22 PROCEDURE — 99232 SBSQ HOSP IP/OBS MODERATE 35: CPT | Performed by: ANESTHESIOLOGY

## 2023-01-22 PROCEDURE — 84295 ASSAY OF SERUM SODIUM: CPT | Performed by: STUDENT IN AN ORGANIZED HEALTH CARE EDUCATION/TRAINING PROGRAM

## 2023-01-22 PROCEDURE — 85027 COMPLETE CBC AUTOMATED: CPT | Performed by: SURGERY

## 2023-01-22 PROCEDURE — 71045 X-RAY EXAM CHEST 1 VIEW: CPT | Mod: 26 | Performed by: RADIOLOGY

## 2023-01-22 PROCEDURE — 83605 ASSAY OF LACTIC ACID: CPT | Performed by: STUDENT IN AN ORGANIZED HEALTH CARE EDUCATION/TRAINING PROGRAM

## 2023-01-22 PROCEDURE — 258N000003 HC RX IP 258 OP 636: Performed by: SURGERY

## 2023-01-22 PROCEDURE — 999N000157 HC STATISTIC RCP TIME EA 10 MIN

## 2023-01-22 PROCEDURE — 250N000011 HC RX IP 250 OP 636: Performed by: STUDENT IN AN ORGANIZED HEALTH CARE EDUCATION/TRAINING PROGRAM

## 2023-01-22 PROCEDURE — 250N000011 HC RX IP 250 OP 636: Performed by: SURGERY

## 2023-01-22 PROCEDURE — 80053 COMPREHEN METABOLIC PANEL: CPT | Performed by: SURGERY

## 2023-01-22 PROCEDURE — 71045 X-RAY EXAM CHEST 1 VIEW: CPT

## 2023-01-22 PROCEDURE — 94640 AIRWAY INHALATION TREATMENT: CPT | Mod: 76

## 2023-01-22 PROCEDURE — 82805 BLOOD GASES W/O2 SATURATION: CPT | Performed by: SURGERY

## 2023-01-22 PROCEDURE — 250N000013 HC RX MED GY IP 250 OP 250 PS 637: Performed by: STUDENT IN AN ORGANIZED HEALTH CARE EDUCATION/TRAINING PROGRAM

## 2023-01-22 PROCEDURE — 82330 ASSAY OF CALCIUM: CPT | Performed by: SURGERY

## 2023-01-22 PROCEDURE — 83735 ASSAY OF MAGNESIUM: CPT | Performed by: ANESTHESIOLOGY

## 2023-01-22 PROCEDURE — 97162 PT EVAL MOD COMPLEX 30 MIN: CPT | Mod: GP

## 2023-01-22 PROCEDURE — 97530 THERAPEUTIC ACTIVITIES: CPT | Mod: GP

## 2023-01-22 PROCEDURE — 83735 ASSAY OF MAGNESIUM: CPT | Performed by: SURGERY

## 2023-01-22 PROCEDURE — 84100 ASSAY OF PHOSPHORUS: CPT | Performed by: ANESTHESIOLOGY

## 2023-01-22 PROCEDURE — 999N000015 HC STATISTIC ARTERIAL MONITORING DAILY

## 2023-01-22 PROCEDURE — 86901 BLOOD TYPING SEROLOGIC RH(D): CPT | Performed by: SURGERY

## 2023-01-22 RX ORDER — POTASSIUM CHLORIDE 29.8 MG/ML
20 INJECTION INTRAVENOUS ONCE
Status: COMPLETED | OUTPATIENT
Start: 2023-01-22 | End: 2023-01-22

## 2023-01-22 RX ORDER — CALCIUM GLUCONATE 20 MG/ML
2 INJECTION, SOLUTION INTRAVENOUS ONCE
Status: COMPLETED | OUTPATIENT
Start: 2023-01-22 | End: 2023-01-22

## 2023-01-22 RX ORDER — MAGNESIUM SULFATE HEPTAHYDRATE 40 MG/ML
2 INJECTION, SOLUTION INTRAVENOUS ONCE
Status: COMPLETED | OUTPATIENT
Start: 2023-01-22 | End: 2023-01-22

## 2023-01-22 RX ORDER — POTASSIUM CHLORIDE 750 MG/1
40 TABLET, EXTENDED RELEASE ORAL ONCE
Status: COMPLETED | OUTPATIENT
Start: 2023-01-22 | End: 2023-01-22

## 2023-01-22 RX ORDER — PIPERACILLIN SODIUM, TAZOBACTAM SODIUM 4; .5 G/20ML; G/20ML
4.5 INJECTION, POWDER, LYOPHILIZED, FOR SOLUTION INTRAVENOUS EVERY 6 HOURS
Status: COMPLETED | OUTPATIENT
Start: 2023-01-22 | End: 2023-01-25

## 2023-01-22 RX ORDER — ACETAZOLAMIDE 500 MG/5ML
500 INJECTION, POWDER, LYOPHILIZED, FOR SOLUTION INTRAVENOUS ONCE
Status: COMPLETED | OUTPATIENT
Start: 2023-01-22 | End: 2023-01-22

## 2023-01-22 RX ADMIN — BUPROPION HYDROCHLORIDE 300 MG: 300 TABLET, FILM COATED, EXTENDED RELEASE ORAL at 07:56

## 2023-01-22 RX ADMIN — ACETAMINOPHEN 650 MG: 325 TABLET, FILM COATED ORAL at 01:43

## 2023-01-22 RX ADMIN — ACETAMINOPHEN 650 MG: 325 TABLET, FILM COATED ORAL at 11:35

## 2023-01-22 RX ADMIN — HUMAN INSULIN 1 UNITS/HR: 100 INJECTION, SOLUTION SUBCUTANEOUS at 06:17

## 2023-01-22 RX ADMIN — POLYETHYLENE GLYCOL 3350 17 G: 17 POWDER, FOR SOLUTION ORAL at 09:48

## 2023-01-22 RX ADMIN — PIPERACILLIN AND TAZOBACTAM 4.5 G: 4; .5 INJECTION, POWDER, FOR SOLUTION INTRAVENOUS at 09:40

## 2023-01-22 RX ADMIN — AMIODARONE HYDROCHLORIDE 400 MG: 200 TABLET ORAL at 07:56

## 2023-01-22 RX ADMIN — HYDROMORPHONE HYDROCHLORIDE 0.2 MG: 0.2 INJECTION, SOLUTION INTRAMUSCULAR; INTRAVENOUS; SUBCUTANEOUS at 18:50

## 2023-01-22 RX ADMIN — PIPERACILLIN SODIUM AND TAZOBACTAM SODIUM 4.5 G: 4; .5 INJECTION, POWDER, LYOPHILIZED, FOR SOLUTION INTRAVENOUS at 03:59

## 2023-01-22 RX ADMIN — SENNOSIDES AND DOCUSATE SODIUM 2 TABLET: 50; 8.6 TABLET ORAL at 20:53

## 2023-01-22 RX ADMIN — OXYCODONE HYDROCHLORIDE 10 MG: 10 TABLET ORAL at 16:50

## 2023-01-22 RX ADMIN — CALCIUM GLUCONATE 2 G: 20 INJECTION, SOLUTION INTRAVENOUS at 05:08

## 2023-01-22 RX ADMIN — HYDROMORPHONE HYDROCHLORIDE 0.4 MG: 0.2 INJECTION, SOLUTION INTRAMUSCULAR; INTRAVENOUS; SUBCUTANEOUS at 14:22

## 2023-01-22 RX ADMIN — ACETAZOLAMIDE 500 MG: 500 INJECTION, POWDER, LYOPHILIZED, FOR SOLUTION INTRAVENOUS at 10:52

## 2023-01-22 RX ADMIN — IPRATROPIUM BROMIDE AND ALBUTEROL SULFATE 3 ML: 2.5; .5 SOLUTION RESPIRATORY (INHALATION) at 21:38

## 2023-01-22 RX ADMIN — ATORVASTATIN CALCIUM 80 MG: 80 TABLET, FILM COATED ORAL at 07:56

## 2023-01-22 RX ADMIN — POTASSIUM CHLORIDE 20 MEQ: 29.8 INJECTION, SOLUTION INTRAVENOUS at 13:55

## 2023-01-22 RX ADMIN — MAGNESIUM SULFATE IN WATER 2 G: 40 INJECTION, SOLUTION INTRAVENOUS at 06:17

## 2023-01-22 RX ADMIN — CALCIUM GLUCONATE 1 G: 20 INJECTION, SOLUTION INTRAVENOUS at 12:59

## 2023-01-22 RX ADMIN — ASPIRIN 162 MG: 81 TABLET, CHEWABLE ORAL at 07:59

## 2023-01-22 RX ADMIN — Medication 500 MG: at 21:32

## 2023-01-22 RX ADMIN — OXYCODONE HYDROCHLORIDE 5 MG: 5 TABLET ORAL at 12:08

## 2023-01-22 RX ADMIN — POTASSIUM CHLORIDE 40 MEQ: 750 TABLET, EXTENDED RELEASE ORAL at 23:07

## 2023-01-22 RX ADMIN — OXYCODONE HYDROCHLORIDE 10 MG: 10 TABLET ORAL at 07:55

## 2023-01-22 RX ADMIN — METHOCARBAMOL 500 MG: 500 TABLET ORAL at 07:55

## 2023-01-22 RX ADMIN — ARIPIPRAZOLE 5 MG: 5 TABLET ORAL at 07:59

## 2023-01-22 RX ADMIN — AMIODARONE HYDROCHLORIDE 400 MG: 200 TABLET ORAL at 20:53

## 2023-01-22 RX ADMIN — Medication 500 MG: at 21:31

## 2023-01-22 RX ADMIN — POLYETHYLENE GLYCOL 3350 17 G: 17 POWDER, FOR SOLUTION ORAL at 20:54

## 2023-01-22 RX ADMIN — HEPARIN SODIUM 5000 UNITS: 5000 INJECTION, SOLUTION INTRAVENOUS; SUBCUTANEOUS at 11:35

## 2023-01-22 RX ADMIN — POTASSIUM PHOSPHATE, MONOBASIC POTASSIUM PHOSPHATE, DIBASIC 15 MMOL: 224; 236 INJECTION, SOLUTION, CONCENTRATE INTRAVENOUS at 07:46

## 2023-01-22 RX ADMIN — HYDROMORPHONE HYDROCHLORIDE 0.2 MG: 0.2 INJECTION, SOLUTION INTRAMUSCULAR; INTRAVENOUS; SUBCUTANEOUS at 05:08

## 2023-01-22 RX ADMIN — PIPERACILLIN AND TAZOBACTAM 4.5 G: 4; .5 INJECTION, POWDER, FOR SOLUTION INTRAVENOUS at 16:00

## 2023-01-22 RX ADMIN — THERA TABS 1 TABLET: TAB at 07:56

## 2023-01-22 RX ADMIN — POTASSIUM CHLORIDE 40 MEQ: 750 TABLET, EXTENDED RELEASE ORAL at 06:17

## 2023-01-22 RX ADMIN — HEPARIN SODIUM 5000 UNITS: 5000 INJECTION, SOLUTION INTRAVENOUS; SUBCUTANEOUS at 20:53

## 2023-01-22 RX ADMIN — HEPARIN SODIUM 5000 UNITS: 5000 INJECTION, SOLUTION INTRAVENOUS; SUBCUTANEOUS at 03:59

## 2023-01-22 RX ADMIN — OXYCODONE HYDROCHLORIDE 10 MG: 10 TABLET ORAL at 01:43

## 2023-01-22 RX ADMIN — PIPERACILLIN AND TAZOBACTAM 4.5 G: 4; .5 INJECTION, POWDER, FOR SOLUTION INTRAVENOUS at 21:12

## 2023-01-22 RX ADMIN — PANTOPRAZOLE SODIUM 40 MG: 40 TABLET, DELAYED RELEASE ORAL at 07:56

## 2023-01-22 RX ADMIN — SENNOSIDES AND DOCUSATE SODIUM 2 TABLET: 50; 8.6 TABLET ORAL at 07:56

## 2023-01-22 RX ADMIN — HYDROMORPHONE HYDROCHLORIDE 0.2 MG: 0.2 INJECTION, SOLUTION INTRAMUSCULAR; INTRAVENOUS; SUBCUTANEOUS at 23:07

## 2023-01-22 ASSESSMENT — ACTIVITIES OF DAILY LIVING (ADL)
ADLS_ACUITY_SCORE: 24

## 2023-01-22 NOTE — PROGRESS NOTES
"Pain Service Progress Note  [facility]  Date: 01/22/2023       Patient Name: Gilberto Camilo  MRN: [id number]  Age: 67 year old  Sex: male      Assessment:  68yo male with history of HTN, AAA s/p repair (2017), PAD with claudication, DM2, severe mitral valve regurgitation now s/p CABG x2 and mitral valve repair on 1/18/23 with Dr. Kim.     Procedure: CABG, mitral valve repair    Date of Surgery: 1/18/23    Date of Catheter Placement: 1/18/23    Plan/Recommendations:  1. Regional Anesthesia/Analgesia  -Continuous Catheter Type/Site: bilateral erector spinae (ES) T6-7  Infusate: ropivacaine 0.2%  Programmed Intermittent Bolus (PIB) at 7 mL Q60 min via each catheter, total infusion rate of 14 mL/hr    Following negative aspiration, a bolus of 20 mL 0.25% bupivacaine was administered to the bilateral erector spinae (ES) catheter(s) at 8:25am. No symptoms of local anesthetic systemic toxicity (LAST) were observed. Remained with and assessed patient for 10 min post-injection (BP, P and MAP stable). Bedside RN aware of need to continue BP, P and MAP monitoring Q 10 min for an additional 30 min. Please contact the Inpatient Pain Service if any of the following: patient experiencing any untoward effects, SBP< 90, P < 50 or > 120, MAP < 60.     Following administration of bolus, patient felt the pain was somewhat improved.    Plan to maintain catheters, max of 7 days    2. Anticoagulation  -Please contact Inpatient Pain Service before ordering or making any anticoagulation changes       3. Multimodal Analgesia  - Per primary team    Pain Service will continue to follow.    Please Page the Pain Team Via [employer]: \"PAIN MANAGEMENT ACUTE INPATIENT/ [facility]\"    Francisca Mcmillan MD  01/22/2023     Overnight Events: No acute events    Tubes/Drains: Yes  keon, CVC, CTx2    Subjective: \" I am hurting\". Patient was able to stand and march in place and transfer to chair this AM.   Nausea: No  Vomiting: " No  Pruritus: No  Symptoms of LAST: No    Pain Location:  Anterior chest    Pain Intensity:    Pain at Rest: 5/10  Pain with Activity: NA  Satisfied with your level of pain control: Yes     Diet: Easy to Chew Diet (level 7) Thin Liquids (level 0)  Snacks/Supplements Adult: Other; Boost Vanilla; Between Meals    Relevant Labs:  Recent Labs   Lab Test 01/19/23  0356 01/18/23  2345 01/18/23  2212   INR  --   --  1.21*     --   --    PTT  --   --  109*   BUN 16.7   < >  --     < > = values in this interval not displayed.       Physical Exam:  Vitals: /47   Pulse 70   Temp 36.7  C (98.1  F)   Resp 25   Ht 1.829 m (6')   Wt 110 kg (242 lb 8.1 oz)   SpO2 95%   BMI 32.89 kg/m      Physical Exam:   Orientation:  Alert, and in no acute distress: Yes  Sedation: No    Motor Examination:  Moving all extremities spontaneously    Catheter Site:   Catheter entry site is clean/dry/intact: Yes     Tender: No       Relevant Medications:  Current Pain Medications:  Medications related to Pain Management (From now, onward)    Start     Dose/Rate Route Frequency Ordered Stop    01/21/23 0000  acetaminophen (TYLENOL) tablet 650 mg         650 mg Oral EVERY 4 HOURS PRN 01/18/23 1907 01/19/23 0800  aspirin (ASA) chewable tablet 162 mg         162 mg Oral or NG Tube DAILY 01/18/23 1907 01/19/23 0800  polyethylene glycol (MIRALAX) Packet 17 g         17 g Oral DAILY 01/18/23 1907 01/18/23 2030  fentaNYL (SUBLIMAZE) infusion          mcg/hr  1-2 mL/hr  Intravenous CONTINUOUS 01/18/23 2025 01/18/23 2030  propofol (DIPRIVAN) infusion         5-75 mcg/kg/min × 109.2 kg  3.3-49.1 mL/hr  Intravenous CONTINUOUS 01/18/23 2025 01/18/23 2000  acetaminophen (TYLENOL) tablet 975 mg         975 mg Oral or Feeding Tube EVERY 8 HOURS 01/18/23 1907 01/21/23 1959 01/18/23 2000  senna-docusate (SENOKOT-S/PERICOLACE) 8.6-50 MG per tablet 1 tablet         1 tablet Oral or Feeding Tube 2 TIMES DAILY 01/18/23  1907      01/18/23 1930  dexmedetomidine (PRECEDEX) 400 mcg in 0.9% sodium chloride 100 mL         0.2-0.7 mcg/kg/hr × 109.2 kg  5.5-19.1 mL/hr  Intravenous CONTINUOUS 01/18/23 1907      01/18/23 1907  magnesium hydroxide (MILK OF MAGNESIA) suspension 30 mL         30 mL Oral DAILY PRN 01/18/23 1907      01/18/23 1907  bisacodyl (DULCOLAX) suppository 10 mg         10 mg Rectal DAILY PRN 01/18/23 1907      01/18/23 1907  HYDROmorphone (DILAUDID) injection 0.2 mg        See Hyperspace for full Linked Orders Report.    0.2 mg Intravenous EVERY 2 HOURS PRN 01/18/23 1907      01/18/23 1907  HYDROmorphone (DILAUDID) injection 0.4 mg        See Hyperspace for full Linked Orders Report.    0.4 mg Intravenous EVERY 2 HOURS PRN 01/18/23 1907      01/18/23 1907  oxyCODONE (ROXICODONE) tablet 5 mg        See Hyperspace for full Linked Orders Report.    5 mg Oral EVERY 4 HOURS PRN 01/18/23 1907      01/18/23 1907  oxyCODONE IR (ROXICODONE) tablet 10 mg        See Hyperspace for full Linked Orders Report.    10 mg Oral EVERY 4 HOURS PRN 01/18/23 1907      01/18/23 1907  methocarbamol (ROBAXIN) tablet 500 mg         500 mg Oral or Feeding Tube EVERY 6 HOURS PRN 01/18/23 1907      01/18/23 1907  lidocaine 1 % 0.1-1 mL         0.1-1 mL Other EVERY 1 HOUR PRN 01/18/23 1907      01/18/23 1907  lidocaine (LMX4) cream          Topical EVERY 1 HOUR PRN 01/18/23 1907      01/18/23 1230  ropivacaine 0.2% in NS perineural infusion simple          Perineural Continuous Nerve Block 01/18/23 1227      01/18/23 1230  ropivacaine 0.2% in NS perineural infusion simple          Perineural Continuous Nerve Block 01/18/23 1227            Primary Service Contacted with Recommendations? Yes       Please see A&P for additional details of medical decision making.  Tests REVIEWED in the past 24 hours:  - CBC  40 MINUTES SPENT BY ME on the date of service doing chart review, history, exam, documentation & further activities per the note.

## 2023-01-22 NOTE — PROGRESS NOTES
01/22/23 0900   Appointment Info   Signing Clinician's Name / Credentials (PT) Maurice Lynch DPT   Living Environment   People in Home spouse   Current Living Arrangements house   Home Accessibility no concerns   Transportation Anticipated car, drives self;family or friend will provide   Living Environment Comments Pt and wife live in basement level of son's home, no stairs to access (walk-out basement). pt with walk-in shower. Spouse can A PRN at baseline.   Self-Care   Usual Activity Tolerance moderate   Current Activity Tolerance fair   Regular Exercise No   Equipment Currently Used at Home none   Fall history within last six months no   Activity/Exercise/Self-Care Comment Patient IND with all mobility and ADLs/IADLs at baseline.   General Information   Onset of Illness/Injury or Date of Surgery 01/18/23   Referring Physician Philip Martins MD   Patient/Family Therapy Goals Statement (PT) to return home   Pertinent History of Current Problem (include personal factors and/or comorbidities that impact the POC) Gilberto Camilo is a 67 year old male with PMH of HTN, HLD, AAA s/p repair in 2017, PAD with claudication (follows at [facility]), DM2, previous smoker, found to have severe degenerative mitral valve regurgitation due to prolapse who underwent CABG x 2 (SVG to PDA, SVG to D1) and mitral valve repair on 1/18/23 by Dr. Kim.   Existing Precautions/Restrictions sternal;cardiac;fall   Cognition   Affect/Mental Status (Cognition) WFL;low arousal/lethargic   Orientation Status (Cognition) oriented x 4   Pain Assessment   Patient Currently in Pain Yes, see Vital Sign flowsheet   Posture    Posture Protracted shoulders   Range of Motion (ROM)   ROM Comment BLE WFL   Strength (Manual Muscle Testing)   Strength Comments generalized weakness and deconditioning, demonstrates at least 3+/5 BLE strength with mobility   Bed Mobility   Comment, (Bed Mobility) Not assessed, patient greeted sitting EOB    Transfers   Comment, (Transfers) sit<>stand min Ax2   Gait/Stairs (Locomotion)   Comment, (Gait/Stairs) Not formally assessed, small steps bed>chair with min Ax2   Balance   Balance Comments good sitting balance, fair standing balance- currently requiring UE support for stability   Sensory Examination   Sensory Perception patient reports no sensory changes   Clinical Impression   Criteria for Skilled Therapeutic Intervention Yes, treatment indicated   PT Diagnosis (PT) impaired functional mobility   Influenced by the following impairments strength, balance, activity tolerance, pain, sternal precautions   Functional limitations due to impairments bed mobility, transfers, gait, functional endurance   Clinical Presentation (PT Evaluation Complexity) Evolving/Changing   Clinical Presentation Rationale PMH/comorbidities, clinical judgement   Clinical Decision Making (Complexity) moderate complexity   Planned Therapy Interventions (PT) balance training;bed mobility training;gait training;patient/family education;postural re-education;strengthening;transfer training;progressive activity/exercise;risk factor education;home program guidelines   Risk & Benefits of therapy have been explained evaluation/treatment results reviewed;care plan/treatment goals reviewed;risks/benefits reviewed;participants voiced agreement with care plan;participants included;patient   Physical Therapy Goals   PT Frequency 6x/week   PT Predicted Duration/Target Date for Goal Attainment 02/03/23   PT Goals Bed Mobility;Transfers;Gait   PT: Bed Mobility Independent;Supine to/from sit;Within precautions   PT: Transfers Independent;Sit to/from stand;Within precautions   PT: Gait Independent;Greater than 200 feet   PT Discharge Planning   PT Discharge Recommendation (DC Rec) [facility]-Mot[facility] patient will benefit from intensive, interdisciplinary therapy.  Anticipate will be able to tolerate 3 hours of therapy per day   PT Rationale for DC  Rec Patient below IND baseline, highly motivated to return to PLOF. Currently would benefit from intensive therapies to maximize functional status. Pending progress in therapy, may progress to discharge home with assist from spouse and OP CR, will continue to assess and update recs as appropriate.

## 2023-01-22 NOTE — PHARMACY-ADMISSION MEDICATION HISTORY
Admission Medication History Completed by Pharmacy    See [facility] Admission Navigator for allergy information, preferred outpatient pharmacy, prior to admission medications and immunization status.     Medication History Sources:     Dispense records, chart review    Changes made to PTA medication list (reason):    Added: None    Deleted: None    Changed: None    Additional Information:    Could not confirm OTC medications, but all were listed on active medication lists for outpatient visits.    Prior to Admission medications    Medication Sig Last Dose Taking? Auth Provider Long Term End Date   ARIPiprazole (ABILIFY) 5 MG tablet TAKE 1 TABLET(5 MG) BY MOUTH DAILY  Patient taking differently: Take 5 mg by mouth every morning TAKE 1 TABLET(5 MG) BY MOUTH DAILY 1/16/2023 Yes Rock Basilio MD Yes    aspirin (ASA) 81 MG EC tablet Take 81 mg by mouth every morning 1/16/2023 Yes Reported, Patient     atorvastatin (LIPITOR) 80 MG tablet TAKE 1 TABLET(80 MG) BY MOUTH DAILY  Patient taking differently: Take 80 mg by mouth every morning TAKE 1 TABLET(80 MG) BY MOUTH DAILY 1/16/2023 Yes Rock Basilio MD Yes    buPROPion (WELLBUTRIN XL) 300 MG 24 hr tablet Take 1 tablet (300 mg) by mouth every morning 1/16/2023  Rock Basilio MD Yes    diphenhydrAMINE HCl (BENADRYL ALLERGY PO) Take by mouth as needed 1/16/2023  Reported, Patient     lisinopril (ZESTRIL) 10 MG tablet Take 1 tablet (10 mg) by mouth daily 1/16/2023  Rock Basilio MD Yes    loratadine (CLARITIN) 10 MG tablet Take 10 mg by mouth every morning 1/16/2023  Reported, Patient     metFORMIN (GLUCOPHAGE XR) 500 MG 24 hr tablet Take 2 tablets (1,000 mg) by mouth daily (with dinner)  Patient taking differently: Take 1,000 mg by mouth 2 times daily (with meals) 1/16/2023  Rock Basilio MD Yes    metoprolol succinate ER (TOPROL-XL) 25 MG 24 hr tablet TAKE 1 TABLET(25 MG) BY MOUTH DAILY  Patient taking differently: Take 25 mg by mouth every morning 1/16/2023   Rock Basilio MD Yes    Multiple Vitamins-Minerals (MULTIVITAMIN ADULT PO) Take by mouth every morning 1/16/2023  Reported, Patient     omeprazole (PRILOSEC) 20 MG DR capsule TAKE 1 CAPSULE(20 MG) BY MOUTH EVERY MORNING  Patient taking differently: Take 20 mg by mouth every morning TAKE 1 CAPSULE(20 MG) BY MOUTH EVERY MORNING 1/16/2023  Rock Basilio MD     vitamin C (ASCORBIC ACID) 1000 MG TABS Take 1,000 mg by mouth every morning 1/16/2023  Reported, Patient         Date completed: 01/22/23    Medication history completed by: Nabil Blevins [facility]

## 2023-01-22 NOTE — PROGRESS NOTES
CV ICU PROGRESS NOTE  1/22/23      ASSESSMENT: Gilberto Camilo is a 67 year old male with PMH of HTN, HLD, AAA s/p repair in 2017, PAD with claudication (follows at [facility]), DM2, previous smoker, found to have severe degenerative mitral valve regurgitation due to prolapse who underwent CABG x 3 (SVG to PDA, SVG to D1) and mitral valve repair on 1/18/23 by Dr. Kim.    Changes for today:  May need NJT Monday if poor eating  Remove pacer wires  Add supplements  5 day zosyn duration  Wean dobutamine  Hold bumex gtt  Diamox x 1        PLAN:  Neuro/ pain/ sedation:  #Acute Postoperative pain  #Peripheral Neuropathy  - Monitor neurological status. Notify the MD for any acute changes in exam.  - Pain: Scheduled tylenol. PRN tylenol, oxycodone, dilaudid. Robaxin prn. ES catheters        Pulmonary care:   #Postoperative ventilation management  #Hypoxemic respiratory failure   - 5 L oxymask. Titrate supplemental oxygen to maintain saturation above 92%.  - Pulmonary hygiene: Incentive spirometer every 15- 30 minutes when awake, flutter valve  -Hold off on diuresis      Cardiovascular:    #S/p CABG x 2 on 1/18/23 by Dr. Kim  #S/p Mitral Valve Repair 1/18/23  #CAD  #HLD  #S/p AAA repair 2017  #Atrial fibrillation with competing junctional rhythm  #Cardiogenic shock   Recent ECHO 1/20 with acutely reduced LVEF 40-45% and mildly reduced RV function but difficult study   - Monitor hemodynamic status.   - Goal MAP>65, SBP<140  - Continue statin  - Continue ASA  - Epi off. Keep dobutamine @ 2.5. Wean to 1.25. Recheck Lucia numbers. Turn off  - Continue PO amio for A fib. Now converted to sinus  - Stop bumex gtt    GI care/ Nutrition:   - Soft diet with thin liquids. Supplements. SLP following for dysphagia  - PPI  - Last BM PTA. Continue bowel regimen: miralax BID, senna BID, use PRNs today-suppository or MOM    Renal/ Fluid Balance/ Electrolytes:   #AVERY  BL creat appears to be ~ 1.0  - Cr improving  1.13<1.34<1.42.   - Hold diuresis  - Strict I/O, daily weights  - Avoid/limit nephrotoxins as able  - Replete lytes PRN per protocol     Endocrine:    Stress induced hyperglycemia  Preop A1c 7.9  - Continue insulin gtt  - Goal BG <180 for optimal healing  - Hold PTA metformin     ID/ Antibiotics:  #Stress induced leukocytosis  - Completed perioperative antibiotics  - Febrile TMax 101.7 F. Blood cultures NGTD. Started on Zosyn (1/21- 1/25)     Heme:     #Stress induced leukocytosis  #Acute blood loss anemia  #Acute blood loss thrombocytopenia  #High chest tube output  No s/sx active bleeding  - Hgb 8<7.1 s/p 1 U pRBC. Continue to monitor       MSK/ Skin:  #Sternotomy  #Surgical Incision  - Sternal precautions  - Postoperative incision management per protocol  - PT/OT     Prophylaxis:    - Mechanical prophylaxis for DVT  - Chemical DVT prophylaxis -  SQH  - PPI     Lines/ tubes/ drains:  - RIJ CVC, PA catheter (1/17/23)  - Arterial Line (1/17/23)  - CTs x2 meds (1/17/23)  - Arredondo (1/17/23) keep with diuresis     Disposition:  - CVICU    Patient seen, findings and plan discussed with CVICU staff.    Charla Leo  General Surgery Resident, PGY-3    ====================================    TODAY'S PROGRESS  SUBJECTIVE  Pain well controlled. Working with therapies. Denies shortness of breath. Poor appetite but will try to eat more today    OBJECTIVE  1. VITAL SIGNS  Temp:  [97.9  F (36.6  C)-99.5  F (37.5  C)] 98.1  F (36.7  C)  Pulse:  [64-71] 66  Resp:  [10-30] 24  BP: (118-121)/(47-64) 118/47  MAP:  [61 mmHg-371 mmHg] 78 mmHg  Arterial Line BP: (105-158)/(40-68) 132/56  SpO2:  [90 %-100 %] 98 %  Resp: 24    2. INTAKE/ OUTPUT  I/O last 3 completed shifts:  In: 3857.54 [P.O.:2360; I.V.:1497.54]  Out: 5875 [Urine:5545; Chest Tube:330]    3. PHYSICAL EXAMINATION  General: awake, alert, NAD  Neuro:no focal neural deficits  Resp: non-labored breathing on nasal cannula  CV: RRR  Abdomen: Soft, non-distended,  non-tender  Incisions: c/d/i  Extremities: warm and well perfused  CT: To suction, serosang output, no airleak    4. INVESTIGATIONS  Arterial Blood Gases   Recent Labs   Lab 01/22/23  0340 01/21/23 1928 01/21/23  1146 01/21/23  0335   PH 7.46* 7.44 7.41 7.42   PCO2 50* 46* 46* 45   PO2 104 79* 63* 113*   HCO3 36* 31* 29* 29*     Complete Blood Count   Recent Labs   Lab 01/22/23  0340 01/21/23 1928 01/21/23  0335 01/20/23  1542 01/20/23  0340 01/20/23  0046   WBC 11.7*  --  14.8*  --  17.8* 17.4*   HGB 8.0* 7.1* 7.5* 7.5* 8.4* 8.7*   *  --  110*  --  164 166     Basic Metabolic Panel  Recent Labs   Lab 01/22/23  0555 01/22/23  0346 01/22/23  0340 01/22/23  0147 01/21/23  1945 01/21/23 1928 01/21/23  0342 01/21/23  0335 01/20/23  2151 01/20/23  2149 01/20/23  0415 01/20/23  0340   NA  --   --  132*  --   --   --   --  141  --  140  --  141   POTASSIUM  --   --  3.3*  --   --  3.3*  --  3.8  --  3.7   < > 4.8   CHLORIDE  --   --  92*  --   --   --   --  106  --  104  --  108*   CO2  --   --  33*  --   --   --   --  24  --  23  --  21*   BUN  --   --  20.7  --   --   --   --  28.1*  --  29.5*  --  27.0*   CR  --   --  1.13  --   --   --   --  1.34*  --  1.42*  --  1.74*   * 157* 142* 145*   < >  --    < > 125*   < > 153*   < > 116*    < > = values in this interval not displayed.     Liver Function Tests  Recent Labs   Lab 01/22/23  0340 01/21/23  0335 01/20/23  0340 01/20/23  0046 01/19/23 2049 01/18/23 2345 01/18/23 2212 01/18/23 1917   AST 35 44 57* 55* 58*   < >  --   --    ALT 14 16 16 15 16   < >  --  20   ALKPHOS 62 56 53 53 58   < >  --  65   BILITOTAL 1.0 0.7 0.8 0.8 0.6   < >  --  0.8   ALBUMIN 3.0* 3.0* 3.3* 2.9* 3.5   < >  --  3.4*   INR  --   --   --  1.28* 1.19*  --  1.21* 1.25*    < > = values in this interval not displayed.     Pancreatic Enzymes  No lab results found in last 7 days.  Coagulation Profile  Recent Labs   Lab 01/20/23  0046 01/19/23 2049 01/18/23 2212 01/18/23 1917    INR 1.28* 1.19* 1.21* 1.25*   PTT 45* 46* 109* 56*     Lactate  Invalid input(s): LACTATE    5. RADIOLOGY  Recent Results (from the past 24 hour(s))   POC US Guidance Needle Placement    Impression    Bilateral erector spinae catheter   XR Abdomen Port 1 View    Narrative    Exam: XR ABDOMEN PORT 1 VIEW, 1/18/2023 7:55 PM    Indication: OG    Comparison: X-ray same day    Findings:   No frontal radiograph of the abdomen. Partial visualization of gastric  tube with side hole likely at the level of the GE junction. Aortoiliac  stent graft. Paucity of bowel gas.  No pneumatosis. No aggressive  osseous lesions.      Impression    Impression: Partial visualization of gastric tube with side hole  likely at the level of the GE junction. Advancement should be  considered.    I have personally reviewed the examination and initial interpretation  and I agree with the findings.    JORDANA PAREKH MD         SYSTEM ID:  [id number]   XR Chest Port 1 View   Result Value    Radiologist flags (Urgent)     Endotracheal tube within the right mainstem bronchus    Narrative    EXAM: XR CHEST PORT 1 VIEW 1/18/2023 7:56 PM    HISTORY: 67 years Male Post Op CVTS Surgery.     COMPARISON: CT chest 9/29/2022.    TECHNIQUE: Single portable AP view of the chest.    FINDINGS:   Post surgical chest with intact median sternotomy wires and mitral  valve replacement. Endotracheal tube projects in the right mainstem  bronchus. Right IJ [place]-Lianet catheter projects over the right main  pulmonary artery. Spinal anesthetic catheters in place. Gastric tube  tip projects over the stomach with side port projecting at the lower  esophageal stricture. Dual mediastinal drains in place.    Midline trachea. Obscured cardiac silhouette. Mediastinum is within  normal limits. Distinct pulmonary vasculature. Moderate left pleural  effusion. No discernible pneumothorax. Low lung volumes. Mixed  interstitial and airspace opacities bilaterally, predominantly in  the  perihilar regions. Unremarkable upper abdomen. No acute or suspicious  osseous abnormalities. Unremarkable soft tissues.      Impression    IMPRESSION:   1.  Endotracheal tube tip projects in the right mainstem bronchus.  2.  Postsurgical chest with otherwise appropriately positioned  surgical hardware.  3.  Moderate left pleural effusion and bilateral pulmonary edema with  scattered atelectasis.    [Urgent Result: Endotracheal tube within the right mainstem bronchus]    Finding was identified on 1/18/2023 8:11 PM.     Philip Martins MD was contacted by Dr. Germán Dickinson at 1/18/2023 8:16  PM and verbalized understanding of the urgent finding.     I have personally reviewed the examination and initial interpretation  and I agree with the findings.    JORDANA PAREKH MD         SYSTEM ID:  [id number]   XR Chest Port 1 View    Narrative    Exam: Chest x-ray, 1 view 1/18/2023 9:09 PM    Indication: Endotracheal tube    Comparison: Xray: 1/18/2023    Findings:   Single frontal radiograph of the chest. Postoperative chest with  cardiac valve replacement and [place]-Lianet catheter over the right main  pulmonary artery. The ET tube projects 2.3 cm above the kathrine.     Interstitial and airspace opacities bilaterally, predominantly in the  perihilar and basilar regions. No pneumothorax. Small left pleural  effusion. Retrocardiac opacities. Visualized upper abdomen and bones  are grossly unremarkable.      Impression    Impression:   1. Endotracheal tube projects 2.3 cm above the kathrine.  2. Pericardiac opacities and mixed interstitial and airspace opacities  bilaterally, likely pulmonary edema with overlying atelectasis.  3. Small left pleural effusion.    I have personally reviewed the examination and initial interpretation  and I agree with the findings.    JORDANA PAREKH MD         SYSTEM ID:  [id number]   XR Abdomen Port 1 View    Narrative    EXAMINATION: XR ABDOMEN PORT 1 VIEW, 1/18/2023 9:23 PM    COMPARISON:  1/18/2023    HISTORY: OG placement    FINDINGS: No orogastric tube is seen. Possible orogastric tube coiled  in the hypopharynx which is not well visualized. Endotracheal tube in  the mid thoracic trachea. [place]-Lianet catheter in the pulmonary outflow  tract. Mediastinal drains. Posterior gas in the bowel. Vascular stent.      Impression    IMPRESSION: No orogastric tube is clearly seen, though there may be  one partially visualized in the hypopharynx.     JORDANA PAREKH MD         SYSTEM ID:  [id number]   XR Abdomen Port 1 View    Impression    RESIDENT PRELIMINARY INTERPRETATION  IMPRESSION: Slightly progressed enteric tube with sidehole estimated  to project over the gastroesophageal junction. Consider advancement.

## 2023-01-22 NOTE — PROGRESS NOTES
Shift Summary    CV: SR w/Prolonged QTc around 520.  Faint PT pulses but needing to doppler DP pulses.  BP adequate on dopamine gtt not requiring other pressors.  S1S2 able to be heard beneath postoperative pericardial friction rub.  1U PRBC given for hgb of 7.1 overnight, repeat this AM was 8.  See flowsheets for PAT numbers throughout shift.      Pulmonary: 3L NC, increased to 5L w/sleep.  RLL has faint crackles but improved with bumex gtt.  Some abdominal muscle use with breathing but not tachypneic or in distress, most likely due to habitus.  Congested cough.  Pt declined BiPAP overnight.  CO2 around 50 but seems to be consistent with pt's baseline.      Resp: 12    GI/: Bumex gtt infusing at 2mg/hr, UOP profound; over 6L out in past 12 hours.  Replacing electrolytes frequently.  Bowel sounds hypoactive but pt states he is passing flatus, BM meds given.      Intake/Output Summary (Last 24 hours) at 1/22/2023 0516  Last data filed at 1/22/2023 0500  Gross per 24 hour   Intake 3863.16 ml   Output 8605 ml   Net -4741.84 ml     Neuro: WDL.    Major Shift Events:  N/A.    Plan: Continue cares and allow day team to reassess plan.    Documenting RN assumed care of this patient from 1/21/23  [id number].  For vital signs and complete assessments, please see documentation flowsheets; assessments charted by exception.  All ICU standards of care were followed.    Pravin Ac, RN, BSN, CCRN on 1/22/2023 at 5:16 AM

## 2023-01-22 NOTE — PLAN OF CARE
Goal Outcome Evaluation:  Dobutamine reduced by half to 1.25mcg/kg/min. Did not wean further d/t drop in Sv02 from 59-49.  This afternoon, unable to get accurate swan #'s (CVP 21 from 14; PA 60's/30's from 40's/20's). CXR obtained-waiting for read to determine course of action for hemodynamics. Pt told in rounds this morning that he would need to eat or a FT may have to be placed to provide adequate nutrition. Pt ate most of 2 meals and drank Boost Shakes today.  Pt's pain well controlled with Oxycodone, IV Dilaudid, and erector spinae catheters. Pt oob to chair with 2 assist this morning. Insulin gtt discontinued and sliding scale insulin ordered. Bumex gtt stopped this morning, and one dose Diamox given. K and Ca replaced. Mg and Phos did not meet replacement criteria on re-check.      Plan of Care Reviewed With: patient, spouse

## 2023-01-23 ENCOUNTER — APPOINTMENT (OUTPATIENT)
Dept: SPEECH THERAPY | Facility: CLINIC | Age: 68
DRG: 219 | End: 2023-01-23
Attending: SURGERY
Payer: COMMERCIAL

## 2023-01-23 ENCOUNTER — APPOINTMENT (OUTPATIENT)
Dept: OCCUPATIONAL THERAPY | Facility: CLINIC | Age: 68
DRG: 219 | End: 2023-01-23
Attending: SURGERY
Payer: COMMERCIAL

## 2023-01-23 ENCOUNTER — APPOINTMENT (OUTPATIENT)
Dept: GENERAL RADIOLOGY | Facility: CLINIC | Age: 68
DRG: 219 | End: 2023-01-23
Attending: SURGERY
Payer: COMMERCIAL

## 2023-01-23 LAB
ALBUMIN SERPL BCG-MCNC: 3.2 G/DL (ref 3.5–5.2)
ALLEN'S TEST: ABNORMAL
ALP SERPL-CCNC: 61 U/L (ref 40–129)
ALT SERPL W P-5'-P-CCNC: 14 U/L (ref 10–50)
ANION GAP SERPL CALCULATED.3IONS-SCNC: 11 MMOL/L (ref 7–15)
AST SERPL W P-5'-P-CCNC: 24 U/L (ref 10–50)
BASE EXCESS BLDA CALC-SCNC: 8 MMOL/L (ref -9–1.8)
BASE EXCESS BLDV CALC-SCNC: 6.7 MMOL/L (ref -7.7–1.9)
BASE EXCESS BLDV CALC-SCNC: 7.8 MMOL/L (ref -7.7–1.9)
BASE EXCESS BLDV CALC-SCNC: 8.3 MMOL/L (ref -7.7–1.9)
BILIRUB SERPL-MCNC: 0.5 MG/DL
BUN SERPL-MCNC: 21.9 MG/DL (ref 8–23)
CA-I BLD-MCNC: 4.6 MG/DL (ref 4.4–5.2)
CALCIUM SERPL-MCNC: 8.3 MG/DL (ref 8.8–10.2)
CHLORIDE SERPL-SCNC: 95 MMOL/L (ref 98–107)
CREAT SERPL-MCNC: 1.05 MG/DL (ref 0.67–1.17)
DEPRECATED HCO3 PLAS-SCNC: 29 MMOL/L (ref 22–29)
ERYTHROCYTE [DISTWIDTH] IN BLOOD BY AUTOMATED COUNT: 14.2 % (ref 10–15)
GFR SERPL CREATININE-BSD FRML MDRD: 77 ML/MIN/1.73M2
GLUCOSE BLDC GLUCOMTR-MCNC: 135 MG/DL (ref 70–99)
GLUCOSE BLDC GLUCOMTR-MCNC: 169 MG/DL (ref 70–99)
GLUCOSE BLDC GLUCOMTR-MCNC: 171 MG/DL (ref 70–99)
GLUCOSE BLDC GLUCOMTR-MCNC: 174 MG/DL (ref 70–99)
GLUCOSE BLDC GLUCOMTR-MCNC: 233 MG/DL (ref 70–99)
GLUCOSE SERPL-MCNC: 128 MG/DL (ref 70–99)
HCO3 BLD-SCNC: 34 MMOL/L (ref 21–28)
HCO3 BLDV-SCNC: 33 MMOL/L (ref 21–28)
HCO3 BLDV-SCNC: 34 MMOL/L (ref 21–28)
HCO3 BLDV-SCNC: 35 MMOL/L (ref 21–28)
HCT VFR BLD AUTO: 25 % (ref 40–53)
HGB BLD-MCNC: 8.1 G/DL (ref 13.3–17.7)
LACTATE SERPL-SCNC: 0.6 MMOL/L (ref 0.7–2)
MAGNESIUM SERPL-MCNC: 2.2 MG/DL (ref 1.7–2.3)
MCH RBC QN AUTO: 29.5 PG (ref 26.5–33)
MCHC RBC AUTO-ENTMCNC: 32.4 G/DL (ref 31.5–36.5)
MCV RBC AUTO: 91 FL (ref 78–100)
O2/TOTAL GAS SETTING VFR VENT: 3 %
O2/TOTAL GAS SETTING VFR VENT: 32 %
OXYHGB MFR BLD: 96 % (ref 92–100)
OXYHGB MFR BLDV: 48 % (ref 70–75)
OXYHGB MFR BLDV: 49 % (ref 70–75)
OXYHGB MFR BLDV: 55 % (ref 70–75)
PCO2 BLD: 54 MM HG (ref 35–45)
PCO2 BLDV: 59 MM HG (ref 40–50)
PCO2 BLDV: 59 MM HG (ref 40–50)
PCO2 BLDV: 60 MM HG (ref 40–50)
PH BLD: 7.41 [PH] (ref 7.35–7.45)
PH BLDV: 7.36 [PH] (ref 7.32–7.43)
PH BLDV: 7.37 [PH] (ref 7.32–7.43)
PH BLDV: 7.38 [PH] (ref 7.32–7.43)
PHOSPHATE SERPL-MCNC: 3.2 MG/DL (ref 2.5–4.5)
PLATELET # BLD AUTO: 127 10E3/UL (ref 150–450)
PO2 BLD: 90 MM HG (ref 80–105)
PO2 BLDV: 29 MM HG (ref 25–47)
PO2 BLDV: 29 MM HG (ref 25–47)
PO2 BLDV: 32 MM HG (ref 25–47)
POTASSIUM SERPL-SCNC: 3.9 MMOL/L (ref 3.4–5.3)
PROT SERPL-MCNC: 5.3 G/DL (ref 6.4–8.3)
RBC # BLD AUTO: 2.75 10E6/UL (ref 4.4–5.9)
SODIUM SERPL-SCNC: 135 MMOL/L (ref 136–145)
WBC # BLD AUTO: 10.3 10E3/UL (ref 4–11)

## 2023-01-23 PROCEDURE — 250N000012 HC RX MED GY IP 250 OP 636 PS 637: Performed by: STUDENT IN AN ORGANIZED HEALTH CARE EDUCATION/TRAINING PROGRAM

## 2023-01-23 PROCEDURE — 97530 THERAPEUTIC ACTIVITIES: CPT | Mod: GO | Performed by: OCCUPATIONAL THERAPIST

## 2023-01-23 PROCEDURE — 71045 X-RAY EXAM CHEST 1 VIEW: CPT | Mod: 26 | Performed by: RADIOLOGY

## 2023-01-23 PROCEDURE — 250N000013 HC RX MED GY IP 250 OP 250 PS 637: Performed by: STUDENT IN AN ORGANIZED HEALTH CARE EDUCATION/TRAINING PROGRAM

## 2023-01-23 PROCEDURE — 71045 X-RAY EXAM CHEST 1 VIEW: CPT

## 2023-01-23 PROCEDURE — 999N000157 HC STATISTIC RCP TIME EA 10 MIN

## 2023-01-23 PROCEDURE — 999N000127 HC STATISTIC PERIPHERAL IV START W US GUIDANCE

## 2023-01-23 PROCEDURE — 214N000001 HC R&B CCU UMMC

## 2023-01-23 PROCEDURE — 250N000013 HC RX MED GY IP 250 OP 250 PS 637: Performed by: SURGERY

## 2023-01-23 PROCEDURE — 250N000011 HC RX IP 250 OP 636: Performed by: SURGERY

## 2023-01-23 PROCEDURE — 82805 BLOOD GASES W/O2 SATURATION: CPT | Performed by: SURGERY

## 2023-01-23 PROCEDURE — 82330 ASSAY OF CALCIUM: CPT | Performed by: SURGERY

## 2023-01-23 PROCEDURE — 97535 SELF CARE MNGMENT TRAINING: CPT | Mod: GO | Performed by: OCCUPATIONAL THERAPIST

## 2023-01-23 PROCEDURE — 85014 HEMATOCRIT: CPT | Performed by: SURGERY

## 2023-01-23 PROCEDURE — 250N000011 HC RX IP 250 OP 636: Performed by: STUDENT IN AN ORGANIZED HEALTH CARE EDUCATION/TRAINING PROGRAM

## 2023-01-23 PROCEDURE — 999N000155 HC STATISTIC RAPCV CVP MONITORING

## 2023-01-23 PROCEDURE — 999N000015 HC STATISTIC ARTERIAL MONITORING DAILY

## 2023-01-23 PROCEDURE — 84100 ASSAY OF PHOSPHORUS: CPT | Performed by: ANESTHESIOLOGY

## 2023-01-23 PROCEDURE — 999N000045 HC STATISTIC DAILY SWAN MONITORING

## 2023-01-23 PROCEDURE — 80053 COMPREHEN METABOLIC PANEL: CPT | Performed by: SURGERY

## 2023-01-23 PROCEDURE — 83605 ASSAY OF LACTIC ACID: CPT | Performed by: STUDENT IN AN ORGANIZED HEALTH CARE EDUCATION/TRAINING PROGRAM

## 2023-01-23 PROCEDURE — 83735 ASSAY OF MAGNESIUM: CPT | Performed by: ANESTHESIOLOGY

## 2023-01-23 PROCEDURE — 99231 SBSQ HOSP IP/OBS SF/LOW 25: CPT | Performed by: NURSE PRACTITIONER

## 2023-01-23 PROCEDURE — 92526 ORAL FUNCTION THERAPY: CPT | Mod: GN | Performed by: SPEECH-LANGUAGE PATHOLOGIST

## 2023-01-23 RX ORDER — FUROSEMIDE 10 MG/ML
40 INJECTION INTRAMUSCULAR; INTRAVENOUS ONCE
Status: COMPLETED | OUTPATIENT
Start: 2023-01-23 | End: 2023-01-23

## 2023-01-23 RX ADMIN — HEPARIN SODIUM 5000 UNITS: 5000 INJECTION, SOLUTION INTRAVENOUS; SUBCUTANEOUS at 03:16

## 2023-01-23 RX ADMIN — AMIODARONE HYDROCHLORIDE 400 MG: 200 TABLET ORAL at 08:41

## 2023-01-23 RX ADMIN — OXYCODONE HYDROCHLORIDE 10 MG: 10 TABLET ORAL at 08:41

## 2023-01-23 RX ADMIN — OXYCODONE HYDROCHLORIDE 5 MG: 5 TABLET ORAL at 12:29

## 2023-01-23 RX ADMIN — HYDROMORPHONE HYDROCHLORIDE 0.4 MG: 0.2 INJECTION, SOLUTION INTRAMUSCULAR; INTRAVENOUS; SUBCUTANEOUS at 19:03

## 2023-01-23 RX ADMIN — ARIPIPRAZOLE 5 MG: 5 TABLET ORAL at 09:56

## 2023-01-23 RX ADMIN — PANTOPRAZOLE SODIUM 40 MG: 40 TABLET, DELAYED RELEASE ORAL at 08:41

## 2023-01-23 RX ADMIN — PIPERACILLIN AND TAZOBACTAM 4.5 G: 4; .5 INJECTION, POWDER, FOR SOLUTION INTRAVENOUS at 23:55

## 2023-01-23 RX ADMIN — DOBUTAMINE HYDROCHLORIDE 1.25 MCG/KG/MIN: 200 INJECTION INTRAVENOUS at 00:40

## 2023-01-23 RX ADMIN — HEPARIN SODIUM 5000 UNITS: 5000 INJECTION, SOLUTION INTRAVENOUS; SUBCUTANEOUS at 12:28

## 2023-01-23 RX ADMIN — POLYETHYLENE GLYCOL 3350 17 G: 17 POWDER, FOR SOLUTION ORAL at 08:41

## 2023-01-23 RX ADMIN — THERA TABS 1 TABLET: TAB at 08:41

## 2023-01-23 RX ADMIN — AMIODARONE HYDROCHLORIDE 400 MG: 200 TABLET ORAL at 20:59

## 2023-01-23 RX ADMIN — INSULIN ASPART 2 UNITS: 100 INJECTION, SOLUTION INTRAVENOUS; SUBCUTANEOUS at 13:07

## 2023-01-23 RX ADMIN — HYDROMORPHONE HYDROCHLORIDE 0.4 MG: 0.2 INJECTION, SOLUTION INTRAMUSCULAR; INTRAVENOUS; SUBCUTANEOUS at 16:00

## 2023-01-23 RX ADMIN — METHOCARBAMOL 500 MG: 500 TABLET ORAL at 08:41

## 2023-01-23 RX ADMIN — OXYCODONE HYDROCHLORIDE 10 MG: 10 TABLET ORAL at 20:21

## 2023-01-23 RX ADMIN — PIPERACILLIN AND TAZOBACTAM 4.5 G: 4; .5 INJECTION, POWDER, FOR SOLUTION INTRAVENOUS at 09:56

## 2023-01-23 RX ADMIN — HYDROMORPHONE HYDROCHLORIDE 0.4 MG: 0.2 INJECTION, SOLUTION INTRAMUSCULAR; INTRAVENOUS; SUBCUTANEOUS at 23:14

## 2023-01-23 RX ADMIN — ASPIRIN 162 MG: 81 TABLET, CHEWABLE ORAL at 08:41

## 2023-01-23 RX ADMIN — HEPARIN SODIUM 5000 UNITS: 5000 INJECTION, SOLUTION INTRAVENOUS; SUBCUTANEOUS at 20:59

## 2023-01-23 RX ADMIN — ATORVASTATIN CALCIUM 80 MG: 80 TABLET, FILM COATED ORAL at 08:41

## 2023-01-23 RX ADMIN — HYDROMORPHONE HYDROCHLORIDE 0.4 MG: 0.2 INJECTION, SOLUTION INTRAMUSCULAR; INTRAVENOUS; SUBCUTANEOUS at 02:54

## 2023-01-23 RX ADMIN — PIPERACILLIN AND TAZOBACTAM 4.5 G: 4; .5 INJECTION, POWDER, FOR SOLUTION INTRAVENOUS at 16:01

## 2023-01-23 RX ADMIN — SENNOSIDES AND DOCUSATE SODIUM 2 TABLET: 50; 8.6 TABLET ORAL at 08:41

## 2023-01-23 RX ADMIN — METHOCARBAMOL 500 MG: 500 TABLET ORAL at 23:14

## 2023-01-23 RX ADMIN — HYDROMORPHONE HYDROCHLORIDE 0.4 MG: 0.2 INJECTION, SOLUTION INTRAMUSCULAR; INTRAVENOUS; SUBCUTANEOUS at 05:37

## 2023-01-23 RX ADMIN — BUPROPION HYDROCHLORIDE 300 MG: 300 TABLET, FILM COATED, EXTENDED RELEASE ORAL at 08:41

## 2023-01-23 RX ADMIN — PIPERACILLIN AND TAZOBACTAM 4.5 G: 4; .5 INJECTION, POWDER, FOR SOLUTION INTRAVENOUS at 03:16

## 2023-01-23 RX ADMIN — FUROSEMIDE 40 MG: 10 INJECTION, SOLUTION INTRAVENOUS at 09:56

## 2023-01-23 RX ADMIN — OXYCODONE HYDROCHLORIDE 10 MG: 10 TABLET ORAL at 00:23

## 2023-01-23 ASSESSMENT — ACTIVITIES OF DAILY LIVING (ADL)
ADLS_ACUITY_SCORE: 27
ADLS_ACUITY_SCORE: 29
ADLS_ACUITY_SCORE: 27
ADLS_ACUITY_SCORE: 24
ADLS_ACUITY_SCORE: 27
DEPENDENT_IADLS:: INDEPENDENT
ADLS_ACUITY_SCORE: 24
ADLS_ACUITY_SCORE: 24
ADLS_ACUITY_SCORE: 27
ADLS_ACUITY_SCORE: 29
ADLS_ACUITY_SCORE: 24

## 2023-01-23 NOTE — CONSULTS
Care Management Initial Consult    General Information  Assessment completed with: Patient, Spouse or significant other, pt and spouse, Marcella  Type of CM/SW Visit: Initial Assessment    Primary Care Provider verified and updated as needed: Yes   Readmission within the last 30 days:           Advance Care Planning: Advance Care Planning Reviewed: no concerns identified        Communication Assessment  Patient's communication style: spoken language (English or Bilingual)    Hearing Difficulty or Deaf: yes   Wear Glasses or Blind: no    Cognitive  Cognitive/Neuro/Behavioral: .WDL except  Level of Consciousness: alert  Arousal Level: opens eyes spontaneously  Orientation: oriented x 4  Mood/Behavior: behavior appropriate to situation  Best Language: 0 - No aphasia  Speech: slow, clear, spontaneous    Living Environment:   People in home: spouse  Marcella  Current living Arrangements: house      Able to return to prior arrangements:       Family/Social Support:  Care provided by: self  Provides care for: no one  Marital Status:   Wife  Marcella       Description of Support System: Supportive, Involved       Current Resources:   Patient receiving home care services: No     Community Resources: None  Equipment currently used at home: none  Supplies currently used at home: None     Financial Concerns: No concerns identified       Lifestyle & Psychosocial Needs:  Social Determinants of Health     Tobacco Use: Medium Risk     Smoking Tobacco Use: Former     Smokeless Tobacco Use: Never     Passive Exposure: Not on file   Alcohol Use: Not on file   Financial Resource Strain: Not on file   Food Insecurity: Not on file   Transportation Needs: Not on file   Physical Activity: Not on file   Stress: Not on file   Social Connections: Not on file   Intimate Partner Violence: Not on file   Depression: Not at risk     PHQ-2 Score: 0   Housing Stability: Not on file       Functional Status:  Prior to admission patient needed  assistance:   Dependent ADLs:: Independent  Dependent IADLs:: Independent     Mental Health Status:  Mental Health Status: No Current Concerns       Chemical Dependency Status:  Chemical Dependency Status: No Current Concerns        Additional Information:  Pt is s/p CABG x2 and mitral valve repair on 1/18/23.    RNCC visited pt and spouse to introduce RNCC role and complete initial assessment.  Pt lives with spouse in [place], [place], (about 200 miles from [place]).  Pt was ind. with all cares, mobility and driving prior hospitalization.  Pt wasn't receiving any home care service.  PT/OT have evaluated pt and ARU is rec. at this time.  RNCC discussed about ARU vs TCU and the referral process.  Pt and spouse are in agreement for ARU placement.  There are no ARU facilities near [facility] and requested ARU place to be checked around [facility] ( [place] is about 60 miles from them, per pt).  RNCC informed pt and spouse that SW will assist with ARU placement and agreed to share their preference.  Pt and family agreed.  RNCC/SW will cont to follow plan of care.  SW will assist with ARU placement.    Basil Willard RN, PHN, BSN  4A and 4E/ ICU  Care Coordinator  Phone: [phone]  Pager: [phone]    To contact the weekend RNCC  [place] (0800 - 1630) Saturday and Sunday   Units: 4A, 4C, 4E, 5A and 5B- Pager 1: [phone]   Units: 6A, 6B, 6C, 6D- Pager 2: [phone]   Units: 7A, 7B, 7C, 7D, and 5C-Pager 3: [phone]

## 2023-01-23 NOTE — PROGRESS NOTES
"Pain Service Bolus Note  [facility]  Date: 01/23/2023       Patient Name: Gilberto Camilo  MRN: [id number]  Age: 67 year old  Sex: male      Assessment:  Gilberto Camilo was evaluated this evening, experiencing moderate pain (4-5/10). Following negative aspiration, a total bolus of 20 mL 0.25% bupivacaine (10ml/catheter) was administered to the bilateral erector spinae (ES) catheter(s) at 12:47 AM. No symptoms of local anesthetic systemic toxicity (LAST) were observed. Remained with and assessed patient post-injection (BP, Pulse and MAP stable). Please contact the Inpatient Pain Service if any of the following: patient experiencing any untoward effects, SBP< 90, P < 50 or > 120, MAP < 60.       Please Page the Pain Team Via Amcom: \"PAIN MANAGEMENT ACUTE INPATIENT/ [facility]\"  "

## 2023-01-23 NOTE — PLAN OF CARE
Major Shift Events: Neuro intact. Afebrile. Moderate incisional & back pain- oxy x2, dilaudid x1, ES caths in place. SR w/ 1st degree rates 70s-80s. MAPs maintained w/n goal. VSS on 3L NC. One large stool this shift. Good appetite on easy to chew diet. Adequate UOP, Lasix 40 mg given this AM. Removed R Victor & MAC, R arterial line, chest tubes & velez.     Transferred to: [address] [place] [address] at 1825  Status at time of transfer: VSS, see above for updates.   Belongings: One pt belonging bag sent with pt  Velez removed? (if no, why?): Yes  Chart and medications: Sent with pt  Family notified: Wife at bedside    Plan: PT/OT. Update primary team with changes in POC.    For vital signs and complete assessments, please see flowsheets.

## 2023-01-23 NOTE — PROGRESS NOTES
"Pain Service Progress Note  [facility]  Date: 01/23/2023       Patient Name: Gilberto Camilo  MRN: [id number]  Age: 67 year old  Sex: male      Assessment:  68yo male with history of HTN, AAA s/p repair (2017), PAD with claudication, DM2, severe mitral valve regurgitation now s/p CABG x2 and mitral valve repair on 1/18/23 with Dr. Kim.     Procedure: CABG, mitral valve repair    Date of Surgery: 1/18/23    Date of Catheter Placement: 1/18/23    Plan/Recommendations:  1. Regional Anesthesia/Analgesia  -Continuous Catheter Type/Site: bilateral erector spinae (ES) T6-7  Infusate: Ropivacaine 0.2%  Programmed Intermittent Bolus (PIB) at 7 mL Q60 min via each catheter, total infusion rate of 14 mL/hr    No bolus given - pain adequately controlled    Plan to maintain catheters while chest tubes in place, max of 7 days    2. Anticoagulation  -Please contact Inpatient Pain Service before ordering or making any anticoagulation changes       3. Multimodal Analgesia  - per primary service PRN oxycodone, Dilaudid IV    Pain Service will continue to follow.    Discussed with attending anesthesiologist    Please Page the Pain Team Via Amcom: \"PAIN MANAGEMENT ACUTE INPATIENT/ [facility]\"    TORI Jauregiu CNP  01/23/2023     Overnight Events: None    Tubes/Drains: Yes  2 chest tubes    Subjective:  Not much pain right now, getting ready to eat.   Nausea: No  Symptoms of LAST: No    Pain Location:  Anterior chest wall    Pain Intensity:    Pain at Rest: 1/10   Pain with Activity: 2/10  Satisfied with your level of pain control: Yes    Diet: Easy to Chew Diet (level 7) Thin Liquids (level 0)  Snacks/Supplements Adult: Other; Boost Vanilla; Between Meals    Relevant Labs:  Recent Labs   Lab Test 01/23/23  0257 01/20/23  0340 01/20/23  0046   INR  --   --  1.28*   *   < > 166   PTT  --   --  45*   BUN 21.9   < > 24.0*    < > = values in this interval not displayed.       Physical " Exam:  Vitals: /65 (BP Location: Left arm)   Pulse 79   Temp 98.4  F (36.9  C) (Axillary)   Resp 21   Ht 1.829 m (6')   Wt 109.5 kg (241 lb 6.5 oz)   SpO2 99%   BMI 32.74 kg/m      Physical Exam:   Orientation:  Alert, oriented, and in no acute distress: Yes  Sedation: No    Motor Examination:  5/5 Strength in lower extremities: Yes     Catheter Site:   Catheter entry site is clean/dry/intact: Yes    Tender: No      Relevant Medications:  Current Pain Medications:  Medications related to Pain Management (From now, onward)    Start     Dose/Rate Route Frequency Ordered Stop    01/21/23 2000  senna-docusate (SENOKOT-S/PERICOLACE) 8.6-50 MG per tablet 2 tablet         2 tablet Oral or Feeding Tube 2 TIMES DAILY 01/21/23 0930      01/21/23 2000  polyethylene glycol (MIRALAX) Packet 17 g         17 g Oral 2 TIMES DAILY 01/21/23 0930      01/21/23 0000  acetaminophen (TYLENOL) tablet 650 mg         650 mg Oral EVERY 4 HOURS PRN 01/18/23 1907      01/20/23 0200  ropivacaine 0.2% in NS perineural infusion simple          Perineural Continuous Nerve Block 01/20/23 0144      01/20/23 0200  ropivacaine 0.2% in NS perineural infusion simple          Perineural Continuous Nerve Block 01/20/23 0144      01/19/23 0800  aspirin (ASA) chewable tablet 162 mg         162 mg Oral or NG Tube DAILY 01/18/23 1907 01/18/23 1907  magnesium hydroxide (MILK OF MAGNESIA) suspension 30 mL         30 mL Oral DAILY PRN 01/18/23 1907      01/18/23 1907  bisacodyl (DULCOLAX) suppository 10 mg         10 mg Rectal DAILY PRN 01/18/23 1907      01/18/23 1907  HYDROmorphone (DILAUDID) injection 0.2 mg        See Hyperspace for full Linked Orders Report.    0.2 mg Intravenous EVERY 2 HOURS PRN 01/18/23 1907      01/18/23 1907  HYDROmorphone (DILAUDID) injection 0.4 mg        See Hyperspace for full Linked Orders Report.    0.4 mg Intravenous EVERY 2 HOURS PRN 01/18/23 1907 01/18/23 1907  oxyCODONE (ROXICODONE) tablet 5 mg         See Hyperspace for full Linked Orders Report.    5 mg Oral EVERY 4 HOURS PRN 01/18/23 1907      01/18/23 1907  oxyCODONE IR (ROXICODONE) tablet 10 mg        See Hyperspace for full Linked Orders Report.    10 mg Oral EVERY 4 HOURS PRN 01/18/23 1907      01/18/23 1907  methocarbamol (ROBAXIN) tablet 500 mg         500 mg Oral or Feeding Tube EVERY 6 HOURS PRN 01/18/23 1907      01/18/23 1907  lidocaine 1 % 0.1-1 mL         0.1-1 mL Other EVERY 1 HOUR PRN 01/18/23 1907      01/18/23 1907  lidocaine (LMX4) cream          Topical EVERY 1 HOUR PRN 01/18/23 1907            Primary Service Contacted with Recommendations? No      Please see A&P for additional details of medical decision making.

## 2023-01-23 NOTE — PROGRESS NOTES
CV ICU PROGRESS NOTE  1/23/23      ASSESSMENT: Gilberto Camilo is a 67 year old male with PMH of HTN, HLD, AAA s/p repair in 2017, PAD with claudication (follows at [facility]), DM2, previous smoker, found to have severe degenerative mitral valve regurgitation due to prolapse who underwent CABG x 3 (SVG to PDA, SVG to D1) and mitral valve repair on 1/18/23 by Dr. Kim.    Changes for today:  Dobutamine gtt off   Remove lines  Transfer to floor  PT/OT    PLAN:  Neuro/ pain/ sedation:  #Acute Postoperative pain  #Peripheral Neuropathy  - Monitor neurological status. Notify the MD for any acute changes in exam.  - Pain: Scheduled tylenol. PRN tylenol, oxycodone, dilaudid. Robaxin prn.   - PT/OT  - Wellbutrin     Pulmonary care:   #Postoperative ventilation management  #Hypoxemic respiratory failure   - 3 L oxymask. Titrate supplemental oxygen to maintain saturation above 92%   - Pulmonary hygiene: Incentive spirometer every 15- 30 minutes when awake, flutter valve  - 40 mg lasix one time     Cardiovascular:    #S/p CABG x 2 on 1/18/23 by Dr. Kim  #S/p Mitral Valve Repair 1/18/23  #CAD  #HLD  #S/p AAA repair 2017  #Atrial fibrillation with competing junctional rhythm  #Cardiogenic shock   Recent ECHO 1/20 with acutely reduced LVEF 40-45% and mildly reduced RV function but difficult study   - Monitor hemodynamic status.   - Goal MAP>65, SBP<140  - Continue statin  - Continue ASA  - Epi off. Stop dobutamine.  - Continue PO amio for Afib. Now converted to sinus  - Stop bumex gtt  - Remove swan   - BB in soon     GI care/ Nutrition:   - Easy to chew, with thin liquids. Supplements. SLP following for dysphagia.   - PPI   - Last BM PTA. Continue bowel regimen: miralax BID, senna BID, use PRNs today-suppository or MOM    Renal/ Fluid Balance/ Electrolytes:   #AVERY   BL creat appears to be ~ 1.0  - Cr improving 1.13<1.34<1.42.   - Strict I/O, daily weights  - Avoid/limit nephrotoxins as able  - Replete lytes PRN  per protocol     Endocrine:    #Stress induced hyperglycemia    Preop A1c 7.9  - Continue insulin gtt  - Goal BG <180 for optimal healing  - Hold PTA metformin     ID/ Antibiotics:  #Stress induced leukocytosis  - Completed perioperative antibiotics  - Afebrile. Blood cultures NGTD.   - Started on Zosyn (1/21- 1/25) for fevers. Completing zosyn.      Heme:     #Stress induced leukocytosis  #Acute blood loss anemia  #Acute blood loss thrombocytopenia  #High chest tube output  No s/sx active bleeding  - Hgb 8<7.1 s/p 1 U pRBC. Continue to monitor    MSK/ Skin:  #Sternotomy  #Surgical Incision  - Sternal precautions  - Postoperative incision management per protocol  - PT/OT     Prophylaxis:    - Mechanical prophylaxis for DVT  - Chemical DVT prophylaxis -  SQH  - PPI     Lines/ tubes/ drains:  - RIJ CVC, PA catheter (1/17/23)  - Arterial Line (1/17/23), remove 1/23  - CTs x2 meds (1/17/23)  - Arredondo (1/17/23) keep with diuresis, remove 1/23     Disposition:  - CVICU    Patient seen, findings and plan discussed with CVICU staff.    Wlison Barrera  Anesthesia, PGY-3    ====================================    TODAY'S PROGRESS  SUBJECTIVE  Pain well controlled. Working with therapies. Denies shortness of breath. Poor appetite but will try to eat more today.    OBJECTIVE  1. VITAL SIGNS  Temp:  [97.9  F (36.6  C)-98.4  F (36.9  C)] 97.9  F (36.6  C)  Pulse:  [64-75] 70  Resp:  [10-35] 22  MAP:  [60 mmHg-100 mmHg] 68 mmHg  Arterial Line BP: ()/(42-74) 115/47  SpO2:  [91 %-100 %] 97 %  Resp: 22    2. INTAKE/ OUTPUT  I/O last 3 completed shifts:  In: 2986.13 [P.O.:890; I.V.:1752.13]  Out: 8360 [Urine:8110; Chest Tube:250]    3. PHYSICAL EXAMINATION  General: awake, alert, NAD  Neuro:no focal neural deficits  Resp: non-labored breathing on nasal cannula  CV: RRR  Abdomen: Soft, non-distended, non-tender  Incisions: c/d/i  Extremities: warm and well perfused  CT: To suction, serosang output, no airleak    4.  INVESTIGATIONS  Arterial Blood Gases   Recent Labs   Lab 01/23/23 0257 01/22/23 2055 01/22/23  1145 01/22/23  0340   PH 7.41 7.42 7.44 7.46*   PCO2 54* 53* 50* 50*   PO2 90 94 66* 104   HCO3 34* 35* 34* 36*     Complete Blood Count   Recent Labs   Lab 01/23/23 0257 01/22/23 0340 01/21/23  1928 01/21/23  0335 01/20/23  1542 01/20/23  0340   WBC 10.3 11.7*  --  14.8*  --  17.8*   HGB 8.1* 8.0* 7.1* 7.5*   < > 8.4*   * 114*  --  110*  --  164    < > = values in this interval not displayed.     Basic Metabolic Panel  Recent Labs   Lab 01/23/23 0257 01/22/23 2101 01/22/23 2055 01/22/23  1713 01/22/23  1400 01/22/23  1152 01/22/23  1145 01/22/23 0346 01/22/23  0340 01/21/23  0342 01/21/23  0335   *  --   --   --   --   --  134  135*  --  132*  --  141   POTASSIUM 3.9  --  3.4  --   --   --  3.5  3.5  --  3.3*   < > 3.8   CHLORIDE 95*  --   --   --   --   --  91*  --  92*  --  106   CO2 29  --   --   --   --   --  28  --  33*  --  24   BUN 21.9  --   --   --   --   --  19.5  --  20.7  --  28.1*   CR 1.05  --   --   --   --   --  1.05  --  1.13  --  1.34*   * 169*  --  110* 138*   < > 187*   < > 142*   < > 125*    < > = values in this interval not displayed.     Liver Function Tests  Recent Labs   Lab 01/23/23 0257 01/22/23 0340 01/21/23  0335 01/20/23 0340 01/20/23  0046 01/19/23 2049 01/18/23 2345 01/18/23 2212 01/18/23 1917   AST 24 35 44 57* 55* 58*   < >  --   --    ALT 14 14 16 16 15 16   < >  --  20   ALKPHOS 61 62 56 53 53 58   < >  --  65   BILITOTAL 0.5 1.0 0.7 0.8 0.8 0.6   < >  --  0.8   ALBUMIN 3.2* 3.0* 3.0* 3.3* 2.9* 3.5   < >  --  3.4*   INR  --   --   --   --  1.28* 1.19*  --  1.21* 1.25*    < > = values in this interval not displayed.     Pancreatic Enzymes  No lab results found in last 7 days.  Coagulation Profile  Recent Labs   Lab 01/20/23  0046 01/19/23 2049 01/18/23 2212 01/18/23 1917   INR 1.28* 1.19* 1.21* 1.25*   PTT 45* 46* 109* 56*     Lactate  Invalid  input(s): LACTATE    5. RADIOLOGY  Recent Results (from the past 24 hour(s))   POC US Guidance Needle Placement    Impression    Bilateral erector spinae catheter   XR Abdomen Port 1 View    Narrative    Exam: XR ABDOMEN PORT 1 VIEW, 1/18/2023 7:55 PM    Indication: OG    Comparison: X-ray same day    Findings:   No frontal radiograph of the abdomen. Partial visualization of gastric  tube with side hole likely at the level of the GE junction. Aortoiliac  stent graft. Paucity of bowel gas.  No pneumatosis. No aggressive  osseous lesions.      Impression    Impression: Partial visualization of gastric tube with side hole  likely at the level of the GE junction. Advancement should be  considered.    I have personally reviewed the examination and initial interpretation  and I agree with the findings.    JORDANA PAREKH MD         SYSTEM ID:  [id number]   XR Chest Port 1 View   Result Value    Radiologist flags (Urgent)     Endotracheal tube within the right mainstem bronchus    Narrative    EXAM: XR CHEST PORT 1 VIEW 1/18/2023 7:56 PM    HISTORY: 67 years Male Post Op CVTS Surgery.     COMPARISON: CT chest 9/29/2022.    TECHNIQUE: Single portable AP view of the chest.    FINDINGS:   Post surgical chest with intact median sternotomy wires and mitral  valve replacement. Endotracheal tube projects in the right mainstem  bronchus. Right IJ [place]-Lianet catheter projects over the right main  pulmonary artery. Spinal anesthetic catheters in place. Gastric tube  tip projects over the stomach with side port projecting at the lower  esophageal stricture. Dual mediastinal drains in place.    Midline trachea. Obscured cardiac silhouette. Mediastinum is within  normal limits. Distinct pulmonary vasculature. Moderate left pleural  effusion. No discernible pneumothorax. Low lung volumes. Mixed  interstitial and airspace opacities bilaterally, predominantly in the  perihilar regions. Unremarkable upper abdomen. No acute or  suspicious  osseous abnormalities. Unremarkable soft tissues.      Impression    IMPRESSION:   1.  Endotracheal tube tip projects in the right mainstem bronchus.  2.  Postsurgical chest with otherwise appropriately positioned  surgical hardware.  3.  Moderate left pleural effusion and bilateral pulmonary edema with  scattered atelectasis.    [Urgent Result: Endotracheal tube within the right mainstem bronchus]    Finding was identified on 1/18/2023 8:11 PM.     Philip Martins MD was contacted by Dr. Germán Dickinson at 1/18/2023 8:16  PM and verbalized understanding of the urgent finding.     I have personally reviewed the examination and initial interpretation  and I agree with the findings.    JORDANA PAREKH MD         SYSTEM ID:  [id number]   XR Chest Port 1 View    Narrative    Exam: Chest x-ray, 1 view 1/18/2023 9:09 PM    Indication: Endotracheal tube    Comparison: Xray: 1/18/2023    Findings:   Single frontal radiograph of the chest. Postoperative chest with  cardiac valve replacement and [place]-Lianet catheter over the right main  pulmonary artery. The ET tube projects 2.3 cm above the kathrine.     Interstitial and airspace opacities bilaterally, predominantly in the  perihilar and basilar regions. No pneumothorax. Small left pleural  effusion. Retrocardiac opacities. Visualized upper abdomen and bones  are grossly unremarkable.      Impression    Impression:   1. Endotracheal tube projects 2.3 cm above the kathrine.  2. Pericardiac opacities and mixed interstitial and airspace opacities  bilaterally, likely pulmonary edema with overlying atelectasis.  3. Small left pleural effusion.    I have personally reviewed the examination and initial interpretation  and I agree with the findings.    JORDANA PAREKH MD         SYSTEM ID:  [id number]   XR Abdomen Port 1 View    Narrative    EXAMINATION: XR ABDOMEN PORT 1 VIEW, 1/18/2023 9:23 PM    COMPARISON: 1/18/2023    HISTORY: OG placement    FINDINGS: No orogastric tube is  seen. Possible orogastric tube coiled  in the hypopharynx which is not well visualized. Endotracheal tube in  the mid thoracic trachea. [place]-Lianet catheter in the pulmonary outflow  tract. Mediastinal drains. Posterior gas in the bowel. Vascular stent.      Impression    IMPRESSION: No orogastric tube is clearly seen, though there may be  one partially visualized in the hypopharynx.     JORDANA PAREKH MD         SYSTEM ID:  [id number]   XR Abdomen Port 1 View    Impression    RESIDENT PRELIMINARY INTERPRETATION  IMPRESSION: Slightly progressed enteric tube with sidehole estimated  to project over the gastroesophageal junction. Consider advancement.

## 2023-01-23 NOTE — PROGRESS NOTES
Shift Summary    CV: SR, w/new 1st Deg AVB, BBB, and Prolonged QTc around 500.  Postoperative pericardial friction rub auscultated.  See flowsheets for PAT numbers throughout shift.  Discussed with overnight MD about lower SvO2's and decided to keep dobutamine at 1.25 throughout night and reassess weaning of this in the morning.  CVP remains in low 20s.    Pulmonary: Remains on 3L NC, lung sounds clearer/improved.      Resp: 10    GI/: UOP adequate.  No BM but bowel meds given, pt passing flatus.    Intake/Output Summary (Last 24 hours) at 1/23/2023 0501  Last data filed at 1/23/2023 0500  Gross per 24 hour   Intake 2516.33 ml   Output 5375 ml   Net -2858.67 ml     Neuro: WDL.    Major Shift Events:  N/A.    Plan: Continue current therapies.    Documenting RN assumed care of this patient from 1/22/23  [id number].  For vital signs and complete assessments, please see documentation flowsheets; assessments charted by exception.  All ICU standards of care were followed.    Pravin Ac, RN, BSN, CCRN on 1/23/2023 at 5:01 AM

## 2023-01-24 ENCOUNTER — APPOINTMENT (OUTPATIENT)
Dept: OCCUPATIONAL THERAPY | Facility: CLINIC | Age: 68
DRG: 219 | End: 2023-01-24
Attending: SURGERY
Payer: COMMERCIAL

## 2023-01-24 ENCOUNTER — APPOINTMENT (OUTPATIENT)
Dept: GENERAL RADIOLOGY | Facility: CLINIC | Age: 68
DRG: 219 | End: 2023-01-24
Attending: SURGERY
Payer: COMMERCIAL

## 2023-01-24 LAB
ALBUMIN SERPL BCG-MCNC: 3.4 G/DL (ref 3.5–5.2)
ALP SERPL-CCNC: 72 U/L (ref 40–129)
ALT SERPL W P-5'-P-CCNC: 17 U/L (ref 10–50)
ANION GAP SERPL CALCULATED.3IONS-SCNC: 12 MMOL/L (ref 7–15)
AST SERPL W P-5'-P-CCNC: 26 U/L (ref 10–50)
BILIRUB SERPL-MCNC: 0.5 MG/DL
BUN SERPL-MCNC: 23.1 MG/DL (ref 8–23)
CA-I BLD-MCNC: 4.8 MG/DL (ref 4.4–5.2)
CALCIUM SERPL-MCNC: 9.4 MG/DL (ref 8.8–10.2)
CHLORIDE SERPL-SCNC: 97 MMOL/L (ref 98–107)
CREAT SERPL-MCNC: 0.95 MG/DL (ref 0.67–1.17)
DEPRECATED HCO3 PLAS-SCNC: 25 MMOL/L (ref 22–29)
ERYTHROCYTE [DISTWIDTH] IN BLOOD BY AUTOMATED COUNT: 14.1 % (ref 10–15)
GFR SERPL CREATININE-BSD FRML MDRD: 87 ML/MIN/1.73M2
GLUCOSE BLDC GLUCOMTR-MCNC: 133 MG/DL (ref 70–99)
GLUCOSE BLDC GLUCOMTR-MCNC: 164 MG/DL (ref 70–99)
GLUCOSE BLDC GLUCOMTR-MCNC: 179 MG/DL (ref 70–99)
GLUCOSE BLDC GLUCOMTR-MCNC: 188 MG/DL (ref 70–99)
GLUCOSE SERPL-MCNC: 150 MG/DL (ref 70–99)
HCT VFR BLD AUTO: 28 % (ref 40–53)
HGB BLD-MCNC: 8.8 G/DL (ref 13.3–17.7)
MAGNESIUM SERPL-MCNC: 2 MG/DL (ref 1.7–2.3)
MCH RBC QN AUTO: 29.3 PG (ref 26.5–33)
MCHC RBC AUTO-ENTMCNC: 31.4 G/DL (ref 31.5–36.5)
MCV RBC AUTO: 93 FL (ref 78–100)
PHOSPHATE SERPL-MCNC: 2.7 MG/DL (ref 2.5–4.5)
PLATELET # BLD AUTO: 172 10E3/UL (ref 150–450)
POTASSIUM SERPL-SCNC: 4.1 MMOL/L (ref 3.4–5.3)
PROT SERPL-MCNC: 5.9 G/DL (ref 6.4–8.3)
RBC # BLD AUTO: 3 10E6/UL (ref 4.4–5.9)
SODIUM SERPL-SCNC: 134 MMOL/L (ref 136–145)
WBC # BLD AUTO: 11.5 10E3/UL (ref 4–11)

## 2023-01-24 PROCEDURE — 250N000013 HC RX MED GY IP 250 OP 250 PS 637: Performed by: SURGERY

## 2023-01-24 PROCEDURE — 82330 ASSAY OF CALCIUM: CPT | Performed by: SURGERY

## 2023-01-24 PROCEDURE — 250N000013 HC RX MED GY IP 250 OP 250 PS 637: Performed by: PHYSICIAN ASSISTANT

## 2023-01-24 PROCEDURE — 250N000013 HC RX MED GY IP 250 OP 250 PS 637: Performed by: STUDENT IN AN ORGANIZED HEALTH CARE EDUCATION/TRAINING PROGRAM

## 2023-01-24 PROCEDURE — 214N000001 HC R&B CCU UMMC

## 2023-01-24 PROCEDURE — 83735 ASSAY OF MAGNESIUM: CPT | Performed by: STUDENT IN AN ORGANIZED HEALTH CARE EDUCATION/TRAINING PROGRAM

## 2023-01-24 PROCEDURE — 250N000011 HC RX IP 250 OP 636: Performed by: SURGERY

## 2023-01-24 PROCEDURE — 250N000011 HC RX IP 250 OP 636: Performed by: STUDENT IN AN ORGANIZED HEALTH CARE EDUCATION/TRAINING PROGRAM

## 2023-01-24 PROCEDURE — 71045 X-RAY EXAM CHEST 1 VIEW: CPT | Mod: 26 | Performed by: RADIOLOGY

## 2023-01-24 PROCEDURE — 99231 SBSQ HOSP IP/OBS SF/LOW 25: CPT | Performed by: NURSE PRACTITIONER

## 2023-01-24 PROCEDURE — 97530 THERAPEUTIC ACTIVITIES: CPT | Mod: GO | Performed by: OCCUPATIONAL THERAPIST

## 2023-01-24 PROCEDURE — 85027 COMPLETE CBC AUTOMATED: CPT | Performed by: SURGERY

## 2023-01-24 PROCEDURE — 258N000003 HC RX IP 258 OP 636: Performed by: SURGERY

## 2023-01-24 PROCEDURE — 80053 COMPREHEN METABOLIC PANEL: CPT | Performed by: SURGERY

## 2023-01-24 PROCEDURE — 250N000009 HC RX 250: Performed by: SURGERY

## 2023-01-24 PROCEDURE — 36415 COLL VENOUS BLD VENIPUNCTURE: CPT | Performed by: SURGERY

## 2023-01-24 PROCEDURE — 71045 X-RAY EXAM CHEST 1 VIEW: CPT

## 2023-01-24 PROCEDURE — 84100 ASSAY OF PHOSPHORUS: CPT | Performed by: STUDENT IN AN ORGANIZED HEALTH CARE EDUCATION/TRAINING PROGRAM

## 2023-01-24 PROCEDURE — 250N000011 HC RX IP 250 OP 636: Performed by: PHYSICIAN ASSISTANT

## 2023-01-24 PROCEDURE — 999N000157 HC STATISTIC RCP TIME EA 10 MIN

## 2023-01-24 RX ORDER — MAGNESIUM SULFATE HEPTAHYDRATE 40 MG/ML
2 INJECTION, SOLUTION INTRAVENOUS ONCE
Status: COMPLETED | OUTPATIENT
Start: 2023-01-24 | End: 2023-01-24

## 2023-01-24 RX ORDER — FUROSEMIDE 10 MG/ML
40 INJECTION INTRAMUSCULAR; INTRAVENOUS ONCE
Status: COMPLETED | OUTPATIENT
Start: 2023-01-24 | End: 2023-01-24

## 2023-01-24 RX ORDER — IPRATROPIUM BROMIDE AND ALBUTEROL SULFATE 2.5; .5 MG/3ML; MG/3ML
3 SOLUTION RESPIRATORY (INHALATION)
Status: DISCONTINUED | OUTPATIENT
Start: 2023-01-24 | End: 2023-01-25

## 2023-01-24 RX ORDER — POLYETHYLENE GLYCOL 3350 17 G/17G
17 POWDER, FOR SOLUTION ORAL DAILY PRN
Status: DISCONTINUED | OUTPATIENT
Start: 2023-01-24 | End: 2023-01-31 | Stop reason: HOSPADM

## 2023-01-24 RX ORDER — ACETYLCYSTEINE 100 MG/ML
4 SOLUTION ORAL; RESPIRATORY (INHALATION)
Status: DISCONTINUED | OUTPATIENT
Start: 2023-01-24 | End: 2023-01-25

## 2023-01-24 RX ADMIN — METHOCARBAMOL 500 MG: 500 TABLET ORAL at 20:02

## 2023-01-24 RX ADMIN — HEPARIN SODIUM 5000 UNITS: 5000 INJECTION, SOLUTION INTRAVENOUS; SUBCUTANEOUS at 04:14

## 2023-01-24 RX ADMIN — ASPIRIN 162 MG: 81 TABLET, CHEWABLE ORAL at 09:07

## 2023-01-24 RX ADMIN — OXYCODONE HYDROCHLORIDE 5 MG: 5 TABLET ORAL at 01:12

## 2023-01-24 RX ADMIN — AMIODARONE HYDROCHLORIDE 400 MG: 200 TABLET ORAL at 09:07

## 2023-01-24 RX ADMIN — OXYCODONE HYDROCHLORIDE 10 MG: 10 TABLET ORAL at 06:26

## 2023-01-24 RX ADMIN — OXYCODONE HYDROCHLORIDE 5 MG: 5 TABLET ORAL at 17:03

## 2023-01-24 RX ADMIN — PIPERACILLIN AND TAZOBACTAM 4.5 G: 4; .5 INJECTION, POWDER, FOR SOLUTION INTRAVENOUS at 06:16

## 2023-01-24 RX ADMIN — BUPROPION HYDROCHLORIDE 300 MG: 300 TABLET, FILM COATED, EXTENDED RELEASE ORAL at 09:08

## 2023-01-24 RX ADMIN — SODIUM PHOSPHATE, MONOBASIC, MONOHYDRATE AND SODIUM PHOSPHATE, DIBASIC, ANHYDROUS 9 MMOL: 276; 142 INJECTION, SOLUTION INTRAVENOUS at 10:45

## 2023-01-24 RX ADMIN — PANTOPRAZOLE SODIUM 40 MG: 40 TABLET, DELAYED RELEASE ORAL at 09:08

## 2023-01-24 RX ADMIN — THERA TABS 1 TABLET: TAB at 09:08

## 2023-01-24 RX ADMIN — POLYETHYLENE GLYCOL 3350 17 G: 17 POWDER, FOR SOLUTION ORAL at 09:34

## 2023-01-24 RX ADMIN — OXYCODONE HYDROCHLORIDE 10 MG: 10 TABLET ORAL at 23:35

## 2023-01-24 RX ADMIN — ATORVASTATIN CALCIUM 80 MG: 80 TABLET, FILM COATED ORAL at 09:08

## 2023-01-24 RX ADMIN — AMIODARONE HYDROCHLORIDE 400 MG: 200 TABLET ORAL at 20:02

## 2023-01-24 RX ADMIN — PIPERACILLIN AND TAZOBACTAM 4.5 G: 4; .5 INJECTION, POWDER, FOR SOLUTION INTRAVENOUS at 23:39

## 2023-01-24 RX ADMIN — SENNOSIDES AND DOCUSATE SODIUM 2 TABLET: 50; 8.6 TABLET ORAL at 09:07

## 2023-01-24 RX ADMIN — PIPERACILLIN AND TAZOBACTAM 4.5 G: 4; .5 INJECTION, POWDER, FOR SOLUTION INTRAVENOUS at 12:45

## 2023-01-24 RX ADMIN — HYDROMORPHONE HYDROCHLORIDE 0.4 MG: 0.2 INJECTION, SOLUTION INTRAMUSCULAR; INTRAVENOUS; SUBCUTANEOUS at 20:07

## 2023-01-24 RX ADMIN — PIPERACILLIN AND TAZOBACTAM 4.5 G: 4; .5 INJECTION, POWDER, FOR SOLUTION INTRAVENOUS at 17:04

## 2023-01-24 RX ADMIN — ARIPIPRAZOLE 5 MG: 5 TABLET ORAL at 09:11

## 2023-01-24 RX ADMIN — ACETAMINOPHEN 650 MG: 325 TABLET, FILM COATED ORAL at 23:35

## 2023-01-24 RX ADMIN — HEPARIN SODIUM 5000 UNITS: 5000 INJECTION, SOLUTION INTRAVENOUS; SUBCUTANEOUS at 12:45

## 2023-01-24 RX ADMIN — OXYCODONE HYDROCHLORIDE 5 MG: 5 TABLET ORAL at 02:19

## 2023-01-24 RX ADMIN — FUROSEMIDE 40 MG: 10 INJECTION, SOLUTION INTRAVENOUS at 16:23

## 2023-01-24 RX ADMIN — MAGNESIUM SULFATE HEPTAHYDRATE 2 G: 40 INJECTION, SOLUTION INTRAVENOUS at 09:26

## 2023-01-24 RX ADMIN — HEPARIN SODIUM 5000 UNITS: 5000 INJECTION, SOLUTION INTRAVENOUS; SUBCUTANEOUS at 20:02

## 2023-01-24 RX ADMIN — Medication 12.5 MG: at 17:03

## 2023-01-24 ASSESSMENT — ACTIVITIES OF DAILY LIVING (ADL)
ADLS_ACUITY_SCORE: 33
ADLS_ACUITY_SCORE: 38
ADLS_ACUITY_SCORE: 38
ADLS_ACUITY_SCORE: 33
ADLS_ACUITY_SCORE: 33
ADLS_ACUITY_SCORE: 38
ADLS_ACUITY_SCORE: 33

## 2023-01-24 NOTE — OP NOTE
Procedure Date: 01/18/2023    REFERRING CARDIOLOGIST:  Dr. Won Thompson.    PREOPERATIVE DIAGNOSIS:  Severe symptomatic mitral valve regurgitation and severe 2-vessel coronary artery disease.    POSTOPERATIVE DIAGNOSIS:  Severe symptomatic mitral valve regurgitation and severe 2-vessel coronary artery disease.    SURGEON:  Linda Kim MD    ASSISTANT:  THAD Vazquez    NAME OF OPERATION:  Mitral valve repair with [place]-Reynaldo NeoChords to the flail P2 segment, closure of P1/P2 cleft, implantation of a 34 mm Avilez Physio II annuloplasty ring, coronary artery bypass grafting x2 with reverse saphenous vein graft to the posterior ascending artery, reverse saphenous vein graft to the diagonal artery, endoscopic vein harvest from the left lower extremity, intraoperative TRENT.    ANESTHESIA:  General endotracheal.    INDICATIONS FOR PROCEDURE:  Mr. Camilo is very pleasant 67-year-old gentleman who was recently referred to me for severe symptomatic mitral regurgitation from P2 flail.  Coronary angiogram demonstrated severe 2-vessel coronary artery disease.  He was taken to the operating room today for mitral valve repair and coronary artery bypass grafting.    OPERATIVE FINDINGS:  The patient had an overall normal LV systolic function.  He had severe left atrial enlargement.  He had an obvious flail P2 segment with multiple ruptured chordae, and a small area of calcification along the base of the posterior leaflet.  No other abnormalities were seen.  The left greater saphenous vein was a good quality conduit, measuring 3-4 mm in diameter.  The posterior descending artery had mild to moderate disease and the probe size was 1.5 mm.  Diagonal artery also had mild to moderate disease and the probe size was 1.5 mm as well.    DESCRIPTION OF PROCEDURE:  After informed consent was obtained, the patient was brought down to the operating room and was placed on the OR table in a supine position.  Intravenous and  intra-arterial lines were begun.  While monitoring his blood pressure and EKG tracing, he was anesthetized and intubated using a single lumen endotracheal tube.  His entire chest, abdomen, both groins and legs were prepped down to the toes using multiple layers of DuraPrep.  He was draped in a sterile field.  A median sternotomy was performed and the sternal edges were retracted laterally.  The pericardium was opened to suspend the heart in a pericardial cradle.  The left greater saphenous vein was harvested endoscopically.  The patient was fully heparinized.  The ascending aorta and the SVC and IVC were cannulated.  A retrograde cardioplegia catheter was placed into coronary sinus without difficulty.  An antegrade needle/aortic root vent was placed in mediastinum as well.  After appropriate ACT level was achieved, cardiopulmonary bypass was established and the patient was kept normothermic during the entire operation.  Carbon dioxide was flooded into the chest to prevent air embolism.  The aorta was then crossclamped and antegrade cold blood cardioplegia was given to fully arrest the heart.  The patient went into good diastolic arrest without any LV distention.  Following this, intermittent retrograde cardioplegia doses were given on average every 15 minutes for myocardial protection while the aorta was crossclamped.    First, the distal coronary anastomoses were performed.  The first coronary vessel grafted was the posterior descending artery.  Conduit used was a saphenous vein.  This anastomosis was performed in an end-to-side fashion using running 7-0 Prolene.  Next, the diagonal artery was grafted.  Another saphenous vein was used as a conduit.  This anastomosis was performed in an end-to-side fashion using running 7-0 Prolene.  Next, the interatrial groove was dissected out and a standard left atriotomy was made to expose the mitral valve.  Our exposure was excellent.  We began by placing annular simple  interrupted 2-0 Ethibond sutures along the mitral valve annulus.  The valve was carefully inspected.  Findings are noted above.  Several ruptured loose chordae were trimmed off the flail P2 segment leaflet edge.  We decided to proceed with the mitral valve repair using a nonresectional technique with artificial chords.  The 16 mm ReviverMx remeasure loop system was brought to the field, and the system was anchored down to the anterolateral papillary muscle head.  The three 16 mm chords were then sewn to the leaflet edge of the P2 segment and the sutures were tied.  This eliminated the flail.  The annulus was then true sized to a 34 mm Physio II ring.  The ring was brought to the field and all sutures were brought through the sewing ring, and the ring was seated down without difficulty.  All sutures were tied down securely using the Cor-Knot device.  The valve was tested and there was a residual leak along a cleft between the P1 and P2 segments, which we closed using multiple interrupted CV-4 [place]-Reynaldo simple sutures.  Final test demonstrated good leaflet coaptation with no significant regurgitation.  The left atriotomy was then closed in 2 layers of running 4-0 Prolene.      Next, two 4 mm aortotomies were created in the ascending aorta and the 2 proximal vein anastomoses were performed in an end-to-side fashion using running 6-0 Prolene.  Retrograde hot shot was given and the aortic crossclamp was removed.  Aortic cross-clamp time was 127 minutes.  The ascending aorta was continuously vented to deair the heart.  The patient was weaned off cardiopulmonary bypass with low-dose epinephrine and Levophed drips.  LV function was back to his baseline.  The heart was adequately de-aired.  The mitral valve repair looked good with only trace residual insufficiency with a mean gradient of 2 mmHg.  Once the patient remained stable off bypass, the venous cannulae were removed and protamine was given.  The aortic cannula was  subsequently removed as well.  Temporary ventricular pacer wire was placed in the RV muscle.  32-[place] straight chest tubes were placed, 2 in the mediastinum as well.  These were all brought out percutaneously below the sternotomy incision and secured to the skin using 2-0 Ethibond.  Both pleural spaces were  slightly opened.  The mediastinum was irrigated with antibiotic saline and hemostasis was achieved.  The sternum was reapproximated using multiple interrupted single and double wires.  The incision was closed in layers of running Vicryl suture.  Skin was closed using 3-0 Vicryl and was sealed using Dermabond.    Total cardiopulmonary bypass time was 149 minutes.  There were no intraoperative complications and the patient tolerated the operation well.  No blood products were given intraoperatively.  All sponge counts, needle counts and instrument counts were correct x 2 at the end of the operation.    ESTIMATED BLOOD LOSS:  Unknown.    SPECIMEN REMOVED:  None.    The patient was brought to the ICU in hemodynamically stable condition and remained intubated.    Linda Kim MD        D: 2023   T: 2023   MT: WALESKA    Name:     FELICIANO LITTLEN:      [id number]        Account:        [id number]   :      1955           Procedure Date: 2023     Document: [id number]

## 2023-01-24 NOTE — PROGRESS NOTES
Transfer  Transferred from:   Via: bed  Reason for transfer:Pt appropriate for 6C- improved patient condition  Family: Aware of transfer  Belongings: Sent with pt  Chart: Sent with pt  Medications: Meds from bin sent with pt  Report called from: CEE Sampson

## 2023-01-24 NOTE — PROGRESS NOTES
Cardiovascular Surgery Progress Note  01/24/2023         Assessment and Plan:     Gilberto Camilo is a 67 year old male with a PMH of HTN, HLD, Depression, CAD, AAA s/p repair (2017), PAD with claudication, previous smoker, DM Type 2, severe mitral valve regurgitation. Patient is now s/p CABGx2 and MVR on 01/18/23 with Dr. Kim.  On 01/19/23 patient was brought back to the OR with Dr. Kmi for high CT output. Tubes had clotted, once milked at bedside he dumped 400mL and within 30 minutes another 350mL. Significant mediasinal hematoma evacuated and some bleeding from vein graft to diagonal artery noted that was fixed with a single prolene suture.    Cardiovascular:   Hx of severe mitral valve regurg and CAD s/p CABGx2 and MVR on 01/18/23  OR take back for high CT output/reexploration of chest, 01/19/23   HLD  S/p AAA repair in 2017  Post-operative A-Fib with competing junctional rhythm, controlled with amio   -Developed Afib with junction rhythm on 01/19, amio bolus and infusion given. Needed pacing after recovered from a-fib. IV amio transitioned to PO on 01/21 at 400mg BID.  No arrhythmias overnight, HD stable, in NSR. SBP increased from 100-130s to 130-140s 01/24 so adding in BB.  Most recent echo (1/20/23) demonstrated LVEF 40-45%, LV fx decreased, RV fx mildly reduced  -  mg daily  - Atorvastatin 80mg daily  - BB: Started Lopressor 12.5mg BID 01/24  - Diuresis as below  Prolonged QTc first noted on 01/22, improving    Chest tubes: removed in ICU on 01/23  TPW: Removed in ICU on 01/22    Pulmonary:  Hypoxemic respiratory failure, improved  Extubated POD 1 to 5 lpm via NC. Now saturating well on 3 lpm.   - Supplemental O2 PRN to keep sats > 92%. Wean off as tolerated.  - Pulm hygiene, IS, activity and deep breathing  - Duonebs and mucomyst ordered q6hr while awake 01/24  - BiPAP to be worn overnight.    Neurology / MSK:  Acute post-operative pain  Peripheral Neuropathy   Pain well controlled with  current regimen:  - Acetaminophen PRN, PO oxycodone PRN, IV dilaudid PRN, methocarbamol PRN  - ES catheters removed 01/24  - PTA Wellbutrin 300mg daily  - PTA Abilify 5mg daily     / Renal / Fluid / Electrolytes:  AVERY, resolved  BL creat ~ 0.9-1.0, most recent creatinine 0.95; adequate UOP.   FLUID STATUS: Pre-op weight 247, weight today 242; I/O past 24 hours: +931  - incontinent, voiding adequately with condom catheter in place  - Diuresis: Was on bumex drip until 01/20-01/22, and been given lasix 40mg IV 1/18-1/20 and resumed the Lasix 40mg IV on 01/23, continued on 01/24  - Replete lytes per protocol  - Strict I/O, daily weights  - Avoid/limit nephrotoxins as able    GI / Nutrition:   - Tolerating Level 7 (easy to chew) diet with thin liquids, SLP following.  - Continue bowel regimen, last BM 01/23. Loose stools overnight, changed Miralax to PRN from scheduled 01/24    Endocrine:  Stress induced hyperglycemia   Hgb A1c 7.9% on 01/09/23  - Initially managed on insulin drip postop, transitioned to sliding scale; goal BG <180 for optimal healing    Infectious Disease:  Stress induced leukocytosis  WBC 11.5, remains afebrile, no signs or symptoms of infection  - Blood cultures obtained for high WBC and fever in ICU, NGTD. Started on Zosyn for 5 days, started: 01/21-01/25  - Completed perioperative antibiotics  - Continue to monitor fever curve, CBC    Hematology:   Acute blood loss anemia and thrombocytopenia  Hgb 8.8; Plt 172, no signs or symptoms of active bleeding  - CBC, continue to monitor with AM draws.    Anticoagulation:   - ASA 162mg  - Subcutaneous heparin while inpatient    MSK/Skin:  Sternotomy  Surgical incision  - Sternal precautions  - Incisional cares per protocol    Prophylaxis:   - Stress ulcer prophylaxis: Pantoprazole 40 mg daily for 30 days  - DVT prophylaxis: Subcutaneous heparin and ASA 162mg, SCD    Disposition:   - Transferred to  on 01/23 evening  - Therapies recommending discharge to  ARU once medically ready.    Dr. Kim has been informed of changes through both verbal and written communication.      Vanessa Guido PA-C   Cardiothoracic Surgery  Pager: [phone]        Interval History:     No overnight events.  States pain is well managed on current regimen. Slept ok overnight.  Tolerating diet, is passing flatus, + BM. No nausea or vomiting.  Breathing well without complaints, but does sound congested on exam so Duoneb and Mucomyst added.   Working with therapies and ambulating in halls without assistance.   Denies chest pain, palpitations, dizziness, syncopal symptoms, fevers, chills, myalgias, or sternal popping/clicking.         Physical Exam:     Gen: A&Ox4, NAD  Neuro: no focal deficits   CV: RRR, normal S1 S2, no murmurs, rubs or gallops. NO JVD  Pulm: diminished bases, some wheezing noted, saturating well on 3 lpm  Abd: nondistended, normal BS, soft, nontender  Ext: mild peripheral edema, no pitting  Incision: clean, dry, intact, no erythema, sternum stable  Tubes/drain sites: dressing clean and dry         Data:    Imaging:  reviewed recent imaging, no acute concerns    No results found for this or any previous visit (from the past 24 hour(s)).     Labs:  Recent Labs   Lab 01/24/23  1244 01/24/23  0905 01/24/23  0619 01/23/23  0831 01/23/23  0257 01/22/23  2101 01/22/23  2055 01/22/23  1152 01/22/23  1145 01/22/23  0346 01/22/23  0340 01/20/23  0154 01/20/23  0046 01/19/23  2104 01/19/23  2049 01/18/23  2312 01/18/23  2212   WBC  --   --  11.5*  --  10.3  --   --   --   --   --  11.7*   < > 17.4*  --   --    < >  --    HGB  --   --  8.8*  --  8.1*  --   --   --   --   --  8.0*   < > 8.7*   < >  --    < >  --    MCV  --   --  93  --  91  --   --   --   --   --  89   < > 95  --   --    < >  --    PLT  --   --  172  --  127*  --   --   --   --   --  114*   < > 166  --   --    < >  --    INR  --   --   --   --   --   --   --   --   --   --   --   --  1.28*  --  1.19*  --  1.21*    NA  --   --  134*  --  135*  --   --   --  134  135*  --  132*   < > 140   < > 141   < >  --    POTASSIUM  --   --  4.1  --  3.9  --  3.4  --  3.5  3.5  --  3.3*   < > 4.9   < > 4.6   < >  --    CHLORIDE  --   --  97*  --  95*  --   --   --  91*  --  92*   < > 107  --  106   < >  --    CO2  --   --  25  --  29  --   --   --  28  --  33*   < > 20*  --  23   < >  --    BUN  --   --  23.1*  --  21.9  --   --   --  19.5  --  20.7   < > 24.0*  --  23.6*   < >  --    CR  --   --  0.95  --  1.05  --   --   --  1.05  --  1.13   < > 1.54*  --  1.34*   < >  --    ANIONGAP  --   --  12  --  11  --   --   --  16*  --  7   < > 13  --  12   < >  --    SARI  --   --  9.4  --  8.3*  --   --   --  8.1*  --  7.6*   < > 7.9*  --  8.3*   < >  --    * 133* 150*   < > 128*   < >  --    < > 187*   < > 142*   < > 131*   < > 153*   < >  --    ALBUMIN  --   --  3.4*  --  3.2*  --   --   --   --   --  3.0*   < > 2.9*  --  3.5   < >  --    PROTTOTAL  --   --  5.9*  --  5.3*  --   --   --   --   --  5.1*   < > 4.6*  --  5.3*   < >  --    BILITOTAL  --   --  0.5  --  0.5  --   --   --   --   --  1.0   < > 0.8  --  0.6   < >  --    ALKPHOS  --   --  72  --  61  --   --   --   --   --  62   < > 53  --  58   < >  --    ALT  --   --  17  --  14  --   --   --   --   --  14   < > 15  --  16   < >  --    AST  --   --  26  --  24  --   --   --   --   --  35   < > 55*  --  58*   < >  --     < > = values in this interval not displayed.      GLUCOSE:   Recent Labs   Lab 01/24/23  1244 01/24/23  0905 01/24/23  0619 01/23/23  2208 01/23/23  1608 01/23/23  1306   * 133* 150* 174* 171* 233*

## 2023-01-24 NOTE — PLAN OF CARE
Pt remains hospitalized for CABG x2 and MVR on 1/18/23.    Neuro: A/Ox4, denies dizziness, numbness and tingling in lower extremities which is baseline per pt.  Cardiac: SR, diaphoretic  Respiratory: 97% on NC 4 LPM   GI/: External catheter in place. Pt incontinent of stool and urine. Last BM 1/24. Pt was able to tell nurse he needed the bed pan urgently once today.  Activity: Pt worked with therapy and nurse and stood and pivoted to the chair. Pt is a heavy assist of 2 with a gait belt.  Pain: Experiencing pain at old chest tube sites. PRN oxycodone given.  Skin: Pt has a large bruise on his left thigh. Surgical incisions. Old chest tube sites. Sternal incision and bruising on his right arm and abdomen.  Lines: LPIV    Pt chest tube dressing changed today.     Continue POC and notify CVTS with any changes.

## 2023-01-24 NOTE — PROGRESS NOTES
"Pain Service Progress Note  [facility]  Date: 01/24/2023       Patient Name: Gilberto Camilo  MRN: [id number]  Age: 67 year old  Sex: male      Assessment:  66yo male with history of HTN, AAA s/p repair (2017), PAD with claudication, DM2, severe mitral valve regurgitation now s/p CABG x2 and mitral valve repair on 1/18/23 with Dr. Kim.     Procedure: CABG, mitral valve repair    Date of Surgery: 1/18/23    Date of Catheter Placement: 1/18/23    Plan/Recommendations:  1. Regional Anesthesia/Analgesia  -Continuous Catheter Type/Site: bilateral erector spinae (ES) T6-7  Chest tubes removed yesterday and last dose subcutaneous heparin given today at 0414  0830 AM. Bilateral ES catheters removed without complication, dark tips intact. Insertion sites c/d/i. No erythema, heme, edema. Small bandages applied    2. Multimodal Analgesia  - per primary service    Pain Service will sign off.    Discussed with attending anesthesiologist    Please Page the Pain Team Via [employer]: \"PAIN MANAGEMENT ACUTE INPATIENT/ [facility]\"    Joycelyn Goodson, TORI CNP  01/24/2023     Overnight Events: None    Tubes/Drains: No    Subjective: Chest tubes were removed yesterday. Denies pain, tolerating oral intake and medications.    Nausea: No  Vomiting: No  Symptoms of LAST: No      Diet: Easy to Chew Diet (level 7) Thin Liquids (level 0)  Snacks/Supplements Adult: Other; Boost Vanilla; Between Meals    Relevant Labs:  Recent Labs   Lab Test 01/24/23  0619 01/20/23  0340 01/20/23  0046   INR  --   --  1.28*      < > 166   PTT  --   --  45*   BUN 23.1*   < > 24.0*    < > = values in this interval not displayed.       Physical Exam:  Vitals: /74   Pulse 81   Temp 97.5  F (36.4  C) (Oral)   Resp 18   Ht 1.829 m (6')   Wt 110 kg (242 lb 8.1 oz)   SpO2 97%   BMI 32.89 kg/m      Physical Exam:   Orientation:  Alert, oriented, and in no acute distress: Yes  Sedation: No    Motor " Examination:  5/5 Strength in lower extremities: Yes      Catheter Site:   Catheter entry site is clean/dry/intact: Yes    Tender: No      Relevant Medications:  Current Pain Medications:  Medications related to Pain Management (From now, onward)    Start     Dose/Rate Route Frequency Ordered Stop    01/21/23 2000  senna-docusate (SENOKOT-S/PERICOLACE) 8.6-50 MG per tablet 2 tablet         2 tablet Oral or Feeding Tube 2 TIMES DAILY 01/21/23 0930      01/21/23 2000  polyethylene glycol (MIRALAX) Packet 17 g         17 g Oral 2 TIMES DAILY 01/21/23 0930      01/21/23 0000  acetaminophen (TYLENOL) tablet 650 mg         650 mg Oral EVERY 4 HOURS PRN 01/18/23 1907      01/20/23 0200  ropivacaine 0.2% in NS perineural infusion simple          Perineural Continuous Nerve Block 01/20/23 0144      01/20/23 0200  ropivacaine 0.2% in NS perineural infusion simple          Perineural Continuous Nerve Block 01/20/23 0144      01/19/23 0800  aspirin (ASA) chewable tablet 162 mg         162 mg Oral or NG Tube DAILY 01/18/23 1907 01/18/23 1907  magnesium hydroxide (MILK OF MAGNESIA) suspension 30 mL         30 mL Oral DAILY PRN 01/18/23 1907      01/18/23 1907  bisacodyl (DULCOLAX) suppository 10 mg         10 mg Rectal DAILY PRN 01/18/23 1907      01/18/23 1907  HYDROmorphone (DILAUDID) injection 0.2 mg        See Hyperspace for full Linked Orders Report.    0.2 mg Intravenous EVERY 2 HOURS PRN 01/18/23 1907      01/18/23 1907  HYDROmorphone (DILAUDID) injection 0.4 mg        See Hyperspace for full Linked Orders Report.    0.4 mg Intravenous EVERY 2 HOURS PRN 01/18/23 1907      01/18/23 1907  oxyCODONE (ROXICODONE) tablet 5 mg        See Hyperspace for full Linked Orders Report.    5 mg Oral EVERY 4 HOURS PRN 01/18/23 1907      01/18/23 1907  oxyCODONE IR (ROXICODONE) tablet 10 mg        See Hyperspace for full Linked Orders Report.    10 mg Oral EVERY 4 HOURS PRN 01/18/23 1907 01/18/23 1907  methocarbamol (ROBAXIN)  tablet 500 mg         500 mg Oral or Feeding Tube EVERY 6 HOURS PRN 01/18/23 1907      01/18/23 1907  lidocaine 1 % 0.1-1 mL         0.1-1 mL Other EVERY 1 HOUR PRN 01/18/23 1907      01/18/23 1907  lidocaine (LMX4) cream          Topical EVERY 1 HOUR PRN 01/18/23 1907            Primary Service Contacted with Recommendations? Yes      15 MINUTES SPENT BY ME on the date of service doing chart review, history, exam, documentation & further activities per the note.

## 2023-01-24 NOTE — PLAN OF CARE
Gilberot Camilo is a 67 year old male with a PMH significant for HTN, HL, AAA s/p repair 2017, PAD with claudication (follows with [facility] vascular) DM2, former smoker and depression. He was recently found to have severe degenerative mitral valve regurgitation due to prolapse.     Neuro: A&O x4, denies dizziness numbness or tingling   Respiratory:pt sating @ 98% on nc running 3 LPM, bi-pap machine at bedside    Cardiac: SR, diaphoretic pt on tele monitor   GI: Incontinent pt is having loose stools scheduled Senna and Miralax not given, Denied nausea, bowel sounds normoactive.  : pt is incontinent, voiding adequately   Skin: See PCS for assessment and treatment of wounds and surgical incisions.   VS: BP (!) 147/89 (BP Location: Left arm)   Pulse 80   Temp 97.4  F (36.3  C) (Oral)   Resp 18   Ht 1.829 m (6')   Wt 109.5 kg (241 lb 6.5 oz)   SpO2 98%   BMI 32.74 kg/m     Pain:reports incisional chest pain prn oxy,dilaudid, robaxin given, ES catheter x2 infusing Ropivacaine  Mobility: Ax2 with lift   Plan:  Continue to monitor pain, VS, heart rhythm, fluid status, bowel status, cardiac and respiratory status.  Notify care team of changes in patient condition or other concerns.

## 2023-01-25 ENCOUNTER — APPOINTMENT (OUTPATIENT)
Dept: OCCUPATIONAL THERAPY | Facility: CLINIC | Age: 68
DRG: 219 | End: 2023-01-25
Attending: SURGERY
Payer: COMMERCIAL

## 2023-01-25 ENCOUNTER — APPOINTMENT (OUTPATIENT)
Dept: SPEECH THERAPY | Facility: CLINIC | Age: 68
DRG: 219 | End: 2023-01-25
Attending: SURGERY
Payer: COMMERCIAL

## 2023-01-25 ENCOUNTER — APPOINTMENT (OUTPATIENT)
Dept: PHYSICAL THERAPY | Facility: CLINIC | Age: 68
DRG: 219 | End: 2023-01-25
Attending: SURGERY
Payer: COMMERCIAL

## 2023-01-25 LAB
ANION GAP SERPL CALCULATED.3IONS-SCNC: 11 MMOL/L (ref 7–15)
ATRIAL RATE - MUSE: 69 BPM
BACTERIA BLD CULT: NO GROWTH
BACTERIA BLD CULT: NO GROWTH
BUN SERPL-MCNC: 22.1 MG/DL (ref 8–23)
CA-I BLD-MCNC: 4.9 MG/DL (ref 4.4–5.2)
CALCIUM SERPL-MCNC: 9.2 MG/DL (ref 8.8–10.2)
CHLORIDE SERPL-SCNC: 100 MMOL/L (ref 98–107)
CREAT SERPL-MCNC: 0.91 MG/DL (ref 0.67–1.17)
DEPRECATED HCO3 PLAS-SCNC: 26 MMOL/L (ref 22–29)
DIASTOLIC BLOOD PRESSURE - MUSE: NORMAL MMHG
ERYTHROCYTE [DISTWIDTH] IN BLOOD BY AUTOMATED COUNT: 14.4 % (ref 10–15)
GFR SERPL CREATININE-BSD FRML MDRD: >90 ML/MIN/1.73M2
GLUCOSE BLDC GLUCOMTR-MCNC: 128 MG/DL (ref 70–99)
GLUCOSE BLDC GLUCOMTR-MCNC: 205 MG/DL (ref 70–99)
GLUCOSE BLDC GLUCOMTR-MCNC: 210 MG/DL (ref 70–99)
GLUCOSE BLDC GLUCOMTR-MCNC: 222 MG/DL (ref 70–99)
GLUCOSE SERPL-MCNC: 141 MG/DL (ref 70–99)
HCT VFR BLD AUTO: 27.4 % (ref 40–53)
HGB BLD-MCNC: 8.7 G/DL (ref 13.3–17.7)
INTERPRETATION ECG - MUSE: NORMAL
MAGNESIUM SERPL-MCNC: 2 MG/DL (ref 1.7–2.3)
MCH RBC QN AUTO: 29.9 PG (ref 26.5–33)
MCHC RBC AUTO-ENTMCNC: 31.8 G/DL (ref 31.5–36.5)
MCV RBC AUTO: 94 FL (ref 78–100)
P AXIS - MUSE: 57 DEGREES
PHOSPHATE SERPL-MCNC: 3.1 MG/DL (ref 2.5–4.5)
PLATELET # BLD AUTO: 201 10E3/UL (ref 150–450)
POTASSIUM SERPL-SCNC: 3.9 MMOL/L (ref 3.4–5.3)
PR INTERVAL - MUSE: 162 MS
QRS DURATION - MUSE: 116 MS
QT - MUSE: 466 MS
QTC - MUSE: 499 MS
R AXIS - MUSE: -9 DEGREES
RBC # BLD AUTO: 2.91 10E6/UL (ref 4.4–5.9)
SODIUM SERPL-SCNC: 137 MMOL/L (ref 136–145)
SYSTOLIC BLOOD PRESSURE - MUSE: NORMAL MMHG
T AXIS - MUSE: 40 DEGREES
VENTRICULAR RATE- MUSE: 69 BPM
WBC # BLD AUTO: 12.4 10E3/UL (ref 4–11)

## 2023-01-25 PROCEDURE — 97116 GAIT TRAINING THERAPY: CPT | Mod: GP

## 2023-01-25 PROCEDURE — 97530 THERAPEUTIC ACTIVITIES: CPT | Mod: GO

## 2023-01-25 PROCEDURE — 250N000013 HC RX MED GY IP 250 OP 250 PS 637: Performed by: SURGERY

## 2023-01-25 PROCEDURE — 97530 THERAPEUTIC ACTIVITIES: CPT | Mod: GP

## 2023-01-25 PROCEDURE — 80048 BASIC METABOLIC PNL TOTAL CA: CPT | Performed by: PHYSICIAN ASSISTANT

## 2023-01-25 PROCEDURE — 250N000013 HC RX MED GY IP 250 OP 250 PS 637: Performed by: STUDENT IN AN ORGANIZED HEALTH CARE EDUCATION/TRAINING PROGRAM

## 2023-01-25 PROCEDURE — 36415 COLL VENOUS BLD VENIPUNCTURE: CPT | Performed by: SURGERY

## 2023-01-25 PROCEDURE — 250N000011 HC RX IP 250 OP 636: Performed by: PHYSICIAN ASSISTANT

## 2023-01-25 PROCEDURE — 83735 ASSAY OF MAGNESIUM: CPT | Performed by: SURGERY

## 2023-01-25 PROCEDURE — 92526 ORAL FUNCTION THERAPY: CPT | Mod: GN | Performed by: REHABILITATION PRACTITIONER

## 2023-01-25 PROCEDURE — 82330 ASSAY OF CALCIUM: CPT | Performed by: SURGERY

## 2023-01-25 PROCEDURE — 94640 AIRWAY INHALATION TREATMENT: CPT | Mod: 76

## 2023-01-25 PROCEDURE — 85027 COMPLETE CBC AUTOMATED: CPT | Performed by: SURGERY

## 2023-01-25 PROCEDURE — 250N000011 HC RX IP 250 OP 636: Performed by: STUDENT IN AN ORGANIZED HEALTH CARE EDUCATION/TRAINING PROGRAM

## 2023-01-25 PROCEDURE — 999N000248 HC STATISTIC IV INSERT WITH US BY RN

## 2023-01-25 PROCEDURE — 84100 ASSAY OF PHOSPHORUS: CPT | Performed by: SURGERY

## 2023-01-25 PROCEDURE — 250N000009 HC RX 250: Performed by: SURGERY

## 2023-01-25 PROCEDURE — 999N000157 HC STATISTIC RCP TIME EA 10 MIN

## 2023-01-25 PROCEDURE — 250N000013 HC RX MED GY IP 250 OP 250 PS 637: Performed by: PHYSICIAN ASSISTANT

## 2023-01-25 PROCEDURE — 214N000001 HC R&B CCU UMMC

## 2023-01-25 PROCEDURE — 250N000009 HC RX 250: Performed by: PHYSICIAN ASSISTANT

## 2023-01-25 PROCEDURE — 250N000011 HC RX IP 250 OP 636: Performed by: SURGERY

## 2023-01-25 RX ORDER — FUROSEMIDE 40 MG
40 TABLET ORAL
Status: DISCONTINUED | OUTPATIENT
Start: 2023-01-25 | End: 2023-01-25

## 2023-01-25 RX ORDER — IPRATROPIUM BROMIDE AND ALBUTEROL SULFATE 2.5; .5 MG/3ML; MG/3ML
3 SOLUTION RESPIRATORY (INHALATION)
Status: DISCONTINUED | OUTPATIENT
Start: 2023-01-25 | End: 2023-01-29

## 2023-01-25 RX ORDER — ACETYLCYSTEINE 100 MG/ML
4 SOLUTION ORAL; RESPIRATORY (INHALATION)
Status: DISCONTINUED | OUTPATIENT
Start: 2023-01-25 | End: 2023-01-29

## 2023-01-25 RX ORDER — MAGNESIUM OXIDE 400 MG/1
400 TABLET ORAL EVERY 4 HOURS
Status: COMPLETED | OUTPATIENT
Start: 2023-01-25 | End: 2023-01-25

## 2023-01-25 RX ORDER — FUROSEMIDE 10 MG/ML
40 INJECTION INTRAMUSCULAR; INTRAVENOUS ONCE
Status: COMPLETED | OUTPATIENT
Start: 2023-01-25 | End: 2023-01-25

## 2023-01-25 RX ADMIN — HEPARIN SODIUM 5000 UNITS: 5000 INJECTION, SOLUTION INTRAVENOUS; SUBCUTANEOUS at 04:32

## 2023-01-25 RX ADMIN — OXYCODONE HYDROCHLORIDE 10 MG: 10 TABLET ORAL at 04:33

## 2023-01-25 RX ADMIN — AMIODARONE HYDROCHLORIDE 400 MG: 200 TABLET ORAL at 19:50

## 2023-01-25 RX ADMIN — THERA TABS 1 TABLET: TAB at 07:43

## 2023-01-25 RX ADMIN — HEPARIN SODIUM 5000 UNITS: 5000 INJECTION, SOLUTION INTRAVENOUS; SUBCUTANEOUS at 12:05

## 2023-01-25 RX ADMIN — ARIPIPRAZOLE 5 MG: 5 TABLET ORAL at 07:43

## 2023-01-25 RX ADMIN — ACETAMINOPHEN 650 MG: 325 TABLET, FILM COATED ORAL at 19:50

## 2023-01-25 RX ADMIN — ACETYLCYSTEINE 4 ML: 100 SOLUTION ORAL; RESPIRATORY (INHALATION) at 21:21

## 2023-01-25 RX ADMIN — Medication 400 MG: at 12:05

## 2023-01-25 RX ADMIN — IPRATROPIUM BROMIDE AND ALBUTEROL SULFATE 3 ML: 2.5; .5 SOLUTION RESPIRATORY (INHALATION) at 14:08

## 2023-01-25 RX ADMIN — FUROSEMIDE 40 MG: 10 INJECTION, SOLUTION INTRAVENOUS at 12:18

## 2023-01-25 RX ADMIN — OXYCODONE HYDROCHLORIDE 10 MG: 10 TABLET ORAL at 19:50

## 2023-01-25 RX ADMIN — ACETAMINOPHEN 650 MG: 325 TABLET, FILM COATED ORAL at 09:31

## 2023-01-25 RX ADMIN — Medication 12.5 MG: at 19:50

## 2023-01-25 RX ADMIN — OXYCODONE HYDROCHLORIDE 10 MG: 10 TABLET ORAL at 14:14

## 2023-01-25 RX ADMIN — Medication 12.5 MG: at 07:43

## 2023-01-25 RX ADMIN — ACETAMINOPHEN 650 MG: 325 TABLET, FILM COATED ORAL at 14:14

## 2023-01-25 RX ADMIN — IPRATROPIUM BROMIDE AND ALBUTEROL SULFATE 3 ML: .5; 2.5 SOLUTION RESPIRATORY (INHALATION) at 21:21

## 2023-01-25 RX ADMIN — ASPIRIN 162 MG: 81 TABLET, CHEWABLE ORAL at 07:43

## 2023-01-25 RX ADMIN — HYDROMORPHONE HYDROCHLORIDE 0.2 MG: 0.2 INJECTION, SOLUTION INTRAMUSCULAR; INTRAVENOUS; SUBCUTANEOUS at 21:51

## 2023-01-25 RX ADMIN — IPRATROPIUM BROMIDE AND ALBUTEROL SULFATE 3 ML: .5; 2.5 SOLUTION RESPIRATORY (INHALATION) at 09:46

## 2023-01-25 RX ADMIN — OXYCODONE HYDROCHLORIDE 10 MG: 10 TABLET ORAL at 09:31

## 2023-01-25 RX ADMIN — ATORVASTATIN CALCIUM 80 MG: 80 TABLET, FILM COATED ORAL at 07:43

## 2023-01-25 RX ADMIN — ACETYLCYSTEINE 4 ML: 100 SOLUTION ORAL; RESPIRATORY (INHALATION) at 09:46

## 2023-01-25 RX ADMIN — PANTOPRAZOLE SODIUM 40 MG: 40 TABLET, DELAYED RELEASE ORAL at 07:43

## 2023-01-25 RX ADMIN — ACETAMINOPHEN 650 MG: 325 TABLET, FILM COATED ORAL at 04:32

## 2023-01-25 RX ADMIN — AMIODARONE HYDROCHLORIDE 400 MG: 200 TABLET ORAL at 07:43

## 2023-01-25 RX ADMIN — HEPARIN SODIUM 5000 UNITS: 5000 INJECTION, SOLUTION INTRAVENOUS; SUBCUTANEOUS at 19:50

## 2023-01-25 RX ADMIN — ACETYLCYSTEINE 4 ML: 100 SOLUTION ORAL; RESPIRATORY (INHALATION) at 14:07

## 2023-01-25 RX ADMIN — Medication 400 MG: at 15:55

## 2023-01-25 RX ADMIN — BUPROPION HYDROCHLORIDE 300 MG: 300 TABLET, FILM COATED, EXTENDED RELEASE ORAL at 07:43

## 2023-01-25 RX ADMIN — PIPERACILLIN AND TAZOBACTAM 4.5 G: 4; .5 INJECTION, POWDER, FOR SOLUTION INTRAVENOUS at 06:16

## 2023-01-25 ASSESSMENT — ACTIVITIES OF DAILY LIVING (ADL)
ADLS_ACUITY_SCORE: 38
ADLS_ACUITY_SCORE: 36
ADLS_ACUITY_SCORE: 38
ADLS_ACUITY_SCORE: 38
ADLS_ACUITY_SCORE: 36
ADLS_ACUITY_SCORE: 38
ADLS_ACUITY_SCORE: 38
ADLS_ACUITY_SCORE: 36
ADLS_ACUITY_SCORE: 38

## 2023-01-25 NOTE — PROGRESS NOTES
Cardiovascular Surgery Progress Note  01/25/2023         Assessment and Plan:     Gilberto Camilo is a 67 year old male with a PMH of HTN, HLD, Depression, CAD, AAA s/p repair (2017), PAD with claudication, previous smoker, DM Type 2, severe mitral valve regurgitation. Patient is now s/p CABGx2 and MVR on 01/18/23 with Dr. Kim.  On 01/19/23 patient was brought back to the OR with Dr. Kim for high CT output. Tubes had clotted, once milked at bedside he dumped 400mL and within 30 minutes another 350mL. Significant mediasinal hematoma evacuated and some bleeding from vein graft to diagonal artery noted that was fixed with a single prolene suture.    Cardiovascular:   Hx of severe mitral valve regurg and CAD s/p CABGx2 and MVR on 01/18/23  OR take back for high CT output/reexploration of chest, 01/19/23   HLD  S/p AAA repair in 2017  Post-operative A-Fib with competing junctional rhythm, controlled with amio   -Developed Afib with junction rhythm on 01/19, amio bolus and infusion given. Needed pacing after recovered from a-fib. IV amio transitioned to PO on 01/21 at 400mg BID.  No arrhythmias overnight, HD stable, in NSR. SBP increased from 100-130s to 130-140s 01/24 so adding in BB.  Most recent echo (1/20/23) demonstrated LVEF 40-45%, LV fx decreased, RV fx mildly reduced  -  mg daily  - Atorvastatin 80mg daily  - BB: Started Lopressor 12.5mg BID 01/24  - Diuresis as below  Prolonged QTc first noted on 01/22, improving    Chest tubes: removed in ICU on 01/23  TPW: Removed in ICU on 01/22    Pulmonary:  Hypoxemic respiratory failure, improved  Extubated POD 1 to 5 lpm via NC. Now saturating well on 3-4 lpm.   - Supplemental O2 PRN to keep sats > 92%. Wean off as tolerated.  - Pulm hygiene, IS, activity and deep breathing  - Duonebs and mucomyst ordered q6hr while awake 01/24  - BiPAP to be worn overnight, refused overnight 01/25 as could not tolerate.     Neurology / MSK:  Acute post-operative  pain  Peripheral Neuropathy   Pain well controlled with current regimen:  - Acetaminophen PRN, PO oxycodone PRN, IV dilaudid PRN, methocarbamol PRN  - ES catheters removed 01/24  - PTA Wellbutrin 300mg daily  - PTA Abilify 5mg daily     / Renal / Fluid / Electrolytes:  AVERY, resolved  BL creat ~ 0.9-1.0, most recent creatinine 0.91; adequate UOP.   FLUID STATUS: Pre-op weight 247, weight today 242; I/O past 24 hours: -1200  - incontinent, voiding adequately with condom catheter in place  - Diuresis: Was on bumex drip until 01/20-01/22, and been given lasix 40mg IV 1/18-1/20 and resumed the Lasix 40mg IV on 01/23, continued on 01/24-01/25  - Replete lytes per protocol  - Strict I/O, daily weights  - Avoid/limit nephrotoxins as able    GI / Nutrition:   - Tolerating regular diet now as of 01/24 evening.   - Continue bowel regimen, last BM 01/24. Loose stools overnight, changed Miralax to PRN from scheduled 01/24.     Endocrine:  Stress induced hyperglycemia   Hgb A1c 7.9% on 01/09/23  - Initially managed on insulin drip postop, transitioned to sliding scale; goal BG <180 for optimal healing    Infectious Disease:  Stress induced leukocytosis  WBC 12.4, remains afebrile, no signs or symptoms of infection  - Blood cultures obtained for high WBC and fever in ICU, NGTD. Started on Zosyn for 5 days, running from: 01/21-01/25. To be completed after todays dosing.  - Completed perioperative antibiotics  - Continue to monitor fever curve, CBC    Hematology:   Acute blood loss anemia and thrombocytopenia  Hgb 8.7; Plt 201 no signs or symptoms of active bleeding  - CBC, continue to monitor with AM draws.    Anticoagulation:   - ASA 162mg  - Subcutaneous heparin while inpatient    MSK/Skin:  Sternotomy  Surgical incision  - Sternal precautions  - Incisional cares per protocol    Prophylaxis:   - Stress ulcer prophylaxis: Pantoprazole 40 mg daily for 30 days  - DVT prophylaxis: Subcutaneous heparin and ASA 162mg,  SCD    Disposition:   - Transferred to  on 01/23 evening  - Therapies recommending discharge to ARU once medically ready, patient and wife would like him to go to ARU in [place] closer to home. YUVAL has placed referrals to ARU with [facility] in [place], [place].    Dr. Kim has been informed of changes through both verbal and written communication.      Vanessa Guido PA-C   Cardiothoracic Surgery  Pager: [phone]        Interval History:     No acute events overnight. States pain is well controlled on current regimen. Slept ok overnight. Tolerating regular diet now. Passing flatulence, +BM and voiding well. Breathing is stable and saturating well on 3-4 LPM via NC throughout the day. Does not sound as congested today and reports he has been using his IS as instructed, and will continue this. Patient on Duonebs and Mucomyst. Working with therapies, up to chair throughout the day, ambulating in hallways with assistance. Denies CP, SOB, dizziness, lightheadedness, syncope, fever, chills, myalgias, N/V, and sternal popping/clicking.          Physical Exam:     Gen: A&Ox4, NAD  Neuro: no focal deficits   CV: RRR, normal S1 S2, no murmurs, rubs or gallops. NO JVD  Pulm: diminished bases, some wheezing noted, saturating well on 3 lpm  Abd: nondistended, normal BS, soft, nontender  Ext: mild peripheral edema, no pitting  Incision: clean, dry, intact, no erythema, sternum stable  Tubes/drain sites: dressing clean and dry         Data:    Imaging:  reviewed recent imaging, no acute concerns    No results found for this or any previous visit (from the past 24 hour(s)).     Labs:  Recent Labs   Lab 01/25/23  0740 01/25/23  0647 01/24/23  2145 01/24/23  0905 01/24/23  0619 01/23/23  0831 01/23/23  0257 01/20/23  0154 01/20/23  0046 01/19/23  2104 01/19/23  2049 01/18/23  2312 01/18/23  2212   WBC  --  12.4*  --   --  11.5*  --  10.3   < > 17.4*  --   --    < >  --    HGB  --  8.7*  --   --  8.8*  --  8.1*   < > 8.7*   < >  --     < >  --    MCV  --  94  --   --  93  --  91   < > 95  --   --    < >  --    PLT  --  201  --   --  172  --  127*   < > 166  --   --    < >  --    INR  --   --   --   --   --   --   --   --  1.28*  --  1.19*  --  1.21*   NA  --  137  --   --  134*  --  135*   < > 140   < > 141   < >  --    POTASSIUM  --  3.9  --   --  4.1  --  3.9   < > 4.9   < > 4.6   < >  --    CHLORIDE  --  100  --   --  97*  --  95*   < > 107  --  106   < >  --    CO2  --  26  --   --  25  --  29   < > 20*  --  23   < >  --    BUN  --  22.1  --   --  23.1*  --  21.9   < > 24.0*  --  23.6*   < >  --    CR  --  0.91  --   --  0.95  --  1.05   < > 1.54*  --  1.34*   < >  --    ANIONGAP  --  11  --   --  12  --  11   < > 13  --  12   < >  --    SARI  --  9.2  --   --  9.4  --  8.3*   < > 7.9*  --  8.3*   < >  --    * 141* 164*   < > 150*   < > 128*   < > 131*   < > 153*   < >  --    ALBUMIN  --   --   --   --  3.4*  --  3.2*   < > 2.9*  --  3.5   < >  --    PROTTOTAL  --   --   --   --  5.9*  --  5.3*   < > 4.6*  --  5.3*   < >  --    BILITOTAL  --   --   --   --  0.5  --  0.5   < > 0.8  --  0.6   < >  --    ALKPHOS  --   --   --   --  72  --  61   < > 53  --  58   < >  --    ALT  --   --   --   --  17  --  14   < > 15  --  16   < >  --    AST  --   --   --   --  26  --  24   < > 55*  --  58*   < >  --     < > = values in this interval not displayed.      GLUCOSE:   Recent Labs   Lab 01/25/23  0740 01/25/23  0647 01/24/23  2145 01/24/23  1634 01/24/23  1244 01/24/23  0905   * 141* 164* 188* 179* 133*

## 2023-01-25 NOTE — PROGRESS NOTES
RT attempted to place patient on cpap, and patient unable to tolerate cpap machine, RN notified and paging MD per order set.

## 2023-01-25 NOTE — PROGRESS NOTES
Care Management Follow Up    Length of Stay (days): 7    Expected Discharge Date: 01/27/2023     Concerns to be Addressed: discharge planning     Patient plan of care discussed at interdisciplinary rounds: Yes    Anticipated Discharge Disposition: ARU     Anticipated Discharge Services:  ARU therapy services  Anticipated Discharge DME:  n/a    Patient/family educated on Medicare website which has current facility and service quality ratings:  yes  Education Provided on the Discharge Plan:  yes  Patient/Family in Agreement with the Plan:  yes    Referrals Placed by CM/YUVAL:    YUVAL sent a new referral via Epic (destination tab) to the ARU at [facility] in [facility]  [address] [place] [id number]  Admissions: [phone]  Fax: [phone]  - 1/25: YUVAL got a call from Ruth in admissions with a request for today's therapy notes being faxed to her. YUVAL faxed her the OT note from 1/24 and will send her the updated note from PT once it's in.     Private pay costs discussed: Not applicable    Additional Information:  YUVAL is following for discharge planning.    Per the provider, the pt will be med ready today/tomorrow pending weaning off of oxygen. As of this morning, pt is on 4LPM via a nasal cannula.     YUVAL met with the pt and his wife Marcella (Phone: [phone]) at bedside. YUVAL introduced herself, role on the unit, and gave them the update on discharge planning. Pt was following at the beginning of the conversation but fell asleep in his chair so YUVAL mainly spoke with Marcella. YUVAL told Marcella that with the pt's current mobility status (Ax2 with a lift) and oxygen need (currently 4L on a nasal cannula & overnight BiPaP), pt will need private pay stretcher transport to rehab which Marcella was understanding of. Marcella told the SW that she's staying at the [facility] close to the hospital until Friday.     ___________________    MAURILIO Dyer, LGSW  6C   [facility]  [facility]  Phone: [phone]  Pager: [phone]

## 2023-01-25 NOTE — PLAN OF CARE
D: PMH of HTN, HLD, Depression, CAD, AAA s/p repair (2017), PAD with claudication, previous smoker, DM Type 2, severe mitral valve regurgitation. Patient is now s/p CABGx2 and MVR on 01/18/23 with Dr. Kim.  On 01/19/23 patient was brought back to the OR with Dr. Kim for high CT output. Tubes had clotted, once milked at bedside he dumped 400mL and within 30 minutes another 350mL. Significant mediasinal hematoma evacuated and some bleeding from vein graft to diagonal artery noted that was fixed with a single prolene suture.     I: Monitored vitals and assessed pt status.   LDA:   -L PIV x1  -Primofit (external catheter)  Running: Intermittent abx  PRN:   -Dilaudid x1  -Robaxin x1  -Tylenol x2  -Oxycodone x2  Tele: SR  O2: 4L NC O2  -Refused BiPAP at bedtime, provider aware  Mobility: Assist of 2 + lift      A: A0x4. VSS. Afebrile. PRN hydralazine available but not needed this shift. Diaphoretic, labored breathing. Incisional pain, given PRN dilaudid, robaxin, Tylenol, oxycodone scattered throughout shift which helped per Pt. Baseline numbness/tingling lower extremities; denied any new numbness/tingling. Denied nausea, CP, HA, dizziness. Regular diet. ACHS POCT. Urinating adequately, Primofit external catheter on, incontinent. BM x1 this shift, incontinent, soft/loose. Sternal inc KAYLYNN, old CT sites dressing CDI. See flowsheets for skin assessment. Cool extremities, lower extremity pulses weak but dopplerable. Slept off and on overnight between cares. Repositioned/turned Q2-3H this shift.        P: Continue to monitor Pt status and report changes to CVTS. Plan to discharge to ARU when medically ready.      Shift [id number]

## 2023-01-26 ENCOUNTER — APPOINTMENT (OUTPATIENT)
Dept: SPEECH THERAPY | Facility: CLINIC | Age: 68
DRG: 219 | End: 2023-01-26
Attending: SURGERY
Payer: COMMERCIAL

## 2023-01-26 ENCOUNTER — APPOINTMENT (OUTPATIENT)
Dept: PHYSICAL THERAPY | Facility: CLINIC | Age: 68
DRG: 219 | End: 2023-01-26
Attending: SURGERY
Payer: COMMERCIAL

## 2023-01-26 LAB
ALBUMIN UR-MCNC: NEGATIVE MG/DL
ANION GAP SERPL CALCULATED.3IONS-SCNC: 7 MMOL/L (ref 7–15)
APPEARANCE UR: CLEAR
BILIRUB UR QL STRIP: NEGATIVE
BUN SERPL-MCNC: 21.7 MG/DL (ref 8–23)
CALCIUM SERPL-MCNC: 8.5 MG/DL (ref 8.8–10.2)
CHLORIDE SERPL-SCNC: 99 MMOL/L (ref 98–107)
COLOR UR AUTO: YELLOW
CREAT SERPL-MCNC: 0.85 MG/DL (ref 0.67–1.17)
DEPRECATED HCO3 PLAS-SCNC: 30 MMOL/L (ref 22–29)
ERYTHROCYTE [DISTWIDTH] IN BLOOD BY AUTOMATED COUNT: 14.6 % (ref 10–15)
GFR SERPL CREATININE-BSD FRML MDRD: >90 ML/MIN/1.73M2
GLUCOSE BLDC GLUCOMTR-MCNC: 137 MG/DL (ref 70–99)
GLUCOSE BLDC GLUCOMTR-MCNC: 153 MG/DL (ref 70–99)
GLUCOSE BLDC GLUCOMTR-MCNC: 160 MG/DL (ref 70–99)
GLUCOSE BLDC GLUCOMTR-MCNC: 257 MG/DL (ref 70–99)
GLUCOSE SERPL-MCNC: 138 MG/DL (ref 70–99)
GLUCOSE UR STRIP-MCNC: 50 MG/DL
HCT VFR BLD AUTO: 28.9 % (ref 40–53)
HGB BLD-MCNC: 8.9 G/DL (ref 13.3–17.7)
HGB UR QL STRIP: NEGATIVE
KETONES UR STRIP-MCNC: NEGATIVE MG/DL
LEUKOCYTE ESTERASE UR QL STRIP: NEGATIVE
MAGNESIUM SERPL-MCNC: 2.2 MG/DL (ref 1.7–2.3)
MCH RBC QN AUTO: 29.2 PG (ref 26.5–33)
MCHC RBC AUTO-ENTMCNC: 30.8 G/DL (ref 31.5–36.5)
MCV RBC AUTO: 95 FL (ref 78–100)
NITRATE UR QL: NEGATIVE
PH UR STRIP: 6.5 [PH] (ref 5–7)
PHOSPHATE SERPL-MCNC: 3.3 MG/DL (ref 2.5–4.5)
PLATELET # BLD AUTO: 261 10E3/UL (ref 150–450)
POTASSIUM SERPL-SCNC: 3.8 MMOL/L (ref 3.4–5.3)
RBC # BLD AUTO: 3.05 10E6/UL (ref 4.4–5.9)
SARS-COV-2 RNA RESP QL NAA+PROBE: NEGATIVE
SODIUM SERPL-SCNC: 136 MMOL/L (ref 136–145)
SP GR UR STRIP: 1.02 (ref 1–1.03)
UROBILINOGEN UR STRIP-MCNC: NORMAL MG/DL
WBC # BLD AUTO: 12.5 10E3/UL (ref 4–11)

## 2023-01-26 PROCEDURE — 250N000009 HC RX 250: Performed by: PHYSICIAN ASSISTANT

## 2023-01-26 PROCEDURE — 83735 ASSAY OF MAGNESIUM: CPT | Performed by: SURGERY

## 2023-01-26 PROCEDURE — 214N000001 HC R&B CCU UMMC

## 2023-01-26 PROCEDURE — 92526 ORAL FUNCTION THERAPY: CPT | Mod: GN | Performed by: SPEECH-LANGUAGE PATHOLOGIST

## 2023-01-26 PROCEDURE — 250N000011 HC RX IP 250 OP 636: Performed by: PHYSICIAN ASSISTANT

## 2023-01-26 PROCEDURE — 97110 THERAPEUTIC EXERCISES: CPT | Mod: GP

## 2023-01-26 PROCEDURE — 250N000011 HC RX IP 250 OP 636: Performed by: SURGERY

## 2023-01-26 PROCEDURE — 250N000013 HC RX MED GY IP 250 OP 250 PS 637: Performed by: SURGERY

## 2023-01-26 PROCEDURE — 93005 ELECTROCARDIOGRAM TRACING: CPT

## 2023-01-26 PROCEDURE — 94640 AIRWAY INHALATION TREATMENT: CPT

## 2023-01-26 PROCEDURE — 82310 ASSAY OF CALCIUM: CPT | Performed by: PHYSICIAN ASSISTANT

## 2023-01-26 PROCEDURE — 94640 AIRWAY INHALATION TREATMENT: CPT | Mod: 76

## 2023-01-26 PROCEDURE — 250N000013 HC RX MED GY IP 250 OP 250 PS 637: Performed by: STUDENT IN AN ORGANIZED HEALTH CARE EDUCATION/TRAINING PROGRAM

## 2023-01-26 PROCEDURE — 81003 URINALYSIS AUTO W/O SCOPE: CPT | Performed by: PHYSICIAN ASSISTANT

## 2023-01-26 PROCEDURE — U0003 INFECTIOUS AGENT DETECTION BY NUCLEIC ACID (DNA OR RNA); SEVERE ACUTE RESPIRATORY SYNDROME CORONAVIRUS 2 (SARS-COV-2) (CORONAVIRUS DISEASE [COVID-19]), AMPLIFIED PROBE TECHNIQUE, MAKING USE OF HIGH THROUGHPUT TECHNOLOGIES AS DESCRIBED BY CMS-2020-01-R: HCPCS | Performed by: PHYSICIAN ASSISTANT

## 2023-01-26 PROCEDURE — 93010 ELECTROCARDIOGRAM REPORT: CPT | Performed by: INTERNAL MEDICINE

## 2023-01-26 PROCEDURE — 999N000157 HC STATISTIC RCP TIME EA 10 MIN

## 2023-01-26 PROCEDURE — 250N000013 HC RX MED GY IP 250 OP 250 PS 637: Performed by: PHYSICIAN ASSISTANT

## 2023-01-26 PROCEDURE — 84100 ASSAY OF PHOSPHORUS: CPT | Performed by: SURGERY

## 2023-01-26 PROCEDURE — 36415 COLL VENOUS BLD VENIPUNCTURE: CPT | Performed by: PHYSICIAN ASSISTANT

## 2023-01-26 PROCEDURE — 97530 THERAPEUTIC ACTIVITIES: CPT | Mod: GP

## 2023-01-26 PROCEDURE — 85014 HEMATOCRIT: CPT | Performed by: SURGERY

## 2023-01-26 RX ORDER — POTASSIUM CHLORIDE 750 MG/1
20 TABLET, EXTENDED RELEASE ORAL ONCE
Status: COMPLETED | OUTPATIENT
Start: 2023-01-26 | End: 2023-01-26

## 2023-01-26 RX ORDER — FUROSEMIDE 10 MG/ML
40 INJECTION INTRAMUSCULAR; INTRAVENOUS ONCE
Status: COMPLETED | OUTPATIENT
Start: 2023-01-26 | End: 2023-01-26

## 2023-01-26 RX ORDER — AMOXICILLIN 250 MG
2 CAPSULE ORAL 2 TIMES DAILY PRN
Status: DISCONTINUED | OUTPATIENT
Start: 2023-01-26 | End: 2023-01-31 | Stop reason: HOSPADM

## 2023-01-26 RX ADMIN — ATORVASTATIN CALCIUM 80 MG: 80 TABLET, FILM COATED ORAL at 08:11

## 2023-01-26 RX ADMIN — ACETAMINOPHEN 650 MG: 325 TABLET, FILM COATED ORAL at 10:40

## 2023-01-26 RX ADMIN — Medication 12.5 MG: at 20:20

## 2023-01-26 RX ADMIN — OXYCODONE HYDROCHLORIDE 10 MG: 10 TABLET ORAL at 23:59

## 2023-01-26 RX ADMIN — AMIODARONE HYDROCHLORIDE 400 MG: 200 TABLET ORAL at 20:20

## 2023-01-26 RX ADMIN — OXYCODONE HYDROCHLORIDE 10 MG: 10 TABLET ORAL at 18:23

## 2023-01-26 RX ADMIN — ASPIRIN 162 MG: 81 TABLET, CHEWABLE ORAL at 08:11

## 2023-01-26 RX ADMIN — IPRATROPIUM BROMIDE AND ALBUTEROL SULFATE 3 ML: .5; 2.5 SOLUTION RESPIRATORY (INHALATION) at 03:40

## 2023-01-26 RX ADMIN — HEPARIN SODIUM 5000 UNITS: 5000 INJECTION, SOLUTION INTRAVENOUS; SUBCUTANEOUS at 04:11

## 2023-01-26 RX ADMIN — ACETAMINOPHEN 650 MG: 325 TABLET, FILM COATED ORAL at 04:14

## 2023-01-26 RX ADMIN — ACETAMINOPHEN 650 MG: 325 TABLET, FILM COATED ORAL at 18:23

## 2023-01-26 RX ADMIN — THERA TABS 1 TABLET: TAB at 08:11

## 2023-01-26 RX ADMIN — ACETYLCYSTEINE 4 ML: 100 SOLUTION ORAL; RESPIRATORY (INHALATION) at 03:40

## 2023-01-26 RX ADMIN — ACETYLCYSTEINE 4 ML: 100 SOLUTION ORAL; RESPIRATORY (INHALATION) at 13:06

## 2023-01-26 RX ADMIN — IPRATROPIUM BROMIDE AND ALBUTEROL SULFATE 3 ML: .5; 2.5 SOLUTION RESPIRATORY (INHALATION) at 21:45

## 2023-01-26 RX ADMIN — FUROSEMIDE 40 MG: 10 INJECTION, SOLUTION INTRAVENOUS at 09:25

## 2023-01-26 RX ADMIN — ACETYLCYSTEINE 4 ML: 100 SOLUTION ORAL; RESPIRATORY (INHALATION) at 07:52

## 2023-01-26 RX ADMIN — IPRATROPIUM BROMIDE AND ALBUTEROL SULFATE 3 ML: .5; 2.5 SOLUTION RESPIRATORY (INHALATION) at 07:55

## 2023-01-26 RX ADMIN — OXYCODONE HYDROCHLORIDE 10 MG: 10 TABLET ORAL at 10:39

## 2023-01-26 RX ADMIN — AMIODARONE HYDROCHLORIDE 400 MG: 200 TABLET ORAL at 08:11

## 2023-01-26 RX ADMIN — PANTOPRAZOLE SODIUM 40 MG: 40 TABLET, DELAYED RELEASE ORAL at 08:11

## 2023-01-26 RX ADMIN — Medication 12.5 MG: at 08:11

## 2023-01-26 RX ADMIN — OXYCODONE HYDROCHLORIDE 10 MG: 10 TABLET ORAL at 04:14

## 2023-01-26 RX ADMIN — POTASSIUM CHLORIDE 20 MEQ: 750 TABLET, EXTENDED RELEASE ORAL at 08:13

## 2023-01-26 RX ADMIN — ACETAMINOPHEN 650 MG: 325 TABLET, FILM COATED ORAL at 23:59

## 2023-01-26 RX ADMIN — OXYCODONE HYDROCHLORIDE 10 MG: 10 TABLET ORAL at 00:14

## 2023-01-26 RX ADMIN — ARIPIPRAZOLE 5 MG: 5 TABLET ORAL at 08:11

## 2023-01-26 RX ADMIN — ACETYLCYSTEINE 4 ML: 100 SOLUTION ORAL; RESPIRATORY (INHALATION) at 21:45

## 2023-01-26 RX ADMIN — METFORMIN HYDROCHLORIDE 1000 MG: 500 TABLET, FILM COATED ORAL at 17:31

## 2023-01-26 RX ADMIN — HEPARIN SODIUM 5000 UNITS: 5000 INJECTION, SOLUTION INTRAVENOUS; SUBCUTANEOUS at 14:04

## 2023-01-26 RX ADMIN — BUPROPION HYDROCHLORIDE 300 MG: 300 TABLET, FILM COATED, EXTENDED RELEASE ORAL at 08:11

## 2023-01-26 RX ADMIN — ACETAMINOPHEN 650 MG: 325 TABLET, FILM COATED ORAL at 00:14

## 2023-01-26 RX ADMIN — IPRATROPIUM BROMIDE AND ALBUTEROL SULFATE 3 ML: .5; 2.5 SOLUTION RESPIRATORY (INHALATION) at 13:06

## 2023-01-26 RX ADMIN — HEPARIN SODIUM 5000 UNITS: 5000 INJECTION, SOLUTION INTRAVENOUS; SUBCUTANEOUS at 20:20

## 2023-01-26 ASSESSMENT — ACTIVITIES OF DAILY LIVING (ADL)
ADLS_ACUITY_SCORE: 38
ADLS_ACUITY_SCORE: 36
ADLS_ACUITY_SCORE: 38
ADLS_ACUITY_SCORE: 38
ADLS_ACUITY_SCORE: 36
ADLS_ACUITY_SCORE: 38
ADLS_ACUITY_SCORE: 38
ADLS_ACUITY_SCORE: 36
ADLS_ACUITY_SCORE: 38
ADLS_ACUITY_SCORE: 38

## 2023-01-26 NOTE — PROGRESS NOTES
Care Management Follow Up    Length of Stay (days): 8    Expected Discharge Date: 01/27/2023     Concerns to be Addressed: discharge planning       Patient plan of care discussed at interdisciplinary rounds: Yes    Anticipated Discharge Disposition: ARU     Anticipated Discharge Services:  ARU therapy services  Anticipated Discharge DME:  n/a    Patient/family educated on Medicare website which has current facility and service quality ratings:  yes  Education Provided on the Discharge Plan:  yes  Patient/Family in Agreement with the Plan:  yes    Referrals Placed by CM/SW:       [facility]  [address] [address]  Admissions: [phone]  Fax: [phone]  - 1/26: YUVAL left a VM with admissions at 12:17pm with a request on any updates on the referral. Ruth called the SW and left a VM at 12:21pm telling the SW that they are starting to review but need the PT/OT notes from 1/25 to better review. YUVAL faxed over the PT/OT notes from 1/25 and the CVTS provider progress notes from 1/26.     Private pay costs discussed: Not applicable    Additional Information:  SW is following for discharge planning.    Per the provider note, the pt has been weaned down to 0.5 LPM for oxygen (3-4LPM yesterday). Otherwise, pt is med ready for discharge and is awaiting ARU placement. Per nursing note this morning, pt is Ax1-2 using a walker or gait belt.     ___________________    MAURILIO Dyer, ASHLEYSW  6C   [facility]  Phone: [phone]  Pager: [phone]

## 2023-01-26 NOTE — PROGRESS NOTES
"CLINICAL NUTRITION SERVICES - ASSESSMENT NOTE     Nutrition Prescription    RECOMMENDATIONS FOR MDs/PROVIDERS TO ORDER:  None currently     Malnutrition Status:    Patient does not meet two of the established criteria necessary for diagnosing malnutrition    Recommendations already ordered by Registered Dietitian (RD):    Carbohydrate controlled heart healthy diet education completed    Future/Additional Recommendations:    Follow up for additional questions about diet for discharge     REASON FOR ASSESSMENT  Gilberto Camilo is a/an 67 year old male assessed by the dietitian for LOS and nutrition education.     Patient s/p CABGx2 and MVR 1/18/23. Pt returned to OR 1/19/23 for high chest tube output.     MEDICAL HISTORY  HTN, HLD, Depression, CAD, AAA s/p repair (2017), PAD with claudication, previous smoker, DM Type 2, severe mitral valve regurgitation    NUTRITION HISTORY  Pt reports regular diet PTA, not following any dietary restrictions. He reports good appetite and eating TID meals PTA. Wife reports they do not often use salt but do add it to some foods. Wife reports she usually uses olive oil and pt enjoys avocados and PB. Pt and wife report pt used to drink diet soda but has been getting away from that recently and seems pt was drinking more regular soda recently. Pt reports usually having a HS snack such as PB and apples or PB and crackers.       CURRENT NUTRITION ORDERS  Diet: Regular    Intake/Tolerance: Patient consuming  % of 3 meal(s) daily    LABS  Labs reviewed    Hgb A1c 7.9 (H) 1/9.23    MEDICATIONS  Medications reviewed    lipitor    Novolog at meals and HS    multivitamin    ANTHROPOMETRICS  Height: 182.9 cm (6' 0\")  Most Recent Weight: 110 kg (242 lb 8.1 oz)    IBW: 80.9 kg  BMI: Obesity Grade I BMI 30-34.9  Weight History: No significant weight loss noted.  Wt Readings from Last 15 Encounters:   01/26/23 110 kg (242 lb 8.1 oz)   01/17/23 109.2 kg (240 lb 11.9 oz)   01/09/23 107.3 kg (236 " lb 9.6 oz)   01/06/23 106.6 kg (235 lb)   10/18/22 105.2 kg (232 lb)   08/29/22 109.3 kg (241 lb)   06/10/22 111.1 kg (245 lb)   03/01/22 109.8 kg (242 lb)   02/17/22 109.8 kg (242 lb)   08/30/21 106.6 kg (235 lb)   08/17/21 106.6 kg (235 lb)   02/15/21 103.9 kg (229 lb)   12/08/20 109.8 kg (242 lb)   08/21/20 118 kg (260 lb 3.2 oz)   06/03/20 112.5 kg (248 lb)         ASSESSED NUTRITION NEEDS  Dosing Weight: 80.9 kg (IBW)   Estimated Energy Needs: 0434-2407 kcals/day (25 - 30 kcals/kg)  Justification: Post-op  Estimated Protein Needs: 121-162 grams protein/day (1.5 - 2 grams of pro/kg)  Justification: Post-op  Estimated Fluid Needs: 2163-0417 mL/day (1 mL/kcal)   Justification: Maintenance and Per provider pending fluid status    PHYSICAL FINDINGS  See malnutrition section below.    MALNUTRITION  % Intake: No decreased intake noted  % Weight Loss: None noted  Subcutaneous Fat Loss: None observed   Muscle Loss: None observed  Fluid Accumulation/Edema: Mild  Malnutrition Diagnosis: Patient does not meet two of the established criteria necessary for diagnosing malnutrition    NUTRITION DIAGNOSIS   Food- and nutrition-related knowledge deficit r/t no previous knowledge of heart-healthy diet AEB pt report of no previous formal heart-healthy nutrition education.    INTERVENTIONS  Implementation    Nutrition Education  1. Provided verbal instruction on a heart-healthy carbohydrate controlled diet.  2. Provided handouts: Heart-Healthy Consistent Carbohydrate Nutrition Therapy and Seasoning Your Foods Without Adding Salt.      Goals  Pt will verbalize diet recommendations for discharge        Monitoring/Evaluation  Progress toward goals will be monitored and evaluated per protocol.    Enedina Valle RDN, LD  6C RD pager: [phone]  Weekend/Holiday RD pager: [phone]

## 2023-01-26 NOTE — PLAN OF CARE
Goal Outcome Evaluation:      Plan of Care Reviewed With: patient, spouse    Overall Patient Progress: improvingOverall Patient Progress: improving    Outcome Evaluation: Pt reports appetite and PO are going fine. eating 3 meals daily + BID Ensure Enlive. Completed heart healthy carbohydrate controlled diet education. See RD note 1/26 for details.    Enedina Valle RDN, LD  6C RD pager: [phone]  Weekend/Holiday RD pager: [phone]

## 2023-01-26 NOTE — PLAN OF CARE
PMH of HTN, HLD, Depression, CAD, AAA s/p repair (2017), PAD with claudication, previous smoker, DM Type 2, severe mitral valve regurgitation. Patient is now s/p CABGx2 and MVR on 01/18/23 with Dr. Kim.  On 01/19/23 patient was brought back to the OR with Dr. Kim for high CT output. Tubes had clotted, once milked at bedside he dumped 400mL and within 30 minutes another 350mL. Significant mediasinal hematoma evacuated and some bleeding from vein graft to diagonal artery noted that was fixed with a single prolene suture     Neuro: AOx4, denies headache, lightheadedness. Numbness/tingling to lower extremities, baseline. Hard of hearing bilaterally, baseline.   Resp: Sating >92% on 4L NC O2, weaned to 1.5L at 1830 and resting comfortably at 98%. Denies SOB at rest, PÉREZ. LS clear and diminished. Encouraging IS usage, breathing seems to be calm and symmetric.  Cardiac: SR 60-80s, VSS on 4L. Denies cardiac chest pain and palpitations. Lower extremities pulses +1. Warm and clammy skin in AM, diminished over time.  GI/: Regular diet, BG ACHS, did not require AM insulin, given insulin as ordered otherwise. Good appetite during day, denies nausea. Received one time dose of 40mg IV Lasxi, primofit on with adequate output. Incontinent of urine and BM's during day, did have soft 1 BM on bedside commode.  Skin: Midsternal incision KAYLYNN and WDL, CT sites with mild serosanguinous drainage and redressed - dressing CDI, L leg graft KAYLYNN and WDL. Generalized bruising.  Pain: Incisional pain managed with PRN Tylenol and Oxy q4 x2.  Activity: Assist of 1-2, worked with OT to move to chair - per OT improving and does not require lift unless pt extremely lethargic. Worked with PT - moved down hallways with GB and walker.  LDAs: L PIV SL and WDL  Electrolytes: Replaced Mag orally (2.0). On K+ (3.9) and Phos (3.1) replace, not required this shift.     Will continue to monitor and report changes to team.

## 2023-01-26 NOTE — PLAN OF CARE
D: PMH of HTN, HLD, Depression, CAD, AAA s/p repair (2017), PAD with claudication, previous smoker, DM Type 2, severe mitral valve regurgitation. Patient is now s/p CABGx2 and MVR on 01/18/23 with Dr. Kim.  On 01/19/23 patient was brought back to the OR with Dr. Kim for high CT output. Tubes had clotted, once milked at bedside he dumped 400mL and within 30 minutes another 350mL. Significant mediasinal hematoma evacuated and some bleeding from vein graft to diagonal artery noted that was fixed with a single prolene suture.     I: Monitored vitals and assessed pt status.   LDA:   -L PIV x1  -Primofit (external catheter)  PRN:   -Dilaudid x1  -Tylenol x3  -Oxycodone x3  Tele: SR w/ 1st * AVB  O2: 1.5-2  L NC O2  Mobility: Assist of 1-2 + walker/GB     A: A0x4. VSS. Afebrile. PRN hydralazine available but not needed this shift. Clammy. Appears to be breathing more comfortably this shift. Incisional pain, given PRN dilaudid, Tylenol, and oxycodone throughout shift which helped per Pt. PÉREZ. Baseline numbness/tingling lower extremities; denied new numbness/tingling. Denied nausea, CP, HA, dizziness, SOB. Regular diet. ACHS POCT. Urinating adequately, Primofit external catheter on, incontinent; not able to measure all of urine output d/t leaking out of primofit. BM x2 this shift, incontinent, soft. Sternal inc KAYLYNN, LE graft site KAYLYNN, old CT sites dressing CDI. See flowsheets for skin assessment. Cool extremities, lower extremity pulses weak. Slept off and on overnight between cares. Repositioned/turned Q2-3H overnight.         P: Continue to monitor Pt status and report changes to CVTS. Plan to discharge to ARU when medically ready.      Shift [id number]

## 2023-01-26 NOTE — PROGRESS NOTES
Cardiovascular Surgery Progress Note  01/26/2023         Assessment and Plan:     Gilberto Camilo is a 67 year old male with a PMH of HTN, HLD, Depression, CAD, AAA s/p repair (2017), PAD with claudication, previous smoker, DM Type 2, severe mitral valve regurgitation. Patient is now s/p CABGx2 and MVR on 01/18/23 with Dr. Kim.  On 01/19/23 patient was brought back to the OR with Dr. Kim for high CT output. Tubes had clotted, once milked at bedside he dumped 400mL and within 30 minutes another 350mL. Significant mediasinal hematoma evacuated and some bleeding from vein graft to diagonal artery noted that was fixed with a single prolene suture.    Cardiovascular:   Hx of severe mitral valve regurg and CAD s/p CABGx2 and MVR on 01/18/23  OR take back for high CT output/reexploration of chest, 01/19/23   HLD  S/p AAA repair in 2017  Post-operative A-Fib with competing junctional rhythm, controlled with amio   -Developed Afib with junction rhythm on 01/19, amio bolus and infusion given. Needed pacing after recovered from a-fib.   -IV amio transitioned to PO on 01/21 at 400mg BID.  No arrhythmias overnight, HD stable, in NSR. SBP 90-130s, HR 70s.  Most recent echo (1/20/23) demonstrated LVEF 40-45%, LV fx decreased, RV fx mildly reduced  -  mg daily  - Atorvastatin 80mg daily  - BB: Started Lopressor 12.5mg BID 01/24  - Diuresis as below  Prolonged QTc first noted on 01/19, resolved  - Repeat/follow up EKG 01/26 prolonged QTc resolved    Chest tubes: removed in ICU on 01/23  TPW: Removed in ICU on 01/22    Pulmonary:  Hypoxemic respiratory failure, improved  Extubated POD 1 to 5 lpm via NC. Now saturating well on 0.5 lpm and has been continuing to wean daily.   - Supplemental O2 PRN to keep sats > 92%. Wean off as tolerated.  - Pulm hygiene, IS, activity and deep breathing  - Duonebs and mucomyst ordered q6hr while awake 01/24  - BiPAP to be worn overnight, has been refusing overnight as cannot tolerate.    - Roommate's wife who has been visiting tested positive to COVID 01/26. Ordered COVID testing this morning, will monitor for symptoms.     Neurology / MSK:  Acute post-operative pain  Peripheral Neuropathy   Pain well controlled with current regimen:  - Acetaminophen PRN, PO oxycodone PRN, IV dilaudid PRN, methocarbamol PRN  - ES catheters removed 01/24  - PTA Wellbutrin 300mg daily  - PTA Abilify 5mg daily     / Renal / Fluid / Electrolytes:  AVREY, resolved  BL creat ~ 0.9-1.0, most recent creatinine 0.85; adequate UOP.   FLUID STATUS: Pre-op weight 247, weight today 242; I/O past 24 hours: +180 with 3 unrecorded UOs  - incontinent, voiding adequately with condom catheter in place: curbsided urology 01/26 to discuss ongoing incontinence. Recommended: UA to r/o infectious cause, bladder scan to make sure theres no retention, and if both normal follow up referral to urology outpatient.   - UA and bladder scan ordered 01/26 per recs above.   - Diuresis: Was on bumex drip until 01/20-01/22, and been given lasix 40mg IV 1/18-1/20 and resumed the Lasix 40mg IV on 01/23, continued on 01/24-01/26  - Replete lytes per protocol  - Strict I/O, daily weights  - Avoid/limit nephrotoxins as able    GI / Nutrition:   - Tolerating regular diet now as of 01/24 evening.   - Continue bowel regimen, last BM 01/26. Loose stools overnight, changed Miralax to PRN from scheduled 01/24. Still multiple stool overnight 01/26 so changing Senna from scheduled to PRN as well.     Endocrine:  Stress induced hyperglycemia   Hgb A1c 7.9% on 01/09/23  - Initially managed on insulin drip postop, transitioned to sliding scale; goal BG <180 for optimal healing  - PTA Metformin resumed 01/26 and diabetes education consult placed.     Infectious Disease:  Stress induced leukocytosis  WBC 12.5, remains afebrile, no signs or symptoms of infection  - Blood cultures obtained for high WBC and fever in ICU, NGTD. Started on Zosyn for 5 days, running from:  01/21-01/25. To be completed after todays dosing.  - Completed perioperative antibiotics  - Continue to monitor fever curve, CBC    Hematology:   Acute blood loss anemia and thrombocytopenia  Hgb 8.9; Plt 261 no signs or symptoms of active bleeding  - CBC, continue to monitor with AM draws.    Anticoagulation:   - ASA 162mg  - Subcutaneous heparin while inpatient    MSK/Skin:  Sternotomy  Surgical incision  - Sternal precautions  - Incisional cares per protocol    Prophylaxis:   - Stress ulcer prophylaxis: Pantoprazole 40 mg daily for 30 days  - DVT prophylaxis: Subcutaneous heparin and ASA 162mg, SCD    Disposition:   - Transferred to  on 01/23 evening  - Therapies recommending discharge to ARU once medically ready, patient and wife would like him to go to ARU in [place] closer to home.  has placed referrals to ARU with [facility] in [place], [place]. Awaiting bed availability.     Dr. Kim has been informed of changes through both verbal and written communication.      Vanessa Guido PA-C   Cardiothoracic Surgery  Pager: [phone]        Interval History:     No acute events overnight. Pain is controlled on current multimodal regimen. Slept ok overnight, but refused CPAP. Tolerating diet, passing flatulence, +BM and voiding well with condom cath in place d/t incontinence. Breathing is stable and continuing to wean and now down to 0.5LPM from 3-4LPM yesterday. Continue to use and encourage IS. Working with therapies, up to chair throughout the day, ambulating in hallways with assistance. Denies CP, SOB, dizziness, lightheadedness, syncope, fever, myalgias, chills, N/V, and sternal popping/clicking.          Physical Exam:     Gen: A&Ox4, NAD  Neuro: no focal deficits   CV: RRR, normal S1 S2, no murmurs, rubs or gallops. NO JVD  Pulm: diminished bases, some wheezing noted, saturating well on 3 lpm  Abd: nondistended, normal BS, soft, nontender  Ext: mild peripheral edema, no pitting  Incision: clean, dry,  intact, no erythema, sternum stable  Tubes/drain sites: dressing clean and dry         Data:    Imaging:  reviewed recent imaging, no acute concerns    No results found for this or any previous visit (from the past 24 hour(s)).     Labs:  Recent Labs   Lab 01/26/23  0729 01/26/23  0627 01/25/23  2133 01/25/23  0740 01/25/23  0647 01/24/23  0905 01/24/23  0619 01/23/23  0831 01/23/23  0257 01/20/23  0154 01/20/23  0046 01/19/23  2104 01/19/23  2049   WBC  --  12.5*  --   --  12.4*  --  11.5*  --  10.3   < > 17.4*  --   --    HGB  --  8.9*  --   --  8.7*  --  8.8*  --  8.1*   < > 8.7*   < >  --    MCV  --  95  --   --  94  --  93  --  91   < > 95  --   --    PLT  --  261  --   --  201  --  172  --  127*   < > 166  --   --    INR  --   --   --   --   --   --   --   --   --   --  1.28*  --  1.19*   NA  --  136  --   --  137  --  134*  --  135*   < > 140   < > 141   POTASSIUM  --  3.8  --   --  3.9  --  4.1  --  3.9   < > 4.9   < > 4.6   CHLORIDE  --  99  --   --  100  --  97*  --  95*   < > 107  --  106   CO2  --  30*  --   --  26  --  25  --  29   < > 20*  --  23   BUN  --  21.7  --   --  22.1  --  23.1*  --  21.9   < > 24.0*  --  23.6*   CR  --  0.85  --   --  0.91  --  0.95  --  1.05   < > 1.54*  --  1.34*   ANIONGAP  --  7  --   --  11  --  12  --  11   < > 13  --  12   SARI  --  8.5*  --   --  9.2  --  9.4  --  8.3*   < > 7.9*  --  8.3*   * 138* 210*   < > 141*   < > 150*   < > 128*   < > 131*   < > 153*   ALBUMIN  --   --   --   --   --   --  3.4*  --  3.2*   < > 2.9*  --  3.5   PROTTOTAL  --   --   --   --   --   --  5.9*  --  5.3*   < > 4.6*  --  5.3*   BILITOTAL  --   --   --   --   --   --  0.5  --  0.5   < > 0.8  --  0.6   ALKPHOS  --   --   --   --   --   --  72  --  61   < > 53  --  58   ALT  --   --   --   --   --   --  17  --  14   < > 15  --  16   AST  --   --   --   --   --   --  26  --  24   < > 55*  --  58*    < > = values in this interval not displayed.      GLUCOSE:   Recent Labs   Lab  01/26/23  0729 01/26/23  0627 01/25/23  2133 01/25/23  1705 01/25/23  1204 01/25/23  0740   * 138* 210* 205* 222* 128*

## 2023-01-27 ENCOUNTER — APPOINTMENT (OUTPATIENT)
Dept: GENERAL RADIOLOGY | Facility: CLINIC | Age: 68
DRG: 219 | End: 2023-01-27
Attending: PHYSICIAN ASSISTANT
Payer: COMMERCIAL

## 2023-01-27 ENCOUNTER — APPOINTMENT (OUTPATIENT)
Dept: PHYSICAL THERAPY | Facility: CLINIC | Age: 68
DRG: 219 | End: 2023-01-27
Attending: SURGERY
Payer: COMMERCIAL

## 2023-01-27 ENCOUNTER — APPOINTMENT (OUTPATIENT)
Dept: SPEECH THERAPY | Facility: CLINIC | Age: 68
DRG: 219 | End: 2023-01-27
Attending: SURGERY
Payer: COMMERCIAL

## 2023-01-27 ENCOUNTER — APPOINTMENT (OUTPATIENT)
Dept: OCCUPATIONAL THERAPY | Facility: CLINIC | Age: 68
DRG: 219 | End: 2023-01-27
Attending: SURGERY
Payer: COMMERCIAL

## 2023-01-27 LAB
ANION GAP SERPL CALCULATED.3IONS-SCNC: 9 MMOL/L (ref 7–15)
ATRIAL RATE - MUSE: 75 BPM
BUN SERPL-MCNC: 20.4 MG/DL (ref 8–23)
CALCIUM SERPL-MCNC: 9 MG/DL (ref 8.8–10.2)
CHLORIDE SERPL-SCNC: 100 MMOL/L (ref 98–107)
CREAT SERPL-MCNC: 0.82 MG/DL (ref 0.67–1.17)
DEPRECATED HCO3 PLAS-SCNC: 27 MMOL/L (ref 22–29)
DIASTOLIC BLOOD PRESSURE - MUSE: NORMAL MMHG
ERYTHROCYTE [DISTWIDTH] IN BLOOD BY AUTOMATED COUNT: 14.7 % (ref 10–15)
GFR SERPL CREATININE-BSD FRML MDRD: >90 ML/MIN/1.73M2
GLUCOSE BLDC GLUCOMTR-MCNC: 144 MG/DL (ref 70–99)
GLUCOSE BLDC GLUCOMTR-MCNC: 150 MG/DL (ref 70–99)
GLUCOSE BLDC GLUCOMTR-MCNC: 179 MG/DL (ref 70–99)
GLUCOSE BLDC GLUCOMTR-MCNC: 183 MG/DL (ref 70–99)
GLUCOSE SERPL-MCNC: 138 MG/DL (ref 70–99)
HCT VFR BLD AUTO: 28.9 % (ref 40–53)
HGB BLD-MCNC: 9 G/DL (ref 13.3–17.7)
INTERPRETATION ECG - MUSE: NORMAL
MAGNESIUM SERPL-MCNC: 2 MG/DL (ref 1.7–2.3)
MCH RBC QN AUTO: 29.1 PG (ref 26.5–33)
MCHC RBC AUTO-ENTMCNC: 31.1 G/DL (ref 31.5–36.5)
MCV RBC AUTO: 94 FL (ref 78–100)
P AXIS - MUSE: 47 DEGREES
PHOSPHATE SERPL-MCNC: 2.6 MG/DL (ref 2.5–4.5)
PLATELET # BLD AUTO: 291 10E3/UL (ref 150–450)
POTASSIUM SERPL-SCNC: 4.1 MMOL/L (ref 3.4–5.3)
PR INTERVAL - MUSE: 226 MS
QRS DURATION - MUSE: 126 MS
QT - MUSE: 410 MS
QTC - MUSE: 457 MS
R AXIS - MUSE: -39 DEGREES
RBC # BLD AUTO: 3.09 10E6/UL (ref 4.4–5.9)
SODIUM SERPL-SCNC: 136 MMOL/L (ref 136–145)
SYSTOLIC BLOOD PRESSURE - MUSE: NORMAL MMHG
T AXIS - MUSE: 76 DEGREES
VENTRICULAR RATE- MUSE: 75 BPM
WBC # BLD AUTO: 11.3 10E3/UL (ref 4–11)

## 2023-01-27 PROCEDURE — 97110 THERAPEUTIC EXERCISES: CPT | Mod: GO | Performed by: OCCUPATIONAL THERAPIST

## 2023-01-27 PROCEDURE — 250N000013 HC RX MED GY IP 250 OP 250 PS 637: Performed by: SURGERY

## 2023-01-27 PROCEDURE — 71046 X-RAY EXAM CHEST 2 VIEWS: CPT | Mod: 26 | Performed by: RADIOLOGY

## 2023-01-27 PROCEDURE — 250N000009 HC RX 250: Performed by: PHYSICIAN ASSISTANT

## 2023-01-27 PROCEDURE — 250N000011 HC RX IP 250 OP 636: Performed by: SURGERY

## 2023-01-27 PROCEDURE — 92526 ORAL FUNCTION THERAPY: CPT | Mod: GN

## 2023-01-27 PROCEDURE — 94640 AIRWAY INHALATION TREATMENT: CPT | Mod: 76

## 2023-01-27 PROCEDURE — 250N000013 HC RX MED GY IP 250 OP 250 PS 637: Performed by: PHYSICIAN ASSISTANT

## 2023-01-27 PROCEDURE — 97535 SELF CARE MNGMENT TRAINING: CPT | Mod: GO | Performed by: OCCUPATIONAL THERAPIST

## 2023-01-27 PROCEDURE — 36415 COLL VENOUS BLD VENIPUNCTURE: CPT | Performed by: PHYSICIAN ASSISTANT

## 2023-01-27 PROCEDURE — 82310 ASSAY OF CALCIUM: CPT | Performed by: PHYSICIAN ASSISTANT

## 2023-01-27 PROCEDURE — 85027 COMPLETE CBC AUTOMATED: CPT | Performed by: SURGERY

## 2023-01-27 PROCEDURE — 97116 GAIT TRAINING THERAPY: CPT | Mod: GP | Performed by: PHYSICAL THERAPIST

## 2023-01-27 PROCEDURE — 94640 AIRWAY INHALATION TREATMENT: CPT

## 2023-01-27 PROCEDURE — 71046 X-RAY EXAM CHEST 2 VIEWS: CPT

## 2023-01-27 PROCEDURE — 214N000001 HC R&B CCU UMMC

## 2023-01-27 PROCEDURE — 999N000157 HC STATISTIC RCP TIME EA 10 MIN

## 2023-01-27 PROCEDURE — 97530 THERAPEUTIC ACTIVITIES: CPT | Mod: GP | Performed by: PHYSICAL THERAPIST

## 2023-01-27 PROCEDURE — 250N000013 HC RX MED GY IP 250 OP 250 PS 637: Performed by: STUDENT IN AN ORGANIZED HEALTH CARE EDUCATION/TRAINING PROGRAM

## 2023-01-27 PROCEDURE — 83735 ASSAY OF MAGNESIUM: CPT | Performed by: SURGERY

## 2023-01-27 PROCEDURE — 84100 ASSAY OF PHOSPHORUS: CPT | Performed by: SURGERY

## 2023-01-27 RX ORDER — MAGNESIUM OXIDE 400 MG/1
400 TABLET ORAL EVERY 4 HOURS
Status: COMPLETED | OUTPATIENT
Start: 2023-01-27 | End: 2023-01-27

## 2023-01-27 RX ADMIN — ASPIRIN 162 MG: 81 TABLET, CHEWABLE ORAL at 07:57

## 2023-01-27 RX ADMIN — AMIODARONE HYDROCHLORIDE 400 MG: 200 TABLET ORAL at 07:56

## 2023-01-27 RX ADMIN — POTASSIUM & SODIUM PHOSPHATES POWDER PACK 280-160-250 MG 1 PACKET: 280-160-250 PACK at 11:54

## 2023-01-27 RX ADMIN — OXYCODONE HYDROCHLORIDE 10 MG: 10 TABLET ORAL at 07:57

## 2023-01-27 RX ADMIN — PANTOPRAZOLE SODIUM 40 MG: 40 TABLET, DELAYED RELEASE ORAL at 07:57

## 2023-01-27 RX ADMIN — THERA TABS 1 TABLET: TAB at 07:57

## 2023-01-27 RX ADMIN — Medication 400 MG: at 07:56

## 2023-01-27 RX ADMIN — OXYCODONE HYDROCHLORIDE 5 MG: 5 TABLET ORAL at 22:05

## 2023-01-27 RX ADMIN — HEPARIN SODIUM 5000 UNITS: 5000 INJECTION, SOLUTION INTRAVENOUS; SUBCUTANEOUS at 11:54

## 2023-01-27 RX ADMIN — AMIODARONE HYDROCHLORIDE 400 MG: 200 TABLET ORAL at 20:14

## 2023-01-27 RX ADMIN — ACETAMINOPHEN 650 MG: 325 TABLET, FILM COATED ORAL at 11:57

## 2023-01-27 RX ADMIN — OXYCODONE HYDROCHLORIDE 10 MG: 10 TABLET ORAL at 11:54

## 2023-01-27 RX ADMIN — METFORMIN HYDROCHLORIDE 1000 MG: 500 TABLET, FILM COATED ORAL at 16:55

## 2023-01-27 RX ADMIN — HEPARIN SODIUM 5000 UNITS: 5000 INJECTION, SOLUTION INTRAVENOUS; SUBCUTANEOUS at 20:15

## 2023-01-27 RX ADMIN — METFORMIN HYDROCHLORIDE 1000 MG: 500 TABLET, FILM COATED ORAL at 07:56

## 2023-01-27 RX ADMIN — IPRATROPIUM BROMIDE AND ALBUTEROL SULFATE 3 ML: .5; 2.5 SOLUTION RESPIRATORY (INHALATION) at 13:48

## 2023-01-27 RX ADMIN — Medication 12.5 MG: at 20:13

## 2023-01-27 RX ADMIN — IPRATROPIUM BROMIDE AND ALBUTEROL SULFATE 3 ML: .5; 2.5 SOLUTION RESPIRATORY (INHALATION) at 03:02

## 2023-01-27 RX ADMIN — ACETAMINOPHEN 650 MG: 325 TABLET, FILM COATED ORAL at 20:13

## 2023-01-27 RX ADMIN — Medication 12.5 MG: at 07:56

## 2023-01-27 RX ADMIN — METHOCARBAMOL 500 MG: 500 TABLET ORAL at 22:05

## 2023-01-27 RX ADMIN — IPRATROPIUM BROMIDE AND ALBUTEROL SULFATE 3 ML: .5; 2.5 SOLUTION RESPIRATORY (INHALATION) at 09:12

## 2023-01-27 RX ADMIN — ACETYLCYSTEINE 4 ML: 100 SOLUTION ORAL; RESPIRATORY (INHALATION) at 20:21

## 2023-01-27 RX ADMIN — HEPARIN SODIUM 5000 UNITS: 5000 INJECTION, SOLUTION INTRAVENOUS; SUBCUTANEOUS at 03:15

## 2023-01-27 RX ADMIN — IPRATROPIUM BROMIDE AND ALBUTEROL SULFATE 3 ML: .5; 2.5 SOLUTION RESPIRATORY (INHALATION) at 20:21

## 2023-01-27 RX ADMIN — BUPROPION HYDROCHLORIDE 300 MG: 300 TABLET, FILM COATED, EXTENDED RELEASE ORAL at 07:56

## 2023-01-27 RX ADMIN — ACETYLCYSTEINE 4 ML: 100 SOLUTION ORAL; RESPIRATORY (INHALATION) at 09:12

## 2023-01-27 RX ADMIN — ARIPIPRAZOLE 5 MG: 5 TABLET ORAL at 07:57

## 2023-01-27 RX ADMIN — ACETYLCYSTEINE 4 ML: 100 SOLUTION ORAL; RESPIRATORY (INHALATION) at 03:02

## 2023-01-27 RX ADMIN — Medication 400 MG: at 11:54

## 2023-01-27 RX ADMIN — POTASSIUM & SODIUM PHOSPHATES POWDER PACK 280-160-250 MG 1 PACKET: 280-160-250 PACK at 07:56

## 2023-01-27 RX ADMIN — ATORVASTATIN CALCIUM 80 MG: 80 TABLET, FILM COATED ORAL at 07:56

## 2023-01-27 RX ADMIN — ACETYLCYSTEINE 4 ML: 100 SOLUTION ORAL; RESPIRATORY (INHALATION) at 13:48

## 2023-01-27 ASSESSMENT — ACTIVITIES OF DAILY LIVING (ADL)
ADLS_ACUITY_SCORE: 32

## 2023-01-27 NOTE — PLAN OF CARE
D: PMH of HTN, HLD, Depression, CAD, AAA s/p repair (2017), PAD with claudication, previous smoker, DM Type 2, severe mitral valve regurgitation. Patient is now s/p CABGx2 and MVR on 01/18/23 with Dr. Kim.  On 01/19/23 patient was brought back to the OR with Dr. Kim for high CT output. Tubes had clotted, once milked at bedside he dumped 400mL and within 30 minutes another 350mL. Significant mediasinal hematoma evacuated and some bleeding from vein graft to diagonal artery noted that was fixed with a single prolene suture.     I: Monitored vitals and assessed pt status.   LDA:   -L PIV x1  -Primofit (external catheter)  PRN:   -Tylenol x1  -Oxycodone x1  Tele: SR w/ 1st * AVB  O2: RA while awake; 1.5 L NC overnight when sleeping  Mobility: Assist of 1 + walker/GB     A: A0x4. VSS. Afebrile. PRN hydralazine available but not needed this shift. Clammy, sleeps hot, given ice packs to sleep with and fan per pt request. Appears to be breathing comfortably on RA while awake; somewhat congested; scheduled nebs given by RT; infrequent cough. Incisional pain, given PRN Tylenol and oxycodone. PÉREZ. Baseline numbness/tingling lower extremities; denied new numbness/tingling. Denied nausea, CP, HA, dizziness, SOB at rest. Regular diet. ACHS POCT. Urinating adequately, Primofit external catheter on, incontinent - Pt says sometimes he can feel urge to void but other times he cannot. No BM this shift, passing flatus. Sternal inc KAYLYNN, LE graft site KAYLYNN, old CT sites dressing CDI. See flowsheets for skin assessment. Slept off and on overnight between cares. Repositioned/turned Q2-3H overnight. Up to bedside commode with assist of 1 + walker/GB. Sternal precautions maintained.      P: Continue to monitor Pt status and report changes to CVTS. Plan to discharge to ARU when medically ready.      Shift [id number]

## 2023-01-27 NOTE — PLAN OF CARE
Speech Language Therapy Discharge Summary    Reason for therapy discharge:    All goals and outcomes met, no further needs identified.    Progress towards therapy goal(s). See goals on Care Plan in [facility] electronic health record for goal details.  Goals met    Therapy recommendation(s):    No further therapy is recommended. Recommend continue regular diet and thin liquids. Ensure pt is upright for all PO.

## 2023-01-27 NOTE — PLAN OF CARE
PMH of HTN, HLD, Depression, CAD, AAA s/p repair (2017), PAD with claudication, previous smoker, DM Type 2, severe mitral valve regurgitation. Patient is now s/p CABGx2 and MVR on 01/18/23 with Dr. Kim.  On 01/19/23 patient was brought back to the OR with Dr. Kim for high CT output. Tubes had clotted, once milked at bedside he dumped 400mL and within 30 minutes another 350mL. Significant mediasinal hematoma evacuated and some bleeding from vein graft to diagonal artery noted that was fixed with a single prolene suture     Neuro: AOx4, denies headache, lightheadedness. Numbness/tingling to lower extremities, baseline. Hard of hearing bilaterally, baseline.   Resp: Sating >92% on RA. Denies SOB at rest, PÉREZ. LS clear and diminished. Encouraging IS usage, occasionally has audible congestion/rales. Pt reporting feeling of clicking in sternum - overnight CVTS provider came to assess and thinks it may be congestion. Chest xray to be ordered if O2 needs or work of breathing increases.  Cardiac: SR w/ 1st degree AVB 60-80s, VSS on RA. Denies cardiac chest pain and palpitations. Lower extremities pulses +1. Warm and clammy skin in afternoon, diminished over time.  GI/: Regular diet, BG ACHS, given insulin as ordered otherwise. Started on PO Metformin this shift. Good appetite during day, denies nausea. Received one time dose of 40mg IV Lasix, primofit on with adequate output. UA sent and resulted for urine incontinence. Incontinent of urine and BM's during day, did have soft 1 BM on bedside commode.  Skin: Midsternal incision KAYLYNN and WDL, CT sites still mild oozing - cleaned and redressed, CDI. L leg graft KAYLYNN and WDL. Generalized bruising.  Pain: Incisional pain managed with PRN Tylenol and Oxy q4 - last given at 1625  Activity: Assist of 1-2, worked with therapies and improving activity tolerance.  LDAs: L PIV SL and WDL  Electrolytes: Replaced K+ orally (3.8)     Will continue to monitor and report changes to  team.

## 2023-01-27 NOTE — PROGRESS NOTES
Cardiovascular Surgery Progress Note  01/27/2023         Assessment and Plan:     Gilberto Camilo is a 67 year old male with a PMH of HTN, HLD, Depression, CAD, AAA s/p repair (2017), PAD with claudication, previous smoker, DM Type 2, severe mitral valve regurgitation. Patient is now s/p CABGx2 and MVR on 01/18/23 with Dr. Kim.  On 01/19/23 patient was brought back to the OR with Dr. Kim for high CT output. Tubes had clotted, once milked at bedside he dumped 400mL and within 30 minutes another 350mL. Significant mediasinal hematoma evacuated and some bleeding from vein graft to diagonal artery noted that was fixed with a single prolene suture.    Cardiovascular:   Hx of severe mitral valve regurg and CAD s/p CABGx2 and MVR on 01/18/23  OR take back for high CT output/reexploration of chest, 01/19/23   HLD  S/p AAA repair in 2017  Post-operative A-Fib with competing junctional rhythm, controlled with amio   HD stable overnight in NSR  Most recent echo (1/20/23) demonstrated LVEF 40-45%, LV fx decreased, RV fx mildly reduced  -Developed Afib with junction rhythm on 01/19, amio bolus and infusion given. Needed pacing after recovered from a-fib.   -IV amio transitioned to PO on 01/21 at 400mg BID.  -  mg daily  - Atorvastatin 80mg daily  - BB: Started Lopressor 12.5mg BID 01/24  - Diuresis as below  Prolonged QTc first noted on 01/19, resolved  - Repeat/follow up EKG 01/26 prolonged QTc resolved    Chest tubes: removed in ICU on 01/23  TPW: Removed in ICU on 01/22    Pulmonary:  Hypoxemic respiratory failure, resolved  Extubated POD 1 to 5 lpm via NC. Now saturating well on room air  - Pulm hygiene, IS, activity and deep breathing  - Duonebs and mucomyst ordered q6hr while awake 01/24  - BiPAP to be worn overnight, has been refusing overnight as cannot tolerate.   - Roommate's wife who has been visiting tested positive to COVID 01/26. COVID negative 1/26    Neurology / MSK:  Acute post-operative  pain  Peripheral Neuropathy   Pain well controlled with current regimen:  - Acetaminophen PRN, PO oxycodone PRN, methocarbamol PRN  - ES catheters removed 01/24  - PTA Wellbutrin 300mg daily  - PTA Abilify 5mg daily     / Renal / Fluid / Electrolytes:  AVERY, resolved  BL creat ~ 0.9-1.0, most recent creatinine stable; adequate UOP.   FLUID STATUS: Pre-op weight 247 lbs, weight today 237 lbs; I=O past 24h, euvolemic  - incontinent, voiding adequately with condom catheter in place: curbsided urology 01/26 to discuss ongoing incontinence. Recommended: UA to r/o infectious cause, bladder scan to make sure theres no retention, and if both normal follow up referral to urology outpatient. UA negative and no PVRs.  - Diuresis: euvolemic, diurese PRN  - Replete lytes per protocol  - Strict I/O, daily weights  - Avoid/limit nephrotoxins as able    GI / Nutrition:   - Tolerating regular diet   - Continue bowel regimen, last BM 01/27    Endocrine:  Stress induced hyperglycemia   Hgb A1c 7.9% on 01/09/23  - Initially managed on insulin drip postop, transitioned to sliding scale; goal BG <180 for optimal healing  - PTA Metformin resumed 01/26 and diabetes education consult placed.     Infectious Disease:  Stress induced leukocytosis  WBC 11.3 and trending down, remains afebrile, no signs or symptoms of infection  - Blood cultures obtained for high WBC and fever in ICU, NGTD. Started on Zosyn for 5 days, running from: 01/21-01/25.  - Completed perioperative antibiotics  - Continue to monitor fever curve, CBC    Hematology:   Acute blood loss anemia and thrombocytopenia  Hgb 9; Plt 291 no signs or symptoms of active bleeding  - CBC, continue to monitor with AM draws.    Anticoagulation:   -  mg daily  - Subcutaneous heparin while inpatient    MSK/Skin:  Sternotomy  Surgical incision  - Sternal precautions  - Incisional cares per protocol  - Beard shield placed    Prophylaxis:   - Stress ulcer prophylaxis: Pantoprazole 40 mg  daily for 30 days  - DVT prophylaxis: Subcutaneous heparin, SCD    Disposition:   - Transferred to  on 01/23 evening  - Therapies recommending discharge to ARU, medically ready awaiting bed availability    Dr. Kim has been informed of changes through both verbal and written communication.      TORI Melvin, ACN-AG, CCRN  Nurse Practitioner  Cardiothoracic Surgery  Pager: [phone]        Interval History:     No acute events overnight. States pain is well managed on current regimen, slept well. Breathing is stable on room air, working with IS. Tolerating diet, is passing flatus, +BM, no n/v. Denies chest pain, palpitations, dizziness, syncopal symptoms, chills, myalgias, had one occasion of sternal popping, stable on exam.          Physical Exam:     Gen: A&Ox4, NAD  Neuro: Intact with no focal deficits   CV: RRR, normal S1 S2, no murmurs, rubs or gallops. NO JVD  Pulm: diminished bases, some wheezing noted, saturating well on room air  Abd: nondistended, normal BS, soft, nontender  Ext: mild peripheral edema, no pitting  Incision: clean, dry, intact, no erythema, sternum stable  Tubes/drain sites: dressing clean and dry         Data:    Imaging:  reviewed recent imaging, no acute concerns    No results found for this or any previous visit (from the past 24 hour(s)).     Labs:  Recent Labs   Lab 01/27/23  1235 01/27/23  0909 01/27/23  0534 01/26/23  0729 01/26/23  0627 01/25/23  0740 01/25/23  0647 01/24/23  0905 01/24/23  0619 01/23/23  0831 01/23/23  0257   WBC  --   --  11.3*  --  12.5*  --  12.4*  --  11.5*  --  10.3   HGB  --   --  9.0*  --  8.9*  --  8.7*  --  8.8*  --  8.1*   MCV  --   --  94  --  95  --  94  --  93  --  91   PLT  --   --  291  --  261  --  201  --  172  --  127*   NA  --   --  136  --  136  --  137  --  134*  --  135*   POTASSIUM  --   --  4.1  --  3.8  --  3.9  --  4.1  --  3.9   CHLORIDE  --   --  100  --  99  --  100  --  97*  --  95*   CO2  --   --  27  --  30*  --  26   --  25  --  29   BUN  --   --  20.4  --  21.7  --  22.1  --  23.1*  --  21.9   CR  --   --  0.82  --  0.85  --  0.91  --  0.95  --  1.05   ANIONGAP  --   --  9  --  7  --  11  --  12  --  11   SARI  --   --  9.0  --  8.5*  --  9.2  --  9.4  --  8.3*   * 150* 138*   < > 138*   < > 141*   < > 150*   < > 128*   ALBUMIN  --   --   --   --   --   --   --   --  3.4*  --  3.2*   PROTTOTAL  --   --   --   --   --   --   --   --  5.9*  --  5.3*   BILITOTAL  --   --   --   --   --   --   --   --  0.5  --  0.5   ALKPHOS  --   --   --   --   --   --   --   --  72  --  61   ALT  --   --   --   --   --   --   --   --  17  --  14   AST  --   --   --   --   --   --   --   --  26  --  24    < > = values in this interval not displayed.      GLUCOSE:   Recent Labs   Lab 01/27/23  1235 01/27/23  0909 01/27/23  0534 01/26/23  2150 01/26/23  1729 01/26/23  1153   * 150* 138* 153* 160* 257*

## 2023-01-27 NOTE — PLAN OF CARE
Goal Outcome Evaluation:    Pt remains hospitalized for CABG x2 and MVR on 1/18/23.     Neuro: A/Ox4  Cardiac: SR, diaphoretic (baseline according to family)  Respiratory: 97% on RA          GI/: External catheter in place. Pt incontinent urine. Last BM 1/27.  Pt was able to tell nurse he needed the commode today. Straight cath once for high urine volume via bladder scan.   Activity: Patient an assist x1  Pain: Experiencing pain at old chest tube sites. PRN oxycodone given.  Skin: Pt has a large bruise on his left thigh. Surgical incisions. Old chest tube sites. Sternal incision and bruising on his right arm and abdomen.  Lines: LPIV       Continue POC and notify CVTS with any changes.

## 2023-01-28 LAB
ANION GAP SERPL CALCULATED.3IONS-SCNC: 12 MMOL/L (ref 7–15)
BUN SERPL-MCNC: 20.3 MG/DL (ref 8–23)
CALCIUM SERPL-MCNC: 9.7 MG/DL (ref 8.8–10.2)
CHLORIDE SERPL-SCNC: 101 MMOL/L (ref 98–107)
CREAT SERPL-MCNC: 0.83 MG/DL (ref 0.67–1.17)
DEPRECATED HCO3 PLAS-SCNC: 23 MMOL/L (ref 22–29)
ERYTHROCYTE [DISTWIDTH] IN BLOOD BY AUTOMATED COUNT: 14.8 % (ref 10–15)
GFR SERPL CREATININE-BSD FRML MDRD: >90 ML/MIN/1.73M2
GLUCOSE BLDC GLUCOMTR-MCNC: 129 MG/DL (ref 70–99)
GLUCOSE BLDC GLUCOMTR-MCNC: 130 MG/DL (ref 70–99)
GLUCOSE BLDC GLUCOMTR-MCNC: 220 MG/DL (ref 70–99)
GLUCOSE BLDC GLUCOMTR-MCNC: 268 MG/DL (ref 70–99)
GLUCOSE SERPL-MCNC: 138 MG/DL (ref 70–99)
HCT VFR BLD AUTO: 32.9 % (ref 40–53)
HGB BLD-MCNC: 9.8 G/DL (ref 13.3–17.7)
MAGNESIUM SERPL-MCNC: 2 MG/DL (ref 1.7–2.3)
MCH RBC QN AUTO: 29.6 PG (ref 26.5–33)
MCHC RBC AUTO-ENTMCNC: 29.8 G/DL (ref 31.5–36.5)
MCV RBC AUTO: 99 FL (ref 78–100)
PHOSPHATE SERPL-MCNC: 3.2 MG/DL (ref 2.5–4.5)
PLATELET # BLD AUTO: 340 10E3/UL (ref 150–450)
POTASSIUM SERPL-SCNC: 4.5 MMOL/L (ref 3.4–5.3)
RBC # BLD AUTO: 3.31 10E6/UL (ref 4.4–5.9)
SODIUM SERPL-SCNC: 136 MMOL/L (ref 136–145)
WBC # BLD AUTO: 9.9 10E3/UL (ref 4–11)

## 2023-01-28 PROCEDURE — 250N000011 HC RX IP 250 OP 636: Performed by: SURGERY

## 2023-01-28 PROCEDURE — 94640 AIRWAY INHALATION TREATMENT: CPT | Mod: 76

## 2023-01-28 PROCEDURE — 250N000013 HC RX MED GY IP 250 OP 250 PS 637

## 2023-01-28 PROCEDURE — 999N000157 HC STATISTIC RCP TIME EA 10 MIN

## 2023-01-28 PROCEDURE — 94640 AIRWAY INHALATION TREATMENT: CPT

## 2023-01-28 PROCEDURE — 250N000013 HC RX MED GY IP 250 OP 250 PS 637: Performed by: SURGERY

## 2023-01-28 PROCEDURE — 250N000009 HC RX 250: Performed by: PHYSICIAN ASSISTANT

## 2023-01-28 PROCEDURE — 83735 ASSAY OF MAGNESIUM: CPT | Performed by: NURSE PRACTITIONER

## 2023-01-28 PROCEDURE — 250N000013 HC RX MED GY IP 250 OP 250 PS 637: Performed by: PHYSICIAN ASSISTANT

## 2023-01-28 PROCEDURE — 250N000013 HC RX MED GY IP 250 OP 250 PS 637: Performed by: STUDENT IN AN ORGANIZED HEALTH CARE EDUCATION/TRAINING PROGRAM

## 2023-01-28 PROCEDURE — 85027 COMPLETE CBC AUTOMATED: CPT | Performed by: SURGERY

## 2023-01-28 PROCEDURE — 36415 COLL VENOUS BLD VENIPUNCTURE: CPT | Performed by: NURSE PRACTITIONER

## 2023-01-28 PROCEDURE — 84100 ASSAY OF PHOSPHORUS: CPT | Performed by: SURGERY

## 2023-01-28 PROCEDURE — 80048 BASIC METABOLIC PNL TOTAL CA: CPT | Performed by: NURSE PRACTITIONER

## 2023-01-28 PROCEDURE — 214N000001 HC R&B CCU UMMC

## 2023-01-28 RX ORDER — MAGNESIUM OXIDE 400 MG/1
400 TABLET ORAL EVERY 4 HOURS
Status: DISCONTINUED | OUTPATIENT
Start: 2023-01-28 | End: 2023-01-28 | Stop reason: SINTOL

## 2023-01-28 RX ORDER — MAGNESIUM SULFATE HEPTAHYDRATE 40 MG/ML
2 INJECTION, SOLUTION INTRAVENOUS ONCE
Status: COMPLETED | OUTPATIENT
Start: 2023-01-28 | End: 2023-01-28

## 2023-01-28 RX ORDER — LOPERAMIDE HCL 2 MG
2 CAPSULE ORAL 3 TIMES DAILY PRN
Status: DISCONTINUED | OUTPATIENT
Start: 2023-01-28 | End: 2023-01-29

## 2023-01-28 RX ADMIN — THERA TABS 1 TABLET: TAB at 09:16

## 2023-01-28 RX ADMIN — IPRATROPIUM BROMIDE AND ALBUTEROL SULFATE 3 ML: .5; 2.5 SOLUTION RESPIRATORY (INHALATION) at 01:52

## 2023-01-28 RX ADMIN — HEPARIN SODIUM 5000 UNITS: 5000 INJECTION, SOLUTION INTRAVENOUS; SUBCUTANEOUS at 20:52

## 2023-01-28 RX ADMIN — ACETAMINOPHEN 650 MG: 325 TABLET, FILM COATED ORAL at 20:47

## 2023-01-28 RX ADMIN — METFORMIN HYDROCHLORIDE 1000 MG: 500 TABLET, FILM COATED ORAL at 16:48

## 2023-01-28 RX ADMIN — BUPROPION HYDROCHLORIDE 300 MG: 300 TABLET, FILM COATED, EXTENDED RELEASE ORAL at 09:16

## 2023-01-28 RX ADMIN — HEPARIN SODIUM 5000 UNITS: 5000 INJECTION, SOLUTION INTRAVENOUS; SUBCUTANEOUS at 12:40

## 2023-01-28 RX ADMIN — HEPARIN SODIUM 5000 UNITS: 5000 INJECTION, SOLUTION INTRAVENOUS; SUBCUTANEOUS at 03:26

## 2023-01-28 RX ADMIN — ACETYLCYSTEINE 4 ML: 100 SOLUTION ORAL; RESPIRATORY (INHALATION) at 13:48

## 2023-01-28 RX ADMIN — Medication 12.5 MG: at 20:48

## 2023-01-28 RX ADMIN — AMIODARONE HYDROCHLORIDE 400 MG: 200 TABLET ORAL at 09:16

## 2023-01-28 RX ADMIN — ARIPIPRAZOLE 5 MG: 5 TABLET ORAL at 09:17

## 2023-01-28 RX ADMIN — METHOCARBAMOL 500 MG: 500 TABLET ORAL at 20:47

## 2023-01-28 RX ADMIN — PANTOPRAZOLE SODIUM 40 MG: 40 TABLET, DELAYED RELEASE ORAL at 09:17

## 2023-01-28 RX ADMIN — IPRATROPIUM BROMIDE AND ALBUTEROL SULFATE 3 ML: .5; 2.5 SOLUTION RESPIRATORY (INHALATION) at 13:48

## 2023-01-28 RX ADMIN — Medication 1 MG: at 20:47

## 2023-01-28 RX ADMIN — MAGNESIUM SULFATE IN WATER 2 G: 40 INJECTION, SOLUTION INTRAVENOUS at 16:36

## 2023-01-28 RX ADMIN — ASPIRIN 162 MG: 81 TABLET, CHEWABLE ORAL at 09:16

## 2023-01-28 RX ADMIN — OXYCODONE HYDROCHLORIDE 5 MG: 5 TABLET ORAL at 03:14

## 2023-01-28 RX ADMIN — ACETYLCYSTEINE 4 ML: 100 SOLUTION ORAL; RESPIRATORY (INHALATION) at 01:52

## 2023-01-28 RX ADMIN — OXYCODONE HYDROCHLORIDE 10 MG: 10 TABLET ORAL at 09:15

## 2023-01-28 RX ADMIN — ACETAMINOPHEN 650 MG: 325 TABLET, FILM COATED ORAL at 09:15

## 2023-01-28 RX ADMIN — OXYCODONE HYDROCHLORIDE 10 MG: 10 TABLET ORAL at 20:47

## 2023-01-28 RX ADMIN — METFORMIN HYDROCHLORIDE 1000 MG: 500 TABLET, FILM COATED ORAL at 09:16

## 2023-01-28 RX ADMIN — ATORVASTATIN CALCIUM 80 MG: 80 TABLET, FILM COATED ORAL at 09:16

## 2023-01-28 RX ADMIN — Medication 12.5 MG: at 09:16

## 2023-01-28 RX ADMIN — LOPERAMIDE HYDROCHLORIDE 2 MG: 2 CAPSULE ORAL at 21:00

## 2023-01-28 RX ADMIN — AMIODARONE HYDROCHLORIDE 400 MG: 200 TABLET ORAL at 20:48

## 2023-01-28 RX ADMIN — ACETAMINOPHEN 650 MG: 325 TABLET, FILM COATED ORAL at 01:05

## 2023-01-28 ASSESSMENT — ACTIVITIES OF DAILY LIVING (ADL)
ADLS_ACUITY_SCORE: 32

## 2023-01-28 NOTE — PLAN OF CARE
Temp: 98.4  F (36.9  C) Temp src: Oral BP: 104/60 (side-lying) Pulse: 74   Resp: 16 SpO2: 99 % O2 Device: None (Room air)       Diagnosis: MVR & CABG x2 (1/18) c/b mediastinal hematoma s/p evacuation & vein graft bleeding s/p suture repair  History of HTN, HLD, CAD, AAA s/p repair (2017), PAD, severe MR, T2DM, depression, obesity, former smoker    Neuro: A&Ox 4, denies headache or dizziness.  Cardiac: Tele in place, SR with first degree ABV.  Respiratory: O2 sating >94% on RA, with occasional dips into mid 80s, reminded pt to cough when O2 dips to clear congestion.  Vitals: VSS, Afebrile, c/o sternal pain 4-6/10, administered PRN tylenol, oxycodone & robaxin.  GI/: Denies nausea. Loose BM x2. Minimal urine output overnight, bladder scan at 0120 showed <10 mL, PrimoFit in place but poor fit & leaking d/t anatomy, switched to brief.  Skin: Left PIV in place, SL. Sternal incision & LLE graft sites, KAYLYNN & WNL.    Mobility: Up A1 with gait belt and walker to bedside commode x3.   Rest: Slept poorly overnight due to sternal pain.    PRN: tylenol 650mg x2, oxycodone 5mg x2, robaxin 500mg x1    P: Continue to monitor and follow POC. Plan for discharge to ARU when bed available. Notify CVTS with changes.

## 2023-01-28 NOTE — PROGRESS NOTES
Cardiovascular Surgery Progress Note  01/28/2023         Assessment and Plan:     Gilberto Camilo is a 67 year old male with a PMH of HTN, HLD, Depression, CAD, AAA s/p repair (2017), PAD with claudication, previous smoker, DM Type 2, severe mitral valve regurgitation. Patient is now s/p CABGx2 and MVR on 01/18/23 with Dr. Kim.  On 01/19/23 patient was brought back to the OR with Dr. Kim for high CT output. Tubes had clotted, once milked at bedside he dumped 400mL and within 30 minutes another 350mL. Significant mediasinal hematoma evacuated and some bleeding from vein graft to diagonal artery noted that was fixed with a single prolene suture.    Cardiovascular:   Hx of severe mitral valve regurg and CAD s/p CABGx2 and MVR on 01/18/23  OR take back for high CT output/reexploration of chest, 01/19/23   HLD  S/p AAA repair in 2017  Post-operative A-Fib with competing junctional rhythm, controlled with amio   HD stable overnight in NSR  Most recent echo (1/20/23) demonstrated LVEF 40-45%, LV fx decreased, RV fx mildly reduced  -Developed Afib with junction rhythm on 01/19, amio bolus and infusion given. Needed pacing after recovered from a-fib.   -IV amio transitioned to PO on 01/21 at 400mg BID.  -  mg daily  - Atorvastatin 80mg daily  - BB: Started Lopressor 12.5mg BID 01/24  - Diuresis as below  Prolonged QTc first noted on 01/19, resolved  - Repeat/follow up EKG 01/26 prolonged QTc resolved    Chest tubes: removed in ICU on 01/23  TPW: Removed in ICU on 01/22    Pulmonary:  Hypoxemic respiratory failure, resolved  Extubated POD 1 to 5 lpm via NC. Now saturating well on room air  - Pulm hygiene, IS, activity and deep breathing  - Duonebs and mucomyst ordered q6hr while awake 01/24  - BiPAP to be worn overnight, has been refusing overnight as cannot tolerate.     Neurology / MSK:  Acute post-operative pain  Peripheral Neuropathy   Pain well controlled with current regimen:  - Acetaminophen PRN, PO  oxycodone PRN, methocarbamol PRN  - PTA Wellbutrin 300mg daily  - PTA Abilify 5mg daily     / Renal / Fluid / Electrolytes:  AVERY, resolved  BL creat ~ 0.9-1.0, most recent creatinine stable; adequate UOP.   FLUID STATUS: Pre-op weight 247 lbs, weight today 236 lbs; I=O past 24h, euvolemic  - incontinent, voiding adequately with condom catheter in place: curbsided urology 01/26 to discuss ongoing incontinence. Recommended: UA to r/o infectious cause, bladder scan to make sure theres no retention, and if both normal follow up referral to urology outpatient. UA negative and no PVRs.  - Diuresis: euvolemic, diurese PRN  - Replete lytes per protocol  - Strict I/O, daily weights  - Avoid/limit nephrotoxins as able    GI / Nutrition:   - Tolerating regular diet   - Continue bowel regimen, last BM 01/28    Endocrine:  Stress induced hyperglycemia   Hgb A1c 7.9% on 01/09/23  - Initially managed on insulin drip postop, transitioned to sliding scale; goal BG <180 for optimal healing  - PTA Metformin resumed 01/26 and diabetes education consult placed.     Infectious Disease:  Stress induced leukocytosis  WBC 9.9 and trending down, remains afebrile, no signs or symptoms of infection  - Blood cultures obtained for high WBC and fever in ICU, NGTD. Started on Zosyn for 5 days, completed-01/25.  - Completed perioperative antibiotics  - Continue to monitor fever curve, CBC    Hematology:   Acute blood loss anemia and thrombocytopenia  Hgb 9.8; Plt 340 no signs or symptoms of active bleeding  - CBC, continue to monitor with AM draws.    Anticoagulation:   -  mg daily  - Subcutaneous heparin while inpatient    MSK/Skin:  Sternotomy  Surgical incision  - Sternal precautions  - Incisional cares per protocol  - Beard shield placed    Prophylaxis:   - Stress ulcer prophylaxis: Pantoprazole 40 mg daily for 30 days  - DVT prophylaxis: Subcutaneous heparin, SCD    Disposition:   - Transferred to  on 01/23 evening  - Therapies  recommending discharge to ARU, medically ready awaiting bed availability    Discussed with Dr. Kim.    Elizabeth Kurtz PA-C   Cardiothoracic Surgery   Pager #[phone]  January 28, 2023 at 9:44 AM        Interval History:     No acute events overnight. States pain is well managed on current regimen, slept well. Breathing is stable on room air, working with IS. Tolerating diet, is passing flatus, +BM, no n/v. Denies chest pain, palpitations, dizziness, syncopal symptoms, chills, myalgias, had one occasion of sternal popping, stable on exam.          Physical Exam:     Gen: A&Ox4, NAD  Neuro: Intact with no focal deficits   CV: RRR, normal S1 S2, no murmurs, rubs or gallops. NO JVD  Pulm: diminished bases, some wheezing noted, saturating well on room air  Abd: nondistended, normal BS, soft, nontender  Ext: mild peripheral edema, no pitting  Incision: clean, dry, intact, no erythema, sternum stable  Tubes/drain sites: dressing clean and dry         Data:    Imaging:  reviewed recent imaging, no acute concerns    Recent Results (from the past 24 hour(s))   XR Chest 2 Views    Narrative    EXAM: XR CHEST 2 VIEWS  1/27/2023 8:53 AM     HISTORY:  s/p mitral repair and CABGx2       COMPARISON:  1/24/2023    FINDINGS: Two views of the chest. Postsurgical changes of the chest  with intact median sternotomy wires prosthetic mitral valve. Coronary  artery stents. Stable enlarged cardiac silhouette. No significant  pleural effusion or pneumothorax. Bilateral mixed interstitial and  airspace pulmonary opacities, decreased at the right lung base,  increased in the bilateral perihilar regions. The visualized upper  abdomen is unremarkable.      Impression    IMPRESSION:     1. Similar bilateral pulmonary opacities opacities, likely  atelectasis/edema versus infection.  2. Stable postsurgical chest.    I have personally reviewed the examination and initial interpretation  and I agree with the findings.    AURELIANO YOU MD          SYSTEM ID:  [id number]        Labs:  Recent Labs   Lab 01/28/23  0618 01/28/23  0151 01/27/23  2153 01/27/23  0909 01/27/23  0534 01/26/23  0729 01/26/23  0627 01/24/23  0905 01/24/23  0619 01/23/23  0831 01/23/23  0257   WBC 9.9  --   --   --  11.3*  --  12.5*   < > 11.5*  --  10.3   HGB 9.8*  --   --   --  9.0*  --  8.9*   < > 8.8*  --  8.1*   MCV 99  --   --   --  94  --  95   < > 93  --  91     --   --   --  291  --  261   < > 172  --  127*     --   --   --  136  --  136   < > 134*  --  135*   POTASSIUM 4.5  --   --   --  4.1  --  3.8   < > 4.1  --  3.9   CHLORIDE 101  --   --   --  100  --  99   < > 97*  --  95*   CO2 23  --   --   --  27  --  30*   < > 25  --  29   BUN 20.3  --   --   --  20.4  --  21.7   < > 23.1*  --  21.9   CR 0.83  --   --   --  0.82  --  0.85   < > 0.95  --  1.05   ANIONGAP 12  --   --   --  9  --  7   < > 12  --  11   SARI 9.7  --   --   --  9.0  --  8.5*   < > 9.4  --  8.3*   * 129* 183*   < > 138*   < > 138*   < > 150*   < > 128*   ALBUMIN  --   --   --   --   --   --   --   --  3.4*  --  3.2*   PROTTOTAL  --   --   --   --   --   --   --   --  5.9*  --  5.3*   BILITOTAL  --   --   --   --   --   --   --   --  0.5  --  0.5   ALKPHOS  --   --   --   --   --   --   --   --  72  --  61   ALT  --   --   --   --   --   --   --   --  17  --  14   AST  --   --   --   --   --   --   --   --  26  --  24    < > = values in this interval not displayed.      GLUCOSE:   Recent Labs   Lab 01/28/23  0618 01/28/23  0151 01/27/23  2153 01/27/23  1640 01/27/23  1235 01/27/23  0909   * 129* 183* 144* 179* 150*

## 2023-01-29 ENCOUNTER — APPOINTMENT (OUTPATIENT)
Dept: OCCUPATIONAL THERAPY | Facility: CLINIC | Age: 68
DRG: 219 | End: 2023-01-29
Attending: SURGERY
Payer: COMMERCIAL

## 2023-01-29 LAB
ANION GAP SERPL CALCULATED.3IONS-SCNC: 10 MMOL/L (ref 7–15)
BUN SERPL-MCNC: 21 MG/DL (ref 8–23)
CALCIUM SERPL-MCNC: 9.6 MG/DL (ref 8.8–10.2)
CHLORIDE SERPL-SCNC: 102 MMOL/L (ref 98–107)
CREAT SERPL-MCNC: 0.9 MG/DL (ref 0.67–1.17)
DEPRECATED HCO3 PLAS-SCNC: 24 MMOL/L (ref 22–29)
ERYTHROCYTE [DISTWIDTH] IN BLOOD BY AUTOMATED COUNT: 15.1 % (ref 10–15)
GFR SERPL CREATININE-BSD FRML MDRD: >90 ML/MIN/1.73M2
GLUCOSE BLDC GLUCOMTR-MCNC: 138 MG/DL (ref 70–99)
GLUCOSE BLDC GLUCOMTR-MCNC: 142 MG/DL (ref 70–99)
GLUCOSE BLDC GLUCOMTR-MCNC: 158 MG/DL (ref 70–99)
GLUCOSE BLDC GLUCOMTR-MCNC: 167 MG/DL (ref 70–99)
GLUCOSE BLDC GLUCOMTR-MCNC: 172 MG/DL (ref 70–99)
GLUCOSE SERPL-MCNC: 146 MG/DL (ref 70–99)
HCT VFR BLD AUTO: 29.9 % (ref 40–53)
HGB BLD-MCNC: 8.9 G/DL (ref 13.3–17.7)
MAGNESIUM SERPL-MCNC: 2 MG/DL (ref 1.7–2.3)
MCH RBC QN AUTO: 28.6 PG (ref 26.5–33)
MCHC RBC AUTO-ENTMCNC: 29.8 G/DL (ref 31.5–36.5)
MCV RBC AUTO: 96 FL (ref 78–100)
PHOSPHATE SERPL-MCNC: 3.4 MG/DL (ref 2.5–4.5)
PLATELET # BLD AUTO: 379 10E3/UL (ref 150–450)
POTASSIUM SERPL-SCNC: 4.9 MMOL/L (ref 3.4–5.3)
RBC # BLD AUTO: 3.11 10E6/UL (ref 4.4–5.9)
SODIUM SERPL-SCNC: 136 MMOL/L (ref 136–145)
WBC # BLD AUTO: 11.1 10E3/UL (ref 4–11)

## 2023-01-29 PROCEDURE — 85027 COMPLETE CBC AUTOMATED: CPT | Performed by: SURGERY

## 2023-01-29 PROCEDURE — 36415 COLL VENOUS BLD VENIPUNCTURE: CPT | Performed by: NURSE PRACTITIONER

## 2023-01-29 PROCEDURE — 250N000013 HC RX MED GY IP 250 OP 250 PS 637: Performed by: PHYSICIAN ASSISTANT

## 2023-01-29 PROCEDURE — 250N000013 HC RX MED GY IP 250 OP 250 PS 637

## 2023-01-29 PROCEDURE — 250N000009 HC RX 250: Performed by: PHYSICIAN ASSISTANT

## 2023-01-29 PROCEDURE — 250N000013 HC RX MED GY IP 250 OP 250 PS 637: Performed by: SURGERY

## 2023-01-29 PROCEDURE — 83735 ASSAY OF MAGNESIUM: CPT | Performed by: SURGERY

## 2023-01-29 PROCEDURE — 80048 BASIC METABOLIC PNL TOTAL CA: CPT | Performed by: NURSE PRACTITIONER

## 2023-01-29 PROCEDURE — 250N000011 HC RX IP 250 OP 636: Performed by: SURGERY

## 2023-01-29 PROCEDURE — 97110 THERAPEUTIC EXERCISES: CPT | Mod: GO

## 2023-01-29 PROCEDURE — 84100 ASSAY OF PHOSPHORUS: CPT | Performed by: SURGERY

## 2023-01-29 PROCEDURE — 214N000001 HC R&B CCU UMMC

## 2023-01-29 PROCEDURE — 999N000157 HC STATISTIC RCP TIME EA 10 MIN

## 2023-01-29 PROCEDURE — 250N000013 HC RX MED GY IP 250 OP 250 PS 637: Performed by: STUDENT IN AN ORGANIZED HEALTH CARE EDUCATION/TRAINING PROGRAM

## 2023-01-29 PROCEDURE — 94640 AIRWAY INHALATION TREATMENT: CPT | Mod: 76

## 2023-01-29 RX ORDER — AMIODARONE HYDROCHLORIDE 200 MG/1
200 TABLET ORAL 2 TIMES DAILY
Status: DISCONTINUED | OUTPATIENT
Start: 2023-01-29 | End: 2023-01-31 | Stop reason: HOSPADM

## 2023-01-29 RX ADMIN — Medication 1 MG: at 23:38

## 2023-01-29 RX ADMIN — METFORMIN HYDROCHLORIDE 1000 MG: 500 TABLET, FILM COATED ORAL at 17:35

## 2023-01-29 RX ADMIN — BUPROPION HYDROCHLORIDE 300 MG: 300 TABLET, FILM COATED, EXTENDED RELEASE ORAL at 08:59

## 2023-01-29 RX ADMIN — HEPARIN SODIUM 5000 UNITS: 5000 INJECTION, SOLUTION INTRAVENOUS; SUBCUTANEOUS at 19:15

## 2023-01-29 RX ADMIN — OXYCODONE HYDROCHLORIDE 10 MG: 10 TABLET ORAL at 15:30

## 2023-01-29 RX ADMIN — METFORMIN HYDROCHLORIDE 1000 MG: 500 TABLET, FILM COATED ORAL at 08:59

## 2023-01-29 RX ADMIN — ACETAMINOPHEN 650 MG: 325 TABLET, FILM COATED ORAL at 15:30

## 2023-01-29 RX ADMIN — PANTOPRAZOLE SODIUM 40 MG: 40 TABLET, DELAYED RELEASE ORAL at 08:59

## 2023-01-29 RX ADMIN — Medication 12.5 MG: at 19:15

## 2023-01-29 RX ADMIN — OXYCODONE HYDROCHLORIDE 10 MG: 10 TABLET ORAL at 23:38

## 2023-01-29 RX ADMIN — ARIPIPRAZOLE 5 MG: 5 TABLET ORAL at 09:02

## 2023-01-29 RX ADMIN — AMIODARONE HYDROCHLORIDE 400 MG: 200 TABLET ORAL at 08:59

## 2023-01-29 RX ADMIN — OXYCODONE HYDROCHLORIDE 10 MG: 10 TABLET ORAL at 05:47

## 2023-01-29 RX ADMIN — IPRATROPIUM BROMIDE AND ALBUTEROL SULFATE 3 ML: .5; 2.5 SOLUTION RESPIRATORY (INHALATION) at 03:33

## 2023-01-29 RX ADMIN — HEPARIN SODIUM 5000 UNITS: 5000 INJECTION, SOLUTION INTRAVENOUS; SUBCUTANEOUS at 04:32

## 2023-01-29 RX ADMIN — OXYCODONE HYDROCHLORIDE 10 MG: 10 TABLET ORAL at 19:21

## 2023-01-29 RX ADMIN — OXYCODONE HYDROCHLORIDE 10 MG: 10 TABLET ORAL at 10:38

## 2023-01-29 RX ADMIN — ACETAMINOPHEN 650 MG: 325 TABLET, FILM COATED ORAL at 10:38

## 2023-01-29 RX ADMIN — THERA TABS 1 TABLET: TAB at 08:59

## 2023-01-29 RX ADMIN — OXYCODONE HYDROCHLORIDE 10 MG: 10 TABLET ORAL at 01:06

## 2023-01-29 RX ADMIN — ATORVASTATIN CALCIUM 80 MG: 80 TABLET, FILM COATED ORAL at 08:59

## 2023-01-29 RX ADMIN — ASPIRIN 162 MG: 81 TABLET, CHEWABLE ORAL at 08:58

## 2023-01-29 RX ADMIN — AMIODARONE HYDROCHLORIDE 200 MG: 200 TABLET ORAL at 19:15

## 2023-01-29 RX ADMIN — ACETYLCYSTEINE 4 ML: 100 SOLUTION ORAL; RESPIRATORY (INHALATION) at 03:33

## 2023-01-29 RX ADMIN — METHOCARBAMOL 500 MG: 500 TABLET ORAL at 23:38

## 2023-01-29 RX ADMIN — Medication 12.5 MG: at 08:59

## 2023-01-29 RX ADMIN — HEPARIN SODIUM 5000 UNITS: 5000 INJECTION, SOLUTION INTRAVENOUS; SUBCUTANEOUS at 12:40

## 2023-01-29 RX ADMIN — ACETAMINOPHEN 650 MG: 325 TABLET, FILM COATED ORAL at 05:47

## 2023-01-29 RX ADMIN — ACETAMINOPHEN 650 MG: 325 TABLET, FILM COATED ORAL at 01:06

## 2023-01-29 RX ADMIN — ACETAMINOPHEN 650 MG: 325 TABLET, FILM COATED ORAL at 21:24

## 2023-01-29 ASSESSMENT — ACTIVITIES OF DAILY LIVING (ADL)
ADLS_ACUITY_SCORE: 32
ADLS_ACUITY_SCORE: 34
ADLS_ACUITY_SCORE: 32
ADLS_ACUITY_SCORE: 35
ADLS_ACUITY_SCORE: 32
ADLS_ACUITY_SCORE: 34

## 2023-01-29 NOTE — PLAN OF CARE
Temp: 97.4  F (36.3  C) Temp src: Oral BP: 98/73 Pulse: 76   Resp: 16 SpO2: 95 % O2 Device: None (Room air)       Diagnosis: MVR & CABG x2 (1/18) c/b mediastinal hematoma s/p evacuation & vein graft bleeding s/p suture repair  History of HTN, HLD, CAD, AAA s/p repair (2017), PAD, severe MR, T2DM, depression, obesity, former smoker    Neuro: A&Ox 4. No complaints  Cardiac: SR 1st degree  Respiratory: O2 sating >96% on RA  Vitals: VSS, afebrile, Tylenol + oxycodone for incisional pain.   GI/: No GI symptoms. Primofit on due to incontinence.   Skin: Left PIV in place, SL. Sternal incision & LLE graft sites, KAYLYNN   Mobility: Minimal A1 to commode. Walked hallway with walker and wheelchair follow. Patient steady  Rest: Napped in between cares.     PRN: tylenol 650mg x1, oxycodone 10mg x1,    P: Continue to monitor and follow POC. Plan for discharge to ARU when bed available. Notify CVTS with changes.

## 2023-01-29 NOTE — PLAN OF CARE
Temp: 97.5  F (36.4  C) Temp src: Oral BP: 139/82 Pulse: 76   Resp: 18 SpO2: 96 % O2 Device: None (Room air)       Diagnosis: MVR & CABG x2 (1/18) c/b mediastinal hematoma s/p evacuation & vein graft bleeding s/p suture repair  History of HTN, HLD, CAD, AAA s/p repair (2017), PAD, severe MR, T2DM, depression, obesity, former smoker    Neuro: A&Ox 4, denies headache or dizziness.  Cardiac: Tele in place, SR with first degree ABV.  Respiratory: O2 sating >96% on RA  Vitals: VSS, afebrile, c/o sternal & urethral pain, administered PRN tylenol, oxycodone & robaxin.  GI/: Denies nausea. Bowel incontinence x1, administered PRN loperamide. Adequate urine output overnight with PureWick & brief in place.  Skin: Left PIV in place, SL. Sternal incision & LLE graft sites, KALYYNN & WNL.    Mobility: Up A1 to bedside commode x1.   Rest: Slept poorly overnight due to urethral pain.    PRN: tylenol 650mg x3, oxycodone 10mg x3, robaxin 500mg x1, loperamide 2mg x1, melatonin 1mg x1    P: Continue to monitor and follow POC. Plan for discharge to ARU when bed available. Notify CVTS with changes.

## 2023-01-29 NOTE — PLAN OF CARE
"D:  Pt is s/p CABG x2 and MVR on 1/18/23.     I: Monitored and assessed patient status; nursing cares provided.    A:   Neuro: A&Ox4, able to make needs known, cooperative  Cardiac: Sinus Rhythm with 1st Degree AVB, VSS, afebrile  Respiratory: Dyspnea on exertion; on RA until ~1800 when pt reported having trouble \"taking a deep breath\"; pt denied chest pain, 98% on RA--repositioned pt to be upright in bed and placed 2L NC for comfort; pt reported relief within 5 minutes. Also noted is slight congestion in upper airway, nonproductive cough.  GI/: +BS, frequent soft stools. Per pt, when he voids, he tends to also have a BM at the same time. Bowel medications held. Incontinent of urine and stool at times.  Diet/Appetite: Regular Diet, good appetite. BS's in mid-200's prior to lunch and dinner, covered via sliding scale novolog insulin and twice daily metformin, however, may need additional coverage.  Skin: See PCS Flowsheet for surgical incision details.  Pain: Oxycodone x2 for Incisional chest pain with adequate pain control per pt report.  Labs/Lytes: Mg 2, replaced with IV Mg   LDA's: PIV SL'd  Activity: Up to commode, chair, and in joseph with assist of 1, walker, and gait belt.    P: Continue with post-op plan of care. Contact CVTS for questions or concerns.    Emilee Lobato RN   Cardiology    "

## 2023-01-29 NOTE — PROGRESS NOTES
Cardiovascular Surgery Progress Note  01/29/2023         Assessment and Plan:     Gilberto Camilo is a 67 year old male with a PMH of HTN, HLD, Depression, CAD, AAA s/p repair (2017), PAD with claudication, previous smoker, DM Type 2, severe mitral valve regurgitation. Patient is now s/p CABGx2 and MVR on 01/18/23 with Dr. Kim.  On 01/19/23 patient was brought back to the OR with Dr. Kim for high CT output. Tubes had clotted, once milked at bedside he dumped 400mL and within 30 minutes another 350mL. Significant mediasinal hematoma evacuated and some bleeding from vein graft to diagonal artery noted that was fixed with a single prolene suture.    Cardiovascular:   Hx of severe mitral valve regurg and CAD s/p CABGx2 and MVR on 01/18/23  OR take back for high CT output/reexploration of chest, 01/19/23   HLD  S/p AAA repair in 2017  Post-operative A-Fib with competing junctional rhythm, controlled with amio   HD stable overnight in NSR  Most recent echo (1/20/23) demonstrated LVEF 40-45%, LV fx decreased, RV fx mildly reduced  -Developed Afib with junction rhythm on 01/19, amio bolus and infusion given.  -IV amio transitioned to PO on 01/21 at 400mg BID.  -  mg daily  - Atorvastatin 80mg daily  - BB: Started Lopressor 12.5mg BID 01/24  - Diuresis as below  Prolonged QTc first noted on 01/19, resolved  - Repeat/follow up EKG 01/26 prolonged QTc resolved    Chest tubes: removed in ICU on 01/23  TPW: Removed in ICU on 01/22    Pulmonary:  Hypoxemic respiratory failure, resolved  Extubated POD 1 to 5 lpm via NC. Now saturating well on room air  - Pulm hygiene, IS, activity and deep breathing  - Duonebs and mucomyst ordered q6hr while awake 01/24  - BiPAP to be worn overnight, has been refusing overnight as cannot tolerate.     Neurology / MSK:  Acute post-operative pain  Peripheral Neuropathy   Pain well controlled with current regimen:  - Acetaminophen PRN, PO oxycodone PRN, methocarbamol PRN  - PTA  Wellbutrin 300mg daily  - PTA Abilify 5mg daily     / Renal / Fluid / Electrolytes:  AVERY, resolved  BL creat ~ 0.9-1.0, most recent creatinine stable; adequate UOP.   FLUID STATUS: Pre-op weight 247 lbs, weight today 238 lbs; IO inaccurate, unmeasured voids  - incontinent, voiding adequately with condom catheter in place: curbsided urology 01/26 to discuss ongoing incontinence. Recommended: UA to r/o infectious cause, bladder scan to make sure theres no retention, and if both normal follow up referral to urology outpatient. UA negative and no PVRs. resolved  - Diuresis: euvolemic, diurese PRN  - Replete lytes per protocol  - Strict I/O, daily weights  - Avoid/limit nephrotoxins as able    GI / Nutrition:   - Tolerating regular diet   - Continue bowel regimen, last BM 01/28    Endocrine:  Stress induced hyperglycemia   Hgb A1c 7.9% on 01/09/23  - Initially managed on insulin drip postop, transitioned to sliding scale; goal BG <180 for optimal healing  - PTA Metformin resumed 01/26 and diabetes education consult placed.     Infectious Disease:  Stress induced leukocytosis  WBC 11.1, remains afebrile, no signs or symptoms of infection  - Blood cultures obtained for high WBC and fever in ICU, NGTD. On Zosyn for 5 days, completed-01/25.  - Completed perioperative antibiotics  - Continue to monitor fever curve, CBC    Hematology:   Acute blood loss anemia and thrombocytopenia  Hgb/plt stable. no signs or symptoms of active bleeding  - CBC, continue to monitor with AM draws.    Anticoagulation:   -  mg daily  - Subcutaneous heparin while inpatient    MSK/Skin:  Sternotomy  Surgical incision  - Sternal precautions  - Incisional cares per protocol  - Beard shield placed    Prophylaxis:   - Stress ulcer prophylaxis: Pantoprazole 40 mg daily for 30 days  - DVT prophylaxis: Subcutaneous heparin, SCD    Disposition:   - Transferred to  on 01/23 evening  - Therapies recommending discharge to ARU, medically ready  awaiting bed availability for TCU in [place].    Discussed with Dr. Kim.    Elizabeth Kurtz PA-C   Cardiothoracic Surgery   Pager #[phone]  January 28, 2023 at 9:44 AM        Interval History:     No acute events overnight. States pain is well managed on current regimen, slept well. Breathing is stable on room air, working with IS. Tolerating diet, is passing flatus, +BM, no n/v. Denies chest pain, palpitations, dizziness, syncopal symptoms, chills, myalgias, had one occasion of sternal popping, stable on exam.          Physical Exam:     Gen: A&Ox4, NAD  Neuro: Intact with no focal deficits   CV: RRR, normal S1 S2, no murmurs, rubs or gallops. NO JVD  Pulm: diminished bases, saturating well on room air  Abd: nondistended, normal BS, soft, nontender  Ext: mild peripheral edema, no pitting  Incision: clean, dry, intact, no erythema, sternum stable  Tubes/drain sites: dressing clean and dry         Data:    Imaging:  reviewed recent imaging, no acute concerns    No results found for this or any previous visit (from the past 24 hour(s)).     Labs:  Recent Labs   Lab 01/29/23  0735 01/28/23  2055 01/28/23  1641 01/28/23  1155 01/28/23  0618 01/27/23  0909 01/27/23  0534 01/24/23  0905 01/24/23  0619 01/23/23  0831 01/23/23  0257   WBC 11.1*  --   --   --  9.9  --  11.3*   < > 11.5*  --  10.3   HGB 8.9*  --   --   --  9.8*  --  9.0*   < > 8.8*  --  8.1*   MCV 96  --   --   --  99  --  94   < > 93  --  91     --   --   --  340  --  291   < > 172  --  127*     --   --   --  136  --  136   < > 134*  --  135*   POTASSIUM 4.9  --   --   --  4.5  --  4.1   < > 4.1  --  3.9   CHLORIDE 102  --   --   --  101  --  100   < > 97*  --  95*   CO2 24  --   --   --  23  --  27   < > 25  --  29   BUN 21.0  --   --   --  20.3  --  20.4   < > 23.1*  --  21.9   CR 0.90  --   --   --  0.83  --  0.82   < > 0.95  --  1.05   ANIONGAP 10  --   --   --  12  --  9   < > 12  --  11   SARI 9.6  --   --   --  9.7  --  9.0   < > 9.4   --  8.3*   * 130* 220*   < > 138*   < > 138*   < > 150*   < > 128*   ALBUMIN  --   --   --   --   --   --   --   --  3.4*  --  3.2*   PROTTOTAL  --   --   --   --   --   --   --   --  5.9*  --  5.3*   BILITOTAL  --   --   --   --   --   --   --   --  0.5  --  0.5   ALKPHOS  --   --   --   --   --   --   --   --  72  --  61   ALT  --   --   --   --   --   --   --   --  17  --  14   AST  --   --   --   --   --   --   --   --  26  --  24    < > = values in this interval not displayed.      GLUCOSE:   Recent Labs   Lab 01/29/23  0735 01/28/23  2055 01/28/23  1641 01/28/23  1155 01/28/23  0618 01/28/23  0151   * 130* 220* 268* 138* 129*

## 2023-01-30 ENCOUNTER — APPOINTMENT (OUTPATIENT)
Dept: OCCUPATIONAL THERAPY | Facility: CLINIC | Age: 68
DRG: 219 | End: 2023-01-30
Attending: SURGERY
Payer: COMMERCIAL

## 2023-01-30 ENCOUNTER — APPOINTMENT (OUTPATIENT)
Dept: PHYSICAL THERAPY | Facility: CLINIC | Age: 68
DRG: 219 | End: 2023-01-30
Attending: SURGERY
Payer: COMMERCIAL

## 2023-01-30 LAB
ANION GAP SERPL CALCULATED.3IONS-SCNC: 14 MMOL/L (ref 7–15)
BUN SERPL-MCNC: 20.4 MG/DL (ref 8–23)
CALCIUM SERPL-MCNC: 10 MG/DL (ref 8.8–10.2)
CHLORIDE SERPL-SCNC: 100 MMOL/L (ref 98–107)
CREAT SERPL-MCNC: 0.88 MG/DL (ref 0.67–1.17)
DEPRECATED HCO3 PLAS-SCNC: 22 MMOL/L (ref 22–29)
ERYTHROCYTE [DISTWIDTH] IN BLOOD BY AUTOMATED COUNT: 15.2 % (ref 10–15)
GFR SERPL CREATININE-BSD FRML MDRD: >90 ML/MIN/1.73M2
GLUCOSE BLDC GLUCOMTR-MCNC: 133 MG/DL (ref 70–99)
GLUCOSE BLDC GLUCOMTR-MCNC: 140 MG/DL (ref 70–99)
GLUCOSE BLDC GLUCOMTR-MCNC: 152 MG/DL (ref 70–99)
GLUCOSE BLDC GLUCOMTR-MCNC: 152 MG/DL (ref 70–99)
GLUCOSE SERPL-MCNC: 128 MG/DL (ref 70–99)
HCT VFR BLD AUTO: 33.5 % (ref 40–53)
HGB BLD-MCNC: 10.2 G/DL (ref 13.3–17.7)
MCH RBC QN AUTO: 29.1 PG (ref 26.5–33)
MCHC RBC AUTO-ENTMCNC: 30.4 G/DL (ref 31.5–36.5)
MCV RBC AUTO: 96 FL (ref 78–100)
PLATELET # BLD AUTO: 434 10E3/UL (ref 150–450)
POTASSIUM SERPL-SCNC: 5.4 MMOL/L (ref 3.4–5.3)
RBC # BLD AUTO: 3.5 10E6/UL (ref 4.4–5.9)
SODIUM SERPL-SCNC: 136 MMOL/L (ref 136–145)
WBC # BLD AUTO: 11.1 10E3/UL (ref 4–11)

## 2023-01-30 PROCEDURE — 250N000011 HC RX IP 250 OP 636: Performed by: SURGERY

## 2023-01-30 PROCEDURE — 80048 BASIC METABOLIC PNL TOTAL CA: CPT | Performed by: NURSE PRACTITIONER

## 2023-01-30 PROCEDURE — 36415 COLL VENOUS BLD VENIPUNCTURE: CPT | Performed by: SURGERY

## 2023-01-30 PROCEDURE — 85027 COMPLETE CBC AUTOMATED: CPT | Performed by: SURGERY

## 2023-01-30 PROCEDURE — 214N000001 HC R&B CCU UMMC

## 2023-01-30 PROCEDURE — 250N000013 HC RX MED GY IP 250 OP 250 PS 637: Performed by: PHYSICIAN ASSISTANT

## 2023-01-30 PROCEDURE — 250N000013 HC RX MED GY IP 250 OP 250 PS 637: Performed by: SURGERY

## 2023-01-30 PROCEDURE — 97535 SELF CARE MNGMENT TRAINING: CPT | Mod: GO

## 2023-01-30 PROCEDURE — 97116 GAIT TRAINING THERAPY: CPT | Mod: GP | Performed by: PHYSICAL THERAPIST

## 2023-01-30 PROCEDURE — 97530 THERAPEUTIC ACTIVITIES: CPT | Mod: GP | Performed by: PHYSICAL THERAPIST

## 2023-01-30 PROCEDURE — 36415 COLL VENOUS BLD VENIPUNCTURE: CPT | Performed by: NURSE PRACTITIONER

## 2023-01-30 RX ORDER — FUROSEMIDE 20 MG
20 TABLET ORAL ONCE
Status: COMPLETED | OUTPATIENT
Start: 2023-01-30 | End: 2023-01-30

## 2023-01-30 RX ORDER — FUROSEMIDE 20 MG
20 TABLET ORAL DAILY
Status: DISCONTINUED | OUTPATIENT
Start: 2023-01-31 | End: 2023-01-31 | Stop reason: HOSPADM

## 2023-01-30 RX ADMIN — BUPROPION HYDROCHLORIDE 300 MG: 300 TABLET, FILM COATED, EXTENDED RELEASE ORAL at 08:38

## 2023-01-30 RX ADMIN — METFORMIN HYDROCHLORIDE 1000 MG: 500 TABLET, FILM COATED ORAL at 08:38

## 2023-01-30 RX ADMIN — AMIODARONE HYDROCHLORIDE 200 MG: 200 TABLET ORAL at 08:39

## 2023-01-30 RX ADMIN — OXYCODONE HYDROCHLORIDE 10 MG: 10 TABLET ORAL at 17:32

## 2023-01-30 RX ADMIN — THERA TABS 1 TABLET: TAB at 08:38

## 2023-01-30 RX ADMIN — ASPIRIN 162 MG: 81 TABLET, CHEWABLE ORAL at 08:38

## 2023-01-30 RX ADMIN — OXYCODONE HYDROCHLORIDE 10 MG: 10 TABLET ORAL at 13:21

## 2023-01-30 RX ADMIN — HEPARIN SODIUM 5000 UNITS: 5000 INJECTION, SOLUTION INTRAVENOUS; SUBCUTANEOUS at 04:41

## 2023-01-30 RX ADMIN — HEPARIN SODIUM 5000 UNITS: 5000 INJECTION, SOLUTION INTRAVENOUS; SUBCUTANEOUS at 19:36

## 2023-01-30 RX ADMIN — ACETAMINOPHEN 650 MG: 325 TABLET, FILM COATED ORAL at 17:32

## 2023-01-30 RX ADMIN — OXYCODONE HYDROCHLORIDE 10 MG: 10 TABLET ORAL at 04:28

## 2023-01-30 RX ADMIN — Medication 12.5 MG: at 19:35

## 2023-01-30 RX ADMIN — ATORVASTATIN CALCIUM 80 MG: 80 TABLET, FILM COATED ORAL at 08:38

## 2023-01-30 RX ADMIN — Medication 12.5 MG: at 08:40

## 2023-01-30 RX ADMIN — PANTOPRAZOLE SODIUM 40 MG: 40 TABLET, DELAYED RELEASE ORAL at 08:39

## 2023-01-30 RX ADMIN — HEPARIN SODIUM 5000 UNITS: 5000 INJECTION, SOLUTION INTRAVENOUS; SUBCUTANEOUS at 11:42

## 2023-01-30 RX ADMIN — METFORMIN HYDROCHLORIDE 1000 MG: 500 TABLET, FILM COATED ORAL at 17:32

## 2023-01-30 RX ADMIN — ACETAMINOPHEN 650 MG: 325 TABLET, FILM COATED ORAL at 08:39

## 2023-01-30 RX ADMIN — OXYCODONE HYDROCHLORIDE 10 MG: 10 TABLET ORAL at 21:52

## 2023-01-30 RX ADMIN — ACETAMINOPHEN 650 MG: 325 TABLET, FILM COATED ORAL at 21:52

## 2023-01-30 RX ADMIN — FUROSEMIDE 20 MG: 20 TABLET ORAL at 17:32

## 2023-01-30 RX ADMIN — OXYCODONE HYDROCHLORIDE 10 MG: 10 TABLET ORAL at 08:39

## 2023-01-30 RX ADMIN — ACETAMINOPHEN 650 MG: 325 TABLET, FILM COATED ORAL at 04:28

## 2023-01-30 RX ADMIN — AMIODARONE HYDROCHLORIDE 200 MG: 200 TABLET ORAL at 19:35

## 2023-01-30 RX ADMIN — ACETAMINOPHEN 650 MG: 325 TABLET, FILM COATED ORAL at 13:21

## 2023-01-30 RX ADMIN — ARIPIPRAZOLE 5 MG: 5 TABLET ORAL at 08:38

## 2023-01-30 ASSESSMENT — ACTIVITIES OF DAILY LIVING (ADL)
ADLS_ACUITY_SCORE: 34

## 2023-01-30 NOTE — PROGRESS NOTES
Care Management Follow Up    Length of Stay (days): 12    Expected Discharge Date: 01/31/2023     Concerns to be Addressed: Discharge planning  Patient plan of care discussed at interdisciplinary rounds: Yes    Anticipated Discharge Disposition: Home     Anticipated Discharge Services: OP CR  Anticipated Discharge DME: SATISH (pt will issue if needed)     Education Provided on the Discharge Plan: Yes  Patient/Family in Agreement with the Plan: Yes    Additional Information:  Received update from PT/OT that pt is now safe for discharge to home with assist and home care vs OP CR. Pt states there are two hospitals within 10 mins of home ([facility] and [place]) and he would be open to attending outpatient cardiac rehab at either of these locations. OP CR referral in place, confirmed that [facility] ([facility]) has OP CR. Pt states that he has called family and his brother will come pick him up tomorrow late morning.     CC will continue to monitor patient's medical condition and progress towards discharge.  Sofy Boggs RN BSN  6C Unit Care Coordinator  Phone number: [phone]  Pager: [phone]

## 2023-01-30 NOTE — PLAN OF CARE
MVR & CABG x2 (1/18) c/b mediastinal hematoma s/p evacuation & vein graft bleeding s/p suture repair  History of HTN, HLD, CAD, AAA s/p repair (2017), PAD, severe MR, T2DM, depression, obesity, former smoker.  Shift 15:00-23:30  Neuro: A&O x 4. [place].  Resp: Lungs diminished with sats in the 90s. Some wheezing with movement and PÉREZ.CPAP at noc.  CV: SR 60s-90s. VSS.   GI: Abdomen obese/distended. Attempting to have BMs with no success. Was told he had been having diarrhea lately. Ate fair/well.  : Attends on, incontinent of urine. Voided on the commode x 2. Penis reddened.  Skin: Mid sternal and L leg incisions healing. Wound cleanser used to clean incisions.  Pain: Tylenol and oxycodone given x 1 for pain 5/10 with some relief.  Plan: Monitor and assist as needed. CVTS with issues.Waiting for ARU bed availability in [place].

## 2023-01-30 NOTE — PROGRESS NOTES
Care Management Follow Up    Length of Stay (days): 12    Expected Discharge Date: 01/31/2023     Concerns to be Addressed:  discharge planning     Patient plan of care discussed at interdisciplinary rounds: Yes    Anticipated Discharge Disposition: ARU vs. Home (Per therapies, pt is on the cusp of progressing to a home discharge so they'll see the pt today and will update discharge recs)     Anticipated Discharge Services:  ARU or home therapy services  Anticipated Discharge DME:  n/a    Patient/family educated on Medicare website which has current facility and service quality ratings:  yes  Education Provided on the Discharge Plan:  yes  Patient/Family in Agreement with the Plan:  yes    Referrals Placed by CM/SW:      [facility]  [address] [place] [id number]  Admissions: [phone] (Ruth Castro)  Fax: [phone]  - 1/30: YUVAL left a VM with admissions at 9:54am. Ruth called the SW back and requested updated provider notes & therapy notes. Both PT & OT have worked with the pt this morning and both updated the RNCC + SW that the pt can discharge home. So YUVAL left a VM with Ruth in admissions at 11:38am informing her.     Private pay costs discussed: Not applicable    Additional Information:  YUVAL is following for discharge planning.    Now the pt is a home discharge, 6C RNCC to follow.    ___________________    MAURILIO Dyer, LGSW  6C   [facility]  Phone: [phone]  Pager: [phone]

## 2023-01-30 NOTE — PROGRESS NOTES
Cardiovascular Surgery Progress Note  01/30/2023         Assessment and Plan:     Gilberto Camilo is a 67 year old male with a PMH of HTN, HLD, Depression, CAD, AAA s/p repair (2017), PAD with claudication, previous smoker, DM Type 2, severe mitral valve regurgitation. Patient is now s/p CABGx2 and MVR on 01/18/23 with Dr. Kim.  On 01/19/23 patient was brought back to the OR with Dr. Kim for high CT output. Tubes had clotted, once milked at bedside he dumped 400mL and within 30 minutes another 350mL. Significant mediasinal hematoma evacuated and some bleeding from vein graft to diagonal artery noted that was fixed with a single prolene suture.    Cardiovascular:   Hx of severe mitral valve regurg and CAD s/p CABGx2 and MVR on 01/18/23  OR take back for high CT output/reexploration of chest, 01/19/23   HLD  S/p AAA repair in 2017  Post-operative A-Fib with competing junctional rhythm, controlled with amio   HD stable overnight in NSR  Most recent echo (1/20/23) demonstrated LVEF 40-45%, LV fx decreased, RV fx mildly reduced  -Developed Afib with junction rhythm on 01/19, amio bolus and infusion given.  -IV amio transitioned to PO on 01/21 at 400mg BID.  -  mg daily  - Atorvastatin 80mg daily  - BB: Started Lopressor 12.5mg BID 01/24  - Diuresis as below  Prolonged QTc first noted on 01/19, resolved  - Repeat/follow up EKG 01/26 prolonged QTc resolved    Chest tubes: removed in ICU on 01/23  TPW: Removed in ICU on 01/22    Pulmonary:  Hypoxemic respiratory failure, resolved  Extubated POD 1 to 5 lpm via NC. Now saturating well on room air  - Pulm hygiene, IS, activity and deep breathing  - Duonebs and mucomyst ordered q6hr while awake 01/24  - BiPAP to be worn overnight, has been refusing overnight as cannot tolerate.     Neurology / MSK:  Acute post-operative pain  Peripheral Neuropathy   Pain well controlled with current regimen:  - Acetaminophen PRN, PO oxycodone PRN, methocarbamol PRN  - PTA  Wellbutrin 300mg daily  - PTA Abilify 5mg daily     / Renal / Fluid / Electrolytes:  AVERY, resolved  BL creat ~ 0.9-1.0, most recent creatinine stable; adequate UOP.   FLUID STATUS: Pre-op weight 247 lbs, weight today 238 lbs; IO inaccurate, unmeasured voids  - incontinent, voiding adequately with condom catheter in place: curbsided urology 01/26 to discuss ongoing incontinence. Recommended: UA to r/o infectious cause, bladder scan to make sure theres no retention, and if both normal follow up referral to urology outpatient. UA negative and no PVRs. resolved  - Diuresis: lasix 20 mg po today, diureses PRN  - K 5.4, give lasix and recheck.   - Replete lytes per protocol  - Strict I/O, daily weights  - Avoid/limit nephrotoxins as able    GI / Nutrition:   - Tolerating regular diet   - Continue bowel regimen, last BM 01/28    Endocrine:  Stress induced hyperglycemia   Hgb A1c 7.9% on 01/09/23  - Initially managed on insulin drip postop, transitioned to sliding scale; goal BG <180 for optimal healing  - PTA Metformin resumed 01/26 and diabetes education consult placed.     Infectious Disease:  Stress induced leukocytosis  WBC 11.1, remains afebrile, no signs or symptoms of infection  - Blood cultures obtained for high WBC and fever in ICU, NGTD. On Zosyn for 5 days, completed-01/25.  - Completed perioperative antibiotics  - Continue to monitor fever curve, CBC    Hematology:   Acute blood loss anemia and thrombocytopenia  Hgb/plt stable. no signs or symptoms of active bleeding  - CBC, continue to monitor with AM draws.    Anticoagulation:   -  mg daily  - Subcutaneous heparin while inpatient    MSK/Skin:  Sternotomy  Surgical incision  - Sternal precautions  - Incisional cares per protocol  - Beard shield placed    Prophylaxis:   - Stress ulcer prophylaxis: Pantoprazole 40 mg daily for 30 days  - DVT prophylaxis: Subcutaneous heparin, SCD    Disposition:   - Transferred to  on 01/23 evening  - Therapies  recommending discharge to home. Patient to discharge home tomorrow at 10 am.     Discussed with Dr. Kim.    Akila Vasquez PA-C  Cardiothoracic Surgery  Pager [phone]  January 30, 2023          Interval History:     No acute events overnight. States pain is well managed on current regimen, slept well. Breathing is stable on room air, working with IS. Tolerating diet, is passing flatus, +BM, no n/v. Denies chest pain, palpitations, dizziness, syncopal symptoms, chills, myalgias, had one occasion of sternal popping, stable on exam.          Physical Exam:     Gen: A&Ox4, NAD  Neuro: Intact with no focal deficits   CV: RRR, normal S1 S2, no murmurs, rubs or gallops. NO JVD  Pulm: diminished bases, saturating well on room air  Abd: distended, firm, normal BS, soft, nontender  Ext: trace peripheral edema, no pitting  Incision: clean, dry, intact, no erythema, sternum stable  Tubes/drain sites: dressing clean and dry         Data:    Imaging:  reviewed recent imaging, no acute concerns    No results found for this or any previous visit (from the past 24 hour(s)).     Labs:  Recent Labs   Lab 01/30/23  1205 01/30/23  0917 01/30/23  0843 01/30/23  0738 01/29/23  0954 01/29/23  0735 01/28/23  1155 01/28/23  0618 01/24/23  0905 01/24/23  0619   WBC  --  11.1*  --   --   --  11.1*  --  9.9   < > 11.5*   HGB  --  10.2*  --   --   --  8.9*  --  9.8*   < > 8.8*   MCV  --  96  --   --   --  96  --  99   < > 93   PLT  --  434  --   --   --  379  --  340   < > 172   NA  --   --   --  136  --  136  --  136   < > 134*   POTASSIUM  --   --   --  5.4*  --  4.9  --  4.5   < > 4.1   CHLORIDE  --   --   --  100  --  102  --  101   < > 97*   CO2  --   --   --  22  --  24  --  23   < > 25   BUN  --   --   --  20.4  --  21.0  --  20.3   < > 23.1*   CR  --   --   --  0.88  --  0.90  --  0.83   < > 0.95   ANIONGAP  --   --   --  14  --  10  --  12   < > 12   SARI  --   --   --  10.0  --  9.6  --  9.7   < > 9.4   *  --   140* 128*   < > 146*   < > 138*   < > 150*   ALBUMIN  --   --   --   --   --   --   --   --   --  3.4*   PROTTOTAL  --   --   --   --   --   --   --   --   --  5.9*   BILITOTAL  --   --   --   --   --   --   --   --   --  0.5   ALKPHOS  --   --   --   --   --   --   --   --   --  72   ALT  --   --   --   --   --   --   --   --   --  17   AST  --   --   --   --   --   --   --   --   --  26    < > = values in this interval not displayed.      GLUCOSE:   Recent Labs   Lab 01/30/23  1205 01/30/23  0843 01/30/23  0738 01/29/23  2157 01/29/23  2118 01/29/23  1714   * 140* 128* 172* 158* 142*

## 2023-01-30 NOTE — PLAN OF CARE
Temp: 97.6  F (36.4  C) Temp src: Oral BP: 132/81 Pulse: 78   Resp: 12 SpO2: 98 % O2 Device: None (Room air)       Diagnosis: MVR & CABG x2 (1/18) c/b mediastinal hematoma s/p evacuation & vein graft bleeding s/p suture repair  History of HTN, HLD, CAD, AAA s/p repair (2017), PAD, severe MR, T2DM, depression, obesity, former smoker    Neuro: A&Ox 4, denies headache or dizziness.  Cardiac: Tele in place, SR with first degree ABV.  Respiratory: O2 sating >97% on RA  Vitals: VSS, afebrile, c/o generalized pain, administered PRN tylenol, oxycodone & robaxin.  GI/: Denies nausea. No BM overnight. Urinary incontinence x2, only brief in place d/t penile meatus redness/irritation .  Skin: Left PIV in place, SL. Sternal incision & LLE graft sites, KAYLYNN & WNL.    Mobility: Up A1.   Rest: Administered PRN melatonin per pt request to promote rest, slept ok overnight.    PRN: tylenol 650mg x1, oxycodone 10mg x2, robaxin 500mg x1, melatonin 1mg x1    P: Continue to monitor and follow POC. Plan for discharge to ARU when bed available. Notify CVTS with changes.

## 2023-01-30 NOTE — DISCHARGE SUMMARY
[facility]   Cardiothoracic Surgery Hospital Discharge Summary     Gilberto Camilo N# [id number]   Age: 67 year old YOB: 1955     Admitting Physician:  Shen Navarrete MD  Discharge Physician:  Vanessa Guido PA-C  Primary Care Physician:        Rock Basiloi     DATE OF ADMISSION: 1/18/2023      DATE OF DISCHARGE: January 31, 2023     Admit Wt: 247 lbs   Discharge Wt: 238 lbs          Primary Diagnoses:   1. Severe mitral valve repair and Coronary artery disease   2. S/p CABG X2 and MVR 1/18/2023  3. S/p re-exploration of the chest for bleeding 1/19/2023  4. Post-op atrial fibrillation   5. Post-op AVERY - resolved           Secondary Diagnoses:   1. S/P AAA repair 2017  2. Acute respiratory failure due to mediastinal bleeding - resolved   3. Peripheral neuropathy   4. Type II DM     PROCEDURES PERFORMED:   Date: 1/18/2023.  Surgeon: Dr. Linda Kim  NAME OF OPERATION:  Mitral valve repair with [place]-Reynaldo NeoChords to the flail P2 segment, implantation of a 34 mm Avilez Physio II annuloplasty ring, coronary artery bypass grafting x2 with reverse saphenous vein graft to the posterior ascending artery, reverse saphenous vein graft to the diagonal artery, endoscopic vein harvest from the left lower extremity, intraoperative TRENT.    OPERATIVE FINDINGS:  The patient had an overall normal LV systolic function.  He had severe left atrial enlargement.  He had an obvious flail P2 segment with multiple ruptured chordae, and a small area of calcification along the base of the posterior leaflet.  No other abnormalities were seen.  The left greater saphenous vein was a good quality conduit, measuring 3-4 mm in diameter.  Posterior descending artery had mild to moderate disease and the probe size was 1.5 mm.  Diagonal artery also had mild to moderate disease and the probe size was 1.5 mm as well.    Date: 1/19/2023. Surgeon: Dr. Linda Kim   NAME OF OPERATION:   Emergent reexploration of the chest.  DESCRIPTION OF PROCEDURE:  After informed consent was obtained, the patient was brought down to the operating room emergently and was placed on the OR table in supine position.  Intravenous and intra-arterial access had already been obtained.  While monitoring his blood pressure and EKG tracing, he was anesthetized and intubated using a single lumen endotracheal tube.  His entire chest, abdomen and groins were then prepped and draped in the usual sterile fashion.  Ancef was given IV prior to skin incision.  Previous sternotomy incision was reopened and the sternal wires removed.  The sternal edges retracted laterally.  A significant amount of mediastinal hematoma was evacuated, and all cardiotomy sites were examined.  There was brisk bleeding from the vein graft to the diagonal artery about a centimeter distal to the proximal anastomosis.  This could have been either a clip that had blown off or it possibly could have been bleeding from de-airing site from the original operation.  The bleeding was fixed by placing a single figure-of-eight 7-0 Prolene suture to the vein graft.  The mediastinum was irrigated again and after hemostasis was confirmed again, the sternum was reapproximated using multiple interrupted single and double wires.  The incision was closed in layers of running Vicryl suture.  The skin was closed using 3-0 Vicryl and was sealed using Dermabond.       INTRAOPERATIVE COMPLICATIONS:  none    PATHOLOGY RESULTS:  none     CULTURE RESULTS:  none    CONSULTS:    1. PT/OT  2. Care Coordinator     BRIEF HISTORY OF ILLNESS:  Gilberto Camilo is a 67 year old male with a PMH of HTN, HLD, Depression, CAD, AAA s/p repair (2017), PAD with claudication, previous smoker, DM Type 2, severe mitral valve regurgitation. Patient is now s/p CABGx2 and MV repair on 01/18/23 with Dr. Kim.    HOSPITAL COURSE:   Mr. Camilo underwent the above-named procedures. He tolerated the  operation well and postoperatively was admitted to the CVICU. On 01/19/23 he was brought back to the OR with Dr. Kim for high CT output. Tubes had clotted, once milked at bedside he dumped 400mL and within 30 minutes another 350mL. Significant mediasinal hematoma was evacuated and some bleeding from vein graft to diagonal artery noted that was fixed with a single prolene suture. He was extubated on POD # 1 to 5 lpm via NC with neuro status intact.  His ICU stay was complicated by post-op bleeding as noted above, post-op atrial fib requiring IV amiodarone transitioned to po loading dose 1/21. He also had hypoxia with respiratory failure requiring duonebs, mucomyst, and Bipap when tolerated.        He was transferred to the post-surgical telemetry unit on POD #5. His hospital course progressed as expected. His TPW were removed POD# 4 and chest tubes were removed POD #5.  He had AVERY post-op with peak Creat of 1.74 that resolved. He was given IV diuresis intially, but this was stopped POD# 5 and he has been auto diuresing. He also had some urinary incontinence, a UA work up and PVR were checked. UA was negative and his incontinence resolved.     His rhythm stabilized after being on Amiodarone and he will discharge with a tapering dose along with low dose BB. He was initially found to have a prolonged QTc, but this resolved on recheck EKG POD# 8. He was not started on anticoagulation. He will discharge on  mg daily and Atorvastatin 80 mg daily. He will start a low dose ACE Inhibitor for his decreased LVEF: Lisinopril 2.5mg daily started.     Patient had post-operative hyperglycemia, and has a history of diabetes mellitus Type 2 as well. Patient will continue his PTA Metformin at discharge and will not have additional insulin needs.     Prior to discharge, his pain was controlled well, he was able to perform most ADLs and ambulate without difficulty, and had full return of bowel and bladder function.  On  January 31, 2023, he was discharged to home in stable condition.    Patient discharged on aspirin:  Yes 162 mg  Patient discharged on a statin: Yes Lipitor 80 mg daily   Patient discharged on beta blocker: Yes Metoprolol   Patient discharged on ACE Inhibitor/ARB: Yes, lisinopril 2.5mg started         Discharge Disposition:     Discharged to home            Condition on Discharge:     Discharge condition: Stable   Discharge vitals: Blood pressure 109/72, pulse 92, temperature 97.7  F (36.5  C), temperature source Oral, resp. rate 16, height 1.829 m (6'), weight 108.2 kg (238 lb 9.6 oz), SpO2 93 %.     Code status on discharge: Full Code     Vitals:    01/29/23 0430 01/30/23 0428 01/31/23 0005   Weight: 108 kg (238 lb 1.6 oz) 108.4 kg (238 lb 14.4 oz) 108.2 kg (238 lb 9.6 oz)       DAY OF DISCHARGE PHYSICAL EXAM:    Blood pressure 109/72, pulse 92, temperature 97.7  F (36.5  C), temperature source Oral, resp. rate 16, height 1.829 m (6'), weight 108.2 kg (238 lb 9.6 oz), SpO2 93 %.  Vitals:    01/29/23 0430 01/30/23 0428 01/31/23 0005   Weight: 108 kg (238 lb 1.6 oz) 108.4 kg (238 lb 14.4 oz) 108.2 kg (238 lb 9.6 oz)      Weight; -9 lbs since admit and trending down.     Gen: A&Ox4, NAD  Neuro: no focal deficits   CV: RRR, normal S1 S2, no murmurs, rubs or gallops. NO JVD  Pulm: CTA, no wheezing or rhonchi, normal breathing on RA  Abd: nondistended, normal BS, soft, nontender  Ext: mild peripheral edema, no pitting  Incision: clean, dry, intact, no erythema, sternum stable  Tubes/drain sites: dressing clean and dry    LABS  BMP  Recent Labs   Lab 01/31/23  0746 01/31/23  0745 01/31/23  0631 01/30/23  2137 01/30/23  1718 01/30/23  0843 01/30/23  0738 01/29/23  0954 01/29/23  0735 01/28/23  1155 01/28/23  0618   NA  --   --  133*  --   --   --  136  --  136  --  136   POTASSIUM 4.7  --  5.9*  --   --   --  5.4*  --  4.9  --  4.5   CHLORIDE  --   --  97*  --   --   --  100  --  102  --  101   SARI  --   --  9.5  --   --    --  10.0  --  9.6  --  9.7   CO2  --   --  27  --   --   --  22  --  24  --  23   BUN  --   --  25.9*  --   --   --  20.4  --  21.0  --  20.3   CR  --   --  0.97  --   --   --  0.88  --  0.90  --  0.83   GLC  --  109* 115* 133* 152*   < > 128*   < > 146*   < > 138*    < > = values in this interval not displayed.     CBC  Recent Labs   Lab 01/31/23  0631 01/30/23  0917 01/29/23  0735 01/28/23  0618   WBC 11.5* 11.1* 11.1* 9.9   RBC 3.14* 3.50* 3.11* 3.31*   HGB 9.3* 10.2* 8.9* 9.8*   HCT 29.2* 33.5* 29.9* 32.9*   MCV 93 96 96 99   MCH 29.6 29.1 28.6 29.6   MCHC 31.8 30.4* 29.8* 29.8*   RDW 15.1* 15.2* 15.1* 14.8   * 434 379 340     INR  No lab results found in last 7 days.   Hepatic Panel  No lab results found in last 7 days.  Recent Labs   Lab 01/31/23  0745 01/31/23  0631 01/30/23  2137 01/30/23  1718 01/30/23  1205 01/30/23  0843   * 115* 133* 152* 152* 140*       ECHOCARDIOGRAM, 1/20/2023- Interpretation Summary  S/P Mitral valve repair with [place]-Reynaldo NeoChords to the flail P2 segment,  implantation of a 34 mm Avilez Physio II annuloplasty ring 1/17/2023. Trace  mitral insufficiency is present.  Technically difficult study.  Left ventricular function is decreased. The ejection fraction is 40-45% (mildly reduced).  Difficult to assesss RV function. Global right ventricular function probably is mildly reduced.  No pericardial effusion is present.     This study was compared with the study from 11/11/2022. S/P Mitral valve  repair for severe MR. LVEF declined mildly.      CXR1/27/2023- IMPRESSION:    1. Similar bilateral pulmonary opacities opacities, likely  atelectasis/edema versus infection.  2. Stable postsurgical chest.      DISCHARGE INSTRUCTIONS:  Avoid lifting anything greater than ten pounds for 6 weeks after surgery and then less than 20 pounds for an additional 6 weeks. Do not reach backwards or use arms to push out of chair. Do not let people pull on your arms to assist with standing. Avoid  twisting or reaching too far across your body.  Avoid strenuous activities such as bowling, vacuuming, raking, shoveling, golf or tennis for 12 weeks after your surgery. It is okay to resume sex if you feel comfortable in doing so. You may have to try different positions with your partner.  Splint your chest incision by hugging a pillow or bringing your arms across your chest when coughing or sneezing. Please try to sleep on your back for the first 4-6 weeks to avoid extra stress on your sternum (breastbone) while it is healing.     No driving for 4 weeks after surgery or while on pain medication.       Shower or wash your incisions twice daily with soap and water (or as instructed), pat dry. Keep wound clean and dry, showers are okay after discharge, but don't let spray hit directly on incision. No baths or swimming for 1 month. Cover chest tube sites with gauze until they stop draining, then leave open to air. It is not abnormal for chest tube sites to drain yellowish/clear fluid for up to 2-3 weeks after surgery.   Watch for signs of infection: increased redness, tenderness, warmth or any drainage from sternum incision.  Also a temperature > 100.5 F or chills. Call your surgeon or primary care provider's office immediately. Remove any skin glue left on incisions after 10-14 days. This will not affect your incision and can speed up healing.    Exercise is very important in your recovery. Please follow the guidelines set up for you in your cardiac rehab classes at the hospital. If outpatient cardiac rehab was ordered for you, we highly recommend you participate. If you have problems arranging your cardiac rehab, please call [phone] for all locations, with the exception of [place], please call [phone] and [place], please call [phone].    Avoid sitting for prolonged periods of time, try to walk every hour during the day. If you have a leg incision, elevate your leg often when you are not  walking.    Check your weight when you get home from the hospital and continue to check it daily through your recovery for at least a month. If you notice a weight gain of 2-3 pounds in a week, notify your primary care physician, cardiologist or surgeon.    Bowel activity may be slow after surgery. If necessary, you may take an over the counter laxative such as Milk of Magnesia or Miralax. You may have stool softeners prescribed (docusate sodium, Senokot). We recommend using stool softeners while using narcotics for pain (oxycodone/percocet, hydrocodone/vicodin, hydromorphone/dilaudid).      Wean OFF of narcotics (oxycodone, dilaudid, hydrocodone) as soon as possible. You should continue taking acetaminophen as long as you have any surgical pain as the first choice for pain control and add narcotics as necessary for pain to be tolerable.      DENTAL VISITS AFTER SURGERY  If you have had your heart valve repaired or replaced, we do not recommend having any dental work done for 6 months and you will need to take an antibiotic prior to dental visits from now on.  Please notify your dentist before any procedure for the proper treatment needed. The antibiotic is taken by mouth one hour prior to visit. This includes routine cleanings.    DO NOT SMOKE.  IF YOU NEED HELP QUITTING, PLEASE TALK WITH YOUR CARDIOLOGIST OR PRIMARY DOCTOR.    REGARDING PRESCRIPTION REFILLS.  If you need a refill on your pain medication contact us to discuss your pain and a possible one time refill.   All other medications will be adjusted, discontinued and re-filled by your primary care physician and/or your cardiologist as they were prior to your surgery. We have given you enough for one to three month with possibly one refill.    POST-OPERATIVE CLINIC VISITS  You have a follow up visit with CVTS Surgery Advance Care Practitioners in two weeks at the [facility].  Office will contact you. You will then return to the care  of your primary provider and your cardiologist. Future medication refills should come from your PCP or Cardiologist.   You should see your primary care provider in 2-4 weeks after discharge.   It is important to see your cardiologist about 4-6 weeks after discharge.    If you do not hear from a  in 7 days, please call [phone] (choose option 1) and request to be seen with a general cardiologist or someone that you have seen in the past.   If there is a need to return to see CT Surgery, please call our  Krys Morales at [phone].     PRE-ADMISSION MEDICATIONS:  No current facility-administered medications on file prior to encounter.  diphenhydrAMINE HCl (BENADRYL ALLERGY PO), Take by mouth as needed  loratadine (CLARITIN) 10 MG tablet, Take 10 mg by mouth every morning  omeprazole (PRILOSEC) 20 MG DR capsule, TAKE 1 CAPSULE(20 MG) BY MOUTH EVERY MORNING (Patient taking differently: Take 20 mg by mouth every morning TAKE 1 CAPSULE(20 MG) BY MOUTH EVERY MORNING)  vitamin C (ASCORBIC ACID) 1000 MG TABS, Take 1,000 mg by mouth every morning         DISCHARGE MEDICATIONS:      Review of your medicines      START taking      Dose / Directions   acetaminophen 325 MG tablet  Commonly known as: TYLENOL  Used for: S/P CABG (coronary artery bypass graft), S/P MVR (mitral valve replacement)      Dose: 650 mg  Take 2 tablets (650 mg) by mouth every 4 hours as needed for other (For optimal non-opioid multimodal pain management to improve pain control.)  Quantity: 100 tablet  Refills: 1     amiodarone 200 MG tablet  Commonly known as: PACERONE  Used for: S/P CABG (coronary artery bypass graft), S/P MVR (mitral valve replacement)      Start taking on: January 31, 2023  Take 1 tablet (200 mg) by mouth 2 times daily for 5 days, THEN 1 tablet (200 mg) daily for 30 days.  Quantity: 40 tablet  Refills: 0     aspirin 81 MG chewable tablet  Commonly known as: ASA  Used for: S/P CABG (coronary artery bypass graft),  S/P MVR (mitral valve replacement)  Replaces: aspirin 81 MG EC tablet      Dose: 162 mg  2 tablets (162 mg) by Oral or NG Tube route daily  Quantity: 60 tablet  Refills: 3     furosemide 20 MG tablet  Commonly known as: LASIX  Used for: S/P CABG (coronary artery bypass graft), S/P MVR (mitral valve replacement)      Dose: 20 mg  Start taking on: February 1, 2023  Take 1 tablet (20 mg) by mouth daily for 10 days  Quantity: 10 tablet  Refills: 0     metFORMIN 1000 MG tablet  Commonly known as: GLUCOPHAGE  Used for: S/P CABG (coronary artery bypass graft), S/P MVR (mitral valve replacement)  Replaces: metFORMIN 500 MG 24 hr tablet      Dose: 1,000 mg  Take 1 tablet (1,000 mg) by mouth 2 times daily (with meals)  Quantity: 60 tablet  Refills: 3     methocarbamol 500 MG tablet  Commonly known as: ROBAXIN  Used for: S/P CABG (coronary artery bypass graft), S/P MVR (mitral valve replacement)      Dose: 500 mg  1 tablet (500 mg) by Oral or Feeding Tube route every 6 hours as needed for muscle spasms  Quantity: 30 tablet  Refills: 0     oxyCODONE 5 MG tablet  Commonly known as: ROXICODONE  Used for: S/P CABG (coronary artery bypass graft), S/P MVR (mitral valve replacement)      Dose: 5 mg  Take 1 tablet (5 mg) by mouth every 4 hours as needed for severe pain (7-10)  Quantity: 20 tablet  Refills: 0     pantoprazole 40 MG EC tablet  Commonly known as: PROTONIX  Used for: S/P CABG (coronary artery bypass graft), S/P MVR (mitral valve replacement)      Dose: 40 mg  Start taking on: February 1, 2023  Take 1 tablet (40 mg) by mouth daily for 20 days  Quantity: 20 tablet  Refills: 0     polyethylene glycol 17 g packet  Commonly known as: MIRALAX  Used for: S/P CABG (coronary artery bypass graft), S/P MVR (mitral valve replacement)      Dose: 17 g  Take 17 g by mouth daily as needed for constipation  Quantity: 30 packet  Refills: 0     senna-docusate 8.6-50 MG tablet  Commonly known as: SENOKOT-S/PERICOLACE  Used for: S/P CABG  (coronary artery bypass graft), S/P MVR (mitral valve replacement)      Dose: 2 tablet  2 tablets by Oral or Feeding Tube route 2 times daily as needed for constipation  Quantity: 60 tablet  Refills: 1        CONTINUE these medicines which may have CHANGED, or have new prescriptions. If we are uncertain of the size of tablets/capsules you have at home, strength may be listed as something that might have changed.      Dose / Directions   ARIPiprazole 5 MG tablet  Commonly known as: ABILIFY  This may have changed:     how much to take    how to take this    when to take this    additional instructions  Used for: S/P CABG (coronary artery bypass graft), S/P MVR (mitral valve replacement)      Dose: 5 mg  1 tablet (5 mg) by Oral or Feeding Tube route every morning  Quantity: 30 tablet  Refills: 3     atorvastatin 80 MG tablet  Commonly known as: LIPITOR  This may have changed:     how much to take    how to take this    when to take this    additional instructions  Used for: S/P CABG (coronary artery bypass graft), S/P MVR (mitral valve replacement)      Dose: 80 mg  Take 1 tablet (80 mg) by mouth every morning  Quantity: 30 tablet  Refills: 3     buPROPion 300 MG 24 hr tablet  Commonly known as: WELLBUTRIN XL  This may have changed: when to take this  Used for: S/P CABG (coronary artery bypass graft), S/P MVR (mitral valve replacement)      Dose: 300 mg  Take 1 tablet (300 mg) by mouth daily  Quantity: 30 tablet  Refills: 3     lisinopril 2.5 MG tablet  Commonly known as: ZESTRIL  This may have changed:     medication strength    how much to take  Used for: S/P CABG (coronary artery bypass graft)      Dose: 2.5 mg  Take 1 tablet (2.5 mg) by mouth daily  Quantity: 30 tablet  Refills: 0     multivitamin, therapeutic Tabs tablet  This may have changed:     how much to take    how to take this  Used for: S/P CABG (coronary artery bypass graft), S/P MVR (mitral valve replacement)      Dose: 1 tablet  1 tablet by Oral or  Feeding Tube route every morning  Quantity: 30 tablet  Refills: 3     omeprazole 20 MG DR capsule  Commonly known as: priLOSEC  This may have changed: See the new instructions.  Used for: Gastroesophageal reflux disease, unspecified whether esophagitis present      TAKE 1 CAPSULE(20 MG) BY MOUTH EVERY MORNING  Quantity: 90 capsule  Refills: 3        CONTINUE these medicines which have NOT CHANGED      Dose / Directions   BENADRYL ALLERGY PO      Take by mouth as needed  Refills: 0     loratadine 10 MG tablet  Commonly known as: CLARITIN      Dose: 10 mg  Take 10 mg by mouth every morning  Refills: 0     metoprolol succinate ER 25 MG 24 hr tablet  Commonly known as: TOPROL XL  Used for: S/P CABG (coronary artery bypass graft), S/P MVR (mitral valve replacement)      Dose: 25 mg  Take 1 tablet (25 mg) by mouth every morning  Quantity: 30 tablet  Refills: 3     vitamin C 1000 MG Tabs  Commonly known as: ASCORBIC ACID      Dose: 1,000 mg  Take 1,000 mg by mouth every morning  Refills: 0        STOP taking    aspirin 81 MG EC tablet  Commonly known as: ASA  Replaced by: aspirin 81 MG chewable tablet        metFORMIN 500 MG 24 hr tablet  Commonly known as: GLUCOPHAGE XR  Replaced by: metFORMIN 1000 MG tablet              Where to get your medicines      These medications were sent to [facility] - [place], [place] - [address] [address]    Phone: [phone]     acetaminophen 325 MG tablet    amiodarone 200 MG tablet    ARIPiprazole 5 MG tablet    aspirin 81 MG chewable tablet    atorvastatin 80 MG tablet    buPROPion 300 MG 24 hr tablet    furosemide 20 MG tablet    lisinopril 2.5 MG tablet    metFORMIN 1000 MG tablet    methocarbamol 500 MG tablet    metoprolol succinate ER 25 MG 24 hr tablet    multivitamin, therapeutic Tabs tablet    oxyCODONE 5 MG tablet    pantoprazole 40 MG EC tablet    polyethylene glycol 17 g packet    senna-docusate 8.6-50 MG tablet          CC:Rock Fabian  cardiologist      [facility]   Cardiothoracic Surgery  Office phone: [phone]  Office fax: [phone]     * a total of 30 minutes was spent on this discharge, including coordination of care, review of lab and imaging results,  patient communication and education, and discussion of care plan with consulting teams and surgeon.

## 2023-01-30 NOTE — PLAN OF CARE
D: Patient is now s/p CABGx2 and MVR on 01/18/23 with Dr. Kim. On 01/19/23 patient was brought back to the OR with Dr. Kim for high CT output. Significant mediasinal hematoma evacuated and some bleeding from vein graft to diagonal artery noted that was fixed with a single prolene suture.    PMHx:HTN, HLD, Depression, CAD, AAA s/p repair (2017), PAD with claudication, previous smoker, DM Type 2, severe mitral valve regurgitation.     I: Monitored vitals and assessed pt status. Encouraged activity.      Changed: Pt was eval by PT. OK to discharge home tomorrow. Spoke with patient and CM. Patient having brother come to pick him up tomorrow at 10 AM.    IV Access: PIV  Running:  SL. Dressing reinforced.   PRN: Tylenol and oxy almost every Q4 hr  Tele: SR first degree  O2: RA  Mobility: SBA/Independent. Calls appropriately.    Skin: Sternal KAYLYNN WDL, CT sites old KAYLYNN WDL, Left lower extremity graft site KAYLYNN WDL     A: A&Ox4. VSS. Afebrile.  Pt lung clear to dim. Reports SOB with exertion. Calls appropriately, [place] at baseline. Denies CP or numbness/tingling. Regular diet, Eating well. ACHS BS. Despite education, pt wants to continue to use briefs dt urgency. Patient changing briefs himself and asks for supplies as needed,      P: Continue to monitor pt status and report changes to treatment team. Discharge tomorrow at 1000 AM. Spoke with a CVTS team member and probably not needing insulin to home since on metformin

## 2023-01-31 ENCOUNTER — APPOINTMENT (OUTPATIENT)
Dept: OCCUPATIONAL THERAPY | Facility: CLINIC | Age: 68
DRG: 219 | End: 2023-01-31
Attending: SURGERY
Payer: COMMERCIAL

## 2023-01-31 VITALS
OXYGEN SATURATION: 93 % | DIASTOLIC BLOOD PRESSURE: 72 MMHG | SYSTOLIC BLOOD PRESSURE: 109 MMHG | BODY MASS INDEX: 32.32 KG/M2 | TEMPERATURE: 97.7 F | HEIGHT: 72 IN | HEART RATE: 92 BPM | WEIGHT: 238.6 LBS | RESPIRATION RATE: 16 BRPM

## 2023-01-31 LAB
ANION GAP SERPL CALCULATED.3IONS-SCNC: 9 MMOL/L (ref 7–15)
BUN SERPL-MCNC: 25.9 MG/DL (ref 8–23)
CALCIUM SERPL-MCNC: 9.5 MG/DL (ref 8.8–10.2)
CHLORIDE SERPL-SCNC: 97 MMOL/L (ref 98–107)
CREAT SERPL-MCNC: 0.97 MG/DL (ref 0.67–1.17)
DEPRECATED HCO3 PLAS-SCNC: 27 MMOL/L (ref 22–29)
ERYTHROCYTE [DISTWIDTH] IN BLOOD BY AUTOMATED COUNT: 15.1 % (ref 10–15)
GFR SERPL CREATININE-BSD FRML MDRD: 85 ML/MIN/1.73M2
GLUCOSE BLDC GLUCOMTR-MCNC: 109 MG/DL (ref 70–99)
GLUCOSE SERPL-MCNC: 115 MG/DL (ref 70–99)
HCT VFR BLD AUTO: 29.2 % (ref 40–53)
HGB BLD-MCNC: 9.3 G/DL (ref 13.3–17.7)
HOLD SPECIMEN: NORMAL
MCH RBC QN AUTO: 29.6 PG (ref 26.5–33)
MCHC RBC AUTO-ENTMCNC: 31.8 G/DL (ref 31.5–36.5)
MCV RBC AUTO: 93 FL (ref 78–100)
PLATELET # BLD AUTO: 473 10E3/UL (ref 150–450)
POTASSIUM SERPL-SCNC: 4.7 MMOL/L (ref 3.4–5.3)
POTASSIUM SERPL-SCNC: 5.9 MMOL/L (ref 3.4–5.3)
RBC # BLD AUTO: 3.14 10E6/UL (ref 4.4–5.9)
SODIUM SERPL-SCNC: 133 MMOL/L (ref 136–145)
WBC # BLD AUTO: 11.5 10E3/UL (ref 4–11)

## 2023-01-31 PROCEDURE — 36415 COLL VENOUS BLD VENIPUNCTURE: CPT | Performed by: SURGERY

## 2023-01-31 PROCEDURE — 250N000013 HC RX MED GY IP 250 OP 250 PS 637: Performed by: PHYSICIAN ASSISTANT

## 2023-01-31 PROCEDURE — 250N000013 HC RX MED GY IP 250 OP 250 PS 637: Performed by: SURGERY

## 2023-01-31 PROCEDURE — 84132 ASSAY OF SERUM POTASSIUM: CPT | Performed by: PHYSICIAN ASSISTANT

## 2023-01-31 PROCEDURE — 97110 THERAPEUTIC EXERCISES: CPT | Mod: GO | Performed by: OCCUPATIONAL THERAPIST

## 2023-01-31 PROCEDURE — 97535 SELF CARE MNGMENT TRAINING: CPT | Mod: GO | Performed by: OCCUPATIONAL THERAPIST

## 2023-01-31 PROCEDURE — 250N000011 HC RX IP 250 OP 636: Performed by: SURGERY

## 2023-01-31 PROCEDURE — 82947 ASSAY GLUCOSE BLOOD QUANT: CPT | Performed by: NURSE PRACTITIONER

## 2023-01-31 PROCEDURE — 36415 COLL VENOUS BLD VENIPUNCTURE: CPT | Performed by: PHYSICIAN ASSISTANT

## 2023-01-31 PROCEDURE — 85027 COMPLETE CBC AUTOMATED: CPT | Performed by: SURGERY

## 2023-01-31 RX ORDER — AMIODARONE HYDROCHLORIDE 200 MG/1
TABLET ORAL
Qty: 40 TABLET | Refills: 0 | Status: SHIPPED | OUTPATIENT
Start: 2023-01-31 | End: 2023-02-23

## 2023-01-31 RX ORDER — AMOXICILLIN 250 MG
2 CAPSULE ORAL 2 TIMES DAILY PRN
Qty: 60 TABLET | Refills: 1 | Status: SHIPPED | OUTPATIENT
Start: 2023-01-31 | End: 2023-02-13

## 2023-01-31 RX ORDER — LISINOPRIL 2.5 MG/1
2.5 TABLET ORAL DAILY
Qty: 30 TABLET | Refills: 0 | Status: SHIPPED | OUTPATIENT
Start: 2023-01-31 | End: 2023-02-23

## 2023-01-31 RX ORDER — POLYETHYLENE GLYCOL 3350 17 G/17G
17 POWDER, FOR SOLUTION ORAL DAILY PRN
Qty: 30 PACKET | Refills: 0 | Status: SHIPPED | OUTPATIENT
Start: 2023-01-31 | End: 2023-02-13

## 2023-01-31 RX ORDER — BUPROPION HYDROCHLORIDE 300 MG/1
300 TABLET ORAL DAILY
Qty: 30 TABLET | Refills: 3 | Status: SHIPPED | OUTPATIENT
Start: 2023-01-31 | End: 2024-02-02

## 2023-01-31 RX ORDER — METHOCARBAMOL 500 MG/1
500 TABLET, FILM COATED ORAL EVERY 6 HOURS PRN
Qty: 30 TABLET | Refills: 0 | Status: SHIPPED | OUTPATIENT
Start: 2023-01-31 | End: 2023-07-17

## 2023-01-31 RX ORDER — OXYCODONE HYDROCHLORIDE 5 MG/1
5 TABLET ORAL EVERY 4 HOURS PRN
Qty: 20 TABLET | Refills: 0 | Status: SHIPPED | OUTPATIENT
Start: 2023-01-31 | End: 2023-02-13

## 2023-01-31 RX ORDER — PANTOPRAZOLE SODIUM 40 MG/1
40 TABLET, DELAYED RELEASE ORAL DAILY
Qty: 20 TABLET | Refills: 0 | Status: SHIPPED | OUTPATIENT
Start: 2023-02-01 | End: 2023-02-21

## 2023-01-31 RX ORDER — ATORVASTATIN CALCIUM 80 MG/1
80 TABLET, FILM COATED ORAL EVERY MORNING
Qty: 30 TABLET | Refills: 3 | Status: SHIPPED | OUTPATIENT
Start: 2023-01-31 | End: 2023-02-23

## 2023-01-31 RX ORDER — METOPROLOL SUCCINATE 25 MG/1
25 TABLET, EXTENDED RELEASE ORAL EVERY MORNING
Qty: 30 TABLET | Refills: 3 | Status: SHIPPED | OUTPATIENT
Start: 2023-01-31 | End: 2023-02-10

## 2023-01-31 RX ORDER — FUROSEMIDE 20 MG
20 TABLET ORAL DAILY
Qty: 10 TABLET | Refills: 0 | Status: SHIPPED | OUTPATIENT
Start: 2023-02-01 | End: 2023-02-23

## 2023-01-31 RX ORDER — ACETAMINOPHEN 325 MG/1
650 TABLET ORAL EVERY 4 HOURS PRN
Qty: 100 TABLET | Refills: 1 | Status: SHIPPED | OUTPATIENT
Start: 2023-01-31 | End: 2023-07-17

## 2023-01-31 RX ORDER — ARIPIPRAZOLE 5 MG/1
5 TABLET ORAL EVERY MORNING
Qty: 30 TABLET | Refills: 3 | Status: SHIPPED | OUTPATIENT
Start: 2023-01-31 | End: 2023-08-08

## 2023-01-31 RX ORDER — ASPIRIN 81 MG/1
162 TABLET, CHEWABLE ORAL DAILY
Qty: 60 TABLET | Refills: 3 | Status: SHIPPED | OUTPATIENT
Start: 2023-01-31 | End: 2023-02-23

## 2023-01-31 RX ORDER — MULTIVITAMIN,THERAPEUTIC
1 TABLET ORAL EVERY MORNING
Qty: 30 TABLET | Refills: 3 | Status: SHIPPED | OUTPATIENT
Start: 2023-01-31

## 2023-01-31 RX ADMIN — Medication 12.5 MG: at 08:29

## 2023-01-31 RX ADMIN — OXYCODONE HYDROCHLORIDE 10 MG: 10 TABLET ORAL at 10:42

## 2023-01-31 RX ADMIN — ARIPIPRAZOLE 5 MG: 5 TABLET ORAL at 08:29

## 2023-01-31 RX ADMIN — HEPARIN SODIUM 5000 UNITS: 5000 INJECTION, SOLUTION INTRAVENOUS; SUBCUTANEOUS at 04:08

## 2023-01-31 RX ADMIN — PANTOPRAZOLE SODIUM 40 MG: 40 TABLET, DELAYED RELEASE ORAL at 08:29

## 2023-01-31 RX ADMIN — AMIODARONE HYDROCHLORIDE 200 MG: 200 TABLET ORAL at 08:30

## 2023-01-31 RX ADMIN — OXYCODONE HYDROCHLORIDE 10 MG: 10 TABLET ORAL at 06:00

## 2023-01-31 RX ADMIN — ACETAMINOPHEN 650 MG: 325 TABLET, FILM COATED ORAL at 10:42

## 2023-01-31 RX ADMIN — METFORMIN HYDROCHLORIDE 1000 MG: 500 TABLET, FILM COATED ORAL at 08:30

## 2023-01-31 RX ADMIN — ASPIRIN 162 MG: 81 TABLET, CHEWABLE ORAL at 08:30

## 2023-01-31 RX ADMIN — ACETAMINOPHEN 650 MG: 325 TABLET, FILM COATED ORAL at 06:00

## 2023-01-31 RX ADMIN — THERA TABS 1 TABLET: TAB at 08:30

## 2023-01-31 RX ADMIN — ACETAMINOPHEN 650 MG: 325 TABLET, FILM COATED ORAL at 02:06

## 2023-01-31 RX ADMIN — FUROSEMIDE 20 MG: 20 TABLET ORAL at 08:29

## 2023-01-31 RX ADMIN — ATORVASTATIN CALCIUM 80 MG: 80 TABLET, FILM COATED ORAL at 08:30

## 2023-01-31 RX ADMIN — BUPROPION HYDROCHLORIDE 300 MG: 300 TABLET, FILM COATED, EXTENDED RELEASE ORAL at 08:29

## 2023-01-31 RX ADMIN — OXYCODONE HYDROCHLORIDE 10 MG: 10 TABLET ORAL at 02:06

## 2023-01-31 ASSESSMENT — ACTIVITIES OF DAILY LIVING (ADL)
ADLS_ACUITY_SCORE: 34
ADLS_ACUITY_SCORE: 32
ADLS_ACUITY_SCORE: 34

## 2023-01-31 NOTE — PROGRESS NOTES
Physical Therapy Discharge Summary    Reason for therapy discharge:    Discharged to home.    Progress towards therapy goal(s). See goals on Care Plan in [facility] electronic health record for goal details.  Goals partially met.  Barriers to achieving goals:   discharge from facility.    Therapy recommendation(s):    Continued therapy is recommended.  Rationale/Recommendations:  home therapy to continue to improve functional stability, balance and activity tolerance. PT had recommended walker for pt as of yesterday. Initially pt's dc was cancelled this am, then re-established and pt discharged to home before PT able to see pt again and issue walker. Unclear if pt was going to have access to walker from friends/family. Pt has ambulated without walker yesteday with PT but with increased unstreadiness..

## 2023-01-31 NOTE — PLAN OF CARE
Goal Outcome Evaluation:      Plan of Care Reviewed With: patient    Overall Patient Progress: no changeOverall Patient Progress: no change    Neuro: A&Ox4  Cardiac/Telemetry:  SR, 1AVB, VSS  Respiratory: RA, lungs clear/dim, PÉREZ, wheezy at times  GI/: incontinent of urine at times, changes briefs independently  Endocrine: ACHS  Diet/Appetite: Regular diet, good appetite  Skin: sternal incision, old CT sites, and LLE graft site KAYLYNN and WDL  LDA: L PIV SL  Activity: independent, calls appropriately   Pain: c/o sternal incision pain, PRN oxy and tylenol given regularly q4     Plan:   -discharge home today, brother picking up at 10AM

## 2023-01-31 NOTE — PLAN OF CARE
CHANGES: Patient K level was high this AM. Informed providers immediately. Recheck placed. Pts brother told to hold off on coming in case discharge was cancelled. Recheck came back WDL. Pt brother came to  patient since providers agreed he was ok to discharge since everything was WDL.       DISCHARGE                         1/31/2023 11:39 AM  ----------------------------------------------------------------------------  Discharged to: Home  Via: Automobile  Accompanied by: Family  Discharge Instructions: diet, activity, medications, follow up appointments, when to call the MD, aftercare instructions, and what to watchout for (i.e. s/s of infection, increasing SOB, palpitations, chest pain,)  Prescriptions: To be filled by  discharge pharmacy per pt's request; medication list reviewed & sent with pt  Follow Up Appointments: arranged; information given  Belongings: All sent with pt  IV: out  Telemetry: off  Pt exhibits understanding of above discharge instructions; all questions answered.    Discharge Paperwork: Signed, copied, and sent home with patient.

## 2023-01-31 NOTE — PROGRESS NOTES
Occupational Therapy Discharge Summary    Reason for therapy discharge:    Discharged to home with outpatient therapy.    Progress towards therapy goal(s). See goals on Care Plan in [facility] electronic health record for goal details.  Goals met    Therapy recommendation(s):    Continued therapy is recommended.  Rationale/Recommendations:  To progress cardiopulmonary health and endurance.

## 2023-02-01 ENCOUNTER — TELEPHONE (OUTPATIENT)
Dept: CARDIOLOGY | Facility: CLINIC | Age: 68
End: 2023-02-01
Payer: COMMERCIAL

## 2023-02-01 NOTE — TELEPHONE ENCOUNTER
Spoke with Gilberto and his wife Marcella about scheduling post op appts. Pt prefers to be seen near his home and does not feel the need to come down for CVTS visit. Pt is scheduled to see PCP and will make a cardiology follow up himself.

## 2023-02-02 LAB
CLOT INIT KAOL IND TO POST HEP NEUT TRTO: 1 {RATIO}
CLOT INIT KAOLIN IND BLD US: 102 SEC (ref 113–166)
CLOT INIT KAOLIN IND P HEP NEUT BLD US: 107 SEC (ref 103–153)
CLOT STIFF PLT CONT BLD CALC: 15.7 HPA (ref 11.9–29.8)
CLOT STIFF TF IND P HEP NEUT BLD US: 17.7 HPA (ref 13–33.2)
CLOT STIFF TF IND+IIB-IIIA INH P HEP NEU: 2 HPA (ref 1–3.7)

## 2023-02-03 ENCOUNTER — TELEPHONE (OUTPATIENT)
Dept: CARDIOLOGY | Facility: CLINIC | Age: 68
End: 2023-02-03
Payer: COMMERCIAL

## 2023-02-03 NOTE — TELEPHONE ENCOUNTER
----- Message from TORI Hopkins CNP sent at 1/30/2023  3:54 PM CST -----  Regarding: Patient Discharge  Surgeon: Julio   Case: CABG X2, MV repair   Complications: post-op bleeding  Anticipated Discharge Date: 1/31/2023    Please schedule:   CVTS [facility] clinic follow up in 1-2 weeks   Primary Care Provider follow up in 2-3 weeks.  Cardiologist follow up in 4-6 weeks     ThanksGabriela

## 2023-02-03 NOTE — TELEPHONE ENCOUNTER
Called and left  for pt to call CV RN with update after hospital discharge. Contact info provided.        CARDIOTHORACIC SURGERY  Discharge Follow Up Phone Call    POST OP MONITORING  How is your pain on a 0-10 scale, how are you managing your pain? Pain is being managed with his medication.    ACTIVITY  How is your activity tolerance? Walking and tolerating it well.  Are you still doing sternal precautions? Yes.  Do you hear any clicking when you are moving or taking a deep breath? No.    Are you weighing yourself daily? Does not have a scale, no new swelling noted.    SIGNS AND SYMPTOMS OF INFECTION  1. INCREASE IN PAIN - No  2. FEVER - No  3. DRAINAGE - No If so, color: NA  4. REDNESS - No  5. SWELLING - No    ASSISTANCE  Do you have someone at home to assist you with your daily activities? Spouse is helping pt at home.    MEDICATIONS  Is someone helping you to set up your medications? Spouse and pt.  Do you have any questions about your medications? No.    Are you on a blood thinner? No  Who is managing your INRs? NA    FOLLOW UP  You are scheduled to see your primary care physician on 2/13.  You are scheduled to see our surgery advanced practive provider for post operative follow up on -  Not yet scheduled.    You are scheduled for cardiac rehab on - referral sent to [facility] per pt request.  You are scheduled to see your cardiologist on 2/23.    CONTACT INFORMATION  Please feel free to call us with any questions or symptoms that are concerning for you at [phone]. If it is after 4:00 in the afternoon, or a weekend please call [phone] and ask for the on call specialist. We want to do everything we can to help prevent you needing to return to the ED, so please do not hesitate to call us.    Emilee Graham, RNCC  Cardiothoracic Surgery  [phone]

## 2023-02-06 NOTE — ANESTHESIA POSTPROCEDURE EVALUATION
Patient: Gilberto Camilo    Procedure: Procedure(s):  chest washout, Re-do sternotomy, repair of vein graft       Anesthesia Type:  General    Note:  Disposition: ICU            ICU Sign Out: Anesthesiologist/ICU physician sign out WAS performed   Postop Pain Control:    PONV:    Neuro/Psych:             Sign Out: PLANNED postop sedation   Airway/Respiratory: Uneventful            Sign Out: Acceptable/Baseline resp. status   CV/Hemodynamics: Uneventful            Sign Out: Acceptable CV status; No obvious hypovolemia; No obvious fluid overload   Other NRE:    DID A NON-ROUTINE EVENT OCCUR?            Last vitals:  Vitals:    01/30/23 2352 01/31/23 0407 01/31/23 0726   BP: 105/74  109/72   Pulse: 80  92   Resp: 16  16   Temp: 36.4  C (97.6  F) 36.4  C (97.5  F) 36.5  C (97.7  F)   SpO2: 96%  93%       Electronically Signed By: Naty Angelo MD  February 6, 2023  8:18 AM

## 2023-02-07 DIAGNOSIS — Z95.1 S/P CABG (CORONARY ARTERY BYPASS GRAFT): Primary | ICD-10-CM

## 2023-02-09 ENCOUNTER — HOSPITAL ENCOUNTER (OUTPATIENT)
Dept: CARDIAC REHAB | Facility: HOSPITAL | Age: 68
Setting detail: THERAPIES SERIES
Discharge: HOME | End: 2023-02-09
Attending: FAMILY MEDICINE
Payer: COMMERCIAL

## 2023-02-09 DIAGNOSIS — Z95.2 S/P MVR (MITRAL VALVE REPLACEMENT): ICD-10-CM

## 2023-02-09 DIAGNOSIS — Z95.1 S/P CABG (CORONARY ARTERY BYPASS GRAFT): ICD-10-CM

## 2023-02-09 PROCEDURE — 999N000109 HC STATISTIC OP CR VISIT

## 2023-02-09 PROCEDURE — 93798 PHYS/QHP OP CAR RHAB W/ECG: CPT

## 2023-02-10 RX ORDER — METOPROLOL SUCCINATE 25 MG/1
TABLET, EXTENDED RELEASE ORAL
Qty: 90 TABLET | Refills: 3 | Status: SHIPPED | OUTPATIENT
Start: 2023-02-10 | End: 2024-01-31

## 2023-02-10 NOTE — TELEPHONE ENCOUNTER
Change of pharmacy    metoprolol      Last Written Prescription Date:  1/31/2023  Last Fill Quantity: 30,   # refills: 3  Last Office Visit: 1/6/2023  Future Office visit:    Next 5 appointments (look out 90 days)    Feb 13, 2023  9:45 AM  (Arrive by 9:30 AM)  Office Visit with Rock Basilio MD  [facility] ([facility] ) [address] [place] [id number]  [phone]   Feb 23, 2023 10:30 AM  (Arrive by 10:15 AM)  Return Visit with Zoe Carr CNP  [facility] ([facility] ) [address] [place] [id number]  [phone]   Mar 07, 2023  2:00 PM  (Arrive by 1:45 PM)  Return Visit with Sierra Velásquez DPM  [facility] ([facility] ) [address] [place] [id number] [phone]           Routing refill request to provider for review/approval because:

## 2023-02-13 ENCOUNTER — OFFICE VISIT (OUTPATIENT)
Dept: FAMILY MEDICINE | Facility: OTHER | Age: 68
End: 2023-02-13
Attending: FAMILY MEDICINE
Payer: COMMERCIAL

## 2023-02-13 ENCOUNTER — LAB (OUTPATIENT)
Dept: LAB | Facility: OTHER | Age: 68
End: 2023-02-13
Payer: COMMERCIAL

## 2023-02-13 ENCOUNTER — ANCILLARY PROCEDURE (OUTPATIENT)
Dept: GENERAL RADIOLOGY | Facility: OTHER | Age: 68
End: 2023-02-13
Attending: FAMILY MEDICINE
Payer: COMMERCIAL

## 2023-02-13 VITALS
RESPIRATION RATE: 20 BRPM | SYSTOLIC BLOOD PRESSURE: 106 MMHG | DIASTOLIC BLOOD PRESSURE: 76 MMHG | WEIGHT: 230 LBS | OXYGEN SATURATION: 98 % | HEART RATE: 91 BPM | BODY MASS INDEX: 31.19 KG/M2 | TEMPERATURE: 96.2 F

## 2023-02-13 DIAGNOSIS — Z98.890 S/P MITRAL VALVE REPAIR: ICD-10-CM

## 2023-02-13 DIAGNOSIS — I97.89 POSTOPERATIVE ATRIAL FIBRILLATION (H): ICD-10-CM

## 2023-02-13 DIAGNOSIS — Z09 HOSPITAL DISCHARGE FOLLOW-UP: ICD-10-CM

## 2023-02-13 DIAGNOSIS — E66.01 MORBID OBESITY (H): ICD-10-CM

## 2023-02-13 DIAGNOSIS — F33.42 RECURRENT MAJOR DEPRESSIVE DISORDER, IN FULL REMISSION (H): ICD-10-CM

## 2023-02-13 DIAGNOSIS — Z09 HOSPITAL DISCHARGE FOLLOW-UP: Primary | ICD-10-CM

## 2023-02-13 DIAGNOSIS — Z12.11 SPECIAL SCREENING FOR MALIGNANT NEOPLASMS, COLON: ICD-10-CM

## 2023-02-13 DIAGNOSIS — I48.91 POSTOPERATIVE ATRIAL FIBRILLATION (H): ICD-10-CM

## 2023-02-13 DIAGNOSIS — I10 ESSENTIAL HYPERTENSION: ICD-10-CM

## 2023-02-13 DIAGNOSIS — I73.9 PERIPHERAL ARTERIAL DISEASE (H): ICD-10-CM

## 2023-02-13 DIAGNOSIS — D64.9 POSTOPERATIVE ANEMIA: ICD-10-CM

## 2023-02-13 DIAGNOSIS — Z95.1 S/P CABG (CORONARY ARTERY BYPASS GRAFT): ICD-10-CM

## 2023-02-13 DIAGNOSIS — E11.69 TYPE 2 DIABETES MELLITUS WITH OTHER SPECIFIED COMPLICATION, WITHOUT LONG-TERM CURRENT USE OF INSULIN (H): ICD-10-CM

## 2023-02-13 DIAGNOSIS — I25.10 CORONARY ARTERY DISEASE INVOLVING NATIVE HEART WITHOUT ANGINA PECTORIS, UNSPECIFIED VESSEL OR LESION TYPE: ICD-10-CM

## 2023-02-13 PROBLEM — L98.9 SKIN LESION: Status: RESOLVED | Noted: 2018-06-26 | Resolved: 2023-02-13

## 2023-02-13 LAB
ALBUMIN SERPL BCG-MCNC: 4.3 G/DL (ref 3.5–5.2)
ALP SERPL-CCNC: 130 U/L (ref 40–129)
ALT SERPL W P-5'-P-CCNC: 24 U/L (ref 10–50)
ANION GAP SERPL CALCULATED.3IONS-SCNC: 11 MMOL/L (ref 7–15)
AST SERPL W P-5'-P-CCNC: 31 U/L (ref 10–50)
BASOPHILS # BLD AUTO: 0.1 10E3/UL (ref 0–0.2)
BASOPHILS NFR BLD AUTO: 1 %
BILIRUB SERPL-MCNC: 0.5 MG/DL
BUN SERPL-MCNC: 28.2 MG/DL (ref 8–23)
CALCIUM SERPL-MCNC: 10.3 MG/DL (ref 8.8–10.2)
CHLORIDE SERPL-SCNC: 98 MMOL/L (ref 98–107)
CREAT SERPL-MCNC: 1.13 MG/DL (ref 0.67–1.17)
DEPRECATED HCO3 PLAS-SCNC: 29 MMOL/L (ref 22–29)
EOSINOPHIL # BLD AUTO: 0.6 10E3/UL (ref 0–0.7)
EOSINOPHIL NFR BLD AUTO: 7 %
ERYTHROCYTE [DISTWIDTH] IN BLOOD BY AUTOMATED COUNT: 14.2 % (ref 10–15)
GFR SERPL CREATININE-BSD FRML MDRD: 70 ML/MIN/1.73M2
GLUCOSE SERPL-MCNC: 115 MG/DL (ref 70–99)
HCT VFR BLD AUTO: 40.3 % (ref 40–53)
HGB BLD-MCNC: 12.6 G/DL (ref 13.3–17.7)
IMM GRANULOCYTES # BLD: 0 10E3/UL
IMM GRANULOCYTES NFR BLD: 0 %
LYMPHOCYTES # BLD AUTO: 2.6 10E3/UL (ref 0.8–5.3)
LYMPHOCYTES NFR BLD AUTO: 30 %
MCH RBC QN AUTO: 28.8 PG (ref 26.5–33)
MCHC RBC AUTO-ENTMCNC: 31.3 G/DL (ref 31.5–36.5)
MCV RBC AUTO: 92 FL (ref 78–100)
MONOCYTES # BLD AUTO: 0.9 10E3/UL (ref 0–1.3)
MONOCYTES NFR BLD AUTO: 10 %
NEUTROPHILS # BLD AUTO: 4.5 10E3/UL (ref 1.6–8.3)
NEUTROPHILS NFR BLD AUTO: 52 %
NRBC # BLD AUTO: 0 10E3/UL
NRBC BLD AUTO-RTO: 0 /100
PLATELET # BLD AUTO: 321 10E3/UL (ref 150–450)
POTASSIUM SERPL-SCNC: 4.6 MMOL/L (ref 3.4–5.3)
PROT SERPL-MCNC: 7.5 G/DL (ref 6.4–8.3)
RBC # BLD AUTO: 4.37 10E6/UL (ref 4.4–5.9)
SODIUM SERPL-SCNC: 138 MMOL/L (ref 136–145)
WBC # BLD AUTO: 8.7 10E3/UL (ref 4–11)

## 2023-02-13 PROCEDURE — 71046 X-RAY EXAM CHEST 2 VIEWS: CPT | Mod: TC

## 2023-02-13 PROCEDURE — 93010 ELECTROCARDIOGRAM REPORT: CPT | Mod: 77 | Performed by: INTERNAL MEDICINE

## 2023-02-13 PROCEDURE — G0463 HOSPITAL OUTPT CLINIC VISIT: HCPCS | Mod: 25

## 2023-02-13 PROCEDURE — 85018 HEMOGLOBIN: CPT | Mod: ZL

## 2023-02-13 PROCEDURE — 36415 COLL VENOUS BLD VENIPUNCTURE: CPT | Mod: ZL

## 2023-02-13 PROCEDURE — 99495 TRANSJ CARE MGMT MOD F2F 14D: CPT | Mod: 25 | Performed by: FAMILY MEDICINE

## 2023-02-13 PROCEDURE — 85041 AUTOMATED RBC COUNT: CPT | Mod: ZL

## 2023-02-13 PROCEDURE — G0463 HOSPITAL OUTPT CLINIC VISIT: HCPCS

## 2023-02-13 PROCEDURE — 99495 TRANSJ CARE MGMT MOD F2F 14D: CPT | Performed by: FAMILY MEDICINE

## 2023-02-13 PROCEDURE — 93005 ELECTROCARDIOGRAM TRACING: CPT

## 2023-02-13 PROCEDURE — 82374 ASSAY BLOOD CARBON DIOXIDE: CPT | Mod: ZL

## 2023-02-13 ASSESSMENT — PAIN SCALES - GENERAL: PAINLEVEL: MILD PAIN (2)

## 2023-02-13 NOTE — PROGRESS NOTES
Assessment & Plan     Hospital discharge follow-up  Doing real well. Continue current medications and behavioral changes.   - Comprehensive metabolic panel; Future  - CBC with Platelets & Differential; Future  - XR Chest 2 Views; Future  - EKG 12-lead complete w/read - Clinics; Future  - EKG 12-lead complete w/read - Clinics    S/P CABG (coronary artery bypass graft)  Doing great. Cardiac Rehab starts this week.  Continue current medications and behavioral changes.   - Comprehensive metabolic panel; Future  - CBC with Platelets & Differential; Future  - XR Chest 2 Views; Future  - EKG 12-lead complete w/read - Clinics; Future  - EKG 12-lead complete w/read - Clinics    Coronary artery disease involving native heart without angina pectoris, unspecified vessel or lesion type  As above. No anginal sx   - Comprehensive metabolic panel; Future  - CBC with Platelets & Differential; Future  - XR Chest 2 Views; Future  - EKG 12-lead complete w/read - Clinics; Future  - EKG 12-lead complete w/read - Clinics    S/P mitral valve repair  Doing great. No murmur on exam   - Comprehensive metabolic panel; Future  - CBC with Platelets & Differential; Future  - XR Chest 2 Views; Future  - EKG 12-lead complete w/read - Clinics; Future  - EKG 12-lead complete w/read - Clinics    Postoperative atrial fibrillation (H)  Still in NSR  - Comprehensive metabolic panel; Future  - CBC with Platelets & Differential; Future  - XR Chest 2 Views; Future  - EKG 12-lead complete w/read - Clinics; Future  - EKG 12-lead complete w/read - Clinics    Postoperative anemia  Improved   - Comprehensive metabolic panel; Future  - CBC with Platelets & Differential; Future  - XR Chest 2 Views; Future  - EKG 12-lead complete w/read - Clinics; Future  - EKG 12-lead complete w/read - Clinics    Essential hypertension  Stable BP .   - Comprehensive metabolic panel; Future  - CBC with Platelets & Differential; Future  - XR Chest 2 Views; Future  - EKG 12-lead  complete w/read - Clinics; Future  - EKG 12-lead complete w/read - Clinics    Type 2 diabetes mellitus with other specified complication, without long-term current use of insulin (H)  Back on meds. Recheck labs in 3 months. Appt made to be fasting   - Comprehensive metabolic panel; Future  - CBC with Platelets & Differential; Future  - XR Chest 2 Views; Future  - EKG 12-lead complete w/read - Clinics; Future  - EKG 12-lead complete w/read - Clinics    Recurrent major depressive disorder, in full remission (H)  Stable doing well mentally after large surgery   - Comprehensive metabolic panel; Future  - CBC with Platelets & Differential; Future  - XR Chest 2 Views; Future  - EKG 12-lead complete w/read - Clinics; Future  - EKG 12-lead complete w/read - Clinics    Special screening for malignant neoplasms, colon  Will hold til recovers nicely     Peripheral arterial disease (H)  Stable     Morbid obesity (H)  Hope to loose some wt with cardiac rehab               MED REC REQUIRED  Post Medication Reconciliation Status:  Discharge medications reconciled, continue medications without change          No follow-ups on file.    Rock Basilio MD  [facility] - CHRISTINAARNALDO Garnican is a 67 year old, presenting for the following health issues:  Follow Up      HPI     Diabetes Follow-up      How often are you checking your blood sugar? Not at all    What concerns do you have today about your diabetes? None     Do you have any of these symptoms? (Select all that apply)  No numbness or tingling in feet.  No redness, sores or blisters on feet.  No complaints of excessive thirst.  No reports of blurry vision.  No significant changes to weight.  Back on Glucophage   No issues       Hyperlipidemia Follow-Up      Are you regularly taking any medication or supplement to lower your cholesterol?   Yes- atorvastatin    Are you having muscle aches or other side effects that you think could be caused by your  cholesterol lowering medication?  No    Hypertension Follow-up      Do you check your blood pressure regularly outside of the clinic? No     Are you following a low salt diet? Yes    Are your blood pressures ever more than 140 on the top number (systolic) OR more   than 90 on the bottom number (diastolic), for example 140/90? No    BP Readings from Last 2 Encounters:   02/13/23 106/76   01/31/23 109/72     Hemoglobin A1C (%)   Date Value   01/09/2023 7.9 (H)   08/29/2022 7.5 (H)   02/15/2021 6.5 (H)     LDL Cholesterol Calculated (mg/dL)   Date Value   08/29/2022 56   08/17/2021 76   08/21/2020 68   10/02/2019 97             Hospital Follow-up Visit:    Hospital/Nursing Home/IP Rehab Facility: [facility]  Date of Admission: 1/18/2023  Date of Discharge: 1/31/2023  Reason(s) for Admission: S/p CABG X2 and MVR    Was your hospitalization related to COVID-19? No   Problems taking medications regularly:  None  Medication changes since discharge: None  Problems adhering to non-medication therapy:  Physical therapy will start on wednesday    Summary of hospitalization:  [facility] discharge summary reviewed  Diagnostic Tests/Treatments reviewed.  Follow up needed: none  Other Healthcare Providers Involved in Patient s Care:         None  Update since discharge: improved.   Plan of care communicated with patient           Review of Systems   Constitutional, HEENT, cardiovascular, pulmonary, GI, , musculoskeletal, neuro, skin, endocrine and psych systems are negative, except as otherwise noted.      Objective    /76 (BP Location: Right arm, Patient Position: Sitting, Cuff Size: Adult Large)   Pulse 91   Temp (!) 96.2  F (35.7  C) (Tympanic)   Resp 20   Wt 104.3 kg (230 lb)   SpO2 98%   BMI 31.19 kg/m    Body mass index is 31.19 kg/m .  Physical Exam   GENERAL: healthy, alert and no distress  EYES: Eyes grossly normal to inspection, PERRL and  conjunctivae and sclerae normal  HENT: ear canals and TM's normal, nose and mouth without ulcers or lesions  NECK: no adenopathy, no asymmetry, masses, or scars and thyroid normal to palpation  RESP: lungs clear to auscultation - no rales, rhonchi or wheezes  Chest and left leg -healing incisions - C/D/I  CV: regular rate and rhythm, normal S1 S2, no S3 or S4, no murmur, click or rub, no peripheral edema and peripheral pulses strong  ABDOMEN: soft, nontender, no hepatosplenomegaly, no masses and bowel sounds normal  MS: no gross musculoskeletal defects noted, no edema  SKIN: no suspicious lesions or rashes  NEURO: Normal strength and tone, mentation intact and speech normal  PSYCH: mentation appears normal, affect normal/bright    Results for orders placed or performed in visit on 02/13/23   XR Chest 2 Views     Status: None    Narrative    XR CHEST 2 VIEWS    HISTORY: 67 years Male Hospital discharge follow-up; S/P CABG  (coronary artery bypass graft); Coronary artery disease involving  native heart without angina pectoris, unspecified vessel or lesion  type; S/P mitral valve repair; Postoperative atrial fibrillation (H);  Postoperative atrial fibrillation (H); Postoperative anemia; Essential  hypertension; Type 2 diabetes mellitus with other specified  complication, without long-    COMPARISON: 1/27/2023    TECHNIQUE: 2 views of the chest were obtained.    FINDINGS: Two views of the chest were obtained. Heart size and  pulmonary vascularity are within normal limits, lungs are clear on  both views. No consolidating air space opacities are present.    Again seen are changes of median sternotomy and mitral valve  replacement/repair      Impression    IMPRESSION: Clear chest.    BRYANNA MENDOZA MD         SYSTEM ID:  [id number]   Results for orders placed or performed in visit on 02/13/23   Comprehensive metabolic panel     Status: Abnormal   Result Value Ref Range    Sodium 138 136 - 145 mmol/L    Potassium 4.6 3.4 -  5.3 mmol/L    Chloride 98 98 - 107 mmol/L    Carbon Dioxide (CO2) 29 22 - 29 mmol/L    Anion Gap 11 7 - 15 mmol/L    Urea Nitrogen 28.2 (H) 8.0 - 23.0 mg/dL    Creatinine 1.13 0.67 - 1.17 mg/dL    Calcium 10.3 (H) 8.8 - 10.2 mg/dL    Glucose 115 (H) 70 - 99 mg/dL    Alkaline Phosphatase 130 (H) 40 - 129 U/L    AST 31 10 - 50 U/L    ALT 24 10 - 50 U/L    Protein Total 7.5 6.4 - 8.3 g/dL    Albumin 4.3 3.5 - 5.2 g/dL    Bilirubin Total 0.5 <=1.2 mg/dL    GFR Estimate 70 >60 mL/min/1.73m2   CBC with platelets and differential     Status: Abnormal   Result Value Ref Range    WBC Count 8.7 4.0 - 11.0 10e3/uL    RBC Count 4.37 (L) 4.40 - 5.90 10e6/uL    Hemoglobin 12.6 (L) 13.3 - 17.7 g/dL    Hematocrit 40.3 40.0 - 53.0 %    MCV 92 78 - 100 fL    MCH 28.8 26.5 - 33.0 pg    MCHC 31.3 (L) 31.5 - 36.5 g/dL    RDW 14.2 10.0 - 15.0 %    Platelet Count 321 150 - 450 10e3/uL    % Neutrophils 52 %    % Lymphocytes 30 %    % Monocytes 10 %    % Eosinophils 7 %    % Basophils 1 %    % Immature Granulocytes 0 %    NRBCs per 100 WBC 0 <1 /100    Absolute Neutrophils 4.5 1.6 - 8.3 10e3/uL    Absolute Lymphocytes 2.6 0.8 - 5.3 10e3/uL    Absolute Monocytes 0.9 0.0 - 1.3 10e3/uL    Absolute Eosinophils 0.6 0.0 - 0.7 10e3/uL    Absolute Basophils 0.1 0.0 - 0.2 10e3/uL    Absolute Immature Granulocytes 0.0 <=0.4 10e3/uL    Absolute NRBCs 0.0 10e3/uL   CBC with Platelets & Differential     Status: Abnormal    Narrative    The following orders were created for panel order CBC with Platelets & Differential.  Procedure                               Abnormality         Status                     ---------                               -----------         ------                     CBC with platelets and d...[[id number]]  Abnormal            Final result                 Please view results for these tests on the individual orders.     EKG- NSR with some flipped T waves in anterior leads

## 2023-02-15 ENCOUNTER — HOSPITAL ENCOUNTER (OUTPATIENT)
Dept: CARDIAC REHAB | Facility: HOSPITAL | Age: 68
Setting detail: THERAPIES SERIES
Discharge: HOME | End: 2023-02-15
Attending: FAMILY MEDICINE
Payer: COMMERCIAL

## 2023-02-15 PROCEDURE — 93798 PHYS/QHP OP CAR RHAB W/ECG: CPT

## 2023-02-15 PROCEDURE — 999N000109 HC STATISTIC OP CR VISIT

## 2023-02-17 ENCOUNTER — HOSPITAL ENCOUNTER (OUTPATIENT)
Dept: CARDIAC REHAB | Facility: HOSPITAL | Age: 68
Setting detail: THERAPIES SERIES
Discharge: HOME | End: 2023-02-17
Attending: FAMILY MEDICINE
Payer: COMMERCIAL

## 2023-02-17 PROCEDURE — 93798 PHYS/QHP OP CAR RHAB W/ECG: CPT

## 2023-02-17 PROCEDURE — 999N000109 HC STATISTIC OP CR VISIT

## 2023-02-20 ENCOUNTER — HOSPITAL ENCOUNTER (OUTPATIENT)
Dept: CARDIAC REHAB | Facility: HOSPITAL | Age: 68
Setting detail: THERAPIES SERIES
Discharge: HOME | End: 2023-02-20
Attending: FAMILY MEDICINE
Payer: COMMERCIAL

## 2023-02-20 PROCEDURE — 93798 PHYS/QHP OP CAR RHAB W/ECG: CPT

## 2023-02-20 PROCEDURE — 999N000109 HC STATISTIC OP CR VISIT

## 2023-02-22 ENCOUNTER — HOSPITAL ENCOUNTER (OUTPATIENT)
Dept: CARDIAC REHAB | Facility: HOSPITAL | Age: 68
Setting detail: THERAPIES SERIES
Discharge: HOME | End: 2023-02-22
Attending: FAMILY MEDICINE
Payer: COMMERCIAL

## 2023-02-22 PROCEDURE — 999N000109 HC STATISTIC OP CR VISIT

## 2023-02-22 PROCEDURE — 93798 PHYS/QHP OP CAR RHAB W/ECG: CPT

## 2023-02-23 ENCOUNTER — OFFICE VISIT (OUTPATIENT)
Dept: CARDIOLOGY | Facility: OTHER | Age: 68
End: 2023-02-23
Attending: NURSE PRACTITIONER
Payer: COMMERCIAL

## 2023-02-23 VITALS
HEIGHT: 72 IN | WEIGHT: 224 LBS | SYSTOLIC BLOOD PRESSURE: 121 MMHG | BODY MASS INDEX: 30.34 KG/M2 | HEART RATE: 79 BPM | DIASTOLIC BLOOD PRESSURE: 78 MMHG | TEMPERATURE: 96.2 F | RESPIRATION RATE: 20 BRPM | OXYGEN SATURATION: 98 %

## 2023-02-23 DIAGNOSIS — E11.9 TYPE 2 DIABETES MELLITUS WITHOUT COMPLICATION, WITHOUT LONG-TERM CURRENT USE OF INSULIN (H): ICD-10-CM

## 2023-02-23 DIAGNOSIS — I10 ESSENTIAL HYPERTENSION: ICD-10-CM

## 2023-02-23 DIAGNOSIS — I48.91 POSTOPERATIVE ATRIAL FIBRILLATION (H): ICD-10-CM

## 2023-02-23 DIAGNOSIS — Z98.890 S/P AAA REPAIR: ICD-10-CM

## 2023-02-23 DIAGNOSIS — Z95.2 S/P MVR (MITRAL VALVE REPLACEMENT): ICD-10-CM

## 2023-02-23 DIAGNOSIS — E78.2 MIXED HYPERLIPIDEMIA: ICD-10-CM

## 2023-02-23 DIAGNOSIS — I50.20 HFREF (HEART FAILURE WITH REDUCED EJECTION FRACTION) (H): ICD-10-CM

## 2023-02-23 DIAGNOSIS — Z95.1 S/P CABG (CORONARY ARTERY BYPASS GRAFT): ICD-10-CM

## 2023-02-23 DIAGNOSIS — I97.89 POSTOPERATIVE ATRIAL FIBRILLATION (H): ICD-10-CM

## 2023-02-23 DIAGNOSIS — F17.201 TOBACCO DEPENDENCE IN REMISSION: ICD-10-CM

## 2023-02-23 DIAGNOSIS — K21.9 GASTROESOPHAGEAL REFLUX DISEASE, UNSPECIFIED WHETHER ESOPHAGITIS PRESENT: ICD-10-CM

## 2023-02-23 DIAGNOSIS — I34.1 MITRAL VALVE PROLAPSE: Primary | ICD-10-CM

## 2023-02-23 DIAGNOSIS — I25.10 CORONARY ARTERY DISEASE INVOLVING NATIVE CORONARY ARTERY OF NATIVE HEART WITHOUT ANGINA PECTORIS: ICD-10-CM

## 2023-02-23 DIAGNOSIS — I51.7 LAE (LEFT ATRIAL ENLARGEMENT): ICD-10-CM

## 2023-02-23 DIAGNOSIS — I51.7 LEFT VENTRICULAR DILATATION: ICD-10-CM

## 2023-02-23 DIAGNOSIS — Z86.79 S/P AAA REPAIR: ICD-10-CM

## 2023-02-23 PROCEDURE — G0463 HOSPITAL OUTPT CLINIC VISIT: HCPCS

## 2023-02-23 PROCEDURE — 99215 OFFICE O/P EST HI 40 MIN: CPT | Performed by: NURSE PRACTITIONER

## 2023-02-23 RX ORDER — ASPIRIN 81 MG/1
162 TABLET, CHEWABLE ORAL DAILY
Qty: 180 TABLET | Refills: 0 | Status: SHIPPED | OUTPATIENT
Start: 2023-02-23 | End: 2023-05-24

## 2023-02-23 RX ORDER — LISINOPRIL 2.5 MG/1
2.5 TABLET ORAL DAILY
Qty: 90 TABLET | Refills: 3 | Status: SHIPPED | OUTPATIENT
Start: 2023-02-23 | End: 2023-05-04

## 2023-02-23 RX ORDER — ATORVASTATIN CALCIUM 80 MG/1
80 TABLET, FILM COATED ORAL EVERY MORNING
Qty: 90 TABLET | Refills: 3 | Status: SHIPPED | OUTPATIENT
Start: 2023-02-23 | End: 2024-03-15

## 2023-02-23 RX ORDER — AMIODARONE HYDROCHLORIDE 200 MG/1
200 TABLET ORAL DAILY
Qty: 30 TABLET | Refills: 1 | Status: SHIPPED | OUTPATIENT
Start: 2023-02-23 | End: 2023-09-12

## 2023-02-23 RX ORDER — PANTOPRAZOLE SODIUM 40 MG/1
40 TABLET, DELAYED RELEASE ORAL DAILY
Qty: 90 TABLET | Refills: 3 | Status: SHIPPED | OUTPATIENT
Start: 2023-02-23 | End: 2024-01-23

## 2023-02-23 ASSESSMENT — PAIN SCALES - GENERAL: PAINLEVEL: NO PAIN (1)

## 2023-02-23 NOTE — PATIENT INSTRUCTIONS
Thank you for allowing Zoe Carr CNP and our  team to participate in your care. Please call our office at [phone] with scheduling questions or if you need to cancel or change your appointment. With any other questions or concerns you may call cardiology nurse at [phone] or [phone].       If you experience chest pain, chest pressure, chest tightness, shortness of breath, fainting, lightheadedness, nausea, vomiting, or other concerning symptoms, please report to the Emergency Department or call 911. These symptoms may be emergent, and best treated in the Emergency Department.      Continue cardiac rehab  Continue current medications  Post operative atrial fibrillation- plan for cardiac monitor in 6 months and could consider stopping amiodarone if no recurrence. Should consider chronic oral anticoagulation-- we will follow up with you on this next week.     Follow-up with cardiology in 3 months, certainly sooner with acute concerns.      Zoe Carr CNP

## 2023-02-24 ENCOUNTER — HOSPITAL ENCOUNTER (OUTPATIENT)
Dept: CARDIAC REHAB | Facility: HOSPITAL | Age: 68
Setting detail: THERAPIES SERIES
Discharge: HOME | End: 2023-02-24
Attending: FAMILY MEDICINE
Payer: COMMERCIAL

## 2023-02-24 PROCEDURE — 93798 PHYS/QHP OP CAR RHAB W/ECG: CPT

## 2023-02-24 PROCEDURE — 999N000109 HC STATISTIC OP CR VISIT

## 2023-02-27 ENCOUNTER — HOSPITAL ENCOUNTER (OUTPATIENT)
Dept: CARDIAC REHAB | Facility: HOSPITAL | Age: 68
Setting detail: THERAPIES SERIES
Discharge: HOME | End: 2023-02-27
Attending: FAMILY MEDICINE
Payer: COMMERCIAL

## 2023-02-27 PROCEDURE — 93798 PHYS/QHP OP CAR RHAB W/ECG: CPT

## 2023-02-27 PROCEDURE — 999N000109 HC STATISTIC OP CR VISIT

## 2023-03-01 ENCOUNTER — HOSPITAL ENCOUNTER (OUTPATIENT)
Dept: CARDIAC REHAB | Facility: HOSPITAL | Age: 68
Setting detail: THERAPIES SERIES
Discharge: HOME | End: 2023-03-01
Attending: FAMILY MEDICINE
Payer: COMMERCIAL

## 2023-03-01 PROCEDURE — 93798 PHYS/QHP OP CAR RHAB W/ECG: CPT

## 2023-03-01 PROCEDURE — 999N000109 HC STATISTIC OP CR VISIT

## 2023-03-02 ENCOUNTER — TELEPHONE (OUTPATIENT)
Dept: CARDIOLOGY | Facility: OTHER | Age: 68
End: 2023-03-02

## 2023-03-02 DIAGNOSIS — I48.91 POSTOPERATIVE ATRIAL FIBRILLATION (H): Primary | ICD-10-CM

## 2023-03-02 DIAGNOSIS — I97.89 POSTOPERATIVE ATRIAL FIBRILLATION (H): Primary | ICD-10-CM

## 2023-03-02 NOTE — TELEPHONE ENCOUNTER
----- Message from Zoe Carr CNP sent at 3/2/2023 12:01 PM CST -----  Regarding: Phone call  I called patient to follow-up on his post-op atrial fibrillation. I wanted to speak with Dr. Miranda about the anti-arrythmic as well as chronic oral anticoagulation therapy. I was not able to reach him and left a voicemail for him to call back. If he calls back and I am unavailable:    I would like him to stop the amiodarone.  I placed an order for him to wear a zio patch for 2 weeks to see if there is recurrence of his post-operative atrial fibrillation.  At his visit we discussed chronic oral anticoagulation therapy. He is not currently on anything. Atrial fibrillation carries a stroke risk but there are not clear cut guidelines with post op atrial fibrillation. It is possible he has had atrial fibrillation and may get atrial fibrillation again-- even with a normal monitor. He has risk factors for stroke which increases his risk. If he would like to be on a blood thinner we certainly can start one, if he wants to wait until after wearing the monitor-- he can also schedule a follow-up visit to discuss the results and anticoagulation.      Thanks,    Zoe Carr CNP on 3/2/2023 at 12:16 PM

## 2023-03-02 NOTE — TELEPHONE ENCOUNTER
Spoke with patient and wife each on the phone at the same time. Gave instructions to stop the Amiodarone. They want to wait till follow up to discuss results and possibly taking the blood thinner. Make follow up for 1-2 weeks after removing and mailing the zio. They voice understanding.

## 2023-03-03 ENCOUNTER — HOSPITAL ENCOUNTER (OUTPATIENT)
Dept: CARDIAC REHAB | Facility: HOSPITAL | Age: 68
Setting detail: THERAPIES SERIES
Discharge: HOME | End: 2023-03-03
Attending: FAMILY MEDICINE
Payer: COMMERCIAL

## 2023-03-03 DIAGNOSIS — Z95.1 S/P CABG (CORONARY ARTERY BYPASS GRAFT): ICD-10-CM

## 2023-03-03 PROCEDURE — 999N000109 HC STATISTIC OP CR VISIT

## 2023-03-03 PROCEDURE — 93798 PHYS/QHP OP CAR RHAB W/ECG: CPT

## 2023-03-03 NOTE — TELEPHONE ENCOUNTER
Amiodarone- would like 90 day      Last Written Prescription Date:  2/23/23  Last Fill Quantity: 30,   # refills: 1  Last Office Visit: 2/13/23  Future Office visit:    Next 5 appointments (look out 90 days)    Mar 07, 2023  2:00 PM  (Arrive by 1:45 PM)  Return Visit with Sierra Velásquez DPM  [facility] ([facility] ) [address] [place] [id number] [phone]   May 04, 2023  8:45 AM  (Arrive by 8:30 AM)  SHORT with Rock Basilio MD  [facility] ([facility] ) [address] [facility] [address]  [phone]   May 24, 2023 11:00 AM  (Arrive by 10:45 AM)  Return Visit with Zoe Carr CNP  [facility] ([facility] ) [address] [place] [id number]  [phone]

## 2023-03-06 ENCOUNTER — HOSPITAL ENCOUNTER (OUTPATIENT)
Dept: CARDIAC REHAB | Facility: HOSPITAL | Age: 68
Setting detail: THERAPIES SERIES
Discharge: HOME | End: 2023-03-06
Attending: FAMILY MEDICINE
Payer: COMMERCIAL

## 2023-03-06 PROCEDURE — 999N000109 HC STATISTIC OP CR VISIT

## 2023-03-06 PROCEDURE — 93798 PHYS/QHP OP CAR RHAB W/ECG: CPT

## 2023-03-06 NOTE — TELEPHONE ENCOUNTER
Wife calls attempted to make an appt for the Zio placement. No order was in. Order placed per M Overbye.

## 2023-03-07 ENCOUNTER — OFFICE VISIT (OUTPATIENT)
Dept: PODIATRY | Facility: OTHER | Age: 68
End: 2023-03-07
Attending: PODIATRIST
Payer: COMMERCIAL

## 2023-03-07 VITALS
HEART RATE: 86 BPM | OXYGEN SATURATION: 96 % | SYSTOLIC BLOOD PRESSURE: 95 MMHG | TEMPERATURE: 97 F | DIASTOLIC BLOOD PRESSURE: 65 MMHG

## 2023-03-07 DIAGNOSIS — L60.3 ONYCHODYSTROPHY: Primary | ICD-10-CM

## 2023-03-07 DIAGNOSIS — E11.42 DIABETIC POLYNEUROPATHY ASSOCIATED WITH TYPE 2 DIABETES MELLITUS (H): ICD-10-CM

## 2023-03-07 DIAGNOSIS — I70.213 INTERMITTENT CLAUDICATION OF BOTH LOWER EXTREMITIES DUE TO ATHEROSCLEROSIS (H): ICD-10-CM

## 2023-03-07 DIAGNOSIS — I73.9 PERIPHERAL ARTERIAL DISEASE (H): ICD-10-CM

## 2023-03-07 DIAGNOSIS — L85.3 XEROSIS OF SKIN: ICD-10-CM

## 2023-03-07 DIAGNOSIS — E11.9 DIABETES MELLITUS TYPE 2, NONINSULIN DEPENDENT (H): ICD-10-CM

## 2023-03-07 DIAGNOSIS — L60.0 INGROWN TOENAIL: ICD-10-CM

## 2023-03-07 PROCEDURE — 11721 DEBRIDE NAIL 6 OR MORE: CPT | Performed by: PODIATRIST

## 2023-03-07 PROCEDURE — G0463 HOSPITAL OUTPT CLINIC VISIT: HCPCS | Mod: 25

## 2023-03-07 RX ORDER — AMIODARONE HYDROCHLORIDE 200 MG/1
200 TABLET ORAL DAILY
Qty: 90 TABLET | Refills: 0 | OUTPATIENT
Start: 2023-03-07

## 2023-03-07 ASSESSMENT — PAIN SCALES - GENERAL: PAINLEVEL: NO PAIN (0)

## 2023-03-07 NOTE — PROGRESS NOTES
Chief complaint: Patient presents with:  Toenail: DFE      History of Present Illness: This 67 year old NIDDM II male is seen for follow-up management of elongated toenails. The toenails are difficult to reach and to trim because of the thickness of the toenails.    He has had consistent tingling in the toes.    Patient says he had open heart surgery for a valve replacement and double bypass on 01/17/2023. He was in the hospital for eight days. He is doing well and currently going through cardiac rehab at [facility].    He had a vascular follow-up with Dr. Mcgrath at [facility] on 09/15/2022. He thinks he will see him again this summer of 2023.    He has not had a lot of pain from the bilateral hallux ingrown toenails.    Patients says he has not been applying lotion to his feet.        Lab Results   Component Value Date    A1C 7.9 01/09/2023    A1C 7.5 08/29/2022    A1C 7.3 02/17/2022    A1C 6.7 08/17/2021    A1C 6.5 02/15/2021       Last HbA1C was 7.5% on 08/29/2022.    ---Previously 7.3% on 02/17/2022.    ---Previously 6.7% on 08/17/2021.      No further pedal complaints today.     Patient quit smoking around 2011.        BP 95/65 (BP Location: Left arm, Patient Position: Sitting, Cuff Size: Adult Regular)   Pulse 86   Temp 97  F (36.1  C) (Tympanic)   SpO2 96%     Patient Active Problem List   Diagnosis     Abdominal aortic aneurysm     Erectile dysfunction     ACP (advance care planning)     Routine general medical examination at a health care facility     Essential hypertension     Mixed hyperlipidemia     Moderate major depression (H)     Morbid obesity (H)     Elevated prostate specific antigen (PSA)     Prediabetes     Diabetes mellitus, type 2 (H)     S/P AAA repair     Tobacco dependence in remission     Mitral valve prolapse     Status post coronary angiogram     Other ill-defined heart diseases     Coronary artery disease involving native heart, unspecified vessel or lesion type, unspecified whether  angina present     S/P CABG (coronary artery bypass graft)     S/P mitral valve repair     Postoperative atrial fibrillation (H)     Peripheral arterial disease (H)       Past Surgical History:   Procedure Laterality Date     AAA REPAIR  06/17/2020    [facility]/ intravascular repair     APPENDECTOMY       BYPASS GRAFT ARTERY CORONARY, REPAIR VALVE MITRAL, COMBINED N/A 1/18/2023    Procedure: MEDIAN STERNOTOMY, ON PUMP OXGENATION, Left endovascular saphaneous vein harvest, CORONARY ARTERY BYPASS GRAFT (CABG x 2), Mitral Valve Repair, transesophageal echocardiogram (TRENT) performed by anesthesia;  Surgeon: Linda Kim MD;  Location: [facility] OR     COLONOSCOPY  2013     CV CORONARY ANGIOGRAM N/A 12/9/2022    Procedure: Coronary Angiogram with possible intervention;  Surgeon: Porfirio Herrera MD;  Location:  HEART CARDIAC CATH LAB     CV RIGHT HEART CATH MEASUREMENTS RECORDED N/A 12/9/2022    Procedure: Right Heart Cath;  Surgeon: Porfirio Herrera MD;  Location:  HEART CARD[facility]AC CATH LAB     ESOPHAGOGASTRODUODENOSCOPY  2013     INCISION AND DRAINAGE CHEST WASHOUT, COMBINED N/A 1/19/2023    Procedure: chest washout, Re-do sternotomy, repair of vein graft;  Surgeon: Linda Kim MD;  Location: [facility] OR       Current Outpatient Medications   Medication     acetaminophen (TYLENOL) 325 MG tablet     amiodarone (PACERONE) 200 MG tablet     ARIPiprazole (ABILIFY) 5 MG tablet     aspirin (ASA) 81 MG chewable tablet     atorvastatin (LIPITOR) 80 MG tablet     buPROPion (WELLBUTRIN XL) 300 MG 24 hr tablet     diphenhydrAMINE HCl (BENADRYL ALLERGY PO)     lisinopril (ZESTRIL) 2.5 MG tablet     loratadine (CLARITIN) 10 MG tablet     metFORMIN (GLUCOPHAGE) 1000 MG tablet     methocarbamol (ROBAXIN) 500 MG tablet     metoprolol succinate ER (TOPROL XL) 25 MG 24 hr tablet     multivitamin, therapeutic (THERA-VIT) TABS tablet     pantoprazole (PROTONIX) 40 MG EC tablet     vitamin C (ASCORBIC ACID)  1000 MG TABS     No current facility-administered medications for this visit.        No Known Allergies    Family History   Problem Relation Age of Onset     C.A.D. Father         CABG in late 40's     C.A.D. Brother         MI in 50's       Social History     Socioeconomic History     Marital status:      Spouse name: None     Number of children: None     Years of education: None     Highest education level: None   Occupational History     None   Tobacco Use     Smoking status: Former Smoker     Packs/day: 0.00     Types: Cigarettes     Smokeless tobacco: Never Used     Tobacco comment: Feb. 2014   Substance and Sexual Activity     Alcohol use: Yes     Comment: occasional     Drug use: Yes     Types: Marijuana     Sexual activity: Yes     Partners: Female   Other Topics Concern     Parent/sibling w/ CABG, MI or angioplasty before 65F 55M? Yes     Comment: dad had heart attack before 55. brother also had heart attack before 55.   Social History Narrative    .      Social Determinants of Health     Financial Resource Strain:      Difficulty of Paying Living Expenses:    Food Insecurity:      Worried About Running Out of Food in the Last Year:      Ran Out of Food in the Last Year:    Transportation Needs:      Lack of Transportation (Medical):      Lack of Transportation (Non-Medical):    Physical Activity:      Days of Exercise per Week:      Minutes of Exercise per Session:    Stress:      Feeling of Stress :    Social Connections:      Frequency of Communication with Friends and Family:      Frequency of Social Gatherings with Friends and Family:      Attends Hoahaoism Services:      Active Member of Clubs or Organizations:      Attends Club or Organization Meetings:      Marital Status:    Intimate Partner Violence:      Fear of Current or Ex-Partner:      Emotionally Abused:      Physically Abused:      Sexually Abused:        ROS: 10 point ROS neg other than the symptoms noted above in the  HPI.  EXAM  Constitutional: healthy, alert and no distress    Psychiatric: mentation appears normal and affect normal/bright    VASCULAR:  -Dorsalis pedis pulse +1/4 b/l  -Posterior tibial pulse +1/4 b/l  -Capillary refill time < 3 seconds to b/l hallux  -Hair growth Absent to b/l anterior legs and ankles  NEURO:  -Positive for paresthesias to bilateral foot  -Epicritic and protective sensation diminished to the bilateral foot  DERM:  -Skin temperature cool to bilateral foot  -Mild rubor with purple discoloration to bilateral foot  -Skin diffusely dry, cracking and flaking to plantar foot (most cracking on heels) without open wounds or drainage at this time  -Toenails elongated, thickened, dystrophic and discolored x 10  ---Bilateral hallux toenails significantly dystrophic  ---Mild incurvation of the lateral toenail border of the RIGHT hallux -- no open wounds, no drainage  -----No severe erythema, no ascending erythema, no calor, no purulence, no malodor, no other signs of infection.   MSK:  -Muscle strength of ankles +5/5 for dorsiflexion, plantarflexion, ABDUction and ADDuction b/l    ============================================================    ASSESSMENT:  (L60.3) Onychodystrophy  (primary encounter diagnosis)    (L60.0) Ingrown toenail    (L85.3) Xerosis of skin    (I70.213) Intermittent claudication of both lower extremities due to atherosclerosis (H)    (E11.9) Diabetes mellitus type 2, noninsulin dependent (H)    (E11.42) Diabetic polyneuropathy associated with type 2 diabetes mellitus (H)    (I73.9) Peripheral arterial disease (H)      PLAN:  -Patient evaluated and examined. Treatment options discussed with no educational barriers noted.  -High risk toenail debridement x 10 toenails without incident    -Diabetic Foot Education provided. This included checking the feet daily looking for new new blisters or wounds, wearing shoes at all times when walking including around the house, and avoiding lotion  application between the toes. If there are any signs of infection, the patient should present to the ED as soon as possible. Infections of the foot can be life threatening or lead to amputations of the foot or leg.     -Vascular appointment -- coming up on 09/15/2021 with Dr. Mcgrath:    ---Dr. Mcgrath's note in July, 2021:   Reviewed his DAVID. The right was 0.79, the left 0.59. This corresponds to his complaint of his symptoms.    Moderate disease bilaterally, worse on the left. Discussed that he has fairly long segment disease on the left. I would not recommend intervention unless he truly has lifestyle limiting/disabling claudication. Discussed that any rest pain or tissue loss would lead to prompt evaluation for intervention. I discussed structured exercise and the significant benefit to be had from this in his pain free walking distance. However, given the distance that he has to walk, this may not be practical for him.   ---Patient says he thinks he will be called to follow-up with Dr. Mcgrath this summer of 2023.    -Patient in agreement with the above treatment plan and all of patient's questions were answered.      RTC 3 months for diabetic foot exam and high risk nail debridement         Sierra Velásquez DPM

## 2023-03-08 ENCOUNTER — HOSPITAL ENCOUNTER (OUTPATIENT)
Dept: CARDIAC REHAB | Facility: HOSPITAL | Age: 68
Setting detail: THERAPIES SERIES
Discharge: HOME | End: 2023-03-08
Attending: FAMILY MEDICINE
Payer: COMMERCIAL

## 2023-03-08 PROCEDURE — 999N000109 HC STATISTIC OP CR VISIT

## 2023-03-08 PROCEDURE — 93798 PHYS/QHP OP CAR RHAB W/ECG: CPT

## 2023-03-10 ENCOUNTER — HOSPITAL ENCOUNTER (OUTPATIENT)
Dept: CARDIAC REHAB | Facility: HOSPITAL | Age: 68
Setting detail: THERAPIES SERIES
Discharge: HOME | End: 2023-03-10
Attending: FAMILY MEDICINE
Payer: COMMERCIAL

## 2023-03-10 ENCOUNTER — HOSPITAL ENCOUNTER (OUTPATIENT)
Dept: CARDIOLOGY | Facility: HOSPITAL | Age: 68
Discharge: HOME | End: 2023-03-10
Attending: NURSE PRACTITIONER | Admitting: INTERNAL MEDICINE
Payer: COMMERCIAL

## 2023-03-10 DIAGNOSIS — I97.89 POSTOPERATIVE ATRIAL FIBRILLATION (H): ICD-10-CM

## 2023-03-10 DIAGNOSIS — I48.91 POSTOPERATIVE ATRIAL FIBRILLATION (H): ICD-10-CM

## 2023-03-10 PROCEDURE — 93798 PHYS/QHP OP CAR RHAB W/ECG: CPT

## 2023-03-10 PROCEDURE — 999N000109 HC STATISTIC OP CR VISIT

## 2023-03-10 PROCEDURE — 93248 EXT ECG>7D<15D REV&INTERPJ: CPT | Performed by: INTERNAL MEDICINE

## 2023-03-10 PROCEDURE — 93246 EXT ECG>7D<15D RECORDING: CPT

## 2023-03-10 NOTE — PROGRESS NOTES
Zio patch placed on patient in outpatient appointment today. Patient was instructed to wear patch for 14 days. Skin was prepped and patch was placed following [employer] guidelines .     Patient was instructed to push button when symptomatic and fill out diary. Patient was instructed not to submerse in water and no swimming, saunas, or hot tubs. Showers may be taken keeping back towards the water. If the area DOES become wet pat it dry with a cloth. If skin irritation occurs patient was instructed to remove patch and contact [employer].    Patient understands we do not receive results until they mail patch back inside the box. Staff reminded patient of outside billing notice which was explained to patient during the scheduling process of this monitor. Patient also instructed to contact [employer] with any questions [phone].    Patient had no further questions and agreed with plan. Patient was sent home with the box, information pamphlet and booklet to latrell any incidents in.

## 2023-03-13 ENCOUNTER — HOSPITAL ENCOUNTER (OUTPATIENT)
Dept: CARDIAC REHAB | Facility: HOSPITAL | Age: 68
Setting detail: THERAPIES SERIES
Discharge: HOME | End: 2023-03-13
Attending: FAMILY MEDICINE
Payer: COMMERCIAL

## 2023-03-13 PROCEDURE — 93798 PHYS/QHP OP CAR RHAB W/ECG: CPT

## 2023-03-13 PROCEDURE — 999N000109 HC STATISTIC OP CR VISIT

## 2023-03-15 ENCOUNTER — HOSPITAL ENCOUNTER (OUTPATIENT)
Dept: CARDIAC REHAB | Facility: HOSPITAL | Age: 68
Setting detail: THERAPIES SERIES
Discharge: HOME | End: 2023-03-15
Attending: FAMILY MEDICINE
Payer: COMMERCIAL

## 2023-03-15 PROCEDURE — 93798 PHYS/QHP OP CAR RHAB W/ECG: CPT

## 2023-03-15 PROCEDURE — 999N000109 HC STATISTIC OP CR VISIT

## 2023-03-17 ENCOUNTER — HOSPITAL ENCOUNTER (OUTPATIENT)
Dept: CARDIAC REHAB | Facility: HOSPITAL | Age: 68
Setting detail: THERAPIES SERIES
Discharge: HOME | End: 2023-03-17
Attending: FAMILY MEDICINE
Payer: COMMERCIAL

## 2023-03-17 PROCEDURE — 93798 PHYS/QHP OP CAR RHAB W/ECG: CPT

## 2023-03-17 PROCEDURE — 999N000109 HC STATISTIC OP CR VISIT

## 2023-03-20 ENCOUNTER — HOSPITAL ENCOUNTER (OUTPATIENT)
Dept: CARDIAC REHAB | Facility: HOSPITAL | Age: 68
Setting detail: THERAPIES SERIES
Discharge: HOME | End: 2023-03-20
Attending: FAMILY MEDICINE
Payer: COMMERCIAL

## 2023-03-20 PROCEDURE — 93798 PHYS/QHP OP CAR RHAB W/ECG: CPT

## 2023-03-20 PROCEDURE — 999N000109 HC STATISTIC OP CR VISIT

## 2023-03-24 ENCOUNTER — HOSPITAL ENCOUNTER (OUTPATIENT)
Dept: CARDIAC REHAB | Facility: HOSPITAL | Age: 68
Setting detail: THERAPIES SERIES
Discharge: HOME | End: 2023-03-24
Attending: FAMILY MEDICINE
Payer: COMMERCIAL

## 2023-03-24 PROCEDURE — 93798 PHYS/QHP OP CAR RHAB W/ECG: CPT

## 2023-03-24 PROCEDURE — 999N000109 HC STATISTIC OP CR VISIT

## 2023-03-27 ENCOUNTER — HOSPITAL ENCOUNTER (OUTPATIENT)
Dept: CARDIAC REHAB | Facility: HOSPITAL | Age: 68
Setting detail: THERAPIES SERIES
Discharge: HOME | End: 2023-03-27
Attending: FAMILY MEDICINE
Payer: COMMERCIAL

## 2023-03-27 PROCEDURE — 999N000109 HC STATISTIC OP CR VISIT

## 2023-03-27 PROCEDURE — 93798 PHYS/QHP OP CAR RHAB W/ECG: CPT

## 2023-03-29 ENCOUNTER — HOSPITAL ENCOUNTER (OUTPATIENT)
Dept: CARDIAC REHAB | Facility: HOSPITAL | Age: 68
Setting detail: THERAPIES SERIES
Discharge: HOME | End: 2023-03-29
Attending: FAMILY MEDICINE
Payer: COMMERCIAL

## 2023-03-29 PROCEDURE — 999N000109 HC STATISTIC OP CR VISIT

## 2023-03-29 PROCEDURE — 93798 PHYS/QHP OP CAR RHAB W/ECG: CPT

## 2023-03-31 ENCOUNTER — HOSPITAL ENCOUNTER (OUTPATIENT)
Dept: CARDIAC REHAB | Facility: HOSPITAL | Age: 68
Setting detail: THERAPIES SERIES
Discharge: HOME | End: 2023-03-31
Attending: FAMILY MEDICINE
Payer: COMMERCIAL

## 2023-03-31 PROCEDURE — 999N000109 HC STATISTIC OP CR VISIT

## 2023-03-31 PROCEDURE — 93798 PHYS/QHP OP CAR RHAB W/ECG: CPT

## 2023-04-03 ENCOUNTER — HOSPITAL ENCOUNTER (OUTPATIENT)
Dept: CARDIAC REHAB | Facility: HOSPITAL | Age: 68
Setting detail: THERAPIES SERIES
Discharge: HOME | End: 2023-04-03
Attending: FAMILY MEDICINE
Payer: COMMERCIAL

## 2023-04-03 PROCEDURE — 93798 PHYS/QHP OP CAR RHAB W/ECG: CPT

## 2023-04-03 PROCEDURE — 999N000109 HC STATISTIC OP CR VISIT

## 2023-04-05 ENCOUNTER — HOSPITAL ENCOUNTER (OUTPATIENT)
Dept: CARDIAC REHAB | Facility: HOSPITAL | Age: 68
Setting detail: THERAPIES SERIES
Discharge: HOME | End: 2023-04-05
Attending: FAMILY MEDICINE
Payer: COMMERCIAL

## 2023-04-05 PROCEDURE — 999N000109 HC STATISTIC OP CR VISIT

## 2023-04-05 PROCEDURE — 93798 PHYS/QHP OP CAR RHAB W/ECG: CPT

## 2023-04-10 ENCOUNTER — HOSPITAL ENCOUNTER (OUTPATIENT)
Dept: CARDIAC REHAB | Facility: HOSPITAL | Age: 68
Setting detail: THERAPIES SERIES
Discharge: HOME | End: 2023-04-10
Attending: FAMILY MEDICINE
Payer: COMMERCIAL

## 2023-04-10 PROCEDURE — 93798 PHYS/QHP OP CAR RHAB W/ECG: CPT

## 2023-04-10 PROCEDURE — 999N000109 HC STATISTIC OP CR VISIT

## 2023-04-12 ENCOUNTER — HOSPITAL ENCOUNTER (OUTPATIENT)
Dept: CARDIAC REHAB | Facility: HOSPITAL | Age: 68
Setting detail: THERAPIES SERIES
Discharge: HOME | End: 2023-04-12
Attending: FAMILY MEDICINE
Payer: COMMERCIAL

## 2023-04-12 PROCEDURE — 999N000109 HC STATISTIC OP CR VISIT

## 2023-04-12 PROCEDURE — 93798 PHYS/QHP OP CAR RHAB W/ECG: CPT

## 2023-04-14 ENCOUNTER — OFFICE VISIT (OUTPATIENT)
Dept: CARDIOLOGY | Facility: OTHER | Age: 68
End: 2023-04-14
Attending: NURSE PRACTITIONER
Payer: COMMERCIAL

## 2023-04-14 ENCOUNTER — HOSPITAL ENCOUNTER (OUTPATIENT)
Dept: CARDIAC REHAB | Facility: HOSPITAL | Age: 68
Setting detail: THERAPIES SERIES
Discharge: HOME | End: 2023-04-14
Attending: FAMILY MEDICINE
Payer: COMMERCIAL

## 2023-04-14 VITALS
SYSTOLIC BLOOD PRESSURE: 122 MMHG | OXYGEN SATURATION: 96 % | HEART RATE: 65 BPM | TEMPERATURE: 97.9 F | DIASTOLIC BLOOD PRESSURE: 78 MMHG | BODY MASS INDEX: 31.15 KG/M2 | HEIGHT: 72 IN | WEIGHT: 230 LBS | RESPIRATION RATE: 20 BRPM

## 2023-04-14 DIAGNOSIS — R06.83 SNORING: Primary | ICD-10-CM

## 2023-04-14 DIAGNOSIS — I34.1 MITRAL VALVE PROLAPSE: ICD-10-CM

## 2023-04-14 DIAGNOSIS — I97.89 POSTOPERATIVE ATRIAL FIBRILLATION (H): ICD-10-CM

## 2023-04-14 DIAGNOSIS — I10 ESSENTIAL HYPERTENSION: ICD-10-CM

## 2023-04-14 DIAGNOSIS — I50.20 HFREF (HEART FAILURE WITH REDUCED EJECTION FRACTION) (H): ICD-10-CM

## 2023-04-14 DIAGNOSIS — I48.91 POSTOPERATIVE ATRIAL FIBRILLATION (H): ICD-10-CM

## 2023-04-14 DIAGNOSIS — Z95.2 S/P MVR (MITRAL VALVE REPLACEMENT): ICD-10-CM

## 2023-04-14 DIAGNOSIS — R06.81 WITNESSED EPISODE OF APNEA: ICD-10-CM

## 2023-04-14 DIAGNOSIS — I51.7 LAE (LEFT ATRIAL ENLARGEMENT): ICD-10-CM

## 2023-04-14 PROCEDURE — 99215 OFFICE O/P EST HI 40 MIN: CPT | Performed by: NURSE PRACTITIONER

## 2023-04-14 PROCEDURE — 999N000109 HC STATISTIC OP CR VISIT

## 2023-04-14 PROCEDURE — G0463 HOSPITAL OUTPT CLINIC VISIT: HCPCS

## 2023-04-14 PROCEDURE — 93798 PHYS/QHP OP CAR RHAB W/ECG: CPT

## 2023-04-14 PROCEDURE — G0463 HOSPITAL OUTPT CLINIC VISIT: HCPCS | Mod: 25

## 2023-04-14 ASSESSMENT — PAIN SCALES - GENERAL: PAINLEVEL: NO PAIN (0)

## 2023-04-14 NOTE — PATIENT INSTRUCTIONS
Thank you for allowing Zoe Carr CNP and our  team to participate in your care. Please call our office at [phone] with scheduling questions or if you need to cancel or change your appointment. With any other questions or concerns you may call cardiology nurse at [phone] or [phone].       If you experience chest pain, chest pressure, chest tightness, shortness of breath, fainting, lightheadedness, nausea, vomiting, or other concerning symptoms, please report to the Emergency Department or call 911. These symptoms may be emergent, and best treated in the Emergency Department.      No changes today    Zio patch and echocardiogram in January 2024    Follow-up with cardiology after completion of testing      Zoe Carr CNP

## 2023-04-14 NOTE — PROGRESS NOTES
[facility] HEART CARE   CARDIOLOGY PROGRESS NOTE     Chief Complaint   Patient presents with     Follow Up     Heart Problem          Diagnosis:    ICD-10-CM    1. Snoring  R06.83 Sleep Study Referral      2. Witnessed episode of apnea  R06.81 Sleep Study Referral      3. HFrEF (heart failure with reduced ejection fraction) (H)  I50.20 Sleep Study Referral     Echocardiogram Complete      4. Essential hypertension  I10 Sleep Study Referral      5. LAE (left atrial enlargement)  I51.7 Sleep Study Referral      6. Postoperative atrial fibrillation (H)  I97.89 Echocardiogram Complete    I48.91 Leadless EKG Monitor 8 to 14 Days      7. Mitral valve prolapse  I34.1 Echocardiogram Complete      8. S/P MVR (mitral valve replacement)  Z95.2 Echocardiogram Complete            Assessment/Plan:    1. Mitral valve prolapse (moderate-severe) with left ventricular dilation (moderate) on TTE 9/2022  2. S/p mitral valve repair and mitral valve annuloplasty ring at [facility] 1/18/2023 (Mitral valve repair with [place]-Reynaldo NeoChords to the flail P2 segment, closure of P1/P2 cleft, implantation of a 34 mm Avilez Physio II annuloplasty ring)    Prior to procedure he was symptomatic with progressive fatigue, dyspnea on exertion. His fatigue has improved and his dyspnea with minimal exertion has overall resolved.     Post-operative complications including bleeding of graft warranting being re-opened in the OR, acute blood loss anemia, post-operative atrial fibrillation.     Continue aspirin 162 mg once daily    Continue cardiac rehab and lifting precautions      3. Heart Failure with mildly reduced Ejection Fraction (HFREF) with LVEF 40-45% 1/2023   4. Left ventricular dilation  NYHA Symptom Class II  Stage C      ACE-I/ARB/ARNi: Continue lisinopril 2.5 mg once daily    BB: Continue with metoprolol XL 25 mg     SGLT-2 Inhibitor:     Aldosterone antagonist     SCD prophylaxis does not meet criteria for implant with LVEF 40-45%    %BiV pacing:   N/A    Fluid status Euvolemic    Cardiac Rehab: Currently in cardiac rehab s/p CABG    Sleep Apnea Evaluation: Referral placed today for snoring, ongoing fatigue    Wt Readings from Last 5 Encounters:   04/14/23 104.3 kg (230 lb)   02/23/23 101.6 kg (224 lb)   02/13/23 104.3 kg (230 lb)   01/31/23 108.2 kg (238 lb 9.6 oz)   01/17/23 109.2 kg (240 lb 11.9 oz)         5. Coronary artery disease  6. S/p coronary artery bypass graft (CABG) x 2V CABG-     Reverse saphenous vein graft to the posterior ascending artery    Reverse saphenous vein graft to the diagonal artery at [facility] 1/8/2023    Continue high intensity statin: atorvastatin 80 mg once daily for secondary prevention and plaque stabilization    Continue aspirin 162 mg once daily    Continue beta-blockade for secondary prevention CAD, HFmrEF    Continue lisinopril for HFmrEF, hypertension    Completed cardiac rehab    No concerns with Sternotomy/sternotomy. No clicking, popping, or discomfort.       7. Left atrial enlargement    Risk factor modification    Moderate-severe mitral regurgitation s/p mitral valve repair      8. Post-operative atrial fibrillation    He has completed 30 days of amiodarone 200 mg once daily prescription    He stopped amiodarone wore a zio patch which did not show recurrence of atrial fibrillation.     He denies any symptoms of racing, skipping, fluttering, palpitations.    Reviewed nature of atrial fibrillation and there is no way to know for certain whether his post-operative atrial fibrillation was the first occurrence or if there would be a recurrence.      9. CHADs-VASC >= 2    He was not discharged home with chronic oral anticoagulation for stroke prophylaxis with post-operative atrial fibrillation.     Reviewed stroke risk factors and chronic oral anticoagulation    At this time he would like to wait on starting chronic oral anticoagulation therapy.     Plan for zio patch in 1 year, certainly sooner with any symptoms of racing,  skipping, fluttering, lightheadedness, dyspnea.       10. Abdominal aortic aneurysm s/p AAA repair 2017 at [facility]    Recent MR at [facility] and per patient report no change in size    Continue to follow with vascular at [facility]      11. Essential hypertension, controlled     Continue guideline directed medical therapy for heart failure    BP Readings from Last 6 Encounters:   04/14/23 122/78   03/07/23 95/65   02/23/23 121/78   02/13/23 106/76   01/31/23 109/72   01/09/23 107/68       12. Mixed hyperlipidemia with LDL goal less than 70, controlled    Continue Atorvastatin 80 mg once daily    Recent Labs   Lab Test 08/29/22  1441 08/17/21  0826   CHOL 153 168   HDL 57 49   LDL 56 76   TRIG 198* 214*       13. Diabetes Mellitus, type II    Poorly controlled with recent A1c of 7.9%    Continue management by PCP    Statin: atorvastatin 80 mg once daily    ACEi: Lisinopril 2.5 mg once daily      14. Claudication    Follows with vascular at [facility]       15. Former smoker    Longstanding history, quit around age 60.           Interval history:  Gilberto Camilo is a 67-year old male who presents for cardiology follow-up. Recall he has a cardiac history including moderate-severe mitral regurgitation s/p mitral valve repair at [facility] 1/18/2023, coronary artery disease s/p 2V CABG- reverse saphenous vein graft to the posterior ascending artery, reverse saphenous vein graft to the diagonal artery at [facility] 1/8/2023,  abdominal aortic aneurysm s/p repair in 2017 at [facility] in [facility], essential hypertension, mixed hyperlipidemia, PAD with claudication (follows with [facility] vascular). He has a noncardiac history including DM- type II, ED, depression, former smoker.     Mr. Camilo was initially seen in consult at prior visit for finding of moderate-severe mitral valve prolapse. He had symptoms of progressive dyspnea, dyspnea on exertion, and fatigue. He was referred for evaluation by structural heart clinic with  "work-up including coronary angiogram which showed 2 vessel obstructive CAD. He underwent 2 vessel CABG as well as mitral valve repair. His post-operative period was complicated by bleeding of one of the grafts. He was brought back to the OR and hemostasis achieved. He also developed post-operative atrial fibrillation and was started on amiodarone. He has completed cardiac rehab and tolerated without concerns for chest pain/pressure, dyspnea, palpitations. He continues with discomfort of sternotomy only with coughing or sneezing. No popping or clicking sensation at sternotomy site.  No swelling in legs/feet. No sensation of racing/fluttering/palpitations. No episodes of lightheadedness, dizziness, near-syncope or syncope.         Smoking History: Started smoking at age 18, quit around 2015 (about age 60 years old). Smoking up to 3 ppd.    Alcohol History: None    Substance Abuse History: None    Sleep History: Sleeps in bed, naps in the chair. Typically goes to bed around 10 pm, wakes up around 8 am. Wakes up a couple times per night to void, able to fall right back to sleep. He does snore \"if allergies are acting up.\"   Per his wife he was supposed to have a sleep study but never went.  She has witnessed apneic periods. Wakes up most mornings feeling rested but within 2-3 hours feeling tired and ready for a nap. Denies orthopnea, PND.       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 He has a cardiac history including abdominal aortic aneurysm s/p repair in 2017 at " [facility] in [facility], essential hypertension, mixed hyperlipidemia, coronary calcifications on CT, PAD with claudication (follows with [facility] vascular). He has a noncardiac history including DM- type II, ED, depression, former smoker.       Mr. Camilo has had fatigue in the last 6 months. Per wife- increased napping. Sleeps at least 10 hours per night and several naps throughout the day. This is new and worrisome to wife Marcella. She endorses that he previously would sleep 8 hours and be up and ready to go, rare naps.     He denies chest pain, exertional chest pain, dyspnea at rest or with conversation. He has had dyspnea on exertion which seems to be progressively getting worse. He [place] his [place] about 0.25 miles and he had to stop multiple times due to dyspnea. Per his wife he previously had to stop due to symptoms of bilateral LE claudication versus dyspnea; however, now he is becoming dyspneic before the claudication starts. Denies any LE edema, abdominal bloating.     Per his wife he has had some lightheaded dizziness with postural change from sitting to standing and with bending over to standing. This is newer for him. He has felt near-syncopal a couple times. He has not had a syncopal episode.      Smoking History: Started smoking at age 18, quit around 2015 (about age 60 years old). Smoking up to 3 ppd.    Alcohol History: None    Substance Abuse History: None    Sleep History: Sleeps in bed, naps in the chair. Typically goes to bed around 10 pm, wakes up around 8 am. Wakes up a couple times per night to void, able to fall right back to sleep. He does snore \"if allergies are acting up.\"   Per his wife he was supposed to have a sleep study but never went.  She has witnessed apneic periods. Wakes up most mornings feeling rested but within 2-3 hours feeling tired and ready for a nap. Denies orthopnea, PND.       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 Trace  mitral insufficiency is present.     Technically difficult study.  Left ventricular function is decreased. The ejection fraction is 40-45%  (mildly reduced).  Difficult to assesss RV function. Global right ventricular function probably  is mildly reduced.  No pericardial effusion is present.     This study was compared with the study from 2022. S/P Mitral valve  repair for severe MR. LVEF declined mildly.      Cardiac catheterization at [facility] 2022  Conclusion    Right sided filling pressures are normal.    Mild elevated pulmonary hypertension.    Left sided filling pressures are moderately elevated.    Normal cardiac output level.    Hemodynamic data has been modified in Epic per physician review.    RPDA lesion is 100% stenosed.    Prox RCA to Mid RCA lesion is 95% stenosed.    1st Diag lesion is 90% stenosed.      Transthoracic Echocardiogram 2022  Interpretation Summary  No pericardial effusion is present.  Global and regional left ventricular function is normal with an EF of 55-60%.  Moderate left ventricular dilation is present.  The right ventricle is normal size.  Global right ventricular function is normal.  Moderate left atrial enlargement is present.  The posterior MV leaflet is slightly thickened and shortened with prolapse and  the valves do not coapt. There is significant MR jet towrds the interatrial  septum.  Moderate to severe mitral  insufficiency is present.   Trace aortic insufficiency is present.  No aortic stenosis is present.  Trace tricuspid insufficiency is present.  Trace pulmonic insufficiency is present.         Past Medical History:   Diagnosis Date     AAA (abdominal aortic aneurysm) (H) 09/20/2017    5.3 cm      Mohan esophagus      Closed rib fracture      Coronary artery disease involving native heart, unspecified vessel or lesion type, unspecified whether angina present 1/18/2023     DNS (deviated nasal septum)      Postoperative atrial fibrillation (H) 2/13/2023     S/P CABG (coronary artery bypass graft) 2/13/2023     S/P mitral valve repair 2/13/2023       Past Surgical History:   Procedure Laterality Date     AAA REPAIR  06/17/2020    [facility]/ intravascular repair     APPENDECTOMY       BYPASS GRAFT ARTERY CORONARY, REPAIR VALVE MITRAL, COMBINED N/A 1/18/2023    Procedure: MEDIAN STERNOTOMY, ON PUMP OXGENATION, Left endovascular saphaneous vein harvest, CORONARY ARTERY BYPASS GRAFT (CABG x 2), Mitral Valve Repair, transesophageal echocardiogram (TRENT) performed by anesthesia;  Surgeon: Linda Kim MD;  Location: [facility] OR     COLONOSCOPY  2013     CV CORONARY ANGIOGRAM N/A 12/9/2022    Procedure: Coronary Angiogram with possible intervention;  Surgeon: Porfirio Herrera MD;  Location:  HEART CARDIAC CATH LAB     CV RIGHT HEART CATH MEASUREMENTS RECORDED N/A 12/9/2022    Procedure: Right Heart Cath;  Surgeon: Porfirio Herrera MD;  Location:  HEART CARDIAC CATH LAB     ESOPHAGOGASTRODUODENOSCOPY  2013     INCISION AND DRAINAGE CHEST WASHOUT, COMBINED N/A 1/19/2023    Procedure: chest washout, Re-do sternotomy, repair of vein graft;  Surgeon: Linda Kim MD;  Location:  OR       No Known Allergies    Current Outpatient Medications   Medication Sig Dispense Refill     acetaminophen (TYLENOL) 325 MG tablet Take 2 tablets (650 mg) by mouth every 4 hours as needed for other (For optimal  non-opioid multimodal pain management to improve pain control.) 100 tablet 1     amiodarone (PACERONE) 200 MG tablet Take 1 tablet (200 mg) by mouth daily 30 tablet 1     ARIPiprazole (ABILIFY) 5 MG tablet 1 tablet (5 mg) by Oral or Feeding Tube route every morning 30 tablet 3     aspirin (ASA) 81 MG chewable tablet 2 tablets (162 mg) by Oral or NG Tube route daily for 90 days 180 tablet 0     atorvastatin (LIPITOR) 80 MG tablet Take 1 tablet (80 mg) by mouth every morning for 90 days 90 tablet 3     buPROPion (WELLBUTRIN XL) 300 MG 24 hr tablet Take 1 tablet (300 mg) by mouth daily 30 tablet 3     diphenhydrAMINE HCl (BENADRYL ALLERGY PO) Take by mouth as needed       lisinopril (ZESTRIL) 2.5 MG tablet Take 1 tablet (2.5 mg) by mouth daily 90 tablet 3     loratadine (CLARITIN) 10 MG tablet Take 10 mg by mouth every morning       metFORMIN (GLUCOPHAGE) 1000 MG tablet Take 1 tablet (1,000 mg) by mouth 2 times daily (with meals) 60 tablet 3     methocarbamol (ROBAXIN) 500 MG tablet 1 tablet (500 mg) by Oral or Feeding Tube route every 6 hours as needed for muscle spasms 30 tablet 0     metoprolol succinate ER (TOPROL XL) 25 MG 24 hr tablet TAKE 1 TABLET(25 MG) BY MOUTH DAILY 90 tablet 3     multivitamin, therapeutic (THERA-VIT) TABS tablet 1 tablet by Oral or Feeding Tube route every morning 30 tablet 3     pantoprazole (PROTONIX) 40 MG EC tablet Take 1 tablet (40 mg) by mouth daily 90 tablet 3     vitamin C (ASCORBIC ACID) 1000 MG TABS Take 1,000 mg by mouth every morning         Social History     Socioeconomic History     Marital status:      Spouse name: Not on file     Number of children: Not on file     Years of education: Not on file     Highest education level: Not on file   Occupational History     Not on file   Tobacco Use     Smoking status: Former     Packs/day: 1.00     Years: 25.00     Pack years: 25.00     Types: Cigarettes     Smokeless tobacco: Never     Tobacco comments:     Feb. 2014   Vaping  Use     Vaping status: Never Used   Substance and Sexual Activity     Alcohol use: Yes     Comment: occasional     Drug use: Not Currently     Types: Marijuana     Sexual activity: Yes     Partners: Female   Other Topics Concern     Parent/sibling w/ CABG, MI or angioplasty before 65F 55M? Yes     Comment: dad had heart attack before 55. brother also had heart attack before 55.   Social History Narrative    .      Social Determinants of Health     Financial Resource Strain: Not on file   Food Insecurity: Not on file   Transportation Needs: Not on file   Physical Activity: Not on file   Stress: Not on file   Social Connections: Not on file   Intimate Partner Violence: Not on file   Housing Stability: Not on file       LAB RESULTS:   Orders placed or performed in visit on 02/23/23     aspirin (ASA) 81 MG chewable tablet     lisinopril (ZESTRIL) 2.5 MG tablet     amiodarone (PACERONE) 200 MG tablet     atorvastatin (LIPITOR) 80 MG tablet     pantoprazole (PROTONIX) 40 MG EC tablet         Review of systems: Negative except that which was noted in the HPI.    Physical examination:    Vitals: /78 (BP Location: Right arm, Patient Position: Sitting, Cuff Size: Adult Regular)   Pulse 65   Temp 97.9  F (36.6  C) (Tympanic)   Resp 20   Ht 1.829 m (6')   Wt 104.3 kg (230 lb)   SpO2 96%   BMI 31.19 kg/m    BMI= Body mass index is 31.19 kg/m .      GENERAL APPEARANCE: healthy, alert and no distress  HEENT: no icterus, no xanthelasmas, normal pupil size and reaction, no cyanosis.  NECK: no adenopathy, no asymmetry, masses.  CHEST: lungs clear to auscultation - no rales, rhonchi or wheezes, no use of accessory muscles, no retractions, respirations are unlabored, normal respiratory rate  CARDIOVASCULAR: regular rhythm, normal rate. normal S1 with physiologic split S2, no S3 or S4 and no murmur, click or rub  EXTREMITIES: no clubbing, cyanosis or edema  NEURO: alert and oriented normal speech, and affect  VASC:  No vascular bruits heard.  SKIN: Sternotomy site is clean, dry intact. Edges are well approximated and there are no open areas. No tenderness. No erythema or warmth. No popping or clicking of sternum. no ecchymoses, no rashes        Thank you for allowing me to participate in the care of your patient. Please do not hesitate to contact me if you have any questions.     Total time spent on day of visit, including review of tests, obtaining/reviewing separately obtained history, ordering medications/tests/procedures, communicating with PCP/consultants, and documenting in electronic medical record: 45 minutes.         Zoe Carr, CNP

## 2023-04-25 ENCOUNTER — TELEPHONE (OUTPATIENT)
Dept: CARDIOLOGY | Facility: OTHER | Age: 68
End: 2023-04-25

## 2023-04-25 ENCOUNTER — TELEPHONE (OUTPATIENT)
Dept: FAMILY MEDICINE | Facility: OTHER | Age: 68
End: 2023-04-25

## 2023-04-25 NOTE — TELEPHONE ENCOUNTER
Spoke with patient today in regards to medication adherence of metformin. Last filled 3/1 for 30 day supply. Patient confirmed he is taking 1000 mg twice daily (dose change after hospitalization). He stated he does not need a refill at this time and that he is taking it twice daily.     Thank you,     Ilana Almeida, PharmD, [facility]  Pharmacy

## 2023-04-25 NOTE — TELEPHONE ENCOUNTER
Overbye, Zoe, CNP sent to Yokasta Foss LPN  Caller: Unspecified (Today,  2:54 PM)  He can try taking saunas.   He should start with lower temperature and shorter duration to see how he feels. The lisinopril and metoprolol can sometimes exaggerate effects of heat. This can make him feel lightheaded, dizzy which is why starting with a lower temperature and shorter duration would be best to see how he feels.     Thanks,     Zoe Carr CNP on 4/25/2023 at 3:02 PM

## 2023-05-03 NOTE — PROGRESS NOTES
"  Assessment & Plan     1. Type 2 diabetes mellitus with other specified complication, without long-term current use of insulin (H)  A1C today 6.5 down from 7.9. Congratulated him and his work and life changes after cardiac surgery. Discussed continuing working hard at exercise in the summer. Will keep metformin at current dose. Will recheck in 3-4 months, He is comfortable with plan  - Hemoglobin A1c; Future  - Lipid Profile; Future  - Comprehensive metabolic panel; Future  - lisinopril (ZESTRIL) 5 MG tablet; Take 1 tablet (5 mg) by mouth daily  Dispense: 90 tablet; Refill: 3    2. S/P CABG (coronary artery bypass graft)  Patient has been doing well after his surgery. Well healing incision on chest. He currently has no issues and will follow up with cardiology every 6 months. Will raise zestril as mentioned below \"hypertension\" plan. He should continue exercise. He has no other questions or concerns.   - CBC with Platelets & Differential; Future  - Lipid Profile; Future  - Comprehensive metabolic panel; Future  - lisinopril (ZESTRIL) 5 MG tablet; Take 1 tablet (5 mg) by mouth daily  Dispense: 90 tablet; Refill: 3    3. S/P mitral valve repair  See above plan  - CBC with Platelets & Differential; Future  - Lipid Profile; Future    4. Coronary artery disease involving native heart without angina pectoris, unspecified vessel or lesion type  See above plan  - CBC with Platelets & Differential; Future  - Comprehensive metabolic panel; Future    5. Essential hypertension  BP in office elevated on multiple reads. Discussed doubling zestril from 2.5 to 5mg to help lower it. He is comfortable with plan. Will recheck BP at next visit.  - lisinopril (ZESTRIL) 5 MG tablet; Take 1 tablet (5 mg) by mouth daily  Dispense: 90 tablet; Refill: 3    6. Morbid obesity (H)  Keep working on this    7. Elevated prostate specific antigen (PSA)  PSA WNL  - PSA tumor marker; Future    8. Special screening for malignant neoplasms, " colon  Discussed that would like to wait to closer to 1 year after cardiac surgery before colonoscopy. Will discuss again at next visit. He is comfortable with plan.    9. Need for vaccination  Vaccine administered  - Pneumococcal 20 Valent Conjugate (Prevnar 20)                   No follow-ups on file.    Rock Basilio MD  [facility] - KAIDEN Garnican is a 68 year old, presenting for the following health issues:  Diabetes    DM  Does not take sugars at home - out of testing strips  Activity - hoping to be more active now that warmer  Diet - watches carbs and salt  Eye appointment - will get in November  Medications - no issues  Smoking - non  Alcohol - non drinker    Heart  No issues  Follows with cardiology - every 6 months                 View : No data to display.              HPI   Answers for HPI/ROS submitted by the patient on 5/4/2023  If you checked off any problems, how difficult have these problems made it for you to do your work, take care of things at home, or get along with other people?: Not difficult at all  PHQ9 TOTAL SCORE: 4  OFELIA 7 TOTAL SCORE: 0      Diabetes Follow-up      How often are you checking your blood sugar? Not at all    What concerns do you have today about your diabetes? None     Do you have any of these symptoms? (Select all that apply)  No numbness or tingling in feet.  No redness, sores or blisters on feet.  No complaints of excessive thirst.  No reports of blurry vision.  No significant changes to weight.        Hyperlipidemia Follow-Up      Are you regularly taking any medication or supplement to lower your cholesterol?   Yes- lipitor     Are you having muscle aches or other side effects that you think could be caused by your cholesterol lowering medication?  No    Hypertension Follow-up      Do you check your blood pressure regularly outside of the clinic? Yes     Are you following a low salt diet? Yes    Are your blood pressures ever more than 140  on the top number (systolic) OR more   than 90 on the bottom number (diastolic), for example 140/90? No    BP Readings from Last 2 Encounters:   05/04/23 (!) 142/85   04/14/23 122/78     Hemoglobin A1C (%)   Date Value   01/09/2023 7.9 (H)   08/29/2022 7.5 (H)   02/15/2021 6.5 (H)     LDL Cholesterol Calculated (mg/dL)   Date Value   08/29/2022 56   08/17/2021 76   08/21/2020 68   10/02/2019 97       Atrial Fibrillation Follow-up      Symptoms: no recent chest pain, significant palpitations, dizziness/lightheadedness, dyspnea, or increased peripheral edema., no chest pain, no palpitations, no dizziness/lightheadedness, dyspnea and no increased dependent edema- shortness of breath is every once in a while     Stroke prevention: None        2/13/2023     9:40 AM 2/23/2023    10:17 AM 3/7/2023     1:57 PM 4/14/2023    10:57 AM 5/4/2023     8:43 AM   Date   Pulse 91 79 86 65 64     Current      Vascular Disease Follow-up      How often do you take nitroglycerin? Never    Do you take an aspirin every day? Yes    Depression Followup    How are you doing with your depression since your last visit? No change    Are you having other symptoms that might be associated with depression? No    Have you had a significant life event?  No     Are you feeling anxious or having panic attacks?   No    Do you have any concerns with your use of alcohol or other drugs? No    Social History     Tobacco Use     Smoking status: Former     Packs/day: 1.00     Years: 25.00     Pack years: 25.00     Types: Cigarettes     Smokeless tobacco: Never     Tobacco comments:     Feb. 2014   Vaping Use     Vaping status: Never Used   Substance Use Topics     Alcohol use: Yes     Comment: occasional     Drug use: Not Currently     Types: Marijuana         2/17/2022     8:00 AM 1/6/2023     1:34 PM 5/4/2023     8:36 AM   PHQ   PHQ-9 Total Score 0 0 4   Q9: Thoughts of better off dead/self-harm past 2 weeks Not at all Not at all Not at all          2/17/2022     8:00 AM 1/6/2023     1:34 PM 5/4/2023     8:37 AM   OFELIA-7 SCORE   Total Score   0 (minimal anxiety)   Total Score 0 0 0         5/4/2023     8:36 AM   Last PHQ-9   1.  Little interest or pleasure in doing things 0   2.  Feeling down, depressed, or hopeless 0   3.  Trouble falling or staying asleep, or sleeping too much 3   4.  Feeling tired or having little energy 1   5.  Poor appetite or overeating 0   6.  Feeling bad about yourself 0   7.  Trouble concentrating 0   8.  Moving slowly or restless 0   Q9: Thoughts of better off dead/self-harm past 2 weeks 0   PHQ-9 Total Score 4         5/4/2023     8:37 AM   OFELIA-7    1. Feeling nervous, anxious, or on edge 0   2. Not being able to stop or control worrying 0   3. Worrying too much about different things 0   4. Trouble relaxing 0   5. Being so restless that it is hard to sit still 0   6. Becoming easily annoyed or irritable 0   7. Feeling afraid, as if something awful might happen 0   OFELIA-7 Total Score 0   If you checked any problems, how difficult have they made it for you to do your work, take care of things at home, or get along with other people? Not difficult at all       Suicide Assessment Five-step Evaluation and Treatment (SAFE-T)      Review of Systems   Constitutional, HEENT, cardiovascular, pulmonary, GI, , musculoskeletal, neuro, skin, endocrine and psych systems are negative, except as otherwise noted.      Objective    BP (!) 142/85 (BP Location: Right arm, Patient Position: Sitting, Cuff Size: Adult Regular)   Pulse 64   Temp 97.3  F (36.3  C) (Tympanic)   Ht 1.829 m (6')   Wt 106.9 kg (235 lb 9.6 oz)   SpO2 95%   BMI 31.95 kg/m    Body mass index is 31.95 kg/m .  Physical Exam   GENERAL: healthy, alert and no distress  EYES: Eyes grossly normal to inspection, PERRL and conjunctivae and sclerae normal  HENT: ear canals and TM's normal, nose and mouth without ulcers or lesions  NECK: no adenopathy, no asymmetry, masses, or scars and  thyroid normal to palpation  RESP: lungs clear to auscultation - no rales, rhonchi or wheezes  CV: regular rate and rhythm, normal S1 S2, no S3 or S4, no murmur, click or rub, no peripheral edema and peripheral pulses strong  ABDOMEN: soft, nontender, no hepatosplenomegaly, no masses and bowel sounds normal  MS: no gross musculoskeletal defects noted, no edema  SKIN: no suspicious lesions or rashes  NEURO: Normal strength and tone, mentation intact and speech normal  PSYCH: mentation appears normal, affect normal/bright      Results for orders placed or performed in visit on 05/04/23   Hemoglobin A1c     Status: Abnormal   Result Value Ref Range    Estimated Average Glucose 140 mg/dL    Hemoglobin A1C 6.5 (H) <5.7 %   Lipid Profile     Status: Normal   Result Value Ref Range    Cholesterol 137 <200 mg/dL    Triglycerides 129 <150 mg/dL    Direct Measure HDL 62 >=40 mg/dL    LDL Cholesterol Calculated 49 <=100 mg/dL    Non HDL Cholesterol 75 <130 mg/dL    Narrative    Cholesterol  Desirable:  <200 mg/dL    Triglycerides  Normal:  Less than 150 mg/dL  Borderline High:  150-199 mg/dL  High:  200-499 mg/dL  Very High:  Greater than or equal to 500 mg/dL    Direct Measure HDL  Female:  Greater than or equal to 50 mg/dL   Male:  Greater than or equal to 40 mg/dL    LDL Cholesterol  Desirable:  <100mg/dL  Above Desirable:  100-129 mg/dL   Borderline High:  130-159 mg/dL   High:  160-189 mg/dL   Very High:  >= 190 mg/dL    Non HDL Cholesterol  Desirable:  130 mg/dL  Above Desirable:  130-159 mg/dL  Borderline High:  160-189 mg/dL  High:  190-219 mg/dL  Very High:  Greater than or equal to 220 mg/dL   PSA tumor marker     Status: Normal   Result Value Ref Range    PSA Tumor Marker 4.94 0.00 - 6.50 ng/mL    Narrative    This result is obtained using the Roche Elecsys total PSA method on the meredith e601 immunoassay analyzer. Results obtained with different assay methods or kits cannot be used interchangeably.   Comprehensive  metabolic panel     Status: Abnormal   Result Value Ref Range    Sodium 137 136 - 145 mmol/L    Potassium 4.6 3.4 - 5.3 mmol/L    Chloride 99 98 - 107 mmol/L    Carbon Dioxide (CO2) 28 22 - 29 mmol/L    Anion Gap 10 7 - 15 mmol/L    Urea Nitrogen 14.6 8.0 - 23.0 mg/dL    Creatinine 1.04 0.67 - 1.17 mg/dL    Calcium 10.2 8.8 - 10.2 mg/dL    Glucose 149 (H) 70 - 99 mg/dL    Alkaline Phosphatase 110 40 - 129 U/L    AST 21 10 - 50 U/L    ALT 18 10 - 50 U/L    Protein Total 7.2 6.4 - 8.3 g/dL    Albumin 4.2 3.5 - 5.2 g/dL    Bilirubin Total 0.6 <=1.2 mg/dL    GFR Estimate 77 >60 mL/min/1.73m2   CBC with platelets and differential     Status: Abnormal   Result Value Ref Range    WBC Count 11.5 (H) 4.0 - 11.0 10e3/uL    RBC Count 4.69 4.40 - 5.90 10e6/uL    Hemoglobin 13.6 13.3 - 17.7 g/dL    Hematocrit 42.0 40.0 - 53.0 %    MCV 90 78 - 100 fL    MCH 29.0 26.5 - 33.0 pg    MCHC 32.4 31.5 - 36.5 g/dL    RDW 14.6 10.0 - 15.0 %    Platelet Count 230 150 - 450 10e3/uL    % Neutrophils 56 %    % Lymphocytes 22 %    % Monocytes 9 %    % Eosinophils 12 %    % Basophils 1 %    % Immature Granulocytes 0 %    NRBCs per 100 WBC 0 <1 /100    Absolute Neutrophils 6.3 1.6 - 8.3 10e3/uL    Absolute Lymphocytes 2.6 0.8 - 5.3 10e3/uL    Absolute Monocytes 1.0 0.0 - 1.3 10e3/uL    Absolute Eosinophils 1.4 (H) 0.0 - 0.7 10e3/uL    Absolute Basophils 0.1 0.0 - 0.2 10e3/uL    Absolute Immature Granulocytes 0.1 <=0.4 10e3/uL    Absolute NRBCs 0.0 10e3/uL   CBC with Platelets & Differential     Status: Abnormal    Narrative    The following orders were created for panel order CBC with Platelets & Differential.  Procedure                               Abnormality         Status                     ---------                               -----------         ------                     CBC with platelets and d...[[id number]]  Abnormal            Final result                 Please view results for these tests on the individual orders.

## 2023-05-04 ENCOUNTER — OFFICE VISIT (OUTPATIENT)
Dept: FAMILY MEDICINE | Facility: OTHER | Age: 68
End: 2023-05-04
Attending: FAMILY MEDICINE
Payer: COMMERCIAL

## 2023-05-04 ENCOUNTER — LAB (OUTPATIENT)
Dept: LAB | Facility: OTHER | Age: 68
End: 2023-05-04
Attending: FAMILY MEDICINE
Payer: COMMERCIAL

## 2023-05-04 VITALS
SYSTOLIC BLOOD PRESSURE: 136 MMHG | HEART RATE: 64 BPM | BODY MASS INDEX: 31.91 KG/M2 | DIASTOLIC BLOOD PRESSURE: 78 MMHG | TEMPERATURE: 97.3 F | WEIGHT: 235.6 LBS | HEIGHT: 72 IN | OXYGEN SATURATION: 95 %

## 2023-05-04 DIAGNOSIS — Z12.11 SPECIAL SCREENING FOR MALIGNANT NEOPLASMS, COLON: ICD-10-CM

## 2023-05-04 DIAGNOSIS — E11.69 TYPE 2 DIABETES MELLITUS WITH OTHER SPECIFIED COMPLICATION, WITHOUT LONG-TERM CURRENT USE OF INSULIN (H): Primary | ICD-10-CM

## 2023-05-04 DIAGNOSIS — Z98.890 S/P MITRAL VALVE REPAIR: ICD-10-CM

## 2023-05-04 DIAGNOSIS — E11.69 TYPE 2 DIABETES MELLITUS WITH OTHER SPECIFIED COMPLICATION, WITHOUT LONG-TERM CURRENT USE OF INSULIN (H): ICD-10-CM

## 2023-05-04 DIAGNOSIS — Z95.1 S/P CABG (CORONARY ARTERY BYPASS GRAFT): ICD-10-CM

## 2023-05-04 DIAGNOSIS — I25.10 CORONARY ARTERY DISEASE INVOLVING NATIVE HEART WITHOUT ANGINA PECTORIS, UNSPECIFIED VESSEL OR LESION TYPE: ICD-10-CM

## 2023-05-04 DIAGNOSIS — I10 ESSENTIAL HYPERTENSION: ICD-10-CM

## 2023-05-04 DIAGNOSIS — E66.01 MORBID OBESITY (H): ICD-10-CM

## 2023-05-04 DIAGNOSIS — Z23 NEED FOR VACCINATION: ICD-10-CM

## 2023-05-04 DIAGNOSIS — R97.20 ELEVATED PROSTATE SPECIFIC ANTIGEN (PSA): ICD-10-CM

## 2023-05-04 LAB
ALBUMIN SERPL BCG-MCNC: 4.2 G/DL (ref 3.5–5.2)
ALP SERPL-CCNC: 110 U/L (ref 40–129)
ALT SERPL W P-5'-P-CCNC: 18 U/L (ref 10–50)
ANION GAP SERPL CALCULATED.3IONS-SCNC: 10 MMOL/L (ref 7–15)
AST SERPL W P-5'-P-CCNC: 21 U/L (ref 10–50)
BASOPHILS # BLD AUTO: 0.1 10E3/UL (ref 0–0.2)
BASOPHILS NFR BLD AUTO: 1 %
BILIRUB SERPL-MCNC: 0.6 MG/DL
BUN SERPL-MCNC: 14.6 MG/DL (ref 8–23)
CALCIUM SERPL-MCNC: 10.2 MG/DL (ref 8.8–10.2)
CHLORIDE SERPL-SCNC: 99 MMOL/L (ref 98–107)
CHOLEST SERPL-MCNC: 137 MG/DL
CREAT SERPL-MCNC: 1.04 MG/DL (ref 0.67–1.17)
DEPRECATED HCO3 PLAS-SCNC: 28 MMOL/L (ref 22–29)
EOSINOPHIL # BLD AUTO: 1.4 10E3/UL (ref 0–0.7)
EOSINOPHIL NFR BLD AUTO: 12 %
ERYTHROCYTE [DISTWIDTH] IN BLOOD BY AUTOMATED COUNT: 14.6 % (ref 10–15)
EST. AVERAGE GLUCOSE BLD GHB EST-MCNC: 140 MG/DL
GFR SERPL CREATININE-BSD FRML MDRD: 77 ML/MIN/1.73M2
GLUCOSE SERPL-MCNC: 149 MG/DL (ref 70–99)
HBA1C MFR BLD: 6.5 %
HCT VFR BLD AUTO: 42 % (ref 40–53)
HDLC SERPL-MCNC: 62 MG/DL
HGB BLD-MCNC: 13.6 G/DL (ref 13.3–17.7)
IMM GRANULOCYTES # BLD: 0.1 10E3/UL
IMM GRANULOCYTES NFR BLD: 0 %
LDLC SERPL CALC-MCNC: 49 MG/DL
LYMPHOCYTES # BLD AUTO: 2.6 10E3/UL (ref 0.8–5.3)
LYMPHOCYTES NFR BLD AUTO: 22 %
MCH RBC QN AUTO: 29 PG (ref 26.5–33)
MCHC RBC AUTO-ENTMCNC: 32.4 G/DL (ref 31.5–36.5)
MCV RBC AUTO: 90 FL (ref 78–100)
MONOCYTES # BLD AUTO: 1 10E3/UL (ref 0–1.3)
MONOCYTES NFR BLD AUTO: 9 %
NEUTROPHILS # BLD AUTO: 6.3 10E3/UL (ref 1.6–8.3)
NEUTROPHILS NFR BLD AUTO: 56 %
NONHDLC SERPL-MCNC: 75 MG/DL
NRBC # BLD AUTO: 0 10E3/UL
NRBC BLD AUTO-RTO: 0 /100
PLATELET # BLD AUTO: 230 10E3/UL (ref 150–450)
POTASSIUM SERPL-SCNC: 4.6 MMOL/L (ref 3.4–5.3)
PROT SERPL-MCNC: 7.2 G/DL (ref 6.4–8.3)
PSA SERPL DL<=0.01 NG/ML-MCNC: 4.94 NG/ML (ref 0–6.5)
RBC # BLD AUTO: 4.69 10E6/UL (ref 4.4–5.9)
SODIUM SERPL-SCNC: 137 MMOL/L (ref 136–145)
TRIGL SERPL-MCNC: 129 MG/DL
WBC # BLD AUTO: 11.5 10E3/UL (ref 4–11)

## 2023-05-04 PROCEDURE — 85025 COMPLETE CBC W/AUTO DIFF WBC: CPT | Mod: ZL

## 2023-05-04 PROCEDURE — 90677 PCV20 VACCINE IM: CPT

## 2023-05-04 PROCEDURE — 99214 OFFICE O/P EST MOD 30 MIN: CPT | Performed by: FAMILY MEDICINE

## 2023-05-04 PROCEDURE — 36415 COLL VENOUS BLD VENIPUNCTURE: CPT | Mod: ZL

## 2023-05-04 PROCEDURE — 83036 HEMOGLOBIN GLYCOSYLATED A1C: CPT | Mod: ZL

## 2023-05-04 PROCEDURE — 80061 LIPID PANEL: CPT | Mod: ZL

## 2023-05-04 PROCEDURE — 80053 COMPREHEN METABOLIC PANEL: CPT | Mod: ZL

## 2023-05-04 PROCEDURE — 84153 ASSAY OF PSA TOTAL: CPT | Mod: ZL

## 2023-05-04 PROCEDURE — G0463 HOSPITAL OUTPT CLINIC VISIT: HCPCS | Mod: 25 | Performed by: FAMILY MEDICINE

## 2023-05-04 RX ORDER — LISINOPRIL 5 MG/1
5 TABLET ORAL DAILY
Qty: 90 TABLET | Refills: 3 | Status: SHIPPED | OUTPATIENT
Start: 2023-05-04 | End: 2023-11-28

## 2023-05-04 ASSESSMENT — PATIENT HEALTH QUESTIONNAIRE - PHQ9
SUM OF ALL RESPONSES TO PHQ QUESTIONS 1-9: 4
10. IF YOU CHECKED OFF ANY PROBLEMS, HOW DIFFICULT HAVE THESE PROBLEMS MADE IT FOR YOU TO DO YOUR WORK, TAKE CARE OF THINGS AT HOME, OR GET ALONG WITH OTHER PEOPLE: NOT DIFFICULT AT ALL
SUM OF ALL RESPONSES TO PHQ QUESTIONS 1-9: 4

## 2023-05-04 ASSESSMENT — PAIN SCALES - GENERAL: PAINLEVEL: NO PAIN (0)

## 2023-05-04 ASSESSMENT — ANXIETY QUESTIONNAIRES
8. IF YOU CHECKED OFF ANY PROBLEMS, HOW DIFFICULT HAVE THESE MADE IT FOR YOU TO DO YOUR WORK, TAKE CARE OF THINGS AT HOME, OR GET ALONG WITH OTHER PEOPLE?: NOT DIFFICULT AT ALL
IF YOU CHECKED OFF ANY PROBLEMS ON THIS QUESTIONNAIRE, HOW DIFFICULT HAVE THESE PROBLEMS MADE IT FOR YOU TO DO YOUR WORK, TAKE CARE OF THINGS AT HOME, OR GET ALONG WITH OTHER PEOPLE: NOT DIFFICULT AT ALL
5. BEING SO RESTLESS THAT IT IS HARD TO SIT STILL: NOT AT ALL
7. FEELING AFRAID AS IF SOMETHING AWFUL MIGHT HAPPEN: NOT AT ALL
4. TROUBLE RELAXING: NOT AT ALL
1. FEELING NERVOUS, ANXIOUS, OR ON EDGE: NOT AT ALL
3. WORRYING TOO MUCH ABOUT DIFFERENT THINGS: NOT AT ALL
6. BECOMING EASILY ANNOYED OR IRRITABLE: NOT AT ALL
7. FEELING AFRAID AS IF SOMETHING AWFUL MIGHT HAPPEN: NOT AT ALL
2. NOT BEING ABLE TO STOP OR CONTROL WORRYING: NOT AT ALL
GAD7 TOTAL SCORE: 0

## 2023-05-04 NOTE — PATIENT INSTRUCTIONS
Results for orders placed or performed in visit on 05/04/23   Hemoglobin A1c     Status: Abnormal   Result Value Ref Range    Estimated Average Glucose 140 mg/dL    Hemoglobin A1C 6.5 (H) <5.7 %   Lipid Profile     Status: Normal   Result Value Ref Range    Cholesterol 137 <200 mg/dL    Triglycerides 129 <150 mg/dL    Direct Measure HDL 62 >=40 mg/dL    LDL Cholesterol Calculated 49 <=100 mg/dL    Non HDL Cholesterol 75 <130 mg/dL    Narrative    Cholesterol  Desirable:  <200 mg/dL    Triglycerides  Normal:  Less than 150 mg/dL  Borderline High:  150-199 mg/dL  High:  200-499 mg/dL  Very High:  Greater than or equal to 500 mg/dL    Direct Measure HDL  Female:  Greater than or equal to 50 mg/dL   Male:  Greater than or equal to 40 mg/dL    LDL Cholesterol  Desirable:  <100mg/dL  Above Desirable:  100-129 mg/dL   Borderline High:  130-159 mg/dL   High:  160-189 mg/dL   Very High:  >= 190 mg/dL    Non HDL Cholesterol  Desirable:  130 mg/dL  Above Desirable:  130-159 mg/dL  Borderline High:  160-189 mg/dL  High:  190-219 mg/dL  Very High:  Greater than or equal to 220 mg/dL   PSA tumor marker     Status: Normal   Result Value Ref Range    PSA Tumor Marker 4.94 0.00 - 6.50 ng/mL    Narrative    This result is obtained using the Roche Elecsys total PSA method on the meredith e601 immunoassay analyzer. Results obtained with different assay methods or kits cannot be used interchangeably.   Comprehensive metabolic panel     Status: Abnormal   Result Value Ref Range    Sodium 137 136 - 145 mmol/L    Potassium 4.6 3.4 - 5.3 mmol/L    Chloride 99 98 - 107 mmol/L    Carbon Dioxide (CO2) 28 22 - 29 mmol/L    Anion Gap 10 7 - 15 mmol/L    Urea Nitrogen 14.6 8.0 - 23.0 mg/dL    Creatinine 1.04 0.67 - 1.17 mg/dL    Calcium 10.2 8.8 - 10.2 mg/dL    Glucose 149 (H) 70 - 99 mg/dL    Alkaline Phosphatase 110 40 - 129 U/L    AST 21 10 - 50 U/L    ALT 18 10 - 50 U/L    Protein Total 7.2 6.4 - 8.3 g/dL    Albumin 4.2 3.5 - 5.2 g/dL     Bilirubin Total 0.6 <=1.2 mg/dL    GFR Estimate 77 >60 mL/min/1.73m2   CBC with platelets and differential     Status: Abnormal   Result Value Ref Range    WBC Count 11.5 (H) 4.0 - 11.0 10e3/uL    RBC Count 4.69 4.40 - 5.90 10e6/uL    Hemoglobin 13.6 13.3 - 17.7 g/dL    Hematocrit 42.0 40.0 - 53.0 %    MCV 90 78 - 100 fL    MCH 29.0 26.5 - 33.0 pg    MCHC 32.4 31.5 - 36.5 g/dL    RDW 14.6 10.0 - 15.0 %    Platelet Count 230 150 - 450 10e3/uL    % Neutrophils 56 %    % Lymphocytes 22 %    % Monocytes 9 %    % Eosinophils 12 %    % Basophils 1 %    % Immature Granulocytes 0 %    NRBCs per 100 WBC 0 <1 /100    Absolute Neutrophils 6.3 1.6 - 8.3 10e3/uL    Absolute Lymphocytes 2.6 0.8 - 5.3 10e3/uL    Absolute Monocytes 1.0 0.0 - 1.3 10e3/uL    Absolute Eosinophils 1.4 (H) 0.0 - 0.7 10e3/uL    Absolute Basophils 0.1 0.0 - 0.2 10e3/uL    Absolute Immature Granulocytes 0.1 <=0.4 10e3/uL    Absolute NRBCs 0.0 10e3/uL   CBC with Platelets & Differential     Status: Abnormal    Narrative    The following orders were created for panel order CBC with Platelets & Differential.  Procedure                               Abnormality         Status                     ---------                               -----------         ------                     CBC with platelets and d...[[id number]]  Abnormal            Final result                 Please view results for these tests on the individual orders.

## 2023-05-10 ENCOUNTER — DOCUMENTATION ONLY (OUTPATIENT)
Dept: SLEEP MEDICINE | Facility: HOSPITAL | Age: 68
End: 2023-05-10

## 2023-05-11 NOTE — PROGRESS NOTES
Chart review prior to sleep testing.    Patient Summary:  68 year old yo male who is referred for excessive sleepiness, excessive sleep need.    Patient Active Problem List    Diagnosis Date Noted     S/P CABG (coronary artery bypass graft) 02/13/2023     Priority: Medium     S/P mitral valve repair 02/13/2023     Priority: Medium     Postoperative atrial fibrillation (H) 02/13/2023     Priority: Medium     Peripheral arterial disease (H) 02/13/2023     Priority: Medium     Other ill-defined heart diseases 01/18/2023     Priority: Medium     Coronary artery disease involving native heart, unspecified vessel or lesion type, unspecified whether angina present 01/18/2023     Priority: Medium     Status post coronary angiogram 12/09/2022     Priority: Medium     Mitral valve prolapse 09/30/2022     Priority: Medium     S/P AAA repair 08/29/2022     Priority: Medium     Tobacco dependence in remission 08/29/2022     Priority: Medium     Elevated prostate specific antigen (PSA) 02/15/2021     Priority: Medium     Prediabetes 02/15/2021     Priority: Medium     Diabetes mellitus, type 2 (H) 02/15/2021     Priority: Medium     Morbid obesity (H) 08/21/2020     Priority: Medium     Moderate major depression (H) 06/19/2020     Priority: Medium     Mixed hyperlipidemia 06/26/2018     Priority: Medium     Essential hypertension 10/11/2017     Priority: Medium     ACP (advance care planning) 05/11/2016     Priority: Medium     Advance Care Planning 5/11/2016: ACP Review of Chart / Resources Provided:  Reviewed chart for advance care plan.  Gilberto Camilo has no plan or code status on file. Discussed available resources and provided with information. Confirmed code status reflects current choices pending further ACP discussions.  Confirmed/documented legally designated decision makers.  Added by Lorenza Jordan             Routine general medical examination at a health care facility 05/11/2016     Priority: Medium     Erectile  "dysfunction 05/18/2015     Priority: Medium     Abdominal aortic aneurysm (H) 03/18/2014     Priority: Medium     Problem list name updated by automated process. Provider to review         Current Outpatient Medications   Medication     acetaminophen (TYLENOL) 325 MG tablet     amiodarone (PACERONE) 200 MG tablet     ARIPiprazole (ABILIFY) 5 MG tablet     aspirin (ASA) 81 MG chewable tablet     atorvastatin (LIPITOR) 80 MG tablet     buPROPion (WELLBUTRIN XL) 300 MG 24 hr tablet     diphenhydrAMINE HCl (BENADRYL ALLERGY PO)     lisinopril (ZESTRIL) 5 MG tablet     loratadine (CLARITIN) 10 MG tablet     metFORMIN (GLUCOPHAGE) 1000 MG tablet     methocarbamol (ROBAXIN) 500 MG tablet     metoprolol succinate ER (TOPROL XL) 25 MG 24 hr tablet     multivitamin, therapeutic (THERA-VIT) TABS tablet     pantoprazole (PROTONIX) 40 MG EC tablet     vitamin C (ASCORBIC ACID) 1000 MG TABS     No current facility-administered medications for this visit.       Pertinent PMHx of obesity, HLD, DM II, HTN, peripheral arterial disease, CAD s/p CABG, s/p mitral valve repair, depression.    STOP-BANG score of 7, with unknown neck circumference.  Delaplane score of 13.  BMI of Estimated body mass index is 31.95 kg/m  as calculated from the following:    Height as of 5/4/23: 1.829 m (6').    Weight as of 5/4/23: 106.9 kg (235 lb 9.6 oz).     Per questionnaire: \"Sleeping too much at night and napping all day\"    Sxs for one year.    Caffeine use:  Yes for 3+ per day.  No for within 6 hours of bed.    Tobacco use: No    Sleep pattern:  9pm - 7am, total sleep time 8+ hours.  Time to fall asleep: ~5-10 minutes.  Awakenings: 2-3 times per night, 5 minutes to return to sleep, awake for total of 30 minutes per night.  Napping.  2-3 per day, 2 hours per nap.    No for RLS screen.  No for sleep walking.  Yes for dream enactment behavior.  No for bruxism.    No for morning headaches.  Yes for snoring.  Yes for observed apnea.  No for FHx of " EMANUEL.    SHx:  , lives with wife.  Retired  /  / .    A/P:    1.)  High likelihood of EMANUEL with STOP-BANG score of 7.  2.)  Potential abnormal nocturnal behaviors with some suspicion for REM-sleep behavior disorder.   - Would appear to be candidate for either home sleep testing or in-lab PSG, though would strongly recommend in-lab PSG with 4-limb EMG monitoring.    ---  This note was written with the assistance of the Dragon voice-dictation technology software. The final document, although reviewed, may contain errors. For corrections, please contact the office.    Vargas Murphy MD    Sleep Medicine    [place], [place]  o Main Office: [phone]    [facility] [address], [address]  o Schedule visits: [phone]  o Main Office: [phone]  o Fax: [phone]
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
## 2023-05-11 NOTE — PROGRESS NOTES
STOP NYA       Name: Gilberto Camilo MRN# [id number]   Age: 68 year old YOB: 1955     Stop Bang questionnaire completed with a score of >3 to allow for HST     Have you been told you snore loudly (louder than talking or loud enough to be heard through doors)? YES    Do you often feel tired, fatigued, or sleepy during the daytime? YES    Has anyone observed you stop breathing during your sleep? YES    Do you have or are you being treated for high blood pressure? YES    Is your BMI greater than 35? NO    Is your neck size circumference 16 inches or greater? YES    Are you over 50 years old? YES    Stop Bang Score (# of yes): 7

## 2023-05-12 ENCOUNTER — TELEPHONE (OUTPATIENT)
Dept: PULMONOLOGY | Facility: OTHER | Age: 68
End: 2023-05-12

## 2023-05-23 ENCOUNTER — TELEPHONE (OUTPATIENT)
Dept: CARDIOLOGY | Facility: OTHER | Age: 68
End: 2023-05-23

## 2023-05-23 NOTE — TELEPHONE ENCOUNTER
Patient called [facility] that appointment tomorrow with Zoe Carr was to be cancelled or thought is was to be cancelled??     Tried reaching back out to ask why he thinks it should be and no answer. Please call patient in am if you know if appointment is not needed.     Thank you,   Krys Barnes LPN

## 2023-05-24 NOTE — TELEPHONE ENCOUNTER
Zoe Carr CNP   Cardiology 10 minutes ago (8:38 AM)     MO  Yes-- I saw him last month and he will follow-up after his zio patch and echocardiogram sometime after January-- unless there are any acute concerns of course.     Thanks!     Zoe Carr CNP on 5/24/2023 at 8:38 AM

## 2023-05-24 NOTE — TELEPHONE ENCOUNTER
Outreach to patient: cancelled appt for today and kept appt in January. Patient will call if he wants an appt before then.

## 2023-06-12 NOTE — PROGRESS NOTES
Virtual Visit Details    Gilberto Camilo is a 68 year old male who is being evaluated via a billable telephone visit.       What phone number would you like to be contacted at?PHONE@ [phone]  Home Phone [phone]   Work Phone Not on file.   Mobile [phone]       How would you like to obtain your AVS? Kabbagehart       Telephone Virtual Visit Details     Type of service:  Telephone Virtual Visit     Originating Location (pt. Location): Home     Distant Location (provider location):  Off-site, [facility] [place]      Start Time:  1100  End Time:  1125    Virtual visit for Concern for sleep disordered breathing in the setting of potential heart failure with reduced ejection fraction.     A/P:     1.)  High likelihood of EMANUEL with STOP-BANG score of 7.  - Given his history of reduced ejection fraction heart failure, I did recommend in lab PSG.  But I do presume that his heart function has improved following his coronary artery bypass graft.  - He is scheduled for in lab PSG at this time.    2.)  Restless sleep and subtle body movements during sleep  - Overall, this does not sound consistent with REM sleep behavior disorder and I suspect may be closer related to periodic limb movements or restless sleep disorder.  - Given that this seemed to start quite acutely following his coronary artery bypass graft, this increases my suspicion for periodic limb movements following blood loss from surgery and subsequent iron deficiency.  - We will check a baseline ferritin and TIBC, along with evaluating for sleep apnea.    SUBJECTIVE:  Gilberto Camilo is a 68 year old male.    68 year old yo male who is referred for excessive sleepiness, excessive sleep need.     Pertinent PMHx of obesity, HLD, DM II, HTN, peripheral arterial disease, CAD s/p CABG, s/p mitral valve repair, depression.     STOP-BANG score of 7, with unknown neck circumference.  [place] score of 13.  BMI of Estimated body mass index is 31.95  "kg/m  as calculated from the following:    Height as of 5/4/23: 1.829 m (6').    Weight as of 5/4/23: 106.9 kg (235 lb 9.6 oz).      Today - We reviewed his sleep history today.  His main concerns are significant daytime fatigue and needing to take multiple 2-hour naps daily.  He has had snoring and observed apnea for many years.  There was also increased clinical concern for sleep disordered breathing given his significant cardiovascular history including coronary artery bypass graft on January 18, 2023.    His most recent echocardiogram was on January 18, 2023 with a left ventricular ejection fraction of 40-45%, mild diffuse hypokinesis.    Since his bypass, he has felt that his sleep has improved.    Per his wife, there does not appear to be any abnormal dream and acting behavior, but since his bypass surgery he does seem to have more sleep talking and he seems more restless in his hands and feet during sleep.  She is quite certain that this largely started following immediately after the bypass and was not present before.  She states that this looks like fidgeting and is quite subtle.  There has been no kicking, hitting, punching, screaming and has not injured himself or others and has not fallen out of the bed.  He has not left the bed during these behaviors.    He denies any regular blood donation or other forms of blood loss.    Per questionnaire: \"Sleeping too much at night and napping all day\"     Sxs for one year.     Caffeine use:  Yes for 3+ per day.  No for within 6 hours of bed.     Tobacco use: No     Sleep pattern:  9pm - 7am, total sleep time 8+ hours.  Time to fall asleep: ~5-10 minutes.  Awakenings: 2-3 times per night, 5 minutes to return to sleep, awake for total of 30 minutes per night.  Napping.  2-3 per day, 2 hours per nap.     No for RLS screen.  No for sleep walking.  Yes for dream enactment behavior.  No for bruxism.     No for morning headaches.  Yes for snoring.  Yes for observed " apnea.  No for FHx of EMANUEL.     SHx:  , lives with wife.  Retired  /  / .        Past medical history:    Patient Active Problem List    Diagnosis Date Noted    S/P CABG (coronary artery bypass graft) 02/13/2023     Priority: Medium    S/P mitral valve repair 02/13/2023     Priority: Medium    Postoperative atrial fibrillation (H) 02/13/2023     Priority: Medium    Peripheral arterial disease (H) 02/13/2023     Priority: Medium    Other ill-defined heart diseases 01/18/2023     Priority: Medium    Coronary artery disease involving native heart, unspecified vessel or lesion type, unspecified whether angina present 01/18/2023     Priority: Medium    Status post coronary angiogram 12/09/2022     Priority: Medium    Mitral valve prolapse 09/30/2022     Priority: Medium    S/P AAA repair 08/29/2022     Priority: Medium    Tobacco dependence in remission 08/29/2022     Priority: Medium    Elevated prostate specific antigen (PSA) 02/15/2021     Priority: Medium    Prediabetes 02/15/2021     Priority: Medium    Diabetes mellitus, type 2 (H) 02/15/2021     Priority: Medium    Morbid obesity (H) 08/21/2020     Priority: Medium    Moderate major depression (H) 06/19/2020     Priority: Medium    Mixed hyperlipidemia 06/26/2018     Priority: Medium    Essential hypertension 10/11/2017     Priority: Medium    ACP (advance care planning) 05/11/2016     Priority: Medium     Advance Care Planning 5/11/2016: ACP Review of Chart / Resources Provided:  Reviewed chart for advance care plan.  Gilberto Camilo has no plan or code status on file. Discussed available resources and provided with information. Confirmed code status reflects current choices pending further ACP discussions.  Confirmed/documented legally designated decision makers.  Added by Lorenza Jordan            Routine general medical examination at a health care facility 05/11/2016     Priority: Medium    Erectile dysfunction 05/18/2015      Priority: Medium    Abdominal aortic aneurysm (H) 03/18/2014     Priority: Medium     Problem list name updated by automated process. Provider to review           10 point ROS of systems including Constitutional, Eyes, Respiratory, Cardiovascular, Gastroenterology, Genitourinary, Integumentary, Muscularskeletal, Psychiatric were all negative except for pertinent positives noted in my HPI.    Current Outpatient Medications   Medication Sig Dispense Refill    acetaminophen (TYLENOL) 325 MG tablet Take 2 tablets (650 mg) by mouth every 4 hours as needed for other (For optimal non-opioid multimodal pain management to improve pain control.) (Patient not taking: Reported on 5/4/2023) 100 tablet 1    amiodarone (PACERONE) 200 MG tablet Take 1 tablet (200 mg) by mouth daily 30 tablet 1    ARIPiprazole (ABILIFY) 5 MG tablet 1 tablet (5 mg) by Oral or Feeding Tube route every morning 30 tablet 3    atorvastatin (LIPITOR) 80 MG tablet Take 1 tablet (80 mg) by mouth every morning for 90 days 90 tablet 3    buPROPion (WELLBUTRIN XL) 300 MG 24 hr tablet Take 1 tablet (300 mg) by mouth daily 30 tablet 3    diphenhydrAMINE HCl (BENADRYL ALLERGY PO) Take by mouth as needed      lisinopril (ZESTRIL) 5 MG tablet Take 1 tablet (5 mg) by mouth daily 90 tablet 3    loratadine (CLARITIN) 10 MG tablet Take 10 mg by mouth every morning      metFORMIN (GLUCOPHAGE) 1000 MG tablet Take 1 tablet (1,000 mg) by mouth 2 times daily (with meals) 60 tablet 3    methocarbamol (ROBAXIN) 500 MG tablet 1 tablet (500 mg) by Oral or Feeding Tube route every 6 hours as needed for muscle spasms 30 tablet 0    metoprolol succinate ER (TOPROL XL) 25 MG 24 hr tablet TAKE 1 TABLET(25 MG) BY MOUTH DAILY 90 tablet 3    multivitamin, therapeutic (THERA-VIT) TABS tablet 1 tablet by Oral or Feeding Tube route every morning 30 tablet 3    pantoprazole (PROTONIX) 40 MG EC tablet Take 1 tablet (40 mg) by mouth daily 90 tablet 3    vitamin C (ASCORBIC ACID) 1000 MG  TABS Take 1,000 mg by mouth every morning         OBJECTIVE:  There were no vitals taken for this visit.    Physical Exam:  healthy, alert, and no distress  PSYCH: Alert and oriented times 3; coherent speech, normal   rate and volume, able to articulate logical thoughts, able   to abstract reason, no tangential thoughts, no hallucinations   or delusions  His affect is normal  RESP: No cough, no audible wheezing, able to talk in full sentences  Remainder of exam unable to be completed due to telephone visits    ---  This note was written with the assistance of the Dragon voice-dictation technology software. The final document, although reviewed, may contain errors. For corrections, please contact the office.    Total time spent preparing to see the patient, review of chart, obtaining history and physical examination, review of sleep testing, review of treatment options, education, discussion with patient and documenting in Epic / EMR was 30 minutes.  All time involved was spent on the day of service for the patient (the same day as the patient's appointment).    Vargas Murphy MD    Sleep Medicine  [place], [place]  Main Office: [phone]  [facility] [place]  [address] [place] [address], [address]  Schedule visits: [phone]  Main Office: [phone]  Fax: [phone]

## 2023-06-13 ENCOUNTER — OFFICE VISIT (OUTPATIENT)
Dept: PODIATRY | Facility: OTHER | Age: 68
End: 2023-06-13
Attending: PODIATRIST
Payer: COMMERCIAL

## 2023-06-13 VITALS
DIASTOLIC BLOOD PRESSURE: 80 MMHG | TEMPERATURE: 98 F | HEART RATE: 62 BPM | OXYGEN SATURATION: 94 % | SYSTOLIC BLOOD PRESSURE: 132 MMHG

## 2023-06-13 DIAGNOSIS — E11.42 DIABETIC POLYNEUROPATHY ASSOCIATED WITH TYPE 2 DIABETES MELLITUS (H): ICD-10-CM

## 2023-06-13 DIAGNOSIS — E11.9 DIABETES MELLITUS TYPE 2, NONINSULIN DEPENDENT (H): ICD-10-CM

## 2023-06-13 DIAGNOSIS — I73.9 PERIPHERAL ARTERIAL DISEASE (H): ICD-10-CM

## 2023-06-13 DIAGNOSIS — L60.3 ONYCHODYSTROPHY: Primary | ICD-10-CM

## 2023-06-13 PROCEDURE — G0463 HOSPITAL OUTPT CLINIC VISIT: HCPCS | Mod: 25

## 2023-06-13 PROCEDURE — 11721 DEBRIDE NAIL 6 OR MORE: CPT | Performed by: PODIATRIST

## 2023-06-13 ASSESSMENT — PAIN SCALES - GENERAL: PAINLEVEL: NO PAIN (0)

## 2023-06-13 NOTE — PROGRESS NOTES
Chief complaint: Patient presents with:  Toenail: DFE      History of Present Illness: This 68 year old NIDDM II male is seen for follow-up management of elongated toenails. The toenails are difficult to reach and to trim because of the thickness of the toenails.    He has had consistent tingling in the toes.    Patient says he had open heart surgery for a valve replacement and double bypass on 01/17/2023. He was in the hospital for eight days. He is no longer going through through cardiac rehab at [place].    He had a vascular follow-up with Dr. Mcgrath at [facility] on 09/15/2022. He thinks he will see him again this summer of 2023.    He has not had a lot of pain from the bilateral hallux ingrown toenails.    Patients says he has not been applying lotion to his feet.        Lab Results   Component Value Date    A1C 6.5 05/04/2023    A1C 7.9 01/09/2023    A1C 7.5 08/29/2022    A1C 7.3 02/17/2022    A1C 6.7 08/17/2021    A1C 6.5 02/15/2021         No further pedal complaints today.     Patient quit smoking around 2011.        /80 (BP Location: Left arm, Patient Position: Sitting, Cuff Size: Adult Regular)   Pulse 62   Temp 98  F (36.7  C) (Tympanic)   SpO2 94%     Patient Active Problem List   Diagnosis     Abdominal aortic aneurysm (H)     Erectile dysfunction     ACP (advance care planning)     Routine general medical examination at a health care facility     Essential hypertension     Mixed hyperlipidemia     Moderate major depression (H)     Morbid obesity (H)     Elevated prostate specific antigen (PSA)     Prediabetes     Diabetes mellitus, type 2 (H)     S/P AAA repair     Tobacco dependence in remission     Mitral valve prolapse     Status post coronary angiogram     Other ill-defined heart diseases     Coronary artery disease involving native heart, unspecified vessel or lesion type, unspecified whether angina present     S/P CABG (coronary artery bypass graft)     S/P mitral valve repair      Postoperative atrial fibrillation (H)     Peripheral arterial disease (H)       Past Surgical History:   Procedure Laterality Date     AAA REPAIR  06/17/2020    [facility]/ intravascular repair     APPENDECTOMY       BYPASS GRAFT ARTERY CORONARY, REPAIR VALVE MITRAL, COMBINED N/A 1/18/2023    Procedure: MEDIAN STERNOTOMY, ON PUMP OXGENATION, Left endovascular saphaneous vein harvest, CORONARY ARTERY BYPASS GRAFT (CABG x 2), Mitral Valve Repair, transesophageal echocardiogram (TRENT) performed by anesthesia;  Surgeon: Linda Kim MD;  Location: [facility] OR     COLONOSCOPY  2013     CV CORONARY ANGIOGRAM N/A 12/9/2022    Procedure: Coronary Angiogram with possible intervention;  Surgeon: Porfirio Herrera MD;  Location:  HEART CARDIAC CATH LAB     CV RIGHT HEART CATH MEASUREMENTS RECORDED N/A 12/9/2022    Procedure: Right Heart Cath;  Surgeon: Porfirio Herrera MD;  Location:  HEART CARDIAC CATH LAB     ESOPHAGOGASTRODUODENOSCOPY  2013     INCISION AND DRAINAGE CHEST WASHOUT, COMBINED N/A 1/19/2023    Procedure: chest washout, Re-do sternotomy, repair of vein graft;  Surgeon: Linda Kim MD;  Location: [facility] OR       Current Outpatient Medications   Medication     amiodarone (PACERONE) 200 MG tablet     ARIPiprazole (ABILIFY) 5 MG tablet     buPROPion (WELLBUTRIN XL) 300 MG 24 hr tablet     diphenhydrAMINE HCl (BENADRYL ALLERGY PO)     lisinopril (ZESTRIL) 5 MG tablet     loratadine (CLARITIN) 10 MG tablet     metFORMIN (GLUCOPHAGE) 1000 MG tablet     methocarbamol (ROBAXIN) 500 MG tablet     metoprolol succinate ER (TOPROL XL) 25 MG 24 hr tablet     multivitamin, therapeutic (THERA-VIT) TABS tablet     pantoprazole (PROTONIX) 40 MG EC tablet     vitamin C (ASCORBIC ACID) 1000 MG TABS     acetaminophen (TYLENOL) 325 MG tablet     atorvastatin (LIPITOR) 80 MG tablet     No current facility-administered medications for this visit.        No Known Allergies    Family History   Problem  Relation Age of Onset     C.A.DANA. Father         CABG in late 40's     C.CAITIE. Brother         MI in 50's       Social History     Socioeconomic History     Marital status:      Spouse name: None     Number of children: None     Years of education: None     Highest education level: None   Occupational History     None   Tobacco Use     Smoking status: Former Smoker     Packs/day: 0.00     Types: Cigarettes     Smokeless tobacco: Never Used     Tobacco comment: Feb. 2014   Substance and Sexual Activity     Alcohol use: Yes     Comment: occasional     Drug use: Yes     Types: Marijuana     Sexual activity: Yes     Partners: Female   Other Topics Concern     Parent/sibling w/ CABG, MI or angioplasty before 65F 55M? Yes     Comment: dad had heart attack before 55. brother also had heart attack before 55.   Social History Narrative    .      Social Determinants of Health     Financial Resource Strain:      Difficulty of Paying Living Expenses:    Food Insecurity:      Worried About Running Out of Food in the Last Year:      Ran Out of Food in the Last Year:    Transportation Needs:      Lack of Transportation (Medical):      Lack of Transportation (Non-Medical):    Physical Activity:      Days of Exercise per Week:      Minutes of Exercise per Session:    Stress:      Feeling of Stress :    Social Connections:      Frequency of Communication with Friends and Family:      Frequency of Social Gatherings with Friends and Family:      Attends Zoroastrian Services:      Active Member of Clubs or Organizations:      Attends Club or Organization Meetings:      Marital Status:    Intimate Partner Violence:      Fear of Current or Ex-Partner:      Emotionally Abused:      Physically Abused:      Sexually Abused:        ROS: 10 point ROS neg other than the symptoms noted above in the HPI.  EXAM  Constitutional: healthy, alert and no distress    Psychiatric: mentation appears normal and affect  normal/bright    VASCULAR:  -Dorsalis pedis pulse +1/4 b/l  -Posterior tibial pulse +1/4 b/l  -Capillary refill time < 3 seconds to b/l hallux  -Hair growth Absent to b/l anterior legs and ankles  NEURO:  -Positive for paresthesias to bilateral foot  -Epicritic and protective sensation diminished to the bilateral foot  DERM:  -Skin temperature cool to bilateral foot  -Mild rubor with purple discoloration to bilateral foot  -Skin diffusely dry, cracking and flaking to plantar foot (most cracking on heels) without open wounds or drainage at this time  -Toenails elongated, thickened, dystrophic and discolored x 10  ---Bilateral hallux toenails significantly dystrophic  ---Mild incurvation of the lateral toenail border of the RIGHT hallux -- no open wounds, no drainage  -----No severe erythema, no ascending erythema, no calor, no purulence, no malodor, no other signs of infection.   MSK:  -Muscle strength of ankles +5/5 for dorsiflexion, plantarflexion, ABDUction and ADDuction b/l    ============================================================    ASSESSMENT:  (L60.3) Onychodystrophy  (primary encounter diagnosis)    (E11.9) Diabetes mellitus type 2, noninsulin dependent (H)    (E11.42) Diabetic polyneuropathy associated with type 2 diabetes mellitus (H)    (I73.9) Peripheral arterial disease (H)      PLAN:  -Patient evaluated and examined. Treatment options discussed with no educational barriers noted.  -High risk toenail debridement x 10 toenails without incident    -Diabetic Foot Education provided. This included checking the feet daily looking for new new blisters or wounds, wearing shoes at all times when walking including around the house, and avoiding lotion application between the toes. If there are any signs of infection, the patient should present to the ED as soon as possible. Infections of the foot can be life threatening or lead to amputations of the foot or leg.    Diabetes Mellitus: Patient's DM is managed  by their PCP. The DM appears to be stable. Patient's last HbA1C was 6.5% on 05/04/2023.     -Vascular appointment -- 09/15/2021 with Dr. Mcgrath for PAD:    ---Dr. Mcgrath's note in July, 2021:   Reviewed his DAVID. The right was 0.79, the left 0.59. This corresponds to his complaint of his symptoms.    Moderate disease bilaterally, worse on the left. Discussed that he has fairly long segment disease on the left. I would not recommend intervention unless he truly has lifestyle limiting/disabling claudication. Discussed that any rest pain or tissue loss would lead to prompt evaluation for intervention. I discussed structured exercise and the significant benefit to be had from this in his pain free walking distance. However, given the distance that he has to walk, this may not be practical for him.   ---Patient says he thinks he will be called to follow-up with Dr. Mcgrath this summer of 2023.    -Patient in agreement with the above treatment plan and all of patient's questions were answered.      RTC 3 months for diabetic foot exam and high risk nail debridement         Sierra Velásquez DPM

## 2023-06-14 DIAGNOSIS — R06.81 APNEA: ICD-10-CM

## 2023-06-14 DIAGNOSIS — I50.1 LEFT HEART FAILURE (H): ICD-10-CM

## 2023-06-14 DIAGNOSIS — I51.7 CARDIOMEGALY: ICD-10-CM

## 2023-06-14 DIAGNOSIS — R06.83 SNORING: Primary | ICD-10-CM

## 2023-06-14 DIAGNOSIS — I50.20 SYSTOLIC HEART FAILURE (H): ICD-10-CM

## 2023-06-15 ENCOUNTER — VIRTUAL VISIT (OUTPATIENT)
Dept: PULMONOLOGY | Facility: OTHER | Age: 68
End: 2023-06-15
Attending: FAMILY MEDICINE
Payer: COMMERCIAL

## 2023-06-15 VITALS — WEIGHT: 235 LBS | HEIGHT: 72 IN | BODY MASS INDEX: 31.83 KG/M2

## 2023-06-15 DIAGNOSIS — G47.61 PLMD (PERIODIC LIMB MOVEMENT DISORDER): Primary | ICD-10-CM

## 2023-06-15 DIAGNOSIS — E83.10 DISORDER OF IRON METABOLISM: ICD-10-CM

## 2023-06-15 DIAGNOSIS — R53.82 CHRONIC FATIGUE: ICD-10-CM

## 2023-06-15 PROCEDURE — 99443 PR PHYSICIAN TELEPHONE EVALUATION 21-30 MIN: CPT | Mod: 93 | Performed by: FAMILY MEDICINE

## 2023-06-15 ASSESSMENT — PAIN SCALES - GENERAL: PAINLEVEL: NO PAIN (0)

## 2023-06-15 NOTE — NURSING NOTE
Is the patient currently in the state of [place]? YES    Visit mode:TELEPHONE    If the visit is dropped, the patient can be reconnected by: TELEPHONE VISIT: Phone number: [phone]    Will anyone else be joining the visit? YES: How would they like to receive their invitation?       How would you like to obtain your AVS? Mail a copy    Are changes needed to the allergy or medication list? NO    Reason for visit: RECHECK  Has patient had flu shot for current/most recent flu season? If so, when? Yes: 11/04/2022

## 2023-06-30 ENCOUNTER — APPOINTMENT (OUTPATIENT)
Dept: CT IMAGING | Facility: CLINIC | Age: 68
DRG: 536 | End: 2023-06-30
Attending: EMERGENCY MEDICINE
Payer: COMMERCIAL

## 2023-06-30 ENCOUNTER — DOCUMENTATION ONLY (OUTPATIENT)
Dept: ONCOLOGY | Facility: CLINIC | Age: 68
End: 2023-06-30
Payer: COMMERCIAL

## 2023-06-30 ENCOUNTER — APPOINTMENT (OUTPATIENT)
Dept: GENERAL RADIOLOGY | Facility: CLINIC | Age: 68
DRG: 536 | End: 2023-06-30
Attending: EMERGENCY MEDICINE
Payer: COMMERCIAL

## 2023-06-30 ENCOUNTER — HOSPITAL ENCOUNTER (INPATIENT)
Facility: CLINIC | Age: 68
LOS: 1 days | Discharge: ANOTHER HOSPITAL | DRG: 536 | End: 2023-06-30
Attending: EMERGENCY MEDICINE | Admitting: EMERGENCY MEDICINE
Payer: COMMERCIAL

## 2023-06-30 VITALS
HEART RATE: 82 BPM | BODY MASS INDEX: 31.15 KG/M2 | DIASTOLIC BLOOD PRESSURE: 79 MMHG | HEIGHT: 72 IN | RESPIRATION RATE: 16 BRPM | SYSTOLIC BLOOD PRESSURE: 125 MMHG | OXYGEN SATURATION: 93 % | WEIGHT: 230 LBS | TEMPERATURE: 98.1 F

## 2023-06-30 DIAGNOSIS — S72.002A HIP FRACTURE, LEFT, CLOSED, INITIAL ENCOUNTER (H): ICD-10-CM
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
## 2023-06-30 LAB
ANION GAP SERPL CALCULATED.3IONS-SCNC: 11 MMOL/L (ref 7–15)
BASOPHILS # BLD AUTO: 0.1 10E3/UL (ref 0–0.2)
BASOPHILS NFR BLD AUTO: 1 %
BUN SERPL-MCNC: 20.9 MG/DL (ref 8–23)
CALCIUM SERPL-MCNC: 9.9 MG/DL (ref 8.8–10.2)
CHLORIDE SERPL-SCNC: 104 MMOL/L (ref 98–107)
CREAT SERPL-MCNC: 1.11 MG/DL (ref 0.67–1.17)
DEPRECATED HCO3 PLAS-SCNC: 25 MMOL/L (ref 22–29)
EOSINOPHIL # BLD AUTO: 0.3 10E3/UL (ref 0–0.7)
EOSINOPHIL NFR BLD AUTO: 3 %
ERYTHROCYTE [DISTWIDTH] IN BLOOD BY AUTOMATED COUNT: 14.6 % (ref 10–15)
GFR SERPL CREATININE-BSD FRML MDRD: 71 ML/MIN/1.73M2
GLUCOSE SERPL-MCNC: 175 MG/DL (ref 70–99)
HCT VFR BLD AUTO: 38.9 % (ref 40–53)
HGB BLD-MCNC: 12.2 G/DL (ref 13.3–17.7)
IMM GRANULOCYTES # BLD: 0.1 10E3/UL
IMM GRANULOCYTES NFR BLD: 1 %
INR PPP: 0.99 (ref 0.85–1.15)
LYMPHOCYTES # BLD AUTO: 1.7 10E3/UL (ref 0.8–5.3)
LYMPHOCYTES NFR BLD AUTO: 14 %
MCH RBC QN AUTO: 29.1 PG (ref 26.5–33)
MCHC RBC AUTO-ENTMCNC: 31.4 G/DL (ref 31.5–36.5)
MCV RBC AUTO: 93 FL (ref 78–100)
MONOCYTES # BLD AUTO: 0.9 10E3/UL (ref 0–1.3)
MONOCYTES NFR BLD AUTO: 8 %
NEUTROPHILS # BLD AUTO: 9 10E3/UL (ref 1.6–8.3)
NEUTROPHILS NFR BLD AUTO: 73 %
NRBC # BLD AUTO: 0 10E3/UL
NRBC BLD AUTO-RTO: 0 /100
PLATELET # BLD AUTO: 245 10E3/UL (ref 150–450)
POTASSIUM SERPL-SCNC: 4.7 MMOL/L (ref 3.4–5.3)
RBC # BLD AUTO: 4.19 10E6/UL (ref 4.4–5.9)
SODIUM SERPL-SCNC: 140 MMOL/L (ref 136–145)
WBC # BLD AUTO: 12.1 10E3/UL (ref 4–11)

## 2023-06-30 PROCEDURE — 85025 COMPLETE CBC W/AUTO DIFF WBC: CPT | Performed by: EMERGENCY MEDICINE

## 2023-06-30 PROCEDURE — 85610 PROTHROMBIN TIME: CPT | Performed by: EMERGENCY MEDICINE

## 2023-06-30 PROCEDURE — 93005 ELECTROCARDIOGRAM TRACING: CPT | Performed by: EMERGENCY MEDICINE

## 2023-06-30 PROCEDURE — 99285 EMERGENCY DEPT VISIT HI MDM: CPT | Mod: 25 | Performed by: EMERGENCY MEDICINE

## 2023-06-30 PROCEDURE — 82310 ASSAY OF CALCIUM: CPT | Performed by: EMERGENCY MEDICINE

## 2023-06-30 PROCEDURE — 36415 COLL VENOUS BLD VENIPUNCTURE: CPT | Performed by: EMERGENCY MEDICINE

## 2023-06-30 PROCEDURE — 258N000003 HC RX IP 258 OP 636: Performed by: EMERGENCY MEDICINE

## 2023-06-30 PROCEDURE — 73502 X-RAY EXAM HIP UNI 2-3 VIEWS: CPT

## 2023-06-30 PROCEDURE — 250N000011 HC RX IP 250 OP 636: Performed by: EMERGENCY MEDICINE

## 2023-06-30 PROCEDURE — 73502 X-RAY EXAM HIP UNI 2-3 VIEWS: CPT | Mod: 26 | Performed by: RADIOLOGY

## 2023-06-30 PROCEDURE — 72192 CT PELVIS W/O DYE: CPT

## 2023-06-30 PROCEDURE — 120N000002 HC R&B MED SURG/OB UMMC

## 2023-06-30 PROCEDURE — 93010 ELECTROCARDIOGRAM REPORT: CPT | Performed by: EMERGENCY MEDICINE

## 2023-06-30 PROCEDURE — 72192 CT PELVIS W/O DYE: CPT | Mod: 26 | Performed by: RADIOLOGY

## 2023-06-30 RX ORDER — HYDROMORPHONE HYDROCHLORIDE 1 MG/ML
0.5 INJECTION, SOLUTION INTRAMUSCULAR; INTRAVENOUS; SUBCUTANEOUS
Status: COMPLETED | OUTPATIENT
Start: 2023-06-30 | End: 2023-06-30

## 2023-06-30 RX ORDER — ONDANSETRON 2 MG/ML
4 INJECTION INTRAMUSCULAR; INTRAVENOUS EVERY 30 MIN PRN
Status: DISCONTINUED | OUTPATIENT
Start: 2023-06-30 | End: 2023-07-01 | Stop reason: HOSPADM

## 2023-06-30 RX ORDER — SODIUM CHLORIDE, SODIUM LACTATE, POTASSIUM CHLORIDE, CALCIUM CHLORIDE 600; 310; 30; 20 MG/100ML; MG/100ML; MG/100ML; MG/100ML
INJECTION, SOLUTION INTRAVENOUS CONTINUOUS
Status: DISCONTINUED | OUTPATIENT
Start: 2023-06-30 | End: 2023-07-01 | Stop reason: HOSPADM

## 2023-06-30 RX ADMIN — HYDROMORPHONE HYDROCHLORIDE 0.5 MG: 1 INJECTION, SOLUTION INTRAMUSCULAR; INTRAVENOUS; SUBCUTANEOUS at 16:34

## 2023-06-30 RX ADMIN — HYDROMORPHONE HYDROCHLORIDE 0.5 MG: 1 INJECTION, SOLUTION INTRAMUSCULAR; INTRAVENOUS; SUBCUTANEOUS at 20:20

## 2023-06-30 RX ADMIN — SODIUM CHLORIDE, POTASSIUM CHLORIDE, SODIUM LACTATE AND CALCIUM CHLORIDE: 600; 310; 30; 20 INJECTION, SOLUTION INTRAVENOUS at 16:35

## 2023-06-30 RX ADMIN — HYDROMORPHONE HYDROCHLORIDE 0.5 MG: 1 INJECTION, SOLUTION INTRAMUSCULAR; INTRAVENOUS; SUBCUTANEOUS at 23:07

## 2023-06-30 ASSESSMENT — ACTIVITIES OF DAILY LIVING (ADL)
ADLS_ACUITY_SCORE: 35
ADLS_ACUITY_SCORE: 37

## 2023-06-30 NOTE — NURSING NOTE
Rapid Response Epic Documentation     Situation:  Visitor was leaving the facility and tripped over a rock outside of the facility and couldn't bear weight on their (L) leg after.       Objective: Visitor was holding themselves up via wheelchair while on both knees, complaining they couldn't get up.     Assessment:   Visitor said they have 8/10 (L) hip pain, denies any other pain or tingling/numbness throughout extremity. They also had nausea that subsided. Visitor reports no dizziness or mobility problems, prior to them falling. Patient was assisted up x2 into the wheelchair, brought into the facility and an ambulance was requested by the visitor. [facility] medics showed up and took patient to [facility]. No interventions were performed.          BP:  128/85    Pulse: 65  Respiration: 20  SPO2: 95 %  Glucose: N/A  Mental Status: x4  CMS: x4  Stroke Scale: N/A  EKG: N/A      Treatment:    N/A      Location:    Outside near the [facility]      Disposition:      Taken to [facility]    Protocol Used:

## 2023-06-30 NOTE — ED PROVIDER NOTES
[place] EMERGENCY DEPARTMENT ([facility])    6/30/23       ED PROVIDER NOTE    History     Chief Complaint   Patient presents with     Fall     Hip Pain     HPI  Gilberto Camilo is a 68 year old male with past medical history of DMII, hypertension, CAD s/p 2V CABG, mitral valve prolapse s/p mitral valve repair (1/18/2023) with post operative atrial fibrillation who was biba to ED for evaluation after tripping and falling outside a clinic on the sidewalk. He claims that he heard his left hip crack. He reports left hip pain 9/10. He stated that he is unable to bear weight on legs. Denies loss of consciousness.     Past Medical History  Past Medical History:   Diagnosis Date     AAA (abdominal aortic aneurysm) (H) 09/20/2017    5.3 cm      Mohan esophagus      Closed rib fracture      Coronary artery disease involving native heart, unspecified vessel or lesion type, unspecified whether angina present 1/18/2023     DNS (deviated nasal septum)      Postoperative atrial fibrillation (H) 2/13/2023     S/P CABG (coronary artery bypass graft) 2/13/2023     S/P mitral valve repair 2/13/2023     Past Surgical History:   Procedure Laterality Date     AAA REPAIR  06/17/2020    [facility]/ intravascular repair     APPENDECTOMY       BYPASS GRAFT ARTERY CORONARY, REPAIR VALVE MITRAL, COMBINED N/A 1/18/2023    Procedure: MEDIAN STERNOTOMY, ON PUMP OXGENATION, Left endovascular saphaneous vein harvest, CORONARY ARTERY BYPASS GRAFT (CABG x 2), Mitral Valve Repair, transesophageal echocardiogram (TRENT) performed by anesthesia;  Surgeon: Linda Kim MD;  Location:  OR     COLONOSCOPY  2013     CV CORONARY ANGIOGRAM N/A 12/9/2022    Procedure: Coronary Angiogram with possible intervention;  Surgeon: Porfirio Herrera MD;  Location:  HEART CARDIAC CATH LAB     CV RIGHT HEART CATH MEASUREMENTS RECORDED N/A 12/9/2022    Procedure: Right Heart Cath;  Surgeon: Porfirio Herrera MD;  Location:   HEART CARDIAC CATH LAB     ESOPHAGOGASTRODUODENOSCOPY  2013     INCISION AND DRAINAGE CHEST WASHOUT, COMBINED N/A 1/19/2023    Procedure: chest washout, Re-do sternotomy, repair of vein graft;  Surgeon: Linda Kim MD;  Location:  OR     acetaminophen (TYLENOL) 325 MG tablet  amiodarone (PACERONE) 200 MG tablet  ARIPiprazole (ABILIFY) 5 MG tablet  atorvastatin (LIPITOR) 80 MG tablet  buPROPion (WELLBUTRIN XL) 300 MG 24 hr tablet  diphenhydrAMINE HCl (BENADRYL ALLERGY PO)  lisinopril (ZESTRIL) 5 MG tablet  loratadine (CLARITIN) 10 MG tablet  metFORMIN (GLUCOPHAGE) 1000 MG tablet  methocarbamol (ROBAXIN) 500 MG tablet  metoprolol succinate ER (TOPROL XL) 25 MG 24 hr tablet  multivitamin, therapeutic (THERA-VIT) TABS tablet  pantoprazole (PROTONIX) 40 MG EC tablet  vitamin C (ASCORBIC ACID) 1000 MG TABS      No Known Allergies  Family History  Family History   Problem Relation Age of Onset     C.A.D. Father         CABG in late 40's     C.A.D. Brother         MI in 50's     Social History   Social History     Tobacco Use     Smoking status: Former     Packs/day: 1.00     Years: 25.00     Pack years: 25.00     Types: Cigarettes     Smokeless tobacco: Never     Tobacco comments:     Feb. 2014   Vaping Use     Vaping Use: Never used   Substance Use Topics     Alcohol use: Yes     Comment: occasional     Drug use: Yes     Types: Marijuana      Past medical history, past surgical history, medications, allergies, family history, and social history were reviewed with the patient. No additional pertinent items.      A medically appropriate review of systems was performed with pertinent positives and negatives noted in the HPI, and all other systems negative.    Physical Exam   BP: 124/74  Pulse: 62  Temp: 98.2  F (36.8  C)  Resp: 16  Height: 182.9 cm (6')  Weight: 104.3 kg (230 lb)  SpO2: 96 %  Physical Exam  Vitals and nursing note reviewed.   Constitutional:       General: He is not in acute distress.  HENT:       Head: Atraumatic.   Cardiovascular:      Rate and Rhythm: Normal rate and regular rhythm.   Pulmonary:      Effort: Pulmonary effort is normal.      Breath sounds: Normal breath sounds.   Musculoskeletal:      Cervical back: Neck supple.      Left hip: Tenderness and bony tenderness present. Decreased range of motion.        Legs:    Neurological:      General: No focal deficit present.      Mental Status: He is alert and oriented to person, place, and time.   Psychiatric:         Mood and Affect: Mood normal.         Behavior: Behavior normal.           ED Course, Procedures, & Data      Procedures       ED Course Selections: A consult was attained from the orthopedics service. The case was discussed with orthopedic resident, senior resident from that service. The consulting service's recommendations were provided at 1800          X-ray pelvis and left hip was reviewed by myself that shows a subcapital minimally displaced fracture on the left side no other abnormalities.  CT requested by orthopedics and is pending       Results for orders placed or performed during the hospital encounter of 06/30/23   XR Pelvis and Hip Left 2 Views     Status: None    Narrative    2 views pelvis/left hip radiographs 6/30/2023 3:52 PM    History: Pain after fall    Comparison: None available    Findings:    AP view of pelvis, frog leg lateral  views of the left hip were  obtained.     No acute osseous abnormality.      Left femur basicervical fracture.    Reduced right femoral head neck junction offset with relative  preservation of joint space.    Others:    Aortobiiliac stents. Vascular calcifications. Surgical clip projecting  along the left medial thigh.      Impression    Impression: Left femur basicervical fracture.    Finding discussed with Dr. Araiza by me at 3:55 PM.    NINFA VILLAVICENCIOASHI         SYSTEM ID:  [id number]   Basic metabolic panel     Status: Abnormal   Result Value Ref Range    Sodium 140 136 - 145 mmol/L     Potassium 4.7 3.4 - 5.3 mmol/L    Chloride 104 98 - 107 mmol/L    Carbon Dioxide (CO2) 25 22 - 29 mmol/L    Anion Gap 11 7 - 15 mmol/L    Urea Nitrogen 20.9 8.0 - 23.0 mg/dL    Creatinine 1.11 0.67 - 1.17 mg/dL    Calcium 9.9 8.8 - 10.2 mg/dL    Glucose 175 (H) 70 - 99 mg/dL    GFR Estimate 71 >60 mL/min/1.73m2   INR     Status: Normal   Result Value Ref Range    INR 0.99 0.85 - 1.15   CBC with platelets and differential     Status: Abnormal   Result Value Ref Range    WBC Count 12.1 (H) 4.0 - 11.0 10e3/uL    RBC Count 4.19 (L) 4.40 - 5.90 10e6/uL    Hemoglobin 12.2 (L) 13.3 - 17.7 g/dL    Hematocrit 38.9 (L) 40.0 - 53.0 %    MCV 93 78 - 100 fL    MCH 29.1 26.5 - 33.0 pg    MCHC 31.4 (L) 31.5 - 36.5 g/dL    RDW 14.6 10.0 - 15.0 %    Platelet Count 245 150 - 450 10e3/uL    % Neutrophils 73 %    % Lymphocytes 14 %    % Monocytes 8 %    % Eosinophils 3 %    % Basophils 1 %    % Immature Granulocytes 1 %    NRBCs per 100 WBC 0 <1 /100    Absolute Neutrophils 9.0 (H) 1.6 - 8.3 10e3/uL    Absolute Lymphocytes 1.7 0.8 - 5.3 10e3/uL    Absolute Monocytes 0.9 0.0 - 1.3 10e3/uL    Absolute Eosinophils 0.3 0.0 - 0.7 10e3/uL    Absolute Basophils 0.1 0.0 - 0.2 10e3/uL    Absolute Immature Granulocytes 0.1 <=0.4 10e3/uL    Absolute NRBCs 0.0 10e3/uL   EKG 12-lead, tracing only     Status: None (Preliminary result)   Result Value Ref Range    Systolic Blood Pressure  mmHg    Diastolic Blood Pressure  mmHg    Ventricular Rate 75 BPM    Atrial Rate 75 BPM    OH Interval 194 ms    QRS Duration 112 ms     ms    QTc 419 ms    P Axis 61 degrees    R AXIS -39 degrees    T Axis 142 degrees    Interpretation ECG       Sinus rhythm  Left axis deviation  Inferior infarct , age undetermined  ST & T wave abnormality, consider lateral ischemia  Abnormal ECG     CBC with platelets differential     Status: Abnormal    Narrative    The following orders were created for panel order CBC with platelets differential.  Procedure                                Abnormality         Status                     ---------                               -----------         ------                     CBC with platelets and d...[[id number]]  Abnormal            Final result                 Please view results for these tests on the individual orders.     Medications   lactated ringers infusion ( Intravenous $New Bag 6/30/23 1635)   ondansetron (ZOFRAN) injection 4 mg (has no administration in time range)   HYDROmorphone (PF) (DILAUDID) injection 0.5 mg (0.5 mg Intravenous $Given 6/30/23 8112)     Labs Ordered and Resulted from Time of ED Arrival to Time of ED Departure   BASIC METABOLIC PANEL - Abnormal       Result Value    Sodium 140      Potassium 4.7      Chloride 104      Carbon Dioxide (CO2) 25      Anion Gap 11      Urea Nitrogen 20.9      Creatinine 1.11      Calcium 9.9      Glucose 175 (*)     GFR Estimate 71     CBC WITH PLATELETS AND DIFFERENTIAL - Abnormal    WBC Count 12.1 (*)     RBC Count 4.19 (*)     Hemoglobin 12.2 (*)     Hematocrit 38.9 (*)     MCV 93      MCH 29.1      MCHC 31.4 (*)     RDW 14.6      Platelet Count 245      % Neutrophils 73      % Lymphocytes 14      % Monocytes 8      % Eosinophils 3      % Basophils 1      % Immature Granulocytes 1      NRBCs per 100 WBC 0      Absolute Neutrophils 9.0 (*)     Absolute Lymphocytes 1.7      Absolute Monocytes 0.9      Absolute Eosinophils 0.3      Absolute Basophils 0.1      Absolute Immature Granulocytes 0.1      Absolute NRBCs 0.0     INR - Normal    INR 0.99       XR Pelvis and Hip Left 2 Views   Final Result   Impression: Left femur basicervical fracture.      Finding discussed with Dr. Araiza by me at 3:55 PM.      NINFA KIRSTY            SYSTEM ID:  [id number]      CT Pelvis Bone wo Contrast    (Results Pending)   CT Hip Left w/o Contrast    (Results Pending)         Medications   lactated ringers infusion ( Intravenous $New Bag 6/30/23 1635)   ondansetron (ZOFRAN) injection 4 mg  (has no administration in time range)   HYDROmorphone (PF) (DILAUDID) injection 0.5 mg (0.5 mg Intravenous $Given 6/30/23 4611)         Critical care was not performed.     Medical Decision Making  The patient's presentation was of moderate complexity (an acute illness with systemic symptoms).    The patient's evaluation involved:  ordering and/or review of 3+ test(s) in this encounter (X-ray left hip, CBC, basic metabolic, CT hip and pelvis)    The patient's management necessitated high risk (a decision regarding hospitalization).      Assessment & Plan    68-year-old male with fall from standing onto his left hip resulting in subcapital fracture.  Orthopedics consulted and saw the patient.  The decision at this point is whether he can be repaired here or will need to be transferred to outside hospital because of the availability of the needed surgeon for the case.  This is pending at the time of dictation.  He does need surgical repair will either be done here or possibly at [facility].  He is stable pain is managed and ultimate disposition is pending.    I have reviewed the nursing notes. I have reviewed the findings, diagnosis, plan and need for follow up with the patient.    New Prescriptions    No medications on file       Final diagnoses:   Fall from standing, initial encounter   Hip fracture, left, closed, initial encounter (H)       I, Rosi Desir, am serving as a trained medical scribe to document services personally performed by Ramesh Araiza MD based on the provider's statements to me on June 30, 2023.  This document has been checked and approved by the attending provider.    I, Ramesh Araiza MD, was physically present and have reviewed and verified the accuracy of this note documented by Rosi Desir medical scribe.      Ramesh Araiza MD  [facility] EMERGENCY DEPARTMENT  6/30/2023     Ramesh Araiza MD  07/01/23 2625

## 2023-06-30 NOTE — ED NOTES
[facility]  Expected date: 6/30/23  Expected time: 2:53 PM  Means of arrival: Ambulance  Comments:  [id number]  Fall   Hip pain

## 2023-06-30 NOTE — ED TRIAGE NOTES
Pt brought in by ambulance from [facility] after tripping and falling outside on sidewalk with 9/10 left hip pain. Pt unable to bear weight. No loss of consciousness. Hx DM, HTN, heart surgery

## 2023-07-01 NOTE — ED NOTES
Report called to Germania CONNORS RN at [facility]. Called [facility] requesting transport to [place] [address] Room 7213. Est. Time for  [id number]. [facility] updated on approx. Time of .

## 2023-07-01 NOTE — CONSULTS
[facility]  Orthopedic Surgery Consult    Name: Gilberto Camilo  Age: 68 year old  MRN: [id number]  YOB: 1955    Reason for Consult: Left Basicervical Femoral Neck Fracture    Requesting Provider: Dr. Jose G MANCERA    Assessment and Plan:     Assessment:  Mr. Camilo is a 68-year-old male with a left basicervical femoral neck fracture.  The fracture is displaced and meets criteria for an open reduction internal fixation for either a total hip arthroplasty or a hemiarthroplasty.    We discussed with the patient and his family that he would likely benefit from a total hip arthroplasty rather than a hemiarthroplasty given his current functional goals.  The on-call orthopedic surgeon does not offer a total hip arthroplasty.  It was discussed with Dr. Kim at [facility] who is a [facility]  and on-call at [facility] this weekend.  He excepted the patient to be transferred to [facility] to undergo a total hip arthroplasty this weekend the patient and family and [facility] are in agreement with the plan.      Plan:  - Plan for OR: ROHAN at [facility]. Will require preoperative labs  - Anticoagulation/DVT prophylaxis: Mechanical ppx postop  - Antibiotics: Cefazolin perioperative  - Imaging: Hip XR and CT Reviewed  - Activity: NWB LLE  - Weight bearing: NWB LLE  - Pain control: Defer to primary  - Diet: NPO at midnight  - Follow-up: F.u with Dr. Kim  - Disposition: Transfer to [facility].    Staff: Dr. David Tyler MD    Respectfully,    Chuck Kaye MD  Orthopedic Surgery PGY1  [phone]    Please page me directly with any questions/concerns during regular weekday hours before 5 pm. If there is no response, if it is a weekend, or if it is during evening hours then please page the orthopedic surgery resident on call.    History of Present Illness:     Patient was seen and examined by me. History, PMH, Meds, SH, complete ROS (10 organ systems) and PE  reviewed with patient and prior medical records.      68-year-old male with past medical history of diabetes, hypertension, and coronary artery disease who presents to the ED after a fall.  He was being seen at the outpatient surgery center today when he fell outside on the sidewalk.  He states that he heard a left hip crack was unable to bear weight on the legs immediately after the fall.  He reports pain of 9 out of 10.  He denies any prior surgeries to the left hip.  He denies any numbness or tingling and muscle weakness.      Past Medical History:     Past Medical History:   Diagnosis Date     AAA (abdominal aortic aneurysm) (H) 09/20/2017    5.3 cm      Mohan esophagus      Closed rib fracture      Coronary artery disease involving native heart, unspecified vessel or lesion type, unspecified whether angina present 1/18/2023     DNS (deviated nasal septum)      Postoperative atrial fibrillation (H) 2/13/2023     S/P CABG (coronary artery bypass graft) 2/13/2023     S/P mitral valve repair 2/13/2023       Past Surgical History:     Past Surgical History:   Procedure Laterality Date     AAA REPAIR  06/17/2020    st [facility]/ intravascular repair     APPENDECTOMY       BYPASS GRAFT ARTERY CORONARY, REPAIR VALVE MITRAL, COMBINED N/A 1/18/2023    Procedure: MEDIAN STERNOTOMY, ON PUMP OXGENATION, Left endovascular saphaneous vein harvest, CORONARY ARTERY BYPASS GRAFT (CABG x 2), Mitral Valve Repair, transesophageal echocardiogram (TRENT) performed by anesthesia;  Surgeon: Linda Kim MD;  Location:  OR     COLONOSCOPY  2013     CV CORONARY ANGIOGRAM N/A 12/9/2022    Procedure: Coronary Angiogram with possible intervention;  Surgeon: Porfirio Herrera MD;  Location:  HEART CARDIAC CATH LAB     CV RIGHT HEART CATH MEASUREMENTS RECORDED N/A 12/9/2022    Procedure: Right Heart Cath;  Surgeon: Porfirio Herrera MD;  Location:  HEART CARDIAC CATH LAB     ESOPHAGOGASTRODUODENOSCOPY  2013      INCISION AND DRAINAGE CHEST WASHOUT, COMBINED N/A 1/19/2023    Procedure: chest washout, Re-do sternotomy, repair of vein graft;  Surgeon: Linda Kim MD;  Location:  OR       Social History:     Social History     Socioeconomic History     Marital status:      Spouse name: None     Number of children: None     Years of education: None     Highest education level: None   Tobacco Use     Smoking status: Former     Packs/day: 1.00     Years: 25.00     Pack years: 25.00     Types: Cigarettes     Smokeless tobacco: Never     Tobacco comments:     Feb. 2014   Vaping Use     Vaping Use: Never used   Substance and Sexual Activity     Alcohol use: Yes     Comment: occasional     Drug use: Yes     Types: Marijuana     Sexual activity: Yes     Partners: Female   Other Topics Concern     Parent/sibling w/ CABG, MI or angioplasty before 65F 55M? Yes     Comment: dad had heart attack before 55. brother also had heart attack before 55.   Social History Narrative    .        Family History:     Family History   Problem Relation Age of Onset     C.A.D. Father         CABG in late 40's     C.A.D. Brother         MI in 50's       Medications:     Current Facility-Administered Medications   Medication     lactated ringers infusion     ondansetron (ZOFRAN) injection 4 mg     Current Outpatient Medications   Medication Sig     acetaminophen (TYLENOL) 325 MG tablet Take 2 tablets (650 mg) by mouth every 4 hours as needed for other (For optimal non-opioid multimodal pain management to improve pain control.) (Patient not taking: Reported on 6/13/2023)     amiodarone (PACERONE) 200 MG tablet Take 1 tablet (200 mg) by mouth daily     ARIPiprazole (ABILIFY) 5 MG tablet 1 tablet (5 mg) by Oral or Feeding Tube route every morning     atorvastatin (LIPITOR) 80 MG tablet Take 1 tablet (80 mg) by mouth every morning for 90 days     buPROPion (WELLBUTRIN XL) 300 MG 24 hr tablet Take 1 tablet (300 mg) by mouth daily      diphenhydrAMINE HCl (BENADRYL ALLERGY PO) Take by mouth as needed     lisinopril (ZESTRIL) 5 MG tablet Take 1 tablet (5 mg) by mouth daily     loratadine (CLARITIN) 10 MG tablet Take 10 mg by mouth every morning     metFORMIN (GLUCOPHAGE) 1000 MG tablet Take 1 tablet (1,000 mg) by mouth 2 times daily (with meals)     methocarbamol (ROBAXIN) 500 MG tablet 1 tablet (500 mg) by Oral or Feeding Tube route every 6 hours as needed for muscle spasms (Patient not taking: Reported on 6/15/2023)     metoprolol succinate ER (TOPROL XL) 25 MG 24 hr tablet TAKE 1 TABLET(25 MG) BY MOUTH DAILY     multivitamin, therapeutic (THERA-VIT) TABS tablet 1 tablet by Oral or Feeding Tube route every morning     pantoprazole (PROTONIX) 40 MG EC tablet Take 1 tablet (40 mg) by mouth daily     vitamin C (ASCORBIC ACID) 1000 MG TABS Take 1,000 mg by mouth every morning       Allergies:     No Known Allergies    Review of Systems:     A comprehensive 10 point review of systems (constitutional, ENT, cardiac, peripheral vascular, respiratory, GI, , musculoskeletal, skin, neurological) was performed and found to be negative except as described in this note.      Physical Exam:     Vital Signs: /74   Pulse 62   Temp 98.2  F (36.8  C) (Oral)   Resp 16   Ht 1.829 m (6')   Wt 104.3 kg (230 lb)   SpO2 96%   BMI 31.19 kg/m    General: Resting comfortably in bed, awake, alert, cooperative, no apparent distress, appears stated age.  HEENT: Normocephalic, atraumatic, EOMI, no scleral icterus.  Cardiovascular: RRR assessed by peripheral pulse.  Respiratory: Breathing comfortably on room air, no wheezing.  Skin: No rashes or lesions.  Neurologic: A&Ox3, CN II-XII grossly intact  Musculoskeletal:  LLE: No gross deformity. Skin intact. ROM of left hip is limited due to pain.  Fires TA/Gastroc/EHL with 5/5 strength. Unable to assess strength of hip flexion/knee extension due to pain. SILT in femoral, sural, saphenous, deep peroneal, superficial  peroneal, and tibial nerve distributions. Dorsalis pedis and posterior tibial arteries 2+ and foot warm and well-perfused     Imaging:     Left Hip XR shows a left basicervical fracture    CT Pelvis shows 1.  Acute oblique fracture of the left femoral neck with mild displacement and angulation.  2.  Mild degenerative changes in the hips and SI joints.      Data:     CBC:  Lab Results   Component Value Date    WBC 12.1 (H) 06/30/2023    HGB 12.2 (L) 06/30/2023     06/30/2023     BMP:  Lab Results   Component Value Date     06/30/2023    POTASSIUM 4.7 06/30/2023    CHLORIDE 104 06/30/2023    CO2 25 06/30/2023    BUN 20.9 06/30/2023    CR 1.11 06/30/2023    ANIONGAP 11 06/30/2023    SARI 9.9 06/30/2023     (H) 06/30/2023     Inflammatory Markers:  Lab Results   Component Value Date    WBC 12.1 (H) 06/30/2023    WBC 11.5 (H) 05/04/2023    WBC 8.7 02/13/2023    CRP 2.7 03/18/2014

## 2023-07-01 NOTE — ED NOTES
Emergency Department Patient Sign-out       Brief HPI:  This is a 68 year old male signed out to me by Dr. Araiza .  See initial ED Provider note for details of the presentation.            Significant Events prior to my assuming care: Chente pt is a 68 yom who presented after a fall, found to have a hip fracture. Seen by orthopedic surgery here who notes that there is no one on over the weekend here who can fix this type of orthopedic injury, they recommend transfer to [facility], Dr. Kim at [facility] has accepted the patient for admission      Exam:   Patient Vitals for the past 24 hrs:   BP Temp Temp src Pulse Resp SpO2 Height Weight   06/30/23 1503 124/74 98.2  F (36.8  C) Oral 62 16 96 % 1.829 m (6') 104.3 kg (230 lb)           ED RESULTS:   Results for orders placed or performed during the hospital encounter of 06/30/23 (from the past 24 hour(s))   XR Pelvis and Hip Left 2 Views     Status: None    Collection Time: 06/30/23  3:38 PM    Narrative    2 views pelvis/left hip radiographs 6/30/2023 3:52 PM    History: Pain after fall    Comparison: None available    Findings:    AP view of pelvis, frog leg lateral  views of the left hip were  obtained.     No acute osseous abnormality.      Left femur basicervical fracture.    Reduced right femoral head neck junction offset with relative  preservation of joint space.    Others:    Aortobiiliac stents. Vascular calcifications. Surgical clip projecting  along the left medial thigh.      Impression    Impression: Left femur basicervical fracture.    Finding discussed with Dr. Araiza by me at 3:55 PM.    NINFA KIRSTY         SYSTEM ID:  [id number]   EKG 12-lead, tracing only     Status: None (Preliminary result)    Collection Time: 06/30/23  4:13 PM   Result Value Ref Range    Systolic Blood Pressure  mmHg    Diastolic Blood Pressure  mmHg    Ventricular Rate 75 BPM    Atrial Rate 75 BPM    UT Interval 194 ms    QRS Duration 112 ms     ms     QTc 419 ms    P Axis 61 degrees    R AXIS -39 degrees    T Axis 142 degrees    Interpretation ECG       Sinus rhythm  Left axis deviation  Inferior infarct , age undetermined  ST & T wave abnormality, consider lateral ischemia  Abnormal ECG     CBC with platelets differential     Status: Abnormal    Collection Time: 06/30/23  4:28 PM    Narrative    The following orders were created for panel order CBC with platelets differential.  Procedure                               Abnormality         Status                     ---------                               -----------         ------                     CBC with platelets and d...[[id number]]  Abnormal            Final result                 Please view results for these tests on the individual orders.   Basic metabolic panel     Status: Abnormal    Collection Time: 06/30/23  4:28 PM   Result Value Ref Range    Sodium 140 136 - 145 mmol/L    Potassium 4.7 3.4 - 5.3 mmol/L    Chloride 104 98 - 107 mmol/L    Carbon Dioxide (CO2) 25 22 - 29 mmol/L    Anion Gap 11 7 - 15 mmol/L    Urea Nitrogen 20.9 8.0 - 23.0 mg/dL    Creatinine 1.11 0.67 - 1.17 mg/dL    Calcium 9.9 8.8 - 10.2 mg/dL    Glucose 175 (H) 70 - 99 mg/dL    GFR Estimate 71 >60 mL/min/1.73m2   INR     Status: Normal    Collection Time: 06/30/23  4:28 PM   Result Value Ref Range    INR 0.99 0.85 - 1.15   CBC with platelets and differential     Status: Abnormal    Collection Time: 06/30/23  4:28 PM   Result Value Ref Range    WBC Count 12.1 (H) 4.0 - 11.0 10e3/uL    RBC Count 4.19 (L) 4.40 - 5.90 10e6/uL    Hemoglobin 12.2 (L) 13.3 - 17.7 g/dL    Hematocrit 38.9 (L) 40.0 - 53.0 %    MCV 93 78 - 100 fL    MCH 29.1 26.5 - 33.0 pg    MCHC 31.4 (L) 31.5 - 36.5 g/dL    RDW 14.6 10.0 - 15.0 %    Platelet Count 245 150 - 450 10e3/uL    % Neutrophils 73 %    % Lymphocytes 14 %    % Monocytes 8 %    % Eosinophils 3 %    % Basophils 1 %    % Immature Granulocytes 1 %    NRBCs per 100 WBC 0 <1 /100    Absolute Neutrophils  9.0 (H) 1.6 - 8.3 10e3/uL    Absolute Lymphocytes 1.7 0.8 - 5.3 10e3/uL    Absolute Monocytes 0.9 0.0 - 1.3 10e3/uL    Absolute Eosinophils 0.3 0.0 - 0.7 10e3/uL    Absolute Basophils 0.1 0.0 - 0.2 10e3/uL    Absolute Immature Granulocytes 0.1 <=0.4 10e3/uL    Absolute NRBCs 0.0 10e3/uL   CT Pelvis Bone wo Contrast     Status: None    Collection Time: 06/30/23  6:30 PM    Narrative    EXAM: CT PELVIS BONE WO CONTRAST  LOCATION: [facility]  DATE: 6/30/2023    INDICATION: Assess left femoral neck fracture.  COMPARISON: Radiographs from earlier today.  TECHNIQUE: CT scan of the pelvis was performed without IV contrast. Multiplanar reformats were obtained. Dose reduction techniques were used.  CONTRAST: None.    FINDINGS:    Bone/joint:   -Acute oblique fracture of the left femoral neck with mild anterior apical angulation at the fracture margin and mild lateral and proximal displacement of the intertrochanteric region fracture.  -No additional fractures are evident.  -Mild degenerative changes in the hips.  -No hip joint effusions or calcific loose bodies.  -Mild degenerative changes in the SI joints.    Soft tissues:   -No hematoma or fluid collection. No muscular atrophy.  -Aortobiiliac endovascular stent.  -Small fat-containing umbilical hernia.  -Right inguinal cord lipoma or fat-containing hernia.      Impression    IMPRESSION:  1.  Acute oblique fracture of the left femoral neck with mild displacement and angulation.  2.  Mild degenerative changes in the hips and SI joints.  3.  Small fat-containing umbilical hernia.  4.  Right inguinal cord lipoma or fat-containing hernia.         ED MEDICATIONS:   Medications   lactated ringers infusion ( Intravenous $New Bag 6/30/23 7918)   ondansetron (ZOFRAN) injection 4 mg (has no administration in time range)   HYDROmorphone (PF) (DILAUDID) injection 0.5 mg (0.5 mg Intravenous $Given 6/30/23 9884)         Impression:     ICD-10-CM    1. Fall from standing, initial encounter  W19.XXXA       2. Hip fracture, left, closed, initial encounter (H)  S72.002A           Plan:    Patient is awaiting transfer to [facility].       MD Fermin Hernández Michelle C, MD  06/30/23 1951

## 2023-07-03 LAB
ATRIAL RATE - MUSE: 75 BPM
DIASTOLIC BLOOD PRESSURE - MUSE: NORMAL MMHG
INTERPRETATION ECG - MUSE: NORMAL
P AXIS - MUSE: 61 DEGREES
PR INTERVAL - MUSE: 194 MS
QRS DURATION - MUSE: 112 MS
QT - MUSE: 376 MS
QTC - MUSE: 419 MS
R AXIS - MUSE: -39 DEGREES
SYSTOLIC BLOOD PRESSURE - MUSE: NORMAL MMHG
T AXIS - MUSE: 142 DEGREES
VENTRICULAR RATE- MUSE: 75 BPM

## 2023-07-17 ENCOUNTER — LAB (OUTPATIENT)
Dept: LAB | Facility: OTHER | Age: 68
End: 2023-07-17
Attending: FAMILY MEDICINE
Payer: COMMERCIAL

## 2023-07-17 ENCOUNTER — OFFICE VISIT (OUTPATIENT)
Dept: FAMILY MEDICINE | Facility: OTHER | Age: 68
End: 2023-07-17
Attending: FAMILY MEDICINE
Payer: COMMERCIAL

## 2023-07-17 VITALS
WEIGHT: 230 LBS | HEART RATE: 62 BPM | DIASTOLIC BLOOD PRESSURE: 73 MMHG | BODY MASS INDEX: 31.15 KG/M2 | SYSTOLIC BLOOD PRESSURE: 126 MMHG | OXYGEN SATURATION: 98 % | TEMPERATURE: 97.6 F | HEIGHT: 72 IN

## 2023-07-17 DIAGNOSIS — S72.002S CLOSED LEFT HIP FRACTURE, SEQUELA: ICD-10-CM

## 2023-07-17 DIAGNOSIS — Z09 HOSPITAL DISCHARGE FOLLOW-UP: Primary | ICD-10-CM

## 2023-07-17 DIAGNOSIS — Z96.642 S/P TOTAL LEFT HIP ARTHROPLASTY: ICD-10-CM

## 2023-07-17 DIAGNOSIS — Z09 HOSPITAL DISCHARGE FOLLOW-UP: ICD-10-CM

## 2023-07-17 DIAGNOSIS — R32 URINARY INCONTINENCE, UNSPECIFIED TYPE: ICD-10-CM

## 2023-07-17 DIAGNOSIS — N39.41 URGE INCONTINENCE OF URINE: ICD-10-CM

## 2023-07-17 DIAGNOSIS — Z95.1 S/P CABG (CORONARY ARTERY BYPASS GRAFT): ICD-10-CM

## 2023-07-17 DIAGNOSIS — Z95.2 S/P MVR (MITRAL VALVE REPLACEMENT): ICD-10-CM

## 2023-07-17 LAB
ALBUMIN UR-MCNC: 10 MG/DL
ANION GAP SERPL CALCULATED.3IONS-SCNC: 11 MMOL/L (ref 7–15)
APPEARANCE UR: CLEAR
BACTERIA #/AREA URNS HPF: ABNORMAL /HPF
BASOPHILS # BLD AUTO: 0.1 10E3/UL (ref 0–0.2)
BASOPHILS NFR BLD AUTO: 1 %
BILIRUB UR QL STRIP: NEGATIVE
BUN SERPL-MCNC: 17.6 MG/DL (ref 8–23)
CALCIUM SERPL-MCNC: 10.2 MG/DL (ref 8.8–10.2)
CHLORIDE SERPL-SCNC: 101 MMOL/L (ref 98–107)
COLOR UR AUTO: YELLOW
CREAT SERPL-MCNC: 0.93 MG/DL (ref 0.67–1.17)
DEPRECATED HCO3 PLAS-SCNC: 27 MMOL/L (ref 22–29)
EOSINOPHIL # BLD AUTO: 0.5 10E3/UL (ref 0–0.7)
EOSINOPHIL NFR BLD AUTO: 5 %
ERYTHROCYTE [DISTWIDTH] IN BLOOD BY AUTOMATED COUNT: 14.6 % (ref 10–15)
GFR SERPL CREATININE-BSD FRML MDRD: 88 ML/MIN/1.73M2
GLUCOSE SERPL-MCNC: 109 MG/DL (ref 70–99)
GLUCOSE UR STRIP-MCNC: NEGATIVE MG/DL
HCT VFR BLD AUTO: 35.6 % (ref 40–53)
HGB BLD-MCNC: 10.9 G/DL (ref 13.3–17.7)
HGB UR QL STRIP: NEGATIVE
HYALINE CASTS: 1 /LPF
IMM GRANULOCYTES # BLD: 0.1 10E3/UL
IMM GRANULOCYTES NFR BLD: 1 %
KETONES UR STRIP-MCNC: NEGATIVE MG/DL
LEUKOCYTE ESTERASE UR QL STRIP: NEGATIVE
LYMPHOCYTES # BLD AUTO: 2.2 10E3/UL (ref 0.8–5.3)
LYMPHOCYTES NFR BLD AUTO: 20 %
MCH RBC QN AUTO: 28.3 PG (ref 26.5–33)
MCHC RBC AUTO-ENTMCNC: 30.6 G/DL (ref 31.5–36.5)
MCV RBC AUTO: 93 FL (ref 78–100)
MONOCYTES # BLD AUTO: 0.7 10E3/UL (ref 0–1.3)
MONOCYTES NFR BLD AUTO: 7 %
MUCOUS THREADS #/AREA URNS LPF: PRESENT /LPF
NEUTROPHILS # BLD AUTO: 7.1 10E3/UL (ref 1.6–8.3)
NEUTROPHILS NFR BLD AUTO: 66 %
NITRATE UR QL: NEGATIVE
NRBC # BLD AUTO: 0 10E3/UL
NRBC BLD AUTO-RTO: 0 /100
PH UR STRIP: 5.5 [PH] (ref 4.7–8)
PLATELET # BLD AUTO: 476 10E3/UL (ref 150–450)
POTASSIUM SERPL-SCNC: 4.7 MMOL/L (ref 3.4–5.3)
RBC # BLD AUTO: 3.85 10E6/UL (ref 4.4–5.9)
RBC URINE: 1 /HPF
SODIUM SERPL-SCNC: 139 MMOL/L (ref 136–145)
SP GR UR STRIP: 1.03 (ref 1–1.03)
SQUAMOUS EPITHELIAL: 0 /HPF
UROBILINOGEN UR STRIP-MCNC: NORMAL MG/DL
WBC # BLD AUTO: 10.7 10E3/UL (ref 4–11)
WBC URINE: 1 /HPF

## 2023-07-17 PROCEDURE — 99214 OFFICE O/P EST MOD 30 MIN: CPT | Performed by: FAMILY MEDICINE

## 2023-07-17 PROCEDURE — 81003 URINALYSIS AUTO W/O SCOPE: CPT | Mod: ZL

## 2023-07-17 PROCEDURE — 36415 COLL VENOUS BLD VENIPUNCTURE: CPT | Mod: ZL

## 2023-07-17 PROCEDURE — 85025 COMPLETE CBC W/AUTO DIFF WBC: CPT | Mod: ZL

## 2023-07-17 PROCEDURE — G0463 HOSPITAL OUTPT CLINIC VISIT: HCPCS | Performed by: FAMILY MEDICINE

## 2023-07-17 PROCEDURE — 80048 BASIC METABOLIC PNL TOTAL CA: CPT | Mod: ZL

## 2023-07-17 PROCEDURE — 99495 TRANSJ CARE MGMT MOD F2F 14D: CPT | Performed by: FAMILY MEDICINE

## 2023-07-17 RX ORDER — ACETAMINOPHEN 325 MG/1
650 TABLET ORAL EVERY 4 HOURS PRN
Qty: 100 TABLET | Refills: 3 | Status: SHIPPED | OUTPATIENT
Start: 2023-07-17 | End: 2023-11-28

## 2023-07-17 RX ORDER — VITAMIN B COMPLEX
TABLET ORAL
COMMUNITY
Start: 2023-07-04

## 2023-07-17 RX ORDER — ASPIRIN 81 MG/1
162 TABLET, CHEWABLE ORAL
COMMUNITY
Start: 2023-02-23

## 2023-07-17 RX ORDER — FUROSEMIDE 10 MG/ML
SOLUTION ORAL DAILY
Status: CANCELLED | OUTPATIENT
Start: 2023-07-17

## 2023-07-17 RX ORDER — HYDROMORPHONE HYDROCHLORIDE 2 MG/1
TABLET ORAL
COMMUNITY
Start: 2023-07-03 | End: 2023-09-12

## 2023-07-17 RX ORDER — OXYBUTYNIN CHLORIDE 5 MG/1
5 TABLET, EXTENDED RELEASE ORAL DAILY
Qty: 30 TABLET | Refills: 1 | Status: SHIPPED | OUTPATIENT
Start: 2023-07-17 | End: 2023-08-15

## 2023-07-17 RX ORDER — FUROSEMIDE 20 MG
20 TABLET ORAL DAILY
COMMUNITY
Start: 2023-01-31 | End: 2023-07-17

## 2023-07-17 RX ORDER — FUROSEMIDE 20 MG
10 TABLET ORAL DAILY
Qty: 45 TABLET | Refills: 1 | Status: SHIPPED | OUTPATIENT
Start: 2023-07-17 | End: 2023-11-28

## 2023-07-17 ASSESSMENT — PAIN SCALES - GENERAL: PAINLEVEL: MILD PAIN (3)

## 2023-07-17 NOTE — PROGRESS NOTES
Assessment & Plan     Hospital discharge follow-up  Doing well overall.   - CBC with Platelets & Differential; Future  - Basic metabolic panel; Future  - UA with Microscopic reflex to Culture; Future    Closed left hip fracture, sequela  No issue except worsening incontinenc3   - CBC with Platelets & Differential; Future  - Basic metabolic panel; Future  - UA with Microscopic reflex to Culture; Future    S/P total left hip arthroplasty  Stable   - CBC with Platelets & Differential; Future  - Basic metabolic panel; Future  - UA with Microscopic reflex to Culture; Future    Urinary incontinence, unspecified type  No UTI or BPH urinary retention .  More of urge incont . / bladder instability   Will try low dose ditropan and decrease lasix . Follow-up in 30 days   - CBC with Platelets & Differential; Future  - Basic metabolic panel; Future  - UA with Microscopic reflex to Culture; Future    S/P CABG (coronary artery bypass graft)  Stable - try lower dose of lasix   - metFORMIN (GLUCOPHAGE) 1000 MG tablet; Take 0.5 tablets (500 mg) by mouth 2 times daily (with meals)  - acetaminophen (TYLENOL) 325 MG tablet; Take 2 tablets (650 mg) by mouth every 4 hours as needed for other (For optimal non-opioid multimodal pain management to improve pain control.)  - furosemide (LASIX) 20 MG tablet; Take 0.5 tablets (10 mg) by mouth daily    S/P MVR (mitral valve replacement)  Stable   - metFORMIN (GLUCOPHAGE) 1000 MG tablet; Take 0.5 tablets (500 mg) by mouth 2 times daily (with meals)  - acetaminophen (TYLENOL) 325 MG tablet; Take 2 tablets (650 mg) by mouth every 4 hours as needed for other (For optimal non-opioid multimodal pain management to improve pain control.)  - furosemide (LASIX) 20 MG tablet; Take 0.5 tablets (10 mg) by mouth daily    Urge incontinence of urine  As above   - oxybutynin ER (DITROPAN XL) 5 MG 24 hr tablet; Take 1 tablet (5 mg) by mouth daily    Diarrhea and stool incontinence - decrease Metformin by half-  follow-up in a month           MED REC REQUIRED  Post Medication Reconciliation Status: discharge medications reconciled, continue medications without change  BMI:   Estimated body mass index is 31.19 kg/m  as calculated from the following:    Height as of this encounter: 1.829 m (6').    Weight as of this encounter: 104.3 kg (230 lb).   Weight management plan: Discussed healthy diet and exercise guidelines        No follow-ups on file.    Rock Basilio MD  [facility] - KAIDEN Macias is a 68 year old, presenting for the following health issues:  Follow Up        5/4/2023     8:40 AM   Additional Questions   Roomed by kristen acosta   Accompanied by self     HPI     Pain History:  When did you first notice your pain? When he feel 6/30/2023  Have you seen anyone else for your pain? No  How has your pain affected your ability to work? Not currently working - unrelated to pain  Where in your body do you have pain? Musculoskeletal problem/pain  Onset/Duration: when he feel 6/30/2023  Description  Location: hip - left  Joint Swelling: No  Redness: No  Pain: YES  Warmth: No  Intensity:  moderate  Progression of Symptoms:  improving  Accompanying signs and symptoms:   Fevers: No  Numbness/tingling/weakness: No  History  Trauma to the area: YES- fall   Recent illness:  No  Previous similar problem: No  Previous evaluation:  No  Precipitating or alleviating factors:  Aggravating factors include: standing, walking, climbing stairs and rough terrain   Therapies tried and outcome: ice and acetaminophen        Hospital Follow-up Visit:    Hospital/Nursing Home/IP Rehab Facility: [facility]   Date of Admission: 6/30/2023  Date of Discharge: 7/4/2023  Reason(s) for Admission: fall-    Was your hospitalization related to COVID-19? No   Problems taking medications regularly:  None  Medication changes since discharge: tylenol 325 mg  Added to med list   Problems adhering to non-medication  [Post-hospitalization from ___ Hospital] : Post-hospitalization from [unfilled] Hospital therapy:  None    Summary of hospitalization:  [facility] discharge summary reviewed  Diagnostic Tests/Treatments reviewed.  Follow up needed: none  Other Healthcare Providers Involved in Patient s Care:         None  Update since discharge: improved.   Plan of care communicated with patient       Concern - incontinence   Onset: January of this year - after CABG- had a cath in for several days. Then leaking since surgery then some worse after this surgery   Description: BM and urine-  Happening almost everyday   Intensity: moderate  Progression of Symptoms:  worsening  Accompanying Signs & Symptoms: cant tell if he has to go or not   Previous history of similar problem: no   Precipitating factors:        Worsened by: none   Alleviating factors:        Improved by: none   Therapies tried and outcome: None  Did not have catheter that he is aware of with ROHAN  Moderate dribbling but not push or strain to urinated  Feels like empty out most times  Leaks thru the day and when have to go he got to go now.    Also having diarrhea and stool incontinence since surgery of his CABG  Uses Immodium  Stool urgency then has accident  Urinary leakage even before gets any urgency     Nocturia time 3 on avg             Review of Systems   Constitutional, HEENT, cardiovascular, pulmonary, gi and gu systems are negative, except as otherwise noted.      Objective    /73 (BP Location: Right arm, Patient Position: Sitting, Cuff Size: Adult Large)   Pulse 62   Temp 97.6  F (36.4  C) (Tympanic)   Ht 1.829 m (6')   Wt 104.3 kg (230 lb)   SpO2 98%   BMI 31.19 kg/m    Body mass index is 31.19 kg/m .  Physical Exam   GENERAL: healthy, alert and no distress  EYES: Eyes grossly normal to inspection, PERRL and conjunctivae and sclerae normal  NECK: no adenopathy, no asymmetry, masses, or scars and thyroid normal to palpation  RESP: lungs clear to auscultation - no rales, rhonchi or wheezes  CV: regular rate and rhythm,  [Admitted on: ___] : The patient was admitted on [unfilled] normal S1 S2, no S3 or S4, no murmur, click or rub, no peripheral edema and peripheral pulses strong  ABDOMEN: soft, nontender, no hepatosplenomegaly, no masses and bowel sounds normal  MS: no gross musculoskeletal defects noted, no edema-- left hip wound healing well.  Walking with walker   SKIN: no suspicious lesions or rashes  NEURO: Normal strength and tone, mentation intact and speech normal  PSYCH: mentation appears normal, affect normal/bright    Bladder scan - 0 ml    Results for orders placed or performed in visit on 07/17/23   Basic metabolic panel     Status: Abnormal   Result Value Ref Range    Sodium 139 136 - 145 mmol/L    Potassium 4.7 3.4 - 5.3 mmol/L    Chloride 101 98 - 107 mmol/L    Carbon Dioxide (CO2) 27 22 - 29 mmol/L    Anion Gap 11 7 - 15 mmol/L    Urea Nitrogen 17.6 8.0 - 23.0 mg/dL    Creatinine 0.93 0.67 - 1.17 mg/dL    Calcium 10.2 8.8 - 10.2 mg/dL    Glucose 109 (H) 70 - 99 mg/dL    GFR Estimate 88 >60 mL/min/1.73m2   UA with Microscopic reflex to Culture     Status: Abnormal    Specimen: Urine, Clean Catch   Result Value Ref Range    Color Urine Yellow Colorless, Straw, Light Yellow, Yellow    Appearance Urine Clear Clear    Glucose Urine Negative Negative mg/dL    Bilirubin Urine Negative Negative    Ketones Urine Negative Negative mg/dL    Specific Gravity Urine 1.026 1.003 - 1.035    Blood Urine Negative Negative    pH Urine 5.5 4.7 - 8.0    Protein Albumin Urine 10 (A) Negative mg/dL    Urobilinogen Urine Normal Normal, 2.0 mg/dL    Nitrite Urine Negative Negative    Leukocyte Esterase Urine Negative Negative    Bacteria Urine Few (A) None Seen /HPF    Mucus Urine Present (A) None Seen /LPF    RBC Urine 1 <=2 /HPF    WBC Urine 1 <=5 /HPF    Squamous Epithelials Urine 0 <=1 /HPF    Hyaline Casts Urine 1 <=2 /LPF    Narrative    Urine Culture not indicated   CBC with platelets and differential     Status: Abnormal   Result Value Ref Range    WBC Count 10.7 4.0 - 11.0 10e3/uL    RBC  Count 3.85 (L) 4.40 - 5.90 10e6/uL    Hemoglobin 10.9 (L) 13.3 - 17.7 g/dL    Hematocrit 35.6 (L) 40.0 - 53.0 %    MCV 93 78 - 100 fL    MCH 28.3 26.5 - 33.0 pg    MCHC 30.6 (L) 31.5 - 36.5 g/dL    RDW 14.6 10.0 - 15.0 %    Platelet Count 476 (H) 150 - 450 10e3/uL    % Neutrophils 66 %    % Lymphocytes 20 %    % Monocytes 7 %    % Eosinophils 5 %    % Basophils 1 %    % Immature Granulocytes 1 %    NRBCs per 100 WBC 0 <1 /100    Absolute Neutrophils 7.1 1.6 - 8.3 10e3/uL    Absolute Lymphocytes 2.2 0.8 - 5.3 10e3/uL    Absolute Monocytes 0.7 0.0 - 1.3 10e3/uL    Absolute Eosinophils 0.5 0.0 - 0.7 10e3/uL    Absolute Basophils 0.1 0.0 - 0.2 10e3/uL    Absolute Immature Granulocytes 0.1 <=0.4 10e3/uL    Absolute NRBCs 0.0 10e3/uL   CBC with Platelets & Differential     Status: Abnormal    Narrative    The following orders were created for panel order CBC with Platelets & Differential.  Procedure                               Abnormality         Status                     ---------                               -----------         ------                     CBC with platelets and d...[[id number]]  Abnormal            Final result                 Please view results for these tests on the individual orders.                  [Discharged on ___] : discharged on [unfilled] [FreeTextEntry2] : pt is here for hfu\par Had some chest pressure. Went to the ED. EKG wnl. Labs WNL\par Diagnosed with pneumonia. Treated with 3 days of IV antibioticss R&E\par Went home on PO Ceftin for additional 4 days\par Now still with some fatigue. Breathing better but still feels something there. Slight dry cough

## 2023-08-01 ENCOUNTER — THERAPY VISIT (OUTPATIENT)
Dept: PHYSICAL THERAPY | Facility: HOSPITAL | Age: 68
End: 2023-08-01
Attending: FAMILY MEDICINE
Payer: COMMERCIAL

## 2023-08-01 DIAGNOSIS — S72.002A CLOSED FRACTURE OF NECK OF LEFT FEMUR, INITIAL ENCOUNTER (H): Primary | ICD-10-CM

## 2023-08-01 PROCEDURE — 97110 THERAPEUTIC EXERCISES: CPT | Mod: GP

## 2023-08-01 PROCEDURE — 97161 PT EVAL LOW COMPLEX 20 MIN: CPT | Mod: GP

## 2023-08-01 NOTE — PROGRESS NOTES
PHYSICAL THERAPY EVALUATION  Type of Visit: Evaluation    See electronic medical record for Abuse and Falls Screening details.    Subjective       Presenting condition or subjective complaint: L hip replacement On 7/1/23 pt underwent L direct anterior ROHAN for L hip fx (sustained during a fall - pt tripped on a large rock walking around a corner) at [facility] in [place].  Pt reports L hip has been doing well; pt had glue incision closer and had phone follow-up on 7/20/23 and pt reports that went well; has in-person follow-up on 9/7/23.  Pt denies having any specific weight bearing or ROM precautions at this time - he was accompanied by his S.MARIA E Naranjo whom also endorsed this.  Date of onset: 07/01/23    Relevant medical history: Bladder or bowel problems; Chest pain; Hearing problems; High blood pressure; Implanted device; Incontinence; Overweight; Pain at night or rest; Sleep disorder like apnea   Dates & types of surgery: AAA repair - 2019; valve repair and 2 bypasses 1/2023; this L ROHAN 7/1/2023.   Prior diagnostic imaging/testing results: MRI; X-ray     Prior therapy history for the same diagnosis, illness or injury: No      Prior Level of Function  Transfers: Independent  Ambulation: Independent  ADL: Independent  IADL:  Independent    Living Environment  Social support: With a significant other or spouse   Type of home: Apartment/condo; 1 level   Stairs to enter the home: No       Ramp: No   Stairs inside the home: No       Help at home: Home management tasks (cooking, cleaning)  Equipment owned: Walker (Not currently using.)     Employment: No (Retired )    Hobbies/Interests: Fishing, camping, fly tying, concerts.    Patient goals for therapy: Fishing from shore/boat.  Driving distances    Pain assessment:  Pt reports L hip does get stiff/sore if sitting for an hour or more; also gets a bit sore if walking 5-10 min then sits down to rest.  Pain up to max of 2-3/10 recently.      Objective   HIP EVALUATION  INTEGUMENTARY (edema, incisions):  Anterolateral L hip incision is closed (via surgical glue) and healing very well with no redness, warmth or other signs of infection.  POSTURE: Standing Posture: Rounded shoulders, Forward head  GAIT: Pt amb into clinic independently without a device exhibiting a reciprocal step through pattern with slight L antalgic pattern at times.  Pt easily negotiated up 4 steps Rufino using a step through pattern and descended first 2 steps step-to leading with LLE, but last 2 steps step-through with encouragement that this is okay to complete if not painful.  BALANCE/PROPRIOCEPTION:  Not specifically tested today, but higher level balance likely mildly impaired.    ROM: AROM WNL    STRENGTH:  Full anti-gravity MMT: Ankle DF = 5/5 B; PF = 5/5 B; knee ext = 5-/5 L, 5/5 R; knee flxn = 4/5 L, 5-/5 R; hip flxn = 4+/5 R (min pain), 5-/5 L; hip ext = 4/5 B; hip ABD = 3+/5 L, 4/5 R; hip ADD = 4-/5 L, 4+/5 R.  LE FLEXIBILITY:  Mild hip flexor tightness, but did not stress much today d/t anterior approach of pt's L ROHAN.  SPECIAL TESTS:  Deferred d/t recent L ROHAN status  FUNCTIONAL TESTS: See Gait and Stairs above + Pt moved sit<->stand from standard height chair independently with light UE assist and only slightly slow.  Pt moved sit<->sup and rolled L/R and prone each independently with mild slowness.  PALPATION: Pt was non-tender around L hip incision.  JOINT MOBILITY: Not assessed d/t recent L ROHAN status    Assessment & Plan   CLINICAL IMPRESSIONS  Medical Diagnosis: Closed fracture of neck of L femur, initial encounter    Treatment Diagnosis: LLE weakness   Impression/Assessment: Patient is a 68 year old male with status post L ROHAN on 7/1/23 d/t L hip fx and appears to be progressing well overall.  The following significant findings have been identified: Pain, Decreased ROM/flexibility, Decreased strength, Impaired balance, and Impaired gait. These impairments interfere  "with their ability to perform recreational activities, driving , and community mobility as compared to previous level of function.     Clinical Decision Making (Complexity):  Clinical Presentation: Stable/Uncomplicated  Clinical Presentation Rationale: based on medical and personal factors listed in PT evaluation  Clinical Decision Making (Complexity): Low complexity    PLAN OF CARE  Treatment Interventions:  Modalities: Cryotherapy, E-stim, Hot Pack, Ultrasound  Interventions: Gait Training, Manual Therapy, Neuromuscular Re-education, Therapeutic Activity, Therapeutic Exercise    Long Term Goals     PT Goal 1  Goal Identifier: STG1  Goal Description: Pt will report consistent compliance with HEP at least 70% of days to promote therapeutic gains.  Target Date: 08/22/23  PT Goal 2  Goal Identifier: LTG1  Goal Description: Pt will improve LLE strength to at least as follows for decreased difficulty with amb on uneven ground: Knee ext = 5/5; knee flxn = 5-/5; hip flxn = 5-/5; hip ext = 4/5; hip ABD = 4/5; hip ADD = 4+/5.  Pt will also be able to complete SLS on LLE at least 5\".  Target Date: 10/10/23  PT Goal 3  Goal Identifier: LTG2  Goal Description: Pt will report returning to walking on uneven ground and up/down mild grade hills [place] for improved quality of life.  Target Date: 10/10/23  PT Goal 4  Goal Identifier: LTG3  Goal Description: Pt will improve his LEFS score to at least 55/80 and report pain at most 1/10 after sitting >1 hour and standing/walking at least 20 min for improved quality of life.  Target Date: 10/10/23      Frequency of Treatment: 1x/wk  Duration of Treatment: 10 wks    Recommended Referrals to Other Professionals: None at this time.  Education Assessment:   Learner/Method: Patient;Significant Other;Listening  Education Comments: Pt educated on overall healing timeframe following L ROHAN.  Pt also educated can apply ice/cold pack to L hip if does get sore.    Risks and " benefits of evaluation/treatment have been explained.   Patient/Family/caregiver agrees with Plan of Care.     Evaluation Time:     PT Eval, Low Complexity Minutes (12272): 20       Signing Clinician: TIM Ceballos [facility]                                                                                   OUTPATIENT PHYSICAL THERAPY      PLAN OF TREATMENT FOR OUTPATIENT REHABILITATION   Patient's Last Name, First Name, LUIS MartinGilberto roque YOB: 1955   Provider's Name   [facility]   Medical Record No.  [id number]     Onset Date: 07/01/23  Start of Care Date: 08/01/23     Medical Diagnosis:  Closed fracture of neck of L femur, initial encounter      PT Treatment Diagnosis:  LLE weakness Plan of Treatment  Frequency/Duration: 1x/wk/ 10 wks    Certification date from 08/01/23 to 10/10/23         See note for plan of treatment details and functional goals     Fuad Lazcano, PT                         I CERTIFY THE NEED FOR THESE SERVICES FURNISHED UNDER        THIS PLAN OF TREATMENT AND WHILE UNDER MY CARE .             Physician Signature               Date    X_____________________________________________________                    Referring Provider:  Enedina [place]      Initial Assessment  See Epic Evaluation- Start of Care Date: 08/01/23

## 2023-08-07 DIAGNOSIS — Z95.1 S/P CABG (CORONARY ARTERY BYPASS GRAFT): ICD-10-CM

## 2023-08-07 DIAGNOSIS — Z95.2 S/P MVR (MITRAL VALVE REPLACEMENT): ICD-10-CM

## 2023-08-08 RX ORDER — ARIPIPRAZOLE 5 MG/1
5 TABLET ORAL EVERY MORNING
Qty: 30 TABLET | Refills: 3 | Status: SHIPPED | OUTPATIENT
Start: 2023-08-08 | End: 2023-11-28

## 2023-08-08 NOTE — TELEPHONE ENCOUNTER
abilify      Last Written Prescription Date:  1/31/23  Last Fill Quantity: 30,   # refills: 3  Last Office Visit: 7/17/23  Future Office visit:    Next 5 appointments (look out 90 days)      Aug 15, 2023  1:45 PM  (Arrive by 1:30 PM)  SHORT with Rock Basilio MD  [facility] ([facility] ) [address] [address]  [phone]     Sep 12, 2023  8:45 AM  (Arrive by 8:30 AM)  SHORT with Rock Basilio MD  [facility] ([facility] ) [address] [address]  [phone]     Sep 13, 2023  2:30 PM  (Arrive by 2:15 PM)  Return Visit with Sierra Velásquez DPM  [facility] ([facility] ) [address] [id number] [phone]

## 2023-08-09 ENCOUNTER — THERAPY VISIT (OUTPATIENT)
Dept: PHYSICAL THERAPY | Facility: HOSPITAL | Age: 68
End: 2023-08-09
Attending: FAMILY MEDICINE
Payer: COMMERCIAL

## 2023-08-09 DIAGNOSIS — S72.002A CLOSED FRACTURE OF NECK OF LEFT FEMUR, INITIAL ENCOUNTER (H): Primary | ICD-10-CM

## 2023-08-09 PROCEDURE — 97110 THERAPEUTIC EXERCISES: CPT | Mod: GP

## 2023-08-15 ENCOUNTER — OFFICE VISIT (OUTPATIENT)
Dept: FAMILY MEDICINE | Facility: OTHER | Age: 68
End: 2023-08-15
Attending: FAMILY MEDICINE
Payer: COMMERCIAL

## 2023-08-15 ENCOUNTER — LAB (OUTPATIENT)
Dept: LAB | Facility: OTHER | Age: 68
End: 2023-08-15
Payer: COMMERCIAL

## 2023-08-15 VITALS
TEMPERATURE: 98.6 F | HEART RATE: 64 BPM | SYSTOLIC BLOOD PRESSURE: 88 MMHG | OXYGEN SATURATION: 95 % | BODY MASS INDEX: 31.29 KG/M2 | DIASTOLIC BLOOD PRESSURE: 64 MMHG | WEIGHT: 230.7 LBS

## 2023-08-15 DIAGNOSIS — N39.41 URGE INCONTINENCE OF URINE: ICD-10-CM

## 2023-08-15 DIAGNOSIS — Z96.642 S/P TOTAL LEFT HIP ARTHROPLASTY: ICD-10-CM

## 2023-08-15 DIAGNOSIS — D64.9 POSTOPERATIVE ANEMIA: ICD-10-CM

## 2023-08-15 DIAGNOSIS — N39.41 URGE INCONTINENCE OF URINE: Primary | ICD-10-CM

## 2023-08-15 DIAGNOSIS — S72.002S CLOSED LEFT HIP FRACTURE, SEQUELA: ICD-10-CM

## 2023-08-15 LAB
BASOPHILS # BLD AUTO: 0.1 10E3/UL (ref 0–0.2)
BASOPHILS NFR BLD AUTO: 1 %
EOSINOPHIL # BLD AUTO: 0.7 10E3/UL (ref 0–0.7)
EOSINOPHIL NFR BLD AUTO: 7 %
ERYTHROCYTE [DISTWIDTH] IN BLOOD BY AUTOMATED COUNT: 14.2 % (ref 10–15)
HCT VFR BLD AUTO: 37.6 % (ref 40–53)
HGB BLD-MCNC: 11.5 G/DL (ref 13.3–17.7)
IMM GRANULOCYTES # BLD: 0 10E3/UL
IMM GRANULOCYTES NFR BLD: 0 %
LYMPHOCYTES # BLD AUTO: 3.2 10E3/UL (ref 0.8–5.3)
LYMPHOCYTES NFR BLD AUTO: 31 %
MCH RBC QN AUTO: 27.5 PG (ref 26.5–33)
MCHC RBC AUTO-ENTMCNC: 30.6 G/DL (ref 31.5–36.5)
MCV RBC AUTO: 90 FL (ref 78–100)
MONOCYTES # BLD AUTO: 0.9 10E3/UL (ref 0–1.3)
MONOCYTES NFR BLD AUTO: 9 %
NEUTROPHILS # BLD AUTO: 5.3 10E3/UL (ref 1.6–8.3)
NEUTROPHILS NFR BLD AUTO: 52 %
NRBC # BLD AUTO: 0 10E3/UL
NRBC BLD AUTO-RTO: 0 /100
PLATELET # BLD AUTO: 335 10E3/UL (ref 150–450)
RBC # BLD AUTO: 4.18 10E6/UL (ref 4.4–5.9)
WBC # BLD AUTO: 10.2 10E3/UL (ref 4–11)

## 2023-08-15 PROCEDURE — G0463 HOSPITAL OUTPT CLINIC VISIT: HCPCS

## 2023-08-15 PROCEDURE — 36415 COLL VENOUS BLD VENIPUNCTURE: CPT | Mod: ZL

## 2023-08-15 PROCEDURE — 85025 COMPLETE CBC W/AUTO DIFF WBC: CPT | Mod: ZL

## 2023-08-15 PROCEDURE — 99213 OFFICE O/P EST LOW 20 MIN: CPT | Performed by: FAMILY MEDICINE

## 2023-08-15 RX ORDER — OXYBUTYNIN CHLORIDE 10 MG/1
10 TABLET, EXTENDED RELEASE ORAL DAILY
Qty: 30 TABLET | Refills: 1 | Status: SHIPPED | OUTPATIENT
Start: 2023-08-15 | End: 2023-09-12

## 2023-08-15 ASSESSMENT — PAIN SCALES - GENERAL: PAINLEVEL: NO PAIN (0)

## 2023-08-15 NOTE — TELEPHONE ENCOUNTER
Oxybutynin ER (Ditropan XL)_ 10 MG 24 hr tablet    Last Written Prescription Date:  08/15/2023  Last Fill Quantity: 90,   # refills: 0  Last Office Visit: 0815/2023

## 2023-08-15 NOTE — PROGRESS NOTES
Assessment & Plan     Urge incontinence of urine  No improvement - will double to 10 mg.  Follow-up in a  month  - oxyBUTYnin ER (DITROPAN XL) 10 MG 24 hr tablet; Take 1 tablet (10 mg) by mouth daily    Closed left hip fracture, sequela  Doing real well. Walking better.     S/P total left hip arthroplasty  No further issues   - CBC with Platelets & Differential; Future    Postoperative anemia  Almost back to baseline   - CBC with Platelets & Differential; Future                 No follow-ups on file.    Rock Basilio MD  [facility] - KAIDEN Macias is a 68 year old, presenting for the following health issues:  Urinary Problem    HPI      Genitourinary - Male  Onset/Duration: 01/2023  Description:   Dysuria (painful urination): No}  Hematuria (blood in urine): No  Frequency: No  Waking at night to urinate: YES  Hesitancy (delay in urine): No  Retention (unable to empty): No  Decrease in urinary flow: No  Incontinence: YES  Progression of Symptoms:  same  Accompanying Signs & Symptoms:  Fever: No  Back/Flank pain: No  Urethral discharge: No  Testicle lumps/masses/pain: No  Nausea and/or vomiting: No  Abdominal pain: No  History:   History of frequent UTI s: No  History of kidney stones: No  History of hernias: No  Personal or Family history of Prostate problems: No  Sexually active: N/A  Precipitating or alleviating factors: None  Therapies tried and outcome: Ditrpon    No side effect but also no improvement in his urge incontinence  Good stream  No straining       Review of Systems   Constitutional, HEENT, cardiovascular, pulmonary, gi and gu systems are negative, except as otherwise noted.      Objective    BP (!) 88/64 (BP Location: Right arm, Patient Position: Sitting, Cuff Size: Adult Regular)   Pulse 64   Temp 98.6  F (37  C) (Tympanic)   Wt 104.6 kg (230 lb 11.2 oz)   SpO2 95%   BMI 31.29 kg/m    Body mass index is 31.29 kg/m .  Physical Exam   GENERAL: healthy, alert and  no distress  PSYCH: mentation appears normal, affect flat      Results for orders placed or performed in visit on 08/15/23   CBC with platelets and differential     Status: Abnormal   Result Value Ref Range    WBC Count 10.2 4.0 - 11.0 10e3/uL    RBC Count 4.18 (L) 4.40 - 5.90 10e6/uL    Hemoglobin 11.5 (L) 13.3 - 17.7 g/dL    Hematocrit 37.6 (L) 40.0 - 53.0 %    MCV 90 78 - 100 fL    MCH 27.5 26.5 - 33.0 pg    MCHC 30.6 (L) 31.5 - 36.5 g/dL    RDW 14.2 10.0 - 15.0 %    Platelet Count 335 150 - 450 10e3/uL    % Neutrophils 52 %    % Lymphocytes 31 %    % Monocytes 9 %    % Eosinophils 7 %    % Basophils 1 %    % Immature Granulocytes 0 %    NRBCs per 100 WBC 0 <1 /100    Absolute Neutrophils 5.3 1.6 - 8.3 10e3/uL    Absolute Lymphocytes 3.2 0.8 - 5.3 10e3/uL    Absolute Monocytes 0.9 0.0 - 1.3 10e3/uL    Absolute Eosinophils 0.7 0.0 - 0.7 10e3/uL    Absolute Basophils 0.1 0.0 - 0.2 10e3/uL    Absolute Immature Granulocytes 0.0 <=0.4 10e3/uL    Absolute NRBCs 0.0 10e3/uL   CBC with Platelets & Differential     Status: Abnormal    Narrative    The following orders were created for panel order CBC with Platelets & Differential.  Procedure                               Abnormality         Status                     ---------                               -----------         ------                     CBC with platelets and d...[[id number]]  Abnormal            Final result                 Please view results for these tests on the individual orders.

## 2023-08-16 ENCOUNTER — THERAPY VISIT (OUTPATIENT)
Dept: PHYSICAL THERAPY | Facility: HOSPITAL | Age: 68
End: 2023-08-16
Attending: FAMILY MEDICINE
Payer: COMMERCIAL

## 2023-08-16 DIAGNOSIS — S72.002A CLOSED FRACTURE OF NECK OF LEFT FEMUR, INITIAL ENCOUNTER (H): Primary | ICD-10-CM

## 2023-08-16 PROCEDURE — 97110 THERAPEUTIC EXERCISES: CPT | Mod: GP

## 2023-08-16 PROCEDURE — 97112 NEUROMUSCULAR REEDUCATION: CPT | Mod: GP

## 2023-08-16 RX ORDER — OXYBUTYNIN CHLORIDE 10 MG/1
10 TABLET, EXTENDED RELEASE ORAL DAILY
Qty: 90 TABLET | Refills: 0 | OUTPATIENT
Start: 2023-08-16

## 2023-08-30 ENCOUNTER — THERAPY VISIT (OUTPATIENT)
Dept: PHYSICAL THERAPY | Facility: HOSPITAL | Age: 68
End: 2023-08-30
Attending: FAMILY MEDICINE
Payer: COMMERCIAL

## 2023-08-30 DIAGNOSIS — S72.002A CLOSED FRACTURE OF NECK OF LEFT FEMUR, INITIAL ENCOUNTER (H): Primary | ICD-10-CM

## 2023-08-30 PROCEDURE — 97112 NEUROMUSCULAR REEDUCATION: CPT | Mod: GP

## 2023-08-30 NOTE — PROGRESS NOTES
"   08/30/23 0500   Appointment Info   Signing clinician's name / credentials Fuad Lazcano DPT   Visits Used 4 [facility]/Medicare Advantage   Medical Diagnosis Closed fracture of neck of L femur, initial encounter   PT Tx Diagnosis LLE weakness   Quick Adds Certification   Progress Note/Certification   Start of Care Date 08/01/23   Onset of illness/injury or Date of Surgery 07/01/23   Therapy Frequency 1x/wk   Predicted Duration 10 wks   Certification date from 08/01/23   Certification date to 10/10/23   GOALS   PT Goals 2;3;4   PT Goal 1   Goal Identifier STG1   Goal Description Pt will report consistent compliance with HEP at least 70% of days to promote therapeutic gains.   Goal Progress Pt reports ongoing compliance with home program.   Target Date 08/22/23   Date Met 08/30/23   PT Goal 2   Goal Identifier LTG1   Goal Description Pt will improve LLE strength to at least as follows for decreased difficulty with amb on uneven ground: Knee ext = 5/5; knee flxn = 5-/5; hip flxn = 5-/5; hip ext = 4/5; hip ABD = 4/5; hip ADD = 4+/5.  Pt will also be able to complete SLS on LLE at least 5\".   Goal Progress LLE strength full anti-gravity MMT: Knee ext = 5/5; knee flxn = 5/5; hip flxn = 5/5; hip ext = 4+/5; hip ABD = 4+/5; hip ADD = 4+/5.   Target Date 10/10/23   Date Met 08/30/23   PT Goal 3   Goal Identifier LTG2   Goal Description Pt will report returning to walking on uneven ground and up/down mild grade hills [place] for improved quality of life.   Goal Progress Pt reports being able to walk on mildly uneven ground and even up/dowm small hills while walking his dog.  Pt was able to go fishing last week, but terrain was mostly level/no major hills.   Target Date 10/10/23   Date Met 08/30/23   PT Goal 4   Goal Identifier LTG3   Goal Description Pt will improve his LEFS score to at least 55/80 and report pain at most 1/10 after sitting >1 hour and standing/walking at least 20 min for improved " "quality of life.   Goal Progress Pt reports L hip pain no greater than 1/10 after sitting for at least one hour, nor after standing/walking for 20 min.  LEFS = 50/80   Target Date 10/10/23   Date Met   (Nearly met except LEFS = 50/80 versus goal of 55/80.)   Subjective Report   Subjective Report Pt stated he was able to go fishing once since last visit and his hip did not flare-up while going up/down stairs alot in the pocess of moving.  Pt denied hip pain today.   Treatment Interventions (PT)   Interventions Neuromuscular Re-education   Therapeutic Procedure/Exercise   Therapeutic Procedures: strength, endurance, ROM, flexibillity minutes (18481) 5   Patient Response/Progress Pt declined need to review his HEP.   Neuromuscular Re-education   Neuromuscular re-ed of mvmt, balance, coord, kinesthetic sense, posture, proprioception minutes (02942) 25   Neuro Re-ed 1 Balance training   Neuro Re-ed 1 - Details Romberg EC on foam 3 x 20\" with mild/mod sway and LOB x1 first set then no LOB second or third set; marching on foam EO x 30 reps alternating BLEs w/ min sway, but no LOB; tandem stance 2 x 20\" each L and R foot forward with only min sway. SLS x10\" on RLE on second attempt then x 10\" on LLE on first attempt.  Medium lunges on to BOSU x 10 reps on LLE with occasional slight instability to where he touched bar with 1 hand, but medium/large lunges with RLE w/o difficulty.  Standard stance on bubble side of BOSU 4 x 10\" - pt had LOB around 7-8\" first 3 sets then held full 10\" last set with cues to move feet min further forward and for greater weight bearing through forefeet.   Plan   Home program No change, but pt instructed to hold off on HEP on any days where he is working on moving going up/down stairs repeatedly.   Plan for next session D/C PT as pt has met all goals (parially/very nearly met LTG3) and is doing well s/p L ROHAN.   Total Session Time   Timed Code Treatment Minutes 30   Total Treatment Time (sum of " timed and untimed services) 30         DISCHARGE  Reason for Discharge: Patient has met all goals.    Equipment Issued: N/A    Discharge Plan: Patient to continue home program.    Referring Provider:  Enedina Thompson

## 2023-09-10 DIAGNOSIS — N39.41 URGE INCONTINENCE OF URINE: ICD-10-CM

## 2023-09-11 NOTE — TELEPHONE ENCOUNTER
oxyBUTYnin ER (DITROPAN XL) 10 MG 24 hr tablet 30 tablet 1 8/15/2023   Last Office Visit: 0815/2023     Future Office visit:    Next 5 appointments (look out 90 days)      Sep 12, 2023  8:45 AM  (Arrive by 8:30 AM)  SHORT with Rock Basilio MD  [facility] ([facility] ) [address] [place] [id number]  [phone]     Sep 13, 2023  2:30 PM  (Arrive by 2:15 PM)  Return Visit with Sierra Velásquez DPM  [facility] ([facility] ) [address] [place] [address] [id number] [phone]             Routing refill request to provider for review/approval because:

## 2023-09-12 ENCOUNTER — OFFICE VISIT (OUTPATIENT)
Dept: FAMILY MEDICINE | Facility: OTHER | Age: 68
End: 2023-09-12
Attending: FAMILY MEDICINE
Payer: COMMERCIAL

## 2023-09-12 ENCOUNTER — LAB (OUTPATIENT)
Dept: LAB | Facility: OTHER | Age: 68
End: 2023-09-12
Attending: FAMILY MEDICINE
Payer: COMMERCIAL

## 2023-09-12 VITALS
SYSTOLIC BLOOD PRESSURE: 122 MMHG | DIASTOLIC BLOOD PRESSURE: 80 MMHG | OXYGEN SATURATION: 96 % | TEMPERATURE: 97.1 F | HEART RATE: 53 BPM | BODY MASS INDEX: 32.05 KG/M2 | WEIGHT: 236.3 LBS

## 2023-09-12 DIAGNOSIS — R97.20 ELEVATED PROSTATE SPECIFIC ANTIGEN (PSA): ICD-10-CM

## 2023-09-12 DIAGNOSIS — R35.1 NOCTURIA: ICD-10-CM

## 2023-09-12 DIAGNOSIS — I10 ESSENTIAL HYPERTENSION: ICD-10-CM

## 2023-09-12 DIAGNOSIS — Z87.891 PERSONAL HISTORY OF NICOTINE DEPENDENCE: ICD-10-CM

## 2023-09-12 DIAGNOSIS — Z23 HIGH PRIORITY FOR 2019-NCOV VACCINE: ICD-10-CM

## 2023-09-12 DIAGNOSIS — E11.59 TYPE 2 DIABETES MELLITUS WITH OTHER CIRCULATORY COMPLICATION, WITHOUT LONG-TERM CURRENT USE OF INSULIN (H): ICD-10-CM

## 2023-09-12 DIAGNOSIS — I73.9 PERIPHERAL ARTERIAL DISEASE (H): ICD-10-CM

## 2023-09-12 DIAGNOSIS — I25.10 CORONARY ARTERY DISEASE INVOLVING NATIVE HEART, UNSPECIFIED VESSEL OR LESION TYPE, UNSPECIFIED WHETHER ANGINA PRESENT: ICD-10-CM

## 2023-09-12 DIAGNOSIS — F17.201 TOBACCO DEPENDENCE IN REMISSION: ICD-10-CM

## 2023-09-12 DIAGNOSIS — E11.59 TYPE 2 DIABETES MELLITUS WITH OTHER CIRCULATORY COMPLICATION, WITHOUT LONG-TERM CURRENT USE OF INSULIN (H): Primary | ICD-10-CM

## 2023-09-12 DIAGNOSIS — Z12.11 SPECIAL SCREENING FOR MALIGNANT NEOPLASMS, COLON: ICD-10-CM

## 2023-09-12 DIAGNOSIS — N39.41 URGE INCONTINENCE OF URINE: ICD-10-CM

## 2023-09-12 DIAGNOSIS — Z23 ENCOUNTER FOR IMMUNIZATION: ICD-10-CM

## 2023-09-12 PROBLEM — I51.89 OTHER ILL-DEFINED HEART DISEASES: Status: RESOLVED | Noted: 2023-01-18 | Resolved: 2023-09-12

## 2023-09-12 LAB
ANION GAP SERPL CALCULATED.3IONS-SCNC: 10 MMOL/L (ref 7–15)
BUN SERPL-MCNC: 19 MG/DL (ref 8–23)
CALCIUM SERPL-MCNC: 10.1 MG/DL (ref 8.8–10.2)
CHLORIDE SERPL-SCNC: 102 MMOL/L (ref 98–107)
CREAT SERPL-MCNC: 1.11 MG/DL (ref 0.67–1.17)
CREAT UR-MCNC: 124.4 MG/DL
DEPRECATED HCO3 PLAS-SCNC: 27 MMOL/L (ref 22–29)
EGFRCR SERPLBLD CKD-EPI 2021: 71 ML/MIN/1.73M2
GLUCOSE SERPL-MCNC: 129 MG/DL (ref 70–99)
MICROALBUMIN UR-MCNC: <12 MG/L
MICROALBUMIN/CREAT UR: NORMAL MG/G{CREAT}
POTASSIUM SERPL-SCNC: 5.3 MMOL/L (ref 3.4–5.3)
PSA SERPL DL<=0.01 NG/ML-MCNC: 5.84 NG/ML (ref 0–6.5)
SODIUM SERPL-SCNC: 139 MMOL/L (ref 136–145)

## 2023-09-12 PROCEDURE — 99214 OFFICE O/P EST MOD 30 MIN: CPT | Performed by: FAMILY MEDICINE

## 2023-09-12 PROCEDURE — G0463 HOSPITAL OUTPT CLINIC VISIT: HCPCS | Mod: 25

## 2023-09-12 PROCEDURE — 36415 COLL VENOUS BLD VENIPUNCTURE: CPT | Mod: ZL

## 2023-09-12 PROCEDURE — 90662 IIV NO PRSV INCREASED AG IM: CPT

## 2023-09-12 PROCEDURE — 84153 ASSAY OF PSA TOTAL: CPT | Mod: ZL

## 2023-09-12 PROCEDURE — 91312 COVID-19 BIVALENT 12+ (PFIZER): CPT

## 2023-09-12 PROCEDURE — 82570 ASSAY OF URINE CREATININE: CPT | Mod: ZL

## 2023-09-12 PROCEDURE — 80048 BASIC METABOLIC PNL TOTAL CA: CPT | Mod: ZL

## 2023-09-12 RX ORDER — OXYBUTYNIN CHLORIDE 15 MG/1
15 TABLET, EXTENDED RELEASE ORAL DAILY
Qty: 90 TABLET | Refills: 1 | Status: SHIPPED | OUTPATIENT
Start: 2023-09-12 | End: 2024-03-12

## 2023-09-12 ASSESSMENT — PAIN SCALES - GENERAL: PAINLEVEL: NO PAIN (0)

## 2023-09-12 NOTE — PROGRESS NOTES
Assessment & Plan     Type 2 diabetes mellitus with other circulatory complication, without long-term current use of insulin (H)  Will check next time in 3 months. Has been running real well. Continue current medications and behavioral changes.   - INFLUENZA VACCINE 65+ (FLUZONE HD)  - Basic metabolic panel; Future  - Albumin Random Urine Quantitative with Creat Ratio; Future    Coronary artery disease involving native heart, unspecified vessel or lesion type, unspecified whether angina present  Stable - no angina. Just moved and did lots of stairs. Continue current medications and behavioral changes.   - INFLUENZA VACCINE 65+ (FLUZONE HD)  - Basic metabolic panel; Future  - Albumin Random Urine Quantitative with Creat Ratio; Future    Essential hypertension  Stable on meds. Continue current medications and behavioral changes.   - INFLUENZA VACCINE 65+ (FLUZONE HD)  - Basic metabolic panel; Future  - Albumin Random Urine Quantitative with Creat Ratio; Future    Peripheral arterial disease (H)  No issues. Sees Vascular in [place]  - INFLUENZA VACCINE 65+ (FLUZONE HD)  - Basic metabolic panel; Future  - Albumin Random Urine Quantitative with Creat Ratio; Future    Urge incontinence of urine  Improved by at least 50%.  Will increase to 15mg and follow-up  in 3 months.   - oxyBUTYnin ER (DITROPAN XL) 15 MG 24 hr tablet; Take 1 tablet (15 mg) by mouth daily    Nocturia  Psa up and down but overall stable.- recheck in a year   - PSA tumor marker; Future    Special screening for malignant neoplasms, colon  Discussed options. Pt wants colonoscopy - will refer  - Colonoscopy Screening  Referral; Future    Tobacco dependence in remission  Discussed- needs annual after 9/29  - CT Chest Lung Cancer Scrn Low Dose wo; Future    Elevated prostate specific antigen (PSA)  As above     Encounter for immunization  Shots given   - INFLUENZA VACCINE 65+ (FLUZONE HD)    Personal history of nicotine dependence  As above.   - CT  Chest Lung Cancer Scrn Low Dose wo; Future    High priority for 2019-nCoV vaccine  Shot given                No follow-ups on file.    Rock Basilio MD  [facility] - KAIDEN Macias is a 68 year old, presenting for the following health issues:  Diabetes, Hypertension, and Heart Problem        9/12/2023     8:36 AM   Additional Questions   Roomed by Yokasta Austin CMA   Accompanied by self       HPI     Diabetes Follow-up    How often are you checking your blood sugar? Not at all  What concerns do you have today about your diabetes? None   Do you have any of these symptoms? (Select all that apply)  No numbness or tingling in feet.  No redness, sores or blisters on feet.  No complaints of excessive thirst.  No reports of blurry vision.  No significant changes to weight.      BP Readings from Last 2 Encounters:   09/12/23 122/80   08/15/23 (!) 88/64     Hemoglobin A1C (%)   Date Value   05/04/2023 6.5 (H)   01/09/2023 7.9 (H)   02/15/2021 6.5 (H)     LDL Cholesterol Calculated (mg/dL)   Date Value   05/04/2023 49   08/29/2022 56   08/21/2020 68   10/02/2019 97             Hypertension Follow-up    Do you check your blood pressure regularly outside of the clinic? Yes   Are you following a low salt diet? Yes  Are your blood pressures ever more than 140 on the top number (systolic) OR more   than 90 on the bottom number (diastolic), for example 140/90? No    Vascular Disease Follow-up    How often do you take nitroglycerin? Never  Do you take an aspirin every day? Yes    Genitourinary - Male  Onset: 01/2023  Description:   Dysuria (painful urination): no   Hematuria (blood in urine): no   Frequency: no   Are you urinating at night : YES  Hesitancy (delay in urine): no   Retention (unable to empty): no   Decrease in urinary flow: no   Incontinence: YES  Progression of Symptoms:  improving  Much better   Incontinence cut down by 50%  Some dry mouth - no other side effects               Review of Systems   Constitutional, HEENT, cardiovascular, pulmonary, gi and gu systems are negative, except as otherwise noted.      Objective    /80 (BP Location: Right arm, Patient Position: Sitting, Cuff Size: Adult Regular)   Pulse 53   Temp 97.1  F (36.2  C) (Tympanic)   Wt 107.2 kg (236 lb 4.8 oz)   SpO2 96%   BMI 32.05 kg/m    Body mass index is 32.05 kg/m .  Physical Exam   GENERAL: healthy, alert and no distress  NECK: no adenopathy, no asymmetry, masses, or scars and thyroid normal to palpation  RESP: lungs clear to auscultation - no rales, rhonchi or wheezes  CV: regular rate and rhythm, normal S1 S2, no S3 or S4, no murmur, click or rub, no peripheral edema and peripheral pulses strong  ABDOMEN: soft, nontender, no hepatosplenomegaly, no masses and bowel sounds normal  MS: no gross musculoskeletal defects noted, no edema        Results for orders placed or performed in visit on 09/12/23   Basic metabolic panel     Status: Abnormal   Result Value Ref Range    Sodium 139 136 - 145 mmol/L    Potassium 5.3 3.4 - 5.3 mmol/L    Chloride 102 98 - 107 mmol/L    Carbon Dioxide (CO2) 27 22 - 29 mmol/L    Anion Gap 10 7 - 15 mmol/L    Urea Nitrogen 19.0 8.0 - 23.0 mg/dL    Creatinine 1.11 0.67 - 1.17 mg/dL    Calcium 10.1 8.8 - 10.2 mg/dL    Glucose 129 (H) 70 - 99 mg/dL    GFR Estimate 71 >60 mL/min/1.73m2   Albumin Random Urine Quantitative with Creat Ratio     Status: None   Result Value Ref Range    Creatinine Urine mg/dL 124.4 mg/dL    Albumin Urine mg/L <12.0 mg/L    Albumin Urine mg/g Cr     PSA tumor marker     Status: Normal   Result Value Ref Range    PSA Tumor Marker 5.84 0.00 - 6.50 ng/mL    Narrative    This result is obtained using the Roche Elecsys total PSA method on the meredith e601 immunoassay analyzer. Results obtained with different assay methods or kits cannot be used interchangeably.

## 2023-09-13 ENCOUNTER — OFFICE VISIT (OUTPATIENT)
Dept: PODIATRY | Facility: OTHER | Age: 68
End: 2023-09-13
Attending: PODIATRIST
Payer: COMMERCIAL

## 2023-09-13 VITALS
HEART RATE: 66 BPM | DIASTOLIC BLOOD PRESSURE: 77 MMHG | TEMPERATURE: 97 F | SYSTOLIC BLOOD PRESSURE: 118 MMHG | OXYGEN SATURATION: 95 %

## 2023-09-13 DIAGNOSIS — E11.42 DIABETIC POLYNEUROPATHY ASSOCIATED WITH TYPE 2 DIABETES MELLITUS (H): ICD-10-CM

## 2023-09-13 DIAGNOSIS — E11.9 DIABETES MELLITUS TYPE 2, NONINSULIN DEPENDENT (H): ICD-10-CM

## 2023-09-13 DIAGNOSIS — Z13.89 SCREENING FOR DIABETIC PERIPHERAL NEUROPATHY: ICD-10-CM

## 2023-09-13 DIAGNOSIS — I73.9 PERIPHERAL ARTERIAL DISEASE (H): ICD-10-CM

## 2023-09-13 DIAGNOSIS — L60.3 ONYCHODYSTROPHY: Primary | ICD-10-CM

## 2023-09-13 PROCEDURE — 11721 DEBRIDE NAIL 6 OR MORE: CPT | Performed by: PODIATRIST

## 2023-09-13 PROCEDURE — G0463 HOSPITAL OUTPT CLINIC VISIT: HCPCS

## 2023-09-13 ASSESSMENT — PAIN SCALES - GENERAL: PAINLEVEL: NO PAIN (0)

## 2023-09-13 NOTE — PROGRESS NOTES
Chief complaint: Patient presents with:  Toenail: DFE      History of Present Illness: This 68 year old NIDDM II male is seen for follow-up management of elongated toenails. The toenails are difficult to reach and to trim because of the thickness of the toenails.    He has had consistent tingling in the toes.    Patient says he had open heart surgery for a valve replacement and double bypass on 01/17/2023. He was in the hospital for eight days.    He had a vascular follow-up with Dr. Mcgrath at [facility] on 09/15/2022. He sees vascular again on 09/19/2023.    He has not had a lot of pain from the bilateral hallux ingrown toenails.    He broke his LEFT hip on 07/01/2023 and he had an ORIF. He says he is doing well now.    Patients says he has not been applying lotion to his feet.     Lab Results   Component Value Date    A1C 6.5 05/04/2023    A1C 7.9 01/09/2023    A1C 7.5 08/29/2022    A1C 7.3 02/17/2022    A1C 6.7 08/17/2021    A1C 6.5 02/15/2021           No further pedal complaints today.     Patient quit smoking around 2011.        /77 (BP Location: Left arm, Patient Position: Sitting, Cuff Size: Adult Regular)   Pulse 66   Temp 97  F (36.1  C) (Tympanic)   SpO2 95%     Patient Active Problem List   Diagnosis    Abdominal aortic aneurysm (H)    Erectile dysfunction    ACP (advance care planning)    Routine general medical examination at a health care facility    Essential hypertension    Mixed hyperlipidemia    Moderate major depression (H)    Morbid obesity (H)    Elevated prostate specific antigen (PSA)    Prediabetes    Diabetes mellitus, type 2 (H)    S/P AAA repair    Tobacco dependence in remission    Mitral valve prolapse    Status post coronary angiogram    Coronary artery disease involving native heart, unspecified vessel or lesion type, unspecified whether angina present    S/P CABG (coronary artery bypass graft)    S/P mitral valve repair    Postoperative atrial fibrillation (H)    Peripheral  arterial disease (H)    Fall from standing, initial encounter       Past Surgical History:   Procedure Laterality Date    AAA REPAIR  06/17/2020    [facility]/ intravascular repair    APPENDECTOMY      BYPASS GRAFT ARTERY CORONARY, REPAIR VALVE MITRAL, COMBINED N/A 1/18/2023    Procedure: MEDIAN STERNOTOMY, ON PUMP OXGENATION, Left endovascular saphaneous vein harvest, CORONARY ARTERY BYPASS GRAFT (CABG x 2), Mitral Valve Repair, transesophageal echocardiogram (TRENT) performed by anesthesia;  Surgeon: Linda Kim MD;  Location: [facility] OR    COLONOSCOPY  2013    CV CORONARY ANGIOGRAM N/A 12/9/2022    Procedure: Coronary Angiogram with possible intervention;  Surgeon: Porfirio Herrera MD;  Location:  HEART CARDIAC CATH LAB    CV RIGHT HEART CATH MEASUREMENTS RECORDED N/A 12/9/2022    Procedure: Right Heart Cath;  Surgeon: Porfirio Herrera MD;  Location:  HEART CARDIAC CATH LAB    ESOPHAGOGASTRODUODENOSCOPY  2013    INCISION AND DRAINAGE CHEST WASHOUT, COMBINED N/A 1/19/2023    Procedure: chest washout, Re-do sternotomy, repair of vein graft;  Surgeon: Linda Kim MD;  Location: [facility] OR       Current Outpatient Medications   Medication    acetaminophen (TYLENOL) 325 MG tablet    ARIPiprazole (ABILIFY) 5 MG tablet    aspirin (ASA) 81 MG chewable tablet    buPROPion (WELLBUTRIN XL) 300 MG 24 hr tablet    diphenhydrAMINE HCl (BENADRYL ALLERGY PO)    furosemide (LASIX) 20 MG tablet    lisinopril (ZESTRIL) 5 MG tablet    loratadine (CLARITIN) 10 MG tablet    metFORMIN (GLUCOPHAGE) 1000 MG tablet    metoprolol succinate ER (TOPROL XL) 25 MG 24 hr tablet    multivitamin, therapeutic (THERA-VIT) TABS tablet    oxyBUTYnin ER (DITROPAN XL) 15 MG 24 hr tablet    pantoprazole (PROTONIX) 40 MG EC tablet    vitamin C (ASCORBIC ACID) 1000 MG TABS    Vitamin D3 (CHOLECALCIFEROL) 25 mcg (1000 units) tablet    atorvastatin (LIPITOR) 80 MG tablet     No current facility-administered medications for  this visit.        No Known Allergies    Family History   Problem Relation Age of Onset    C.A.D. Father         CABG in late 40's    C.A.D. Brother         MI in 50's       Social History     Socioeconomic History    Marital status:      Spouse name: None    Number of children: None    Years of education: None    Highest education level: None   Occupational History    None   Tobacco Use    Smoking status: Former Smoker     Packs/day: 0.00     Types: Cigarettes    Smokeless tobacco: Never Used    Tobacco comment: Feb. 2014   Substance and Sexual Activity    Alcohol use: Yes     Comment: occasional    Drug use: Yes     Types: Marijuana    Sexual activity: Yes     Partners: Female   Other Topics Concern    Parent/sibling w/ CABG, MI or angioplasty before 65F 55M? Yes     Comment: dad had heart attack before 55. brother also had heart attack before 55.   Social History Narrative    .      Social Determinants of Health     Financial Resource Strain:     Difficulty of Paying Living Expenses:    Food Insecurity:     Worried About Running Out of Food in the Last Year:     Ran Out of Food in the Last Year:    Transportation Needs:     Lack of Transportation (Medical):     Lack of Transportation (Non-Medical):    Physical Activity:     Days of Exercise per Week:     Minutes of Exercise per Session:    Stress:     Feeling of Stress :    Social Connections:     Frequency of Communication with Friends and Family:     Frequency of Social Gatherings with Friends and Family:     Attends Spiritism Services:     Active Member of Clubs or Organizations:     Attends Club or Organization Meetings:     Marital Status:    Intimate Partner Violence:     Fear of Current or Ex-Partner:     Emotionally Abused:     Physically Abused:     Sexually Abused:        ROS: 10 point ROS neg other than the symptoms noted above in the HPI.  EXAM  Constitutional: healthy, alert and no distress    Psychiatric: mentation appears normal and  affect normal/bright    VASCULAR:  -Dorsalis pedis pulse +1/4 b/l  -Posterior tibial pulse +1/4 b/l  -Capillary refill time < 3 seconds to b/l hallux  -Hair growth Absent to b/l anterior legs and ankles  NEURO:  -Positive for paresthesias to bilateral foot  -Epicritic and protective sensation diminished to the bilateral foot  DERM:  -Skin temperature cool to bilateral foot  -Mild rubor with purple discoloration to bilateral foot  -Skin diffusely dry, cracking and flaking to plantar foot (most cracking on heels) without open wounds or drainage at this time  -Toenails elongated, thickened, dystrophic and discolored x 10  ---Bilateral hallux toenails significantly dystrophic  ---Mild incurvation of the lateral toenail border of the RIGHT hallux -- no open wounds, no drainage  -----No severe erythema, no ascending erythema, no calor, no purulence, no malodor, no other signs of infection.   MSK:  -Muscle strength of ankles +5/5 for dorsiflexion, plantarflexion, ABDUction and ADDuction b/l    ============================================================    ASSESSMENT:  (L60.3) Onychodystrophy  (primary encounter diagnosis)    (E11.9) Diabetes mellitus type 2, noninsulin dependent (H)    (E11.42) Diabetic polyneuropathy associated with type 2 diabetes mellitus (H)    (I73.9) Peripheral arterial disease (H)    (Z13.89) Screening for diabetic peripheral neuropathy        PLAN:  -Patient evaluated and examined. Treatment options discussed with no educational barriers noted.  -High risk toenail debridement x 10 toenails without incident  -Mild slant back of bilateral border of bilateral hallux where nails were digging into the skin. No open wounds and no drainage.  -No severe erythema, no ascending erythema, no calor, no purulence, no malodor, no other signs of infection.     -Diabetic Foot Education provided. This included checking the feet daily looking for new new blisters or wounds, wearing shoes at all times when walking  including around the house, and avoiding lotion application between the toes. If there are any signs of infection, the patient should present to the ED as soon as possible. Infections of the foot can be life threatening or lead to amputations of the foot or leg.    Diabetes Mellitus: Patient's DM is managed by their PCP. The DM appears to be stable. Patient's last HbA1C was 6.5% on 05/04/2023.     -Vascular appointment --follow-up scheduled for 09/19/[facility] at [facility].    ---Dr. Mcgrath's note in July, 2021:   Reviewed his DAVID. The right was 0.79, the left 0.59. This corresponds to his complaint of his symptoms.    Moderate disease bilaterally, worse on the left. Discussed that he has fairly long segment disease on the left. I would not recommend intervention unless he truly has lifestyle limiting/disabling claudication. Discussed that any rest pain or tissue loss would lead to prompt evaluation for intervention. I discussed structured exercise and the significant benefit to be had from this in his pain free walking distance. However, given the distance that he has to walk, this may not be practical for him.   ---Patient says he thinks he will be called to follow-up with Dr. Mcgrath this summer of 2023.    -Patient in agreement with the above treatment plan and all of patient's questions were answered.      RTC 3 months for diabetic foot exam and high risk nail debridement         Sierra Velásquez DPM

## 2023-09-14 RX ORDER — OXYBUTYNIN CHLORIDE 10 MG/1
10 TABLET, EXTENDED RELEASE ORAL DAILY
Qty: 90 TABLET | Refills: 1 | Status: SHIPPED | OUTPATIENT
Start: 2023-09-14 | End: 2023-11-28

## 2023-09-19 ENCOUNTER — TRANSFERRED RECORDS (OUTPATIENT)
Dept: HEALTH INFORMATION MANAGEMENT | Facility: CLINIC | Age: 68
End: 2023-09-19
Payer: COMMERCIAL

## 2023-10-02 ENCOUNTER — HOSPITAL ENCOUNTER (OUTPATIENT)
Dept: CT IMAGING | Facility: HOSPITAL | Age: 68
Discharge: HOME | End: 2023-10-02
Attending: FAMILY MEDICINE | Admitting: FAMILY MEDICINE
Payer: COMMERCIAL

## 2023-10-02 DIAGNOSIS — F17.201 TOBACCO DEPENDENCE IN REMISSION: ICD-10-CM

## 2023-10-02 DIAGNOSIS — Z87.891 PERSONAL HISTORY OF NICOTINE DEPENDENCE: ICD-10-CM

## 2023-10-02 PROCEDURE — 71271 CT THORAX LUNG CANCER SCR C-: CPT

## 2023-10-20 NOTE — PROGRESS NOTES
[facility] HEART [facility]   CARDIOLOGY PROGRESS NOTE     Chief Complaint   Patient presents with     Heart Problem     Surgical Followup     S/P CABG and MVR          Diagnosis:    ICD-10-CM    1. Mitral valve prolapse  I34.1       2. S/P mitral valve repair 1/18/2023  at [facility]  Z95.2       3. Postoperative atrial fibrillation (H)  I97.89     I48.91       4. HFrEF (heart failure with reduced ejection fraction) (H)  I50.20       5. Coronary artery disease involving native coronary artery of native heart without angina pectoris  I25.10       6. S/P 2V CABG 1//18/2023 at [facility]  Z95.1       7. Abdominal aortic aneurysm s/p AAA repair 2017 at [facility]  Z98.890     Z86.79       8. Essential hypertension  I10       9. Mixed hyperlipidemia  E78.2       10. Type 2 diabetes mellitus without complication, without long-term current use of insulin (H)  E11.9       11. Tobacco dependence in remission  F17.201       12. Left ventricular dilatation  I51.7       13. LAE (left atrial enlargement)  I51.7       14. Gastroesophageal reflux disease, unspecified whether esophagitis present  K21.9 pantoprazole (PROTONIX) 40 MG EC tablet      15. S/P CABG (coronary artery bypass graft)  Z95.1 aspirin (ASA) 81 MG chewable tablet     lisinopril (ZESTRIL) 2.5 MG tablet     amiodarone (PACERONE) 200 MG tablet     atorvastatin (LIPITOR) 80 MG tablet            Assessment/Plan:    1. Mitral valve prolapse (moderate-severe) with left ventricular dilation (moderate) on TTE 9/2022  2. S/p mitral valve repair and mitral valve annuloplasty ring at [facility] 1/18/2023 (Mitral valve repair with [place]-Reynaldo NeoChords to the flail P2 segment, closure of P1/P2 cleft, implantation of a 34 mm Avilez Physio II annuloplasty ring)    Prior to procedure he was symptomatic with progressive fatigue, dyspnea on exertion. His fatigue has improved and his dyspnea with minimal exertion has overall resolved.     Post-operative complications including bleeding of graft warranting  being re-opened in the OR, acute blood loss anemia, post-operative atrial fibrillation.     Continue aspirin 162 mg once daily    Continue cardiac rehab and lifting precautions      3. Heart Failure with mildly reduced Ejection Fraction (HFREF) with LVEF 40-45% 1/2023   4. Left ventricular dilation  NYHA Symptom Class II  Stage C      ACE-I/ARB/ARNi: Continue lisinopril 2.5 mg once daily    BB: Continue with metoprolol XL 25 mg     SGLT-2 Inhibitor:     Aldosterone antagonist     SCD prophylaxis does not meet criteria for implant with LVEF 40-45%    %BiV pacing:  N/A    Fluid status Euvolemic    Cardiac Rehab: Currently in cardiac rehab s/p CABG    Sleep Apnea Evaluation:         5. Coronary artery disease  6. S/p coronary artery bypass graft (CABG) x 2V CABG- reverse saphenous vein graft to the posterior ascending artery, reverse saphenous vein graft to the diagonal artery at [facility] 1/8/2023    Continue high intensity statin: atorvastatin 80 mg once daily for secondary prevention and plaque stabilization    Continue aspirin 162 mg once daily    Continue with cardiac rehab    Sternotomy site clean, dry, intact. No clicking, popping, or discomfort. Reviewed ongoing restrictions.       7. Left atrial enlargement    Risk factor modification    Moderate-severe mitral regurgitation s/p mitral valve repair      8. Post-operative atrial fibrillation    He has completed 30 days of amiodarone 200 mg once daily prescription    Plan to stop amiodarone and have him wear a zio patch to evaluate for any recurrence of atrial fibrillation    He denies any symptoms of racing, skipping, fluttering, palpitations.      9. CHADs-VASC >= 2    He was not discharged home with chronic oral anticoagulation for stroke prophylaxis with post-operative atrial fibrillation.     Reviewed stroke risk factors and chronic oral anticoagulation    We discussed post-operative atrial fibrillation could have been strictly from cardiac procedures (CABG,  mitral valve repair); however, there is no way to know if he has had atrial fibrillation that was asymptomatic previously and newly identified during the post-operative period or if the atrial fibrillation will recur. It is this uncertainty that we cannot guarantee no/zero stroke risk related to atrial fibrillation.     Plan for zio patch and we will re-visit chronic oral anticoagulation again at follow-up.       10. Abdominal aortic aneurysm s/p AAA repair 2017 at [facility]    Recent MR at [facility] and per patient report no change in size    Continue to follow with vascular at [facility]      11. Essential hypertension, controlled     BP Readings from Last 6 Encounters:   02/23/23 121/78   02/13/23 106/76   01/31/23 109/72   01/09/23 107/68   01/06/23 116/70   12/09/22 107/57       12. Mixed hyperlipidemia with LDL goal less than 70, controlled    Continue Atorvastatin 80 mg once daily    Recent Labs   Lab Test 08/29/22  1441 08/17/21  0826   CHOL 153 168   HDL 57 49   LDL 56 76   TRIG 198* 214*       13. Diabetes Mellitus, type II    Poorly controlled with recent A1c of 7.9%    Continue management by PCP    Statin: atorvastatin 80 mg once daily    ACEi: Lisinopril 2.5 mg once daily      14. Claudication    Follows with vascular at [facility]       15. Former smoker    Longstanding history, quit around age 60.     Interval history:  Gilberto Camilo is a 67-year old male who presents for cardiology follow-up. Recall he has a cardiac history including moderate-severe mitral regurgitation s/p mitral valve repair at [facility] 1/18/2023, coronary artery disease s/p 2V CABG- reverse saphenous vein graft to the posterior ascending artery, reverse saphenous vein graft to the diagonal artery at [facility] 1/8/2023,  abdominal aortic aneurysm s/p repair in 2017 at [facility] in [facility], essential hypertension, mixed hyperlipidemia, PAD with claudication (follows with [facility] vascular). He has a noncardiac history including DM-  type II, ED, depression, former smoker.     Mr. Camilo was initially seen in consult at prior visit for finding of moderate-severe mitral valve prolapse. He had symptoms of progressive dyspnea, dyspnea on exertion, and fatigue. He was referred for evaluation by structural heart clinic with work-up including coronary angiogram which showed 2 vessel obstructive CAD. He underwent 2 vessel CABG as well as mitral valve repair. His post-operative period was complicated by bleeding of one of the grafts. He was brought back to the OR and hemostasis achieved. He also developed post-operative atrial fibrillation and was started on amiodarone. He is doing cardiac rehab and tolerating without concerns for chest pain/pressure, dyspnea, palpitations. He continues with some discomfort of sternotomy site but this has been improving. No popping or clicking sensation at sternotomy site. He has been following lifting precautions. No swelling in legs/feet. No sensation of racing/fluttering/palpitations. No episodes of lightheadedness, dizziness, near-syncope or syncope. He endorses overall feeling well and happy with improvement in his dyspnea and fatigue since procedure.         HPI:    Gilberto Camilo is a 67-year old male who presents for cardiology consult with recent moderate-severe mitral regurgitation on echocardiogram. He has a cardiac history including abdominal aortic aneurysm s/p repair in 2017 at [facility] in [facility], essential hypertension, mixed hyperlipidemia, coronary calcifications on CT, PAD with claudication (follows with [facility] vascular). He has a noncardiac history including DM- type II, ED, depression, former smoker.       Mr. Camilo has had fatigue in the last 6 months. Per wife- increased napping. Sleeps at least 10 hours per night and several naps throughout the day. This is new and worrisome to wife Marcella. She endorses that he previously would sleep 8 hours and be up and ready to go, rare naps.  "    He denies chest pain, exertional chest pain, dyspnea at rest or with conversation. He has had dyspnea on exertion which seems to be progressively getting worse. He and his [place] about 0.25 miles and he had to stop multiple times due to dyspnea. Per his wife he previously had to stop due to symptoms of bilateral LE claudication versus dyspnea; however, now he is becoming dyspneic before the claudication starts. Denies any LE edema, abdominal bloating.     Per his wife he has had some lightheaded dizziness with postural change from sitting to standing and with bending over to standing. This is newer for him. He has felt near-syncopal a couple times. He has not had a syncopal episode.      Smoking History: Started smoking at age 18, quit around  (about age 60 years old). Smoking up to 3 ppd.    Alcohol History: None    Substance Abuse History: None    Sleep History: Sleeps in bed, naps in the chair. Typically goes to bed around 10 pm, wakes up around 8 am. Wakes up a couple times per night to void, able to fall right back to sleep. He does snore \"if allergies are acting up.\"   Per his wife he was supposed to have a sleep study but never went.  She has witnessed apneic periods. Wakes up most mornings feeling rested but within 2-3 hours feeling tired and ready for a nap. Denies orthopnea, PND.       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 " Trace  mitral insufficiency is present.     Technically difficult study.  Left ventricular function is decreased. The ejection fraction is 40-45%  (mildly reduced).  Difficult to assesss RV function. Global right ventricular function probably  is mildly reduced.  No pericardial effusion is present.     This study was compared with the study from 11/11/2022. S/P Mitral valve  repair for severe MR. LVEF declined mildly.      Cardiac catheterization at [facility] 12/9/2022  Conclusion    Right sided filling pressures are normal.    Mild elevated pulmonary hypertension.    Left sided filling pressures are moderately elevated.    Normal cardiac output level.    Hemodynamic data has been modified in Epic per physician review.    RPDA lesion is 100% stenosed.    Prox RCA to Mid RCA lesion is 95% stenosed.    1st Diag lesion is 90% stenosed.      Transthoracic Echocardiogram 9/29/2022  Interpretation Summary  No pericardial effusion is present.  Global and regional left ventricular function is normal with an EF of 55-60%.  Moderate left ventricular dilation is present.  The right ventricle is normal size.  Global right ventricular function is normal.  Moderate left atrial enlargement is present.  The posterior MV leaflet is slightly thickened and shortened with prolapse and  the valves do not coapt. There is significant MR jet towrds the interatrial  septum.  Moderate to severe mitral insufficiency is present.   Trace aortic insufficiency is present.  No aortic stenosis is present.  Trace tricuspid insufficiency is present.  Trace pulmonic insufficiency is present.         Past Medical History:   Diagnosis Date     AAA (abdominal aortic aneurysm) 09/20/2017    5.3 cm      Mohan esophagus      Closed rib fracture      Coronary artery disease involving native heart, unspecified vessel or lesion type, unspecified whether angina present 1/18/2023     DNS (deviated nasal septum)      Postoperative atrial fibrillation (H) 2/13/2023      S/P CABG (coronary artery bypass graft) 2/13/2023     S/P mitral valve repair 2/13/2023       Past Surgical History:   Procedure Laterality Date     AAA REPAIR  06/17/2020    [facility]/ intravascular repair     APPENDECTOMY       BYPASS GRAFT ARTERY CORONARY, REPAIR VALVE MITRAL, COMBINED N/A 1/18/2023    Procedure: MEDIAN STERNOTOMY, ON PUMP OXGENATION, Left endovascular saphaneous vein harvest, CORONARY ARTERY BYPASS GRAFT (CABG x 2), Mitral Valve Repair, transesophageal echocardiogram (TRENT) performed by anesthesia;  Surgeon: Linda Kim MD;  Location: [facility] OR     COLONOSCOPY  2013     CV CORONARY ANGIOGRAM N/A 12/9/2022    Procedure: Coronary Angiogram with possible intervention;  Surgeon: Porfirio Herrera MD;  Location:  HEART CARDIAC CATH LAB     CV RIGHT HEART CATH MEASUREMENTS RECORDED N/A 12/9/2022    Procedure: Right Heart Cath;  Surgeon: Porfirio Herrera MD;  Location:  HEART CARDIAC CATH LAB     ESOPHAGOGASTRODUODENOSCOPY  2013     INCISION AND DRAINAGE CHEST WASHOUT, COMBINED N/A 1/19/2023    Procedure: chest washout, Re-do sternotomy, repair of vein graft;  Surgeon: Linda Kim MD;  Location: [facility] OR       No Known Allergies    Current Outpatient Medications   Medication Sig Dispense Refill     acetaminophen (TYLENOL) 325 MG tablet Take 2 tablets (650 mg) by mouth every 4 hours as needed for other (For optimal non-opioid multimodal pain management to improve pain control.) 100 tablet 1     amiodarone (PACERONE) 200 MG tablet Take 1 tablet (200 mg) by mouth daily 30 tablet 1     ARIPiprazole (ABILIFY) 5 MG tablet 1 tablet (5 mg) by Oral or Feeding Tube route every morning 30 tablet 3     aspirin (ASA) 81 MG chewable tablet 2 tablets (162 mg) by Oral or NG Tube route daily for 90 days 180 tablet 0     atorvastatin (LIPITOR) 80 MG tablet Take 1 tablet (80 mg) by mouth every morning for 90 days 90 tablet 3     buPROPion (WELLBUTRIN XL) 300 MG 24 hr tablet Take  1 tablet (300 mg) by mouth daily 30 tablet 3     diphenhydrAMINE HCl (BENADRYL ALLERGY PO) Take by mouth as needed       lisinopril (ZESTRIL) 2.5 MG tablet Take 1 tablet (2.5 mg) by mouth daily 90 tablet 3     loratadine (CLARITIN) 10 MG tablet Take 10 mg by mouth every morning       metFORMIN (GLUCOPHAGE) 1000 MG tablet Take 1 tablet (1,000 mg) by mouth 2 times daily (with meals) 60 tablet 3     methocarbamol (ROBAXIN) 500 MG tablet 1 tablet (500 mg) by Oral or Feeding Tube route every 6 hours as needed for muscle spasms 30 tablet 0     metoprolol succinate ER (TOPROL XL) 25 MG 24 hr tablet TAKE 1 TABLET(25 MG) BY MOUTH DAILY 90 tablet 3     multivitamin, therapeutic (THERA-VIT) TABS tablet 1 tablet by Oral or Feeding Tube route every morning 30 tablet 3     pantoprazole (PROTONIX) 40 MG EC tablet Take 1 tablet (40 mg) by mouth daily 90 tablet 3     vitamin C (ASCORBIC ACID) 1000 MG TABS Take 1,000 mg by mouth every morning         Social History     Socioeconomic History     Marital status:      Spouse name: Not on file     Number of children: Not on file     Years of education: Not on file     Highest education level: Not on file   Occupational History     Not on file   Tobacco Use     Smoking status: Former     Packs/day: 1.00     Years: 25.00     Pack years: 25.00     Types: Cigarettes     Smokeless tobacco: Never     Tobacco comments:     Feb. 2014   Vaping Use     Vaping Use: Never used   Substance and Sexual Activity     Alcohol use: Yes     Comment: occasional     Drug use: Not Currently     Types: Marijuana     Sexual activity: Yes     Partners: Female   Other Topics Concern     Parent/sibling w/ CABG, MI or angioplasty before 65F 55M? Yes     Comment: dad had heart attack before 55. brother also had heart attack before 55.   Social History Narrative    .      Social Determinants of Health     Financial Resource Strain: Not on file   Food Insecurity: Not on file   Transportation Needs: Not on  file   Physical Activity: Not on file   Stress: Not on file   Social Connections: Not on file   Intimate Partner Violence: Not on file   Housing Stability: Not on file       LAB RESULTS:   Orders placed or performed in visit on 02/23/23     aspirin (ASA) 81 MG chewable tablet     lisinopril (ZESTRIL) 2.5 MG tablet     amiodarone (PACERONE) 200 MG tablet     atorvastatin (LIPITOR) 80 MG tablet     pantoprazole (PROTONIX) 40 MG EC tablet         Review of systems: Negative except that which was noted in the HPI.    Physical examination:    Vitals: /78   Pulse 79   Temp (!) 96.2  F (35.7  C) (Tympanic)   Resp 20   Ht 1.829 m (6')   Wt 101.6 kg (224 lb)   SpO2 98%   BMI 30.38 kg/m    BMI= Body mass index is 30.38 kg/m .      GENERAL APPEARANCE: healthy, alert and no distress  HEENT: no icterus, no xanthelasmas, normal pupil size and reaction, no cyanosis.  NECK: no adenopathy, no asymmetry, masses.  CHEST: lungs clear to auscultation - no rales, rhonchi or wheezes, no use of accessory muscles, no retractions, respirations are unlabored, normal respiratory rate  CARDIOVASCULAR: regular rhythm, normal rate. normal S1 with physiologic split S2, no S3 or S4 and no murmur, click or rub  EXTREMITIES: no clubbing, cyanosis or edema  NEURO: alert and oriented normal speech, and affect  VASC: No vascular bruits heard.  SKIN: Sternotomy site is clean, dry intact. Edges are well approximated and there are no open areas. No tenderness. No erythema or warmth. No popping or clicking of sternum. no ecchymoses, no rashes        Thank you for allowing me to participate in the care of your patient. Please do not hesitate to contact me if you have any questions.     Total time spent on day of visit, including review of tests, obtaining/reviewing separately obtained history, ordering medications/tests/procedures, communicating with PCP/consultants, and documenting in electronic medical record: 45 minutes.         Zoe  Overbye, CNP     Previously Declined (within the last year)

## 2023-10-23 ENCOUNTER — OFFICE VISIT (OUTPATIENT)
Dept: FAMILY MEDICINE | Facility: OTHER | Age: 68
End: 2023-10-23
Attending: FAMILY MEDICINE
Payer: COMMERCIAL

## 2023-10-23 VITALS
OXYGEN SATURATION: 97 % | HEART RATE: 61 BPM | BODY MASS INDEX: 32.82 KG/M2 | TEMPERATURE: 97 F | WEIGHT: 242 LBS | SYSTOLIC BLOOD PRESSURE: 109 MMHG | DIASTOLIC BLOOD PRESSURE: 68 MMHG

## 2023-10-23 DIAGNOSIS — I10 ESSENTIAL HYPERTENSION: Primary | ICD-10-CM

## 2023-10-23 DIAGNOSIS — N39.41 URGE INCONTINENCE OF URINE: ICD-10-CM

## 2023-10-23 PROCEDURE — 99213 OFFICE O/P EST LOW 20 MIN: CPT | Performed by: FAMILY MEDICINE

## 2023-10-23 PROCEDURE — G0463 HOSPITAL OUTPT CLINIC VISIT: HCPCS

## 2023-10-23 ASSESSMENT — PAIN SCALES - GENERAL: PAINLEVEL: NO PAIN (0)

## 2023-10-23 NOTE — PROGRESS NOTES
Assessment & Plan     Essential hypertension  Doing well. Discussed getting up to fast not a good idea.   Follow-up in 2 months for DM follow-up . Continue current medications and behavioral changes.   Symptomatic treatment was discussed along when patient should call and/or come back into the clinic or go to ER/Urgent care. All questions answered.     Urge incontinence of urine  Doing better. Continue current medications and behavioral changes.                No follow-ups on file.    Rock Basilio MD  [facility] - KAIDEN Macias is a 68 year old, presenting for the following health issues:  Recheck Medication        10/23/2023    10:37 AM   Additional Questions   Roomed by Joce Murray   Accompanied by None         10/23/2023    10:37 AM   Patient Reported Additional Medications   Patient reports taking the following new medications None       HPI     Medication Followup of Metoprolol   Taking Medication as prescribed: yes  Side Effects:  Dizziness   Medication Helping Symptoms:  Unknown as patient does not check BP outside of clinic     Mild dizziness.           Pt says urge incontinence some better with dose change of Ditropan  No side effects    Review of Systems   Constitutional, HEENT, cardiovascular, pulmonary, gi and gu systems are negative, except as otherwise noted.      Objective    /68 (BP Location: Right arm, Patient Position: Sitting, Cuff Size: Adult Regular)   Pulse 61   Temp 97  F (36.1  C) (Tympanic)   Wt 109.8 kg (242 lb)   SpO2 97%   BMI 32.82 kg/m    Body mass index is 32.82 kg/m .  Physical Exam   GENERAL: healthy, alert and no distress  NECK: no adenopathy, no asymmetry, masses, or scars and thyroid normal to palpation  RESP: lungs clear to auscultation - no rales, rhonchi or wheezes  CV: regular rate and rhythm, normal S1 S2, no S3 or S4, no murmur, click or rub, no peripheral edema and peripheral pulses strong

## 2023-11-02 ENCOUNTER — TELEPHONE (OUTPATIENT)
Dept: FAMILY MEDICINE | Facility: OTHER | Age: 68
End: 2023-11-02

## 2023-11-10 ENCOUNTER — TRANSFERRED RECORDS (OUTPATIENT)
Dept: HEALTH INFORMATION MANAGEMENT | Facility: CLINIC | Age: 68
End: 2023-11-10
Payer: COMMERCIAL

## 2023-11-10 LAB — RETINOPATHY: NEGATIVE

## 2023-11-21 ENCOUNTER — OFFICE VISIT (OUTPATIENT)
Dept: PODIATRY | Facility: OTHER | Age: 68
End: 2023-11-21
Attending: PODIATRIST
Payer: COMMERCIAL

## 2023-11-21 VITALS
SYSTOLIC BLOOD PRESSURE: 96 MMHG | TEMPERATURE: 97.8 F | OXYGEN SATURATION: 94 % | HEART RATE: 72 BPM | DIASTOLIC BLOOD PRESSURE: 65 MMHG

## 2023-11-21 DIAGNOSIS — L60.3 ONYCHODYSTROPHY: Primary | ICD-10-CM

## 2023-11-21 DIAGNOSIS — E11.9 DIABETES MELLITUS TYPE 2, NONINSULIN DEPENDENT (H): ICD-10-CM

## 2023-11-21 DIAGNOSIS — I73.9 PERIPHERAL ARTERIAL DISEASE (H): ICD-10-CM

## 2023-11-21 DIAGNOSIS — E11.42 DIABETIC POLYNEUROPATHY ASSOCIATED WITH TYPE 2 DIABETES MELLITUS (H): ICD-10-CM

## 2023-11-21 DIAGNOSIS — Z13.89 SCREENING FOR DIABETIC PERIPHERAL NEUROPATHY: ICD-10-CM

## 2023-11-21 PROCEDURE — 11721 DEBRIDE NAIL 6 OR MORE: CPT | Performed by: PODIATRIST

## 2023-11-21 PROCEDURE — 99207 PR FOOT EXAM NO CHARGE: CPT | Performed by: PODIATRIST

## 2023-11-21 PROCEDURE — G0463 HOSPITAL OUTPT CLINIC VISIT: HCPCS

## 2023-11-21 ASSESSMENT — PAIN SCALES - GENERAL: PAINLEVEL: NO PAIN (0)

## 2023-11-21 NOTE — PROGRESS NOTES
Chief complaint: Patient presents with:  Toenail: Trimming      History of Present Illness: This 68 year old NIDDM II male is seen for follow-up management of elongated toenails. The toenails are difficult to reach and to trim because of the thickness of the toenails.    He has tingling in the toes although it has improved since his last visit.    Patient says he had open heart surgery for a valve replacement and double bypass on 01/17/2023. He had a vascular follow-up at [facility] on 09/15/2022. He says there are no changes at this time.    He has not had a lot of pain from the bilateral hallux ingrown toenails.    He broke his LEFT hip on 07/01/2023 and he had an ORIF. He is still doing well.    Patients says he has not been applying lotion to his feet.     Lab Results   Component Value Date    A1C 6.5 05/04/2023    A1C 7.9 01/09/2023    A1C 7.5 08/29/2022    A1C 7.3 02/17/2022    A1C 6.7 08/17/2021    A1C 6.5 02/15/2021       No further pedal complaints today.     Patient quit smoking around 2011.        BP 96/65 (BP Location: Left arm, Patient Position: Sitting, Cuff Size: Adult Large)   Pulse 72   Temp 97.8  F (36.6  C) (Tympanic)   SpO2 94%      Patient Active Problem List   Diagnosis    Abdominal aortic aneurysm (H24)    Erectile dysfunction    ACP (advance care planning)    Routine general medical examination at a health care facility    Essential hypertension    Mixed hyperlipidemia    Moderate major depression (H)    Morbid obesity (H)    Elevated prostate specific antigen (PSA)    Prediabetes    Diabetes mellitus, type 2 (H)    S/P AAA repair    Tobacco dependence in remission    Mitral valve prolapse    Status post coronary angiogram    Coronary artery disease involving native heart, unspecified vessel or lesion type, unspecified whether angina present    S/P CABG (coronary artery bypass graft)    S/P mitral valve repair    Postoperative atrial fibrillation (H)    Peripheral arterial disease (H24)     Fall from standing, initial encounter       Past Surgical History:   Procedure Laterality Date    AAA REPAIR  06/17/2020    [facility]/ intravascular repair    APPENDECTOMY      BYPASS GRAFT ARTERY CORONARY, REPAIR VALVE MITRAL, COMBINED N/A 1/18/2023    Procedure: MEDIAN STERNOTOMY, ON PUMP OXGENATION, Left endovascular saphaneous vein harvest, CORONARY ARTERY BYPASS GRAFT (CABG x 2), Mitral Valve Repair, transesophageal echocardiogram (TRENT) performed by anesthesia;  Surgeon: Linda Kim MD;  Location: [facility] OR    COLONOSCOPY  2013    CV CORONARY ANGIOGRAM N/A 12/9/2022    Procedure: Coronary Angiogram with possible intervention;  Surgeon: Porfirio Herrera MD;  Location:  HEART CARDIAC CATH LAB    CV RIGHT HEART CATH MEASUREMENTS RECORDED N/A 12/9/2022    Procedure: Right Heart Cath;  Surgeon: Porfirio Herrera MD;  Location:  HEART CARDIAC CATH LAB    ESOPHAGOGASTRODUODENOSCOPY  2013    INCISION AND DRAINAGE CHEST WASHOUT, COMBINED N/A 1/19/2023    Procedure: chest washout, Re-do sternotomy, repair of vein graft;  Surgeon: Linda Kim MD;  Location: [facility] OR       Current Outpatient Medications   Medication    acetaminophen (TYLENOL) 325 MG tablet    ARIPiprazole (ABILIFY) 5 MG tablet    aspirin (ASA) 81 MG chewable tablet    buPROPion (WELLBUTRIN XL) 300 MG 24 hr tablet    diphenhydrAMINE HCl (BENADRYL ALLERGY PO)    furosemide (LASIX) 20 MG tablet    lisinopril (ZESTRIL) 5 MG tablet    loratadine (CLARITIN) 10 MG tablet    metFORMIN (GLUCOPHAGE) 1000 MG tablet    metoprolol succinate ER (TOPROL XL) 25 MG 24 hr tablet    multivitamin, therapeutic (THERA-VIT) TABS tablet    oxyBUTYnin ER (DITROPAN XL) 10 MG 24 hr tablet    oxyBUTYnin ER (DITROPAN XL) 15 MG 24 hr tablet    pantoprazole (PROTONIX) 40 MG EC tablet    vitamin C (ASCORBIC ACID) 1000 MG TABS    Vitamin D3 (CHOLECALCIFEROL) 25 mcg (1000 units) tablet    atorvastatin (LIPITOR) 80 MG tablet     No current  facility-administered medications for this visit.        No Known Allergies    Family History   Problem Relation Age of Onset    C.A.D. Father         CABG in late 40's    C.A.D. Brother         MI in 50's       Social History     Socioeconomic History    Marital status:      Spouse name: None    Number of children: None    Years of education: None    Highest education level: None   Occupational History    None   Tobacco Use    Smoking status: Former Smoker     Packs/day: 0.00     Types: Cigarettes    Smokeless tobacco: Never Used    Tobacco comment: Feb. 2014   Substance and Sexual Activity    Alcohol use: Yes     Comment: occasional    Drug use: Yes     Types: Marijuana    Sexual activity: Yes     Partners: Female   Other Topics Concern    Parent/sibling w/ CABG, MI or angioplasty before 65F 55M? Yes     Comment: dad had heart attack before 55. brother also had heart attack before 55.   Social History Narrative    .      Social Determinants of Health     Financial Resource Strain:     Difficulty of Paying Living Expenses:    Food Insecurity:     Worried About Running Out of Food in the Last Year:     Ran Out of Food in the Last Year:    Transportation Needs:     Lack of Transportation (Medical):     Lack of Transportation (Non-Medical):    Physical Activity:     Days of Exercise per Week:     Minutes of Exercise per Session:    Stress:     Feeling of Stress :    Social Connections:     Frequency of Communication with Friends and Family:     Frequency of Social Gatherings with Friends and Family:     Attends Sikh Services:     Active Member of Clubs or Organizations:     Attends Club or Organization Meetings:     Marital Status:    Intimate Partner Violence:     Fear of Current or Ex-Partner:     Emotionally Abused:     Physically Abused:     Sexually Abused:        ROS: 10 point ROS neg other than the symptoms noted above in the HPI.  EXAM  Constitutional: healthy, alert and no  distress    Psychiatric: mentation appears normal and affect normal/bright    VASCULAR:  -Dorsalis pedis pulse +1/4 b/l  -Posterior tibial pulse +1/4 b/l  -Capillary refill time < 3 seconds to b/l hallux  -Hair growth Absent to b/l anterior legs and ankles  NEURO:  -Positive for paresthesias to bilateral foot  -Epicritic and protective sensation diminished to the bilateral foot  DERM:  -Skin temperature cool to bilateral foot  -Mild rubor with purple discoloration to bilateral foot  -Skin diffusely dry, flaking to plantar foot (most cracking on heels) without open wounds or drainage at this time  -Toenails elongated, thickened, dystrophic and discolored x 10  ---Bilateral hallux toenails significantly dystrophic  ---Mild incurvation of the lateral toenail border of the RIGHT hallux -- no open wounds, no drainage  -----No severe erythema, no ascending erythema, no calor, no purulence, no malodor, no other signs of infection.   MSK:  -Muscle strength of ankles +5/5 for dorsiflexion, plantarflexion, ABDUction and ADDuction b/l    ============================================================    ASSESSMENT:  (L60.3) Onychodystrophy  (primary encounter diagnosis)    (E11.9) Diabetes mellitus type 2, noninsulin dependent (H)    (E11.42) Diabetic polyneuropathy associated with type 2 diabetes mellitus (H)    (I73.9) Peripheral arterial disease (H)    (Z13.89) Screening for diabetic peripheral neuropathy        PLAN:  -Patient evaluated and examined. Treatment options discussed with no educational barriers noted.  -High risk toenail debridement x 10 toenails without incident  -Mild slant back of bilateral border of bilateral hallux where nails were digging into the skin. No open wounds and no drainage. Moderate indentations of skin from ingrown toenails. Patient to monitor feet daily for open wounds or signs of infection or ingrown toenails. His nails still incurvate into the medial and lateral toenail borders. He understands  and agrees to monitor the toenails.  -No severe erythema, no ascending erythema, no calor, no purulence, no malodor, no other signs of infection.     -Diabetic Foot Education provided. This included checking the feet daily looking for new new blisters or wounds, wearing shoes at all times when walking including around the house, and avoiding lotion application between the toes. If there are any signs of infection, the patient should present to the ED as soon as possible. Infections of the foot can be life threatening or lead to amputations of the foot or leg.    Diabetes Mellitus: Patient's DM is managed by their PCP. The DM appears to be stable. Patient's last HbA1C was 6.5% on 05/04/2023.     -Vascular appointment --follow-up scheduled for 09/19/2023 at [facility].    ---Dr. Mcgrath's note in July, 2021:   Reviewed his DAVID. The right was 0.79, the left 0.59. This corresponds to his complaint of his symptoms.    Moderate disease bilaterally, worse on the left. Discussed that he has fairly long segment disease on the left. I would not recommend intervention unless he truly has lifestyle limiting/disabling claudication. Discussed that any rest pain or tissue loss would lead to prompt evaluation for intervention. I discussed structured exercise and the significant benefit to be had from this in his pain free walking distance. However, given the distance that he has to walk, this may not be practical for him.   ---Patient says he thinks he saw vascular in September, 2023, and no further intervention for his feet is scheduled at this time.    -Patient in agreement with the above treatment plan and all of patient's questions were answered.      RTC 3 months for diabetic foot exam and high risk nail debridement         Sierra Velásquez DPM

## 2023-11-28 ENCOUNTER — OFFICE VISIT (OUTPATIENT)
Dept: FAMILY MEDICINE | Facility: OTHER | Age: 68
End: 2023-11-28
Attending: FAMILY MEDICINE
Payer: COMMERCIAL

## 2023-11-28 VITALS
DIASTOLIC BLOOD PRESSURE: 72 MMHG | OXYGEN SATURATION: 97 % | BODY MASS INDEX: 31.52 KG/M2 | HEART RATE: 57 BPM | SYSTOLIC BLOOD PRESSURE: 108 MMHG | WEIGHT: 232.44 LBS | TEMPERATURE: 97.4 F

## 2023-11-28 DIAGNOSIS — I95.1 ORTHOSTATIC HYPOTENSION: Primary | ICD-10-CM

## 2023-11-28 DIAGNOSIS — R15.9 INCONTINENCE OF FECES WITH FECAL URGENCY: ICD-10-CM

## 2023-11-28 DIAGNOSIS — R15.2 INCONTINENCE OF FECES WITH FECAL URGENCY: ICD-10-CM

## 2023-11-28 DIAGNOSIS — I10 ESSENTIAL HYPERTENSION: ICD-10-CM

## 2023-11-28 DIAGNOSIS — Z95.2 S/P MVR (MITRAL VALVE REPLACEMENT): ICD-10-CM

## 2023-11-28 DIAGNOSIS — E11.59 TYPE 2 DIABETES MELLITUS WITH OTHER CIRCULATORY COMPLICATION, WITHOUT LONG-TERM CURRENT USE OF INSULIN (H): ICD-10-CM

## 2023-11-28 DIAGNOSIS — N39.41 URGE INCONTINENCE OF URINE: ICD-10-CM

## 2023-11-28 DIAGNOSIS — Z95.1 S/P CABG (CORONARY ARTERY BYPASS GRAFT): ICD-10-CM

## 2023-11-28 LAB
ANION GAP SERPL CALCULATED.3IONS-SCNC: 12 MMOL/L (ref 7–15)
BUN SERPL-MCNC: 21.6 MG/DL (ref 8–23)
CALCIUM SERPL-MCNC: 10 MG/DL (ref 8.8–10.2)
CHLORIDE SERPL-SCNC: 102 MMOL/L (ref 98–107)
CREAT SERPL-MCNC: 1.23 MG/DL (ref 0.67–1.17)
DEPRECATED HCO3 PLAS-SCNC: 24 MMOL/L (ref 22–29)
EGFRCR SERPLBLD CKD-EPI 2021: 63 ML/MIN/1.73M2
EST. AVERAGE GLUCOSE BLD GHB EST-MCNC: 157 MG/DL
GLUCOSE SERPL-MCNC: 142 MG/DL (ref 70–99)
HBA1C MFR BLD: 7.1 %
POTASSIUM SERPL-SCNC: 4.5 MMOL/L (ref 3.4–5.3)
SODIUM SERPL-SCNC: 138 MMOL/L (ref 135–145)

## 2023-11-28 PROCEDURE — 83036 HEMOGLOBIN GLYCOSYLATED A1C: CPT | Mod: ZL | Performed by: FAMILY MEDICINE

## 2023-11-28 PROCEDURE — 36415 COLL VENOUS BLD VENIPUNCTURE: CPT | Mod: ZL | Performed by: FAMILY MEDICINE

## 2023-11-28 PROCEDURE — G0463 HOSPITAL OUTPT CLINIC VISIT: HCPCS

## 2023-11-28 PROCEDURE — 99215 OFFICE O/P EST HI 40 MIN: CPT | Performed by: FAMILY MEDICINE

## 2023-11-28 PROCEDURE — 80048 BASIC METABOLIC PNL TOTAL CA: CPT | Mod: ZL | Performed by: FAMILY MEDICINE

## 2023-11-28 RX ORDER — ARIPIPRAZOLE 5 MG/1
5 TABLET ORAL EVERY MORNING
Qty: 90 TABLET | Refills: 3 | Status: SHIPPED | OUTPATIENT
Start: 2023-11-28

## 2023-11-28 ASSESSMENT — ANXIETY QUESTIONNAIRES
GAD7 TOTAL SCORE: 0
IF YOU CHECKED OFF ANY PROBLEMS ON THIS QUESTIONNAIRE, HOW DIFFICULT HAVE THESE PROBLEMS MADE IT FOR YOU TO DO YOUR WORK, TAKE CARE OF THINGS AT HOME, OR GET ALONG WITH OTHER PEOPLE: NOT DIFFICULT AT ALL
4. TROUBLE RELAXING: NOT AT ALL
7. FEELING AFRAID AS IF SOMETHING AWFUL MIGHT HAPPEN: NOT AT ALL
6. BECOMING EASILY ANNOYED OR IRRITABLE: NOT AT ALL
1. FEELING NERVOUS, ANXIOUS, OR ON EDGE: NOT AT ALL
GAD7 TOTAL SCORE: 0
5. BEING SO RESTLESS THAT IT IS HARD TO SIT STILL: NOT AT ALL
2. NOT BEING ABLE TO STOP OR CONTROL WORRYING: NOT AT ALL
3. WORRYING TOO MUCH ABOUT DIFFERENT THINGS: NOT AT ALL

## 2023-11-28 ASSESSMENT — PATIENT HEALTH QUESTIONNAIRE - PHQ9
10. IF YOU CHECKED OFF ANY PROBLEMS, HOW DIFFICULT HAVE THESE PROBLEMS MADE IT FOR YOU TO DO YOUR WORK, TAKE CARE OF THINGS AT HOME, OR GET ALONG WITH OTHER PEOPLE: NOT DIFFICULT AT ALL
SUM OF ALL RESPONSES TO PHQ QUESTIONS 1-9: 4
SUM OF ALL RESPONSES TO PHQ QUESTIONS 1-9: 4

## 2023-11-28 ASSESSMENT — PAIN SCALES - GENERAL: PAINLEVEL: NO PAIN (0)

## 2023-11-28 NOTE — PROGRESS NOTES
Assessment & Plan     Orthostatic hypotension  Gives me sx of this. Will stop Zestril 5mg and lasix 10mg . Discussed getting up slowly. Patient was instructed to check blood pressure 1-2 times per week until I see back in the Clinic. If blood pressure gettng too high or too low as discussed then need to see patient back sooner.   Follow-up on 18th  - Basic metabolic panel    Incontinence of feces with fecal urgency  Due to ? Metformin  vs constipation vs both. Stool diary and stop Metformin - follow-up on 18th    Urge incontinence of urine  Much better but still some - stop lasix and do a diary -see on 18th.  Has some OAB sx. Stay on oxybutynin     Essential hypertension  As above   - Basic metabolic panel    Type 2 diabetes mellitus with other circulatory complication, without long-term current use of insulin (H)  Labs ordered - Stop Metformin . Will assess if need new DM med  - Hemoglobin A1c  - Basic metabolic panel      S/P MVR (mitral valve replacement)  Stable     Wrote down instructions and went over AVS with both pt and wife       Review of external notes as documented elsewhere in note  Assessment requiring an independent historian(s) - family - wife   Diagnosis or treatment significantly limited by social determinants of health - complex   Ordering of each unique test  Prescription drug management  40 minutes spent by me on the date of the encounter doing chart review, history and exam, documentation and further activities per the note           No follow-ups on file.    Rock Basilio MD  [facility] - [place]    Subjective   Gilberto is a 68 year old, presenting for the following health issues:  Incontinence        11/28/2023     8:09 AM   Additional Questions   Roomed by Lore CumminsMillan   Accompanied by Wife         11/28/2023     8:09 AM   Patient Reported Additional Medications   Patient reports taking the following new medications None       HPI     Concern - incontinence   Onset:  ongoing   Description: urinary and bowel incontinence   Progression of Symptoms:  symptoms are about the same for urinary incontinence, bowel incontinence has gotten worse   Accompanying Signs & Symptoms: None   Previous history of similar problem: ongoing   Precipitating factors:        Worsened by: none   Alleviating factors:        Improved by: none   Therapies tried and outcome: oxyBUTYnin helping, immodium when experiencing bowel incontinence     Mild dribble 1 -2 times a day - no pattern and no reason why and when other than after napping-- wife says probably more in morning with using Lasix. Much better since starting Oxybutrin       1 year of stool incontinence since about time of MVR and 2 stents -- avg 2/week   BM every 2-3 days - may be several from solid to watery       Several months of poor balance --- pattern - always from laying to standing or sitting to standing - kiran if get up too fast       Echo reviewed     Review of Systems   Constitutional, HEENT, cardiovascular, pulmonary, gi and gu systems are negative, except as otherwise noted.      Objective    /72 (BP Location: Right arm, Patient Position: Sitting, Cuff Size: Adult Large)   Pulse 57   Temp 97.4  F (36.3  C) (Tympanic)   Wt 105.4 kg (232 lb 7 oz)   SpO2 97%   BMI 31.52 kg/m    Body mass index is 31.52 kg/m .  Physical Exam   GENERAL: healthy, alert and no distress  NECK: no adenopathy, no asymmetry, masses, or scars and thyroid normal to palpation  RESP: lungs clear to auscultation - no rales, rhonchi or wheezes  CV: regular rate and rhythm, normal S1 S2, no S3 or S4, no murmur, click or rub, no peripheral edema and peripheral pulses strong  MS: no gross musculoskeletal defects noted, no edema                      Answers submitted by the patient for this visit:  Patient Health Questionnaire (Submitted on 11/28/2023)  If you checked off any problems, how difficult have these problems made it for you to do your work, take care of  things at home, or get along with other people?: Not difficult at all  PHQ9 TOTAL SCORE: 4  OFELIA-7 (Submitted on 11/28/2023)  OFELIA 7 TOTAL SCORE: 0

## 2023-12-01 ENCOUNTER — ALLIED HEALTH/NURSE VISIT (OUTPATIENT)
Dept: FAMILY MEDICINE | Facility: OTHER | Age: 68
End: 2023-12-01
Attending: FAMILY MEDICINE
Payer: COMMERCIAL

## 2023-12-01 VITALS — DIASTOLIC BLOOD PRESSURE: 68 MMHG | HEART RATE: 64 BPM | SYSTOLIC BLOOD PRESSURE: 128 MMHG | OXYGEN SATURATION: 97 %

## 2023-12-01 DIAGNOSIS — I10 ESSENTIAL HYPERTENSION: Primary | ICD-10-CM

## 2023-12-01 PROCEDURE — 99207 PR NO CHARGE NURSE ONLY: CPT

## 2023-12-01 NOTE — PROGRESS NOTES
Patient presents for a blood pressure check.    Patients manual blood pressure was 128/68.  Patient at home monitor blood pressure machine is 133/70.    Patient had no further question.

## 2023-12-09 ENCOUNTER — HEALTH MAINTENANCE LETTER (OUTPATIENT)
Age: 68
End: 2023-12-09

## 2023-12-18 ENCOUNTER — OFFICE VISIT (OUTPATIENT)
Dept: FAMILY MEDICINE | Facility: OTHER | Age: 68
End: 2023-12-18
Attending: FAMILY MEDICINE
Payer: COMMERCIAL

## 2023-12-18 ENCOUNTER — TELEPHONE (OUTPATIENT)
Dept: FAMILY MEDICINE | Facility: OTHER | Age: 68
End: 2023-12-18

## 2023-12-18 VITALS
OXYGEN SATURATION: 97 % | SYSTOLIC BLOOD PRESSURE: 135 MMHG | HEIGHT: 72 IN | TEMPERATURE: 97.1 F | BODY MASS INDEX: 31.15 KG/M2 | DIASTOLIC BLOOD PRESSURE: 70 MMHG | WEIGHT: 230 LBS | HEART RATE: 52 BPM

## 2023-12-18 DIAGNOSIS — I95.1 ORTHOSTATIC HYPOTENSION: ICD-10-CM

## 2023-12-18 DIAGNOSIS — N39.41 URGE INCONTINENCE OF URINE: ICD-10-CM

## 2023-12-18 DIAGNOSIS — E11.59 TYPE 2 DIABETES MELLITUS WITH OTHER CIRCULATORY COMPLICATION, WITHOUT LONG-TERM CURRENT USE OF INSULIN (H): ICD-10-CM

## 2023-12-18 DIAGNOSIS — R15.2 INCONTINENCE OF FECES WITH FECAL URGENCY: ICD-10-CM

## 2023-12-18 DIAGNOSIS — I10 ESSENTIAL HYPERTENSION: ICD-10-CM

## 2023-12-18 DIAGNOSIS — E11.59 TYPE 2 DIABETES MELLITUS WITH OTHER CIRCULATORY COMPLICATION, WITHOUT LONG-TERM CURRENT USE OF INSULIN (H): Primary | ICD-10-CM

## 2023-12-18 DIAGNOSIS — R15.9 INCONTINENCE OF FECES WITH FECAL URGENCY: ICD-10-CM

## 2023-12-18 PROCEDURE — 99214 OFFICE O/P EST MOD 30 MIN: CPT | Performed by: FAMILY MEDICINE

## 2023-12-18 PROCEDURE — G0463 HOSPITAL OUTPT CLINIC VISIT: HCPCS

## 2023-12-18 RX ORDER — PIOGLITAZONEHYDROCHLORIDE 15 MG/1
15 TABLET ORAL DAILY
Qty: 90 TABLET | Refills: 1 | Status: SHIPPED | OUTPATIENT
Start: 2023-12-18 | End: 2024-05-31

## 2023-12-18 ASSESSMENT — PAIN SCALES - GENERAL: PAINLEVEL: NO PAIN (0)

## 2023-12-18 NOTE — TELEPHONE ENCOUNTER
Called and talked with patient let him know the refill was sent over by Dr. Basilio. He confirmed understanding of results.

## 2023-12-18 NOTE — TELEPHONE ENCOUNTER
1:43 PM    Reason for Call: Phone Call    Description: Needs to speak to Dr Huerta nurse regarding blood sugar testing strips.     Preferred method for responding to this message: yes  What is your phone number ? [phone]    If we cannot reach you directly, may we leave a detailed response at the number you provided? Yes    Can this message wait until your PCP/provider returns, if available today? YES, No

## 2023-12-18 NOTE — PROGRESS NOTES
Assessment & Plan     Type 2 diabetes mellitus with other circulatory complication, without long-term current use of insulin (H)  Will add Actos or exchange with Metformin since doing better off Metformin. Pt to go back checking BS - machine and kit needed. Follow-up in 6 weeks to go over BS readings  - blood glucose monitoring (NO BRAND SPECIFIED) meter device kit; Use to test blood sugar 1 times daily or as directed.  - pioglitazone (ACTOS) 15 MG tablet; Take 1 tablet (15 mg) by mouth daily    Essential hypertension  Good control off meds.     Orthostatic hypotension  Resolved mostly     Incontinence of feces with fecal urgency  Resolved mostly     Urge incontinence of urine  Minimal episodes. His fault - waits to long or does not go before travel etc.                No follow-ups on file.    Rock Basilio MD  [facility] - [place] Macias is a 68 year old, presenting for the following health issues:  Diabetes        12/18/2023     8:24 AM   Additional Questions   Roomed by Naomi HADLEY LPN   Accompanied by self       HPI     Diabetes Follow-up    How often are you checking your blood sugar? Not at all  What concerns do you have today about your diabetes? None   Do you have any of these symptoms? (Select all that apply)  No numbness or tingling in feet.  No redness, sores or blisters on feet.  No complaints of excessive thirst.  No reports of blurry vision.  No significant changes to weight.      BP Readings from Last 2 Encounters:   12/18/23 135/70   12/01/23 128/68     Hemoglobin A1C (%)   Date Value   11/28/2023 7.1 (H)   05/04/2023 6.5 (H)   02/15/2021 6.5 (H)     LDL Cholesterol Calculated (mg/dL)   Date Value   05/04/2023 49   08/29/2022 56   08/21/2020 68   10/02/2019 97             Hypertension Follow-up    Do you check your blood pressure regularly outside of the clinic? Yes   Are you following a low salt diet? Yes  Are your blood pressures ever more than 140 on the top number  (systolic) OR more   than 90 on the bottom number (diastolic), for example 140/90? Yes  How many servings of fruits and vegetables do you eat daily?  2-3  On average, how many sweetened beverages do you drink each day (Examples: soda, juice, sweet tea, etc.  Do NOT count diet or artificially sweetened beverages)?   3  How many days per week do you exercise enough to make your heart beat faster? 3 or less  How many minutes a day do you exercise enough to make your heart beat faster? 9 or less  How many days per week do you miss taking your medication? 0  BP stable and much less orthostasis     Urge incontinence --- still there but better after stopping lasix     Stool incontinence or fecal urgency --- gone / no major accidents since off metformin             Review of Systems   Constitutional, HEENT, cardiovascular, pulmonary, gi and gu systems are negative, except as otherwise noted.      Objective    /70 (BP Location: Left arm, Patient Position: Sitting, Cuff Size: Adult Large)   Pulse 52   Temp 97.1  F (36.2  C) (Tympanic)   Ht 1.829 m (6')   Wt 104.3 kg (230 lb)   SpO2 97%   BMI 31.19 kg/m    Body mass index is 31.19 kg/m .  Physical Exam   GENERAL: healthy, alert and no distress  PSYCH: mentation appears normal, affect normal/bright

## 2023-12-19 DIAGNOSIS — E11.59 TYPE 2 DIABETES MELLITUS WITH OTHER CIRCULATORY COMPLICATION, WITHOUT LONG-TERM CURRENT USE OF INSULIN (H): Primary | ICD-10-CM

## 2023-12-19 RX ORDER — BLOOD-GLUCOSE METER
1 EACH MISCELLANEOUS ONCE
Qty: 1 KIT | Refills: 0 | Status: SHIPPED | OUTPATIENT
Start: 2023-12-19 | End: 2023-12-19

## 2023-12-19 RX ORDER — BLOOD SUGAR DIAGNOSTIC
STRIP MISCELLANEOUS
Qty: 100 STRIP | Refills: 1 | Status: SHIPPED | OUTPATIENT
Start: 2023-12-19

## 2023-12-19 RX ORDER — LANCETS
EACH MISCELLANEOUS
Qty: 100 EACH | Refills: 1 | Status: SHIPPED | OUTPATIENT
Start: 2023-12-19

## 2023-12-20 ENCOUNTER — TELEPHONE (OUTPATIENT)
Dept: FAMILY MEDICINE | Facility: OTHER | Age: 68
End: 2023-12-20

## 2023-12-20 NOTE — TELEPHONE ENCOUNTER
88/40  90/40  [phone] Gisselle        Patient's wife called stating the patient's BP today is 88/40 and 90/40.  States she reviewed his meds with him and he is still taking the metoprolol.  She is questioning if he is supposed to be taking the metoprolol.  He was started on Actos on 12/18/23       pioglitazone (ACTOS) 15 MG tablet 90 tablet 1 12/18/2023 -- No   Sig - Route: Take 1 tablet (15 mg) by mouth daily - Oral   Sent to pharmacy as: Pioglitazone HCl 15 MG Oral Tablet (ACTOS)   Class: E-Prescribe   Order: [id number]   E-Prescribing Status: Receipt confirmed by pharmacy (12/18/2023  9:10 AM CST)       metoprolol succinate ER (TOPROL XL) 25 MG 24 hr [id number] izlscc39/10/2023--NoSig: TAKE 1 TABLET(25 MG) BY MOUTH DAILYSent to pharmacy as: Metoprolol Succinate ER 25 MG Oral Tablet Extended Release 24 Hour (TOPROL XL)Class: E-PrescribeOrder: [id number] Status: Receipt confirmed by pharmacy (2/10/2023 11:27 AM CST)

## 2023-12-20 NOTE — TELEPHONE ENCOUNTER
First -Actos is a blood sugar medicine for diabetes and nothing to do with Blood pressure    Only BP med he is suppose to be on is Toprol at a low dose of 25mg    Bp in clinic have always been in good range    Question if BP reading she got is real or her BP cuff is off.     IF pt does not have increase dizzy /weak/fainting -- push fluids little more and stay on his toprol .     If symptomatic. - can cut Toprol in half and try that.      SBP of 90-95 usually is not real big concern.

## 2024-01-04 ENCOUNTER — OFFICE VISIT (OUTPATIENT)
Dept: FAMILY MEDICINE | Facility: OTHER | Age: 69
End: 2024-01-04
Attending: FAMILY MEDICINE
Payer: COMMERCIAL

## 2024-01-04 ENCOUNTER — MYC MEDICAL ADVICE (OUTPATIENT)
Dept: FAMILY MEDICINE | Facility: OTHER | Age: 69
End: 2024-01-04

## 2024-01-04 VITALS
SYSTOLIC BLOOD PRESSURE: 116 MMHG | RESPIRATION RATE: 16 BRPM | BODY MASS INDEX: 31.74 KG/M2 | HEART RATE: 53 BPM | DIASTOLIC BLOOD PRESSURE: 74 MMHG | HEIGHT: 72 IN | TEMPERATURE: 96.9 F | WEIGHT: 234.3 LBS | OXYGEN SATURATION: 94 %

## 2024-01-04 DIAGNOSIS — H10.33 ACUTE CONJUNCTIVITIS OF BOTH EYES, UNSPECIFIED ACUTE CONJUNCTIVITIS TYPE: Primary | ICD-10-CM

## 2024-01-04 PROCEDURE — 99213 OFFICE O/P EST LOW 20 MIN: CPT | Performed by: FAMILY MEDICINE

## 2024-01-04 PROCEDURE — G0463 HOSPITAL OUTPT CLINIC VISIT: HCPCS

## 2024-01-04 RX ORDER — GENTAMICIN SULFATE 3 MG/ML
1-2 SOLUTION/ DROPS OPHTHALMIC EVERY 4 HOURS
Qty: 5 ML | Refills: 0 | Status: SHIPPED | OUTPATIENT
Start: 2024-01-04 | End: 2024-06-25

## 2024-01-04 ASSESSMENT — PAIN SCALES - GENERAL: PAINLEVEL: NO PAIN (0)

## 2024-01-04 NOTE — TELEPHONE ENCOUNTER
Pt called and is unable to come in tomorrow AM but is able to make it hear by 4 PM today.  Pt is scheduled with PCP at 4:00 PM arrival time.

## 2024-01-04 NOTE — TELEPHONE ENCOUNTER
Pt called and per pt the red, itchy, goopy eyes stated two days ago.  Pt is currently in [place] for a medical appointment with is wife.  Pt would be able to come in this afternoon if needed.   Pt is unable to upload any images at this time.    Please advise.

## 2024-01-04 NOTE — TELEPHONE ENCOUNTER
Would like to see - please fit in today or first thing in the morning - depending on his plans.

## 2024-01-04 NOTE — PROGRESS NOTES
Assessment & Plan     Acute conjunctivitis of both eyes, unspecified acute conjunctivitis type  Comment: Eyes showed very little erythema, no other red-flag symptoms, lack of significant discharge. Likely allergies or dry eyes, unlikely  viral or early bacterial. Advised pt to continue OTC eye drops and use gentamicin eye drops if symptoms worsen with reddening and green-yellow discharge since leaving on 4 day fishing trip in which staying on the ice . Follow-up as needed.  - gentamicin (GARAMYCIN) 0.3 % ophthalmic solution; Place 1-2 drops into both eyes every 4 hours             BMI:   Estimated body mass index is 31.78 kg/m  as calculated from the following:    Height as of this encounter: 1.829 m (6').    Weight as of this encounter: 106.3 kg (234 lb 4.8 oz).           No follow-ups on file.    Rock Basilio MD  [facility] - [place] Garnican is a 68 year old, presenting for the following health issues:  Eye Problem (Red, itchy, draining)      HPI     Concern - Eyes, red, itchy, draining  Onset: 01/02/2023  Description: Allergies, eyes red and sore, slight watering  Intensity: moderate  Progression of Symptoms:  worsening  Accompanying Signs & Symptoms: Redness, itching  Previous history of similar problem: No  Precipitating factors:        Worsened by: None  Alleviating factors:        Improved by: Eye drops  Therapies tried and outcome: Eye drops allergy  Left worse   Right some   No allergens   No contract with kids  Visine helps     Pt presents w/ concern of erythematous, itchy, red eyes. First noticed a few days ago w/ watery discharge, started in both eyes at the same time, left eye is worse. Has been taking OTC eye drops which relieve symptoms temporarily. Had sinus congestion the last couple weeks but it cleared up by itself. Symptoms have been getting worse. Has had no pharyngitis, wheezing, fever, headache or cough. No known allergies or exposures.    Review of Systems    Constitutional, HEENT, cardiovascular, pulmonary, gi and gu systems are negative, except as otherwise noted.      Objective    /74   Pulse 53   Temp 96.9  F (36.1  C) (Tympanic)   Resp 16   Ht 1.829 m (6')   Wt 106.3 kg (234 lb 4.8 oz)   SpO2 94%   BMI 31.78 kg/m    Body mass index is 31.78 kg/m .  Physical Exam   GENERAL: healthy, alert and no distress  EYES: some erythema, PERRL and conjunctivae and sclerae normal  HENT: ear canals and TM's normal, nose and mouth without ulcers or lesions  RESP: lungs clear to auscultation - no rales, rhonchi or wheezes  CV: regular rate and rhythm, normal S1 S2, no S3 or S4, no murmur, click or rub, no peripheral edema and peripheral pulses strong  MS: no gross musculoskeletal defects noted, no edema  SKIN: no suspicious lesions or rashes

## 2024-01-11 ENCOUNTER — HOSPITAL ENCOUNTER (OUTPATIENT)
Dept: CARDIOLOGY | Facility: HOSPITAL | Age: 69
Discharge: HOME | End: 2024-01-11
Attending: NURSE PRACTITIONER
Payer: COMMERCIAL

## 2024-01-11 DIAGNOSIS — I48.91 POSTOPERATIVE ATRIAL FIBRILLATION (H): ICD-10-CM

## 2024-01-11 DIAGNOSIS — I34.1 MITRAL VALVE PROLAPSE: ICD-10-CM

## 2024-01-11 DIAGNOSIS — I97.89 POSTOPERATIVE ATRIAL FIBRILLATION (H): ICD-10-CM

## 2024-01-11 DIAGNOSIS — I50.20 HFREF (HEART FAILURE WITH REDUCED EJECTION FRACTION) (H): ICD-10-CM

## 2024-01-11 DIAGNOSIS — Z95.2 S/P MVR (MITRAL VALVE REPLACEMENT): ICD-10-CM

## 2024-01-11 LAB — LVEF ECHO: NORMAL

## 2024-01-11 PROCEDURE — 93246 EXT ECG>7D<15D RECORDING: CPT

## 2024-01-11 PROCEDURE — 93248 EXT ECG>7D<15D REV&INTERPJ: CPT | Performed by: INTERNAL MEDICINE

## 2024-01-11 PROCEDURE — 93306 TTE W/DOPPLER COMPLETE: CPT

## 2024-01-11 PROCEDURE — 93306 TTE W/DOPPLER COMPLETE: CPT | Mod: 26 | Performed by: INTERNAL MEDICINE

## 2024-01-11 NOTE — PATIENT INSTRUCTIONS
Zio patch placed on patient in outpatient appointment today. Patient was instructed to wear patch for 14 days. Skin was prepped and patch was placed following [employer] guidelines.     Patient was instructed to push button when symptomatic and fill out diary. Patient was instructed not to submerse in water and no swimming, saunas, or hot tubs. Showers may be taken keeping back towards the water. If the area DOES become wet pat it dry with a cloth. If skin irritation occurs patient was instructed to remove patch and contact [employer].    Patient understands we do not receive results until they mail patch back inside the box. Patient was made aware phone number is required for enrollment which automatically enrolls patient into text messaging program and they may receive automated phone calls. Patient can opt out at anytime. Staff reminded patient of outside billing notice which was explained to patient during the scheduling process of this monitor. Patient also instructed to contact [employer] with any questions [phone].    Patient had no further questions and agreed with plan. Patient was sent home with the box, information pamphlet and booklet to latrell any incidents in.

## 2024-01-19 ENCOUNTER — OFFICE VISIT (OUTPATIENT)
Dept: CARDIOLOGY | Facility: OTHER | Age: 69
End: 2024-01-19
Attending: NURSE PRACTITIONER
Payer: COMMERCIAL

## 2024-01-19 VITALS
WEIGHT: 236 LBS | DIASTOLIC BLOOD PRESSURE: 73 MMHG | BODY MASS INDEX: 31.97 KG/M2 | HEIGHT: 72 IN | RESPIRATION RATE: 16 BRPM | HEART RATE: 53 BPM | SYSTOLIC BLOOD PRESSURE: 125 MMHG | OXYGEN SATURATION: 98 %

## 2024-01-19 DIAGNOSIS — I97.89 POSTOPERATIVE ATRIAL FIBRILLATION (H): Primary | ICD-10-CM

## 2024-01-19 DIAGNOSIS — R06.81 WITNESSED APNEIC SPELLS: ICD-10-CM

## 2024-01-19 DIAGNOSIS — R06.83 SNORING: ICD-10-CM

## 2024-01-19 DIAGNOSIS — I25.10 CORONARY ARTERY DISEASE INVOLVING NATIVE CORONARY ARTERY OF NATIVE HEART WITHOUT ANGINA PECTORIS: ICD-10-CM

## 2024-01-19 DIAGNOSIS — R53.83 OTHER FATIGUE: ICD-10-CM

## 2024-01-19 DIAGNOSIS — Z95.2 S/P MVR (MITRAL VALVE REPLACEMENT): ICD-10-CM

## 2024-01-19 DIAGNOSIS — E66.09 CLASS 1 OBESITY DUE TO EXCESS CALORIES WITHOUT SERIOUS COMORBIDITY WITH BODY MASS INDEX (BMI) OF 32.0 TO 32.9 IN ADULT: ICD-10-CM

## 2024-01-19 DIAGNOSIS — I51.7 LAE (LEFT ATRIAL ENLARGEMENT): ICD-10-CM

## 2024-01-19 DIAGNOSIS — I50.20 HFREF (HEART FAILURE WITH REDUCED EJECTION FRACTION) (H): ICD-10-CM

## 2024-01-19 DIAGNOSIS — I34.1 MITRAL VALVE PROLAPSE: ICD-10-CM

## 2024-01-19 DIAGNOSIS — I48.91 POSTOPERATIVE ATRIAL FIBRILLATION (H): Primary | ICD-10-CM

## 2024-01-19 DIAGNOSIS — E66.811 CLASS 1 OBESITY DUE TO EXCESS CALORIES WITHOUT SERIOUS COMORBIDITY WITH BODY MASS INDEX (BMI) OF 32.0 TO 32.9 IN ADULT: ICD-10-CM

## 2024-01-19 LAB
ATRIAL RATE - MUSE: 52 BPM
DIASTOLIC BLOOD PRESSURE - MUSE: NORMAL MMHG
INTERPRETATION ECG - MUSE: NORMAL
P AXIS - MUSE: -4 DEGREES
PR INTERVAL - MUSE: 212 MS
QRS DURATION - MUSE: 134 MS
QT - MUSE: 462 MS
QTC - MUSE: 429 MS
R AXIS - MUSE: -25 DEGREES
SYSTOLIC BLOOD PRESSURE - MUSE: NORMAL MMHG
T AXIS - MUSE: -56 DEGREES
VENTRICULAR RATE- MUSE: 52 BPM

## 2024-01-19 PROCEDURE — G0463 HOSPITAL OUTPT CLINIC VISIT: HCPCS

## 2024-01-19 PROCEDURE — 93005 ELECTROCARDIOGRAM TRACING: CPT | Performed by: NURSE PRACTITIONER

## 2024-01-19 PROCEDURE — 93010 ELECTROCARDIOGRAM REPORT: CPT | Performed by: INTERNAL MEDICINE

## 2024-01-19 PROCEDURE — 99214 OFFICE O/P EST MOD 30 MIN: CPT | Performed by: NURSE PRACTITIONER

## 2024-01-19 ASSESSMENT — PAIN SCALES - GENERAL: PAINLEVEL: NO PAIN (0)

## 2024-01-19 NOTE — PROGRESS NOTES
[facility] HEART [facility]   CARDIOLOGY PROGRESS NOTE     Chief Complaint   Patient presents with    Follow Up          Diagnosis:    ICD-10-CM    1. Postoperative atrial fibrillation (H)  I97.89 EKG 12-lead complete w/read - (Clinic Performed)    I48.91       2. HFrEF (heart failure with reduced ejection fraction) (H)  I50.20 EKG 12-lead complete w/read - (Clinic Performed)     Sleep Study Referral      3. Mitral valve prolapse  I34.1 EKG 12-lead complete w/read - (Clinic Performed)      4. S/P MVR (mitral valve replacement)  Z95.2 EKG 12-lead complete w/read - (Clinic Performed)      5. LAE (left atrial enlargement)  I51.7 EKG 12-lead complete w/read - (Clinic Performed)      6. Coronary artery disease involving native coronary artery of native heart without angina pectoris  I25.10 EKG 12-lead complete w/read - (Clinic Performed)      7. Other fatigue  R53.83 Sleep Study Referral      8. Snoring  R06.83 Sleep Study Referral      9. Class 1 obesity due to excess calories without serious comorbidity with body mass index (BMI) of 32.0 to 32.9 in adult  E66.09 Sleep Study Referral    Z68.32       10. Witnessed apneic spells  R06.81 Sleep Study Referral            Assessment/Plan:    Mitral valve prolapse (moderate-severe) with left ventricular dilation (moderate) on TTE 9/2022  S/p mitral valve repair and mitral valve annuloplasty ring at [facility] 1/18/2023 (Mitral valve repair with [place]-Reynaldo NeoChords to the flail P2 segment, closure of P1/P2 cleft, implantation of a 34 mm Avilez Physio II annuloplasty ring)  Post-operative complications including bleeding of graft warranting being re-opened in the OR, acute blood loss anemia, post-operative atrial fibrillation.   Prior to procedure he was symptomatic with progressive fatigue, dyspnea on exertion. He continues to endorse improvement in these symptoms   Continue aspirin 162 mg once daily  SBE with one time dose of amoxicillin 2 grams 30-60 minutes prior to any dental  procedures      Heart Failure with mildly reduced Ejection Fraction (HFREF) with LVEF 40-45% 1/2023   Left ventricular dilation  TTE 1/2023: LVEF 40-45%  TTE 1/2024: LVEF 60-65%  NYHA Symptom Class II  Stage C    ACE-I/ARB/ARNi:   Continue lisinopril 2.5 mg once daily  BB:   Continue with metoprolol XL 25 mg   SGLT-2 Inhibitor:   Could consider if decline in LVEF  Aldosterone antagonist:  Could consider if decline in LVEF  SCD prophylaxis:  does not meet criteria for implant with LVEF 40-45%  %BiV pacing:    N/A  Fluid status:  Euvolemic  Cardiac Rehab:   Currently in cardiac rehab s/p CABG  Sleep Apnea Evaluation:   Referral placed today for snoring, ongoing fatigue    Wt Readings from Last 5 Encounters:   01/31/24 105.9 kg (233 lb 8 oz)   01/26/24 106.2 kg (234 lb 3.2 oz)   01/19/24 107 kg (236 lb)   01/04/24 106.3 kg (234 lb 4.8 oz)   12/18/23 104.3 kg (230 lb)       Mixed hyperlipidemia with LDL goal less than 70, controlled  Coronary artery disease  S/p coronary artery bypass graft (CABG) x 2V CABG-   Reverse saphenous vein graft to the posterior ascending artery  Reverse saphenous vein graft to the diagonal artery at [facility] 1/8/2023  Continue high intensity statin:   atorvastatin 80 mg once daily for secondary prevention and plaque stabilization  Continue aspirin 162 mg once daily  Continue beta-blockade for secondary prevention CAD, HFmrEF  Continue lisinopril for HFmrEF, hypertension  Heart healthy lifestyle    Recent Labs   Lab Test 05/04/23  0828 08/29/22  1441   CHOL 137 153   HDL 62 57   LDL 49 56   TRIG 129 198*         Left atrial enlargement  Risk factor modification  Moderate-severe mitral regurgitation s/p mitral valve repair      Post-operative atrial fibrillation  He was discharged with 30 days of amiodarone 200 mg once daily prescription. He wore a zio patch which did not show recurrence of atrial fibrillation.   He continues denies any symptoms of racing, skipping, fluttering, palpitations.  Katelyn  patch January 2024 (1 year after surgery/post operative atrial fibrillation) does not show any episodes of atrial fibrillation  Plan for symptom monitoring. If any palpitations, racing, fluttering- plan for repeat monitoring.       CHADs-VASC >= 2  He was not discharged home with chronic oral anticoagulation for stroke prophylaxis with post-operative atrial fibrillation.   Reviewed stroke risk factors and chronic oral anticoagulation  He has not had a recurrence of atrial fibrillation.       Abdominal aortic aneurysm s/p AAA repair 2017 at [facility]  Recent MR at [facility] and per patient report no change in size  Continue to follow with vascular at [facility]      Essential hypertension with blood pressure goal less than 130/80, controlled   Continue guideline directed medical therapy for heart failure    Diabetes Mellitus, type II  Continue management by PCP- he was started on Actos with history of congestive heart failure. Today, no symptoms of congestive heart failure, euvolemic  Statin: atorvastatin 80 mg once daily  ACEi: Lisinopril 2.5 mg once daily    Lab Results   Component Value Date    A1C 7.1 11/28/2023    A1C 6.5 05/04/2023    A1C 7.9 01/09/2023    A1C 7.5 08/29/2022    A1C 7.3 02/17/2022    A1C 6.5 02/15/2021         Claudication  Follows with vascular at [facility]       Former smoker  Longstanding history, quit around age 60.       Follow-up with cardiology in 1 year, certainly sooner with acute concerns.     Interval history:  Gilberto Camilo is a 68-year old male who presents for cardiology follow-up. Recall he has a cardiac history including moderate-severe mitral regurgitation s/p mitral valve repair at [facility] 1/18/2023, coronary artery disease s/p 2V CABG- reverse saphenous vein graft to the posterior ascending artery, reverse saphenous vein graft to the diagonal artery at [facility] 1/8/2023,  abdominal aortic aneurysm s/p repair in 2017 at [facility] in [facility], essential hypertension, mixed  "hyperlipidemia, PAD with claudication (follows with [facility]). He has a noncardiac history including DM- type II, ED, depression, former smoker.     Mr. Camilo was initially seen in consult for finding of moderate-severe mitral valve prolapse. He had symptoms of progressive dyspnea, dyspnea on exertion, and fatigue. He was referred for evaluation by structural heart clinic with work-up including coronary angiogram which showed 2 vessel obstructive CAD. He underwent 2 vessel CABG as well as mitral valve repair January 2023 at [facility]. His post-operative period was complicated by bleeding of one of the grafts. He was brought back to the OR and hemostasis achieved. He also developed post-operative atrial fibrillation and was started on amiodarone. He has completed cardiac rehab.      Today, Gilbetro denies any chest pain or pressure. His dyspnea, dyspnea on exertion has resolved. His fatigue improved but he does continue to take frequent naps per his wife. No concerns for in legs/feet. No orthopnea, PND. No sensation of racing/fluttering/palpitations. No episodes of lightheadedness, dizziness, near-syncope or syncope. He lives sedentary lifestyle but according to him and his wife he is running up and down the stairs with the dogs every 2 hours (20 stairs), carrying groceries up the stairs- No dyspnea, no chest discomfort.       Smoking History: Started smoking at age 18, quit around 2015 (about age 60 years old). Smoking up to 3 ppd.    Alcohol History: None    Substance Abuse History: None    Sleep History: Sleeps in bed, naps in the chair. Typically goes to bed around 10 pm, wakes up around 8 am. Wakes up a couple times per night to void, able to fall right back to sleep. He does snore \"if allergies are acting up.\"   Per his wife he was supposed to have a sleep study but never went.  She has witnessed apneic periods. Wakes up most mornings feeling rested but within 2-3 hours feeling tired and ready for a nap. " Denies orthopnea, PND.       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 He has a cardiac history including abdominal aortic aneurysm s/p repair [facility], essential hypertension, mixed hyperlipidemia, coronary calcifications on CT, PAD with claudication (follows with [facility] vascular). He has a noncardiac history including DM- type II, ED, depression, former smoker.       Mr. Camilo has had fatigue in the last 6 months. Per wife- increased napping. Sleeps at least 10 hours per night and several naps throughout the day. This is new and worrisome to wife Marcella. She endorses that he previously would sleep 8 hours and be up and ready to go, rare naps.     He denies chest pain, exertional chest pain, dyspnea at rest or with conversation. He has had dyspnea on exertion which seems to be progressively getting worse. He and his [place] was about 0.25 miles and he had to stop multiple times due to dyspnea. Per his wife he previously had to stop due to symptoms of bilateral LE claudication versus dyspnea; however, now he is becoming dyspneic before the claudication starts. Denies any LE edema, abdominal bloating.     Per his wife he has had some lightheaded dizziness with postural change from sitting to standing and with bending over to standing. This is newer for him. He has felt near-syncopal a couple times. He has not had a syncopal episode.      Smoking History: Started smoking at age 18, quit around  " (about age 60 years old). Smoking up to 3 ppd.    Alcohol History: None    Substance Abuse History: None    Sleep History: Sleeps in bed, naps in the chair. Typically goes to bed around 10 pm, wakes up around 8 am. Wakes up a couple times per night to void, able to fall right back to sleep. He does snore \"if allergies are acting up.\"   Per his wife he was supposed to have a sleep study but never went.  She has witnessed apneic periods. Wakes up most mornings feeling rested but within 2-3 hours feeling tired and ready for a nap. Denies orthopnea, PND.       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  Global right ventricular function is normal.  Annuloplasty ring in place at the mitral position.There is trace valvular  regurgitation. Mean gradient of 3 mmHg at a HR of 55 bpm (no stenosis).  No pericardial effusion is present.  Mild dilation of the aortic root and ascending aorta  This study was compared with the study from 23. Ventricular function has  normalized    Echocardiogram s/p mitral valve replacement 2023  Interpretation Summary  S/P Mitral valve repair with [place]-Reynaldo NeoChords to the flail P2 segment,  implantation of a 34 mm Avilez Physio II annuloplasty ring 2023. Trace  mitral insufficiency is present.     Technically difficult study.  Left ventricular function is decreased. The " ejection fraction is 40-45%  (mildly reduced).  Difficult to assesss RV function. Global right ventricular function probably  is mildly reduced.  No pericardial effusion is present.     This study was compared with the study from 11/11/2022. S/P Mitral valve  repair for severe MR. LVEF declined mildly.      Cardiac catheterization at [facility] 12/9/2022  Conclusion  Right sided filling pressures are normal.  Mild elevated pulmonary hypertension.  Left sided filling pressures are moderately elevated.  Normal cardiac output level.  Hemodynamic data has been modified in Epic per physician review.  RPDA lesion is 100% stenosed.  Prox RCA to Mid RCA lesion is 95% stenosed.  1st Diag lesion is 90% stenosed.      Transthoracic Echocardiogram 9/29/2022  Interpretation Summary  No pericardial effusion is present.  Global and regional left ventricular function is normal with an EF of 55-60%.  Moderate left ventricular dilation is present.  The right ventricle is normal size.  Global right ventricular function is normal.  Moderate left atrial enlargement is present.  The posterior MV leaflet is slightly thickened and shortened with prolapse and  the valves do not coapt. There is significant MR jet towrds the interatrial  septum.  Moderate to severe mitral insufficiency is present.   Trace aortic insufficiency is present.  No aortic stenosis is present.  Trace tricuspid insufficiency is present.  Trace pulmonic insufficiency is present.         Past Medical History:   Diagnosis Date    AAA (abdominal aortic aneurysm) (H24) 09/20/2017    5.3 cm     Mohan esophagus     Closed rib fracture     Coronary artery disease involving native heart, unspecified vessel or lesion type, unspecified whether angina present 1/18/2023    DNS (deviated nasal septum)     Postoperative atrial fibrillation (H) 2/13/2023    S/P CABG (coronary artery bypass graft) 2/13/2023    S/P mitral valve repair 2/13/2023       Past Surgical History:   Procedure  Laterality Date    AAA REPAIR  06/17/2020    st On license of [facility]/ intravascular repair    APPENDECTOMY      BYPASS GRAFT ARTERY CORONARY, REPAIR VALVE MITRAL, COMBINED N/A 1/18/2023    Procedure: MEDIAN STERNOTOMY, ON PUMP OXGENATION, Left endovascular saphaneous vein harvest, CORONARY ARTERY BYPASS GRAFT (CABG x 2), Mitral Valve Repair, transesophageal echocardiogram (TRENT) performed by anesthesia;  Surgeon: Linda Kim MD;  Location: [facility] OR    COLONOSCOPY  2013    CV CORONARY ANGIOGRAM N/A 12/9/2022    Procedure: Coronary Angiogram with possible intervention;  Surgeon: Porfirio Herrera MD;  Location:  HEART CARDIAC CATH LAB    CV RIGHT HEART CATH MEASUREMENTS RECORDED N/A 12/9/2022    Procedure: Right Heart Cath;  Surgeon: Porfirio Herrera MD;  Location:  HEART CARDIAC CATH LAB    ESOPHAGOGASTRODUODENOSCOPY  2013    INCISION AND DRAINAGE CHEST WASHOUT, COMBINED N/A 1/19/2023    Procedure: chest washout, Re-do sternotomy, repair of vein graft;  Surgeon: Linda Kim MD;  Location:  OR       No Known Allergies    Current Outpatient Medications   Medication Sig Dispense Refill    ACCU-CHEK GUIDE test strip Use to test blood sugar 1 daily. 100 strip 1    ARIPiprazole (ABILIFY) 5 MG tablet Take 1 tablet (5 mg) by mouth every morning 90 tablet 3    aspirin (ASA) 81 MG chewable tablet Take 162 mg by mouth      atorvastatin (LIPITOR) 80 MG tablet Take 1 tablet (80 mg) by mouth every morning for 90 days 90 tablet 3    blood glucose monitoring (NO BRAND SPECIFIED) meter device kit Use to test blood sugar 1 times daily or as directed. 1 kit 0    blood glucose monitoring (SOFTCLIX) lancets Use to test blood sugar 1 times daily. 100 each 1    gentamicin (GARAMYCIN) 0.3 % ophthalmic solution Place 1-2 drops into both eyes every 4 hours 5 mL 0    loratadine (CLARITIN) 10 MG tablet Take 10 mg by mouth every morning      multivitamin, therapeutic (THERA-VIT) TABS tablet 1 tablet by Oral or  Feeding Tube route every morning 30 tablet 3    oxyBUTYnin ER (DITROPAN XL) 15 MG 24 hr tablet Take 1 tablet (15 mg) by mouth daily 90 tablet 1    pioglitazone (ACTOS) 15 MG tablet Take 1 tablet (15 mg) by mouth daily 90 tablet 1    vitamin C (ASCORBIC ACID) 1000 MG TABS Take 1,000 mg by mouth every morning      Vitamin D3 (CHOLECALCIFEROL) 25 mcg (1000 units) tablet       Blood Glucose Monitoring Suppl (ACCU-CHEK GUIDE) w/Device KIT USE TO TEST BLOOD GLUCOSE ONCE DAILY OR AS DIRECTED.      buPROPion (WELLBUTRIN XL) 300 MG 24 hr tablet TAKE 1 TABLET(300 MG) BY MOUTH EVERY MORNING 90 tablet 1    metoprolol succinate ER (TOPROL XL) 25 MG 24 hr tablet 1/2 tablet for 1 week then every other day for 3 doses then off .      pantoprazole (PROTONIX) 40 MG EC tablet TAKE 1 TABLET(40 MG) BY MOUTH DAILY 90 tablet 3       Social History     Socioeconomic History    Marital status:      Spouse name: Not on file    Number of children: Not on file    Years of education: Not on file    Highest education level: Not on file   Occupational History    Not on file   Tobacco Use    Smoking status: Former     Packs/day: 1.00     Years: 25.00     Additional pack years: 0.00     Total pack years: 25.00     Types: Cigarettes     Passive exposure: Never    Smokeless tobacco: Never    Tobacco comments:     Feb. 2014   Vaping Use    Vaping Use: Never used   Substance and Sexual Activity    Alcohol use: Yes     Comment: occasional    Drug use: Yes     Types: Marijuana    Sexual activity: Yes     Partners: Female   Other Topics Concern    Parent/sibling w/ CABG, MI or angioplasty before 65F 55M? Yes     Comment: dad had heart attack before 55. brother also had heart attack before 55.   Social History Narrative    .      Social Determinants of Health     Financial Resource Strain: Low Risk  (1/4/2024)    Financial Resource Strain     Within the past 12 months, have you or your family members you live with been unable to get utilities  (heat, electricity) when it was really needed?: No   Food Insecurity: Low Risk  (1/4/2024)    Food Insecurity     Within the past 12 months, did you worry that your food would run out before you got money to buy more?: No     Within the past 12 months, did the food you bought just not last and you didn t have money to get more?: No   Transportation Needs: Low Risk  (1/4/2024)    Transportation Needs     Within the past 12 months, has lack of transportation kept you from medical appointments, getting your medicines, non-medical meetings or appointments, work, or from getting things that you need?: No   Physical Activity: Not on file   Stress: Not on file   Social Connections: Not on file   Interpersonal Safety: Low Risk  (10/23/2023)    Interpersonal Safety     Do you feel physically and emotionally safe where you currently live?: Yes     Within the past 12 months, have you been hit, slapped, kicked or otherwise physically hurt by someone?: No     Within the past 12 months, have you been humiliated or emotionally abused in other ways by your partner or ex-partner?: No   Housing Stability: Low Risk  (1/4/2024)    Housing Stability     Do you have housing? : Yes     Are you worried about losing your housing?: No       LAB RESULTS:   Results for orders placed or performed in visit on 01/19/24   EKG 12-lead complete w/read - (Clinic Performed)     Status: None   Result Value Ref Range    Systolic Blood Pressure  mmHg    Diastolic Blood Pressure  mmHg    Ventricular Rate 52 BPM    Atrial Rate 52 BPM    ME Interval 212 ms    QRS Duration 134 ms     ms    QTc 429 ms    P Axis -4 degrees    R AXIS -25 degrees    T Axis -56 degrees    Interpretation ECG       Sinus bradycardia with 1st degree A-V block  Non-specific intra-ventricular conduction block  T wave abnormality, consider anterolateral ischemia  Left axis deviation  When compared with ECG of 30-JUN-2023 16:13,  No significant change was found  Confirmed by  MD MELINA, AIDE ([id number]) on 1/19/2024 12:39:34 PM           Review of systems: Negative except that which was noted in the HPI.    Physical examination:    Vitals: /73   Pulse 53   Resp 16   Ht 1.829 m (6')   Wt 107 kg (236 lb)   SpO2 98%   BMI 32.01 kg/m    BMI= Body mass index is 32.01 kg/m .      GENERAL APPEARANCE: healthy, alert and no distress  HEENT: no icterus, no xanthelasmas, normal pupil size and reaction, no cyanosis.  NECK: no adenopathy, no asymmetry, masses.  CHEST: lungs clear to auscultation - no rales, rhonchi or wheezes, no use of accessory muscles, no retractions, respirations are unlabored, normal respiratory rate  CARDIOVASCULAR: regular rhythm, normal rate. normal S1 with physiologic split S2, no S3 or S4 and no murmur, click or rub  EXTREMITIES: no clubbing, cyanosis or edema  NEURO: alert and oriented normal speech, and affect  VASC: No vascular bruits heard.  SKIN:  no ecchymoses, no rashes        Thank you for allowing me to participate in the care of your patient. Please do not hesitate to contact me if you have any questions.       Zoe Carr, CNP

## 2024-01-19 NOTE — PATIENT INSTRUCTIONS
Thank you for allowing Zoe Carr CNP and our  team to participate in your care. Please call our office at [phone] with scheduling questions or if you need to cancel or change your appointment. With any other questions or concerns you may call cardiology nurse at  [phone].       If you experience chest pain, chest pressure, chest tightness, shortness of breath, fainting, lightheadedness, nausea, vomiting, or other concerning symptoms, please report to the Emergency Department or call 911. These symptoms may be emergent, and best treated in the Emergency Department.

## 2024-01-23 DIAGNOSIS — K21.9 GASTROESOPHAGEAL REFLUX DISEASE, UNSPECIFIED WHETHER ESOPHAGITIS PRESENT: ICD-10-CM

## 2024-01-23 RX ORDER — PANTOPRAZOLE SODIUM 40 MG/1
40 TABLET, DELAYED RELEASE ORAL DAILY
Qty: 90 TABLET | Refills: 3 | Status: SHIPPED | OUTPATIENT
Start: 2024-01-23 | End: 2024-10-07

## 2024-01-23 NOTE — TELEPHONE ENCOUNTER
Protonix      Last Written Prescription Date:  2/23/23  Last Fill Quantity: 90,   # refills: 3  Last Office Visit: 1/19/24  Future Office visit:    Next 5 appointments (look out 90 days)      Jan 25, 2024  2:00 PM  (Arrive by 1:45 PM)  Return Visit with Sierra Velásquez DPM  [facility] ([facility] ) [address] [place] [id number] [phone]     Jan 31, 2024  2:45 PM  (Arrive by 2:30 PM)  SHORT with Rock Basilio MD  [facility] ([facility] ) [address] [place] [id number]  [phone]             Routing refill request to provider for review/approval because:

## 2024-01-25 ENCOUNTER — OFFICE VISIT (OUTPATIENT)
Dept: PODIATRY | Facility: OTHER | Age: 69
End: 2024-01-25
Attending: PODIATRIST
Payer: COMMERCIAL

## 2024-01-25 VITALS
TEMPERATURE: 97.9 F | HEART RATE: 51 BPM | SYSTOLIC BLOOD PRESSURE: 128 MMHG | DIASTOLIC BLOOD PRESSURE: 76 MMHG | OXYGEN SATURATION: 96 %

## 2024-01-25 DIAGNOSIS — E11.9 DIABETES MELLITUS TYPE 2, NONINSULIN DEPENDENT (H): ICD-10-CM

## 2024-01-25 DIAGNOSIS — L60.3 ONYCHODYSTROPHY: Primary | ICD-10-CM

## 2024-01-25 DIAGNOSIS — Z13.89 SCREENING FOR DIABETIC PERIPHERAL NEUROPATHY: ICD-10-CM

## 2024-01-25 DIAGNOSIS — E11.42 DIABETIC POLYNEUROPATHY ASSOCIATED WITH TYPE 2 DIABETES MELLITUS (H): ICD-10-CM

## 2024-01-25 DIAGNOSIS — I73.9 PERIPHERAL ARTERIAL DISEASE (H): ICD-10-CM

## 2024-01-25 PROCEDURE — 99212 OFFICE O/P EST SF 10 MIN: CPT | Mod: 25 | Performed by: PODIATRIST

## 2024-01-25 PROCEDURE — G0463 HOSPITAL OUTPT CLINIC VISIT: HCPCS

## 2024-01-25 PROCEDURE — 99207 PR FOOT EXAM NO CHARGE: CPT | Performed by: PODIATRIST

## 2024-01-25 PROCEDURE — G0463 HOSPITAL OUTPT CLINIC VISIT: HCPCS | Mod: 25

## 2024-01-25 PROCEDURE — 11721 DEBRIDE NAIL 6 OR MORE: CPT | Performed by: PODIATRIST

## 2024-01-25 ASSESSMENT — PAIN SCALES - GENERAL: PAINLEVEL: NO PAIN (0)

## 2024-01-25 NOTE — PROGRESS NOTES
Chief complaint: Patient presents with:  Toenail: DFE      History of Present Illness: This 68 year old NIDDM II male is seen for follow-up management of elongated toenails. The toenails are difficult to reach and the nails are too thick for him to trim with regular nail clippers. He usually has bilateral hallux ingrown toenails, but they have not been recently causing him pain.    He has tingling in the toes although it has improved since his last visit.    He broke his LEFT hip on 07/01/2023 and he had an ORIF. He says he is still doing well.    Patients says he has not been applying lotion to his feet.       Lab Results   Component Value Date    A1C 7.1 11/28/2023    A1C 6.5 05/04/2023    A1C 7.9 01/09/2023    A1C 7.5 08/29/2022    A1C 7.3 02/17/2022    A1C 6.5 02/15/2021       No further pedal complaints today.     Patient quit smoking around 2011.        /76 (BP Location: Left arm, Patient Position: Sitting, Cuff Size: Adult Regular)   Pulse 51   Temp 97.9  F (36.6  C) (Tympanic)   SpO2 96%      Patient Active Problem List   Diagnosis    Abdominal aortic aneurysm (H24)    Erectile dysfunction    ACP (advance care planning)    Routine general medical examination at a health care facility    Essential hypertension    Mixed hyperlipidemia    Moderate major depression (H)    Morbid obesity (H)    Elevated prostate specific antigen (PSA)    Prediabetes    Diabetes mellitus, type 2 (H)    S/P AAA repair    Tobacco dependence in remission    Mitral valve prolapse    Status post coronary angiogram    Coronary artery disease involving native heart, unspecified vessel or lesion type, unspecified whether angina present    S/P CABG (coronary artery bypass graft)    S/P mitral valve repair    Postoperative atrial fibrillation (H)    Peripheral arterial disease (H24)    Fall from standing, initial encounter       Past Surgical History:   Procedure Laterality Date    AAA REPAIR  06/17/2020    [facility]/  intravascular repair    APPENDECTOMY      BYPASS GRAFT ARTERY CORONARY, REPAIR VALVE MITRAL, COMBINED N/A 1/18/2023    Procedure: MEDIAN STERNOTOMY, ON PUMP OXGENATION, Left endovascular saphaneous vein harvest, CORONARY ARTERY BYPASS GRAFT (CABG x 2), Mitral Valve Repair, transesophageal echocardiogram (TRENT) performed by anesthesia;  Surgeon: Linda iKm MD;  Location: [facility] OR    COLONOSCOPY  2013    CV CORONARY ANGIOGRAM N/A 12/9/2022    Procedure: Coronary Angiogram with possible intervention;  Surgeon: Porfirio Herrera MD;  Location:  [facility]    CV RIGHT HEART CATH MEASUREMENTS RECORDED N/A 12/9/2022    Procedure: Right Heart Cath;  Surgeon: Porfirio Herrera MD;  Location:  [facility]    ESOPHAGOGASTRODUODENOSCOPY  2013    INCISION AND DRAINAGE CHEST WASHOUT, COMBINED N/A 1/19/2023    Procedure: chest washout, Re-do sternotomy, repair of vein graft;  Surgeon: Linda Kim MD;  Location: [facility] OR       Current Outpatient Medications   Medication    ACCU-CHEK GUIDE test strip    ARIPiprazole (ABILIFY) 5 MG tablet    aspirin (ASA) 81 MG chewable tablet    blood glucose monitoring (NO BRAND SPECIFIED) meter device kit    blood glucose monitoring (SOFTCLIX) lancets    buPROPion (WELLBUTRIN XL) 300 MG 24 hr tablet    gentamicin (GARAMYCIN) 0.3 % ophthalmic solution    loratadine (CLARITIN) 10 MG tablet    metoprolol succinate ER (TOPROL XL) 25 MG 24 hr tablet    multivitamin, therapeutic (THERA-VIT) TABS tablet    oxyBUTYnin ER (DITROPAN XL) 15 MG 24 hr tablet    pantoprazole (PROTONIX) 40 MG EC tablet    pioglitazone (ACTOS) 15 MG tablet    vitamin C (ASCORBIC ACID) 1000 MG TABS    Vitamin D3 (CHOLECALCIFEROL) 25 mcg (1000 units) tablet    atorvastatin (LIPITOR) 80 MG tablet     No current facility-administered medications for this visit.        No Known Allergies    Family History   Problem Relation Age of Onset    C.A.D. Father         CABG in late 40's     JULIETA. Brother         MI in 50's       Social History     Socioeconomic History    Marital status:      Spouse name: None    Number of children: None    Years of education: None    Highest education level: None   Occupational History    None   Tobacco Use    Smoking status: Former Smoker     Packs/day: 0.00     Types: Cigarettes    Smokeless tobacco: Never Used    Tobacco comment: Feb. 2014   Substance and Sexual Activity    Alcohol use: Yes     Comment: occasional    Drug use: Yes     Types: Marijuana    Sexual activity: Yes     Partners: Female   Other Topics Concern    Parent/sibling w/ CABG, MI or angioplasty before 65F 55M? Yes     Comment: dad had heart attack before 55. brother also had heart attack before 55.   Social History Narrative    .      Social Determinants of Health     Financial Resource Strain:     Difficulty of Paying Living Expenses:    Food Insecurity:     Worried About Running Out of Food in the Last Year:     Ran Out of Food in the Last Year:    Transportation Needs:     Lack of Transportation (Medical):     Lack of Transportation (Non-Medical):    Physical Activity:     Days of Exercise per Week:     Minutes of Exercise per Session:    Stress:     Feeling of Stress :    Social Connections:     Frequency of Communication with Friends and Family:     Frequency of Social Gatherings with Friends and Family:     Attends [employer] Services:     Active Member of Clubs or Organizations:     Attends Club or Organization Meetings:     Marital Status:    Intimate Partner Violence:     Fear of Current or Ex-Partner:     Emotionally Abused:     Physically Abused:     Sexually Abused:        ROS: 10 point ROS neg other than the symptoms noted above in the HPI.  EXAM  Constitutional: healthy, alert and no distress    Psychiatric: mentation appears normal and affect normal/bright    VASCULAR:  -Dorsalis pedis pulse +1/4 b/l  -Posterior tibial pulse +1/4 b/l  -Capillary refill time < 3  seconds to b/l hallux  -Hair growth Absent to b/l anterior legs and ankles  NEURO:  -Positive for paresthesias to bilateral foot  -Epicritic and protective sensation diminished to the bilateral foot  DERM:  -Skin temperature cool to bilateral foot  -Skin dry, flaking to bilateral plantar foot  -Toenails elongated, thickened, dystrophic and discolored x 10  ---Bilateral hallux toenails significantly dystrophic    MSK:  -Muscle strength of ankles +5/5 for dorsiflexion, plantarflexion, ABDUction and ADDuction b/l    ============================================================    ASSESSMENT:  (L60.3) Onychodystrophy  (primary encounter diagnosis)    (E11.9) Diabetes mellitus type 2, noninsulin dependent (H)    (E11.42) Diabetic polyneuropathy associated with type 2 diabetes mellitus (H)    (I73.9) Peripheral arterial disease (H)    (Z13.89) Screening for diabetic peripheral neuropathy        PLAN:  -Patient evaluated and examined. Treatment options discussed with no educational barriers noted.  -High risk toenail debridement x 10 toenails without incident    -Diabetic Foot Education provided. This included checking the feet daily looking for new new blisters or wounds, wearing shoes at all times when walking including around the house, and avoiding lotion application between the toes. If there are any signs of infection, the patient should present to the ED as soon as possible. Infections of the foot can be life threatening or lead to amputations of the foot or leg.     -Vascular appointment -- most recent appointment was 09/19/2023 at [facility].    ---Dr. Mcgrath's note in July, 2021:   Reviewed his DAVID. The right was 0.79, the left 0.59. This corresponds to his complaint of his symptoms.    Moderate disease bilaterally, worse on the left. Discussed that he has fairly long segment disease on the left. I would not recommend intervention unless he truly has lifestyle limiting/disabling claudication. Discussed that any  rest pain or tissue loss would lead to prompt evaluation for intervention. I discussed structured exercise and the significant benefit to be had from this in his pain free walking distance. However, given the distance that he has to walk, this may not be practical for him.     Diabetes Mellitus: Patient's DM is managed by their PCP. The DM appears to be stable. Patient's last HbA1C was 7.1% on 11/28/2023.    -Patient in agreement with the above treatment plan and all of patient's questions were answered.      RTC 3 months for diabetic foot exam and high risk nail debridement         Sierra Velásquez DPM

## 2024-01-26 ENCOUNTER — TELEPHONE (OUTPATIENT)
Dept: FAMILY MEDICINE | Facility: OTHER | Age: 69
End: 2024-01-26

## 2024-01-26 ENCOUNTER — OFFICE VISIT (OUTPATIENT)
Dept: FAMILY MEDICINE | Facility: OTHER | Age: 69
End: 2024-01-26
Attending: FAMILY MEDICINE
Payer: COMMERCIAL

## 2024-01-26 VITALS
HEART RATE: 56 BPM | OXYGEN SATURATION: 96 % | TEMPERATURE: 97.3 F | DIASTOLIC BLOOD PRESSURE: 70 MMHG | BODY MASS INDEX: 31.72 KG/M2 | SYSTOLIC BLOOD PRESSURE: 160 MMHG | WEIGHT: 234.2 LBS | HEIGHT: 72 IN

## 2024-01-26 DIAGNOSIS — R55 VASOVAGAL SYNCOPE: Primary | ICD-10-CM

## 2024-01-26 PROCEDURE — G0463 HOSPITAL OUTPT CLINIC VISIT: HCPCS

## 2024-01-26 PROCEDURE — 99213 OFFICE O/P EST LOW 20 MIN: CPT | Performed by: FAMILY MEDICINE

## 2024-01-26 ASSESSMENT — PAIN SCALES - GENERAL: PAINLEVEL: NO PAIN (0)

## 2024-01-26 NOTE — TELEPHONE ENCOUNTER
8:56 AM    Reason for Call: OVERBOOK    Patient is having the following symptoms: Patient would like to be seen for patient fell in bathroom last night, wife is calling wanting him to be seen because his blood pressure is high. 160/80. Wife states he his a heart patient. days.    The patient is requesting an appointment for Overbook with .    Was an appointment offered for this call? No  If yes : Appointment type              Date    Preferred method for responding to this message: Telephone Call  What is your phone number ?  [phone]    If we cannot reach you directly, may we leave a detailed response at the number you provided? Yes    Can this message wait until your PCP/provider returns, if unavailable today? YES, Provider is in today    Natalie Mcmahon

## 2024-01-26 NOTE — PROGRESS NOTES
Assessment & Plan     Vasovagal syncope  Seemed like more of this on how described what happened. This is the first time. Orthostatics ok . Discussed slow transitioning in positional changes. Sitting when voiding and not standing. No  change in BP meds. Follow-up next week.   Symptomatic treatment was discussed along when patient should call and/or come back into the clinic or go to ER/Urgent care. All questions answered.   No further moyer needed at this time           BMI  Estimated body mass index is 31.76 kg/m  as calculated from the following:    Height as of this encounter: 1.829 m (6').    Weight as of this encounter: 106.2 kg (234 lb 3.2 oz).           No follow-ups on file.    Subjective   Gilberto is a 68 year old, presenting for the following health issues:  Fall        1/26/2024     9:46 AM   Additional Questions   Roomed by Naomi HADLEY LPN   Accompanied by self     HPI     Concern - Fall  Onset: Last night  Description: Got up to go to the bathroom and collapsed  Intensity: moderate  Progression of Symptoms: Same  Accompanying Signs & Symptoms: loss of balance, high blood pressure: 167/98 last night and rechecked this morning and had same reading.   Previous history of similar problem: no  Precipitating factors:        Worsened by: movement  Alleviating factors:        Improved by: none  Therapies tried and outcome: None  Got out of bed - went to BR and had to go void - ended up on floor. Does not remember if felt dizzy etc   Does have some h/o CAD/ on BP meds/ some LH/dizzy with quick changes of position  Bp has been good. HR 50-60's with BB      Review of Systems  Constitutional, HEENT, cardiovascular, pulmonary, gi and gu systems are negative, except as otherwise noted.      Objective    BP (!) 160/70 (BP Location: Right arm, Patient Position: Sitting, Cuff Size: Adult Large)   Pulse 56   Temp 97.3  F (36.3  C) (Tympanic)   Ht 1.829 m (6')   Wt 106.2 kg (234 lb 3.2 oz)   SpO2 96%   BMI 31.76 kg/m     see orthostatics - normal; BP came down to goal   Body mass index is 31.76 kg/m .  Physical Exam   GENERAL: alert and no distress  NECK: no adenopathy, no asymmetry, masses, or scars  RESP: lungs clear to auscultation - no rales, rhonchi or wheezes  CV: regular rate and rhythm, normal S1 S2, no S3 or S4, no murmur, click or rub, no peripheral edema  MS: no gross musculoskeletal defects noted, no edema            Signed Electronically by: Rock Basilio MD

## 2024-01-31 ENCOUNTER — OFFICE VISIT (OUTPATIENT)
Dept: FAMILY MEDICINE | Facility: OTHER | Age: 69
End: 2024-01-31
Attending: FAMILY MEDICINE
Payer: COMMERCIAL

## 2024-01-31 VITALS
WEIGHT: 233.5 LBS | TEMPERATURE: 98 F | DIASTOLIC BLOOD PRESSURE: 60 MMHG | HEART RATE: 52 BPM | BODY MASS INDEX: 31.67 KG/M2 | OXYGEN SATURATION: 96 % | SYSTOLIC BLOOD PRESSURE: 90 MMHG

## 2024-01-31 DIAGNOSIS — I95.1 ORTHOSTATIC HYPOTENSION: ICD-10-CM

## 2024-01-31 DIAGNOSIS — R15.9 INCONTINENCE OF FECES WITH FECAL URGENCY: ICD-10-CM

## 2024-01-31 DIAGNOSIS — I10 ESSENTIAL HYPERTENSION: ICD-10-CM

## 2024-01-31 DIAGNOSIS — R15.2 INCONTINENCE OF FECES WITH FECAL URGENCY: ICD-10-CM

## 2024-01-31 DIAGNOSIS — E11.59 TYPE 2 DIABETES MELLITUS WITH OTHER CIRCULATORY COMPLICATION, WITHOUT LONG-TERM CURRENT USE OF INSULIN (H): Primary | ICD-10-CM

## 2024-01-31 DIAGNOSIS — N39.41 URGE INCONTINENCE OF URINE: ICD-10-CM

## 2024-01-31 DIAGNOSIS — R55 VASOVAGAL SYNCOPE: ICD-10-CM

## 2024-01-31 PROCEDURE — 99214 OFFICE O/P EST MOD 30 MIN: CPT | Performed by: FAMILY MEDICINE

## 2024-01-31 PROCEDURE — G0463 HOSPITAL OUTPT CLINIC VISIT: HCPCS

## 2024-01-31 RX ORDER — METOPROLOL SUCCINATE 25 MG/1
TABLET, EXTENDED RELEASE ORAL
COMMUNITY
Start: 2024-01-31 | End: 2024-10-07

## 2024-01-31 RX ORDER — BLOOD-GLUCOSE METER
EACH MISCELLANEOUS
COMMUNITY
Start: 2023-12-19

## 2024-01-31 ASSESSMENT — PAIN SCALES - GENERAL: PAINLEVEL: NO PAIN (0)

## 2024-01-31 NOTE — PROGRESS NOTES
{PROVIDER CHARTING PREFERENCE:848400}    Subjective   Gilberto is a 68 year old, presenting for the following health issues:  No chief complaint on file.  {(!) Visit Details have not yet been documented.  Please enter Visit Details and then use this list to pull in documentation. (Optional):878117}  HPI     Diabetes Follow-up    How often are you checking your blood sugar? { :892252}  What concerns do you have today about your diabetes? { :509997}   Do you have any of these symptoms? (Select all that apply)  { :929066}      BP Readings from Last 2 Encounters:   01/26/24 (!) 160/70   01/25/24 128/76     Hemoglobin A1C (%)   Date Value   11/28/2023 7.1 (H)   05/04/2023 6.5 (H)   02/15/2021 6.5 (H)     LDL Cholesterol Calculated (mg/dL)   Date Value   05/04/2023 49   08/29/2022 56   08/21/2020 68   10/02/2019 97       {Reference  Diabetes Management Resources  Blood Glucose Log - 3 weeks  Blood Glucose Log with Food and Insulin Record :237435}      Hypertension Follow-up    Do you check your blood pressure regularly outside of the clinic? { :459098}   Are you following a low salt diet? { :361850}  Are your blood pressures ever more than 140 on the top number (systolic) OR more   than 90 on the bottom number (diastolic), for example 140/90? { :095779}  How many servings of fruits and vegetables do you eat daily?  { :451795}  On average, how many sweetened beverages do you drink each day (Examples: soda, juice, sweet tea, etc.  Do NOT count diet or artificially sweetened beverages)?   { 1-11:147520}  How many days per week do you exercise enough to make your heart beat faster? { :058518}  How many minutes a day do you exercise enough to make your heart beat faster? { :234903}  How many days per week do you miss taking your medication? {0-7 :182159}  {additonal problems for provider to add (Optional):496074}    {ROS Picklists (Optional):473433}      Objective    There were no vitals taken for this visit.  There is no  height or weight on file to calculate BMI.  Physical Exam   {Exam List (Optional):114949}    {Diagnostic Test Results (Optional):837717}        Signed Electronically by: Rock Basilio MD  {Email feedback regarding this note to [email or web address]   :327502}

## 2024-01-31 NOTE — PROGRESS NOTES
Assessment & Plan     Type 2 diabetes mellitus with other circulatory complication, without long-term current use of insulin (H)  NOT checking BS - discussed and him and wife will check alternate times once daily . Follow-up in  a month    Essential hypertension  BP TOO low.  Discussed. We stopped Lisinopril and now wean off BB - toprol see if confusion and Knees buckling resolves. Check BP 3/ week and write down    Orthostatic hypotension  As aboe     Vasovagal syncope  Had no further episodes since seen last     Incontinence of feces with fecal urgency  Resolved when off Glucophage     Urge incontinence of urine  Improved but had now one bout of nocturnal incontinence with some confusion     Confusion - keep wean down on some meds. NO recent CT or MRI of head - consider if not improved off BB. Follow-up first week of March            BMI  Estimated body mass index is 31.67 kg/m  as calculated from the following:    Height as of 1/26/24: 1.829 m (6').    Weight as of this encounter: 105.9 kg (233 lb 8 oz).             No follow-ups on file.    Subjective   Gilberto is a 68 year old, presenting for the following health issues:  Diabetes and Hypertension        1/31/2024     3:03 PM   Additional Questions   Roomed by Cali Tejada   Accompanied by Wife- Marcella         1/31/2024     3:03 PM   Patient Reported Additional Medications   Patient reports taking the following new medications none     HPI     Diabetes Follow-up    How often are you checking your blood sugar? A few times a week  What time of day are you checking your blood sugars (select all that apply)?   Morning   Have you had any blood sugars above 200?  No  Have you had any blood sugars below 70?  No  What symptoms do you notice when your blood sugar is low?  None  What concerns do you have today about your diabetes? None   Do you have any of these symptoms? (Select all that apply)  No numbness or tingling in feet.  No redness, sores or blisters on feet.   "No complaints of excessive thirst.  No reports of blurry vision.  No significant changes to weight.      BP Readings from Last 2 Encounters:   01/31/24 90/60   01/26/24 (!) 160/70     Hemoglobin A1C (%)   Date Value   11/28/2023 7.1 (H)   05/04/2023 6.5 (H)   02/15/2021 6.5 (H)     LDL Cholesterol Calculated (mg/dL)   Date Value   05/04/2023 49   08/29/2022 56   08/21/2020 68   10/02/2019 97             Hypertension Follow-up    Do you check your blood pressure regularly outside of the clinic? Yes   Are you following a low salt diet? Yes  Are your blood pressures ever more than 140 on the top number (systolic) OR more   than 90 on the bottom number (diastolic), for example 140/90? Yes  BP running low  Having times weak in legs  Also small bouts of confusion     Concern - Confusion   Onset: A couple weeks  Description:   Wife states he will  one spot and just look around. Unsure of what he is doing. Wife states that one time he slept through \"wetting\" the bed. States he woke up and was unaware of it.   Intensity: moderate  Progression of Symptoms:  same  Accompanying Signs & Symptoms:  None  Previous history of similar problem:   None  Precipitating factors:   Worsened by: None  Alleviating factors:  Improved by: None    Therapies Tried and outcome: None    Concern - Knees Buckling up   Onset: 2-3 weeks   Description:   Both knees will buckle up when standing still. States that his legs have been wobbly too.   Intensity: moderate  Progression of Symptoms:  same  Accompanying Signs & Symptoms:  none  Previous history of similar problem:   None  Precipitating factors:   Worsened by: Standing still   Alleviating factors:  Improved by: none    Therapies Tried and outcome: Getting up slower               Review of Systems  Constitutional, HEENT, cardiovascular, pulmonary, gi and gu systems are negative, except as otherwise noted.      Objective    BP 90/60 (BP Location: Right arm, Patient Position: Sitting, Cuff " Size: Adult Large)   Pulse 52   Temp 98  F (36.7  C) (Tympanic)   Wt 105.9 kg (233 lb 8 oz)   SpO2 96%   BMI 31.67 kg/m    Body mass index is 31.67 kg/m .  Physical Exam   GENERAL: alert and no distress  RESP: lungs clear to auscultation - no rales, rhonchi or wheezes  CV: regular rate and rhythm, normal S1 S2, no S3 or S4, no murmur, click or rub, no peripheral edema  MS: no gross musculoskeletal defects noted, no edema  PSYCH: mentation appears normal, affect normal/bright            Signed Electronically by: Rock Basilio MD

## 2024-02-01 DIAGNOSIS — R06.83 SNORING: ICD-10-CM

## 2024-02-01 DIAGNOSIS — R06.81 APNEA: ICD-10-CM

## 2024-02-01 DIAGNOSIS — E66.09 EXOGENOUS OBESITY: ICD-10-CM

## 2024-02-01 DIAGNOSIS — I50.20 SYSTOLIC HEART FAILURE, UNSPECIFIED HF CHRONICITY (H): Primary | ICD-10-CM

## 2024-02-01 DIAGNOSIS — R53.82 CHRONIC FATIGUE: ICD-10-CM

## 2024-02-02 DIAGNOSIS — Z95.2 S/P MVR (MITRAL VALVE REPLACEMENT): ICD-10-CM

## 2024-02-02 DIAGNOSIS — Z95.1 S/P CABG (CORONARY ARTERY BYPASS GRAFT): ICD-10-CM

## 2024-02-02 RX ORDER — BUPROPION HYDROCHLORIDE 300 MG/1
300 TABLET ORAL EVERY MORNING
Qty: 90 TABLET | Refills: 1 | Status: SHIPPED | OUTPATIENT
Start: 2024-02-02 | End: 2024-05-06

## 2024-02-02 NOTE — TELEPHONE ENCOUNTER
Wellbutrin      Last Written Prescription Date:  1/31/23  Last Fill Quantity: 30,   # refills: 3  Last Office Visit: 1/31/24  Future Office visit:    Next 5 appointments (look out 90 days)      Mar 15, 2024  9:45 AM  (Arrive by 9:30 AM)  SHORT with Rock Basilio MD  [facility] ([facility] ) [address] [place] [id number]  [phone]     Apr 25, 2024  1:30 PM  (Arrive by 1:15 PM)  Return Visit with Sierra Velásquez DPM  [facility] ([facility] ) [address] [place] [phone]             Routing refill request to provider for review/approval because:

## 2024-02-07 ENCOUNTER — MYC MEDICAL ADVICE (OUTPATIENT)
Dept: PULMONOLOGY | Facility: OTHER | Age: 69
End: 2024-02-07

## 2024-02-09 ENCOUNTER — THERAPY VISIT (OUTPATIENT)
Dept: SLEEP MEDICINE | Facility: HOSPITAL | Age: 69
End: 2024-02-09
Attending: FAMILY MEDICINE
Payer: COMMERCIAL

## 2024-02-09 DIAGNOSIS — I50.20 SYSTOLIC HEART FAILURE, UNSPECIFIED HF CHRONICITY (H): ICD-10-CM

## 2024-02-09 DIAGNOSIS — E66.09 EXOGENOUS OBESITY: ICD-10-CM

## 2024-02-09 DIAGNOSIS — R06.83 SNORING: ICD-10-CM

## 2024-02-09 DIAGNOSIS — R53.82 CHRONIC FATIGUE: ICD-10-CM

## 2024-02-09 DIAGNOSIS — R06.81 APNEA: ICD-10-CM

## 2024-02-09 PROCEDURE — 95810 POLYSOM 6/> YRS 4/> PARAM: CPT

## 2024-02-09 PROCEDURE — 95810 POLYSOM 6/> YRS 4/> PARAM: CPT | Mod: 26 | Performed by: FAMILY MEDICINE

## 2024-02-10 NOTE — PROGRESS NOTES
Diagnostic sleep testing observed all stages of sleep at a 68% efficiency. EMANUEL OSH RERAs and CSA showed an AHI of 23. Limb movements further elevated the RDI to 38. Light snoring was noted. O2 dropped to the mid 80s with events.

## 2024-02-14 ASSESSMENT — SLEEP AND FATIGUE QUESTIONNAIRES
HOW LIKELY ARE YOU TO NOD OFF OR FALL ASLEEP WHEN YOU ARE A PASSENGER IN A CAR FOR AN HOUR WITHOUT A BREAK: MODERATE CHANCE OF DOZING
HOW LIKELY ARE YOU TO NOD OFF OR FALL ASLEEP WHILE WATCHING TV: MODERATE CHANCE OF DOZING
HOW LIKELY ARE YOU TO NOD OFF OR FALL ASLEEP WHILE LYING DOWN TO REST IN THE AFTERNOON WHEN CIRCUMSTANCES PERMIT: HIGH CHANCE OF DOZING
HOW LIKELY ARE YOU TO NOD OFF OR FALL ASLEEP WHILE SITTING INACTIVE IN A PUBLIC PLACE: HIGH CHANCE OF DOZING
HOW LIKELY ARE YOU TO NOD OFF OR FALL ASLEEP WHILE SITTING QUIETLY AFTER LUNCH WITHOUT ALCOHOL: MODERATE CHANCE OF DOZING
HOW LIKELY ARE YOU TO NOD OFF OR FALL ASLEEP IN A CAR, WHILE STOPPED FOR A FEW MINUTES IN TRAFFIC: WOULD NEVER DOZE
HOW LIKELY ARE YOU TO NOD OFF OR FALL ASLEEP WHILE SITTING AND READING: SLIGHT CHANCE OF DOZING
HOW LIKELY ARE YOU TO NOD OFF OR FALL ASLEEP WHILE SITTING AND TALKING TO SOMEONE: SLIGHT CHANCE OF DOZING
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
## 2024-02-16 NOTE — PROCEDURES
[facility]   SLEEP STUDY INTERPRETATION  DIAGNOSTIC POLYSOMNOGRAPHY REPORT      Patient: FELICIANO LITTLE  YOB: 1955  Study Date: 2/9/2024  MRN: [id number]  Referring Provider: WAYLON Basilio MD  Ordering Provider: JULIA Murphy MD    Indications for Polysomnography: The patient is a 68 year old Male who is 6' and weighs 233.0 lbs. His BMI is 31.9, [place] sleepiness scale - and neck circumference is - cm. Relevant medical history includes obesity, HTN, DM II, CAD s/p CABG, PAD, MDD. A diagnostic polysomnogram was performed to evaluate for sleep apnea.    Polysomnogram Data: A full night polysomnogram recorded the standard physiologic parameters including EEG, EOG, EMG, ECG, nasal and oral airflow. Respiratory parameters of chest and abdominal movements were recorded with respiratory inductance plethysmography. Oxygen saturation was recorded by pulse oximetry. Hypopnea scoring rule used: 1B 4%.    Sleep Architecture: Mildly delayed sleep onset latency, supine REM observed, increased arousal index.  The total recording time of the polysomnogram was 505.4 minutes. The total sleep time was 346.5 minutes. Sleep latency was mildly delayed at 28.1 minutes without the use of a sleep aid. REM latency was 84.5 minutes. Arousal index was increased at 45.0 arousals per hour. Sleep efficiency was decreased at 68.6%. Wake after sleep onset was 130.5 minutes. The patient spent 24.0% of total sleep time in Stage N1, 34.6% in Stage N2, 15.3% in Stage N3, and 26.1% in REM. Time in REM supine was 14.0 minutes.    Respiration: Moderate complex sleep apnea (AHI 23.2 with obstructive AHI 14.5 and central AHI 8.7) with mild sleep-associated hypoxemia (SpO2 <= 88% for 6.3 minutes).  Central apneas did not appear to be post-arousal.  Events ? The polysomnogram revealed a presence of 41 obstructive, 50 central, and 6 mixed apneas resulting in an apnea index of 16.8 events per hour. There were 37 obstructive hypopneas and - central  hypopneas resulting in an obstructive hypopnea index of 6.4 and central hypopnea index of - events per hour. The combined apnea/hypopnea index was 23.2 events per hour (central apnea/hypopnea index was 8.7 events per hour). The REM AHI was 19.9 events per hour. The supine AHI was 49.4 events per hour. The RERA index was 15.2 events per hour.  The RDI was 38.4 events per hour.  Snoring - was reported as light.  Respiratory rate and pattern - was notable for normal respiratory rate and pattern.  Sustained Sleep Associated Hypoventilation - Transcutaneous carbon dioxide monitoring was not used, however significant hypoventilation was not suggested by oximetry.  Sleep Associated Hypoxemia - (Greater than 5 minutes O2 sat at or below 88%) was not present. Baseline oxygen saturation was 92.8%. Lowest oxygen saturation was 85.0%. Time spent less than or equal to 88% was 6.3 minutes. Time spent less than or equal to 89% was 9.9 minutes.    Movement Activity: Somewhat frequent PLM's (index 12.1)  Periodic Limb Activity - There were 70 PLMs during the entire study. The PLM index was 12.1 movements per hour. The PLM Arousal Index was 1.6 per hour.  REM EMG Activity - Excessive transient/sustained muscle activity was not present.  Nocturnal Behavior - Abnormal sleep related behaviors were not noted during/arising out of NREM / REM sleep.   Bruxism - None apparent.    Cardiac Summary: Appears NSR  The average pulse rate was 52.6 bpm. The minimum pulse rate was 44.0 bpm while the maximum pulse rate was 96.0 bpm.      Assessment:   Moderate complex sleep apnea (AHI 23.2 with obstructive AHI 14.5 and central AHI 8.7) with mild sleep-associated hypoxemia (SpO2 <= 88% for 6.3 minutes).  Central apneas did not appear to be post-arousal.  Somewhat frequent PLM's (index 12.1)        Recommendations:  Recommend repeat polysomnography with full night titration of positive airway pressure therapy for the control of sleep disordered  breathing given potential for complex sleep apnea and indication for ASV.  Echo on 1/11/2024 with LVEF 60-65%.  Advice regarding the risks of drowsy driving.  Suggest optimizing sleep schedule and avoiding sleep deprivation.  Weight management (if BMI > 30).  Pharmacologic therapy should be used for management of restless legs syndrome only if present and clinically indicated and not based on the presence of periodic limb movements alone.    Diagnostic Codes:   Obstructive Sleep Apnea G47.33  Sleep Hypoxemia/Hypoventilation G47.36   Primary Central Sleep Apnea G47.31     _____________________________________   Electronically Signed By: Vargas Murphy MD (2/16/2024)

## 2024-02-25 NOTE — PROGRESS NOTES
Gilberto Camilo is a 68 year old male who is being evaluated via in-person clinic visit.       Visit Details     In-clinic visit for review of sleep testing results.     Assessment / Plan:    1.)  Moderate complex sleep apnea (AHI 23.2 with obstructive AHI 14.5 and central AHI 8.7) with mild sleep-associated hypoxemia (SpO2 <= 88% for 6.3 minutes).    - Central apneas did not appear to be post-arousal.  - Somewhat frequent PLM's (index 12.1)     If the mild complex sleep apnea is discounted, the obstructive component does appear to be mild and the hypoxemia was quite mild to borderline.    He does not feel there is any daytime symptoms or sleep concerns from his perspective.    Given that the obstructive sleep apnea portion is mild, I do not suspect that there is a significant cardiovascular risk in his case.  Though I do acknowledge that he does have a history of coronary disease status post CABG.  It is reassuring that his heart function has normalized since his mitral valve replacement.    For now, we agreed to monitor and goal of maintaining weight or weight loss for management of the obstructive sleep apnea component.    If there is any new concerns regarding heart function or worsening daytime fatigue, then I would recommend proceeding to an all-night titration PSG with potential indication for ASV.    SUBJECTIVE:  Gilberto Camilo is a 68 year old male.    Pertinent PMHx of obesity, HTN, DM II, CAD s/p CABG, PAD, MDD.    Most recent echocardiogram on January 11, 2024 with left ventricular ejection fraction 60-65%.    Today -we reviewed his sleep testing results in detail including the discussion of obstructive sleep apnea and central sleep apnea.  He largely denies any concerns with his sleep, in general he denies daytime fatigue or sleepiness, and in general feels he sleeps well.  His wife was concerned that she did observe him snoring though this has improved, along with some observed breathing  pauses.    Please briefly describe the main reason(s) for your sleep visit Daily fatigue, napping, apnea   Approximately when did this problem start 2 yrs   What are your goals for this visit See the reason for fatigue and apnea.   Time in Bed - Work Or School Days   Do you work or go to school No   What time do you usually get into bed 9:30   About how long does it take you to fall asleep 2-5 minutes   How often do you have trouble falling asleep Never   How often do you wake up during the night 1-3 times   What wakes you up at night Use the bathroom   How often do you have trouble falling back to sleep 14   About how long does it take to fall back to sleep 15 minutes   What do you usually do if you have trouble getting back to sleep Get a drink of water   What time do you usually get out of bed to start your day 8:00   Do you use an alarm No   Time in Bed - Weekends/Non-work Days/All Other Days   What time do you usually get into bed 9:30   About how long does it take you to fall asleep 5 minutes   What time do you usually get out of bed to start your day 8:30   Do you use an alarm No   Sleep Need   On average, about how much sleep do you think you get 10-11 hours   About how much sleep do you think you need 8 hours   Sleep Position   Which sleep positions do you prefer Back;Side   How often do you take a nap on purpose 7 days per week   About how long are your naps 2 hours   Do you feel better after naps Yes   How often do you doze off unintentionally A couple times   Have you ever had a driving accident or near-miss due to sleepiness/drowsiness Yes   Sleep Disruptions - Breathing/Snoring   Do you snore Yes   Do other people complain about your snoring Yes   Have you been told you stop breathing in your sleep Yes   Do you have issues with any of the following Morning mouth dryness;Stuffy nose when you wake up;Getting up to urinate more than once   Sleep Disruptions - Movement   Do you get pain, discomfort, with  an urge to move No   Have you been told you kick your legs at night No   Sleep Disruptions - Behaviours in Sleep   Have you ever experienced any of the following during your sleep Sleep-talking   Do you ever experience sudden muscle weakness during the day Yes   4) Is there anything else you would like your sleep provider to know He does a lot of hand fidgeting while sleeping.   Caffeine, Alcohol and Other Substances   How many caffeinated beverages (coffee, tea, soda, energy drinks) per day 4   What time of day is your last caffeine use Up until bedtime   List any prescribed or over the counter stimulants that you take None   List any prescribed or over the counter sleep medication you take See chart   Do you drink alcohol to help you sleep No   Do you drink alcohol near bedtime No   Family History   Has any family member been diagnosed with a sleep disorder No       SLEEP STUDY INTERPRETATION  DIAGNOSTIC POLYSOMNOGRAPHY REPORT        Patient: FELICIANO LITTLE  YOB: 1955  Study Date: 2/9/2024  MRN: [id number]  Referring Provider: WAYLON Basilio MD  Ordering Provider: JULIA Murphy MD     Indications for Polysomnography: The patient is a 68 year old Male who is 6' and weighs 233.0 lbs. His BMI is 31.9, [place] sleepiness scale - and neck circumference is - cm. Relevant medical history includes obesity, HTN, DM II, CAD s/p CABG, PAD, MDD. A diagnostic polysomnogram was performed to evaluate for sleep apnea.     Polysomnogram Data: A full night polysomnogram recorded the standard physiologic parameters including EEG, EOG, EMG, ECG, nasal and oral airflow. Respiratory parameters of chest and abdominal movements were recorded with respiratory inductance plethysmography. Oxygen saturation was recorded by pulse oximetry. Hypopnea scoring rule used: 1B 4%.     Sleep Architecture: Mildly delayed sleep onset latency, supine REM observed, increased arousal index.  The total recording time of the polysomnogram was 505.4  minutes. The total sleep time was 346.5 minutes. Sleep latency was mildly delayed at 28.1 minutes without the use of a sleep aid. REM latency was 84.5 minutes. Arousal index was increased at 45.0 arousals per hour. Sleep efficiency was decreased at 68.6%. Wake after sleep onset was 130.5 minutes. The patient spent 24.0% of total sleep time in Stage N1, 34.6% in Stage N2, 15.3% in Stage N3, and 26.1% in REM. Time in REM supine was 14.0 minutes.     Respiration: Moderate complex sleep apnea (AHI 23.2 with obstructive AHI 14.5 and central AHI 8.7) with mild sleep-associated hypoxemia (SpO2 <= 88% for 6.3 minutes).  Central apneas did not appear to be post-arousal.  Events ? The polysomnogram revealed a presence of 41 obstructive, 50 central, and 6 mixed apneas resulting in an apnea index of 16.8 events per hour. There were 37 obstructive hypopneas and - central hypopneas resulting in an obstructive hypopnea index of 6.4 and central hypopnea index of - events per hour. The combined apnea/hypopnea index was 23.2 events per hour (central apnea/hypopnea index was 8.7 events per hour). The REM AHI was 19.9 events per hour. The supine AHI was 49.4 events per hour. The RERA index was 15.2 events per hour.  The RDI was 38.4 events per hour.  Snoring - was reported as light.  Respiratory rate and pattern - was notable for normal respiratory rate and pattern.  Sustained Sleep Associated Hypoventilation - Transcutaneous carbon dioxide monitoring was not used, however significant hypoventilation was not suggested by oximetry.  Sleep Associated Hypoxemia - (Greater than 5 minutes O2 sat at or below 88%) was not present. Baseline oxygen saturation was 92.8%. Lowest oxygen saturation was 85.0%. Time spent less than or equal to 88% was 6.3 minutes. Time spent less than or equal to 89% was 9.9 minutes.     Movement Activity: Somewhat frequent PLM's (index 12.1)  Periodic Limb Activity - There were 70 PLMs during the entire study. The  PLM index was 12.1 movements per hour. The PLM Arousal Index was 1.6 per hour.  REM EMG Activity - Excessive transient/sustained muscle activity was not present.  Nocturnal Behavior - Abnormal sleep related behaviors were not noted during/arising out of NREM / REM sleep.   Bruxism - None apparent.     Cardiac Summary: Appears NSR  The average pulse rate was 52.6 bpm. The minimum pulse rate was 44.0 bpm while the maximum pulse rate was 96.0 bpm.       Assessment:   Moderate complex sleep apnea (AHI 23.2 with obstructive AHI 14.5 and central AHI 8.7) with mild sleep-associated hypoxemia (SpO2 <= 88% for 6.3 minutes).  Central apneas did not appear to be post-arousal.  Somewhat frequent PLM's (index 12.1)     Recommendations:  Recommend repeat polysomnography with full night titration of positive airway pressure therapy for the control of sleep disordered breathing given potential for complex sleep apnea and indication for ASV.  Echo on 1/11/2024 with LVEF 60-65%.  Advice regarding the risks of drowsy driving.  Suggest optimizing sleep schedule and avoiding sleep deprivation.  Weight management (if BMI > 30).  Pharmacologic therapy should be used for management of restless legs syndrome only if present and clinically indicated and not based on the presence of periodic limb movements alone.     Diagnostic Codes:   Obstructive Sleep Apnea G47.33  Sleep Hypoxemia/Hypoventilation G47.36   Primary Central Sleep Apnea G47.31     _____________________________________   Electronically Signed By: Vargas Murphy MD (2/16/2024)         Past medical history:    Patient Active Problem List    Diagnosis Date Noted    Fall from standing, initial encounter 06/30/2023     Priority: Medium    S/P CABG (coronary artery bypass graft) 02/13/2023     Priority: Medium    S/P mitral valve repair 02/13/2023     Priority: Medium    Postoperative atrial fibrillation (H) 02/13/2023     Priority: Medium    Peripheral arterial disease (H24)  02/13/2023     Priority: Medium    Coronary artery disease involving native heart, unspecified vessel or lesion type, unspecified whether angina present 01/18/2023     Priority: Medium    Status post coronary angiogram 12/09/2022     Priority: Medium    Mitral valve prolapse 09/30/2022     Priority: Medium    S/P AAA repair 08/29/2022     Priority: Medium    Tobacco dependence in remission 08/29/2022     Priority: Medium    Elevated prostate specific antigen (PSA) 02/15/2021     Priority: Medium    Prediabetes 02/15/2021     Priority: Medium    Diabetes mellitus, type 2 (H) 02/15/2021     Priority: Medium    Morbid obesity (H) 08/21/2020     Priority: Medium    Moderate major depression (H) 06/19/2020     Priority: Medium    Mixed hyperlipidemia 06/26/2018     Priority: Medium    Essential hypertension 10/11/2017     Priority: Medium    ACP (advance care planning) 05/11/2016     Priority: Medium     Advance Care Planning 5/11/2016: ACP Review of Chart / Resources Provided:  Reviewed chart for advance care plan.  Gilberto Camilo has no plan or code status on file. Discussed available resources and provided with information. Confirmed code status reflects current choices pending further ACP discussions.  Confirmed/documented legally designated decision makers.  Added by Lorenza Jordan            Routine general medical examination at a health care facility 05/11/2016     Priority: Medium    Erectile dysfunction 05/18/2015     Priority: Medium    Abdominal aortic aneurysm (H24) 03/18/2014     Priority: Medium     Problem list name updated by automated process. Provider to review         10 point ROS of systems including Constitutional, Eyes, Respiratory, Cardiovascular, Gastroenterology, Genitourinary, Integumentary, Muscularskeletal, Psychiatric were all negative except for pertinent positives noted in my HPI.    Current Outpatient Medications   Medication Sig Dispense Refill    ACCU-CHEK GUIDE test strip Use to test  blood sugar 1 daily. 100 strip 1    ARIPiprazole (ABILIFY) 5 MG tablet Take 1 tablet (5 mg) by mouth every morning 90 tablet 3    aspirin (ASA) 81 MG chewable tablet Take 162 mg by mouth      atorvastatin (LIPITOR) 80 MG tablet Take 1 tablet (80 mg) by mouth every morning for 90 days 90 tablet 3    blood glucose monitoring (NO BRAND SPECIFIED) meter device kit Use to test blood sugar 1 times daily or as directed. 1 kit 0    blood glucose monitoring (SOFTCLIX) lancets Use to test blood sugar 1 times daily. 100 each 1    Blood Glucose Monitoring Suppl (ACCU-CHEK GUIDE) w/Device KIT USE TO TEST BLOOD GLUCOSE ONCE DAILY OR AS DIRECTED.      buPROPion (WELLBUTRIN XL) 300 MG 24 hr tablet TAKE 1 TABLET(300 MG) BY MOUTH EVERY MORNING 90 tablet 1    gentamicin (GARAMYCIN) 0.3 % ophthalmic solution Place 1-2 drops into both eyes every 4 hours 5 mL 0    loratadine (CLARITIN) 10 MG tablet Take 10 mg by mouth every morning      metoprolol succinate ER (TOPROL XL) 25 MG 24 hr tablet 1/2 tablet for 1 week then every other day for 3 doses then off .      multivitamin, therapeutic (THERA-VIT) TABS tablet 1 tablet by Oral or Feeding Tube route every morning 30 tablet 3    oxyBUTYnin ER (DITROPAN XL) 15 MG 24 hr tablet Take 1 tablet (15 mg) by mouth daily 90 tablet 1    pantoprazole (PROTONIX) 40 MG EC tablet TAKE 1 TABLET(40 MG) BY MOUTH DAILY 90 tablet 3    pioglitazone (ACTOS) 15 MG tablet Take 1 tablet (15 mg) by mouth daily 90 tablet 1    vitamin C (ASCORBIC ACID) 1000 MG TABS Take 1,000 mg by mouth every morning      Vitamin D3 (CHOLECALCIFEROL) 25 mcg (1000 units) tablet          OBJECTIVE:  There were no vitals taken for this visit.    Physical Exam     ---  This note was written with the assistance of the Dragon voice-dictation technology software. The final document, although reviewed, may contain errors. For corrections, please contact the office.    Total time spent preparing to see the patient, review of chart, obtaining  history and physical examination, review of sleep testing, review of treatment options, education, discussion with patient and documenting in Epic / EMR was 25 minutes.  All time involved was spent on the day of service for the patient (the same day as the patient's appointment).    Vargas Murphy MD    Sleep Medicine  [place], [place]  Main Office: [phone]  [facility] [address], [address]  Schedule visits: [phone]  Main Office: [phone]  Fax: [phone]

## 2024-02-26 ENCOUNTER — OFFICE VISIT (OUTPATIENT)
Dept: PULMONOLOGY | Facility: OTHER | Age: 69
End: 2024-02-26
Attending: FAMILY MEDICINE
Payer: COMMERCIAL

## 2024-02-26 DIAGNOSIS — G47.39 COMPLEX SLEEP APNEA SYNDROME: Primary | ICD-10-CM

## 2024-02-26 PROCEDURE — G0463 HOSPITAL OUTPT CLINIC VISIT: HCPCS | Performed by: FAMILY MEDICINE

## 2024-02-26 PROCEDURE — 99213 OFFICE O/P EST LOW 20 MIN: CPT | Performed by: FAMILY MEDICINE

## 2024-02-26 NOTE — PROGRESS NOTES
Sleep Medicine Clinic Rooming Note    Patient was identified appropriately by name and date of birth.    Medication Reconciliation: complete    Chief complaint: Sleep test results.       Height: 6' ft/inches  Weight: 233 lbs  SpO2: 95  HR: 74  BP: 102/58  Neck circumference: 19.5 inches     [place] Sleepiness Scale    How likely are you to doze off or fall asleep in the following situations, in contrast to just feeling tired?    This refers to your usual way of life recently.    Even if you haven't done some of these things recently, try to figure out how they would have affected you.    Use the following scale to choose the most appropriate number for each situation:  0 = NO CHANCE of dozing  1 = SLIGHT CHANCE of dozing  2 = MODERATE CHANCE of dozing  3 = HIGH CHANCE of dozing    Sitting and Readin  Watching television: 0  Sitting inactive in a public place:1  Riding in car:2  Laying down to rest in the afternoon:3  Sitting and talking to someone:0  Sitting quietly after lunch without alcohol:2  In a car, stopped in traffic for a few minutes:0    Score: 10    DME needs: ***    Additional Notes: ***

## 2024-03-11 DIAGNOSIS — N39.41 URGE INCONTINENCE OF URINE: ICD-10-CM

## 2024-03-11 NOTE — TELEPHONE ENCOUNTER
Oxybutynin ER (Ditropan XL) 15 MG 24 hr tablet    Last Written Prescription Date:  09/12/2023  Last Fill Quantity: 90,   # refills: 1  Last Office Visit: 01/31/2024

## 2024-03-12 RX ORDER — OXYBUTYNIN CHLORIDE 15 MG/1
15 TABLET, EXTENDED RELEASE ORAL DAILY
Qty: 90 TABLET | Refills: 2 | Status: SHIPPED | OUTPATIENT
Start: 2024-03-12 | End: 2024-10-07

## 2024-03-15 ENCOUNTER — OFFICE VISIT (OUTPATIENT)
Dept: FAMILY MEDICINE | Facility: OTHER | Age: 69
End: 2024-03-15
Attending: FAMILY MEDICINE
Payer: COMMERCIAL

## 2024-03-15 ENCOUNTER — APPOINTMENT (OUTPATIENT)
Dept: LAB | Facility: OTHER | Age: 69
End: 2024-03-15
Attending: FAMILY MEDICINE
Payer: COMMERCIAL

## 2024-03-15 VITALS
WEIGHT: 231.2 LBS | OXYGEN SATURATION: 94 % | SYSTOLIC BLOOD PRESSURE: 120 MMHG | TEMPERATURE: 97.6 F | HEART RATE: 68 BPM | BODY MASS INDEX: 31.31 KG/M2 | HEIGHT: 72 IN | DIASTOLIC BLOOD PRESSURE: 70 MMHG

## 2024-03-15 DIAGNOSIS — I10 ESSENTIAL HYPERTENSION: ICD-10-CM

## 2024-03-15 DIAGNOSIS — Z12.11 SPECIAL SCREENING FOR MALIGNANT NEOPLASMS, COLON: ICD-10-CM

## 2024-03-15 DIAGNOSIS — I95.1 ORTHOSTATIC HYPOTENSION: ICD-10-CM

## 2024-03-15 DIAGNOSIS — E11.59 TYPE 2 DIABETES MELLITUS WITH OTHER CIRCULATORY COMPLICATION, WITHOUT LONG-TERM CURRENT USE OF INSULIN (H): Primary | ICD-10-CM

## 2024-03-15 DIAGNOSIS — Z95.1 S/P CABG (CORONARY ARTERY BYPASS GRAFT): ICD-10-CM

## 2024-03-15 DIAGNOSIS — I25.10 CORONARY ARTERY DISEASE INVOLVING NATIVE HEART, UNSPECIFIED VESSEL OR LESION TYPE, UNSPECIFIED WHETHER ANGINA PRESENT: ICD-10-CM

## 2024-03-15 DIAGNOSIS — M17.12 PRIMARY OSTEOARTHRITIS OF LEFT KNEE: ICD-10-CM

## 2024-03-15 LAB
ANION GAP SERPL CALCULATED.3IONS-SCNC: 13 MMOL/L (ref 7–15)
BASOPHILS # BLD AUTO: 0.1 10E3/UL (ref 0–0.2)
BASOPHILS NFR BLD AUTO: 1 %
BUN SERPL-MCNC: 22.4 MG/DL (ref 8–23)
CALCIUM SERPL-MCNC: 9.7 MG/DL (ref 8.8–10.2)
CHLORIDE SERPL-SCNC: 102 MMOL/L (ref 98–107)
CREAT SERPL-MCNC: 1.16 MG/DL (ref 0.67–1.17)
DEPRECATED HCO3 PLAS-SCNC: 23 MMOL/L (ref 22–29)
EGFRCR SERPLBLD CKD-EPI 2021: 68 ML/MIN/1.73M2
EOSINOPHIL # BLD AUTO: 0.4 10E3/UL (ref 0–0.7)
EOSINOPHIL NFR BLD AUTO: 3 %
ERYTHROCYTE [DISTWIDTH] IN BLOOD BY AUTOMATED COUNT: 14.7 % (ref 10–15)
EST. AVERAGE GLUCOSE BLD GHB EST-MCNC: 154 MG/DL
GLUCOSE SERPL-MCNC: 208 MG/DL (ref 70–99)
HBA1C MFR BLD: 7 %
HCT VFR BLD AUTO: 40.7 % (ref 40–53)
HGB BLD-MCNC: 12.4 G/DL (ref 13.3–17.7)
IMM GRANULOCYTES # BLD: 0 10E3/UL
IMM GRANULOCYTES NFR BLD: 0 %
LYMPHOCYTES # BLD AUTO: 2.2 10E3/UL (ref 0.8–5.3)
LYMPHOCYTES NFR BLD AUTO: 20 %
MCH RBC QN AUTO: 26.7 PG (ref 26.5–33)
MCHC RBC AUTO-ENTMCNC: 30.5 G/DL (ref 31.5–36.5)
MCV RBC AUTO: 88 FL (ref 78–100)
MONOCYTES # BLD AUTO: 0.9 10E3/UL (ref 0–1.3)
MONOCYTES NFR BLD AUTO: 8 %
NEUTROPHILS # BLD AUTO: 7.4 10E3/UL (ref 1.6–8.3)
NEUTROPHILS NFR BLD AUTO: 68 %
NRBC # BLD AUTO: 0 10E3/UL
NRBC BLD AUTO-RTO: 0 /100
PLATELET # BLD AUTO: 248 10E3/UL (ref 150–450)
POTASSIUM SERPL-SCNC: 4.1 MMOL/L (ref 3.4–5.3)
RBC # BLD AUTO: 4.64 10E6/UL (ref 4.4–5.9)
SODIUM SERPL-SCNC: 138 MMOL/L (ref 135–145)
WBC # BLD AUTO: 10.9 10E3/UL (ref 4–11)

## 2024-03-15 PROCEDURE — 85025 COMPLETE CBC W/AUTO DIFF WBC: CPT | Mod: ZL | Performed by: FAMILY MEDICINE

## 2024-03-15 PROCEDURE — G0463 HOSPITAL OUTPT CLINIC VISIT: HCPCS

## 2024-03-15 PROCEDURE — 83036 HEMOGLOBIN GLYCOSYLATED A1C: CPT | Mod: ZL | Performed by: FAMILY MEDICINE

## 2024-03-15 PROCEDURE — 99214 OFFICE O/P EST MOD 30 MIN: CPT | Performed by: FAMILY MEDICINE

## 2024-03-15 PROCEDURE — 36415 COLL VENOUS BLD VENIPUNCTURE: CPT | Mod: ZL | Performed by: FAMILY MEDICINE

## 2024-03-15 PROCEDURE — 82374 ASSAY BLOOD CARBON DIOXIDE: CPT | Mod: ZL | Performed by: FAMILY MEDICINE

## 2024-03-15 RX ORDER — ATORVASTATIN CALCIUM 80 MG/1
80 TABLET, FILM COATED ORAL EVERY MORNING
Qty: 90 TABLET | Refills: 3 | Status: SHIPPED | OUTPATIENT
Start: 2024-03-15 | End: 2024-10-07

## 2024-03-15 ASSESSMENT — PAIN SCALES - GENERAL: PAINLEVEL: NO PAIN (0)

## 2024-03-15 NOTE — PROGRESS NOTES
Assessment & Plan     Type 2 diabetes mellitus with other circulatory complication, without long-term current use of insulin (H)  Stable - good control. No change in meds. Continue current medications and behavioral changes. Follow-up in 6 months   - Hemoglobin A1c; Future  - Basic metabolic panel; Future  - CBC with Platelets & Differential; Future  - Hemoglobin A1c  - Basic metabolic panel  - CBC with Platelets & Differential    Essential hypertension  Stable - no orthostasis and falls recently.  Continue current medications and behavioral changes.   - Hemoglobin A1c; Future  - Basic metabolic panel; Future  - CBC with Platelets & Differential; Future  - Hemoglobin A1c  - Basic metabolic panel  - CBC with Platelets & Differential    Coronary artery disease involving native heart, unspecified vessel or lesion type, unspecified whether angina present  Stable - no Angina. Over a year out- will order colon screen   - Hemoglobin A1c; Future  - Basic metabolic panel; Future  - CBC with Platelets & Differential; Future  - Hemoglobin A1c  - Basic metabolic panel  - CBC with Platelets & Differential    Orthostatic hypotension  Resolved with change in meds   - Hemoglobin A1c; Future  - Basic metabolic panel; Future  - CBC with Platelets & Differential; Future  - Hemoglobin A1c  - Basic metabolic panel  - CBC with Platelets & Differential    Special screening for malignant neoplasms, colon  Discussed options.  Wants colonoscopy.  Will refer  - Hemoglobin A1c; Future  - Basic metabolic panel; Future  - CBC with Platelets & Differential; Future  - Colonoscopy Screening  Referral; Future  - Hemoglobin A1c  - Basic metabolic panel  - CBC with Platelets & Differential    S/P CABG (coronary artery bypass graft)  As above. RF needed. Continue current medications and behavioral changes.   - atorvastatin (LIPITOR) 80 MG tablet; Take 1 tablet (80 mg) by mouth every morning    Primary osteoarthritis of left knee  Discussed.  Not bad enough that wants moyer or treatment . Pt to call and will order MRI and ortho- probably has meniscal changes.            BMI  Estimated body mass index is 31.36 kg/m  as calculated from the following:    Height as of this encounter: 1.829 m (6').    Weight as of this encounter: 104.9 kg (231 lb 3.2 oz).             No follow-ups on file.    Claribel Macias is a 68 year old, presenting for the following health issues:  Diabetes, Hypertension, and Thyroid Problem        3/15/2024     9:21 AM   Additional Questions   Roomed by Naomi Marshall LPN, SHEILA, MHP   Accompanied by self     HPI     Diabetes Follow-up    How often are you checking your blood sugar? Three times daily  Blood sugar testing frequency justification:  Patient modifying lifestyle changes (diet, exercise) with blood sugars  What time of day are you checking your blood sugars (select all that apply)?  Before meals, After meals, and At bedtime  Have you had any blood sugars above 200?  Yes 257 highest  Have you had any blood sugars below 70?  No  What symptoms do you notice when your blood sugar is low?  None  What concerns do you have today about your diabetes? None   Do you have any of these symptoms? (Select all that apply)  Excessive thirst      BP Readings from Last 2 Encounters:   03/15/24 120/70   01/31/24 90/60     Hemoglobin A1C (%)   Date Value   11/28/2023 7.1 (H)   05/04/2023 6.5 (H)   02/15/2021 6.5 (H)     LDL Cholesterol Calculated (mg/dL)   Date Value   05/04/2023 49   08/29/2022 56   08/21/2020 68   10/02/2019 97             Hypertension Follow-up    Do you check your blood pressure regularly outside of the clinic? Yes   Are you following a low salt diet? Yes  Are your blood pressures ever more than 140 on the top number (systolic) OR more   than 90 on the bottom number (diastolic), for example 140/90? Yes    Musculoskeletal problem/pain    Duration: a few months  Description  Location: Left knee  Intensity:  mild  Accompanying  signs and symptoms: none  History  Previous similar problem: no   Previous evaluation:  none  Precipitating or alleviating factors:  Trauma or overuse: no - had hip replacement patient feels it could be related   Aggravating factors include: standing  Therapies tried and outcome: nothing      Locks up once and a while   Going on for months   Worse after resting for longer period of time         Review of Systems  Constitutional, HEENT, cardiovascular, pulmonary, gi and gu systems are negative, except as otherwise noted.      Objective    /70 (BP Location: Right arm, Patient Position: Sitting, Cuff Size: Adult Large)   Pulse 68   Temp 97.6  F (36.4  C) (Tympanic)   Ht 1.829 m (6')   Wt 104.9 kg (231 lb 3.2 oz)   SpO2 94%   BMI 31.36 kg/m    Body mass index is 31.36 kg/m .  Physical Exam   GENERAL: alert and no distress  NECK: no adenopathy, no asymmetry, masses, or scars  RESP: lungs clear to auscultation - no rales, rhonchi or wheezes  CV: regular rate and rhythm, normal S1 S2, no S3 or S4, no murmur, click or rub, no peripheral edema  ABDOMEN: soft, nontender, no hepatosplenomegaly, no masses and bowel sounds normal  MS: no gross musculoskeletal defects noted, no edema    Results for orders placed or performed in visit on 03/15/24   Hemoglobin A1c     Status: Abnormal   Result Value Ref Range    Estimated Average Glucose 154 mg/dL    Hemoglobin A1C 7.0 (H) <5.7 %   Basic metabolic panel     Status: Abnormal   Result Value Ref Range    Sodium 138 135 - 145 mmol/L    Potassium 4.1 3.4 - 5.3 mmol/L    Chloride 102 98 - 107 mmol/L    Carbon Dioxide (CO2) 23 22 - 29 mmol/L    Anion Gap 13 7 - 15 mmol/L    Urea Nitrogen 22.4 8.0 - 23.0 mg/dL    Creatinine 1.16 0.67 - 1.17 mg/dL    GFR Estimate 68 >60 mL/min/1.73m2    Calcium 9.7 8.8 - 10.2 mg/dL    Glucose 208 (H) 70 - 99 mg/dL   CBC with platelets and differential     Status: Abnormal   Result Value Ref Range    WBC Count 10.9 4.0 - 11.0 10e3/uL    RBC Count  4.64 4.40 - 5.90 10e6/uL    Hemoglobin 12.4 (L) 13.3 - 17.7 g/dL    Hematocrit 40.7 40.0 - 53.0 %    MCV 88 78 - 100 fL    MCH 26.7 26.5 - 33.0 pg    MCHC 30.5 (L) 31.5 - 36.5 g/dL    RDW 14.7 10.0 - 15.0 %    Platelet Count 248 150 - 450 10e3/uL    % Neutrophils 68 %    % Lymphocytes 20 %    % Monocytes 8 %    % Eosinophils 3 %    % Basophils 1 %    % Immature Granulocytes 0 %    NRBCs per 100 WBC 0 <1 /100    Absolute Neutrophils 7.4 1.6 - 8.3 10e3/uL    Absolute Lymphocytes 2.2 0.8 - 5.3 10e3/uL    Absolute Monocytes 0.9 0.0 - 1.3 10e3/uL    Absolute Eosinophils 0.4 0.0 - 0.7 10e3/uL    Absolute Basophils 0.1 0.0 - 0.2 10e3/uL    Absolute Immature Granulocytes 0.0 <=0.4 10e3/uL    Absolute NRBCs 0.0 10e3/uL   CBC with Platelets & Differential     Status: Abnormal    Narrative    The following orders were created for panel order CBC with Platelets & Differential.  Procedure                               Abnormality         Status                     ---------                               -----------         ------                     CBC with platelets and d...[[id number]]  Abnormal            Final result                 Please view results for these tests on the individual orders.             Signed Electronically by: Rock Basilio MD

## 2024-03-15 NOTE — LETTER
My Heart Failure Action Plan  Name: Gilberto Camilo   YOB: 1955  Date: 3/14/2024   My doctor:   Rock Basilio [facility] [address]  [place] [address]  [phone] My Diagnosis:   My Ejection Fraction:   Lab Results   Component Value Date    LVEF 60-65% 01/11/2024       My Exercise Goal: Start exercise slowly - to begin, do a few minutes of exercise, several times a day. Increase your time and speed [id number] to build tolerance, with a goal of 30 minutes of exercise daily. Steady, slow, and consistent exercise is both safe and healthy. Stop and rest when you feel tired or become short of breath. Do not push yourself on days when you don t feel well.       My Weight Plan:   Wt Readings from Last 2 Encounters:   01/31/24 105.9 kg (233 lb 8 oz)   01/26/24 106.2 kg (234 lb 3.2 oz)     Weigh yourself daily using the same scale. If you gain more than 2 pounds in 24 hours or 5 pounds in 7 days. call the clinic    My Diet Goal:     Emergency Room Visits:    Our goal is to improve your quality of life and help you avoid a visit to the emergency room or hospital.  If we work together, we can achieve this goal. But, if you feel you need to call 911 or go to the emergency room, please do so.  If you go to the emergency room, please bring your list of medicines and your daily weight chart with you.    Each day ask yourself:  Is my weight up?  Do I have swelling?  Do I have trouble breathing?  How did I sleep?  Other problems?       GREEN ZONE     Weight gained is no more than 2 pounds a day or 5 pounds a in 7 days.  No swelling in feet, ankles, legs or stomach.  No more swelling than usual.  No more trouble breathing than usual.  No change in my sleep.  No other problems. What should I do?  I am doing fine. I will take my medicine, follow my diet, see my doctor, exercise, and watch for symptoms.           YELLOW ZONE         Weight gain of more than 2 pounds in one day or  5 pounds in 7 days.  New swelling in ankle, leg, knee or thigh.  Bloating in belly, pants feel tighter.  Swelling in hands or face.  Coughing or trouble breathing while walking or talking.  Harder to breathe last night.  Have trouble sleeping, wake up short of breath.  Unusually tired.  Not eating.  Nausea, vomiting, or diarrhea  Pain in my chest or bad  leg cramps.  Feel weak or dizzy. What should I do?  I need to take action and call my doctor or nurse today.                 RED ZONE         Weight gain of 5 pounds overnight.  Chest pain or pressure that does not go away.  Feel less alert.  Wheezing or have trouble breathing when at rest.  Cannot sleep lying down.  Cannot take my medicines.  Pass out or faint. What should I do?  I need to call my doctor or nurse now!  Call 911 if I have chest pain or cannot breathe.

## 2024-03-18 ENCOUNTER — TELEPHONE (OUTPATIENT)
Dept: FAMILY MEDICINE | Facility: OTHER | Age: 69
End: 2024-03-18

## 2024-03-18 DIAGNOSIS — Z12.11 ENCOUNTER FOR SCREENING COLONOSCOPY: Primary | ICD-10-CM

## 2024-03-18 RX ORDER — BISACODYL 5 MG/1
10 TABLET, DELAYED RELEASE ORAL ONCE
Qty: 2 TABLET | Refills: 0 | Status: SHIPPED | OUTPATIENT
Start: 2024-03-18 | End: 2024-03-18

## 2024-03-18 NOTE — TELEPHONE ENCOUNTER
Patient scheduled screening colonoscopy 06/27/24 with wei Blunt bowel prep ordered to [facility], instructions mailed today.

## 2024-03-18 NOTE — TELEPHONE ENCOUNTER
Screening Questions for the Scheduling of Screening Colonoscopies     (If Colonoscopy is diagnostic, Provider should review the chart before scheduling.)    Are you younger than 50 or older than 80?  No    Do you take aspirin or fish oil?  Yes:  (if yes, tell patient to stop 1 week prior to Colonoscopy)    Do you take warfarin (Coumadin), clopidogrel (Plavix), apixaban (Eliquis), dabigatram (Pradaxa), rivaroxaban (Xarelto) or any blood thinner? No    Do you use oxygen at home?  No    Do you have kidney disease? No    Are you on dialysis? No    Have you had a stroke or heart attack in the last year? No    Have you had a stent in your heart or any blood vessel in the last year? No    Have you had a transplant of any organ?  No    Have you had a colonoscopy or upper endoscopy (EGD) before?  YES. Date-.         Date of scheduled Colonoscopy: 6/27/24    Provider: Dr. KASHMIR Jacques     [facility]

## 2024-04-08 ENCOUNTER — OFFICE VISIT (OUTPATIENT)
Dept: FAMILY MEDICINE | Facility: OTHER | Age: 69
End: 2024-04-08
Attending: FAMILY MEDICINE
Payer: COMMERCIAL

## 2024-04-08 VITALS
WEIGHT: 232.5 LBS | DIASTOLIC BLOOD PRESSURE: 58 MMHG | TEMPERATURE: 97.5 F | HEART RATE: 67 BPM | SYSTOLIC BLOOD PRESSURE: 120 MMHG | BODY MASS INDEX: 31.53 KG/M2 | OXYGEN SATURATION: 96 %

## 2024-04-08 DIAGNOSIS — I95.1 ORTHOSTATIC HYPOTENSION: Primary | ICD-10-CM

## 2024-04-08 PROCEDURE — 99213 OFFICE O/P EST LOW 20 MIN: CPT | Performed by: FAMILY MEDICINE

## 2024-04-08 PROCEDURE — G0463 HOSPITAL OUTPT CLINIC VISIT: HCPCS

## 2024-04-08 ASSESSMENT — PAIN SCALES - GENERAL: PAINLEVEL: NO PAIN (0)

## 2024-04-08 NOTE — PROGRESS NOTES
Assessment & Plan     Orthostatic hypotension  Resolved. Continue current medications and behavioral changes.   Follow-up in Fall.             BMI  Estimated body mass index is 31.53 kg/m  as calculated from the following:    Height as of 3/15/24: 1.829 m (6').    Weight as of this encounter: 105.5 kg (232 lb 8 oz).             No follow-ups on file.  Subjective   Gilberto is a 68 year old, presenting for the following health issues:  Reflex        4/8/2024    11:03 AM   Additional Questions   Roomed by Cali Tejada   Accompanied by Self         4/8/2024    11:03 AM   Patient Reported Additional Medications   Patient reports taking the following new medications none     HPI     Concern - Reflexes   Description: Follow up to check his orthostasis . No falling and doing better  BP much better - no further falling or legs giving out. No LH or dizziness  Accompanying Signs & Symptoms: none  Previous history of similar problem: none  Precipitating factors:        Worsened by: N/A  Alleviating factors:        Improved by: N/A  Therapies tried and outcome: None        Review of Systems  Constitutional, HEENT, cardiovascular, pulmonary, gi and gu systems are negative, except as otherwise noted.      Objective    /58 (BP Location: Left arm, Patient Position: Sitting, Cuff Size: Adult Regular)   Pulse 67   Temp 97.5  F (36.4  C) (Tympanic)   Wt 105.5 kg (232 lb 8 oz)   SpO2 96%   BMI 31.53 kg/m    Body mass index is 31.53 kg/m .  Physical Exam   GENERAL: alert and no distress  RESP: lungs clear to auscultation - no rales, rhonchi or wheezes  CV: regular rate and rhythm, normal S1 S2, no S3 or S4, no murmur, click or rub, no peripheral edema             Signed Electronically by: Rock Basilio MD

## 2024-04-11 ENCOUNTER — MYC MEDICAL ADVICE (OUTPATIENT)
Dept: FAMILY MEDICINE | Facility: OTHER | Age: 69
End: 2024-04-11

## 2024-04-11 DIAGNOSIS — I83.813 VARICOSE VEINS OF BILATERAL LOWER EXTREMITIES WITH PAIN: ICD-10-CM

## 2024-04-11 DIAGNOSIS — I83.93 VARICOSE VEINS OF BOTH LOWER EXTREMITIES: Primary | ICD-10-CM

## 2024-04-11 NOTE — TELEPHONE ENCOUNTER
4/11/2024 3:59 PM  Writer called pt regarding my chart message. Pt reports varicose veins have worsened. Sx started yesterday. Denies pain, denies numbness. Reports occasional intermittent tingling. Pt denies any other concerns or sx.     Meghann Carbone RN

## 2024-04-11 NOTE — TELEPHONE ENCOUNTER
"NO concerns.  Normal \"Old\" legs with varicose veins. Can get or try to use support /tight stockings for Varicose veins -- they are at larger pharmacies or health line  .   "

## 2024-04-12 RX ORDER — MEDICAL SUPPLY, MISCELLANEOUS
1 EACH MISCELLANEOUS DAILY
Qty: 2 EACH | Refills: 2 | Status: CANCELLED | OUTPATIENT
Start: 2024-04-12

## 2024-04-25 ENCOUNTER — OFFICE VISIT (OUTPATIENT)
Dept: PODIATRY | Facility: OTHER | Age: 69
End: 2024-04-25
Attending: PODIATRIST
Payer: COMMERCIAL

## 2024-04-25 VITALS
SYSTOLIC BLOOD PRESSURE: 108 MMHG | HEART RATE: 67 BPM | TEMPERATURE: 98.1 F | DIASTOLIC BLOOD PRESSURE: 65 MMHG | OXYGEN SATURATION: 94 %

## 2024-04-25 DIAGNOSIS — E11.42 DIABETIC POLYNEUROPATHY ASSOCIATED WITH TYPE 2 DIABETES MELLITUS (H): ICD-10-CM

## 2024-04-25 DIAGNOSIS — E11.9 DIABETES MELLITUS TYPE 2, NONINSULIN DEPENDENT (H): ICD-10-CM

## 2024-04-25 DIAGNOSIS — I73.9 PERIPHERAL ARTERIAL DISEASE (H): ICD-10-CM

## 2024-04-25 DIAGNOSIS — L60.3 ONYCHODYSTROPHY: Primary | ICD-10-CM

## 2024-04-25 DIAGNOSIS — Z13.89 SCREENING FOR DIABETIC PERIPHERAL NEUROPATHY: ICD-10-CM

## 2024-04-25 PROCEDURE — 11721 DEBRIDE NAIL 6 OR MORE: CPT | Performed by: PODIATRIST

## 2024-04-25 PROCEDURE — 99207 PR FOOT EXAM NO CHARGE: CPT | Performed by: PODIATRIST

## 2024-04-25 PROCEDURE — G0463 HOSPITAL OUTPT CLINIC VISIT: HCPCS

## 2024-04-25 PROCEDURE — G0463 HOSPITAL OUTPT CLINIC VISIT: HCPCS | Mod: 25

## 2024-04-25 ASSESSMENT — PAIN SCALES - GENERAL: PAINLEVEL: NO PAIN (0)

## 2024-04-25 NOTE — PROGRESS NOTES
Chief complaint: Patient presents with:  RECHECK: DFE      History of Present Illness: This 68 year old NIDDM II male is seen for follow-up management of elongated toenails. The toenails are difficult to reach and the nails are too thick for him to trim with regular nail clippers.     He has not had pain from the bilateral hallux toenails.    He has tingling in the toes although it has improved since his last visit.    He broke his LEFT hip on 07/01/2023 and he had an ORIF. He says he is still doing well.    Patients says he has not been applying lotion to his feet.       Lab Results   Component Value Date    A1C 7.0 03/15/2024    A1C 7.1 11/28/2023    A1C 6.5 05/04/2023    A1C 7.9 01/09/2023    A1C 7.5 08/29/2022    A1C 6.5 02/15/2021           No further pedal complaints today.     Patient quit smoking around 2011.        /65   Pulse 67   Temp 98.1  F (36.7  C)   SpO2 94%      Patient Active Problem List   Diagnosis    Abdominal aortic aneurysm (H24)    Erectile dysfunction    ACP (advance care planning)    Routine general medical examination at a health care facility    Essential hypertension    Mixed hyperlipidemia    Moderate major depression (H)    Morbid obesity (H)    Elevated prostate specific antigen (PSA)    Prediabetes    Diabetes mellitus, type 2 (H)    S/P AAA repair    Tobacco dependence in remission    Mitral valve prolapse    Status post coronary angiogram    Coronary artery disease involving native heart, unspecified vessel or lesion type, unspecified whether angina present    S/P CABG (coronary artery bypass graft)    S/P mitral valve repair    Postoperative atrial fibrillation (H)    Peripheral arterial disease (H24)    Fall from standing, initial encounter    Complex sleep apnea syndrome       Past Surgical History:   Procedure Laterality Date    AAA REPAIR  06/17/2020    [facility]/ intravascular repair    APPENDECTOMY      BYPASS GRAFT ARTERY CORONARY, REPAIR VALVE MITRAL,  COMBINED N/A 1/18/2023    Procedure: MEDIAN STERNOTOMY, ON PUMP OXGENATION, Left endovascular saphaneous vein harvest, CORONARY ARTERY BYPASS GRAFT (CABG x 2), Mitral Valve Repair, transesophageal echocardiogram (TRENT) performed by anesthesia;  Surgeon: Linda Kim MD;  Location: [facility] OR    COLONOSCOPY  2013    CV CORONARY ANGIOGRAM N/A 12/9/2022    Procedure: Coronary Angiogram with possible intervention;  Surgeon: Porfirio Herrera MD;  Location:  [facility]    CV RIGHT HEART CATH MEASUREMENTS RECORDED N/A 12/9/2022    Procedure: Right Heart Cath;  Surgeon: Porfirio Herrera MD;  Location:  [facility]    ESOPHAGOGASTRODUODENOSCOPY  2013    INCISION AND DRAINAGE CHEST WASHOUT, COMBINED N/A 1/19/2023    Procedure: chest washout, Re-do sternotomy, repair of vein graft;  Surgeon: Linda Kim MD;  Location: [facility] OR       Current Outpatient Medications   Medication Sig Dispense Refill    ACCU-CHEK GUIDE test strip Use to test blood sugar 1 daily. 100 strip 1    ARIPiprazole (ABILIFY) 5 MG tablet Take 1 tablet (5 mg) by mouth every morning 90 tablet 3    aspirin (ASA) 81 MG chewable tablet Take 162 mg by mouth      atorvastatin (LIPITOR) 80 MG tablet Take 1 tablet (80 mg) by mouth every morning 90 tablet 3    blood glucose monitoring (NO BRAND SPECIFIED) meter device kit Use to test blood sugar 1 times daily or as directed. 1 kit 0    blood glucose monitoring (SOFTCLIX) lancets Use to test blood sugar 1 times daily. 100 each 1    Blood Glucose Monitoring Suppl (ACCU-CHEK GUIDE) w/Device KIT USE TO TEST BLOOD GLUCOSE ONCE DAILY OR AS DIRECTED.      buPROPion (WELLBUTRIN XL) 300 MG 24 hr tablet TAKE 1 TABLET(300 MG) BY MOUTH EVERY MORNING 90 tablet 1    gentamicin (GARAMYCIN) 0.3 % ophthalmic solution Place 1-2 drops into both eyes every 4 hours 5 mL 0    loratadine (CLARITIN) 10 MG tablet Take 10 mg by mouth every morning      metoprolol succinate ER (TOPROL XL)  25 MG 24 hr tablet 1/2 tablet for 1 week then every other day for 3 doses then off .      multivitamin, therapeutic (THERA-VIT) TABS tablet 1 tablet by Oral or Feeding Tube route every morning 30 tablet 3    oxyBUTYnin ER (DITROPAN XL) 15 MG 24 hr tablet TAKE 1 TABLET(15 MG) BY MOUTH DAILY 90 tablet 2    pantoprazole (PROTONIX) 40 MG EC tablet TAKE 1 TABLET(40 MG) BY MOUTH DAILY 90 tablet 3    pioglitazone (ACTOS) 15 MG tablet Take 1 tablet (15 mg) by mouth daily 90 tablet 1    vitamin C (ASCORBIC ACID) 1000 MG TABS Take 1,000 mg by mouth every morning      Vitamin D3 (CHOLECALCIFEROL) 25 mcg (1000 units) tablet        No current facility-administered medications for this visit.        No Known Allergies    Family History   Problem Relation Age of Onset    C.A.D. Father         CABG in late 40's    C.A.D. Brother         MI in 50's       Social History     Socioeconomic History    Marital status:      Spouse name: None    Number of children: None    Years of education: None    Highest education level: None   Occupational History    None   Tobacco Use    Smoking status: Former Smoker     Packs/day: 0.00     Types: Cigarettes    Smokeless tobacco: Never Used    Tobacco comment: Feb. 2014   Substance and Sexual Activity    Alcohol use: Yes     Comment: occasional    Drug use: Yes     Types: Marijuana    Sexual activity: Yes     Partners: Female   Other Topics Concern    Parent/sibling w/ CABG, MI or angioplasty before 65F 55M? Yes     Comment: dad had heart attack before 55. brother also had heart attack before 55.   Social History Narrative    .      Social Determinants of Health     Financial Resource Strain:     Difficulty of Paying Living Expenses:    Food Insecurity:     Worried About Running Out of Food in the Last Year:     Ran Out of Food in the Last Year:    Transportation Needs:     Lack of Transportation (Medical):     Lack of Transportation (Non-Medical):    Physical Activity:     Days of  Exercise per Week:     Minutes of Exercise per Session:    Stress:     Feeling of Stress :    Social Connections:     Frequency of Communication with Friends and Family:     Frequency of Social Gatherings with Friends and Family:     Attends Sabianist Services:     Active Member of Clubs or Organizations:     Attends Club or Organization Meetings:     Marital Status:    Intimate Partner Violence:     Fear of Current or Ex-Partner:     Emotionally Abused:     Physically Abused:     Sexually Abused:        ROS: 10 point ROS neg other than the symptoms noted above in the HPI.  EXAM  Constitutional: healthy, alert and no distress    Psychiatric: mentation appears normal and affect normal/bright    VASCULAR:  -Dorsalis pedis pulse +1/4 b/l  -Posterior tibial pulse +1/4 b/l  -Capillary refill time < 3 seconds to b/l hallux  -Hair growth Absent to b/l anterior legs and ankles  NEURO:  -Positive for paresthesias to bilateral foot  -Epicritic and protective sensation diminished to the bilateral foot  DERM:  -Skin temperature cool to bilateral foot  -Skin dry, flaking to bilateral plantar foot  -Toenails elongated, thickened, dystrophic and discolored x 10  ---Bilateral hallux toenails significantly dystrophic    MSK:  -Muscle strength of ankles +5/5 for dorsiflexion, plantarflexion, ABDUction and ADDuction b/l    ============================================================    ASSESSMENT:  (L60.3) Onychodystrophy  (primary encounter diagnosis)    (E11.9) Diabetes mellitus type 2, noninsulin dependent (H)    (E11.42) Diabetic polyneuropathy associated with type 2 diabetes mellitus (H)    (I73.9) Peripheral arterial disease (H)    (Z13.89) Screening for diabetic peripheral neuropathy        PLAN:  -Patient evaluated and examined. Treatment options discussed with no educational barriers noted.  -High risk toenail debridement x 10 toenails without incident    -Diabetic Foot Education provided. This included checking the feet  daily looking for new new blisters or wounds, wearing shoes at all times when walking including around the house, and avoiding lotion application between the toes. If there are any signs of infection, the patient should present to the ED as soon as possible. Infections of the foot can be life threatening or lead to amputations of the foot or leg.     -Vascular appointment -- most recent appointment was 09/19/2023 at [facility]. He follows up with them once a year.    Diabetes Mellitus: Patient's DM is managed by their PCP. The DM appears to be stable. Patient's last HbA1C was 7.0% on 03/15/2023.    -Patient in agreement with the above treatment plan and all of patient's questions were answered.      RTC 3 months for diabetic foot exam and high risk nail debridement         Sierra Velásquez DPM

## 2024-05-02 ENCOUNTER — HOSPITAL ENCOUNTER (EMERGENCY)
Facility: HOSPITAL | Age: 69
Discharge: HOME | End: 2024-05-02
Attending: PHYSICIAN ASSISTANT | Admitting: PHYSICIAN ASSISTANT
Payer: COMMERCIAL

## 2024-05-02 ENCOUNTER — OFFICE VISIT (OUTPATIENT)
Dept: FAMILY MEDICINE | Facility: OTHER | Age: 69
End: 2024-05-02
Attending: FAMILY MEDICINE
Payer: COMMERCIAL

## 2024-05-02 ENCOUNTER — TELEPHONE (OUTPATIENT)
Dept: FAMILY MEDICINE | Facility: OTHER | Age: 69
End: 2024-05-02

## 2024-05-02 ENCOUNTER — APPOINTMENT (OUTPATIENT)
Dept: CT IMAGING | Facility: HOSPITAL | Age: 69
End: 2024-05-02
Attending: PHYSICIAN ASSISTANT
Payer: COMMERCIAL

## 2024-05-02 ENCOUNTER — ANCILLARY PROCEDURE (OUTPATIENT)
Dept: GENERAL RADIOLOGY | Facility: OTHER | Age: 69
End: 2024-05-02
Attending: FAMILY MEDICINE
Payer: COMMERCIAL

## 2024-05-02 VITALS
TEMPERATURE: 100.4 F | DIASTOLIC BLOOD PRESSURE: 73 MMHG | RESPIRATION RATE: 18 BRPM | HEART RATE: 94 BPM | SYSTOLIC BLOOD PRESSURE: 127 MMHG | OXYGEN SATURATION: 95 %

## 2024-05-02 VITALS
HEART RATE: 75 BPM | TEMPERATURE: 99.2 F | SYSTOLIC BLOOD PRESSURE: 112 MMHG | OXYGEN SATURATION: 94 % | DIASTOLIC BLOOD PRESSURE: 66 MMHG | RESPIRATION RATE: 16 BRPM

## 2024-05-02 DIAGNOSIS — D72.829 LEUKOCYTOSIS: ICD-10-CM

## 2024-05-02 DIAGNOSIS — R91.8 PULMONARY MASS: ICD-10-CM

## 2024-05-02 DIAGNOSIS — R53.81 MALAISE: ICD-10-CM

## 2024-05-02 DIAGNOSIS — R91.8 LUNG MASS: ICD-10-CM

## 2024-05-02 DIAGNOSIS — J15.9 COMMUNITY ACQUIRED BACTERIAL PNEUMONIA: Primary | ICD-10-CM

## 2024-05-02 DIAGNOSIS — R09.02 HYPOXIA: ICD-10-CM

## 2024-05-02 DIAGNOSIS — J44.1 CHRONIC OBSTRUCTIVE PULMONARY DISEASE WITH ACUTE EXACERBATION (H): ICD-10-CM

## 2024-05-02 LAB
ALBUMIN SERPL BCG-MCNC: 3.8 G/DL (ref 3.5–5.2)
ALBUMIN UR-MCNC: 20 MG/DL
ALP SERPL-CCNC: 143 U/L (ref 40–150)
ALT SERPL W P-5'-P-CCNC: 13 U/L (ref 0–70)
ANION GAP SERPL CALCULATED.3IONS-SCNC: 14 MMOL/L (ref 7–15)
APPEARANCE UR: CLEAR
AST SERPL W P-5'-P-CCNC: 17 U/L (ref 0–45)
BASOPHILS # BLD AUTO: 0.1 10E3/UL (ref 0–0.2)
BASOPHILS NFR BLD AUTO: 1 %
BILIRUB SERPL-MCNC: 0.4 MG/DL
BILIRUB UR QL STRIP: NEGATIVE
BUN SERPL-MCNC: 22.8 MG/DL (ref 8–23)
CALCIUM SERPL-MCNC: 9.5 MG/DL (ref 8.8–10.2)
CHLORIDE SERPL-SCNC: 98 MMOL/L (ref 98–107)
COLOR UR AUTO: YELLOW
CREAT SERPL-MCNC: 1.16 MG/DL (ref 0.67–1.17)
CRP SERPL-MCNC: 76.74 MG/L
DEPRECATED HCO3 PLAS-SCNC: 25 MMOL/L (ref 22–29)
EGFRCR SERPLBLD CKD-EPI 2021: 67 ML/MIN/1.73M2
EOSINOPHIL # BLD AUTO: 0.3 10E3/UL (ref 0–0.7)
EOSINOPHIL NFR BLD AUTO: 2 %
ERYTHROCYTE [DISTWIDTH] IN BLOOD BY AUTOMATED COUNT: 14.9 % (ref 10–15)
GLUCOSE SERPL-MCNC: 197 MG/DL (ref 70–99)
GLUCOSE UR STRIP-MCNC: NEGATIVE MG/DL
HCT VFR BLD AUTO: 35.1 % (ref 40–53)
HGB BLD-MCNC: 11 G/DL (ref 13.3–17.7)
HGB UR QL STRIP: NEGATIVE
IMM GRANULOCYTES # BLD: 0.2 10E3/UL
IMM GRANULOCYTES NFR BLD: 1 %
KETONES UR STRIP-MCNC: NEGATIVE MG/DL
LEUKOCYTE ESTERASE UR QL STRIP: NEGATIVE
LYMPHOCYTES # BLD AUTO: 1.4 10E3/UL (ref 0.8–5.3)
LYMPHOCYTES NFR BLD AUTO: 9 %
MCH RBC QN AUTO: 26.2 PG (ref 26.5–33)
MCHC RBC AUTO-ENTMCNC: 31.3 G/DL (ref 31.5–36.5)
MCV RBC AUTO: 84 FL (ref 78–100)
MONOCYTES # BLD AUTO: 1.5 10E3/UL (ref 0–1.3)
MONOCYTES NFR BLD AUTO: 10 %
MUCOUS THREADS #/AREA URNS LPF: PRESENT /LPF
NEUTROPHILS # BLD AUTO: 11.5 10E3/UL (ref 1.6–8.3)
NEUTROPHILS NFR BLD AUTO: 77 %
NITRATE UR QL: NEGATIVE
NRBC # BLD AUTO: 0 10E3/UL
NRBC BLD AUTO-RTO: 0 /100
PH UR STRIP: 5.5 [PH] (ref 4.7–8)
PLATELET # BLD AUTO: 375 10E3/UL (ref 150–450)
POTASSIUM SERPL-SCNC: 4.4 MMOL/L (ref 3.4–5.3)
PROCALCITONIN SERPL IA-MCNC: 0.12 NG/ML
PROT SERPL-MCNC: 7.5 G/DL (ref 6.4–8.3)
RBC # BLD AUTO: 4.2 10E6/UL (ref 4.4–5.9)
RBC URINE: <1 /HPF
SODIUM SERPL-SCNC: 137 MMOL/L (ref 135–145)
SP GR UR STRIP: 1.03 (ref 1–1.03)
SQUAMOUS EPITHELIAL: 0 /HPF
UROBILINOGEN UR STRIP-MCNC: 2 MG/DL
WBC # BLD AUTO: 15 10E3/UL (ref 4–11)
WBC URINE: 2 /HPF

## 2024-05-02 PROCEDURE — 84460 ALANINE AMINO (ALT) (SGPT): CPT | Mod: ZL | Performed by: FAMILY MEDICINE

## 2024-05-02 PROCEDURE — 96375 TX/PRO/DX INJ NEW DRUG ADDON: CPT | Mod: XU

## 2024-05-02 PROCEDURE — 99214 OFFICE O/P EST MOD 30 MIN: CPT | Performed by: FAMILY MEDICINE

## 2024-05-02 PROCEDURE — 250N000011 HC RX IP 250 OP 636: Performed by: PHYSICIAN ASSISTANT

## 2024-05-02 PROCEDURE — 250N000013 HC RX MED GY IP 250 OP 250 PS 637: Performed by: PHYSICIAN ASSISTANT

## 2024-05-02 PROCEDURE — 81001 URINALYSIS AUTO W/SCOPE: CPT | Mod: ZL | Performed by: FAMILY MEDICINE

## 2024-05-02 PROCEDURE — 87040 BLOOD CULTURE FOR BACTERIA: CPT | Performed by: PHYSICIAN ASSISTANT

## 2024-05-02 PROCEDURE — 71046 X-RAY EXAM CHEST 2 VIEWS: CPT | Mod: TC

## 2024-05-02 PROCEDURE — 250N000009 HC RX 250: Performed by: FAMILY MEDICINE

## 2024-05-02 PROCEDURE — 86140 C-REACTIVE PROTEIN: CPT | Mod: ZL | Performed by: FAMILY MEDICINE

## 2024-05-02 PROCEDURE — 99285 EMERGENCY DEPT VISIT HI MDM: CPT | Mod: 25

## 2024-05-02 PROCEDURE — 36415 COLL VENOUS BLD VENIPUNCTURE: CPT | Mod: ZL | Performed by: FAMILY MEDICINE

## 2024-05-02 PROCEDURE — 96366 THER/PROPH/DIAG IV INF ADDON: CPT

## 2024-05-02 PROCEDURE — G0463 HOSPITAL OUTPT CLINIC VISIT: HCPCS | Mod: 25

## 2024-05-02 PROCEDURE — 250N000009 HC RX 250

## 2024-05-02 PROCEDURE — 84145 PROCALCITONIN (PCT): CPT | Mod: ZL | Performed by: FAMILY MEDICINE

## 2024-05-02 PROCEDURE — 99284 EMERGENCY DEPT VISIT MOD MDM: CPT | Performed by: PHYSICIAN ASSISTANT

## 2024-05-02 PROCEDURE — 71260 CT THORAX DX C+: CPT

## 2024-05-02 PROCEDURE — 96365 THER/PROPH/DIAG IV INF INIT: CPT | Mod: XU

## 2024-05-02 PROCEDURE — 36415 COLL VENOUS BLD VENIPUNCTURE: CPT | Performed by: PHYSICIAN ASSISTANT

## 2024-05-02 PROCEDURE — 85025 COMPLETE CBC W/AUTO DIFF WBC: CPT | Mod: ZL | Performed by: FAMILY MEDICINE

## 2024-05-02 RX ORDER — IOPAMIDOL 755 MG/ML
70 INJECTION, SOLUTION INTRAVASCULAR ONCE
Status: COMPLETED | OUTPATIENT
Start: 2024-05-02 | End: 2024-05-02

## 2024-05-02 RX ORDER — DEXAMETHASONE SODIUM PHOSPHATE 10 MG/ML
10 INJECTION, SOLUTION INTRAMUSCULAR; INTRAVENOUS ONCE
Status: COMPLETED | OUTPATIENT
Start: 2024-05-02 | End: 2024-05-02

## 2024-05-02 RX ORDER — BENZONATATE 100 MG/1
200 CAPSULE ORAL ONCE
Status: COMPLETED | OUTPATIENT
Start: 2024-05-02 | End: 2024-05-02

## 2024-05-02 RX ORDER — LEVOFLOXACIN 5 MG/ML
750 INJECTION, SOLUTION INTRAVENOUS ONCE
Status: COMPLETED | OUTPATIENT
Start: 2024-05-02 | End: 2024-05-02

## 2024-05-02 RX ORDER — IPRATROPIUM BROMIDE AND ALBUTEROL SULFATE 2.5; .5 MG/3ML; MG/3ML
3 SOLUTION RESPIRATORY (INHALATION) ONCE
Status: COMPLETED | OUTPATIENT
Start: 2024-05-02 | End: 2024-05-02

## 2024-05-02 RX ORDER — IPRATROPIUM BROMIDE AND ALBUTEROL SULFATE 2.5; .5 MG/3ML; MG/3ML
1 SOLUTION RESPIRATORY (INHALATION) EVERY 6 HOURS PRN
Qty: 90 ML | Refills: 1 | Status: CANCELLED | OUTPATIENT
Start: 2024-05-02

## 2024-05-02 RX ORDER — LEVOFLOXACIN 500 MG/1
500 TABLET, FILM COATED ORAL DAILY
Qty: 7 TABLET | Refills: 0 | Status: SHIPPED | OUTPATIENT
Start: 2024-05-02 | End: 2024-05-09

## 2024-05-02 RX ORDER — BENZONATATE 200 MG/1
200 CAPSULE ORAL 3 TIMES DAILY PRN
Qty: 30 CAPSULE | Refills: 0 | Status: SHIPPED | OUTPATIENT
Start: 2024-05-02 | End: 2024-05-24

## 2024-05-02 RX ADMIN — LEVOFLOXACIN 750 MG: 750 INJECTION, SOLUTION INTRAVENOUS at 21:31

## 2024-05-02 RX ADMIN — BENZONATATE 200 MG: 100 CAPSULE ORAL at 21:35

## 2024-05-02 RX ADMIN — IPRATROPIUM BROMIDE AND ALBUTEROL SULFATE 3 ML: 2.5; .5 SOLUTION RESPIRATORY (INHALATION) at 16:36

## 2024-05-02 RX ADMIN — IOPAMIDOL 70 ML: 755 INJECTION, SOLUTION INTRAVENOUS at 20:26

## 2024-05-02 RX ADMIN — DEXAMETHASONE SODIUM PHOSPHATE 10 MG: 10 INJECTION INTRAMUSCULAR; INTRAVENOUS at 21:35

## 2024-05-02 ASSESSMENT — PAIN SCALES - GENERAL
PAINLEVEL: NO PAIN (0)
PAINLEVEL: MODERATE PAIN (5)

## 2024-05-02 ASSESSMENT — ENCOUNTER SYMPTOMS
FATIGUE: 1
CHILLS: 1
APPETITE CHANGE: 1
FEVER: 1
SHORTNESS OF BREATH: 1
COUGH: 1
FATIGUE: 1
COUGH: 1
ABDOMINAL PAIN: 0

## 2024-05-02 ASSESSMENT — ACTIVITIES OF DAILY LIVING (ADL)
ADLS_ACUITY_SCORE: 37

## 2024-05-02 ASSESSMENT — COLUMBIA-SUICIDE SEVERITY RATING SCALE - C-SSRS
1. IN THE PAST MONTH, HAVE YOU WISHED YOU WERE DEAD OR WISHED YOU COULD GO TO SLEEP AND NOT WAKE UP?: NO
6. HAVE YOU EVER DONE ANYTHING, STARTED TO DO ANYTHING, OR PREPARED TO DO ANYTHING TO END YOUR LIFE?: NO
2. HAVE YOU ACTUALLY HAD ANY THOUGHTS OF KILLING YOURSELF IN THE PAST MONTH?: NO

## 2024-05-02 ASSESSMENT — PATIENT HEALTH QUESTIONNAIRE - PHQ9
SUM OF ALL RESPONSES TO PHQ QUESTIONS 1-9: 7
SUM OF ALL RESPONSES TO PHQ QUESTIONS 1-9: 7
10. IF YOU CHECKED OFF ANY PROBLEMS, HOW DIFFICULT HAVE THESE PROBLEMS MADE IT FOR YOU TO DO YOUR WORK, TAKE CARE OF THINGS AT HOME, OR GET ALONG WITH OTHER PEOPLE: NOT DIFFICULT AT ALL

## 2024-05-02 NOTE — ED TRIAGE NOTES
Patient has had a cough for about a week. SOB. Saw Dr. Basilio. Sent to ED for a pneumonia and an abscess in the lung. Pain in the left lung

## 2024-05-02 NOTE — TELEPHONE ENCOUNTER
2:07 PM    Reason for Call: OVERBOOK    Patient is having the following symptoms: Patient needs to be seen  for bad cough, pain under ribs, fatigue, sleeping all the time.days.    The patient is requesting an appointment for Overbook with .    Was an appointment offered for this call? No  If yes : Appointment type              Date    Preferred method for responding to this message: Telephone Call  What is your phone number ?  [phone]    If we cannot reach you directly, may we leave a detailed response at the number you provided? Yes    Can this message wait until your PCP/provider returns, if unavailable today? Provider is in today    Natalie Mcmahon

## 2024-05-02 NOTE — PROGRESS NOTES
Assessment & Plan       ICD-10-CM    1. Community acquired bacterial pneumonia  J15.9       2. Pulmonary mass  R91.8       3. Hypoxia  R09.02 ipratropium - albuterol 0.5 mg/2.5 mg/3 mL (DUONEB) neb solution 3 mL      4. Chronic obstructive pulmonary disease with acute exacerbation (H)  J44.1       5. Malaise  R53.81 CBC with Platelets & Differential     Comprehensive metabolic panel     CRP inflammation     XR Chest 2 Views     Procalcitonin     UA with Microscopic reflex to Culture     ipratropium - albuterol 0.5 mg/2.5 mg/3 mL (DUONEB) neb solution 3 mL      Question early sepsis  NEW mass - had CT on October normal  Agree on possible abscess  Hypoxia improved and less sedated but weak still after neb  Discussed case with [facility]-- pt I feels needs CT/Cx and reassess  If abscess - probably needs to go to [place]  With COPD/other morbidity -- I feel should be consider for hospitalization due to progression and larger pneumonia   Pt was accepted for transfer to ER for more higher level care.   I appreciate ER help              BMI  Estimated body mass index is 31.53 kg/m  as calculated from the following:    Height as of 3/15/24: 1.829 m (6').    Weight as of 4/8/24: 105.5 kg (232 lb 8 oz).         .this  No follow-ups on file.    Subjective   Gilberto is a 69 year old, presenting for the following health issues:  Fatigue and Cough    Fatigue  Associated symptoms include coughing and fatigue.   Cough      Acute Illness  Acute illness concerns: Cough/ fatigue   Onset/Duration: 1 week ago   Symptoms:  Fever: No  Chills/Sweats: No  Headache (location?): No  Sinus Pressure: YES  Conjunctivitis:  No  Ear Pain: no  Rhinorrhea: No  Congestion: YES  Sore Throat: No  Cough: YES-non-productive  Wheeze: No  Decreased Appetite: No  Nausea: No  Vomiting: No  Diarrhea: No  Dysuria/Freq.: No  Dysuria or Hematuria: No  Fatigue/Achiness: YES  Sick/Strep Exposure: No  Therapies tried and outcome: cold and flu medicine, elderberry  syrup, psyllium beads     Left lower chest pain with deep breath  Coughing more  More SOB    More PÉREZ /SOB today     Little more confused/sleepy/ slow to answer    Sats in mid 80's- after duoneb - improved to mid 90's     Review of Systems  Constitutional, HEENT, cardiovascular, pulmonary, gi and gu systems are negative, except as otherwise noted.        Objective    /60 (BP Location: Right arm, Patient Position: Sitting, Cuff Size: Adult Large)   Pulse 74   Temp 99.4  F (37.4  C) (Tympanic)   Resp 18   SpO2 (!) 87%   There is no height or weight on file to calculate BMI.  Physical Exam   GENERAL: alert and no distress but lethargic/slowed cognition initially got better after neb, was wanting to shut eyes   EYES: Eyes grossly normal to inspection, PERRL and conjunctivae and sclerae normal  HENT: ear canals and TM's normal, nose and mouth without ulcers or lesions  NECK: no adenopathy, no asymmetry, masses, or scars  RESP: lungs clear to auscultation - LLL/LML rales, no  rhonchi or wheezes  CV: regular rate and rhythm, normal S1 S2, no S3 or S4, no murmur, click or rub, no peripheral edema  ABDOMEN: soft, nontender, no hepatosplenomegaly, no masses and bowel sounds normal  MS: no gross musculoskeletal defects noted, no edema- no swelling and negative slava  SKIN: no suspicious lesions or rashes  NEURO: Normal strength and tone, mentation intact and speech normal  PSYCH: mentation appears normal, affect flat  but more flat and slowed than usual     Results for orders placed or performed in visit on 05/02/24   XR Chest 2 Views     Status: None    Narrative    PROCEDURE:  XR CHEST 2 VIEWS    HISTORY: Bronchopneumonia; Malaise    COMPARISON: CT chest 10/2/2023    FINDINGS: PA and lateral chest radiographs  Postsurgical changes of the chest.  Cardiomediastinal silhouette is within normal limits.  Rounded 4.5 x 4.3 cm mass in the left lower lobe with surrounding  patchy groundglass opacities. No effusion or  pneumothorax.    No suspicious osseous lesion or subdiaphragmatic free air.      Impression    IMPRESSION:    Rounded mass in the left lower lobe with surrounding ill-defined  opacities, concerning for malignancy and/or infection, developing  abscess.    LEWIS WREN MD         SYSTEM ID:  [id number]   Comprehensive metabolic panel     Status: Abnormal   Result Value Ref Range    Sodium 137 135 - 145 mmol/L    Potassium 4.4 3.4 - 5.3 mmol/L    Carbon Dioxide (CO2) 25 22 - 29 mmol/L    Anion Gap 14 7 - 15 mmol/L    Urea Nitrogen 22.8 8.0 - 23.0 mg/dL    Creatinine 1.16 0.67 - 1.17 mg/dL    GFR Estimate 67 >60 mL/min/1.73m2    Calcium 9.5 8.8 - 10.2 mg/dL    Chloride 98 98 - 107 mmol/L    Glucose 197 (H) 70 - 99 mg/dL    Alkaline Phosphatase 143 40 - 150 U/L    AST 17 0 - 45 U/L    ALT 13 0 - 70 U/L    Protein Total 7.5 6.4 - 8.3 g/dL    Albumin 3.8 3.5 - 5.2 g/dL    Bilirubin Total 0.4 <=1.2 mg/dL   CRP inflammation     Status: Abnormal   Result Value Ref Range    CRP Inflammation 76.74 (H) <5.00 mg/L   Procalcitonin     Status: Normal   Result Value Ref Range    Procalcitonin 0.12 <0.50 ng/mL   UA with Microscopic reflex to Culture     Status: Abnormal    Specimen: Urine, Clean Catch   Result Value Ref Range    Color Urine Yellow Colorless, Straw, Light Yellow, Yellow    Appearance Urine Clear Clear    Glucose Urine Negative Negative mg/dL    Bilirubin Urine Negative Negative    Ketones Urine Negative Negative mg/dL    Specific Gravity Urine 1.030 1.003 - 1.035    Blood Urine Negative Negative    pH Urine 5.5 4.7 - 8.0    Protein Albumin Urine 20 (A) Negative mg/dL    Urobilinogen Urine 2.0 Normal, 2.0 mg/dL    Nitrite Urine Negative Negative    Leukocyte Esterase Urine Negative Negative    Mucus Urine Present (A) None Seen /LPF    RBC Urine <1 <=2 /HPF    WBC Urine 2 <=5 /HPF    Squamous Epithelials Urine 0 <=1 /HPF    Narrative    Urine Culture not indicated   CBC with platelets and differential     Status:  Abnormal   Result Value Ref Range    WBC Count 15.0 (H) 4.0 - 11.0 10e3/uL    RBC Count 4.20 (L) 4.40 - 5.90 10e6/uL    Hemoglobin 11.0 (L) 13.3 - 17.7 g/dL    Hematocrit 35.1 (L) 40.0 - 53.0 %    MCV 84 78 - 100 fL    MCH 26.2 (L) 26.5 - 33.0 pg    MCHC 31.3 (L) 31.5 - 36.5 g/dL    RDW 14.9 10.0 - 15.0 %    Platelet Count 375 150 - 450 10e3/uL    % Neutrophils 77 %    % Lymphocytes 9 %    % Monocytes 10 %    % Eosinophils 2 %    % Basophils 1 %    % Immature Granulocytes 1 %    NRBCs per 100 WBC 0 <1 /100    Absolute Neutrophils 11.5 (H) 1.6 - 8.3 10e3/uL    Absolute Lymphocytes 1.4 0.8 - 5.3 10e3/uL    Absolute Monocytes 1.5 (H) 0.0 - 1.3 10e3/uL    Absolute Eosinophils 0.3 0.0 - 0.7 10e3/uL    Absolute Basophils 0.1 0.0 - 0.2 10e3/uL    Absolute Immature Granulocytes 0.2 <=0.4 10e3/uL    Absolute NRBCs 0.0 10e3/uL   CBC with Platelets & Differential     Status: Abnormal    Narrative    The following orders were created for panel order CBC with Platelets & Differential.  Procedure                               Abnormality         Status                     ---------                               -----------         ------                     CBC with platelets and d...[[id number]]  Abnormal            Final result                 Please view results for these tests on the individual orders.             Signed Electronically by: Rock Basilio MD    Answers submitted by the patient for this visit:  Patient Health Questionnaire (Submitted on 5/2/2024)  If you checked off any problems, how difficult have these problems made it for you to do your work, take care of things at home, or get along with other people?: Not difficult at all  PHQ9 TOTAL SCORE: 7

## 2024-05-03 ENCOUNTER — TELEPHONE (OUTPATIENT)
Dept: FAMILY MEDICINE | Facility: OTHER | Age: 69
End: 2024-05-03

## 2024-05-03 NOTE — TELEPHONE ENCOUNTER
10:11 AM    Reason for Call: OVERBOOK    Patient is having the following symptoms: ER follow up appointment.    The patient is requesting an appointment for ER follow up appt. with [facility].    Was an appointment offered for this call? No appts available within a week  If yes : Appointment type              Date    Preferred method for responding to this message: Telephone Call  What is your phone number ? [phone]     If we cannot reach you directly, may we leave a detailed response at the number you provided? Yes    Can this message wait until your PCP/provider returns, if unavailable today? YES    Lori Magallanes

## 2024-05-03 NOTE — DISCHARGE INSTRUCTIONS
Continue the antibiotics as prescribed.    Follow-up in the clinic for recheck and further discussion over biopsy etc.    Return here for any other questions or concerns.

## 2024-05-03 NOTE — ED PROVIDER NOTES
History     Chief Complaint   Patient presents with    Cough    Shortness of Breath     The history is provided by the patient.     Gilberto Camilo is a 69 year old male who presented to the emergency department ambulatory from the clinic for evaluation of approximate 1 week history of cough and fever.  Patient reports intermittent weakness as well.  Intermittent dyspnea.  Found to have abnormalities on clinic evaluation including an elevated CRP, leukocytosis, and abnormal chest x-ray.  He was advised to present to the emergency department for further evaluation.  No travel.  No hemoptysis.  No unusual rashes.  Previous smoking history.    Allergies:  No Known Allergies    Problem List:    Patient Active Problem List    Diagnosis Date Noted    Complex sleep apnea syndrome 02/26/2024     Priority: Medium    Fall from standing, initial encounter 06/30/2023     Priority: Medium    S/P CABG (coronary artery bypass graft) 02/13/2023     Priority: Medium    S/P mitral valve repair 02/13/2023     Priority: Medium    Postoperative atrial fibrillation (H) 02/13/2023     Priority: Medium    Peripheral arterial disease (H24) 02/13/2023     Priority: Medium    Coronary artery disease involving native heart, unspecified vessel or lesion type, unspecified whether angina present 01/18/2023     Priority: Medium    Status post coronary angiogram 12/09/2022     Priority: Medium    Mitral valve prolapse 09/30/2022     Priority: Medium    S/P AAA repair 08/29/2022     Priority: Medium    Tobacco dependence in remission 08/29/2022     Priority: Medium    Elevated prostate specific antigen (PSA) 02/15/2021     Priority: Medium    Prediabetes 02/15/2021     Priority: Medium    Diabetes mellitus, type 2 (H) 02/15/2021     Priority: Medium    Morbid obesity (H) 08/21/2020     Priority: Medium    Moderate major depression (H) 06/19/2020     Priority: Medium    Mixed hyperlipidemia 06/26/2018     Priority: Medium    Essential hypertension  10/11/2017     Priority: Medium    ACP (advance care planning) 05/11/2016     Priority: Medium     Advance Care Planning 5/11/2016: ACP Review of Chart / Resources Provided:  Reviewed chart for advance care plan.  Gilberto Camilo has no plan or code status on file. Discussed available resources and provided with information. Confirmed code status reflects current choices pending further ACP discussions.  Confirmed/documented legally designated decision makers.  Added by Lorenza Jordan            Routine general medical examination at a health care facility 05/11/2016     Priority: Medium    Erectile dysfunction 05/18/2015     Priority: Medium    Abdominal aortic aneurysm (H24) 03/18/2014     Priority: Medium     Problem list name updated by automated process. Provider to review          Past Medical History:    Past Medical History:   Diagnosis Date    AAA (abdominal aortic aneurysm) (H24) 09/20/2017    Mohan esophagus     Closed rib fracture     Coronary artery disease involving native heart, unspecified vessel or lesion type, unspecified whether angina present 1/18/2023    DNS (deviated nasal septum)     Postoperative atrial fibrillation (H) 2/13/2023    S/P CABG (coronary artery bypass graft) 2/13/2023    S/P mitral valve repair 2/13/2023       Past Surgical History:    Past Surgical History:   Procedure Laterality Date    AAA REPAIR  06/17/2020    [facility]/ intravascular repair    APPENDECTOMY      BYPASS GRAFT ARTERY CORONARY, REPAIR VALVE MITRAL, COMBINED N/A 1/18/2023    Procedure: MEDIAN STERNOTOMY, ON PUMP OXGENATION, Left endovascular saphaneous vein harvest, CORONARY ARTERY BYPASS GRAFT (CABG x 2), Mitral Valve Repair, transesophageal echocardiogram (TRENT) performed by anesthesia;  Surgeon: Linda Kim MD;  Location: [facility] OR    COLONOSCOPY  2013    CV CORONARY ANGIOGRAM N/A 12/9/2022    Procedure: Coronary Angiogram with possible intervention;  Surgeon: Porfirio Herrera MD;   Location:  HE[facility]ARD[facility] CATH LAB    CV RIGHT HEART CATH MEASUREMENTS RECORDED N/A 12/9/2022    Procedure: Right Heart Cath;  Surgeon: Porfirio Herrera MD;  Location:  HE[facility]ARD[facility] CATH LAB    ESOPHAGOGASTRODUODENOSCOPY  2013    INCISION AND DRAINAGE CHEST WASHOUT, COMBINED N/A 1/19/2023    Procedure: chest washout, Re-do sternotomy, repair of vein graft;  Surgeon: Linda Kim MD;  Location:  OR       Family History:    Family History   Problem Relation Age of Onset    C.A.D. Father         CABG in late 40's    C.A.D. Brother         MI in 50's       Social History:  Marital Status:   [2]  Social History     Tobacco Use    Smoking status: Former     Current packs/day: 1.00     Average packs/day: 1 pack/day for 25.0 years (25.0 ttl pk-yrs)     Types: Cigarettes     Passive exposure: Never    Smokeless tobacco: Never    Tobacco comments:     Feb. 2014   Vaping Use    Vaping status: Never Used   Substance Use Topics    Alcohol use: Yes     Comment: occasional    Drug use: Yes     Types: Marijuana        Medications:    benzonatate (TESSALON) 200 MG capsule  levofloxacin (LEVAQUIN) 500 MG tablet  ACCU-CHEK GUIDE test strip  ARIPiprazole (ABILIFY) 5 MG tablet  aspirin (ASA) 81 MG chewable tablet  atorvastatin (LIPITOR) 80 MG tablet  blood glucose monitoring (NO BRAND SPECIFIED) meter device kit  blood glucose monitoring (SOFTCLIX) lancets  Blood Glucose Monitoring Suppl (ACCU-CHEK GUIDE) w/Device KIT  buPROPion (WELLBUTRIN XL) 300 MG 24 hr tablet  gentamicin (GARAMYCIN) 0.3 % ophthalmic solution  loratadine (CLARITIN) 10 MG tablet  metoprolol succinate ER (TOPROL XL) 25 MG 24 hr tablet  multivitamin, therapeutic (THERA-VIT) TABS tablet  oxyBUTYnin ER (DITROPAN XL) 15 MG 24 hr tablet  pantoprazole (PROTONIX) 40 MG EC tablet  pioglitazone (ACTOS) 15 MG tablet  vitamin C (ASCORBIC ACID) 1000 MG TABS  Vitamin D3 (CHOLECALCIFEROL) 25 mcg (1000 units) tablet          Review of Systems    Constitutional:  Positive for appetite change, chills, fatigue and fever.   Respiratory:  Positive for cough and shortness of breath.    Cardiovascular:  Negative for chest pain.   Gastrointestinal:  Negative for abdominal pain.       Physical Exam   BP: 137/76  Pulse: 73  Temp: 100.4  F (38  C)  Resp: 18  SpO2: 94 %      Physical Exam  Vitals and nursing note reviewed.   Constitutional:       General: He is not in acute distress.     Appearance: He is not toxic-appearing or diaphoretic.      Comments: Pleasant and talkative but chronically ill-appearing 69-year-old male found in no significant distress.   Cardiovascular:      Rate and Rhythm: Normal rate and regular rhythm.      Comments: No significant tachycardia on my exam  Pulmonary:      Effort: Pulmonary effort is normal.      Comments: Harsh cough without significant tachypnea, increased work of breathing, or respiratory distress.  Skin:     General: Skin is warm and dry.      Capillary Refill: Capillary refill takes less than 2 seconds.   Neurological:      General: No focal deficit present.      Mental Status: He is alert and oriented to person, place, and time.         ED Course     ED Course as of 05/02/24 2209   Thu May 02, 2024   2134 Could only get 1 culture     Procedures              Critical Care time:  none               Results for orders placed or performed in visit on 05/02/24 (from the past 24 hour(s))   CBC with Platelets & Differential    Narrative    The following orders were created for panel order CBC with Platelets & Differential.  Procedure                               Abnormality         Status                     ---------                               -----------         ------                     CBC with platelets and d...[[id number]]  Abnormal            Final result                 Please view results for these tests on the individual orders.   Comprehensive metabolic panel   Result Value Ref Range    Sodium 137 135 - 145  mmol/L    Potassium 4.4 3.4 - 5.3 mmol/L    Carbon Dioxide (CO2) 25 22 - 29 mmol/L    Anion Gap 14 7 - 15 mmol/L    Urea Nitrogen 22.8 8.0 - 23.0 mg/dL    Creatinine 1.16 0.67 - 1.17 mg/dL    GFR Estimate 67 >60 mL/min/1.73m2    Calcium 9.5 8.8 - 10.2 mg/dL    Chloride 98 98 - 107 mmol/L    Glucose 197 (H) 70 - 99 mg/dL    Alkaline Phosphatase 143 40 - 150 U/L    AST 17 0 - 45 U/L    ALT 13 0 - 70 U/L    Protein Total 7.5 6.4 - 8.3 g/dL    Albumin 3.8 3.5 - 5.2 g/dL    Bilirubin Total 0.4 <=1.2 mg/dL   CRP inflammation   Result Value Ref Range    CRP Inflammation 76.74 (H) <5.00 mg/L   Procalcitonin   Result Value Ref Range    Procalcitonin 0.12 <0.50 ng/mL   UA with Microscopic reflex to Culture    Specimen: Urine, Clean Catch   Result Value Ref Range    Color Urine Yellow Colorless, Straw, Light Yellow, Yellow    Appearance Urine Clear Clear    Glucose Urine Negative Negative mg/dL    Bilirubin Urine Negative Negative    Ketones Urine Negative Negative mg/dL    Specific Gravity Urine 1.030 1.003 - 1.035    Blood Urine Negative Negative    pH Urine 5.5 4.7 - 8.0    Protein Albumin Urine 20 (A) Negative mg/dL    Urobilinogen Urine 2.0 Normal, 2.0 mg/dL    Nitrite Urine Negative Negative    Leukocyte Esterase Urine Negative Negative    Mucus Urine Present (A) None Seen /LPF    RBC Urine <1 <=2 /HPF    WBC Urine 2 <=5 /HPF    Squamous Epithelials Urine 0 <=1 /HPF    Narrative    Urine Culture not indicated   CBC with platelets and differential   Result Value Ref Range    WBC Count 15.0 (H) 4.0 - 11.0 10e3/uL    RBC Count 4.20 (L) 4.40 - 5.90 10e6/uL    Hemoglobin 11.0 (L) 13.3 - 17.7 g/dL    Hematocrit 35.1 (L) 40.0 - 53.0 %    MCV 84 78 - 100 fL    MCH 26.2 (L) 26.5 - 33.0 pg    MCHC 31.3 (L) 31.5 - 36.5 g/dL    RDW 14.9 10.0 - 15.0 %    Platelet Count 375 150 - 450 10e3/uL    % Neutrophils 77 %    % Lymphocytes 9 %    % Monocytes 10 %    % Eosinophils 2 %    % Basophils 1 %    % Immature Granulocytes 1 %    NRBCs  per 100 WBC 0 <1 /100    Absolute Neutrophils 11.5 (H) 1.6 - 8.3 10e3/uL    Absolute Lymphocytes 1.4 0.8 - 5.3 10e3/uL    Absolute Monocytes 1.5 (H) 0.0 - 1.3 10e3/uL    Absolute Eosinophils 0.3 0.0 - 0.7 10e3/uL    Absolute Basophils 0.1 0.0 - 0.2 10e3/uL    Absolute Immature Granulocytes 0.2 <=0.4 10e3/uL    Absolute NRBCs 0.0 10e3/uL   XR Chest 2 Views    Narrative    PROCEDURE:  XR CHEST 2 VIEWS    HISTORY: Bronchopneumonia; Malaise    COMPARISON: CT chest 10/2/2023    FINDINGS: PA and lateral chest radiographs  Postsurgical changes of the chest.  Cardiomediastinal silhouette is within normal limits.  Rounded 4.5 x 4.3 cm mass in the left lower lobe with surrounding  patchy groundglass opacities. No effusion or pneumothorax.    No suspicious osseous lesion or subdiaphragmatic free air.      Impression    IMPRESSION:    Rounded mass in the left lower lobe with surrounding ill-defined  opacities, concerning for malignancy and/or infection, developing  abscess.    LEWIS WREN MD         SYSTEM ID:  [id number]       Medications   levofloxacin (LEVAQUIN) infusion 750 mg (750 mg Intravenous $New Bag 5/2/24 2131)   iopamidol (ISOVUE-370) solution 70 mL (70 mLs Intravenous $Given 5/2/24 2026)   sodium chloride (PF) 0.9% PF flush 50 mL (50 mLs Intravenous $Given 5/2/24 2026)   benzonatate (TESSALON) capsule 200 mg (200 mg Oral $Given 5/2/24 2135)   dexAMETHasone PF (DECADRON) injection 10 mg (10 mg Intravenous $Given 5/2/24 2135)       Assessments & Plan (with Medical Decision Making)   Workup reviewed from the clinic.  IV established and single blood culture drawn.  Unable to obtain a second.  CT scan of the chest with official review from [facility] shows left lower lobe mass.  Given the patient's cough, leukocytosis, and fever, I did elect to treat the patient with Levaquin and Tessalon.  Stressed importance of close clinic follow-up.  At this time there is no reasonable indication for admission.  He has normal oxygen  saturations on room air.  Procalcitonin is low.  Strict return precautions were provided.  Close clinic follow-up discussed.  Message sent to primary physician.    Mr. Camilo has also agreed to schedule a follow up appointment with his primary provider for re-evaluation and further management. He verbalized an understanding of the results of our workup and agree with the complete discharge plan including instructions for return and follow up.  There is no indication for further investigation or treatment on an emergent basis.  There is no reasonably foreseeable injury that would be associated with discharge and close outpatient follow-up.      This document was prepared using a combination of typing and voice generated software.  While every attempt was made for accuracy, spelling and grammatical errors may exist.     I have reviewed the nursing notes.    I have reviewed the findings, diagnosis, plan and need for follow up with the patient.           Medical Decision Making  The patient's presentation was of moderate complexity (an undiagnosed new problem with uncertain diagnosis).    The patient's evaluation involved:  review of 3+ test result(s) ordered prior to this encounter (multiple labs and x-ray from the clinic)  ordering and/or review of 2 test(s) in this encounter (blood cultures and CT scan)    The patient's management necessitated moderate risk (prescription drug management including medications given in the ED).        New Prescriptions    BENZONATATE (TESSALON) 200 MG CAPSULE    Take 1 capsule (200 mg) by mouth 3 times daily as needed for cough    LEVOFLOXACIN (LEVAQUIN) 500 MG TABLET    Take 1 tablet (500 mg) by mouth daily for 7 days       Final diagnoses:   Lung mass   Leukocytosis       5/2/2024   [facility]       Kane Pollard PA-C  05/02/24 7171

## 2024-05-06 ENCOUNTER — TELEPHONE (OUTPATIENT)
Dept: FAMILY MEDICINE | Facility: OTHER | Age: 69
End: 2024-05-06

## 2024-05-06 ENCOUNTER — OFFICE VISIT (OUTPATIENT)
Dept: FAMILY MEDICINE | Facility: OTHER | Age: 69
End: 2024-05-06
Attending: FAMILY MEDICINE
Payer: COMMERCIAL

## 2024-05-06 VITALS
BODY MASS INDEX: 31.27 KG/M2 | TEMPERATURE: 98 F | OXYGEN SATURATION: 95 % | HEIGHT: 72 IN | RESPIRATION RATE: 19 BRPM | DIASTOLIC BLOOD PRESSURE: 60 MMHG | HEART RATE: 76 BPM | SYSTOLIC BLOOD PRESSURE: 120 MMHG | WEIGHT: 230.9 LBS

## 2024-05-06 DIAGNOSIS — R09.02 HYPOXIA: ICD-10-CM

## 2024-05-06 DIAGNOSIS — F33.42 RECURRENT MAJOR DEPRESSIVE DISORDER, IN FULL REMISSION (H): ICD-10-CM

## 2024-05-06 DIAGNOSIS — J44.1 CHRONIC OBSTRUCTIVE PULMONARY DISEASE WITH ACUTE EXACERBATION (H): ICD-10-CM

## 2024-05-06 DIAGNOSIS — J15.9 COMMUNITY ACQUIRED BACTERIAL PNEUMONIA: Primary | ICD-10-CM

## 2024-05-06 DIAGNOSIS — R91.8 PULMONARY MASS: ICD-10-CM

## 2024-05-06 PROCEDURE — 36415 COLL VENOUS BLD VENIPUNCTURE: CPT | Mod: ZL | Performed by: FAMILY MEDICINE

## 2024-05-06 PROCEDURE — 87449 NOS EACH ORGANISM AG IA: CPT | Mod: ZL | Performed by: FAMILY MEDICINE

## 2024-05-06 PROCEDURE — G0463 HOSPITAL OUTPT CLINIC VISIT: HCPCS

## 2024-05-06 PROCEDURE — 99214 OFFICE O/P EST MOD 30 MIN: CPT | Performed by: FAMILY MEDICINE

## 2024-05-06 RX ORDER — LEVOFLOXACIN 500 MG/1
500 TABLET, FILM COATED ORAL DAILY
Qty: 3 TABLET | Refills: 0 | Status: SHIPPED | OUTPATIENT
Start: 2024-05-06 | End: 2024-06-25

## 2024-05-06 RX ORDER — BUPROPION HYDROCHLORIDE 300 MG/1
300 TABLET ORAL EVERY MORNING
Qty: 90 TABLET | Refills: 3 | Status: SHIPPED | OUTPATIENT
Start: 2024-05-06

## 2024-05-06 ASSESSMENT — PAIN SCALES - GENERAL: PAINLEVEL: MODERATE PAIN (4)

## 2024-05-06 NOTE — LETTER
My COPD Action Plan     Name: Gilberto Camilo    YOB: 1955   Date: 5/6/2024    My doctor: Rokc Basilio MD   My clinic: [facility] [place]    [facility]  [place] [address]  [phone]  My Controller Medicine:    Dose:      My Rescue Medicine:    Dose:      My Flare Up Medicine:    Dose:      My COPD Severity:       Use of Oxygen: Oxygen Not Prescribed      Make sure you've had your pneumonia   vaccines.          GREEN ZONE       Doing well today    Usual level of activity and exercise  Usual amount of cough and mucus  No shortness of breath  Usual level of health (thinking clearly, sleeping well, feel like eating) Actions:    Take daily medicines  Use oxygen as prescribed  Follow regular exercise and diet plan  Avoid cigarette smoke and other irritants that harm the lungs           YELLOW ZONE          Having a bad day or flare up    Short of breath more than usual  A lot more sputum (mucus) than usual  Sputum looks yellow, green, tan, brown or bloody  More coughing or wheezing  Fever or chills  Less energy; trouble completing activities  Trouble thinking or focusing  Using quick relief inhaler or nebulizer more often  Poor sleep; symptoms wake me up  Do not feel like eating Actions:    Get plenty of rest  Take daily medicines  Use quick relief inhaler   If you use oxygen, call you doctor to see if you should adjust your oxygen  Do breathing exercises or other things to help you relax  Let a loved one, friend or neighbor know you are feeling worse  Call your care team if you have 2 or more symptoms.  Start taking steroids or antibiotics if directed by your care team           RED ZONE       Need medical care now    Severe shortness of breath (feel you can't breathe)  Fever, chills  Not enough breath to do any activity  Trouble coughing up mucus, walking or talking  Blood in mucus  Frequent coughing Rescue medicines are not working  Not able to sleep because of  breathing  Feel confused or drowsy  Chest pain    Actions:    Call your health care team.  If you cannot reach your care team, call 911 or go to the emergency room.        Annual Reminders:  Meet with Care Team, Flu Shot every Fall  Pharmacy:    [id number] - [place], [place] - [address] AT [facility] [address]  [facility] [place] - [address] [facility] [address]  [facility] [place] - [address] [id number] - [facility] [address] [facility] [address]

## 2024-05-06 NOTE — TELEPHONE ENCOUNTER
Patient wife calling back they haven't heard anything back yet and they are very anxious regarding this lung mass and they want to be referred.

## 2024-05-06 NOTE — TELEPHONE ENCOUNTER
9:05 AM    Reason for Call: OVERBOOK/ER FOLLOW UP    ER Follow Up/[facility]/05/02/2024 visit date/lung mass    The patient is requesting an appointment for Soon with Dr. Basilio.    Was an appointment offered for this call? No  If yes : Appointment type              Date    Preferred method for responding to this message: Telephone Call  What is your phone number ?[phone]     If we cannot reach you directly, may we leave a detailed response at the number you provided? Yes    Can this message wait until your PCP/provider returns, if unavailable today? Not applicable    Renata Turner

## 2024-05-06 NOTE — PROGRESS NOTES
Assessment & Plan     Community acquired bacterial pneumonia  Clinically improved. Unusual shape on CT -- lung cancer still possible but would be growing very fast -- clinically doing well and still seems infectious. See below. Take total of 10 days abx. And willfu with labs and CXR in 10 days to see what it looks like   - Blastomyces Agn Quant EIA Blood  - Blastomyces Agn Quant EIA Non Blood  - levofloxacin (LEVAQUIN) 500 MG tablet; Take 1 tablet (500 mg) by mouth daily    Pulmonary mass  As above.  R/o blasto but should see more lesions or colonies/masses.  Clinically better with abx  - Blastomyces Agn Quant EIA Blood  - Blastomyces Agn Quant EIA Non Blood  - levofloxacin (LEVAQUIN) 500 MG tablet; Take 1 tablet (500 mg) by mouth daily    Hypoxia  Resolved     Chronic obstructive pulmonary disease with acute exacerbation (H)  Improved - has been nebing at home - he is to continue     Recurrent major depressive disorder, in full remission (H24)  Doing well. RF needed   - buPROPion (WELLBUTRIN XL) 300 MG 24 hr tablet; Take 1 tablet (300 mg) by mouth every morning            BMI  Estimated body mass index is 31.32 kg/m  as calculated from the following:    Height as of this encounter: 1.829 m (6').    Weight as of this encounter: 104.7 kg (230 lb 14.4 oz).           No follow-ups on file.    Subjective   Gilberto is a 69 year old, presenting for the following health issues:  RECHECK        5/6/2024     1:09 PM   Additional Questions   Roomed by Naomi Marshall LPN, CCP, MHP   Accompanied by wife     HPI         ED/UC Followup:    Facility:  [facility]  Date of visit: 05/02/2024  Reason for visit: Lung mass; Leukocytosis   Current Status: SOB, Fatigue, Coughing- Worsening    Some mild improvement   Less fatigue   No fevers  Less confused  PÉREZ /oxygen levels slight better     ER notes and CT reveiwed    -pt has some risk for blasto-- [facility]/ [facility]     Diabetes Follow-up    How often are you checking your blood sugar? Not at all  What concerns do you have today about your diabetes? None   Do you have any of these symptoms? (Select all that apply)  No numbness or tingling in feet.  No redness, sores or blisters on feet.  No complaints of excessive thirst.  No reports of blurry vision.  No significant changes to weight.      BP Readings from Last 2 Encounters:   05/06/24 120/60   05/02/24 127/73     Hemoglobin A1C (%)   Date Value   03/15/2024 7.0 (H)   11/28/2023 7.1 (H)   02/15/2021 6.5 (H)     LDL Cholesterol Calculated (mg/dL)   Date Value   05/04/2023 49   08/29/2022 56   08/21/2020 68   10/02/2019 97             Hypertension Follow-up    Do you check your blood pressure regularly outside of the clinic? Yes   Are you following a low salt diet? No  Are your blood pressures ever more than 140 on the top number (systolic) OR more   than 90 on the bottom number (diastolic), for example 140/90? No    COPD Follow-Up  Overall, how are your COPD symptoms since your last clinic visit?  Slightly worse  How much fatigue or shortness of breath do you have when you are walking?  More than usual  How much shortness of breath do you have when you are resting?  More than usual  How often do you cough? Often  Have you noticed any change in your sputum/phlegm?  No  Have you experienced a recent fever? Unknown  Please describe how far you can walk without stopping to rest:  The length of 1-2 rooms  How many flights of stairs are you able to walk up without stopping?  1  Have you had any Emergency Room Visits, Urgent Care Visits, or Hospital Admissions because of your COPD since your last office visit?  No    History   Smoking Status    Former    Types: Cigarettes   Smokeless Tobacco    Never     No results found for: \"FEV1\", \"FNR3XTL\"        Review of Systems  Constitutional, HEENT, cardiovascular, pulmonary, gi and gu systems are negative, except as " otherwise noted.      Objective    /60 (BP Location: Left arm, Patient Position: Sitting, Cuff Size: Adult Large)   Pulse 76   Temp 98  F (36.7  C) (Tympanic)   Resp 19   Ht 1.829 m (6')   Wt 104.7 kg (230 lb 14.4 oz)   SpO2 95%   BMI 31.32 kg/m    Body mass index is 31.32 kg/m .  Physical Exam   GENERAL: alert and no distress- much more alert /dry cough . Looks way better   NECK: no adenopathy, no asymmetry, masses, or scars  RESP: lungs clear to auscultation - no rales, rhonchi or wheezes  CV: regular rate and rhythm, normal S1 S2, no S3 or S4, no murmur, click or rub, no peripheral edema             Signed Electronically by: Rock Basilio MD

## 2024-05-08 LAB — BACTERIA BLD CULT: NO GROWTH

## 2024-05-10 LAB
SCANNED LAB RESULT: NORMAL
SCANNED LAB RESULT: NORMAL

## 2024-05-16 ENCOUNTER — LAB (OUTPATIENT)
Dept: LAB | Facility: OTHER | Age: 69
End: 2024-05-16
Attending: FAMILY MEDICINE
Payer: COMMERCIAL

## 2024-05-16 ENCOUNTER — OFFICE VISIT (OUTPATIENT)
Dept: FAMILY MEDICINE | Facility: OTHER | Age: 69
End: 2024-05-16
Attending: FAMILY MEDICINE
Payer: COMMERCIAL

## 2024-05-16 ENCOUNTER — ANCILLARY PROCEDURE (OUTPATIENT)
Dept: GENERAL RADIOLOGY | Facility: OTHER | Age: 69
End: 2024-05-16
Attending: FAMILY MEDICINE
Payer: COMMERCIAL

## 2024-05-16 VITALS
WEIGHT: 230 LBS | RESPIRATION RATE: 19 BRPM | BODY MASS INDEX: 31.15 KG/M2 | HEIGHT: 72 IN | DIASTOLIC BLOOD PRESSURE: 68 MMHG | OXYGEN SATURATION: 96 % | HEART RATE: 72 BPM | TEMPERATURE: 97.5 F | SYSTOLIC BLOOD PRESSURE: 120 MMHG

## 2024-05-16 DIAGNOSIS — J44.1 CHRONIC OBSTRUCTIVE PULMONARY DISEASE WITH ACUTE EXACERBATION (H): ICD-10-CM

## 2024-05-16 DIAGNOSIS — R09.02 HYPOXIA: ICD-10-CM

## 2024-05-16 DIAGNOSIS — R91.8 PULMONARY MASS: ICD-10-CM

## 2024-05-16 DIAGNOSIS — J15.9 COMMUNITY ACQUIRED BACTERIAL PNEUMONIA: ICD-10-CM

## 2024-05-16 DIAGNOSIS — J15.9 COMMUNITY ACQUIRED BACTERIAL PNEUMONIA: Primary | ICD-10-CM

## 2024-05-16 LAB
BASOPHILS # BLD AUTO: 0.1 10E3/UL (ref 0–0.2)
BASOPHILS NFR BLD AUTO: 1 %
CRP SERPL-MCNC: 9.08 MG/L
EOSINOPHIL # BLD AUTO: 0.7 10E3/UL (ref 0–0.7)
EOSINOPHIL NFR BLD AUTO: 7 %
ERYTHROCYTE [DISTWIDTH] IN BLOOD BY AUTOMATED COUNT: 15.5 % (ref 10–15)
HCT VFR BLD AUTO: 39.1 % (ref 40–53)
HGB BLD-MCNC: 12.2 G/DL (ref 13.3–17.7)
IMM GRANULOCYTES # BLD: 0 10E3/UL
IMM GRANULOCYTES NFR BLD: 0 %
LYMPHOCYTES # BLD AUTO: 2.4 10E3/UL (ref 0.8–5.3)
LYMPHOCYTES NFR BLD AUTO: 22 %
MCH RBC QN AUTO: 25.7 PG (ref 26.5–33)
MCHC RBC AUTO-ENTMCNC: 31.2 G/DL (ref 31.5–36.5)
MCV RBC AUTO: 82 FL (ref 78–100)
MONOCYTES # BLD AUTO: 0.9 10E3/UL (ref 0–1.3)
MONOCYTES NFR BLD AUTO: 8 %
NEUTROPHILS # BLD AUTO: 6.5 10E3/UL (ref 1.6–8.3)
NEUTROPHILS NFR BLD AUTO: 61 %
NRBC # BLD AUTO: 0 10E3/UL
NRBC BLD AUTO-RTO: 0 /100
PLATELET # BLD AUTO: 379 10E3/UL (ref 150–450)
RBC # BLD AUTO: 4.75 10E6/UL (ref 4.4–5.9)
WBC # BLD AUTO: 10.6 10E3/UL (ref 4–11)

## 2024-05-16 PROCEDURE — 99214 OFFICE O/P EST MOD 30 MIN: CPT | Performed by: FAMILY MEDICINE

## 2024-05-16 PROCEDURE — G0463 HOSPITAL OUTPT CLINIC VISIT: HCPCS

## 2024-05-16 PROCEDURE — 71046 X-RAY EXAM CHEST 2 VIEWS: CPT | Mod: TC

## 2024-05-16 PROCEDURE — 36415 COLL VENOUS BLD VENIPUNCTURE: CPT | Mod: ZL

## 2024-05-16 PROCEDURE — 86140 C-REACTIVE PROTEIN: CPT | Mod: ZL

## 2024-05-16 PROCEDURE — 85004 AUTOMATED DIFF WBC COUNT: CPT | Mod: ZL

## 2024-05-16 PROCEDURE — G2211 COMPLEX E/M VISIT ADD ON: HCPCS | Performed by: FAMILY MEDICINE

## 2024-05-16 ASSESSMENT — PAIN SCALES - GENERAL: PAINLEVEL: NO PAIN (0)

## 2024-05-16 NOTE — PROGRESS NOTES
Assessment & Plan       ICD-10-CM    1. Community acquired bacterial pneumonia  J15.9 CBC with Platelets & Differential     CRP inflammation     XR Chest 2 Views     Adult Pulmonary Medicine  Referral      2. Pulmonary mass  R91.8 CBC with Platelets & Differential     CRP inflammation     XR Chest 2 Views     Adult Pulmonary Medicine  Referral      3. Hypoxia  R09.02 CBC with Platelets & Differential     CRP inflammation     XR Chest 2 Views      4. Chronic obstructive pulmonary disease with acute exacerbation (H)  J44.1 CBC with Platelets & Differential     CRP inflammation     XR Chest 2 Views      Continue current medications and behavioral changes.   Discussed follow-up in 6 weeks with CT vs seeing Pulmonology  Weird mass like lesion. NOT there on CT less than year ago. Clinically had pneumonia .  Labs better  Blasto neg labs  Pt would like to go back to Dr Simons.  Has been established with him  Follow-up with me in 6 week.s            BMI  Estimated body mass index is 31.19 kg/m  as calculated from the following:    Height as of this encounter: 1.829 m (6').    Weight as of this encounter: 104.3 kg (230 lb).             No follow-ups on file.    Subjective   Gilberto is a 69 year old, presenting for the following health issues:  Follow Up        5/16/2024     1:45 PM   Additional Questions   Roomed by ALURA Rene   Accompanied by wife     History of Present Illness       Reason for visit:  Lung mass    He eats 2-3 servings of fruits and vegetables daily.He consumes 1 sweetened beverage(s) daily.He exercises with enough effort to increase his heart rate 20 to 29 minutes per day.  He exercises with enough effort to increase his heart rate 7 days per week.   He is taking medications regularly.       Concern - Community acquired bacterial pneumonia   Onset: 5/2/24  Description: Community acquired bacterial pneumonia   Intensity: moderate  Progression of Symptoms:  improving    Clinically  improved  Slight SOB  Off abx        Review of Systems  Constitutional, HEENT, cardiovascular, pulmonary, gi and gu systems are negative, except as otherwise noted.      Objective    /68 (BP Location: Right arm, Patient Position: Sitting, Cuff Size: Adult Regular)   Pulse 72   Temp 97.5  F (36.4  C) (Tympanic)   Resp 19   Ht 1.829 m (6')   Wt 104.3 kg (230 lb)   SpO2 96%   BMI 31.19 kg/m    Body mass index is 31.19 kg/m .  Physical Exam   GENERAL: alert and no distress  NECK: no adenopathy, no asymmetry, masses, or scars  RESP: lungs clear to auscultation - no rales, rhonchi or wheezes  CV: regular rate and rhythm, normal S1 S2, no S3 or S4, no murmur, click or rub, no peripheral edema  MS: no gross musculoskeletal defects noted, no edema    Results for orders placed or performed in visit on 05/16/24   XR Chest 2 Views     Status: None    Narrative    PROCEDURE:  XR CHEST 2 VIEWS    HISTORY: Community acquired bacterial pneumonia; Pulmonary mass;  Hypoxia; Chronic obstructive pulmonary disease with acute exacerbation  (H), .    COMPARISON:  5/2/2024    FINDINGS:  The cardiomediastinal contours are stable. The trachea is midline.  There is calcific aortic atherosclerosis.  Masslike consolidation in the superior segment of the left lower lobe  is similar when compared to priors. No effusion or pneumothorax is  seen.    No suspicious osseous lesion or subdiaphragmatic free air.      Impression    IMPRESSION:      Persistent focal opacity in the superior segment of the left lower  lobe. It has been only 2 weeks since this was discovered. Recommend  repeat chest radiographs in 6 more weeks.    AN RAYA MD         SYSTEM ID:  [id number]   Results for orders placed or performed in visit on 05/16/24   CRP inflammation     Status: Abnormal   Result Value Ref Range    CRP Inflammation 9.08 (H) <5.00 mg/L   CBC with platelets and differential     Status: Abnormal   Result Value Ref Range    WBC Count 10.6  4.0 - 11.0 10e3/uL    RBC Count 4.75 4.40 - 5.90 10e6/uL    Hemoglobin 12.2 (L) 13.3 - 17.7 g/dL    Hematocrit 39.1 (L) 40.0 - 53.0 %    MCV 82 78 - 100 fL    MCH 25.7 (L) 26.5 - 33.0 pg    MCHC 31.2 (L) 31.5 - 36.5 g/dL    RDW 15.5 (H) 10.0 - 15.0 %    Platelet Count 379 150 - 450 10e3/uL    % Neutrophils 61 %    % Lymphocytes 22 %    % Monocytes 8 %    % Eosinophils 7 %    % Basophils 1 %    % Immature Granulocytes 0 %    NRBCs per 100 WBC 0 <1 /100    Absolute Neutrophils 6.5 1.6 - 8.3 10e3/uL    Absolute Lymphocytes 2.4 0.8 - 5.3 10e3/uL    Absolute Monocytes 0.9 0.0 - 1.3 10e3/uL    Absolute Eosinophils 0.7 0.0 - 0.7 10e3/uL    Absolute Basophils 0.1 0.0 - 0.2 10e3/uL    Absolute Immature Granulocytes 0.0 <=0.4 10e3/uL    Absolute NRBCs 0.0 10e3/uL   CBC with Platelets & Differential     Status: Abnormal    Narrative    The following orders were created for panel order CBC with Platelets & Differential.  Procedure                               Abnormality         Status                     ---------                               -----------         ------                     CBC with platelets and d...[[id number]]  Abnormal            Final result                 Please view results for these tests on the individual orders.     CXR - less prominent looking mass         Signed Electronically by: Rock Basilio MD

## 2024-05-24 ENCOUNTER — TELEPHONE (OUTPATIENT)
Dept: FAMILY MEDICINE | Facility: OTHER | Age: 69
End: 2024-05-24

## 2024-05-24 DIAGNOSIS — R91.8 PULMONARY MASS: Primary | ICD-10-CM

## 2024-05-24 RX ORDER — BENZONATATE 200 MG/1
200 CAPSULE ORAL 3 TIMES DAILY PRN
Qty: 30 CAPSULE | Refills: 0 | Status: SHIPPED | OUTPATIENT
Start: 2024-05-24 | End: 2024-06-25

## 2024-05-24 NOTE — TELEPHONE ENCOUNTER
1:13 PM    Reason for Call: Phone Call    Description: Pt was seen in ER and was prescribed tessalon pearls. Pt spouse called to discuss if pt can get a refill of this medication as pt is out. Stated it is for a lung mass.     Was an appointment offered for this call? No  If yes : Appointment type              Date    Preferred method for responding to this message: Telephone Call  What is your phone number ?[phone]     If we cannot reach you directly, may we leave a detailed response at the number you provided? Yes    Can this message wait until your PCP/provider returns, if available today? No    Renata Turner

## 2024-05-29 DIAGNOSIS — E11.59 TYPE 2 DIABETES MELLITUS WITH OTHER CIRCULATORY COMPLICATION, WITHOUT LONG-TERM CURRENT USE OF INSULIN (H): ICD-10-CM

## 2024-05-30 NOTE — TELEPHONE ENCOUNTER
pioglitazone (ACTOS) 15 MG tablet    Last Written Prescription Date:  12-18-23  Last Fill Quantity: 90,   # refills: 1  Last Office Visit: 5-16-24  Future Office visit:    Next 5 appointments (look out 90 days)      Jun 25, 2024  3:15 PM  (Arrive by 3:00 PM)  Provider Visit with Rock Basilio MD  [facility] ([facility] ) [address] [place] [id number]  [phone]     Jul 25, 2024  1:30 PM  (Arrive by 1:15 PM)  Return Visit with Sierra Velásquez DPM  [facility] ([facility] ) [address] [place] [id number] [phone]             Routing refill request to provider for review/approval because:

## 2024-05-31 RX ORDER — PIOGLITAZONEHYDROCHLORIDE 15 MG/1
15 TABLET ORAL DAILY
Qty: 90 TABLET | Refills: 1 | Status: SHIPPED | OUTPATIENT
Start: 2024-05-31 | End: 2024-10-07

## 2024-06-09 NOTE — LETTER
"Subjective:      Patient ID: Gabbie Peterson is a 18 y.o. female.    Vitals:  height is 5' 9" (1.753 m) and weight is 77.1 kg (170 lb). Her temperature is 98.2 °F (36.8 °C). Her blood pressure is 126/82 and her pulse is 106. Her respiration is 18 and oxygen saturation is 99%.     Chief Complaint: Possible Pregnancy    Ambulatory to room with request for pregnancy testing.  Would like to see if she is pregnant as she is going out of town next week to celebrate her birthday, and wants to know if she can drink.        Constitution: Negative.   HENT: Negative.     Neck: neck negative.   Cardiovascular: Negative.    Eyes: Negative.    Respiratory: Negative.     Gastrointestinal: Negative.    Genitourinary: Negative.    Musculoskeletal: Negative.    Skin: Negative.    Allergic/Immunologic: Negative.    Neurological: Negative.    Psychiatric/Behavioral: Negative.        Objective:     Physical Exam   Constitutional: She is oriented to person, place, and time. She appears well-developed. She is cooperative.   HENT:   Head: Normocephalic and atraumatic.   Ears:   Right Ear: Hearing and external ear normal.   Left Ear: Hearing and external ear normal.   Nose: Nose normal. No mucosal edema or nasal deformity. No epistaxis. Right sinus exhibits no maxillary sinus tenderness and no frontal sinus tenderness. Left sinus exhibits no maxillary sinus tenderness and no frontal sinus tenderness.   Mouth/Throat: Uvula is midline, oropharynx is clear and moist and mucous membranes are normal. No trismus in the jaw. Normal dentition. No uvula swelling.   Eyes: Conjunctivae and lids are normal.   Neck: Trachea normal and phonation normal. Neck supple.   Cardiovascular: Normal rate, regular rhythm, normal heart sounds and normal pulses.   Pulmonary/Chest: Effort normal and breath sounds normal.   Abdominal: Normal appearance and bowel sounds are normal. Soft.   Musculoskeletal: Normal range of motion.         General: Normal range of motion. " 11/17/2022      RE: Gilberto Camilo  [address] [facility] [id number]           [facility] HEART CARE  CARDIOVASCULAR DIVISION    VALVE CLINIC INITIAL CONSULTATION    PRIMARY CARDIOLOGIST: Dr. Carr      PERTINENT CLINICAL HISTORY:     Gilberto Camilo is a very pleasant 67 year old male with severe mitral valve regurgitation d/t prolapse referred to our clinic for evaluation and consideration for potential transcatheter edge to edge mitral valve repair (ANNE MARIE). His severe mitral regurgitation is eccentric anteriorly-directed MR due to P2 flail characterized with an EROA 0.57 cm2, RVol 78cc, mean gradient 2 mmHg at 52 bpm with LVEF 55-60% by TRENT on 11/11/22.  Additional notable medical history of abdominal aortic aneurysm s/p repair in 2017 at [facility] in [facility], essential hypertension, mixed hyperlipidemia, coronary calcifications on CT, PAD with claudication (follows with [facility] vascular). He has a noncardiac history including DM- type II, ED, depression, former smoker.     Patient notes fatigue x 6 months and sleeping more. He denies chest pain, exertional chest pain, dyspnea at rest or with conversation. He has had dyspnea on exertion which has been progressively getting worse. He previously had to stop due to symptoms of bilateral LE claudication versus dyspnea; however, now he is becoming dyspneic before the claudication starts. Denies any LE edema, abdominal bloating. He has felt near-syncopal a couple times. He has not had a syncopal episode.     PAST MEDICAL HISTORY:     Past Medical History:   Diagnosis Date     AAA (abdominal aortic aneurysm) 09/20/2017    5.3 cm      Mohan esophagus      Closed rib fracture      DNS (deviated nasal septum)         PAST SURGICAL HISTORY:     Past Surgical History:   Procedure Laterality Date     AAA REPAIR  06/17/2020    [facility]/ intravascular repair     APPENDECTOMY       COLONOSCOPY  2013     ESOPHAGOGASTRODUODENOSCOPY  2013        CURRENT MEDICATIONS:      Current Outpatient Medications   Medication Sig Dispense Refill     ARIPiprazole (ABILIFY) 5 MG tablet TAKE 1 TABLET(5 MG) BY MOUTH DAILY 90 tablet 3     aspirin (ASA) 81 MG EC tablet Take 81 mg by mouth daily       atorvastatin (LIPITOR) 80 MG tablet TAKE 1 TABLET(80 MG) BY MOUTH DAILY 90 tablet 3     buPROPion (WELLBUTRIN XL) 300 MG 24 hr tablet Take 1 tablet (300 mg) by mouth every morning 90 tablet 3     diphenhydrAMINE HCl (BENADRYL ALLERGY PO)        lisinopril (ZESTRIL) 10 MG tablet Take 1 tablet (10 mg) by mouth daily 90 tablet 0     loratadine (CLARITIN) 10 MG tablet Take 10 mg by mouth daily       metFORMIN (GLUCOPHAGE XR) 500 MG 24 hr tablet Take 2 tablets (1,000 mg) by mouth 2 times daily (with meals) for 90 days 360 tablet 0     metoprolol succinate ER (TOPROL-XL) 25 MG 24 hr tablet TAKE 1 TABLET(25 MG) BY MOUTH DAILY 90 tablet 2     Multiple Vitamins-Minerals (MULTIVITAMIN ADULT PO)        omeprazole (PRILOSEC) 20 MG DR capsule TAKE 1 CAPSULE(20 MG) BY MOUTH EVERY MORNING 90 capsule 3     vitamin C (ASCORBIC ACID) 1000 MG TABS Take 1,000 mg by mouth daily       zolpidem (AMBIEN) 10 MG tablet TAKE 1 TABLET(10 MG) BY MOUTH EVERY NIGHT AS NEEDED FOR SLEEP 30 tablet 0     lisinopril-hydrochlorothiazide (ZESTORETIC) 20-12.5 MG tablet Take 1 tablet by mouth daily (Patient not taking: Reported on 11/17/2022) 90 tablet 3     metFORMIN (GLUCOPHAGE XR) 500 MG 24 hr tablet Take 2 tablets (1,000 mg) by mouth daily (with dinner) (Patient not taking: Reported on 11/17/2022) 180 tablet 2        ALLERGIES:   No Known Allergies     FAMILY HISTORY:     Family History   Problem Relation Age of Onset     C.A.D. Father         CABG in late 40's     C.A.D. Brother         MI in 50's        SOCIAL HISTORY:     Social History     Socioeconomic History     Marital status:    Tobacco Use     Smoking status: Former     Packs/day: 1.00     Years: 25.00     Pack years: 25.00     Types: Cigarettes     Smokeless tobacco:    Neurological: She is alert and oriented to person, place, and time. She exhibits normal muscle tone.   Skin: Skin is warm, dry and intact.   Psychiatric: Her speech is normal and behavior is normal. Judgment and thought content normal.   Nursing note and vitals reviewed.      Assessment:     1. Amenorrhea    2. Pregnancy test negative        Plan:       Amenorrhea  -     POCT URINE PREGNANCY    Pregnancy test negative           States last month started her period only 8th, today is the 9th, advised patient to wait several more days then test again.          Never     Tobacco comments:     Feb. 2014   Vaping Use     Vaping Use: Never used   Substance and Sexual Activity     Alcohol use: Yes     Comment: occasional     Drug use: Yes     Types: Marijuana     Sexual activity: Yes     Partners: Female   Other Topics Concern     Parent/sibling w/ CABG, MI or angioplasty before 65F 55M? Yes     Comment: dad had heart attack before 55. brother also had heart attack before 55.   Social History Narrative    .         REVIEW OF SYSTEMS:     Constitutional: No fevers or chills  Skin: No new rash or itching  Eyes: No acute change in vision  Ears/Nose/Throat: No purulent rhinorrhea, new hearing loss, or new vertigo  Respiratory: No cough or hemoptysis  Cardiovascular: See HPI  Gastrointestinal: No change in appetite, vomiting, hematemesis or diarrhea  Genitourinary: No dysuria or hematuria  Musculoskeletal: No new back pain, neck pain or muscle pain  Neurologic: No new headaches, focal weakness or behavior changes  Psychiatric: No hallucinations, excessive alcohol consumption or illegal drug usage  Hematologic/Lymphatic/Immunologic: No bleeding, chills, fever, night sweats or weight loss  Endocrine: No new cold intolerance, heat intolerance, polyphagia, polydipsia or polyuria      PHYSICAL EXAMINATION:     There were no vitals taken for this visit. due to video visit.      GENERAL: No acute distress.  HEENT: EOMI. Sclerae white, not injected. Nares clear. Pharynx without erythema or exudate.   Neck:  No jugular venous distension.   Lungs: non labored breathing ent.  Extremities: No clubbing, cyanosis, or edema.   Neurologic: Alert and oriented to person/place/time, normal speech and affect. No focal deficits.  Skin: No petechiae, purpura or rash.     LABORATORY DATA:     LIPID RESULTS:  Lab Results   Component Value Date    CHOL 153 08/29/2022    CHOL 161 08/21/2020    HDL 57 08/29/2022    HDL 54 08/21/2020    LDL 56 08/29/2022    LDL 68 08/21/2020    TRIG 198 (H) 08/29/2022     TRIG 194 (H) 08/21/2020       LIVER ENZYME RESULTS:  Lab Results   Component Value Date    AST 22 08/29/2022    AST 22 08/21/2020    ALT 31 08/29/2022    ALT 31 08/21/2020       CBC RESULTS:  Lab Results   Component Value Date    WBC 12.2 (H) 08/29/2022    WBC 8.4 08/21/2020    RBC 4.86 08/29/2022    RBC 4.46 08/21/2020    HGB 14.2 08/29/2022    HGB 13.5 08/21/2020    HCT 44.3 08/29/2022    HCT 40.5 08/21/2020    MCV 91 08/29/2022    MCV 91 08/21/2020    MCH 29.2 08/29/2022    MCH 30.3 08/21/2020    MCHC 32.1 08/29/2022    MCHC 33.3 08/21/2020    RDW 13.3 08/29/2022    RDW 13.3 08/21/2020     08/29/2022     08/21/2020       BMP RESULTS:  Lab Results   Component Value Date     08/29/2022     02/15/2021    POTASSIUM 4.1 08/29/2022    POTASSIUM 4.2 02/15/2021    CHLORIDE 102 08/29/2022    CHLORIDE 102 02/15/2021    CO2 29 08/29/2022    CO2 31 02/15/2021    ANIONGAP 5 08/29/2022    ANIONGAP 2 (L) 02/15/2021     (H) 08/29/2022     (H) 02/15/2021    BUN 24 08/29/2022    BUN 23 02/15/2021    CR 0.92 08/29/2022    CR 1.08 02/15/2021    GFRESTIMATED 90 08/29/2022    GFRESTIMATED 70 02/15/2021    GFRESTBLACK 81 02/15/2021    SARI 10.0 08/29/2022    SARI 9.4 02/15/2021        A1C RESULTS:  Lab Results   Component Value Date    A1C 7.5 (H) 08/29/2022    A1C 6.5 (H) 02/15/2021       INR RESULTS:  Lab Results   Component Value Date    INR 0.91 03/18/2014          PROCEDURES & FURTHER ASSESSMENTS:     ECG: 10/18/2022- sinus mariah with PVCs    TRENT:  11/11/22  Interpretation Summary  Severe eccentric anteriorly-directed MR due to P2 flail, EROA 0.57cm RVol 78  mL. Percutaneous transcatheter [id number] mitral valve repair appears  feasible. Leaflet lengths are adequate (anterior 2.3 cm, posterior 2.2 cm.)  There is no mitral stenosis (mean gradient 2 mmHg at 52 bpm, MVA>8 cm2 by 3D  planimetry.)     This study was compared with the study from 9/29/22 (TTE): The mitral valve is  better imaged  and MR is better characterized. Flail and severe MR were likely  also present on the prior study but are better demonstrated on today's TRENT.      Coronary Angiogram and Right Heart Catheterization:    STS RISK SCORE: replacement 1.7%, repair 0.6%  FRAILTY SCORE: Pending  NYHA CLASS: III     CLINICAL IMPRESSION:     Gilberto Camilo is a 67 year old male with symptomatic severe mitral regurgitation who is a good candidate for either surgical mitral valve repair/replacment or MitraClip based on age, frailty, co-morbidities and overall surgical mortality risk estimation of 0.6% based on the STS score.      The pre-procedural MitraClip/MVR evaluation currently requires coronary angiogram and RHC prior to presentation to the Heart team for discussion during our multi-disciplinary conference for consensus decision and procedural planning.    Plan Summary:  1) Severe Mitral Regurgitation  - coronary angiogram and RHC  - patient and heart care team would like to pursue mitral valve repair given low STS risk    Patient was evaluated in clinic with Dr. Thompson of interventional cardiology and Dr. Kim of cardiothoracic surgery.    Darcy Baca MD  Interventional Cardiology Fellow    CC  Patient Care Team:  Rock Basilio MD as PCP - General (Family Medicine)  ISIS Wall MD as MD (Family Medicine)  Rock Basilio MD as Assigned PCP  Vargas Murphy MD as Assigned Sleep Provider

## 2024-06-20 ENCOUNTER — ANESTHESIA EVENT (OUTPATIENT)
Dept: SURGERY | Facility: HOSPITAL | Age: 69
End: 2024-06-20
Payer: COMMERCIAL

## 2024-06-20 ASSESSMENT — LIFESTYLE VARIABLES: TOBACCO_USE: 1

## 2024-06-20 ASSESSMENT — ENCOUNTER SYMPTOMS: DYSRHYTHMIAS: 1

## 2024-06-20 ASSESSMENT — NEW YORK HEART ASSOCIATION (NYHA) CLASSIFICATION: NYHA FUNCTIONAL CLASS: II

## 2024-06-20 NOTE — ANESTHESIA PREPROCEDURE EVALUATION
Anesthesia Pre-Procedure Evaluation    Patient: Gilberto Camilo   MRN: [id number] : 1955        Procedure : Procedure(s):  COLONOSCOPY          Past Medical History:   Diagnosis Date     AAA (abdominal aortic aneurysm) (H24) 2017    5.3 cm      Mohan esophagus      Closed rib fracture      Coronary artery disease involving native heart, unspecified vessel or lesion type, unspecified whether angina present 2023     DNS (deviated nasal septum)      Postoperative atrial fibrillation (H) 2023     S/P CABG (coronary artery bypass graft) 2023     S/P mitral valve repair 2023      Past Surgical History:   Procedure Laterality Date     AAA REPAIR  2020    [facility]/ intravascular repair     APPENDECTOMY       BYPASS GRAFT ARTERY CORONARY, REPAIR VALVE MITRAL, COMBINED N/A 2023    Procedure: MEDIAN STERNOTOMY, ON PUMP OXGENATION, Left endovascular saphaneous vein harvest, CORONARY ARTERY BYPASS GRAFT (CABG x 2), Mitral Valve Repair, transesophageal echocardiogram (TRENT) performed by anesthesia;  Surgeon: Linda Kim MD;  Location: [facility] OR     COLONOSCOPY       CV CORONARY ANGIOGRAM N/A 2022    Procedure: Coronary Angiogram with possible intervention;  Surgeon: Porfirio Herrera MD;  Location:  [facility]     CV RIGHT HEART CATH MEASUREMENTS RECORDED N/A 2022    Procedure: Right Heart Cath;  Surgeon: Porfirio Hrerera MD;  Location:  [facility]     ESOPHAGOGASTRODUODENOSCOPY  2013     INCISION AND DRAINAGE CHEST WASHOUT, COMBINED N/A 2023    Procedure: chest washout, Re-do sternotomy, repair of vein graft;  Surgeon: Linda Kim MD;  Location: [facility] OR      No Known Allergies   Social History     Tobacco Use     Smoking status: Former     Current packs/day: 1.00     Average packs/day: 1 pack/day for 25.0 years (25.0 ttl pk-yrs)     Types: Cigarettes     Passive exposure: Never     Smokeless tobacco:  Never     Tobacco comments:     Feb. 2014   Substance Use Topics     Alcohol use: Yes     Comment: occasional      Wt Readings from Last 1 Encounters:   05/16/24 104.3 kg (230 lb)        Anesthesia Evaluation   Pt has had prior anesthetic. Type: General.    No history of anesthetic complications       ROS/MED HX  ENT/Pulmonary: Comment: Atypical Pneumonia back in May.  Currently is back to baseline. Just seen Pulmonology due to mass like lesion in lungs that looked be infectious in nature. It is slowly regressing in size.  Pt has clinical COPD - started recently on Advair but can not afford it. Uses albuterol at times.  Gives me no worsening changes in his cardiopulmonary status and actually breathing some better slowly after pneumonia. (Per 6/25/24 HP)          (+) sleep apnea (2/26/24 pulm f/up), mild, doesn't use CPAP,              tobacco use, Past use,  25  Pack-Year Hx,       moderate,  COPD,    recent URI, resolved, 5/2/24 ED Note: CT scan of the chest with official review from [facility] shows left lower lobe mass.  Given the patient's cough, leukocytosis, and fever, I did elect to treat the patient with Levaquin and Tessalon:        Neurologic:  - neg neurologic ROS     Cardiovascular: Comment: AAA repair  Left atrial enlargement  Claudication - [facility] vascular [facility]'s    Last cards f/up 1/19/24    (+) Dyslipidemia hypertension-range: 130/72 (6/25/24)/ -  CAD -  CABG-date: 2/13/23. -   Taking blood thinners (asa) Pt has received instructions: Instructions Given to patient: hold asa 1 week before procedure. CHF  Last EF: 60-65% date: 1/2024 NYHA classification: II. PÉREZ.             dysrhythmias (post op a fib after 1/18/23 mitral valve repair - no recurrence following ziopatch (per 1/19/24 cards f/up)), a-fib,  valvular problems/murmurs type: MVP S/P mitral valve repair (1/18/23).    Previous cardiac testing   Echo: Date: 1/11/24 Results:  S/P Mitral valve repair with [place] NeoChords to the flail P2  segment,  implantation of a 34 mm Avilez Physio II annuloplasty ring 1/17/2023     Global and regional left ventricular function is normal with an EF of 60-65%.  Global right ventricular function is normal.  Annuloplasty ring in place at the mitral position.There is trace valvular  regurgitation. Mean gradient of 3 mmHg at a HR of 55 bpm (no stenosis).  No pericardial effusion is present.  Mild dilation of the aortic root and ascending aorta  This study was compared with the study from 1/20/23. Ventricular function has  normalized    Stress Test:  Date: Results:    ECG Reviewed:  Date: hp Results:    appears normal, NSR, pvc, normal axis, normal intervals, no acute ST/T changes c/w ischemia, no LVH by voltage criteria, unchanged from previous tracings  Cath:  Date: 12/9/22 Results:   Right sided filling pressures are normal.   Mild elevated pulmonary hypertension.   Left sided filling pressures are moderately elevated.   Normal cardiac output level.   Hemodynamic data has been modified in Epic per physician review.   RPDA lesion is 100% stenosed.   Prox RCA to Mid RCA lesion is 95% stenosed.   1st Diag lesion is 90% stenosed.      METS/Exercise Tolerance: >4 METS    Hematologic:  - neg hematologic  ROS     Musculoskeletal:  - neg musculoskeletal ROS     GI/Hepatic:     (+) GERD, Asymptomatic on medication, esophageal disease,     bowel prep,            Renal/Genitourinary:  - neg Renal ROS     Endo: Comment: Pre diabetes     (+)  type II DM, Last HgA1c: 7.6, date: 6/25/24, Not using insulin, - not using insulin pump.  not previously admitted for DM/DKA.       Obesity,       Psychiatric/Substance Use:     (+) psychiatric history depression   Recreational drug usage: Cannabis (last used a week ago).    Infectious Disease:  - neg infectious disease ROS     Malignancy:  - neg malignancy ROS     Other:  - neg other ROS          Physical Exam    Airway        Mallampati: IV   TM distance: > 3 FB   Neck ROM: full   Mouth  "opening: > 3 cm    Respiratory Devices and Support         Dental       (+) Edentulous      Cardiovascular   cardiovascular exam normal          Pulmonary           breath sounds clear to auscultation   (+) decreased breath sounds       OUTSIDE LABS:  CBC:   Lab Results   Component Value Date    WBC 10.6 05/16/2024    WBC 15.0 (H) 05/02/2024    HGB 12.2 (L) 05/16/2024    HGB 11.0 (L) 05/02/2024    HCT 39.1 (L) 05/16/2024    HCT 35.1 (L) 05/02/2024     05/16/2024     05/02/2024     BMP:   Lab Results   Component Value Date     05/02/2024     03/15/2024    POTASSIUM 4.4 05/02/2024    POTASSIUM 4.1 03/15/2024    CHLORIDE 98 05/02/2024    CHLORIDE 102 03/15/2024    CO2 25 05/02/2024    CO2 23 03/15/2024    BUN 22.8 05/02/2024    BUN 22.4 03/15/2024    CR 1.16 05/02/2024    CR 1.16 03/15/2024     (H) 05/02/2024     (H) 03/15/2024     COAGS:   Lab Results   Component Value Date    PTT 45 (H) 01/20/2023    INR 0.99 06/30/2023    FIBR 382 01/20/2023     POC: No results found for: \"BGM\", \"HCG\", \"HCGS\"  HEPATIC:   Lab Results   Component Value Date    ALBUMIN 3.8 05/02/2024    PROTTOTAL 7.5 05/02/2024    ALT 13 05/02/2024    AST 17 05/02/2024    ALKPHOS 143 05/02/2024    BILITOTAL 0.4 05/02/2024     OTHER:   Lab Results   Component Value Date    PH 7.41 01/23/2023    LACT 0.6 (L) 01/23/2023    A1C 7.0 (H) 03/15/2024    SARI 9.5 05/02/2024    PHOS 3.4 01/29/2023    MAG 2.0 01/29/2023    TSH 0.74 08/29/2022    CRP 2.7 03/18/2014       Anesthesia Plan    ASA Status:  4    NPO Status:  NPO Appropriate    Anesthesia Type: MAC.     - Reason for MAC: straight local not clinically adequate   Induction: Intravenous, Propofol.   Maintenance: Balanced.        Consents    Anesthesia Plan(s) and associated risks, benefits, and realistic alternatives discussed. Questions answered and patient/representative(s) expressed understanding.     - Discussed: Risks, Benefits and Alternatives for BOTH SEDATION " and the PROCEDURE were discussed     - Discussed with:  Patient      - Extended Intubation/Ventilatory Support Discussed: No.      - Patient is DNR/DNI Status: No     Use of blood products discussed: No .     Postoperative Care            Comments:    Other Comments: Risks and benefits of MAC anesthetic discussed including dental damage, aspiration, loss of airway, conversion to general anesthetic, CV complications, MI, stroke, death. Pt wishes to proceed. Reviewed 6/25/24 HP from TORI Lai CNP    I have reviewed the pertinent notes and labs in the chart from the past 30 days and (re)examined the patient.  Any updates or changes from those notes are reflected in this note.              # DMII: A1C = N/A within past 6 months

## 2024-06-24 ENCOUNTER — TRANSFERRED RECORDS (OUTPATIENT)
Dept: HEALTH INFORMATION MANAGEMENT | Facility: CLINIC | Age: 69
End: 2024-06-24
Payer: COMMERCIAL

## 2024-06-25 ENCOUNTER — OFFICE VISIT (OUTPATIENT)
Dept: FAMILY MEDICINE | Facility: OTHER | Age: 69
End: 2024-06-25
Attending: FAMILY MEDICINE
Payer: COMMERCIAL

## 2024-06-25 VITALS
SYSTOLIC BLOOD PRESSURE: 130 MMHG | WEIGHT: 234 LBS | BODY MASS INDEX: 31.74 KG/M2 | TEMPERATURE: 97.7 F | DIASTOLIC BLOOD PRESSURE: 72 MMHG | HEART RATE: 70 BPM | OXYGEN SATURATION: 94 %

## 2024-06-25 DIAGNOSIS — H53.9 VISION CHANGES: ICD-10-CM

## 2024-06-25 DIAGNOSIS — Z95.1 S/P CABG (CORONARY ARTERY BYPASS GRAFT): ICD-10-CM

## 2024-06-25 DIAGNOSIS — Z86.79 S/P AAA REPAIR: ICD-10-CM

## 2024-06-25 DIAGNOSIS — Z12.11 SPECIAL SCREENING FOR MALIGNANT NEOPLASMS, COLON: ICD-10-CM

## 2024-06-25 DIAGNOSIS — Z95.2 S/P MVR (MITRAL VALVE REPLACEMENT): ICD-10-CM

## 2024-06-25 DIAGNOSIS — Z96.642 S/P TOTAL LEFT HIP ARTHROPLASTY: ICD-10-CM

## 2024-06-25 DIAGNOSIS — E11.59 TYPE 2 DIABETES MELLITUS WITH OTHER CIRCULATORY COMPLICATION, WITHOUT LONG-TERM CURRENT USE OF INSULIN (H): ICD-10-CM

## 2024-06-25 DIAGNOSIS — E66.01 MORBID OBESITY (H): ICD-10-CM

## 2024-06-25 DIAGNOSIS — I10 ESSENTIAL HYPERTENSION: ICD-10-CM

## 2024-06-25 DIAGNOSIS — I73.9 PERIPHERAL ARTERIAL DISEASE (H): ICD-10-CM

## 2024-06-25 DIAGNOSIS — R91.8 PULMONARY MASS: ICD-10-CM

## 2024-06-25 DIAGNOSIS — Z98.890 S/P AAA REPAIR: ICD-10-CM

## 2024-06-25 DIAGNOSIS — J44.1 CHRONIC OBSTRUCTIVE PULMONARY DISEASE WITH ACUTE EXACERBATION (H): ICD-10-CM

## 2024-06-25 DIAGNOSIS — I25.10 CORONARY ARTERY DISEASE INVOLVING NATIVE HEART, UNSPECIFIED VESSEL OR LESION TYPE, UNSPECIFIED WHETHER ANGINA PRESENT: ICD-10-CM

## 2024-06-25 DIAGNOSIS — J15.9 COMMUNITY ACQUIRED BACTERIAL PNEUMONIA: ICD-10-CM

## 2024-06-25 DIAGNOSIS — G47.39 COMPLEX SLEEP APNEA SYNDROME: ICD-10-CM

## 2024-06-25 DIAGNOSIS — Z01.810 PRE-OPERATIVE CARDIOVASCULAR EXAMINATION: Primary | ICD-10-CM

## 2024-06-25 LAB
ANION GAP SERPL CALCULATED.3IONS-SCNC: 13 MMOL/L (ref 7–15)
BASOPHILS # BLD AUTO: 0.1 10E3/UL (ref 0–0.2)
BASOPHILS NFR BLD AUTO: 1 %
BUN SERPL-MCNC: 16.6 MG/DL (ref 8–23)
CALCIUM SERPL-MCNC: 9.8 MG/DL (ref 8.8–10.2)
CHLORIDE SERPL-SCNC: 105 MMOL/L (ref 98–107)
CREAT SERPL-MCNC: 1.08 MG/DL (ref 0.67–1.17)
DEPRECATED HCO3 PLAS-SCNC: 24 MMOL/L (ref 22–29)
EGFRCR SERPLBLD CKD-EPI 2021: 73 ML/MIN/1.73M2
EOSINOPHIL # BLD AUTO: 0.7 10E3/UL (ref 0–0.7)
EOSINOPHIL NFR BLD AUTO: 8 %
ERYTHROCYTE [DISTWIDTH] IN BLOOD BY AUTOMATED COUNT: 17 % (ref 10–15)
EST. AVERAGE GLUCOSE BLD GHB EST-MCNC: 171 MG/DL
GLUCOSE SERPL-MCNC: 98 MG/DL (ref 70–99)
HBA1C MFR BLD: 7.6 %
HCT VFR BLD AUTO: 39.8 % (ref 40–53)
HGB BLD-MCNC: 12.6 G/DL (ref 13.3–17.7)
IMM GRANULOCYTES # BLD: 0 10E3/UL
IMM GRANULOCYTES NFR BLD: 0 %
LYMPHOCYTES # BLD AUTO: 2.9 10E3/UL (ref 0.8–5.3)
LYMPHOCYTES NFR BLD AUTO: 31 %
MCH RBC QN AUTO: 26.4 PG (ref 26.5–33)
MCHC RBC AUTO-ENTMCNC: 31.7 G/DL (ref 31.5–36.5)
MCV RBC AUTO: 83 FL (ref 78–100)
MONOCYTES # BLD AUTO: 0.9 10E3/UL (ref 0–1.3)
MONOCYTES NFR BLD AUTO: 9 %
NEUTROPHILS # BLD AUTO: 4.7 10E3/UL (ref 1.6–8.3)
NEUTROPHILS NFR BLD AUTO: 51 %
NRBC # BLD AUTO: 0 10E3/UL
NRBC BLD AUTO-RTO: 0 /100
PLATELET # BLD AUTO: 261 10E3/UL (ref 150–450)
POTASSIUM SERPL-SCNC: 4 MMOL/L (ref 3.4–5.3)
RBC # BLD AUTO: 4.78 10E6/UL (ref 4.4–5.9)
SODIUM SERPL-SCNC: 142 MMOL/L (ref 135–145)
WBC # BLD AUTO: 9.3 10E3/UL (ref 4–11)

## 2024-06-25 PROCEDURE — 83036 HEMOGLOBIN GLYCOSYLATED A1C: CPT | Mod: ZL | Performed by: FAMILY MEDICINE

## 2024-06-25 PROCEDURE — 93005 ELECTROCARDIOGRAM TRACING: CPT | Performed by: FAMILY MEDICINE

## 2024-06-25 PROCEDURE — 99214 OFFICE O/P EST MOD 30 MIN: CPT | Performed by: FAMILY MEDICINE

## 2024-06-25 PROCEDURE — 80048 BASIC METABOLIC PNL TOTAL CA: CPT | Mod: ZL | Performed by: FAMILY MEDICINE

## 2024-06-25 PROCEDURE — 85025 COMPLETE CBC W/AUTO DIFF WBC: CPT | Mod: ZL | Performed by: FAMILY MEDICINE

## 2024-06-25 PROCEDURE — 36415 COLL VENOUS BLD VENIPUNCTURE: CPT | Mod: ZL | Performed by: FAMILY MEDICINE

## 2024-06-25 PROCEDURE — G0463 HOSPITAL OUTPT CLINIC VISIT: HCPCS

## 2024-06-25 ASSESSMENT — ANXIETY QUESTIONNAIRES
5. BEING SO RESTLESS THAT IT IS HARD TO SIT STILL: NOT AT ALL
IF YOU CHECKED OFF ANY PROBLEMS ON THIS QUESTIONNAIRE, HOW DIFFICULT HAVE THESE PROBLEMS MADE IT FOR YOU TO DO YOUR WORK, TAKE CARE OF THINGS AT HOME, OR GET ALONG WITH OTHER PEOPLE: NOT DIFFICULT AT ALL
7. FEELING AFRAID AS IF SOMETHING AWFUL MIGHT HAPPEN: NOT AT ALL
8. IF YOU CHECKED OFF ANY PROBLEMS, HOW DIFFICULT HAVE THESE MADE IT FOR YOU TO DO YOUR WORK, TAKE CARE OF THINGS AT HOME, OR GET ALONG WITH OTHER PEOPLE?: NOT DIFFICULT AT ALL
1. FEELING NERVOUS, ANXIOUS, OR ON EDGE: NOT AT ALL
GAD7 TOTAL SCORE: 0
2. NOT BEING ABLE TO STOP OR CONTROL WORRYING: NOT AT ALL
6. BECOMING EASILY ANNOYED OR IRRITABLE: NOT AT ALL
4. TROUBLE RELAXING: NOT AT ALL
GAD7 TOTAL SCORE: 0
7. FEELING AFRAID AS IF SOMETHING AWFUL MIGHT HAPPEN: NOT AT ALL
3. WORRYING TOO MUCH ABOUT DIFFERENT THINGS: NOT AT ALL

## 2024-06-25 ASSESSMENT — PAIN SCALES - GENERAL: PAINLEVEL: NO PAIN (0)

## 2024-06-25 ASSESSMENT — COPD QUESTIONNAIRES: COPD: 1

## 2024-06-25 NOTE — PROGRESS NOTES
Preoperative Evaluation  [facility] [place]  [address]  [place] [address]  Phone: [phone]  Primary Provider: Rock Basilio MD  Pre-op Performing Provider: Rock Basilio MD  Jun 25, 2024 6/25/2024   Surgical Information   What procedure is being done? colonoscopy   Facility or Hospital where procedure/surgery will be performed: [facility]   Who is doing the procedure / surgery? Dr Jacuqes   Date of surgery / procedure: 6/27/24   Time of surgery / procedure: TBD   Where do you plan to recover after surgery? at home with family        Fax number for surgical facility: Note does not need to be faxed, will be available electronically in Epic.    Assessment & Plan     The proposed surgical procedure is considered LOW risk.      ICD-10-CM    1. Pre-operative cardiovascular examination  Z01.810 CBC with Platelets & Differential     Basic metabolic panel     Hemoglobin A1c     EKG 12-lead complete w/read - Clinics     Hemoglobin A1c     Basic metabolic panel     CBC with Platelets & Differential     CANCELED: XR Chest 2 Views      2. Special screening for malignant neoplasms, colon  Z12.11 CBC with Platelets & Differential     Basic metabolic panel     Hemoglobin A1c     EKG 12-lead complete w/read - Clinics     Hemoglobin A1c     Basic metabolic panel     CBC with Platelets & Differential     CANCELED: XR Chest 2 Views      3. Type 2 diabetes mellitus with other circulatory complication, without long-term current use of insulin (H)  E11.59 CBC with Platelets & Differential     Basic metabolic panel     Hemoglobin A1c     EKG 12-lead complete w/read - Clinics     Hemoglobin A1c     Basic metabolic panel     CBC with Platelets & Differential     CANCELED: XR Chest 2 Views      4. Pulmonary mass  R91.8 CBC with Platelets & Differential     Basic metabolic panel     Hemoglobin A1c     EKG 12-lead complete w/read - Clinics     Hemoglobin A1c     Basic metabolic panel     CBC with Platelets &  Differential     CANCELED: XR Chest 2 Views      5. Community acquired bacterial pneumonia  J15.9 CBC with Platelets & Differential     Basic metabolic panel     Hemoglobin A1c     EKG 12-lead complete w/read - Clinics     Hemoglobin A1c     Basic metabolic panel     CBC with Platelets & Differential     CANCELED: XR Chest 2 Views      6. Chronic obstructive pulmonary disease with acute exacerbation (H)  J44.1 CBC with Platelets & Differential     Basic metabolic panel     Hemoglobin A1c     EKG 12-lead complete w/read - Clinics     Hemoglobin A1c     Basic metabolic panel     CBC with Platelets & Differential     CANCELED: XR Chest 2 Views      7. Essential hypertension  I10 CBC with Platelets & Differential     Basic metabolic panel     Hemoglobin A1c     EKG 12-lead complete w/read - Clinics     Hemoglobin A1c     Basic metabolic panel     CBC with Platelets & Differential     CANCELED: XR Chest 2 Views      8. Coronary artery disease involving native heart, unspecified vessel or lesion type, unspecified whether angina present  I25.10 CBC with Platelets & Differential     Basic metabolic panel     Hemoglobin A1c     EKG 12-lead complete w/read - Clinics     Hemoglobin A1c     Basic metabolic panel     CBC with Platelets & Differential     CANCELED: XR Chest 2 Views      9. S/P MVR (mitral valve replacement)  Z95.2 CBC with Platelets & Differential     Basic metabolic panel     Hemoglobin A1c     EKG 12-lead complete w/read - Clinics     Hemoglobin A1c     Basic metabolic panel     CBC with Platelets & Differential     CANCELED: XR Chest 2 Views      10. Peripheral arterial disease (H24)  I73.9 CBC with Platelets & Differential     Basic metabolic panel     Hemoglobin A1c     EKG 12-lead complete w/read - Clinics     Hemoglobin A1c     Basic metabolic panel     CBC with Platelets & Differential     CANCELED: XR Chest 2 Views      11. Complex sleep apnea syndrome  G47.31 CBC with Platelets & Differential     Basic  metabolic panel     Hemoglobin A1c     EKG 12-lead complete w/read - Clinics     Hemoglobin A1c     Basic metabolic panel     CBC with Platelets & Differential     Adult Sleep Eval & Management  Referral     CANCELED: XR Chest 2 Views      12. Morbid obesity (H)  E66.01 CBC with Platelets & Differential     Basic metabolic panel     Hemoglobin A1c     EKG 12-lead complete w/read - Clinics     Hemoglobin A1c     Basic metabolic panel     CBC with Platelets & Differential     CANCELED: XR Chest 2 Views      13. S/P AAA repair  Z98.890 CBC with Platelets & Differential    Z86.79 Basic metabolic panel     Hemoglobin A1c     EKG 12-lead complete w/read - Clinics     Hemoglobin A1c     Basic metabolic panel     CBC with Platelets & Differential     CANCELED: XR Chest 2 Views      14. S/P CABG (coronary artery bypass graft)  Z95.1 CBC with Platelets & Differential     Basic metabolic panel     Hemoglobin A1c     EKG 12-lead complete w/read - Clinics     Hemoglobin A1c     Basic metabolic panel     CBC with Platelets & Differential     CANCELED: XR Chest 2 Views      15. S/P total left hip arthroplasty  Z96.642 CBC with Platelets & Differential     Basic metabolic panel     Hemoglobin A1c     EKG 12-lead complete w/read - Clinics     Hemoglobin A1c     Basic metabolic panel     CBC with Platelets & Differential     CANCELED: XR Chest 2 Views      16. Vision changes  H53.9 Adult Eye  Referral        WILL REFER TO EYE AND SLEEP SPECIALIST           Risks and Recommendations  The patient has the following additional risks and recommendations for perioperative complications:   - I WOULD RECOMMEND SBE PROPHYLAXIS OR PREOPERATIVE ABX DUE TO RECENT LEFT ROHAN BACK LAST SUMMER     KNOWN SIGNIFICANT SLEEP APNEA NOT ON CPAP    Antiplatelet or Anticoagulation Medication Instructions   - Patient is on no antiplatelet or anticoagulation medications.    Additional Medication Instructions  Take all scheduled medications on  the day of surgery EXCEPT for modifications listed below:  HOLD ALL YOUR VITAMINS FOR A WEEK BEFORE SURGERY     NO ASPIRIN OR NSAIDS A WEEK BEFORE SURGERY     HOLD YOUR ACTOS THE MORNING OF SURGERY     HOLD YOUR OXYBUTYNIN THEN MORNING BEFORE SURGERY   Recommendation  Approval given to proceed with proposed procedure, without further diagnostic evaluation.    Subjective   Gilberto is a 69 year old, presenting for the following:  RECHECK          6/25/2024     3:10 PM   Additional Questions   Roomed by Josey   Accompanied by self     HPI related to upcoming procedure:   70 yO male  presents for cardiopulmonary/general clearance to undergo the above procedure.  His surgeon listed above has asked for this clearance to undergo anesthesia. Pt has had this condition for approximately 10 yrs since last colonoscopy .   Overall pt is of good to fair  health and has not  had any perioperative complications with previous surgeries.      Pt has known CAD/PAD/Tobacco abuse in remission/ recent CABG/AAA repair /Total hip arthroplasty / Complex Sleep Apnea and Atypical Pneumonia back in May.  Currently is back to baseline. Just seen Pulmonology due to mass like lesion in lungs that looked be infectious in nature. It is slowly regressing in size.  Pt has clinical COPD - started recently on Advair but can not afford it. Uses albuterol at times.    Gives me no worsening changes in his cardiopulmonary status and actually breathing some better slowly after pneumonia    Pt has some possible changes in peripheral vision and wants to see Eye doctor - he was referred today . His daytime hypersomnolence has worsened and will send back to Dr Murphy to discuss options for his Complex Sleep Apnea sx.         6/25/2024   Pre-Op Questionnaire   Have you ever had a heart attack or stroke? No   Have you ever had surgery on your heart or blood vessels, such as a stent placement, a coronary artery bypass, or surgery on an artery in your head, neck,  heart, or legs? (!) YES    Do you have chest pain with activity? No   Do you have a history of heart failure? No   Do you currently have a cold, bronchitis or symptoms of other infection? No   Do you have a cough, shortness of breath, or wheezing? No   Do you or anyone in your family have previous history of blood clots? No   Do you or does anyone in your family have a serious bleeding problem such as prolonged bleeding following surgeries or cuts? No   Have you ever had problems with anemia or been told to take iron pills? No   Have you had any abnormal blood loss such as black, tarry or bloody stools? No   Have you ever had a blood transfusion? No   Are you willing to have a blood transfusion if it is medically needed before, during, or after your surgery? Yes   Have you or any of your relatives ever had problems with anesthesia? No   Do you have sleep apnea, excessive snoring or daytime drowsiness? (!) YES   Do you have a CPAP machine? (!) NO    Do you have any artifical heart valves or other implanted medical devices like a pacemaker, defibrillator, or continuous glucose monitor? No   Do you have artificial joints? (!) YES   Are you allergic to latex? No        Health Care Directive  Patient does not have a Health Care Directive or Living Will: Discussed advance care planning with patient; however, patient declined at this time.    Preoperative Review of    reviewed - no record of controlled substances prescribed.      Status of Chronic Conditions:  CAD - Patient has a longstanding history of moderate-severe CAD. Patient denies recent chest pain or NTG use, denies exercise induced dyspnea or PND. Last Stress test -- recent angiogram and Cabg along with valve repair , EKG today .     COPD - Patient has a longstanding history of moderate-severe COPD . Patient has been doing well overall noting PÉREZ and COUGH and continues on medication regimen consisting of albuterol prn  without adverse reactions or side  effects.    DEPRESSION - Patient has a long history of Depression of moderate severity requiring medication for control with recent symptoms being stable..Current symptoms of depression include depressed mood, fatigue, anxiety.     DIABETES - Patient has a longstanding history of DiabetesType Type II . Patient is being treated with diet, oral agents, and exercise and denies significant side effects. Control has been good. Complicating factors include but are not limited to: hypertension, hyperlipidemia, CAD/PVD, morbid obesity , and tobacco use.     HYPERLIPIDEMIA - Patient has a long history of significant Hyperlipidemia requiring medication for treatment with recent good control. Patient reports no problems or side effects with the medication.     HYPERTENSION - Patient has longstanding history of HTN , currently denies any symptoms referable to elevated blood pressure. Specifically denies chest pain, palpitations, dyspnea, orthopnea, PND or peripheral edema. Blood pressure readings have been in normal range. Current medication regimen is as listed below. Patient denies any side effects of medication.     SLEEP PROBLEM - Patient has a longstanding history of excessive daytime somnolence and fatigue.. Patient has tried OTC medications with limited success.     Patient Active Problem List    Diagnosis Date Noted    Recurrent major depressive disorder, in full remission (H24) 05/06/2024     Priority: Medium    Complex sleep apnea syndrome 02/26/2024     Priority: Medium    Fall from standing, initial encounter 06/30/2023     Priority: Medium    S/P CABG (coronary artery bypass graft) 02/13/2023     Priority: Medium    S/P mitral valve repair 02/13/2023     Priority: Medium    Postoperative atrial fibrillation (H) 02/13/2023     Priority: Medium    Peripheral arterial disease (H24) 02/13/2023     Priority: Medium    Coronary artery disease involving native heart, unspecified vessel or lesion type, unspecified whether  angina present 01/18/2023     Priority: Medium    Status post coronary angiogram 12/09/2022     Priority: Medium    Mitral valve prolapse 09/30/2022     Priority: Medium    S/P AAA repair 08/29/2022     Priority: Medium    Tobacco dependence in remission 08/29/2022     Priority: Medium    Elevated prostate specific antigen (PSA) 02/15/2021     Priority: Medium    Prediabetes 02/15/2021     Priority: Medium    Diabetes mellitus, type 2 (H) 02/15/2021     Priority: Medium    Morbid obesity (H) 08/21/2020     Priority: Medium    Moderate major depression (H) 06/19/2020     Priority: Medium    Mixed hyperlipidemia 06/26/2018     Priority: Medium    Essential hypertension 10/11/2017     Priority: Medium    ACP (advance care planning) 05/11/2016     Priority: Medium     Advance Care Planning 5/11/2016: ACP Review of Chart / Resources Provided:  Reviewed chart for advance care plan.  Gilberto Camilo has no plan or code status on file. Discussed available resources and provided with information. Confirmed code status reflects current choices pending further ACP discussions.  Confirmed/documented legally designated decision makers.  Added by Lorenza Jordan            Routine general medical examination at a health care facility 05/11/2016     Priority: Medium    Erectile dysfunction 05/18/2015     Priority: Medium    Abdominal aortic aneurysm (H24) 03/18/2014     Priority: Medium     Problem list name updated by automated process. Provider to review        Past Medical History:   Diagnosis Date    AAA (abdominal aortic aneurysm) (H24) 09/20/2017    5.3 cm     Mohan esophagus     Closed rib fracture     Coronary artery disease involving native heart, unspecified vessel or lesion type, unspecified whether angina present 1/18/2023    DNS (deviated nasal septum)     Postoperative atrial fibrillation (H) 2/13/2023    S/P CABG (coronary artery bypass graft) 2/13/2023    S/P mitral valve repair 2/13/2023     Past Surgical  History:   Procedure Laterality Date    AAA REPAIR  06/17/2020    st [facility]/ intravascular repair    APPENDECTOMY      BYPASS GRAFT ARTERY CORONARY, REPAIR VALVE MITRAL, COMBINED N/A 1/18/2023    Procedure: MEDIAN STERNOTOMY, ON PUMP OXGENATION, Left endovascular saphaneous vein harvest, CORONARY ARTERY BYPASS GRAFT (CABG x 2), Mitral Valve Repair, transesophageal echocardiogram (TRENT) performed by anesthesia;  Surgeon: Linda Kim MD;  Location: [facility] OR    COLONOSCOPY  2013    CV CORONARY ANGIOGRAM N/A 12/9/2022    Procedure: Coronary Angiogram with possible intervention;  Surgeon: Porfirio Herrera MD;  Location:  HEART CARDIAC CATH LAB    CV RIGHT HEART CATH MEASUREMENTS RECORDED N/A 12/9/2022    Procedure: Right Heart Cath;  Surgeon: Porfirio Herrera MD;  Location:  HEART CARDIAC CATH LAB    ESOPHAGOGASTRODUODENOSCOPY  2013    INCISION AND DRAINAGE CHEST WASHOUT, COMBINED N/A 1/19/2023    Procedure: chest washout, Re-do sternotomy, repair of vein graft;  Surgeon: Linda Kim MD;  Location: [facility] OR     Current Outpatient Medications   Medication Sig Dispense Refill    ACCU-CHEK GUIDE test strip Use to test blood sugar 1 daily. 100 strip 1    ARIPiprazole (ABILIFY) 5 MG tablet Take 1 tablet (5 mg) by mouth every morning 90 tablet 3    atorvastatin (LIPITOR) 80 MG tablet Take 1 tablet (80 mg) by mouth every morning 90 tablet 3    blood glucose monitoring (NO BRAND SPECIFIED) meter device kit Use to test blood sugar 1 times daily or as directed. 1 kit 0    blood glucose monitoring (SOFTCLIX) lancets Use to test blood sugar 1 times daily. 100 each 1    Blood Glucose Monitoring Suppl (ACCU-CHEK GUIDE) w/Device KIT USE TO TEST BLOOD GLUCOSE ONCE DAILY OR AS DIRECTED.      buPROPion (WELLBUTRIN XL) 300 MG 24 hr tablet Take 1 tablet (300 mg) by mouth every morning 90 tablet 3    loratadine (CLARITIN) 10 MG tablet Take 10 mg by mouth every morning      metoprolol succinate ER  (TOPROL XL) 25 MG 24 hr tablet 1/2 tablet for 1 week then every other day for 3 doses then off .      multivitamin, therapeutic (THERA-VIT) TABS tablet 1 tablet by Oral or Feeding Tube route every morning 30 tablet 3    oxyBUTYnin ER (DITROPAN XL) 15 MG 24 hr tablet TAKE 1 TABLET(15 MG) BY MOUTH DAILY 90 tablet 2    pantoprazole (PROTONIX) 40 MG EC tablet TAKE 1 TABLET(40 MG) BY MOUTH DAILY 90 tablet 3    pioglitazone (ACTOS) 15 MG tablet TAKE 1 TABLET(15 MG) BY MOUTH DAILY 90 tablet 1    vitamin C (ASCORBIC ACID) 1000 MG TABS Take 1,000 mg by mouth every morning      Vitamin D3 (CHOLECALCIFEROL) 25 mcg (1000 units) tablet       aspirin (ASA) 81 MG chewable tablet Take 162 mg by mouth (Patient not taking: Reported on 6/25/2024)         No Known Allergies     Social History     Tobacco Use    Smoking status: Former     Current packs/day: 1.00     Average packs/day: 1 pack/day for 25.0 years (25.0 ttl pk-yrs)     Types: Cigarettes     Passive exposure: Never    Smokeless tobacco: Never    Tobacco comments:     Feb. 2014   Substance Use Topics    Alcohol use: Yes     Comment: occasional     Family History   Problem Relation Age of Onset    C.A.D. Father         CABG in late 40's    C.A.D. Brother         MI in 50's     History   Drug Use    Types: Marijuana             Review of Systems  Constitutional, neuro, ENT, endocrine, pulmonary, cardiac, gastrointestinal, genitourinary, musculoskeletal, integument and psychiatric systems are negative, except as otherwise noted.    Objective    /72   Pulse 70   Temp 97.7  F (36.5  C) (Tympanic)   Wt 106.1 kg (234 lb)   SpO2 94%   BMI 31.74 kg/m     Estimated body mass index is 31.74 kg/m  as calculated from the following:    Height as of 5/16/24: 1.829 m (6').    Weight as of this encounter: 106.1 kg (234 lb).  Physical Exam  GENERAL: alert and no distress  EYES: Eyes grossly normal to inspection, PERRL and conjunctivae and sclerae normal  HENT: ear canals and TM's  normal, nose and mouth without ulcers or lesions  NECK: no adenopathy, no asymmetry, masses, or scars  RESP: lungs clear to auscultation - no rales, rhonchi or wheezes  CV: regular rate and rhythm, normal S1 S2, no S3 or S4, no murmur, click or rub, no peripheral edema  ABDOMEN: soft, nontender, no hepatosplenomegaly, no masses and bowel sounds normal  MS: no gross musculoskeletal defects noted, no edema  SKIN: no suspicious lesions or rashes  NEURO: Normal strength and tone, mentation intact and speech normal  PSYCH: mentation appears normal, affect -very flat affect - baseline    Recent Labs   Lab Test 06/25/24  1510 05/16/24  1337 05/02/24  1617 03/15/24  0916 07/17/23  1223 06/30/23  1628   HGB 12.6* 12.2* 11.0* 12.4*   < > 12.2*    379 375 248   < > 245   INR  --   --   --   --   --  0.99   NA  --   --  137 138   < > 140   POTASSIUM  --   --  4.4 4.1   < > 4.7   CR  --   --  1.16 1.16   < > 1.11   A1C 7.6*  --   --  7.0*   < >  --     < > = values in this interval not displayed.        Diagnostics  Recent Results (from the past 24 hour(s))   Hemoglobin A1c    Collection Time: 06/25/24  3:10 PM   Result Value Ref Range    Estimated Average Glucose 171 mg/dL    Hemoglobin A1C 7.6 (H) <5.7 %   Basic metabolic panel    Collection Time: 06/25/24  3:10 PM   Result Value Ref Range    Sodium 142 135 - 145 mmol/L    Potassium 4.0 3.4 - 5.3 mmol/L    Chloride 105 98 - 107 mmol/L    Carbon Dioxide (CO2) 24 22 - 29 mmol/L    Anion Gap 13 7 - 15 mmol/L    Urea Nitrogen 16.6 8.0 - 23.0 mg/dL    Creatinine 1.08 0.67 - 1.17 mg/dL    GFR Estimate 73 >60 mL/min/1.73m2    Calcium 9.8 8.8 - 10.2 mg/dL    Glucose 98 70 - 99 mg/dL   CBC with platelets and differential    Collection Time: 06/25/24  3:10 PM   Result Value Ref Range    WBC Count 9.3 4.0 - 11.0 10e3/uL    RBC Count 4.78 4.40 - 5.90 10e6/uL    Hemoglobin 12.6 (L) 13.3 - 17.7 g/dL    Hematocrit 39.8 (L) 40.0 - 53.0 %    MCV 83 78 - 100 fL    MCH 26.4 (L) 26.5 -  33.0 pg    MCHC 31.7 31.5 - 36.5 g/dL    RDW 17.0 (H) 10.0 - 15.0 %    Platelet Count 261 150 - 450 10e3/uL    % Neutrophils 51 %    % Lymphocytes 31 %    % Monocytes 9 %    % Eosinophils 8 %    % Basophils 1 %    % Immature Granulocytes 0 %    NRBCs per 100 WBC 0 <1 /100    Absolute Neutrophils 4.7 1.6 - 8.3 10e3/uL    Absolute Lymphocytes 2.9 0.8 - 5.3 10e3/uL    Absolute Monocytes 0.9 0.0 - 1.3 10e3/uL    Absolute Eosinophils 0.7 0.0 - 0.7 10e3/uL    Absolute Basophils 0.1 0.0 - 0.2 10e3/uL    Absolute Immature Granulocytes 0.0 <=0.4 10e3/uL    Absolute NRBCs 0.0 10e3/uL      EKG: appears normal, NSR, pvc, normal axis, normal intervals, no acute ST/T changes c/w ischemia, no LVH by voltage criteria, unchanged from previous tracings    Revised Cardiac Risk Index (RCRI)  The patient has the following serious cardiovascular risks for perioperative complications:   - Coronary Artery Disease (MI, positive stress test, angina, Qs on EKG) = 1 point     RCRI Interpretation: 1 point: Class II (low risk - 0.9% complication rate)         Signed Electronically by: Rock Basilio MD  Copy of this evaluation report is provided to requesting physician.

## 2024-06-25 NOTE — PATIENT INSTRUCTIONS
HOLD ALL YOUR VITAMINS FOR A WEEK BEFORE SURGERY     NO ASPIRIN OR NSAIDS A WEEK BEFORE SURGERY     HOLD YOUR ACTOS THE MORNING OF SURGERY     HOLD YOUR OXYBUTYNIN THEN MORNING BEFORE SURGERY

## 2024-06-26 ENCOUNTER — MYC MEDICAL ADVICE (OUTPATIENT)
Dept: FAMILY MEDICINE | Facility: OTHER | Age: 69
End: 2024-06-26

## 2024-06-26 ENCOUNTER — MYC MEDICAL ADVICE (OUTPATIENT)
Dept: PULMONOLOGY | Facility: OTHER | Age: 69
End: 2024-06-26

## 2024-06-26 NOTE — TELEPHONE ENCOUNTER
Hello HUC's~    Please redirect the referral for Adult Sleep Eval to [facility].    Thank you!  CEE Espinosa

## 2024-06-27 ENCOUNTER — ANESTHESIA (OUTPATIENT)
Dept: SURGERY | Facility: HOSPITAL | Age: 69
End: 2024-06-27
Payer: COMMERCIAL

## 2024-06-27 ENCOUNTER — HOSPITAL ENCOUNTER (OUTPATIENT)
Facility: HOSPITAL | Age: 69
Discharge: HOME | End: 2024-06-27
Attending: SURGERY | Admitting: SURGERY
Payer: COMMERCIAL

## 2024-06-27 VITALS
TEMPERATURE: 97 F | RESPIRATION RATE: 16 BRPM | BODY MASS INDEX: 31.07 KG/M2 | OXYGEN SATURATION: 96 % | WEIGHT: 229.4 LBS | HEART RATE: 71 BPM | DIASTOLIC BLOOD PRESSURE: 71 MMHG | HEIGHT: 72 IN | SYSTOLIC BLOOD PRESSURE: 141 MMHG

## 2024-06-27 LAB — GLUCOSE BLDC GLUCOMTR-MCNC: 132 MG/DL (ref 70–99)

## 2024-06-27 PROCEDURE — 99100 ANES PT EXTEME AGE<1 YR&>70: CPT | Performed by: NURSE ANESTHETIST, CERTIFIED REGISTERED

## 2024-06-27 PROCEDURE — 94640 AIRWAY INHALATION TREATMENT: CPT

## 2024-06-27 PROCEDURE — 250N000009 HC RX 250: Performed by: NURSE ANESTHETIST, CERTIFIED REGISTERED

## 2024-06-27 PROCEDURE — 710N000012 HC RECOVERY PHASE 2, PER MINUTE: Performed by: SURGERY

## 2024-06-27 PROCEDURE — 250N000011 HC RX IP 250 OP 636: Performed by: NURSE ANESTHETIST, CERTIFIED REGISTERED

## 2024-06-27 PROCEDURE — G0121 COLON CA SCRN NOT HI RSK IND: HCPCS | Performed by: SURGERY

## 2024-06-27 PROCEDURE — 258N000003 HC RX IP 258 OP 636: Performed by: NURSE PRACTITIONER

## 2024-06-27 PROCEDURE — 45378 DIAGNOSTIC COLONOSCOPY: CPT | Performed by: NURSE ANESTHETIST, CERTIFIED REGISTERED

## 2024-06-27 PROCEDURE — 82962 GLUCOSE BLOOD TEST: CPT

## 2024-06-27 PROCEDURE — 999N000141 HC STATISTIC PRE-PROCEDURE NURSING ASSESSMENT: Performed by: SURGERY

## 2024-06-27 PROCEDURE — 999N000157 HC STATISTIC RCP TIME EA 10 MIN

## 2024-06-27 PROCEDURE — 272N000001 HC OR GENERAL SUPPLY STERILE: Performed by: SURGERY

## 2024-06-27 PROCEDURE — 360N000075 HC SURGERY LEVEL 2, PER MIN: Performed by: SURGERY

## 2024-06-27 PROCEDURE — 370N000017 HC ANESTHESIA TECHNICAL FEE, PER MIN: Performed by: SURGERY

## 2024-06-27 RX ORDER — PROPOFOL 10 MG/ML
INJECTION, EMULSION INTRAVENOUS PRN
Status: DISCONTINUED | OUTPATIENT
Start: 2024-06-27 | End: 2024-06-27

## 2024-06-27 RX ORDER — OXYCODONE HYDROCHLORIDE 5 MG/1
5 TABLET ORAL
Status: DISCONTINUED | OUTPATIENT
Start: 2024-06-27 | End: 2024-06-27 | Stop reason: HOSPADM

## 2024-06-27 RX ORDER — SODIUM CHLORIDE, SODIUM LACTATE, POTASSIUM CHLORIDE, CALCIUM CHLORIDE 600; 310; 30; 20 MG/100ML; MG/100ML; MG/100ML; MG/100ML
INJECTION, SOLUTION INTRAVENOUS CONTINUOUS
Status: DISCONTINUED | OUTPATIENT
Start: 2024-06-27 | End: 2024-06-27 | Stop reason: HOSPADM

## 2024-06-27 RX ORDER — LIDOCAINE HYDROCHLORIDE 20 MG/ML
INJECTION, SOLUTION INFILTRATION; PERINEURAL PRN
Status: DISCONTINUED | OUTPATIENT
Start: 2024-06-27 | End: 2024-06-27

## 2024-06-27 RX ORDER — LIDOCAINE 40 MG/G
CREAM TOPICAL
Status: DISCONTINUED | OUTPATIENT
Start: 2024-06-27 | End: 2024-06-27 | Stop reason: HOSPADM

## 2024-06-27 RX ORDER — DEXAMETHASONE SODIUM PHOSPHATE 10 MG/ML
4 INJECTION, SOLUTION INTRAMUSCULAR; INTRAVENOUS
Status: DISCONTINUED | OUTPATIENT
Start: 2024-06-27 | End: 2024-06-27 | Stop reason: HOSPADM

## 2024-06-27 RX ORDER — NALOXONE HYDROCHLORIDE 0.4 MG/ML
0.1 INJECTION, SOLUTION INTRAMUSCULAR; INTRAVENOUS; SUBCUTANEOUS
Status: DISCONTINUED | OUTPATIENT
Start: 2024-06-27 | End: 2024-06-27 | Stop reason: HOSPADM

## 2024-06-27 RX ORDER — ONDANSETRON 2 MG/ML
4 INJECTION INTRAMUSCULAR; INTRAVENOUS EVERY 30 MIN PRN
Status: DISCONTINUED | OUTPATIENT
Start: 2024-06-27 | End: 2024-06-27 | Stop reason: HOSPADM

## 2024-06-27 RX ORDER — ALBUTEROL SULFATE 5 MG/ML
2.5 SOLUTION RESPIRATORY (INHALATION) ONCE
Status: COMPLETED | OUTPATIENT
Start: 2024-06-27 | End: 2024-06-27

## 2024-06-27 RX ORDER — ONDANSETRON 4 MG/1
4 TABLET, ORALLY DISINTEGRATING ORAL EVERY 30 MIN PRN
Status: DISCONTINUED | OUTPATIENT
Start: 2024-06-27 | End: 2024-06-27 | Stop reason: HOSPADM

## 2024-06-27 RX ADMIN — PROPOFOL 50 MG: 10 INJECTION, EMULSION INTRAVENOUS at 09:47

## 2024-06-27 RX ADMIN — PROPOFOL 50 MG: 10 INJECTION, EMULSION INTRAVENOUS at 09:56

## 2024-06-27 RX ADMIN — SODIUM CHLORIDE, POTASSIUM CHLORIDE, SODIUM LACTATE AND CALCIUM CHLORIDE: 600; 310; 30; 20 INJECTION, SOLUTION INTRAVENOUS at 09:29

## 2024-06-27 RX ADMIN — ALBUTEROL SULFATE 2.5 MG: 2.5 SOLUTION RESPIRATORY (INHALATION) at 09:33

## 2024-06-27 RX ADMIN — PROPOFOL 50 MG: 10 INJECTION, EMULSION INTRAVENOUS at 09:52

## 2024-06-27 RX ADMIN — PROPOFOL 50 MG: 10 INJECTION, EMULSION INTRAVENOUS at 10:06

## 2024-06-27 RX ADMIN — PROPOFOL 50 MG: 10 INJECTION, EMULSION INTRAVENOUS at 09:59

## 2024-06-27 RX ADMIN — LIDOCAINE HYDROCHLORIDE 100 MG: 20 INJECTION, SOLUTION INFILTRATION; PERINEURAL at 09:47

## 2024-06-27 ASSESSMENT — ACTIVITIES OF DAILY LIVING (ADL)
ADLS_ACUITY_SCORE: 24
ADLS_ACUITY_SCORE: 24

## 2024-06-27 ASSESSMENT — COPD QUESTIONNAIRES: CAT_SEVERITY: MODERATE

## 2024-06-27 NOTE — ANESTHESIA CARE TRANSFER NOTE
Patient: Gilberto Camilo    Procedure: Procedure(s):  COLONOSCOPY       Diagnosis: Encounter for screening colonoscopy [Z12.11]  Diagnosis Additional Information: No value filed.    Anesthesia Type:   MAC     Note:    Oropharynx: oropharynx clear of all foreign objects and spontaneously breathing  Level of Consciousness: drowsy  Oxygen Supplementation: room air    Independent Airway: airway patency satisfactory and stable  Dentition: dentition unchanged  Vital Signs Stable: post-procedure vital signs reviewed and stable  Report to RN Given: handoff report given  Patient transferred to: Phase II    Handoff Report: Identifed the Patient, Identified the Reponsible Provider, Reviewed the pertinent medical history, Discussed the surgical course, Reviewed Intra-OP anesthesia mangement and issues during anesthesia, Set expectations for post-procedure period and Allowed opportunity for questions and acknowledgement of understanding      Vitals:  Vitals Value Taken Time   BP     Temp     Pulse     Resp     SpO2         Electronically Signed By: TORI KYLE CRNA  June 27, 2024  10:08 AM

## 2024-06-27 NOTE — OP NOTE
REPORT OF OPERATION  DATE OF PROCEDURE: 6/27/2024    PATIENT: Gilberto Camilo    SURGERY PERFORMED: Colonoscopy    PREOPERATIVE DIAGNOSIS: Screening colonoscopy    POSTOPERATIVE DIAGNOSIS:    Same   Normal colonoscopy   Diverticulosis was identified.   Hemorrhoids  were  identified.    SURGEON: Khoa Jacques MD    ASSISTANTS: None    ANESTHESIA: Monitored Anesthesia Care    COMPLICATIONS: None apparent    TRANSFUSIONS: None    TISSUE TO PATHOLOGY: None    FINDINGS: Normal colonoscopy.  Diverticulosis was identified.  Hemorrhoids  were  identified.    INDICATIONS: This is a 69 year old male in need of a colonoscopy for Screening colonoscopy.  The patient will be taken to the endoscopy suite for that procedure.    DESCRIPTIONS OF PROCEDURE IN DETAIL: After consent was obtained the patient was taken to the endoscopy suite and placed in the left lateral decubitus position.  The patient was identified and the correct patient was confirmed.  Monitored Anesthesia Care was given.  A time out was performed verifying the correct patient and the correct procedure.  The entire operative team was in agreement.  All necessary equipment and supplies were in the room.    Rectal exam was performed and no lesions of the anal canal were noted.  The colonoscope was inserted into the anus and passed without difficulty to the cecum.  The cecum was identified by the ileocecal valve, the coalescence of the tinea and the appendiceal orifice.  Upon withdrawal all walls of the colon were visualized.  There were no polyps, masses or evidence of colitis seen.  Diverticulosis was seen.  Upon reaching the rectum the scope was retroflexed and internal hemorrhoids  were  seen.  The scope was straightened back out and removed from the patient.  The patient was then taken to the recovery room in stable condition tolerating the procedure well.      Prep: poor    Withdrawal time was 5 minutes.    It is recommended that the patient have another  colonoscopy PRN.

## 2024-06-27 NOTE — ANESTHESIA POSTPROCEDURE EVALUATION
Patient: Gilberto Camilo    Procedure: Procedure(s):  COLONOSCOPY       Anesthesia Type:  MAC    Note:  Disposition: Outpatient   Postop Pain Control: Uneventful            Sign Out: Well controlled pain   PONV: No   Neuro/Psych: Uneventful            Sign Out: Acceptable/Baseline neuro status   Airway/Respiratory: Uneventful            Sign Out: Acceptable/Baseline resp. status   CV/Hemodynamics: Uneventful            Sign Out: Acceptable CV status; No obvious hypovolemia; No obvious fluid overload   Other NRE: NONE   DID A NON-ROUTINE EVENT OCCUR? No       Last vitals:  Vitals Value Taken Time   /71 06/27/24 1030   Temp 97  F (36.1  C) 06/27/24 1010   Pulse 71 06/27/24 1030   Resp 16 06/27/24 1015   SpO2 94 % 06/27/24 1034   Vitals shown include unfiled device data.    Electronically Signed By: TORI Echevarria CRNA  June 27, 2024  10:35 AM

## 2024-07-25 ENCOUNTER — OFFICE VISIT (OUTPATIENT)
Dept: PODIATRY | Facility: OTHER | Age: 69
End: 2024-07-25
Attending: PODIATRIST
Payer: COMMERCIAL

## 2024-07-25 VITALS
RESPIRATION RATE: 20 BRPM | SYSTOLIC BLOOD PRESSURE: 133 MMHG | HEART RATE: 74 BPM | OXYGEN SATURATION: 96 % | DIASTOLIC BLOOD PRESSURE: 79 MMHG | TEMPERATURE: 98.1 F

## 2024-07-25 DIAGNOSIS — E11.9 DIABETES MELLITUS TYPE 2, NONINSULIN DEPENDENT (H): ICD-10-CM

## 2024-07-25 DIAGNOSIS — E11.42 DIABETIC POLYNEUROPATHY ASSOCIATED WITH TYPE 2 DIABETES MELLITUS (H): ICD-10-CM

## 2024-07-25 DIAGNOSIS — Z13.89 SCREENING FOR DIABETIC PERIPHERAL NEUROPATHY: ICD-10-CM

## 2024-07-25 DIAGNOSIS — L60.3 ONYCHODYSTROPHY: Primary | ICD-10-CM

## 2024-07-25 DIAGNOSIS — I73.9 PERIPHERAL ARTERIAL DISEASE (H): ICD-10-CM

## 2024-07-25 PROCEDURE — 99207 PR FOOT EXAM NO CHARGE: CPT | Performed by: PODIATRIST

## 2024-07-25 PROCEDURE — 11721 DEBRIDE NAIL 6 OR MORE: CPT | Performed by: PODIATRIST

## 2024-07-25 PROCEDURE — G0463 HOSPITAL OUTPT CLINIC VISIT: HCPCS

## 2024-07-25 ASSESSMENT — PAIN SCALES - GENERAL: PAINLEVEL: NO PAIN (0)

## 2024-07-25 NOTE — PROGRESS NOTES
Chief complaint: Patient presents with:  Toenail: DFE      History of Present Illness: This 69 year old NIDDM II male is seen for follow-up management of elongated toenails. The toenails are difficult to reach and the nails are too thick for him to trim with regular nail clippers.     He has not had pain from the bilateral hallux toenails.    He sometimes feels tingling in the toes.    He broke his LEFT hip on 07/01/2023 and he had an ORIF. He says he is still doing well.    Patients says he has not been applying lotion to his feet.       Lab Results   Component Value Date    A1C 7.6 06/25/2024    A1C 7.0 03/15/2024    A1C 7.1 11/28/2023    A1C 6.5 05/04/2023    A1C 7.9 01/09/2023    A1C 6.5 02/15/2021             No further pedal complaints today.     Patient quit smoking around 2011.        /79   Pulse 74   Temp 98.1  F (36.7  C) (Tympanic)   Resp 20   SpO2 96%      Patient Active Problem List   Diagnosis    Abdominal aortic aneurysm (H24)    Erectile dysfunction    ACP (advance care planning)    Essential hypertension    Mixed hyperlipidemia    Moderate major depression (H)    Morbid obesity (H)    Elevated prostate specific antigen (PSA)    Prediabetes    Diabetes mellitus, type 2 (H)    S/P AAA repair    Tobacco dependence in remission    Mitral valve prolapse    Status post coronary angiogram    Coronary artery disease involving native heart, unspecified vessel or lesion type, unspecified whether angina present    S/P CABG (coronary artery bypass graft)    S/P mitral valve repair    Postoperative atrial fibrillation (H)    Peripheral arterial disease (H24)    Fall from standing, initial encounter    Complex sleep apnea syndrome    Recurrent major depressive disorder, in full remission (H24)    S/P total left hip arthroplasty       Past Surgical History:   Procedure Laterality Date    AAA REPAIR  06/17/2020    [facility]/ intravascular repair    APPENDECTOMY      BYPASS GRAFT ARTERY CORONARY,  REPAIR VALVE MITRAL, COMBINED N/A 01/18/2023    Procedure: MEDIAN STERNOTOMY, ON PUMP OXGENATION, Left endovascular saphaneous vein harvest, CORONARY ARTERY BYPASS GRAFT (CABG x 2), Mitral Valve Repair, transesophageal echocardiogram (TRENT) performed by anesthesia;  Surgeon: Linda Kim MD;  Location: [facility] OR    COLONOSCOPY  2013    COLONOSCOPY N/A 6/27/2024    Procedure: COLONOSCOPY;  Surgeon: Khoa Jacques MD;  Location: [facility]    CV CORONARY ANGIOGRAM N/A 12/09/2022    Procedure: Coronary Angiogram with possible intervention;  Surgeon: Porfirio Herrera MD;  Location:  [facility]    CV RIGHT HEART CATH MEASUREMENTS RECORDED N/A 12/09/2022    Procedure: Right Heart Cath;  Surgeon: Porfirio Herrera MD;  Location:  [facility]    ESOPHAGOGASTRODUODENOSCOPY  2013    INCISION AND DRAINAGE CHEST WASHOUT, COMBINED N/A 01/19/2023    Procedure: chest washout, Re-do sternotomy, repair of vein graft;  Surgeon: Linda Kim MD;  Location: [facility] OR    left total hip arthroplasty         Current Outpatient Medications   Medication Sig Dispense Refill    ACCU-CHEK GUIDE test strip Use to test blood sugar 1 daily. 100 strip 1    ARIPiprazole (ABILIFY) 5 MG tablet Take 1 tablet (5 mg) by mouth every morning 90 tablet 3    aspirin (ASA) 81 MG chewable tablet Take 162 mg by mouth      atorvastatin (LIPITOR) 80 MG tablet Take 1 tablet (80 mg) by mouth every morning 90 tablet 3    blood glucose monitoring (NO BRAND SPECIFIED) meter device kit Use to test blood sugar 1 times daily or as directed. 1 kit 0    blood glucose monitoring (SOFTCLIX) lancets Use to test blood sugar 1 times daily. 100 each 1    Blood Glucose Monitoring Suppl (ACCU-CHEK GUIDE) w/Device KIT USE TO TEST BLOOD GLUCOSE ONCE DAILY OR AS DIRECTED.      buPROPion (WELLBUTRIN XL) 300 MG 24 hr tablet Take 1 tablet (300 mg) by mouth every morning 90 tablet 3    loratadine (CLARITIN) 10 MG tablet Take 10 mg  by mouth every morning      metoprolol succinate ER (TOPROL XL) 25 MG 24 hr tablet 1/2 tablet for 1 week then every other day for 3 doses then off .      multivitamin, therapeutic (THERA-VIT) TABS tablet 1 tablet by Oral or Feeding Tube route every morning 30 tablet 3    oxyBUTYnin ER (DITROPAN XL) 15 MG 24 hr tablet TAKE 1 TABLET(15 MG) BY MOUTH DAILY 90 tablet 2    pantoprazole (PROTONIX) 40 MG EC tablet TAKE 1 TABLET(40 MG) BY MOUTH DAILY 90 tablet 3    pioglitazone (ACTOS) 15 MG tablet TAKE 1 TABLET(15 MG) BY MOUTH DAILY 90 tablet 1    vitamin C (ASCORBIC ACID) 1000 MG TABS Take 1,000 mg by mouth every morning      Vitamin D3 (CHOLECALCIFEROL) 25 mcg (1000 units) tablet        No current facility-administered medications for this visit.        No Known Allergies    Family History   Problem Relation Age of Onset    C.A.D. Father         CABG in late 40's    C.A.D. Brother         MI in 50's       Social History     Socioeconomic History    Marital status:      Spouse name: None    Number of children: None    Years of education: None    Highest education level: None   Occupational History    None   Tobacco Use    Smoking status: Former Smoker     Packs/day: 0.00     Types: Cigarettes    Smokeless tobacco: Never Used    Tobacco comment: Feb. 2014   Substance and Sexual Activity    Alcohol use: Yes     Comment: occasional    Drug use: Yes     Types: Marijuana    Sexual activity: Yes     Partners: Female   Other Topics Concern    Parent/sibling w/ CABG, MI or angioplasty before 65F 55M? Yes     Comment: dad had heart attack before 55. brother also had heart attack before 55.   Social History Narrative    .      Social Determinants of Health     Financial Resource Strain:     Difficulty of Paying Living Expenses:    Food Insecurity:     Worried About Running Out of Food in the Last Year:     Ran Out of Food in the Last Year:    Transportation Needs:     Lack of Transportation (Medical):     Lack of  Transportation (Non-Medical):    Physical Activity:     Days of Exercise per Week:     Minutes of Exercise per Session:    Stress:     Feeling of Stress :    Social Connections:     Frequency of Communication with Friends and Family:     Frequency of Social Gatherings with Friends and Family:     Attends [employer] Services:     Active Member of Clubs or Organizations:     Attends Club or Organization Meetings:     Marital Status:    Intimate Partner Violence:     Fear of Current or Ex-Partner:     Emotionally Abused:     Physically Abused:     Sexually Abused:        ROS: 10 point ROS neg other than the symptoms noted above in the HPI.  EXAM  Constitutional: healthy, alert and no distress    Psychiatric: mentation appears normal and affect normal/bright    VASCULAR:  -Dorsalis pedis pulse +1/4 b/l  -Posterior tibial pulse +1/4 b/l  -Capillary refill time < 3 seconds to b/l hallux  -Hair growth Absent to b/l anterior legs and ankles  NEURO:  -Positive for paresthesias to bilateral foot  -Epicritic and protective sensation diminished to the bilateral foot  DERM:  -Skin temperature cool to bilateral foot  -Skin dry, flaking to bilateral plantar foot  -Toenails elongated, thickened, dystrophic and discolored x 10  ---Bilateral hallux toenails significantly dystrophic    MSK:  -Muscle strength of ankles +5/5 for dorsiflexion, plantarflexion, ABDUction and ADDuction b/l    ============================================================    ASSESSMENT:  (L60.3) Onychodystrophy  (primary encounter diagnosis)    (E11.9) Diabetes mellitus type 2, noninsulin dependent (H)    (E11.42) Diabetic polyneuropathy associated with type 2 diabetes mellitus (H)    (I73.9) Peripheral arterial disease (H)    (Z13.89) Screening for diabetic peripheral neuropathy        PLAN:  -Patient evaluated and examined. Treatment options discussed with no educational barriers noted.  -High risk toenail debridement x 10 toenails without  incident    -Diabetic Foot Education provided. This included checking the feet daily looking for new new blisters or wounds, wearing shoes at all times when walking including around the house, and avoiding lotion application between the toes. If there are any signs of infection, the patient should present to the ED as soon as possible. Infections of the foot can be life threatening or lead to amputations of the foot or leg.     -Vascular appointment -- most recent appointment was 09/19/2023 at [facility]. He follows up with them once a year.    Diabetes Mellitus: Patient's DM is managed by their PCP. The DM appears to be stable. Patient's last HbA1C was 7.6% on 06/25/2023.    -Patient in agreement with the above treatment plan and all of patient's questions were answered.      RTC 3 months for diabetic foot exam and high risk nail debridement         Sierra Velásquez DPM

## 2024-08-16 ASSESSMENT — SLEEP AND FATIGUE QUESTIONNAIRES
HOW LIKELY ARE YOU TO NOD OFF OR FALL ASLEEP WHEN YOU ARE A PASSENGER IN A CAR FOR AN HOUR WITHOUT A BREAK: WOULD NEVER DOZE
HOW LIKELY ARE YOU TO NOD OFF OR FALL ASLEEP IN A CAR, WHILE STOPPED FOR A FEW MINUTES IN TRAFFIC: WOULD NEVER DOZE
HOW LIKELY ARE YOU TO NOD OFF OR FALL ASLEEP WHILE SITTING QUIETLY AFTER LUNCH WITHOUT ALCOHOL: HIGH CHANCE OF DOZING
HOW LIKELY ARE YOU TO NOD OFF OR FALL ASLEEP WHILE SITTING AND TALKING TO SOMEONE: SLIGHT CHANCE OF DOZING
HOW LIKELY ARE YOU TO NOD OFF OR FALL ASLEEP WHILE SITTING INACTIVE IN A PUBLIC PLACE: SLIGHT CHANCE OF DOZING
HOW LIKELY ARE YOU TO NOD OFF OR FALL ASLEEP WHILE SITTING AND READING: MODERATE CHANCE OF DOZING
HOW LIKELY ARE YOU TO NOD OFF OR FALL ASLEEP WHILE WATCHING TV: MODERATE CHANCE OF DOZING
HOW LIKELY ARE YOU TO NOD OFF OR FALL ASLEEP WHILE LYING DOWN TO REST IN THE AFTERNOON WHEN CIRCUMSTANCES PERMIT: HIGH CHANCE OF DOZING

## 2024-08-21 NOTE — PROGRESS NOTES
08/16/24 0937   Reason For Your Visit   Please briefly describe the main reason(s) for your sleep visit Apnea?   Approximately when did this problem start Years ago   What are your goals for this visit To not be tored all day.   Time in Bed - Work Or School Days   Do you work or go to school No   What time do you usually get into bed 9-10:00   About how long does it take you to fall asleep 5 minutes   How often do you have trouble falling asleep 0   How often do you wake up during the night Once   What wakes you up at night Use the bathroom   How often do you have trouble falling back to sleep Never   About how long does it take to fall back to sleep N/A   What do you usually do if you have trouble getting back to sleep Get up   What time do you usually get out of bed to start your day 8-9:00   Do you use an alarm No   Time in Bed - Weekends/Non-work Days/All Other Days   What time do you usually get into bed 8-9:00   About how long does it take you to fall asleep 5 minutez   What time do you usually get out of bed to start your day 8-9:00   Do you use an alarm No   Sleep Need   On average, about how much sleep do you think you get 10-12 hrs   About how much sleep do you think you need 8 hrs   Sleep Position   Which sleep positions do you prefer Back;Side   How often do you take a nap on purpose 7   About how long are your naps 2 hrs   Do you feel better after naps Yes   How often do you doze off unintentionally 7   Have you ever had a driving accident or near-miss due to sleepiness/drowsiness Yes   Sleep Disruptions - Breathing/Snoring   Do you snore Yes   Do other people complain about your snoring No   Have you been told you stop breathing in your sleep Yes   Do you have issues with any of the following Morning mouth dryness;Stuffy nose when you wake up;Heartburn or reflux at night;Getting up to urinate more than once   Sleep Disruptions - Movement   Do you get pain, discomfort, with an urge to move No   Does  it happen when you are resting No   Does it get better if you move around No   Does it happen more at night No   Have you been told you kick your legs at night No   Sleep Disruptions - Behaviours in Sleep   Do you ever experience sudden muscle weakness during the day No   Caffeine, Alcohol and Other Substances   How many caffeinated beverages (coffee, tea, soda, energy drinks) per day 5   What time of day is your last caffeine use Bedtime   Do you drink alcohol to help you sleep No   Do you drink alcohol near bedtime No   Family History   Has any family member been diagnosed with a sleep disorder No   In the last TWO WEEKS have you experienced any of the following symptoms?   Fevers No   Night Sweats No   Weight Gain No   Pain at Night No   Double Vision No   Changes in Vision No   Difficulty Breathing through Nose Yes   Sore Throat in Morning No   Dry Mouth in the Morning Yes   Shortness of Breath Lying Flat No   Shortness of Breath With Activity Yes   Awakening with Shortness of Breath No   Increased Cough Yes   Heart Racing at Night No   Swelling in Feet or Legs Yes   Diarrhea at Night No   Heartburn at Night Yes   Urinating More than Once at Night Yes   Losing Control of Urine at Night No   Joint Pains at Night No   Headaches in Morning No   Weakness in Arms or Legs Yes   Depressed Mood No   Anxiety No         8/16/2024     9:43 AM    [place] Sleepiness Scale ( ALBINA Wilson  [id number]<br>ESS - [place]/English - Final version - 21 Nov 07 - [facility] [place].)   Sitting and reading Moderate chance of dozing   Watching TV Moderate chance of dozing   Sitting, inactive in a public place (e.g. a theatre or a meeting) Slight chance of dozing   As a passenger in a car for an hour without a break Would never doze   Lying down to rest in the afternoon when circumstances permit High chance of dozing   Sitting and talking to someone Slight chance of dozing   Sitting quietly after a lunch without alcohol High chance of  dozing   In a car, while stopped for a few minutes in traffic Would never doze   Wooton Score (MC) 12   Wooton Score (Sleep) 12         8/16/2024     9:40 AM   Insomnia Severity Index (RUY)   Difficulty falling asleep 0   Difficulty staying asleep 0   Problems waking up too early 0   How SATISFIED/DISSATISFIED are you with your CURRENT sleep pattern? 1   How NOTICEABLE to others do you think your sleep problem is in terms of impairing the quality of your life? 4   How WORRIED/DISTRESSED are you about your current sleep problem? 3   To what extent do you consider your sleep problem to INTERFERE with your daily functioning (e.g. daytime fatigue, mood, ability to function at work/daily chores, concentration, memory, mood, etc.) CURRENTLY? 4   RUY Total Score 12 8/16/2024     9:41 AM   STOP BANG Questionnaire (  2008, the American Society of Anesthesiologists, Inc. [employer]   1. Snoring - Do you snore loudly (louder than talking or loud enough to be heard through closed doors)? No   2. Tired - Do you often feel tired, fatigued, or sleepy during daytime? Yes   3. Observed - Has anyone observed you stop breathing during your sleep? Yes   4. Blood pressure - Do you have or are you being treated for high blood pressure? Yes   5. BMI - BMI more than 35 kg/m2? Yes   6. Age - Age over 50 yr old? Yes   7. Neck circumference - Neck circumference greater than 40 cm? Yes   8. Gender - Gender male? Yes   STOP BANG Score (MC): 6 (High risk of EMANUEL)

## 2024-08-22 NOTE — TELEPHONE ENCOUNTER
Chart review prior to sleep testing.    Patient Summary:  69 year old male who is referred for sleep-disordered breathing.    Patient Active Problem List    Diagnosis Date Noted    S/P total left hip arthroplasty 06/25/2024     Priority: Medium    Recurrent major depressive disorder, in full remission (H24) 05/06/2024     Priority: Medium    Complex sleep apnea syndrome 02/26/2024     Priority: Medium    Fall from standing, initial encounter 06/30/2023     Priority: Medium    S/P CABG (coronary artery bypass graft) 02/13/2023     Priority: Medium    S/P mitral valve repair 02/13/2023     Priority: Medium    Postoperative atrial fibrillation (H) 02/13/2023     Priority: Medium    Peripheral arterial disease (H24) 02/13/2023     Priority: Medium    Coronary artery disease involving native heart, unspecified vessel or lesion type, unspecified whether angina present 01/18/2023     Priority: Medium    Status post coronary angiogram 12/09/2022     Priority: Medium    Mitral valve prolapse 09/30/2022     Priority: Medium    S/P AAA repair 08/29/2022     Priority: Medium    Tobacco dependence in remission 08/29/2022     Priority: Medium    Elevated prostate specific antigen (PSA) 02/15/2021     Priority: Medium    Prediabetes 02/15/2021     Priority: Medium    Diabetes mellitus, type 2 (H) 02/15/2021     Priority: Medium    Morbid obesity (H) 08/21/2020     Priority: Medium    Moderate major depression (H) 06/19/2020     Priority: Medium    Mixed hyperlipidemia 06/26/2018     Priority: Medium    Essential hypertension 10/11/2017     Priority: Medium    ACP (advance care planning) 05/11/2016     Priority: Medium     Advance Care Planning 5/11/2016: ACP Review of Chart / Resources Provided:  Reviewed chart for advance care plan.  Gilberto Camilo has no plan or code status on file. Discussed available resources and provided with information. Confirmed code status reflects current choices pending further ACP discussions.   Confirmed/documented legally designated decision makers.  Added by Lorenza Jordan            Erectile dysfunction 05/18/2015     Priority: Medium    Abdominal aortic aneurysm (H24) 03/18/2014     Priority: Medium     Problem list name updated by automated process. Provider to review         Current Outpatient Medications   Medication Sig Dispense Refill    ACCU-CHEK GUIDE test strip Use to test blood sugar 1 daily. 100 strip 1    ARIPiprazole (ABILIFY) 5 MG tablet Take 1 tablet (5 mg) by mouth every morning 90 tablet 3    aspirin (ASA) 81 MG chewable tablet Take 162 mg by mouth      atorvastatin (LIPITOR) 80 MG tablet Take 1 tablet (80 mg) by mouth every morning 90 tablet 3    blood glucose monitoring (NO BRAND SPECIFIED) meter device kit Use to test blood sugar 1 times daily or as directed. 1 kit 0    blood glucose monitoring (SOFTCLIX) lancets Use to test blood sugar 1 times daily. 100 each 1    Blood Glucose Monitoring Suppl (ACCU-CHEK GUIDE) w/Device KIT USE TO TEST BLOOD GLUCOSE ONCE DAILY OR AS DIRECTED.      buPROPion (WELLBUTRIN XL) 300 MG 24 hr tablet Take 1 tablet (300 mg) by mouth every morning 90 tablet 3    loratadine (CLARITIN) 10 MG tablet Take 10 mg by mouth every morning      metoprolol succinate ER (TOPROL XL) 25 MG 24 hr tablet 1/2 tablet for 1 week then every other day for 3 doses then off .      multivitamin, therapeutic (THERA-VIT) TABS tablet 1 tablet by Oral or Feeding Tube route every morning 30 tablet 3    oxyBUTYnin ER (DITROPAN XL) 15 MG 24 hr tablet TAKE 1 TABLET(15 MG) BY MOUTH DAILY 90 tablet 2    pantoprazole (PROTONIX) 40 MG EC tablet TAKE 1 TABLET(40 MG) BY MOUTH DAILY 90 tablet 3    pioglitazone (ACTOS) 15 MG tablet TAKE 1 TABLET(15 MG) BY MOUTH DAILY 90 tablet 1    vitamin C (ASCORBIC ACID) 1000 MG TABS Take 1,000 mg by mouth every morning      Vitamin D3 (CHOLECALCIFEROL) 25 mcg (1000 units) tablet        No current facility-administered medications for this visit.        Pertinent PMHx of complex sleep apnea, obesity, DM II, HTN, CAD s/p CABG, s/p mitral valve repair, PAD.    LOV on 2/26/2024.      A/P:  Recommend follow-up to discuss potential start of CPAP versus PAP titration PSG.      ---  This note was written with the assistance of the Dragon voice-dictation technology software. The final document, although reviewed, may contain errors. For corrections, please contact the office.    Vargas Murphy MD    Sleep Medicine  [place], [place]  Main Office: [phone]  [facility] [place]  [address] [place] [address], [address]  Schedule visits: [phone]  Main Office: [phone]  Fax: [phone]

## 2024-10-07 ENCOUNTER — OFFICE VISIT (OUTPATIENT)
Dept: FAMILY MEDICINE | Facility: OTHER | Age: 69
End: 2024-10-07
Attending: FAMILY MEDICINE
Payer: COMMERCIAL

## 2024-10-07 VITALS
HEART RATE: 58 BPM | BODY MASS INDEX: 33.82 KG/M2 | SYSTOLIC BLOOD PRESSURE: 114 MMHG | HEIGHT: 72 IN | OXYGEN SATURATION: 96 % | DIASTOLIC BLOOD PRESSURE: 64 MMHG | WEIGHT: 249.7 LBS | TEMPERATURE: 97.8 F

## 2024-10-07 DIAGNOSIS — K21.9 GASTROESOPHAGEAL REFLUX DISEASE, UNSPECIFIED WHETHER ESOPHAGITIS PRESENT: ICD-10-CM

## 2024-10-07 DIAGNOSIS — Z95.1 S/P CABG (CORONARY ARTERY BYPASS GRAFT): ICD-10-CM

## 2024-10-07 DIAGNOSIS — R91.8 PULMONARY MASS: ICD-10-CM

## 2024-10-07 DIAGNOSIS — G47.39 COMPLEX SLEEP APNEA SYNDROME: ICD-10-CM

## 2024-10-07 DIAGNOSIS — N39.41 URGE INCONTINENCE OF URINE: ICD-10-CM

## 2024-10-07 DIAGNOSIS — Z95.2 S/P MVR (MITRAL VALVE REPLACEMENT): ICD-10-CM

## 2024-10-07 DIAGNOSIS — E11.59 TYPE 2 DIABETES MELLITUS WITH OTHER CIRCULATORY COMPLICATION, WITHOUT LONG-TERM CURRENT USE OF INSULIN (H): Primary | ICD-10-CM

## 2024-10-07 DIAGNOSIS — J44.1 CHRONIC OBSTRUCTIVE PULMONARY DISEASE WITH ACUTE EXACERBATION (H): ICD-10-CM

## 2024-10-07 DIAGNOSIS — I10 ESSENTIAL HYPERTENSION: ICD-10-CM

## 2024-10-07 LAB
ANION GAP SERPL CALCULATED.3IONS-SCNC: 11 MMOL/L (ref 7–15)
BUN SERPL-MCNC: 20.9 MG/DL (ref 8–23)
CALCIUM SERPL-MCNC: 9.6 MG/DL (ref 8.8–10.4)
CHLORIDE SERPL-SCNC: 103 MMOL/L (ref 98–107)
CHOLEST SERPL-MCNC: 155 MG/DL
CREAT SERPL-MCNC: 0.99 MG/DL (ref 0.67–1.17)
CREAT UR-MCNC: 80.8 MG/DL
EGFRCR SERPLBLD CKD-EPI 2021: 81 ML/MIN/1.73M2
EST. AVERAGE GLUCOSE BLD GHB EST-MCNC: 160 MG/DL
FASTING STATUS PATIENT QL REPORTED: NO
FASTING STATUS PATIENT QL REPORTED: NO
GLUCOSE SERPL-MCNC: 135 MG/DL (ref 70–99)
HBA1C MFR BLD: 7.2 %
HCO3 SERPL-SCNC: 23 MMOL/L (ref 22–29)
HDLC SERPL-MCNC: 61 MG/DL
LDLC SERPL CALC-MCNC: 45 MG/DL
MICROALBUMIN UR-MCNC: <12 MG/L
MICROALBUMIN/CREAT UR: NORMAL MG/G{CREAT}
NONHDLC SERPL-MCNC: 94 MG/DL
POTASSIUM SERPL-SCNC: 4.5 MMOL/L (ref 3.4–5.3)
SODIUM SERPL-SCNC: 137 MMOL/L (ref 135–145)
TRIGL SERPL-MCNC: 247 MG/DL

## 2024-10-07 PROCEDURE — 36415 COLL VENOUS BLD VENIPUNCTURE: CPT | Mod: ZL | Performed by: FAMILY MEDICINE

## 2024-10-07 PROCEDURE — 99214 OFFICE O/P EST MOD 30 MIN: CPT | Performed by: FAMILY MEDICINE

## 2024-10-07 PROCEDURE — G0463 HOSPITAL OUTPT CLINIC VISIT: HCPCS | Mod: 25

## 2024-10-07 PROCEDURE — 82043 UR ALBUMIN QUANTITATIVE: CPT | Mod: ZL | Performed by: FAMILY MEDICINE

## 2024-10-07 PROCEDURE — 90480 ADMN SARSCOV2 VAC 1/ONLY CMP: CPT

## 2024-10-07 PROCEDURE — G0008 ADMIN INFLUENZA VIRUS VAC: HCPCS

## 2024-10-07 PROCEDURE — 80048 BASIC METABOLIC PNL TOTAL CA: CPT | Mod: ZL | Performed by: FAMILY MEDICINE

## 2024-10-07 PROCEDURE — G2211 COMPLEX E/M VISIT ADD ON: HCPCS | Performed by: FAMILY MEDICINE

## 2024-10-07 PROCEDURE — 83036 HEMOGLOBIN GLYCOSYLATED A1C: CPT | Mod: ZL | Performed by: FAMILY MEDICINE

## 2024-10-07 PROCEDURE — 80061 LIPID PANEL: CPT | Mod: ZL | Performed by: FAMILY MEDICINE

## 2024-10-07 RX ORDER — PIOGLITAZONE 15 MG/1
15 TABLET ORAL DAILY
Qty: 90 TABLET | Refills: 3 | Status: SHIPPED | OUTPATIENT
Start: 2024-10-07

## 2024-10-07 RX ORDER — ARIPIPRAZOLE 5 MG/1
5 TABLET ORAL EVERY MORNING
Qty: 90 TABLET | Refills: 3 | Status: SHIPPED | OUTPATIENT
Start: 2024-10-07

## 2024-10-07 RX ORDER — OXYBUTYNIN CHLORIDE 15 MG/1
15 TABLET, EXTENDED RELEASE ORAL DAILY
Qty: 90 TABLET | Refills: 3 | Status: SHIPPED | OUTPATIENT
Start: 2024-10-07

## 2024-10-07 RX ORDER — ATORVASTATIN CALCIUM 80 MG/1
80 TABLET, FILM COATED ORAL EVERY MORNING
Qty: 90 TABLET | Refills: 3 | Status: SHIPPED | OUTPATIENT
Start: 2024-10-07

## 2024-10-07 RX ORDER — PANTOPRAZOLE SODIUM 40 MG/1
40 TABLET, DELAYED RELEASE ORAL DAILY
Qty: 90 TABLET | Refills: 3 | Status: SHIPPED | OUTPATIENT
Start: 2024-10-07 | End: 2024-10-28

## 2024-10-07 ASSESSMENT — PATIENT HEALTH QUESTIONNAIRE - PHQ9
10. IF YOU CHECKED OFF ANY PROBLEMS, HOW DIFFICULT HAVE THESE PROBLEMS MADE IT FOR YOU TO DO YOUR WORK, TAKE CARE OF THINGS AT HOME, OR GET ALONG WITH OTHER PEOPLE: NOT DIFFICULT AT ALL
SUM OF ALL RESPONSES TO PHQ QUESTIONS 1-9: 0
SUM OF ALL RESPONSES TO PHQ QUESTIONS 1-9: 0

## 2024-10-07 ASSESSMENT — PAIN SCALES - GENERAL: PAINLEVEL: NO PAIN (0)

## 2024-10-07 NOTE — PATIENT INSTRUCTIONS
Results for orders placed or performed in visit on 10/07/24   Lipid Profile     Status: Abnormal   Result Value Ref Range    Cholesterol 155 <200 mg/dL    Triglycerides 247 (H) <150 mg/dL    Direct Measure HDL 61 >=40 mg/dL    LDL Cholesterol Calculated 45 <100 mg/dL    Non HDL Cholesterol 94 <130 mg/dL    Patient Fasting > 8hrs? No     Narrative    Cholesterol  Desirable: < 200 mg/dL  Borderline High: 200 - 239 mg/dL  High: >= 240 mg/dL    Triglycerides  Normal: < 150 mg/dL  Borderline High: 150 - 199 mg/dL  High: 200-499 mg/dL  Very High: >= 500 mg/dL    Direct Measure HDL  Female: >= 50 mg/dL   Male: >= 40 mg/dL    LDL Cholesterol  Desirable: < 100 mg/dL  Above Desirable: 100 - 129 mg/dL   Borderline High: 130 - 159 mg/dL   High:  160 - 189 mg/dL   Very High: >= 190 mg/dL    Non HDL Cholesterol  Desirable: < 130 mg/dL  Above Desirable: 130 - 159 mg/dL  Borderline High: 160 - 189 mg/dL  High: 190 - 219 mg/dL  Very High: >= 220 mg/dL   Hemoglobin A1c     Status: Abnormal   Result Value Ref Range    Estimated Average Glucose 160 (H) <117 mg/dL    Hemoglobin A1C 7.2 (H) <5.7 %   Basic metabolic panel     Status: Abnormal   Result Value Ref Range    Sodium 137 135 - 145 mmol/L    Potassium 4.5 3.4 - 5.3 mmol/L    Chloride 103 98 - 107 mmol/L    Carbon Dioxide (CO2) 23 22 - 29 mmol/L    Anion Gap 11 7 - 15 mmol/L    Urea Nitrogen 20.9 8.0 - 23.0 mg/dL    Creatinine 0.99 0.67 - 1.17 mg/dL    GFR Estimate 81 >60 mL/min/1.73m2    Calcium 9.6 8.8 - 10.4 mg/dL    Glucose 135 (H) 70 - 99 mg/dL    Patient Fasting > 8hrs? No

## 2024-10-11 ENCOUNTER — TRANSFERRED RECORDS (OUTPATIENT)
Dept: HEALTH INFORMATION MANAGEMENT | Facility: CLINIC | Age: 69
End: 2024-10-11
Payer: COMMERCIAL

## 2024-10-24 ENCOUNTER — TRANSFERRED RECORDS (OUTPATIENT)
Dept: HEALTH INFORMATION MANAGEMENT | Facility: CLINIC | Age: 69
End: 2024-10-24
Payer: COMMERCIAL

## 2024-10-28 ENCOUNTER — MYC REFILL (OUTPATIENT)
Dept: FAMILY MEDICINE | Facility: OTHER | Age: 69
End: 2024-10-28

## 2024-10-28 DIAGNOSIS — K21.9 GASTROESOPHAGEAL REFLUX DISEASE, UNSPECIFIED WHETHER ESOPHAGITIS PRESENT: ICD-10-CM

## 2024-10-28 RX ORDER — PANTOPRAZOLE SODIUM 40 MG/1
40 TABLET, DELAYED RELEASE ORAL DAILY
Qty: 90 TABLET | Refills: 3 | Status: SHIPPED | OUTPATIENT
Start: 2024-10-28

## 2024-10-29 ENCOUNTER — OFFICE VISIT (OUTPATIENT)
Dept: PODIATRY | Facility: OTHER | Age: 69
End: 2024-10-29
Attending: PODIATRIST
Payer: COMMERCIAL

## 2024-10-29 VITALS
OXYGEN SATURATION: 97 % | HEART RATE: 70 BPM | SYSTOLIC BLOOD PRESSURE: 119 MMHG | TEMPERATURE: 97.8 F | DIASTOLIC BLOOD PRESSURE: 69 MMHG

## 2024-10-29 DIAGNOSIS — I73.9 PERIPHERAL ARTERIAL DISEASE (H): ICD-10-CM

## 2024-10-29 DIAGNOSIS — L60.3 ONYCHODYSTROPHY: Primary | ICD-10-CM

## 2024-10-29 DIAGNOSIS — E11.42 DIABETIC POLYNEUROPATHY ASSOCIATED WITH TYPE 2 DIABETES MELLITUS (H): ICD-10-CM

## 2024-10-29 DIAGNOSIS — Z13.89 SCREENING FOR DIABETIC PERIPHERAL NEUROPATHY: ICD-10-CM

## 2024-10-29 DIAGNOSIS — E11.9 DIABETES MELLITUS TYPE 2, NONINSULIN DEPENDENT (H): ICD-10-CM

## 2024-10-29 PROCEDURE — G0463 HOSPITAL OUTPT CLINIC VISIT: HCPCS

## 2024-10-29 PROCEDURE — 99207 PR FOOT EXAM NO CHARGE: CPT | Performed by: PODIATRIST

## 2024-10-29 PROCEDURE — 11721 DEBRIDE NAIL 6 OR MORE: CPT | Performed by: PODIATRIST

## 2024-10-29 ASSESSMENT — PAIN SCALES - GENERAL: PAINLEVEL_OUTOF10: NO PAIN (0)

## 2024-10-29 NOTE — PROGRESS NOTES
Chief complaint: Patient presents with:  Toenail: Trimming      History of Present Illness: This 69 year old NIDDM II male is seen for follow-up management of elongated toenails. The toenails are difficult to reach and the nails are too thick for him to trim with regular nail clippers.     He sometimes feels tingling in the toes.    He broke his LEFT hip on 07/01/2023 and he had an ORIF. He says he is still doing well.    Patients says he has not been applying lotion to his feet.       Lab Results   Component Value Date    A1C 7.2 10/07/2024    A1C 7.6 06/25/2024    A1C 7.0 03/15/2024    A1C 7.1 11/28/2023    A1C 6.5 05/04/2023    A1C 6.5 02/15/2021           No further pedal complaints today.     Patient quit smoking around 2011.        /69 (BP Location: Left arm, Patient Position: Sitting, Cuff Size: Adult Regular)   Pulse 70   Temp 97.8  F (36.6  C) (Temporal)   SpO2 97%      Patient Active Problem List   Diagnosis    Abdominal aortic aneurysm (H)    Erectile dysfunction    ACP (advance care planning)    Essential hypertension    Mixed hyperlipidemia    Moderate major depression (H)    Morbid obesity (H)    Elevated prostate specific antigen (PSA)    Prediabetes    Diabetes mellitus, type 2 (H)    S/P AAA repair    Tobacco dependence in remission    Mitral valve prolapse    Status post coronary angiogram    Coronary artery disease involving native heart, unspecified vessel or lesion type, unspecified whether angina present    S/P CABG (coronary artery bypass graft)    S/P mitral valve repair    Postoperative atrial fibrillation (H)    Peripheral arterial disease (H)    Fall from standing, initial encounter    Complex sleep apnea syndrome    Recurrent major depressive disorder, in full remission (H)    S/P total left hip arthroplasty       Past Surgical History:   Procedure Laterality Date    AAA REPAIR  06/17/2020    [facility]/ intravascular repair    APPENDECTOMY      BYPASS GRAFT ARTERY CORONARY,  REPAIR VALVE MITRAL, COMBINED N/A 01/18/2023    Procedure: MEDIAN STERNOTOMY, ON PUMP OXGENATION, Left endovascular saphaneous vein harvest, CORONARY ARTERY BYPASS GRAFT (CABG x 2), Mitral Valve Repair, transesophageal echocardiogram (TRENT) performed by anesthesia;  Surgeon: Linda Kim MD;  Location: [facility] OR    COLONOSCOPY  2013    COLONOSCOPY N/A 6/27/2024    Procedure: COLONOSCOPY;  Surgeon: Khoa Jacques MD;  Location: [facility]    CV CORONARY ANGIOGRAM N/A 12/09/2022    Procedure: Coronary Angiogram with possible intervention;  Surgeon: Porfirio Herrera MD;  Location:  [facility]    CV RIGHT HEART CATH MEASUREMENTS RECORDED N/A 12/09/2022    Procedure: Right Heart Cath;  Surgeon: Porfirio Herrera MD;  Location:  [facility]    ESOPHAGOGASTRODUODENOSCOPY  2013    INCISION AND DRAINAGE CHEST WASHOUT, COMBINED N/A 01/19/2023    Procedure: chest washout, Re-do sternotomy, repair of vein graft;  Surgeon: Linda Kim MD;  Location: [facility] OR    left total hip arthroplasty         Current Outpatient Medications   Medication Sig Dispense Refill    ACCU-CHEK GUIDE test strip Use to test blood sugar 1 daily. 100 strip 1    ARIPiprazole (ABILIFY) 5 MG tablet Take 1 tablet (5 mg) by mouth every morning. 90 tablet 3    aspirin (ASA) 81 MG chewable tablet Take 162 mg by mouth      atorvastatin (LIPITOR) 80 MG tablet Take 1 tablet (80 mg) by mouth every morning. 90 tablet 3    blood glucose monitoring (NO BRAND SPECIFIED) meter device kit Use to test blood sugar 1 times daily or as directed. 1 kit 0    blood glucose monitoring (SOFTCLIX) lancets Use to test blood sugar 1 times daily. 100 each 1    Blood Glucose Monitoring Suppl (ACCU-CHEK GUIDE) w/Device KIT USE TO TEST BLOOD GLUCOSE ONCE DAILY OR AS DIRECTED.      buPROPion (WELLBUTRIN XL) 300 MG 24 hr tablet Take 1 tablet (300 mg) by mouth every morning 90 tablet 3    loratadine (CLARITIN) 10 MG tablet Take 10  mg by mouth every morning      multivitamin, therapeutic (THERA-VIT) TABS tablet 1 tablet by Oral or Feeding Tube route every morning 30 tablet 3    oxyBUTYnin ER (DITROPAN XL) 15 MG 24 hr tablet Take 1 tablet (15 mg) by mouth daily. 90 tablet 3    pantoprazole (PROTONIX) 40 MG EC tablet Take 1 tablet (40 mg) by mouth daily. 90 tablet 3    pioglitazone (ACTOS) 15 MG tablet Take 1 tablet (15 mg) by mouth daily. 90 tablet 3    vitamin C (ASCORBIC ACID) 1000 MG TABS Take 1,000 mg by mouth every morning      Vitamin D3 (CHOLECALCIFEROL) 25 mcg (1000 units) tablet        No current facility-administered medications for this visit.        No Known Allergies    Family History   Problem Relation Age of Onset    C.A.D. Father         CABG in late 40's    C.A.D. Brother         MI in 50's       Social History     Socioeconomic History    Marital status:      Spouse name: None    Number of children: None    Years of education: None    Highest education level: None   Occupational History    None   Tobacco Use    Smoking status: Former Smoker     Packs/day: 0.00     Types: Cigarettes    Smokeless tobacco: Never Used    Tobacco comment: Feb. 2014   Substance and Sexual Activity    Alcohol use: Yes     Comment: occasional    Drug use: Yes     Types: Marijuana    Sexual activity: Yes     Partners: Female   Other Topics Concern    Parent/sibling w/ CABG, MI or angioplasty before 65F 55M? Yes     Comment: dad had heart attack before 55. brother also had heart attack before 55.   Social History Narrative    .      Social Determinants of Health     Financial Resource Strain:     Difficulty of Paying Living Expenses:    Food Insecurity:     Worried About Running Out of Food in the Last Year:     Ran Out of Food in the Last Year:    Transportation Needs:     Lack of Transportation (Medical):     Lack of Transportation (Non-Medical):    Physical Activity:     Days of Exercise per Week:     Minutes of Exercise per Session:     Stress:     Feeling of Stress :    Social Connections:     Frequency of Communication with Friends and Family:     Frequency of Social Gatherings with Friends and Family:     Attends Temple Services:     Active Member of Clubs or Organizations:     Attends Club or Organization Meetings:     Marital Status:    Intimate Partner Violence:     Fear of Current or Ex-Partner:     Emotionally Abused:     Physically Abused:     Sexually Abused:        ROS: 10 point ROS neg other than the symptoms noted above in the HPI.  EXAM  Constitutional: healthy, alert and no distress    Psychiatric: mentation appears normal and affect normal/bright    VASCULAR:  -Dorsalis pedis pulse +1/4 b/l  -Posterior tibial pulse +1/4 b/l  -Capillary refill time < 3 seconds to b/l hallux  -Hair growth Absent to b/l anterior legs and ankles  NEURO:  -Positive for paresthesias to bilateral foot  -Epicritic and protective sensation diminished to the bilateral foot  DERM:  -Skin temperature cool to bilateral foot  -Skin dry, flaking to bilateral plantar foot  -Toenails elongated, thickened, dystrophic and discolored x 10  ---Bilateral hallux toenails significantly dystrophic    MSK:  -Muscle strength of ankles +5/5 for dorsiflexion, plantarflexion, ABDUction and ADDuction b/l    ============================================================    ASSESSMENT:  (L60.3) Onychodystrophy  (primary encounter diagnosis)    (E11.9) Diabetes mellitus type 2, noninsulin dependent (H)    (E11.42) Diabetic polyneuropathy associated with type 2 diabetes mellitus (H)    (I73.9) Peripheral arterial disease (H)    (Z13.89) Screening for diabetic peripheral neuropathy      PLAN:  -Patient evaluated and examined. Treatment options discussed with no educational barriers noted.  -High risk toenail debridement x 10 toenails without incident    -Diabetic Foot Education provided. This included checking the feet daily looking for new new blisters or wounds, wearing shoes at  all times when walking including around the house, and avoiding lotion application between the toes. If there are any signs of infection, the patient should present to the ED as soon as possible. Infections of the foot can be life threatening or lead to amputations of the foot or leg.     -Vascular appointment -- most recent appointment was 09/19/2024 at [facility]. He follows up with them once a year.    Diabetes Mellitus: Patient's DM is managed by their PCP. The DM appears to be stable. Patient's last HbA1C was 7.0% on 10/07/2024.    -Patient in agreement with the above treatment plan and all of patient's questions were answered.      RTC 3 months for diabetic foot exam and high risk nail debridement         Sierra Velásquez DPM

## 2024-11-13 ENCOUNTER — TELEPHONE (OUTPATIENT)
Dept: FAMILY MEDICINE | Facility: OTHER | Age: 69
End: 2024-11-13

## 2024-11-13 DIAGNOSIS — E11.59 TYPE 2 DIABETES MELLITUS WITH OTHER CIRCULATORY COMPLICATION, WITHOUT LONG-TERM CURRENT USE OF INSULIN (H): ICD-10-CM

## 2024-11-13 DIAGNOSIS — N39.41 URGE INCONTINENCE OF URINE: ICD-10-CM

## 2024-11-13 DIAGNOSIS — Z95.1 S/P CABG (CORONARY ARTERY BYPASS GRAFT): ICD-10-CM

## 2024-11-13 DIAGNOSIS — K21.9 GASTROESOPHAGEAL REFLUX DISEASE, UNSPECIFIED WHETHER ESOPHAGITIS PRESENT: ICD-10-CM

## 2024-11-13 DIAGNOSIS — F33.42 RECURRENT MAJOR DEPRESSIVE DISORDER, IN FULL REMISSION (H): ICD-10-CM

## 2024-11-13 DIAGNOSIS — Z95.2 S/P MVR (MITRAL VALVE REPLACEMENT): ICD-10-CM

## 2024-11-13 NOTE — TELEPHONE ENCOUNTER
Reason for call:  Medication      Have you contacted your pharmacy? Yes   If patient has contacted Pharmacy and it has been over 72hrs, continue to #2  Medication ALL MEDICATIONS ON MED LIST  What Pharmacy do you use? SWITCHING FROM [facility] TO [facility]      (Please note that the turn-around-time for prescriptions is 72 business hours; I am sending your request at this time. SEND TO appropriate Care Team Pool )

## 2024-11-18 RX ORDER — LANCETS
EACH MISCELLANEOUS
Qty: 100 EACH | Refills: 1 | Status: SHIPPED | OUTPATIENT
Start: 2024-11-18

## 2024-11-18 RX ORDER — OXYBUTYNIN CHLORIDE 15 MG/1
15 TABLET, EXTENDED RELEASE ORAL DAILY
Qty: 90 TABLET | Refills: 3 | Status: SHIPPED | OUTPATIENT
Start: 2024-11-18

## 2024-11-18 RX ORDER — PANTOPRAZOLE SODIUM 40 MG/1
40 TABLET, DELAYED RELEASE ORAL DAILY
Qty: 90 TABLET | Refills: 3 | Status: SHIPPED | OUTPATIENT
Start: 2024-11-18

## 2024-11-18 RX ORDER — ATORVASTATIN CALCIUM 80 MG/1
80 TABLET, FILM COATED ORAL EVERY MORNING
Qty: 90 TABLET | Refills: 3 | Status: SHIPPED | OUTPATIENT
Start: 2024-11-18

## 2024-11-18 RX ORDER — ARIPIPRAZOLE 5 MG/1
5 TABLET ORAL EVERY MORNING
Qty: 90 TABLET | Refills: 3 | Status: SHIPPED | OUTPATIENT
Start: 2024-11-18

## 2024-11-18 RX ORDER — BUPROPION HYDROCHLORIDE 300 MG/1
300 TABLET ORAL EVERY MORNING
Qty: 90 TABLET | Refills: 3 | Status: SHIPPED | OUTPATIENT
Start: 2024-11-18

## 2024-11-18 RX ORDER — MULTIVITAMIN,THERAPEUTIC
1 TABLET ORAL EVERY MORNING
Qty: 30 TABLET | Refills: 3 | Status: SHIPPED | OUTPATIENT
Start: 2024-11-18

## 2024-11-18 RX ORDER — PIOGLITAZONE 15 MG/1
15 TABLET ORAL DAILY
Qty: 90 TABLET | Refills: 3 | Status: SHIPPED | OUTPATIENT
Start: 2024-11-18

## 2024-11-20 NOTE — TELEPHONE ENCOUNTER
Zestoretic      Last Written Prescription Date:  5/4/21  Last Fill Quantity: 90,   # refills: 1  Last Office Visit: 8/17/21  Future Office visit:    Next 5 appointments (look out 90 days)    Nov 30, 2021  9:00 AM  (Arrive by 8:45 AM)  Return Visit with Sierra Velásquez DPM  [facility] ([facility] ) [address] [place] [id number] [phone]   Feb 17, 2022  8:30 AM  (Arrive by 8:15 AM)  SHORT with Rock Basilio MD  [facility] ([facility] ) [address] [place] [id number]  [phone]           Routing refill request to provider for review/approval because:      
DISPLAY PLAN FREE TEXT

## 2025-01-02 ENCOUNTER — HOSPITAL ENCOUNTER (OUTPATIENT)
Dept: CARDIOLOGY | Facility: HOSPITAL | Age: 70
End: 2025-01-02
Attending: NURSE PRACTITIONER
Payer: COMMERCIAL

## 2025-01-02 DIAGNOSIS — I48.91 POSTOPERATIVE ATRIAL FIBRILLATION (H): ICD-10-CM

## 2025-01-02 DIAGNOSIS — I34.1 MITRAL VALVE PROLAPSE: ICD-10-CM

## 2025-01-02 DIAGNOSIS — I97.89 POSTOPERATIVE ATRIAL FIBRILLATION (H): ICD-10-CM

## 2025-01-02 DIAGNOSIS — I51.7 LAE (LEFT ATRIAL ENLARGEMENT): ICD-10-CM

## 2025-01-02 DIAGNOSIS — I50.20 HFREF (HEART FAILURE WITH REDUCED EJECTION FRACTION) (H): ICD-10-CM

## 2025-01-02 DIAGNOSIS — Z95.2 S/P MVR (MITRAL VALVE REPLACEMENT): ICD-10-CM

## 2025-01-02 LAB — LVEF ECHO: NORMAL

## 2025-01-02 PROCEDURE — 93306 TTE W/DOPPLER COMPLETE: CPT

## 2025-01-09 ENCOUNTER — TELEPHONE (OUTPATIENT)
Dept: CARDIOLOGY | Facility: OTHER | Age: 70
End: 2025-01-09

## 2025-01-09 NOTE — TELEPHONE ENCOUNTER
Attempt # 1  Outcome: Left Message   Comment: LVM for pt to call and RS 01/24/25 appt to 01/27/25 or later w/Zoe Carr in Cardiology.

## 2025-01-11 ENCOUNTER — HEALTH MAINTENANCE LETTER (OUTPATIENT)
Age: 70
End: 2025-01-11

## 2025-01-27 ENCOUNTER — OFFICE VISIT (OUTPATIENT)
Dept: CARDIOLOGY | Facility: OTHER | Age: 70
End: 2025-01-27
Attending: NURSE PRACTITIONER
Payer: COMMERCIAL

## 2025-01-27 VITALS
HEIGHT: 72 IN | BODY MASS INDEX: 33.32 KG/M2 | SYSTOLIC BLOOD PRESSURE: 124 MMHG | HEART RATE: 67 BPM | RESPIRATION RATE: 20 BRPM | DIASTOLIC BLOOD PRESSURE: 80 MMHG | WEIGHT: 246 LBS | OXYGEN SATURATION: 96 %

## 2025-01-27 DIAGNOSIS — E78.2 MIXED HYPERLIPIDEMIA: ICD-10-CM

## 2025-01-27 DIAGNOSIS — I25.10 CORONARY ARTERY DISEASE INVOLVING NATIVE CORONARY ARTERY OF NATIVE HEART WITHOUT ANGINA PECTORIS: ICD-10-CM

## 2025-01-27 DIAGNOSIS — I97.89 POSTOPERATIVE ATRIAL FIBRILLATION (H): Primary | ICD-10-CM

## 2025-01-27 DIAGNOSIS — I50.20 HFREF (HEART FAILURE WITH REDUCED EJECTION FRACTION) (H): ICD-10-CM

## 2025-01-27 DIAGNOSIS — E11.9 TYPE 2 DIABETES MELLITUS WITHOUT COMPLICATION, WITHOUT LONG-TERM CURRENT USE OF INSULIN (H): ICD-10-CM

## 2025-01-27 DIAGNOSIS — I34.1 MITRAL VALVE PROLAPSE: ICD-10-CM

## 2025-01-27 DIAGNOSIS — Z95.2 S/P MVR (MITRAL VALVE REPLACEMENT): ICD-10-CM

## 2025-01-27 DIAGNOSIS — R60.0 BILATERAL LEG EDEMA: ICD-10-CM

## 2025-01-27 DIAGNOSIS — Z95.1 S/P CABG (CORONARY ARTERY BYPASS GRAFT): ICD-10-CM

## 2025-01-27 DIAGNOSIS — E66.811 CLASS 1 OBESITY DUE TO EXCESS CALORIES WITHOUT SERIOUS COMORBIDITY WITH BODY MASS INDEX (BMI) OF 32.0 TO 32.9 IN ADULT: ICD-10-CM

## 2025-01-27 DIAGNOSIS — R06.09 DOE (DYSPNEA ON EXERTION): ICD-10-CM

## 2025-01-27 DIAGNOSIS — E66.09 CLASS 1 OBESITY DUE TO EXCESS CALORIES WITHOUT SERIOUS COMORBIDITY WITH BODY MASS INDEX (BMI) OF 32.0 TO 32.9 IN ADULT: ICD-10-CM

## 2025-01-27 DIAGNOSIS — I48.91 POSTOPERATIVE ATRIAL FIBRILLATION (H): Primary | ICD-10-CM

## 2025-01-27 DIAGNOSIS — I51.7 LAE (LEFT ATRIAL ENLARGEMENT): ICD-10-CM

## 2025-01-27 LAB
ANION GAP SERPL CALCULATED.3IONS-SCNC: 14 MMOL/L (ref 7–15)
ATRIAL RATE - MUSE: 67 BPM
BUN SERPL-MCNC: 16 MG/DL (ref 8–23)
CALCIUM SERPL-MCNC: 9.6 MG/DL (ref 8.8–10.4)
CHLORIDE SERPL-SCNC: 103 MMOL/L (ref 98–107)
CHOLEST SERPL-MCNC: 154 MG/DL
CREAT SERPL-MCNC: 1.06 MG/DL (ref 0.67–1.17)
DIASTOLIC BLOOD PRESSURE - MUSE: NORMAL MMHG
EGFRCR SERPLBLD CKD-EPI 2021: 75 ML/MIN/1.73M2
ERYTHROCYTE [DISTWIDTH] IN BLOOD BY AUTOMATED COUNT: 14.9 % (ref 10–15)
EST. AVERAGE GLUCOSE BLD GHB EST-MCNC: 154 MG/DL
FASTING STATUS PATIENT QL REPORTED: NO
FASTING STATUS PATIENT QL REPORTED: NO
GLUCOSE SERPL-MCNC: 145 MG/DL (ref 70–99)
HBA1C MFR BLD: 7 %
HCO3 SERPL-SCNC: 22 MMOL/L (ref 22–29)
HCT VFR BLD AUTO: 41.5 % (ref 40–53)
HDLC SERPL-MCNC: 53 MG/DL
HGB BLD-MCNC: 13.6 G/DL (ref 13.3–17.7)
INTERPRETATION ECG - MUSE: NORMAL
LDLC SERPL CALC-MCNC: ABNORMAL MG/DL
MCH RBC QN AUTO: 28.8 PG (ref 26.5–33)
MCHC RBC AUTO-ENTMCNC: 32.8 G/DL (ref 31.5–36.5)
MCV RBC AUTO: 88 FL (ref 78–100)
NONHDLC SERPL-MCNC: 101 MG/DL
NT-PROBNP SERPL-MCNC: 416 PG/ML (ref 0–900)
P AXIS - MUSE: -28 DEGREES
PLATELET # BLD AUTO: 290 10E3/UL (ref 150–450)
POTASSIUM SERPL-SCNC: 4.4 MMOL/L (ref 3.4–5.3)
PR INTERVAL - MUSE: 204 MS
QRS DURATION - MUSE: 122 MS
QT - MUSE: 418 MS
QTC - MUSE: 441 MS
R AXIS - MUSE: -27 DEGREES
RBC # BLD AUTO: 4.73 10E6/UL (ref 4.4–5.9)
SODIUM SERPL-SCNC: 139 MMOL/L (ref 135–145)
SYSTOLIC BLOOD PRESSURE - MUSE: NORMAL MMHG
T AXIS - MUSE: -11 DEGREES
TRIGL SERPL-MCNC: 416 MG/DL
TSH SERPL DL<=0.005 MIU/L-ACNC: 0.8 UIU/ML (ref 0.3–4.2)
VENTRICULAR RATE- MUSE: 67 BPM
WBC # BLD AUTO: 9.2 10E3/UL (ref 4–11)

## 2025-01-27 PROCEDURE — 93010 ELECTROCARDIOGRAM REPORT: CPT | Performed by: INTERNAL MEDICINE

## 2025-01-27 PROCEDURE — 82310 ASSAY OF CALCIUM: CPT | Mod: ZL | Performed by: NURSE PRACTITIONER

## 2025-01-27 PROCEDURE — 99214 OFFICE O/P EST MOD 30 MIN: CPT | Performed by: NURSE PRACTITIONER

## 2025-01-27 PROCEDURE — 85027 COMPLETE CBC AUTOMATED: CPT | Mod: ZL | Performed by: NURSE PRACTITIONER

## 2025-01-27 PROCEDURE — 93005 ELECTROCARDIOGRAM TRACING: CPT | Performed by: NURSE PRACTITIONER

## 2025-01-27 PROCEDURE — 36415 COLL VENOUS BLD VENIPUNCTURE: CPT | Mod: ZL | Performed by: NURSE PRACTITIONER

## 2025-01-27 PROCEDURE — 83036 HEMOGLOBIN GLYCOSYLATED A1C: CPT | Mod: ZL | Performed by: NURSE PRACTITIONER

## 2025-01-27 PROCEDURE — 82465 ASSAY BLD/SERUM CHOLESTEROL: CPT | Mod: ZL | Performed by: NURSE PRACTITIONER

## 2025-01-27 PROCEDURE — 84443 ASSAY THYROID STIM HORMONE: CPT | Mod: ZL | Performed by: NURSE PRACTITIONER

## 2025-01-27 PROCEDURE — 83880 ASSAY OF NATRIURETIC PEPTIDE: CPT | Mod: ZL | Performed by: NURSE PRACTITIONER

## 2025-01-27 PROCEDURE — 82565 ASSAY OF CREATININE: CPT | Mod: ZL | Performed by: NURSE PRACTITIONER

## 2025-01-27 PROCEDURE — G0463 HOSPITAL OUTPT CLINIC VISIT: HCPCS

## 2025-01-27 RX ORDER — FUROSEMIDE 20 MG/1
20 TABLET ORAL DAILY
Qty: 30 TABLET | Refills: 1 | Status: SHIPPED | OUTPATIENT
Start: 2025-01-27

## 2025-01-27 ASSESSMENT — PAIN SCALES - GENERAL: PAINLEVEL_OUTOF10: NO PAIN (0)

## 2025-01-27 NOTE — PATIENT INSTRUCTIONS
M62.81 generalized weakness, decreased ADL management and functional transfers/mobility.
Thank you for allowing Zoe Carr CNP and our  team to participate in your care. Please call our office at [phone] with scheduling questions or if you need to cancel or change your appointment. With any other questions or concerns you may call cardiology nurse at  [phone].       If you experience chest pain, chest pressure, chest tightness, shortness of breath, fainting, lightheadedness, nausea, vomiting, or other concerning symptoms, please report to the Emergency Department or call 911. These symptoms may be emergent, and best treated in the Emergency Department.    Low sodium diet (try to stick under 2,000 mg sodium most days of the week)    Daily weights first thing in the morning- keep track of these    Labs today    START furosemide:  Take 2 tablets once daily Tuesday, Wednesday, Thursday  Then decrease to 1 tablet once daily thereafter.     5. Plan is for some diuresis and hopefully being able to hold furosemide and re-start HFrEF GDMT: lisinopril, metoprolol       Follow-up in 4 weeks, certainly sooner with acute concerns.    Zoe Carr CNP  
97.6

## 2025-01-27 NOTE — PROGRESS NOTES
[facility] HEART CARE   CARDIOLOGY PROGRESS NOTE     Chief Complaint   Patient presents with    Follow Up          Diagnosis:    ICD-10-CM    1. Postoperative atrial fibrillation (H)  I97.89 EKG 12-lead complete w/read - (Clinic Performed)    I48.91       2. HFrEF (heart failure with reduced ejection fraction) (H)  I50.20 EKG 12-lead complete w/read - (Clinic Performed)     furosemide (LASIX) 20 MG tablet     Basic metabolic panel     Lipid Profile (Chol, Trig, HDL, LDL calc)     CBC with platelets     Hemoglobin A1c     TSH with free T4 reflex     N terminal pro BNP outpatient     Basic metabolic panel     Lipid Profile (Chol, Trig, HDL, LDL calc)     CBC with platelets     Hemoglobin A1c     TSH with free T4 reflex     N terminal pro BNP outpatient      3. Mitral valve prolapse  I34.1 EKG 12-lead complete w/read - (Clinic Performed)      4. S/P MVR (mitral valve replacement)  Z95.2       5. LAE (left atrial enlargement)  I51.7 EKG 12-lead complete w/read - (Clinic Performed)      6. Coronary artery disease involving native coronary artery of native heart without angina pectoris  I25.10 EKG 12-lead complete w/read - (Clinic Performed)     Basic metabolic panel     Lipid Profile (Chol, Trig, HDL, LDL calc)     CBC with platelets     Hemoglobin A1c     TSH with free T4 reflex     Basic metabolic panel     Lipid Profile (Chol, Trig, HDL, LDL calc)     CBC with platelets     Hemoglobin A1c     TSH with free T4 reflex      7. S/P 2V CABG 1//18/2023 at [facility]  Z95.1       8. Type 2 diabetes mellitus without complication, without long-term current use of insulin (H)  E11.9 Hemoglobin A1c     Hemoglobin A1c      9. PÉREZ (dyspnea on exertion)  R06.09 N terminal pro BNP outpatient     N terminal pro BNP outpatient      10. Bilateral leg edema  R60.0 N terminal pro BNP outpatient     N terminal pro BNP outpatient      11. Mixed hyperlipidemia  E78.2       12. Class 1 obesity due to excess calories without serious comorbidity with  body mass index (BMI) of 32.0 to 32.9 in adult  E66.811     E66.09     Z68.32             Assessment/Plan:    Mitral valve prolapse (moderate-severe) with left ventricular dilation (moderate) on TTE 9/2022  S/p mitral valve repair and mitral valve annuloplasty ring at [facility] 1/18/2023 (Mitral valve repair with [place]-Reynaldo NeoChords to the flail P2 segment, closure of P1/P2 cleft, implantation of a 34 mm Avilez Physio II annuloplasty ring)  Post-operative complications including bleeding of graft warranting being re-opened in the OR, acute blood loss anemia, post-operative atrial fibrillation.   Continue aspirin 162 mg once daily  SBE with one time dose of amoxicillin 2 grams 30-60 minutes prior to any dental procedures      Heart Failure with mildly reduced Ejection Fraction (HFREF) with LVEF 40-45% 1/2023   Left ventricular dilation  TTE 1/2023: LVEF 40-45%  TTE 1/2024: LVEF 60-65%  TTE 1/2025: LVEF 50-55%. Lisinopril and metoprolol were stopped at some point since prior visit. Suspect this has contributed to reduced LV systolic function      NYHA Symptom Class II  Stage C    ACE-I/ARB/ARNi:   Previously on lisinopril 2.5 mg daily. This was stopped by PCP at some point for soft blood pressures.   BB:   Previously on metoprolol succinate 25 mg daily. This was stopped by PCP at some point for soft blood pressures.   SGLT-2 Inhibitor:   Could consider if decline in LVEF  Aldosterone antagonist:  Could consider if decline in LVEF  SCD prophylaxis:  does not meet criteria for implant with LVEF 40-45%  %BiV pacing:    N/A  Fluid status:  Hypervolemic with trace to +1 bilateral pretibial edema, dyspnea on exertion, weight gain  START furosemide:  Take 2 tablets once daily Tuesday, Wednesday, Thursday  Then decrease to 1 tablet once daily thereafter.  Cardiac Rehab:   Currently in cardiac rehab s/p CABG  Sleep Apnea Evaluation:   Referral placed today for snoring, ongoing fatigue    - Low sodium diet   - Daily weights  -  Weight loss  Wt Readings from Last 5 Encounters:   01/27/25 111.6 kg (246 lb)   10/07/24 113.3 kg (249 lb 11.2 oz)   06/27/24 104.1 kg (229 lb 6.4 oz)   06/25/24 106.1 kg (234 lb)   05/16/24 104.3 kg (230 lb)       Mixed hyperlipidemia with LDL goal less than 70, controlled  Coronary artery disease  S/p coronary artery bypass graft (CABG) x 2V CABG-   Reverse saphenous vein graft to the posterior ascending artery  Reverse saphenous vein graft to the diagonal artery at [facility] 1/8/2023  Continue high intensity statin:   atorvastatin 80 mg once daily for secondary prevention and plaque stabilization  Continue aspirin 162 mg once daily  Continue beta-blockade for secondary prevention CAD, HFmrEF  Continue lisinopril for HFmrEF, hypertension  Heart healthy lifestyle    Recent Labs   Lab Test 05/04/23  0828 08/29/22  1441   CHOL 137 153   HDL 62 57   LDL 49 56   TRIG 129 198*         Left atrial enlargement  Risk factor modification  Moderate-severe mitral regurgitation s/p mitral valve repair      Post-operative atrial fibrillation  He was discharged with 30 days of amiodarone 200 mg once daily prescription. He wore a zio patch which did not show recurrence of atrial fibrillation.   He continues denies any symptoms of racing, skipping, fluttering, palpitations.  Zio patch January 2024 (1 year after surgery/post operative atrial fibrillation) does not show any episodes of atrial fibrillation  Plan for symptom monitoring. If any palpitations, racing, fluttering- plan for repeat monitoring.       CHADs-VASC >= 2  He was not discharged home with chronic oral anticoagulation for stroke prophylaxis with post-operative atrial fibrillation.   Reviewed stroke risk factors and chronic oral anticoagulation  He has not had a recurrence of atrial fibrillation.       Abdominal aortic aneurysm s/p AAA repair 2017 at [facility]  Recent MR at [facility] and per patient report no change in size  Continue to follow with vascular at [facility]  [facility]      Essential hypertension with blood pressure goal less than 130/80, controlled   Continue guideline directed medical therapy for heart failure    BP Readings from Last 6 Encounters:   01/27/25 124/80   10/29/24 119/69   10/07/24 114/64   07/25/24 133/79   06/27/24 141/71   06/25/24 130/72       Diabetes Mellitus, type II  Obesity  Continue management by PCP- on Actos with history of congestive heart failure.  Statin: atorvastatin 80 mg once daily  ACEi: Lisinopril 2.5 mg once daily  Body mass index is 33.36 kg/m .    Lab Results   Component Value Date    A1C 7.2 10/07/2024    A1C 7.6 06/25/2024    A1C 7.0 03/15/2024    A1C 7.1 11/28/2023    A1C 6.5 05/04/2023    A1C 6.5 02/15/2021         Claudication  Follows with vascular at [facility]       Former smoker  Longstanding history, quit around age 60.     Follow-up in 4 weeks, certainly sooner with acute concerns.  Plan is for some diuresis and hopefully being able to hold furosemide and re-start HFrEF GDMT: lisinopril, metoprolol       Interval history:  Gilberto Camilo is a 69-year old male who presents for cardiology follow-up. Recall he has a cardiac history including moderate-severe mitral regurgitation s/p mitral valve repair at [facility] 1/18/2023, coronary artery disease s/p 2V CABG- reverse saphenous vein graft to the posterior ascending artery, reverse saphenous vein graft to the diagonal artery at [facility] 1/8/2023,  abdominal aortic aneurysm s/p repair in 2017 at [facility] in [facility], essential hypertension, mixed hyperlipidemia, PAD with claudication (follows with [facility] vascular). He has a noncardiac history including DM- type II, ED, depression, former smoker.     Mr. Camilo was initially seen in consult for finding of moderate-severe mitral valve prolapse. He had symptoms of progressive dyspnea, dyspnea on exertion, and fatigue. He was referred for evaluation by structural heart clinic with work-up including coronary angiogram which showed 2  "vessel obstructive CAD. He underwent 2 vessel CABG as well as mitral valve repair 2023 at [facility]. His post-operative period was complicated by bleeding of one of the grafts. He was brought back to the OR and hemostasis achieved. He also developed post-operative atrial fibrillation and was started on amiodarone. He has completed cardiac rehab.      Today, Mr. Camilo endorses that he has had dyspnea on exertion for the last 4 months or so. Going up the stairs, walker further than a block he experiences dyspnea. It takes him about 5 minutes at least to catch his breath. No chest pain or pressure. He has had 16 pound weight gain in the last 6 months. He does admit that he lives sedentary lifestyle- goes grocery shopping, takes the dog for a walk (about 2 blocks) about six times per day. No orthopnea or PND.   No increased LE edema.   Lisiniopril and metoprolol were discontinued by PCP for soft blood pressures.   No sensation of racing, skipping, fluttering in his chest. Post-op atrial fibrillation- no evidence or symptoms of recurrence.       Smoking History: Started smoking at age 18, quit around  (about age 60 years old). Smoking up to 3 ppd.    Alcohol History: None    Substance Abuse History: None    Sleep History: Sleeps in bed, naps in the chair. Typically goes to bed around 10 pm, wakes up around 8 am. Wakes up a couple times per night to void, able to fall right back to sleep. He does snore \"if allergies are acting up.\"   Per his wife he was supposed to have a sleep study but never went.  She has witnessed apneic periods. Wakes up most mornings feeling rested but within 2-3 hours feeling tired and ready for a nap. Denies orthopnea, PND.       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 He has a cardiac history including abdominal aortic aneurysm s/p repair in 2017 at [facility] in [facility], essential hypertension, mixed hyperlipidemia, coronary calcifications on CT, PAD with claudication (follows with [facility] vascular). He has a noncardiac history including DM- type II, ED, depression, former smoker.       Mr. Camilo has had fatigue in the last 6 months. Per wife- increased napping. Sleeps at least 10 hours per night and several naps throughout the day. This is new and worrisome to wife Marcella. She endorses that he previously would sleep 8 hours and be up and ready to go, rare naps.     He denies chest pain, exertional chest pain, dyspnea at rest or with conversation. He has had dyspnea on exertion which seems to be progressively getting worse. He and his [place] was about 0.25 miles and he had to stop multiple times due to dyspnea. Per his wife he previously had to stop due to symptoms of bilateral LE claudication versus dyspnea; however, now he is becoming dyspneic before the claudication starts. Denies any LE edema, abdominal bloating.     Per his wife he has had some lightheaded dizziness with postural change from sitting to standing and with bending over to standing. This is newer for him. He has felt near-syncopal a couple times. He has not had a syncopal episode.      Smoking History: Started smoking at age 18, quit around 2015 (about age 60 years old). Smoking up to 3 ppd.    Alcohol History: None    Substance Abuse History: None    Sleep History: Sleeps in bed, naps in the chair. Typically goes to bed around 10 pm, wakes up around 8 am. Wakes up a couple times per night to void, able to fall right back to  "sleep. He does snore \"if allergies are acting up.\"   Per his wife he was supposed to have a sleep study but never went.  She has witnessed apneic periods. Wakes up most mornings feeling rested but within 2-3 hours feeling tired and ready for a nap. Denies orthopnea, PND.       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 The ejection fraction is 50-55%  (borderline).  Grade I or early diastolic dysfunction. Mild diffuse hypokinesis  is present.  Global right ventricular function is borderline reduced.  S/P Mitral valve repair with [place]-Reynaldo NeoChords to the flail P2 segment,  implantation of a 34 mm Avilez Physio II annuloplasty ring 2023; MG is  3mmHg at 73bpm.  The inferior vena cava was normal in size with preserved respiratory  variability.     This study was compared with the study from 24. Biventricular function is  slightly reduced.    Transthoracic Echocardiogram 2024  Interpretation Summary  S/P Mitral valve repair with [place]-Reynaldo NeoChords to the flail P2 segment,  implantation of a 34 mm Avilez Physio II annuloplasty ring 2023     Global and regional left ventricular function is normal with an EF of 60-65%.  Global right ventricular function is normal.  Annuloplasty ring in place at the mitral position.There is trace valvular  regurgitation. Mean gradient of 3 mmHg at a HR of 55 bpm (no stenosis).  No pericardial effusion is present.  Mild dilation of the aortic root and ascending aorta  This study was compared with the study from 23. Ventricular function " has  normalized    Echocardiogram s/p mitral valve replacement 1/20/2023  Interpretation Summary  S/P Mitral valve repair with [place]-Reynaldo NeoChords to the flail P2 segment,  implantation of a 34 mm Avilez Physio II annuloplasty ring 1/17/2023. Trace  mitral insufficiency is present.     Technically difficult study.  Left ventricular function is decreased. The ejection fraction is 40-45%  (mildly reduced).  Difficult to assesss RV function. Global right ventricular function probably  is mildly reduced.  No pericardial effusion is present.     This study was compared with the study from 11/11/2022. S/P Mitral valve  repair for severe MR. LVEF declined mildly.      Cardiac catheterization at [facility] 12/9/2022  Conclusion  Right sided filling pressures are normal.  Mild elevated pulmonary hypertension.  Left sided filling pressures are moderately elevated.  Normal cardiac output level.  Hemodynamic data has been modified in Epic per physician review.  RPDA lesion is 100% stenosed.  Prox RCA to Mid RCA lesion is 95% stenosed.  1st Diag lesion is 90% stenosed.      Transthoracic Echocardiogram 9/29/2022  Interpretation Summary  No pericardial effusion is present.  Global and regional left ventricular function is normal with an EF of 55-60%.  Moderate left ventricular dilation is present.  The right ventricle is normal size.  Global right ventricular function is normal.  Moderate left atrial enlargement is present.  The posterior MV leaflet is slightly thickened and shortened with prolapse and  the valves do not coapt. There is significant MR jet towrds the interatrial  septum.  Moderate to severe mitral insufficiency is present.   Trace aortic insufficiency is present.  No aortic stenosis is present.  Trace tricuspid insufficiency is present.  Trace pulmonic insufficiency is present.         Past Medical History:   Diagnosis Date    AAA (abdominal aortic aneurysm) 09/20/2017    5.3 cm     Mohan esophagus     Closed rib  fracture     Coronary artery disease involving native heart, unspecified vessel or lesion type, unspecified whether angina present 01/18/2023    DNS (deviated nasal septum)     Postoperative atrial fibrillation (H) 02/13/2023    S/P CABG (coronary artery bypass graft) 02/13/2023    S/P mitral valve repair 02/13/2023    S/P total left hip arthroplasty 06/25/2024       Past Surgical History:   Procedure Laterality Date    AAA REPAIR  06/17/2020    [facility]/ intravascular repair    APPENDECTOMY      BYPASS GRAFT ARTERY CORONARY, REPAIR VALVE MITRAL, COMBINED N/A 01/18/2023    Procedure: MEDIAN STERNOTOMY, ON PUMP OXGENATION, Left endovascular saphaneous vein harvest, CORONARY ARTERY BYPASS GRAFT (CABG x 2), Mitral Valve Repair, transesophageal echocardiogram (TRENT) performed by anesthesia;  Surgeon: Linda Kim MD;  Location: [facility] OR    COLONOSCOPY  2013    COLONOSCOPY N/A 6/27/2024    Procedure: COLONOSCOPY;  Surgeon: Khoa Jacques MD;  Location: [facility] OR    CV CORONARY ANGIOGRAM N/A 12/09/2022    Procedure: Coronary Angiogram with possible intervention;  Surgeon: Porfirio Herrera MD;  Location:  HEART CARDIAC CATH LAB    CV RIGHT HEART CATH MEASUREMENTS RECORDED N/A 12/09/2022    Procedure: Right Heart Cath;  Surgeon: Porfirio Herrera MD;  Location:  HEART CARDIAC CATH LAB    ESOPHAGOGASTRODUODENOSCOPY  2013    INCISION AND DRAINAGE CHEST WASHOUT, COMBINED N/A 01/19/2023    Procedure: chest washout, Re-do sternotomy, repair of vein graft;  Surgeon: Linda Kim MD;  Location: [facility] OR    left total hip arthroplasty         No Known Allergies    Current Outpatient Medications   Medication Sig Dispense Refill    ARIPiprazole (ABILIFY) 5 MG tablet Take 1 tablet (5 mg) by mouth every morning. 90 tablet 3    aspirin (ASA) 81 MG chewable tablet Take 162 mg by mouth      atorvastatin (LIPITOR) 80 MG tablet Take 1 tablet (80 mg) by mouth every morning. 90 tablet 3    blood  glucose (ACCU-CHEK GUIDE) test strip Use to test blood sugar 1 daily. 100 strip 1    blood glucose monitoring (NO BRAND SPECIFIED) meter device kit Use to test blood sugar 1 times daily or as directed. 1 kit 0    blood glucose monitoring (SOFTCLIX) lancets Use to test blood sugar 1 times daily. 100 each 1    Blood Glucose Monitoring Suppl (ACCU-CHEK GUIDE) w/Device KIT USE TO TEST BLOOD GLUCOSE ONCE DAILY OR AS DIRECTED.      buPROPion (WELLBUTRIN XL) 300 MG 24 hr tablet Take 1 tablet (300 mg) by mouth every morning. 90 tablet 3    furosemide (LASIX) 20 MG tablet Take 1 tablet (20 mg) by mouth daily. 30 tablet 1    loratadine (CLARITIN) 10 MG tablet Take 10 mg by mouth every morning      multivitamin, therapeutic (THERA-VIT) TABS tablet Take 1 tablet by mouth or Feeding Tube every morning. 30 tablet 3    oxyBUTYnin ER (DITROPAN XL) 15 MG 24 hr tablet Take 1 tablet (15 mg) by mouth daily. 90 tablet 3    pantoprazole (PROTONIX) 40 MG EC tablet Take 1 tablet (40 mg) by mouth daily. 90 tablet 3    pioglitazone (ACTOS) 15 MG tablet Take 1 tablet (15 mg) by mouth daily. 90 tablet 3    vitamin C (ASCORBIC ACID) 1000 MG TABS Take 1,000 mg by mouth every morning      Vitamin D3 (CHOLECALCIFEROL) 25 mcg (1000 units) tablet          Social History     Socioeconomic History    Marital status:      Spouse name: Not on file    Number of children: Not on file    Years of education: Not on file    Highest education level: Not on file   Occupational History    Not on file   Tobacco Use    Smoking status: Former     Current packs/day: 1.00     Average packs/day: 1 pack/day for 25.0 years (25.0 ttl pk-yrs)     Types: Cigarettes     Passive exposure: Never    Smokeless tobacco: Never    Tobacco comments:     Feb. 2014   Vaping Use    Vaping status: Never Used   Substance and Sexual Activity    Alcohol use: Yes     Comment: occasional    Drug use: Yes     Types: Marijuana    Sexual activity: Yes     Partners: Female   Other  Topics Concern    Parent/sibling w/ CABG, MI or angioplasty before 65F 55M? Yes     Comment: dad had heart attack before 55. brother also had heart attack before 55.   Social History Narrative    .      Social Drivers of Health     Financial Resource Strain: Low Risk  (1/4/2024)    Financial Resource Strain     Within the past 12 months, have you or your family members you live with been unable to get utilities (heat, electricity) when it was really needed?: No   Food Insecurity: Low Risk  (1/4/2024)    Food Insecurity     Within the past 12 months, did you worry that your food would run out before you got money to buy more?: No     Within the past 12 months, did the food you bought just not last and you didn t have money to get more?: No   Transportation Needs: Low Risk  (1/4/2024)    Transportation Needs     Within the past 12 months, has lack of transportation kept you from medical appointments, getting your medicines, non-medical meetings or appointments, work, or from getting things that you need?: No   Physical Activity: Not on file   Stress: Not on file   Social Connections: Not on file   Interpersonal Safety: Low Risk  (10/29/2024)    Interpersonal Safety     Do you feel physically and emotionally safe where you currently live?: Yes     Within the past 12 months, have you been hit, slapped, kicked or otherwise physically hurt by someone?: No     Within the past 12 months, have you been humiliated or emotionally abused in other ways by your partner or ex-partner?: No   Housing Stability: Low Risk  (1/4/2024)    Housing Stability     Do you have housing? : Yes     Are you worried about losing your housing?: No       LAB RESULTS:   Results for orders placed or performed in visit on 01/27/25   EKG 12-lead complete w/read - (Clinic Performed)     Status: None (Preliminary result)   Result Value Ref Range    Systolic Blood Pressure  mmHg    Diastolic Blood Pressure  mmHg    Ventricular Rate 67 BPM    Atrial  Rate 67 BPM    WV Interval 204 ms    QRS Duration 122 ms     ms    QTc 441 ms    P Axis -28 degrees    R AXIS -27 degrees    T Axis -11 degrees    Interpretation ECG       Sinus rhythm  Non-specific intra-ventricular conduction delay  Nonspecific ST abnormality  Abnormal ECG  When compared with ECG of 25-Jun-2024 15:23,  Premature ventricular complexes are no longer Present           Review of systems: Negative except that which was noted in the HPI.    Physical examination:    Vitals: /80 (BP Location: Left arm, Patient Position: Sitting, Cuff Size: Adult Regular)   Pulse 67   Resp 20   Ht 1.829 m (6')   Wt 111.6 kg (246 lb)   SpO2 96%   BMI 33.36 kg/m    BMI= Body mass index is 33.36 kg/m .      GENERAL APPEARANCE: healthy, alert and no distress  CHEST: lungs clear to auscultation - no rales, rhonchi or wheezes, no use of accessory muscles, no retractions, respirations are unlabored, normal respiratory rate  CARDIOVASCULAR: regular rhythm, normal rate. normal S1 with physiologic split S2, no S3 or S4 and no murmur, click or rub  EXTREMITIES: +trace to +1 pretibial bilateral LE edema up to knee  NEURO: alert and oriented normal speech  VASC: No carotid bruits heard.      Thank you for allowing me to participate in the care of your patient. Please do not hesitate to contact me if you have any questions.       Zoe Carr, CNP

## 2025-01-29 ENCOUNTER — OFFICE VISIT (OUTPATIENT)
Dept: PODIATRY | Facility: OTHER | Age: 70
End: 2025-01-29
Attending: PODIATRIST
Payer: COMMERCIAL

## 2025-01-29 VITALS
HEART RATE: 85 BPM | TEMPERATURE: 98 F | DIASTOLIC BLOOD PRESSURE: 77 MMHG | SYSTOLIC BLOOD PRESSURE: 125 MMHG | RESPIRATION RATE: 18 BRPM | OXYGEN SATURATION: 98 %

## 2025-01-29 DIAGNOSIS — E11.42 DIABETIC POLYNEUROPATHY ASSOCIATED WITH TYPE 2 DIABETES MELLITUS (H): ICD-10-CM

## 2025-01-29 DIAGNOSIS — L60.3 ONYCHODYSTROPHY: Primary | ICD-10-CM

## 2025-01-29 DIAGNOSIS — E11.9 DIABETES MELLITUS TYPE 2, NONINSULIN DEPENDENT (H): ICD-10-CM

## 2025-01-29 DIAGNOSIS — I73.9 PERIPHERAL ARTERIAL DISEASE: ICD-10-CM

## 2025-01-29 DIAGNOSIS — Z13.89 SCREENING FOR DIABETIC PERIPHERAL NEUROPATHY: ICD-10-CM

## 2025-01-29 PROCEDURE — 11721 DEBRIDE NAIL 6 OR MORE: CPT | Performed by: PODIATRIST

## 2025-01-29 PROCEDURE — G0463 HOSPITAL OUTPT CLINIC VISIT: HCPCS

## 2025-01-29 ASSESSMENT — PAIN SCALES - GENERAL: PAINLEVEL_OUTOF10: NO PAIN (0)

## 2025-01-29 NOTE — PROGRESS NOTES
Chief complaint: Patient presents with:  TOENAILS      History of Present Illness: This 69 year old NIDDM II male is seen for follow-up management of elongated toenails. The toenails are difficult to reach and the nails are too thick for him to trim with regular nail clippers.     He sometimes feels tingling in the toes.    He broke his LEFT hip on 07/01/2023 and he had an ORIF. He says he is still doing well.    Patients says he has not been applying lotion to his feet.       Lab Results   Component Value Date    A1C 7.0 01/27/2025    A1C 7.2 10/07/2024    A1C 7.6 06/25/2024    A1C 7.0 03/15/2024    A1C 7.1 11/28/2023    A1C 6.5 02/15/2021           No further pedal complaints today.     Patient quit smoking around 2011.      /77   Pulse 85   Temp 98  F (36.7  C)   Resp 18   SpO2 98%      Patient Active Problem List   Diagnosis    Abdominal aortic aneurysm    Erectile dysfunction    ACP (advance care planning)    Essential hypertension    Mixed hyperlipidemia    Moderate major depression (H)    Morbid obesity (H)    Elevated prostate specific antigen (PSA)    Prediabetes    Diabetes mellitus, type 2 (H)    S/P AAA repair    Tobacco dependence in remission    Mitral valve prolapse    Status post coronary angiogram    Coronary artery disease involving native heart, unspecified vessel or lesion type, unspecified whether angina present    S/P CABG (coronary artery bypass graft)    S/P mitral valve repair    Postoperative atrial fibrillation (H)    Peripheral arterial disease    Fall from standing, initial encounter    Complex sleep apnea syndrome    Recurrent major depressive disorder, in full remission    S/P total left hip arthroplasty       Past Surgical History:   Procedure Laterality Date    AAA REPAIR  06/17/2020    [facility] [place]/ intravascular repair    APPENDECTOMY      BYPASS GRAFT ARTERY CORONARY, REPAIR VALVE MITRAL, COMBINED N/A 01/18/2023    Procedure: MEDIAN STERNOTOMY, ON PUMP OXGENATION, Left  endovascular saphaneous vein harvest, CORONARY ARTERY BYPASS GRAFT (CABG x 2), Mitral Valve Repair, transesophageal echocardiogram (TRENT) performed by anesthesia;  Surgeon: Linda Kim MD;  Location: [facility] OR    COLONOSCOPY  2013    COLONOSCOPY N/A 6/27/2024    Procedure: COLONOSCOPY;  Surgeon: Khoa Jacques MD;  Location: [facility]    CV CORONARY ANGIOGRAM N/A 12/09/2022    Procedure: Coronary Angiogram with possible intervention;  Surgeon: Porfirio Herrera MD;  Location:  [facility]    CV RIGHT HEART CATH MEASUREMENTS RECORDED N/A 12/09/2022    Procedure: Right Heart Cath;  Surgeon: Porfirio Herrera MD;  Location:  [facility]    ESOPHAGOGASTRODUODENOSCOPY  2013    INCISION AND DRAINAGE CHEST WASHOUT, COMBINED N/A 01/19/2023    Procedure: chest washout, Re-do sternotomy, repair of vein graft;  Surgeon: Linda Kim MD;  Location: [facility] OR    left total hip arthroplasty         Current Outpatient Medications   Medication Sig Dispense Refill    ARIPiprazole (ABILIFY) 5 MG tablet Take 1 tablet (5 mg) by mouth every morning. 90 tablet 3    aspirin (ASA) 81 MG chewable tablet Take 162 mg by mouth      atorvastatin (LIPITOR) 80 MG tablet Take 1 tablet (80 mg) by mouth every morning. 90 tablet 3    blood glucose (ACCU-CHEK GUIDE) test strip Use to test blood sugar 1 daily. 100 strip 1    blood glucose monitoring (NO BRAND SPECIFIED) meter device kit Use to test blood sugar 1 times daily or as directed. 1 kit 0    blood glucose monitoring (SOFTCLIX) lancets Use to test blood sugar 1 times daily. 100 each 1    Blood Glucose Monitoring Suppl (ACCU-CHEK GUIDE) w/Device KIT USE TO TEST BLOOD GLUCOSE ONCE DAILY OR AS DIRECTED.      buPROPion (WELLBUTRIN XL) 300 MG 24 hr tablet Take 1 tablet (300 mg) by mouth every morning. 90 tablet 3    furosemide (LASIX) 20 MG tablet Take 1 tablet (20 mg) by mouth daily. 30 tablet 1    loratadine (CLARITIN) 10 MG tablet Take 10 mg  by mouth every morning      multivitamin, therapeutic (THERA-VIT) TABS tablet Take 1 tablet by mouth or Feeding Tube every morning. 30 tablet 3    oxyBUTYnin ER (DITROPAN XL) 15 MG 24 hr tablet Take 1 tablet (15 mg) by mouth daily. 90 tablet 3    pantoprazole (PROTONIX) 40 MG EC tablet Take 1 tablet (40 mg) by mouth daily. 90 tablet 3    pioglitazone (ACTOS) 15 MG tablet Take 1 tablet (15 mg) by mouth daily. 90 tablet 3    vitamin C (ASCORBIC ACID) 1000 MG TABS Take 1,000 mg by mouth every morning      Vitamin D3 (CHOLECALCIFEROL) 25 mcg (1000 units) tablet        No current facility-administered medications for this visit.        No Known Allergies    Family History   Problem Relation Age of Onset    C.A.D. Father         CABG in late 40's    C.A.D. Brother         MI in 50's       Social History     Socioeconomic History    Marital status:      Spouse name: None    Number of children: None    Years of education: None    Highest education level: None   Occupational History    None   Tobacco Use    Smoking status: Former Smoker     Packs/day: 0.00     Types: Cigarettes    Smokeless tobacco: Never Used    Tobacco comment: Feb. 2014   Substance and Sexual Activity    Alcohol use: Yes     Comment: occasional    Drug use: Yes     Types: Marijuana    Sexual activity: Yes     Partners: Female   Other Topics Concern    Parent/sibling w/ CABG, MI or angioplasty before 65F 55M? Yes     Comment: dad had heart attack before 55. brother also had heart attack before 55.   Social History Narrative    .      Social Determinants of Health     Financial Resource Strain:     Difficulty of Paying Living Expenses:    Food Insecurity:     Worried About Running Out of Food in the Last Year:     Ran Out of Food in the Last Year:    Transportation Needs:     Lack of Transportation (Medical):     Lack of Transportation (Non-Medical):    Physical Activity:     Days of Exercise per Week:     Minutes of Exercise per Session:     Stress:     Feeling of Stress :    Social Connections:     Frequency of Communication with Friends and Family:     Frequency of Social Gatherings with Friends and Family:     Attends Hoahaoism Services:     Active Member of Clubs or Organizations:     Attends Club or Organization Meetings:     Marital Status:    Intimate Partner Violence:     Fear of Current or Ex-Partner:     Emotionally Abused:     Physically Abused:     Sexually Abused:        ROS: 10 point ROS neg other than the symptoms noted above in the HPI.  EXAM  Constitutional: healthy, alert and no distress    Psychiatric: mentation appears normal and affect normal/bright    VASCULAR:  -Dorsalis pedis pulse +1/4 b/l  -Posterior tibial pulse +1/4 b/l  -Capillary refill time < 3 seconds to b/l hallux  -Hair growth Absent to b/l anterior legs and ankles  NEURO:  -Positive for paresthesias to bilateral foot  -Epicritic and protective sensation diminished to the bilateral foot  DERM:  -Skin temperature cool to bilateral foot  -Skin dry, flaking to bilateral plantar foot  -Toenails elongated, thickened, dystrophic and discolored x 10  ---Bilateral hallux toenails significantly dystrophic    MSK:  -Muscle strength of ankles +5/5 for dorsiflexion, plantarflexion, ABDUction and ADDuction b/l    ============================================================    ASSESSMENT:  (L60.3) Onychodystrophy  (primary encounter diagnosis)    (E11.9) Diabetes mellitus type 2, noninsulin dependent (H)    (E11.42) Diabetic polyneuropathy associated with type 2 diabetes mellitus (H)    (I73.9) Peripheral arterial disease (H)    (Z13.89) Screening for diabetic peripheral neuropathy      PLAN:  -Patient evaluated and examined. Treatment options discussed with no educational barriers noted.  -High risk toenail debridement x 10 toenails without incident    -Diabetic Foot Education provided. This included checking the feet daily looking for new new blisters or wounds, wearing shoes at  all times when walking including around the house, and avoiding lotion application between the toes. If there are any signs of infection, the patient should present to the ED as soon as possible. Infections of the foot can be life threatening or lead to amputations of the foot or leg.     -Vascular appointment -- most recent appointment was 09/19/2024 at [facility]. He follows up with them once a year.    Diabetes Mellitus: Patient's DM is managed by their PCP. The DM appears to be stable. Patient's last HbA1C was 7.0% on 01/27/2025.    -Patient in agreement with the above treatment plan and all of patient's questions were answered.      Return to clinic three months for diabetic foot exam and high risk nail debridement         Sierra Velásquez DPM

## 2025-02-04 ENCOUNTER — PATIENT OUTREACH (OUTPATIENT)
Dept: CARDIOLOGY | Facility: OTHER | Age: 70
End: 2025-02-04

## 2025-02-04 NOTE — PROGRESS NOTES
"2nd Call to patient to follow up from LOV.     Patient states that he weight had went down \"a little bit\"  Said he started at 238 tweight decreased to 232, but now states current weight is 238.  Also reports urination has slowed down from when he had first started furosemide.  Patient said that he feels his LE edema has decreased and reports that he feels that the PÉREZ has also improved.  Stated that he will still get SOB with exertion, but it is \"better.\"    "

## 2025-02-04 NOTE — PROGRESS NOTES
From: Zoe Carr CNP   Sent: 1/31/2025  12:00 AM CST   To:  Cardiology   Subject: visit follow-up                                  cAn we call to follow-up on Monday's visit.   We started furosemide- any weight loss, decreased LE edema, improvement in PÉREZ?     Thanks,     Zoe Carr CNP on 1/27/2025 at 1:41 PM

## 2025-02-04 NOTE — PROGRESS NOTES
{PROVIDER CHARTING PREFERENCE:868856}    Subjective   Gilberto is a 69 year old, presenting for the following health issues:  Establish Care        2/5/2025     2:36 PM   Additional Questions   Roomed by Maco Arzola lpn   Accompanied by self     History of Present Illness       Reason for visit:  Left leg   He is taking medications regularly.       Diabetes Follow-up    How often are you checking your blood sugar? A few times a month  What time of day are you checking your blood sugars (select all that apply)?   Midday   Have you had any blood sugars above 200?  No  Have you had any blood sugars below 70?  No  What symptoms do you notice when your blood sugar is low?  None  What concerns do you have today about your diabetes? None   Do you have any of these symptoms? (Select all that apply)  No numbness or tingling in feet.  No redness, sores or blisters on feet.  No complaints of excessive thirst.  No reports of blurry vision.  No significant changes to weight.      {Reference  Diabetes Management Resources  Blood Glucose Log - 3 weeks  Blood Glucose Log with Food and Insulin Record :027031}    Hyperlipidemia Follow-Up    Are you regularly taking any medication or supplement to lower your cholesterol?   Yes- atorvastatin   Are you having muscle aches or other side effects that you think could be caused by your cholesterol lowering medication?  No    Hypertension Follow-up    Do you check your blood pressure regularly outside of the clinic? Yes   Are you following a low salt diet? Yes  Are your blood pressures ever more than 140 on the top number (systolic) OR more   than 90 on the bottom number (diastolic), for example 140/90? No    BP Readings from Last 2 Encounters:   02/05/25 128/60   01/29/25 125/77     Hemoglobin A1C (%)   Date Value   01/27/2025 7.0 (H)   10/07/2024 7.2 (H)   02/15/2021 6.5 (H)     LDL Cholesterol Calculated   Date Value   01/27/2025      Comment:     Cannot estimate LDL when  triglyceride exceeds 400 mg/dL   10/07/2024 45 mg/dL   08/21/2020 68 mg/dL   10/02/2019 97 mg/dL         Depression   How are you doing with your depression since your last visit? No change  Are you having other symptoms that might be associated with depression? No  Have you had a significant life event?  No   Are you feeling anxious or having panic attacks?   No  Do you have any concerns with your use of alcohol or other drugs? No    Social History     Tobacco Use    Smoking status: Former     Current packs/day: 1.00     Average packs/day: 1 pack/day for 25.0 years (25.0 ttl pk-yrs)     Types: Cigarettes     Passive exposure: Never    Smokeless tobacco: Never    Tobacco comments:     Feb. 2014   Vaping Use    Vaping status: Never Used   Substance Use Topics    Alcohol use: Yes     Comment: occasional    Drug use: Yes     Types: Marijuana         11/28/2023     8:05 AM 5/2/2024     3:11 PM 10/7/2024     1:55 PM   PHQ   PHQ-9 Total Score 4 7 0   Q9: Thoughts of better off dead/self-harm past 2 weeks Not at all Not at all Not at all         11/28/2023     8:06 AM 6/25/2024     2:53 PM 2/5/2025     2:27 PM   OFELIA-7 SCORE   Total Score 0 (minimal anxiety) 0 (minimal anxiety) 0 (minimal anxiety)   Total Score 0 0 0        Patient-reported     {Last PHQ9 or GAD7 Responses (Optional):987393}    Suicide Assessment Five-step Evaluation and Treatment (SAFE-T)  {Provider  Link to Depression Care Package SmartSet :992323}    {additonal problems for provider to add (Optional):257993}    {ROS Picklists (Optional):944989}      Objective    /60 (BP Location: Left arm, Patient Position: Sitting, Cuff Size: Adult Regular)   Pulse 75   Temp 99.1  F (37.3  C) (Tympanic)   Resp 18   Wt 109.8 kg (242 lb 1.6 oz)   SpO2 95%   BMI 32.83 kg/m    Body mass index is 32.83 kg/m .  Physical Exam   {Exam List (Optional):850596}    {Diagnostic Test Results (Optional):464038}        Signed Electronically by: Sandy Burks MD  {Email  feedback regarding this note to [email or web address]   :570460}   visit on 02/05/25   XR Knee Left 3 Views (Clinic Performed)     Status: None    Narrative    PROCEDURE:  XR KNEE LEFT 3 VIEWS    HISTORY: Chronic pain of left knee; Chronic pain of left knee    COMPARISON:  None.    TECHNIQUE:  3 views of the left knee were obtained.    FINDINGS:  No fracture or dislocation is identified. The joint spaces  are preserved.  Vascular calcifications are noted      Impression    IMPRESSION: Normal left knee    NHUNG KUMAR MD         SYSTEM ID:  [id number]           Signed Electronically by: Sandy Burks MD    The longitudinal plan of care for the diagnosis(es)/condition(s) as documented were addressed during this visit. Due to the added complexity in care, I will continue to support Gilberto in the subsequent management and with ongoing continuity of care.

## 2025-02-05 ENCOUNTER — OFFICE VISIT (OUTPATIENT)
Dept: FAMILY MEDICINE | Facility: OTHER | Age: 70
End: 2025-02-05
Attending: FAMILY MEDICINE
Payer: COMMERCIAL

## 2025-02-05 ENCOUNTER — ANCILLARY PROCEDURE (OUTPATIENT)
Dept: GENERAL RADIOLOGY | Facility: OTHER | Age: 70
End: 2025-02-05
Attending: FAMILY MEDICINE
Payer: COMMERCIAL

## 2025-02-05 VITALS
WEIGHT: 242.1 LBS | RESPIRATION RATE: 18 BRPM | SYSTOLIC BLOOD PRESSURE: 128 MMHG | HEART RATE: 75 BPM | DIASTOLIC BLOOD PRESSURE: 60 MMHG | BODY MASS INDEX: 32.83 KG/M2 | TEMPERATURE: 99.1 F | OXYGEN SATURATION: 95 %

## 2025-02-05 DIAGNOSIS — E11.9 TYPE 2 DIABETES MELLITUS WITHOUT COMPLICATION, WITHOUT LONG-TERM CURRENT USE OF INSULIN (H): Primary | ICD-10-CM

## 2025-02-05 DIAGNOSIS — I73.9 PERIPHERAL ARTERIAL DISEASE: ICD-10-CM

## 2025-02-05 DIAGNOSIS — I10 ESSENTIAL HYPERTENSION: ICD-10-CM

## 2025-02-05 DIAGNOSIS — M25.562 CHRONIC PAIN OF LEFT KNEE: ICD-10-CM

## 2025-02-05 DIAGNOSIS — E78.2 MIXED HYPERLIPIDEMIA: ICD-10-CM

## 2025-02-05 DIAGNOSIS — I25.10 CORONARY ARTERY DISEASE INVOLVING NATIVE HEART, UNSPECIFIED VESSEL OR LESION TYPE, UNSPECIFIED WHETHER ANGINA PRESENT: ICD-10-CM

## 2025-02-05 DIAGNOSIS — G89.29 CHRONIC PAIN OF LEFT KNEE: ICD-10-CM

## 2025-02-05 DIAGNOSIS — G47.39 COMPLEX SLEEP APNEA SYNDROME: ICD-10-CM

## 2025-02-05 DIAGNOSIS — R49.0 HOARSE VOICE QUALITY: ICD-10-CM

## 2025-02-05 DIAGNOSIS — F33.42 RECURRENT MAJOR DEPRESSIVE DISORDER, IN FULL REMISSION: ICD-10-CM

## 2025-02-05 PROBLEM — I50.22 CHRONIC SYSTOLIC CONGESTIVE HEART FAILURE (H): Status: ACTIVE | Noted: 2023-07-01

## 2025-02-05 PROBLEM — M80.00XA OSTEOPOROSIS WITH CURRENT PATHOLOGICAL FRACTURE: Status: ACTIVE | Noted: 2023-07-02

## 2025-02-05 PROBLEM — I25.5 ISCHEMIC CARDIOMYOPATHY: Status: ACTIVE | Noted: 2023-07-04

## 2025-02-05 PROCEDURE — G0463 HOSPITAL OUTPT CLINIC VISIT: HCPCS | Mod: 25

## 2025-02-05 PROCEDURE — 99215 OFFICE O/P EST HI 40 MIN: CPT | Performed by: FAMILY MEDICINE

## 2025-02-05 PROCEDURE — G2211 COMPLEX E/M VISIT ADD ON: HCPCS | Performed by: FAMILY MEDICINE

## 2025-02-05 PROCEDURE — 73562 X-RAY EXAM OF KNEE 3: CPT | Mod: TC,LT

## 2025-02-05 RX ORDER — ALBUTEROL SULFATE 90 UG/1
2 INHALANT RESPIRATORY (INHALATION) EVERY 4 HOURS PRN
COMMUNITY
Start: 2024-10-24

## 2025-02-05 RX ORDER — PENICILLIN V POTASSIUM 500 MG/1
TABLET, FILM COATED ORAL
COMMUNITY
Start: 2024-12-05 | End: 2025-02-05

## 2025-02-05 ASSESSMENT — ANXIETY QUESTIONNAIRES
GAD7 TOTAL SCORE: 0
5. BEING SO RESTLESS THAT IT IS HARD TO SIT STILL: NOT AT ALL
IF YOU CHECKED OFF ANY PROBLEMS ON THIS QUESTIONNAIRE, HOW DIFFICULT HAVE THESE PROBLEMS MADE IT FOR YOU TO DO YOUR WORK, TAKE CARE OF THINGS AT HOME, OR GET ALONG WITH OTHER PEOPLE: NOT DIFFICULT AT ALL
4. TROUBLE RELAXING: NOT AT ALL
2. NOT BEING ABLE TO STOP OR CONTROL WORRYING: NOT AT ALL
7. FEELING AFRAID AS IF SOMETHING AWFUL MIGHT HAPPEN: NOT AT ALL
3. WORRYING TOO MUCH ABOUT DIFFERENT THINGS: NOT AT ALL
8. IF YOU CHECKED OFF ANY PROBLEMS, HOW DIFFICULT HAVE THESE MADE IT FOR YOU TO DO YOUR WORK, TAKE CARE OF THINGS AT HOME, OR GET ALONG WITH OTHER PEOPLE?: NOT DIFFICULT AT ALL
GAD7 TOTAL SCORE: 0
GAD7 TOTAL SCORE: 0
1. FEELING NERVOUS, ANXIOUS, OR ON EDGE: NOT AT ALL
7. FEELING AFRAID AS IF SOMETHING AWFUL MIGHT HAPPEN: NOT AT ALL
6. BECOMING EASILY ANNOYED OR IRRITABLE: NOT AT ALL

## 2025-02-05 ASSESSMENT — PAIN SCALES - GENERAL: PAINLEVEL_OUTOF10: NO PAIN (0)

## 2025-02-24 ENCOUNTER — HOSPITAL ENCOUNTER (OUTPATIENT)
Dept: MRI IMAGING | Facility: HOSPITAL | Age: 70
Discharge: HOME | End: 2025-02-24
Attending: FAMILY MEDICINE | Admitting: FAMILY MEDICINE
Payer: COMMERCIAL

## 2025-02-24 DIAGNOSIS — G89.29 CHRONIC PAIN OF LEFT KNEE: ICD-10-CM

## 2025-02-24 DIAGNOSIS — M25.562 CHRONIC PAIN OF LEFT KNEE: ICD-10-CM

## 2025-02-24 PROCEDURE — 73721 MRI JNT OF LWR EXTRE W/O DYE: CPT | Mod: LT

## 2025-02-25 ENCOUNTER — TELEPHONE (OUTPATIENT)
Dept: FAMILY MEDICINE | Facility: OTHER | Age: 70
End: 2025-02-25

## 2025-02-25 DIAGNOSIS — M25.562 CHRONIC PAIN OF LEFT KNEE: Primary | ICD-10-CM

## 2025-02-25 DIAGNOSIS — G89.29 CHRONIC PAIN OF LEFT KNEE: Primary | ICD-10-CM

## 2025-02-28 ENCOUNTER — TELEPHONE (OUTPATIENT)
Dept: CARE COORDINATION | Facility: OTHER | Age: 70
End: 2025-02-28

## 2025-02-28 NOTE — TELEPHONE ENCOUNTER
"Telephone call to patient.  States starting weight on 1/28 was 238, reports current weight at 234.  Reported \"significant\" LLE edema x 2 days (wife states he only noticed yesterday, could've been  longer)  patient disclosed he was diagnosed with a meniscal tear.  Writer asked if the tear is the knee with worsening edema,  patient replied yes.  Wife also said he wears a brace on that extremity daily.  Said he has an appt for the left knee next week.    Reported SOB improved with inhaler, and he is walking 2-3 times a day \"to bring the puppy outside to [employer].\"    "

## 2025-02-28 NOTE — TELEPHONE ENCOUNTER
Started furosemide- any weight loss, decreased LE edema, improvement in PÉREZ?     Encourage him to stay active, read labels- sodium restriction when you call again.

## 2025-03-03 DIAGNOSIS — I50.20 HFREF (HEART FAILURE WITH REDUCED EJECTION FRACTION) (H): ICD-10-CM

## 2025-03-03 RX ORDER — FUROSEMIDE 20 MG/1
20 TABLET ORAL DAILY
Qty: 30 TABLET | Refills: 1 | Status: SHIPPED | OUTPATIENT
Start: 2025-03-03

## 2025-03-03 NOTE — TELEPHONE ENCOUNTER
Lasix      Last Written Prescription Date:  01/27/25  Last Fill Quantity: 30,   # refills: 1  Last Office Visit: 01/27/25  Future Office visit:    Next 5 appointments (look out 90 days)      Mar 05, 2025 3:00 PM  (Arrive by 2:45 PM)  Return Visit with Zoe Carr CNP  [facility] ([facility] [address] [facility] [address]  [phone]     Apr 29, 2025 2:30 PM  (Arrive by 2:15 PM)  Return Visit with Sierra Velásquez DPM  [facility] ([facility] ) [address] [place] [address] [phone]     May 21, 2025 10:30 AM  (Arrive by 10:15 AM)  Provider Visit with Sandy Burks MD  [facility] ([facility] ) [address] [facility] [address]  [phone]

## 2025-03-05 ENCOUNTER — TELEPHONE (OUTPATIENT)
Dept: CARDIOLOGY | Facility: OTHER | Age: 70
End: 2025-03-05

## 2025-03-05 ENCOUNTER — OFFICE VISIT (OUTPATIENT)
Dept: CARDIOLOGY | Facility: OTHER | Age: 70
End: 2025-03-05
Attending: NURSE PRACTITIONER
Payer: COMMERCIAL

## 2025-03-05 VITALS
OXYGEN SATURATION: 94 % | SYSTOLIC BLOOD PRESSURE: 112 MMHG | BODY MASS INDEX: 32.47 KG/M2 | DIASTOLIC BLOOD PRESSURE: 74 MMHG | HEART RATE: 79 BPM | WEIGHT: 239.7 LBS | RESPIRATION RATE: 16 BRPM | HEIGHT: 72 IN

## 2025-03-05 DIAGNOSIS — E78.2 MIXED HYPERLIPIDEMIA: ICD-10-CM

## 2025-03-05 DIAGNOSIS — R06.09 DOE (DYSPNEA ON EXERTION): ICD-10-CM

## 2025-03-05 DIAGNOSIS — I50.20 HFREF (HEART FAILURE WITH REDUCED EJECTION FRACTION) (H): ICD-10-CM

## 2025-03-05 DIAGNOSIS — Z95.1 S/P CABG (CORONARY ARTERY BYPASS GRAFT): ICD-10-CM

## 2025-03-05 DIAGNOSIS — I34.1 MITRAL VALVE PROLAPSE: Primary | ICD-10-CM

## 2025-03-05 DIAGNOSIS — E11.9 TYPE 2 DIABETES MELLITUS WITHOUT COMPLICATION, WITHOUT LONG-TERM CURRENT USE OF INSULIN (H): ICD-10-CM

## 2025-03-05 DIAGNOSIS — E66.09 CLASS 1 OBESITY DUE TO EXCESS CALORIES WITH SERIOUS COMORBIDITY AND BODY MASS INDEX (BMI) OF 32.0 TO 32.9 IN ADULT: ICD-10-CM

## 2025-03-05 DIAGNOSIS — Z95.2 S/P MVR (MITRAL VALVE REPLACEMENT): ICD-10-CM

## 2025-03-05 DIAGNOSIS — E66.811 CLASS 1 OBESITY DUE TO EXCESS CALORIES WITH SERIOUS COMORBIDITY AND BODY MASS INDEX (BMI) OF 32.0 TO 32.9 IN ADULT: ICD-10-CM

## 2025-03-05 DIAGNOSIS — I25.10 CORONARY ARTERY DISEASE INVOLVING NATIVE CORONARY ARTERY OF NATIVE HEART WITHOUT ANGINA PECTORIS: ICD-10-CM

## 2025-03-05 PROCEDURE — G0463 HOSPITAL OUTPT CLINIC VISIT: HCPCS

## 2025-03-05 ASSESSMENT — PAIN SCALES - GENERAL: PAINLEVEL_OUTOF10: NO PAIN (0)

## 2025-03-05 NOTE — TELEPHONE ENCOUNTER
Received a PA request for tirzepatide (MOUNJARO) 5 MG/0.5ML SOAJ auto-injector pen. Submitted electronically, waiting for a response.

## 2025-03-05 NOTE — PATIENT INSTRUCTIONS
Thank you for allowing Zeo Carr CNP and our  team to participate in your care. Please call our office at [phone] with scheduling questions or if you need to cancel or change your appointment. With any other questions or concerns you may call cardiology nurse at  [phone].       If you experience chest pain, chest pressure, chest tightness, shortness of breath, fainting, lightheadedness, nausea, vomiting, or other concerning symptoms, please report to the Emergency Department or call 911. These symptoms may be emergent, and best treated in the Emergency Department.      Given CHF NYHA II-III I think we should switch actos to a GLP-1 given cardiovascular history. START mounjaro 2.5 mg once weekly for 4 weeks, then increase to 5 mg once weekly thereafter. Dosing can be increased every 4 weeks if you are tolerating. Goal is to improve blood glucose levels, weight loss and possibly reduce furosemide use to as needed.    STOP Actos once you start mounjaro      Follow-up in 3 months, certainly sooner with acute concerns.    Zoe Carr CNP

## 2025-03-05 NOTE — PROGRESS NOTES
[facility] HEART CARE   CARDIOLOGY PROGRESS NOTE     Chief Complaint   Patient presents with    Follow Up          Diagnosis:    ICD-10-CM    1. Mitral valve prolapse  I34.1       2. Coronary artery disease involving native coronary artery of native heart without angina pectoris  I25.10 tirzepatide (MOUNJARO) 2.5 MG/0.5ML SOAJ auto-injector pen     tirzepatide (MOUNJARO) 5 MG/0.5ML SOAJ auto-injector pen      3. S/P MVR (mitral valve replacement)  Z95.2       4. S/P CABG (coronary artery bypass graft)  Z95.1 tirzepatide (MOUNJARO) 2.5 MG/0.5ML SOAJ auto-injector pen     tirzepatide (MOUNJARO) 5 MG/0.5ML SOAJ auto-injector pen      5. HFrEF (heart failure with reduced ejection fraction) (H)  I50.20 tirzepatide (MOUNJARO) 2.5 MG/0.5ML SOAJ auto-injector pen     tirzepatide (MOUNJARO) 5 MG/0.5ML SOAJ auto-injector pen      6. Type 2 diabetes mellitus without complication, without long-term current use of insulin (H)  E11.9 tirzepatide (MOUNJARO) 2.5 MG/0.5ML SOAJ auto-injector pen     tirzepatide (MOUNJARO) 5 MG/0.5ML SOAJ auto-injector pen      7. Class 1 obesity due to excess calories with serious comorbidity and body mass index (BMI) of 32.0 to 32.9 in adult  E66.811 tirzepatide (MOUNJARO) 2.5 MG/0.5ML SOAJ auto-injector pen    E66.09 tirzepatide (MOUNJARO) 5 MG/0.5ML SOAJ auto-injector pen    Z68.32       8. Mixed hyperlipidemia  E78.2 tirzepatide (MOUNJARO) 2.5 MG/0.5ML SOAJ auto-injector pen     tirzepatide (MOUNJARO) 5 MG/0.5ML SOAJ auto-injector pen      9. PÉREZ (dyspnea on exertion)  R06.09               Assessment/Plan:    Mitral valve prolapse (moderate-severe) with left ventricular dilation (moderate) on TTE 9/2022  S/p mitral valve repair and mitral valve annuloplasty ring at [facility] 1/18/2023 (Mitral valve repair with [place]-Reynaldo NeoChords to the flail P2 segment, closure of P1/P2 cleft, implantation of a 34 mm Avilez Physio II annuloplasty ring)  Post-operative complications including bleeding of graft warranting  being re-opened in the OR, acute blood loss anemia, post-operative atrial fibrillation.   Continue aspirin 162 mg once daily  SBE with one time dose of amoxicillin 2 grams 30-60 minutes prior to any dental procedures      Heart Failure with mildly reduced Ejection Fraction (HFREF) with LVEF 40-45% 1/2023   Left ventricular dilation  TTE 1/2023: LVEF 40-45%  TTE 1/2024: LVEF 60-65%  TTE 1/2025: LVEF 50-55%. Lisinopril and metoprolol were stopped at some point since prior visit. Suspect this has contributed to reduced LV systolic function    NYHA Symptom Class II-III  Stage C    ACE-I/ARB/ARNi:   Previously on lisinopril 2.5 mg daily. This was stopped by PCP at some point for soft blood pressures. Continue to hold for now.   BB:   Previously on metoprolol succinate 25 mg daily. This was stopped by PCP at some point for soft blood pressures. Continue to hold for now.   SGLT-2 Inhibitor:   Could consider if decline in LVEF  Aldosterone antagonist:  Could consider if decline in LVEF  SCD prophylaxis:  does not meet criteria for implant with LVEF 40-45%  %BiV pacing:    N/A  Fluid status:  Hypervolemic with trace to +1 bilateral pretibial edema, dyspnea on exertion, weight gain  Given symptoms and concerns regarding Actos in patients with heart failure we are going to switch from Actos to GLP-1  Continue current diuretic dosing for now and we will see if symptoms improve off Actos   Cardiac Rehab:   Currently in cardiac rehab s/p CABG  Sleep Apnea Evaluation:   Referral placed today for snoring, ongoing fatigue    - Low sodium diet   - Daily weights  - Weight loss  Wt Readings from Last 5 Encounters:   03/05/25 108.7 kg (239 lb 11.2 oz)   02/05/25 109.8 kg (242 lb 1.6 oz)   01/27/25 111.6 kg (246 lb)   10/07/24 113.3 kg (249 lb 11.2 oz)   06/27/24 104.1 kg (229 lb 6.4 oz)       Mixed hyperlipidemia with LDL goal less than 70, controlled  Coronary artery disease  S/p coronary artery bypass graft (CABG) x 2V CABG-   Reverse  saphenous vein graft to the posterior ascending artery  Reverse saphenous vein graft to the diagonal artery at [facility] 1/8/2023  Continue high intensity statin:   atorvastatin 80 mg once daily for secondary prevention and plaque stabilization  Continue aspirin 162 mg once daily  Continue beta-blockade for secondary prevention CAD, HFmrEF  Continue lisinopril for HFmrEF, hypertension  Heart healthy lifestyle    Recent Labs   Lab Test 05/04/23  0828 08/29/22  1441   CHOL 137 153   HDL 62 57   LDL 49 56   TRIG 129 198*         Left atrial enlargement  Risk factor modification  Moderate-severe mitral regurgitation s/p mitral valve repair      Post-operative atrial fibrillation  He was discharged with 30 days of amiodarone 200 mg once daily prescription. He wore a zio patch which did not show recurrence of atrial fibrillation.   He continues denies any symptoms of racing, skipping, fluttering, palpitations.  Zio patch January 2024 (1 year after surgery/post operative atrial fibrillation) does not show any episodes of atrial fibrillation  Plan for symptom monitoring. If any palpitations, racing, fluttering- plan for repeat monitoring.       CHADs-VASC >= 2  He was not discharged home with chronic oral anticoagulation for stroke prophylaxis with post-operative atrial fibrillation.   Reviewed stroke risk factors and chronic oral anticoagulation  He has not had a recurrence of atrial fibrillation.       Abdominal aortic aneurysm s/p AAA repair 2017 at [facility]  Recent MR at [facility] and per patient report no change in size  Continue to follow with vascular at [facility]      Essential hypertension with blood pressure goal less than 130/80, controlled   Continue guideline directed medical therapy for heart failure    BP Readings from Last 6 Encounters:   03/05/25 112/74   02/05/25 128/60   01/29/25 125/77   01/27/25 124/80   10/29/24 119/69   10/07/24 114/64       Diabetes Mellitus, type II  Obesity  Continue management by  PCP- Given CHF NYHA II-III and concerns for hypervolemia - switch actos to a GLP-1 given cardiovascular history. START mounjaro 2.5 mg once weekly for 4 weeks, then increase to 5 mg once weekly thereafter. Dosing can be increased every 4 weeks if you are tolerating. Goal is to improve blood glucose levels, weight loss and possibly reduce furosemide use to as needed.  Statin: atorvastatin 80 mg once daily  ACEi: Lisinopril 2.5 mg once daily  Body mass index is 32.51 kg/m .    Lab Results   Component Value Date    A1C 7.2 10/07/2024    A1C 7.6 06/25/2024    A1C 7.0 03/15/2024    A1C 7.1 11/28/2023    A1C 6.5 05/04/2023    A1C 6.5 02/15/2021         Claudication  Follows with vascular at [facility]       Former smoker  Longstanding history, quit around age 60.       Follow-up with cardiology in 3 months, certainly sooner with acute concerns.       Interval history:  Gilberto Camilo is a 69-year old male who presents for cardiology follow-up. Recall he has a cardiac history including moderate-severe mitral regurgitation s/p mitral valve repair at [facility] 1/18/2023, coronary artery disease s/p 2V CABG- reverse saphenous vein graft to the posterior ascending artery, reverse saphenous vein graft to the diagonal artery at [facility] 1/8/2023,  Post-op atrial fibrillation- no evidence or symptoms of recurrence, abdominal aortic aneurysm s/p repair in 2017 at [facility] in [facility], essential hypertension, mixed hyperlipidemia, PAD with claudication (follows with [facility] vascular). He has a noncardiac history including DM- type II, ED, depression, former smoker.     Mr. Camilo was initially seen in consult for finding of moderate-severe mitral valve prolapse. He had symptoms of progressive dyspnea, dyspnea on exertion, and fatigue. He was referred for evaluation by structural heart clinic with work-up including coronary angiogram which showed 2 vessel obstructive CAD. He underwent 2 vessel CABG as well as mitral valve repair  "2023 at [facility]. His post-operative period was complicated by bleeding of one of the grafts. He was brought back to the OR and hemostasis achieved. He also developed post-operative atrial fibrillation and was started on amiodarone. He has completed cardiac rehab.      Today, Mr. Camilo denies any specific concerns.   He continues with dyspnea on exertion - Experiences dyspnea and sometimes wheezing. Has been using albuterol inhaler about twice daily. Uncertain if this is helpful.   No orthopnea or PND.   LE edema stable to possibly a little increased per wife.  endorses that he has had dyspnea on exertion for the last 4 months or so. Going up the stairs, walker further than a block he experiences dyspnea. It takes him about 5 minutes at least to catch his breath.   No chest pain or pressure. He has had 16 pound weight gain in the last 6 months.   Sedentary lifestyle- goes grocery shopping, takes the dog for a walk (about 2 blocks) about six times per day.    No sensation of racing, skipping, fluttering in his chest.    NO lightheadedness, near-syncope or syncopal events.     Smoking History: Started smoking at age 18, quit around  (about age 60 years old). Smoking up to 3 ppd.    Alcohol History: None    Substance Abuse History: None    Sleep History: Sleeps in bed, naps in the chair. Typically goes to bed around 10 pm, wakes up around 8 am. Wakes up a couple times per night to void, able to fall right back to sleep. He does snore \"if allergies are acting up.\"   Per his wife he was supposed to have a sleep study but never went.  She has witnessed apneic periods. Wakes up most mornings feeling rested but within 2-3 hours feeling tired and ready for a nap. Denies orthopnea, PND.       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 He has a cardiac history including abdominal aortic aneurysm s/p repair in 2017 at [facility] in [facility], essential hypertension, mixed hyperlipidemia, coronary calcifications on CT, PAD with claudication (follows with [facility] vascular). He has a noncardiac history including DM- type II, ED, depression, former smoker.       Mr. Camilo has had fatigue in the last 6 months. Per wife- increased napping. Sleeps at least 10 hours per night and several naps throughout the day. This is new and worrisome to wife Marcella. She endorses that he previously would sleep 8 hours and be up and ready to go, rare naps.     He denies chest pain, exertional chest pain, dyspnea at rest or with conversation. He has had dyspnea on exertion which seems to be progressively getting worse. He [place] his [place] about 0.25 miles and he had to stop multiple times due to dyspnea. Per his wife he previously had to stop due to symptoms of bilateral LE claudication versus dyspnea; however, now he is becoming dyspneic before the claudication starts. Denies any LE edema, abdominal bloating.     Per his wife he has had some lightheaded dizziness with postural change from sitting to standing and with bending over to standing. This is newer for him. He has felt near-syncopal a couple times. He has not had a syncopal episode.      Smoking History: Started smoking at age 18, quit around 2015 (about age 60 years old). Smoking up to 3 ppd.    Alcohol History: None    Substance Abuse History: None    Sleep History: Sleeps in bed, naps in the chair. Typically goes to bed around 10 pm, wakes up around 8 am. Wakes up a couple times per  "night to void, able to fall right back to sleep. He does snore \"if allergies are acting up.\"   Per his wife he was supposed to have a sleep study but never went.  She has witnessed apneic periods. Wakes up most mornings feeling rested but within 2-3 hours feeling tired and ready for a nap. Denies orthopnea, PND.       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 The ejection fraction is 50-55%  (borderline).  Grade I or early diastolic dysfunction. Mild diffuse hypokinesis  is present.  Global right ventricular function is borderline reduced.  S/P Mitral valve repair with [place]-Reynaldo NeoChords to the flail P2 segment,  implantation of a 34 mm Avilez Physio II annuloplasty ring 2023; MG is  3mmHg at 73bpm.  The inferior vena cava was normal in size with preserved respiratory  variability.     This study was compared with the study from 24. Biventricular function is  slightly reduced.    Transthoracic Echocardiogram 2024  Interpretation Summary  S/P Mitral valve repair with [place]-Reynaldo NeoChords to the flail P2 segment,  implantation of a 34 mm Avilez Physio II annuloplasty ring 2023     Global and regional left ventricular function is normal with an EF of 60-65%.  Global right ventricular function is normal.  Annuloplasty ring in place at the mitral position.There is trace valvular  regurgitation. Mean gradient of 3 mmHg at a HR of 55 bpm (no stenosis).  No pericardial effusion is present.  Mild dilation of the aortic root and ascending aorta  This study was compared with the study " from 1/20/23. Ventricular function has  normalized    Echocardiogram s/p mitral valve replacement 1/20/2023  Interpretation Summary  S/P Mitral valve repair with [place]-Reynaldo NeoChords to the flail P2 segment,  implantation of a 34 mm Avilez Physio II annuloplasty ring 1/17/2023. Trace  mitral insufficiency is present.     Technically difficult study.  Left ventricular function is decreased. The ejection fraction is 40-45%  (mildly reduced).  Difficult to assesss RV function. Global right ventricular function probably  is mildly reduced.  No pericardial effusion is present.     This study was compared with the study from 11/11/2022. S/P Mitral valve  repair for severe MR. LVEF declined mildly.      Cardiac catheterization at [facility] 12/9/2022  Conclusion  Right sided filling pressures are normal.  Mild elevated pulmonary hypertension.  Left sided filling pressures are moderately elevated.  Normal cardiac output level.  Hemodynamic data has been modified in Epic per physician review.  RPDA lesion is 100% stenosed.  Prox RCA to Mid RCA lesion is 95% stenosed.  1st Diag lesion is 90% stenosed.      Transthoracic Echocardiogram 9/29/2022  Interpretation Summary  No pericardial effusion is present.  Global and regional left ventricular function is normal with an EF of 55-60%.  Moderate left ventricular dilation is present.  The right ventricle is normal size.  Global right ventricular function is normal.  Moderate left atrial enlargement is present.  The posterior MV leaflet is slightly thickened and shortened with prolapse and  the valves do not coapt. There is significant MR jet towrds the interatrial  septum.  Moderate to severe mitral insufficiency is present.   Trace aortic insufficiency is present.  No aortic stenosis is present.  Trace tricuspid insufficiency is present.  Trace pulmonic insufficiency is present.         Past Medical History:   Diagnosis Date    AAA (abdominal aortic aneurysm) 09/20/2017    5.3 cm      Mohan esophagus     Closed rib fracture     Coronary artery disease involving native heart, unspecified vessel or lesion type, unspecified whether angina present 01/18/2023    DNS (deviated nasal septum)     Postoperative atrial fibrillation (H) 02/13/2023    S/P CABG (coronary artery bypass graft) 02/13/2023    S/P mitral valve repair 02/13/2023    S/P total left hip arthroplasty 06/25/2024       Past Surgical History:   Procedure Laterality Date    AAA REPAIR  06/17/2020    [facility]/ intravascular repair    APPENDECTOMY      BYPASS GRAFT ARTERY CORONARY, REPAIR VALVE MITRAL, COMBINED N/A 01/18/2023    Procedure: MEDIAN STERNOTOMY, ON PUMP OXGENATION, Left endovascular saphaneous vein harvest, CORONARY ARTERY BYPASS GRAFT (CABG x 2), Mitral Valve Repair, transesophageal echocardiogram (TRENT) performed by anesthesia;  Surgeon: Linda Kim MD;  Location: [facility] OR    COLONOSCOPY  2013    COLONOSCOPY N/A 6/27/2024    Procedure: COLONOSCOPY;  Surgeon: Khoa Jacques MD;  Location: [facility] OR    CV CORONARY ANGIOGRAM N/A 12/09/2022    Procedure: Coronary Angiogram with possible intervention;  Surgeon: Porfirio Herrera MD;  Location:  HEART CARDIAC CATH LAB    CV RIGHT HEART CATH MEASUREMENTS RECORDED N/A 12/09/2022    Procedure: Right Heart Cath;  Surgeon: Porfirio Herrera MD;  Location:  HEART CARDIAC CATH LAB    ESOPHAGOGASTRODUODENOSCOPY  2013    INCISION AND DRAINAGE CHEST WASHOUT, COMBINED N/A 01/19/2023    Procedure: chest washout, Re-do sternotomy, repair of vein graft;  Surgeon: Linda Kim MD;  Location: [facility] OR    left total hip arthroplasty         No Known Allergies    Current Outpatient Medications   Medication Sig Dispense Refill    albuterol (PROAIR HFA/PROVENTIL HFA/VENTOLIN HFA) 108 (90 Base) MCG/ACT inhaler 2 puffs every 4 hours as needed for shortness of breath or wheezing.      ARIPiprazole (ABILIFY) 5 MG tablet Take 1 tablet (5 mg) by mouth every  morning. 90 tablet 3    aspirin (ASA) 81 MG chewable tablet Take 162 mg by mouth      atorvastatin (LIPITOR) 80 MG tablet Take 1 tablet (80 mg) by mouth every morning. 90 tablet 3    blood glucose (ACCU-CHEK GUIDE) test strip Use to test blood sugar 1 daily. 100 strip 1    blood glucose monitoring (SOFTCLIX) lancets Use to test blood sugar 1 times daily. 100 each 1    Blood Glucose Monitoring Suppl (ACCU-CHEK GUIDE) w/Device KIT USE TO TEST BLOOD GLUCOSE ONCE DAILY OR AS DIRECTED.      buPROPion (WELLBUTRIN XL) 300 MG 24 hr tablet Take 1 tablet (300 mg) by mouth every morning. 90 tablet 3    furosemide (LASIX) 20 MG tablet Take 1 tablet (20 mg) by mouth daily. 30 tablet 1    loratadine (CLARITIN) 10 MG tablet Take 10 mg by mouth every morning.      multivitamin, therapeutic (THERA-VIT) TABS tablet Take 1 tablet by mouth or Feeding Tube every morning. 30 tablet 3    oxyBUTYnin ER (DITROPAN XL) 15 MG 24 hr tablet Take 1 tablet (15 mg) by mouth daily. 90 tablet 3    pantoprazole (PROTONIX) 40 MG EC tablet Take 1 tablet (40 mg) by mouth daily. 90 tablet 3    pioglitazone (ACTOS) 15 MG tablet Take 1 tablet (15 mg) by mouth daily. 90 tablet 3    tirzepatide (MOUNJARO) 2.5 MG/0.5ML SOAJ auto-injector pen Inject 0.5 mLs (2.5 mg) subcutaneously once a week. 2 mL 0    tirzepatide (MOUNJARO) 5 MG/0.5ML SOAJ auto-injector pen Inject 0.5 mLs (5 mg) subcutaneously once a week. 2 mL 2    vitamin C (ASCORBIC ACID) 1000 MG TABS Take 1,000 mg by mouth every morning      Vitamin D3 (CHOLECALCIFEROL) 25 mcg (1000 units) tablet          Social History     Socioeconomic History    Marital status:      Spouse name: Not on file    Number of children: Not on file    Years of education: Not on file    Highest education level: Not on file   Occupational History    Not on file   Tobacco Use    Smoking status: Former     Current packs/day: 1.00     Average packs/day: 1 pack/day for 25.0 years (25.0 ttl pk-yrs)     Types: Cigarettes      Passive exposure: Never    Smokeless tobacco: Never    Tobacco comments:     Feb. 2014   Vaping Use    Vaping status: Never Used   Substance and Sexual Activity    Alcohol use: Yes     Comment: occasional    Drug use: Yes     Types: Marijuana    Sexual activity: Yes     Partners: Female   Other Topics Concern    Parent/sibling w/ CABG, MI or angioplasty before 65F 55M? Yes     Comment: dad had heart attack before 55. brother also had heart attack before 55.   Social History Narrative    .      Social Drivers of Health     Financial Resource Strain: Low Risk  (2/5/2025)    Financial Resource Strain     Within the past 12 months, have you or your family members you live with been unable to get utilities (heat, electricity) when it was really needed?: No   Food Insecurity: Low Risk  (2/5/2025)    Food Insecurity     Within the past 12 months, did you worry that your food would run out before you got money to buy more?: No     Within the past 12 months, did the food you bought just not last and you didn t have money to get more?: No   Transportation Needs: Low Risk  (2/5/2025)    Transportation Needs     Within the past 12 months, has lack of transportation kept you from medical appointments, getting your medicines, non-medical meetings or appointments, work, or from getting things that you need?: No   Physical Activity: Not on file   Stress: Not on file   Social Connections: Not on file   Interpersonal Safety: Low Risk  (10/29/2024)    Interpersonal Safety     Do you feel physically and emotionally safe where you currently live?: Yes     Within the past 12 months, have you been hit, slapped, kicked or otherwise physically hurt by someone?: No     Within the past 12 months, have you been humiliated or emotionally abused in other ways by your partner or ex-partner?: No   Housing Stability: Low Risk  (2/5/2025)    Housing Stability     Do you have housing? : Yes     Are you worried about losing your housing?: No        LAB RESULTS:   No results found for any visits on 03/05/25.        Review of systems: Negative except that which was noted in the HPI.    Physical examination:    Vitals: /74 (BP Location: Left arm, Patient Position: Sitting, Cuff Size: Adult Regular)   Pulse 79   Resp 16   Ht 1.829 m (6')   Wt 108.7 kg (239 lb 11.2 oz)   SpO2 94%   BMI 32.51 kg/m    BMI= Body mass index is 32.51 kg/m .      GENERAL APPEARANCE: healthy, alert and no distress  CHEST: lungs clear to auscultation - no rales, rhonchi or wheezes, no use of accessory muscles, no retractions, respirations are unlabored, normal respiratory rate  CARDIOVASCULAR: regular rhythm, normal rate. normal S1 with physiologic split S2, no S3 or S4 and no murmur, click or rub  EXTREMITIES: +trace to +1 pretibial bilateral LE edema up to knee  NEURO: alert and oriented normal speech  VASC: No carotid bruits heard.      Thank you for allowing me to participate in the care of your patient. Please do not hesitate to contact me if you have any questions.       Zoe Carr, CNP

## 2025-03-13 NOTE — TELEPHONE ENCOUNTER
This medication or product is on your plan's list of covered drugs. Prior authorization is not required at this time. If your pharmacy has questions regarding the processing of your prescription, please have them call the [facility] pharmacy help desk at [phone].

## 2025-04-24 ENCOUNTER — TELEPHONE (OUTPATIENT)
Dept: CARE COORDINATION | Facility: OTHER | Age: 70
End: 2025-04-24

## 2025-04-24 NOTE — TELEPHONE ENCOUNTER
"Care coordination follow up call to patient.  Reports current weight at 224.  Said is is weighing daily and that he's lost \"about 5 lbs\" this past month.  Both he and his wife stated that his BLE has improved.  Patient said he stil has SOB, but he feels that has also shown improvement.  Writer asked about reading labels and watching sodium in his diet.  Patient stated he is \"trying\".  Wife remarked that she is the one trying to read labels and gently push him in the right direction, but said \"but boy is it a push.\"   Writer encouraged them to continue reading labels to help with reducing sodium in the diet, and to also work on increasing activity now that the weather is getting better and conditions outside are more conducive for safer activity.  Patient remarked that he \"still has to take muttly their dog outside to potty no matter the conditions.\".  But that he knows he has to increase activity beyond that.  Writer asked if she could call back in a few weeks and check in to see how things are going, if weight, SOB and edema are still improved, and if he has been able to make the other adjustments/improvements.  Both he and his spouse verbalized agreement and understanding.   "

## 2025-04-29 ENCOUNTER — OFFICE VISIT (OUTPATIENT)
Dept: PODIATRY | Facility: OTHER | Age: 70
End: 2025-04-29
Attending: PODIATRIST
Payer: COMMERCIAL

## 2025-04-29 VITALS
SYSTOLIC BLOOD PRESSURE: 136 MMHG | DIASTOLIC BLOOD PRESSURE: 85 MMHG | HEART RATE: 66 BPM | TEMPERATURE: 97.4 F | OXYGEN SATURATION: 95 % | RESPIRATION RATE: 20 BRPM

## 2025-04-29 DIAGNOSIS — E11.42 DIABETIC POLYNEUROPATHY ASSOCIATED WITH TYPE 2 DIABETES MELLITUS (H): Primary | ICD-10-CM

## 2025-04-29 DIAGNOSIS — Z13.89 SCREENING FOR DIABETIC PERIPHERAL NEUROPATHY: ICD-10-CM

## 2025-04-29 DIAGNOSIS — I73.9 PERIPHERAL ARTERIAL DISEASE: ICD-10-CM

## 2025-04-29 DIAGNOSIS — E11.9 DIABETES MELLITUS TYPE 2, NONINSULIN DEPENDENT (H): ICD-10-CM

## 2025-04-29 DIAGNOSIS — L60.3 ONYCHODYSTROPHY: ICD-10-CM

## 2025-04-29 PROCEDURE — G0463 HOSPITAL OUTPT CLINIC VISIT: HCPCS

## 2025-04-29 PROCEDURE — 11721 DEBRIDE NAIL 6 OR MORE: CPT | Performed by: PODIATRIST

## 2025-04-29 ASSESSMENT — PAIN SCALES - GENERAL: PAINLEVEL_OUTOF10: NO PAIN (0)

## 2025-04-29 NOTE — PROGRESS NOTES
Chief complaint: Patient presents with:  Wound Check: Nails      History of Present Illness: This 69 year old NIDDM II male is seen for follow-up management of elongated toenails. The toenails are difficult to reach and the nails are too thick for him to trim with regular nail clippers.     He sometimes feels tingling in the toes.    He broke his LEFT hip on 07/01/2023 and he had an ORIF. He says he is still doing well. He has no new concerns with his feet today.,    Patients says he has not been applying lotion to his feet.     Lab Results   Component Value Date    A1C 7.0 01/27/2025    A1C 7.2 10/07/2024    A1C 7.6 06/25/2024    A1C 7.0 03/15/2024    A1C 7.1 11/28/2023    A1C 6.5 02/15/2021         No further pedal complaints today.     Patient quit smoking around 2011.      /85   Pulse 66   Temp 97.4  F (36.3  C) (Tympanic)   Resp 20   SpO2 95%      Patient Active Problem List   Diagnosis    Abdominal aortic aneurysm    Erectile dysfunction    ACP (advance care planning)    Essential hypertension    Mixed hyperlipidemia    Moderate major depression (H)    Morbid obesity (H)    Elevated prostate specific antigen (PSA)    Diabetes mellitus, type 2 (H)    S/P AAA repair    Tobacco dependence in remission    Mitral valve prolapse    Status post coronary angiogram    Coronary artery disease involving native heart, unspecified vessel or lesion type, unspecified whether angina present    S/P CABG (coronary artery bypass graft)    S/P mitral valve repair    Postoperative atrial fibrillation (H)    Peripheral arterial disease    Fall from standing, initial encounter    Complex sleep apnea syndrome    Recurrent major depressive disorder, in full remission    S/P total left hip arthroplasty    Chronic systolic congestive heart failure (H)    Ischemic cardiomyopathy    Osteoporosis with current pathological fracture       Past Surgical History:   Procedure Laterality Date    AAA REPAIR  06/17/2020    [facility]  [place]/ intravascular repair    APPENDECTOMY      BYPASS GRAFT ARTERY CORONARY, REPAIR VALVE MITRAL, COMBINED N/A 01/18/2023    Procedure: MEDIAN STERNOTOMY, ON PUMP OXGENATION, Left endovascular saphaneous vein harvest, CORONARY ARTERY BYPASS GRAFT (CABG x 2), Mitral Valve Repair, transesophageal echocardiogram (TRENT) performed by anesthesia;  Surgeon: Linda Kim MD;  Location: [facility] OR    COLONOSCOPY  2013    COLONOSCOPY N/A 6/27/2024    Procedure: COLONOSCOPY;  Surgeon: Khoa Jacques MD;  Location: [facility]    CV CORONARY ANGIOGRAM N/A 12/09/2022    Procedure: Coronary Angiogram with possible intervention;  Surgeon: Porfirio Herrera MD;  Location:  [facility]    CV RIGHT HEART CATH MEASUREMENTS RECORDED N/A 12/09/2022    Procedure: Right Heart Cath;  Surgeon: Porfirio Herrera MD;  Location:  [facility]    ESOPHAGOGASTRODUODENOSCOPY  2013    INCISION AND DRAINAGE CHEST WASHOUT, COMBINED N/A 01/19/2023    Procedure: chest washout, Re-do sternotomy, repair of vein graft;  Surgeon: Linda Kim MD;  Location: [facility] OR    left total hip arthroplasty         Current Outpatient Medications   Medication Sig Dispense Refill    albuterol (PROAIR HFA/PROVENTIL HFA/VENTOLIN HFA) 108 (90 Base) MCG/ACT inhaler 2 puffs every 4 hours as needed for shortness of breath or wheezing.      ARIPiprazole (ABILIFY) 5 MG tablet Take 1 tablet (5 mg) by mouth every morning. 90 tablet 0    aspirin (ASA) 81 MG chewable tablet Take 162 mg by mouth      atorvastatin (LIPITOR) 80 MG tablet Take 1 tablet (80 mg) by mouth every morning. 90 tablet 0    blood glucose (ACCU-CHEK GUIDE) test strip Use to test blood sugar 1 daily. 100 strip 1    blood glucose monitoring (SOFTCLIX) lancets Use to test blood sugar 1 times daily. 100 each 1    Blood Glucose Monitoring Suppl (ACCU-CHEK GUIDE) w/Device KIT USE TO TEST BLOOD GLUCOSE ONCE DAILY OR AS DIRECTED.      buPROPion (WELLBUTRIN XL) 300  MG 24 hr tablet Take 1 tablet (300 mg) by mouth every morning. 90 tablet 0    furosemide (LASIX) 20 MG tablet Take 1 tablet (20 mg) by mouth daily. 30 tablet 1    loratadine (CLARITIN) 10 MG tablet Take 10 mg by mouth every morning.      multivitamin, therapeutic (THERA-VIT) TABS tablet Take 1 tablet by mouth or Feeding Tube every morning. 90 tablet 0    oxyBUTYnin ER (DITROPAN XL) 15 MG 24 hr tablet Take 1 tablet (15 mg) by mouth daily. 90 tablet 0    pantoprazole (PROTONIX) 40 MG EC tablet Take 1 tablet (40 mg) by mouth daily. 90 tablet 0    pioglitazone (ACTOS) 15 MG tablet Take 1 tablet (15 mg) by mouth daily. 90 tablet 3    tirzepatide (MOUNJARO) 2.5 MG/0.5ML SOAJ auto-injector pen Inject 0.5 mLs (2.5 mg) subcutaneously once a week. 2 mL 0    tirzepatide (MOUNJARO) 5 MG/0.5ML SOAJ auto-injector pen Inject 0.5 mLs (5 mg) subcutaneously once a week. 2 mL 2    vitamin C (ASCORBIC ACID) 1000 MG TABS Take 1,000 mg by mouth every morning      Vitamin D3 (CHOLECALCIFEROL) 25 mcg (1000 units) tablet        No current facility-administered medications for this visit.        No Known Allergies    Family History   Problem Relation Age of Onset    C.A.D. Father         CABG in late 40's    C.A.D. Brother         MI in 50's       Social History     Socioeconomic History    Marital status:      Spouse name: None    Number of children: None    Years of education: None    Highest education level: None   Occupational History    None   Tobacco Use    Smoking status: Former Smoker     Packs/day: 0.00     Types: Cigarettes    Smokeless tobacco: Never Used    Tobacco comment: Feb. 2014   Substance and Sexual Activity    Alcohol use: Yes     Comment: occasional    Drug use: Yes     Types: Marijuana    Sexual activity: Yes     Partners: Female   Other Topics Concern    Parent/sibling w/ CABG, MI or angioplasty before 65F 55M? Yes     Comment: dad had heart attack before 55. brother also had heart attack before 55.   Social  History Narrative    .      Social Determinants of Health     Financial Resource Strain:     Difficulty of Paying Living Expenses:    Food Insecurity:     Worried About Running Out of Food in the Last Year:     Ran Out of Food in the Last Year:    Transportation Needs:     Lack of Transportation (Medical):     Lack of Transportation (Non-Medical):    Physical Activity:     Days of Exercise per Week:     Minutes of Exercise per Session:    Stress:     Feeling of Stress :    Social Connections:     Frequency of Communication with Friends and Family:     Frequency of Social Gatherings with Friends and Family:     Attends [employer] Services:     Active Member of Clubs or Organizations:     Attends Club or Organization Meetings:     Marital Status:    Intimate Partner Violence:     Fear of Current or Ex-Partner:     Emotionally Abused:     Physically Abused:     Sexually Abused:        ROS: 10 point ROS neg other than the symptoms noted above in the HPI.  EXAM  Constitutional: healthy, alert and no distress    Psychiatric: mentation appears normal and affect normal/bright    VASCULAR:  -Dorsalis pedis pulse +1/4 b/l  -Posterior tibial pulse +1/4 b/l  -Capillary refill time < 3 seconds to b/l hallux  -Hair growth Absent to b/l anterior legs and ankles  NEURO:  -Positive for paresthesias to bilateral foot  -Epicritic and protective sensation diminished to the bilateral foot  DERM:  -Skin temperature cool to bilateral foot  -Skin dry, flaking to bilateral plantar foot  -Toenails elongated, thickened, dystrophic and discolored x 10  ---Bilateral hallux toenails significantly dystrophic    MSK:  -Muscle strength of ankles +5/5 for dorsiflexion, plantarflexion, ABDUction and ADDuction b/l    ============================================================    ASSESSMENT:    (E11.42) Diabetic polyneuropathy associated with type 2 diabetes mellitus (H)  (primary encounter diagnosis)    (L60.3) Onychodystrophy    (E11.9)  Diabetes mellitus type 2, noninsulin dependent (H)    (I73.9) Peripheral arterial disease    (Z13.89) Screening for diabetic peripheral neuropathy      PLAN:  -Patient evaluated and examined. Treatment options discussed with no educational barriers noted.  -High risk toenail debridement x 10 toenails without incident    -Diabetic Foot Education provided. This included checking the feet daily looking for new new blisters or wounds, wearing shoes at all times when walking including around the house, and avoiding lotion application between the toes. If there are any signs of infection, the patient should present to the ED as soon as possible. Infections of the foot can be life threatening or lead to amputations of the foot or leg.     -Vascular appointment -- most recent appointment was 09/19/2024 at [facility]. He follows up with them once a year.    Diabetes Mellitus: Patient's DM is managed by their PCP. The DM appears to be stable. Patient's last HbA1C was 7.0% on 01/27/2025.    -Patient in agreement with the above treatment plan and all of patient's questions were answered.      Return to clinic three months for diabetic foot exam and high risk nail debridement         Sierra Velásquez DPM

## 2025-05-19 NOTE — PROGRESS NOTES
Assessment & Plan     Type 2 diabetes mellitus without complication, without long-term current use of insulin (H)  Tolerating mounjaro. Not checking BG. Will have him stop the actos now in that case. Increase mounjaro to 5, call for refill when due and we can increase to 7.5.   - Hemoglobin A1c  - tirzepatide (MOUNJARO) 5 MG/0.5ML SOAJ auto-injector pen  Dispense: 2 mL; Refill: 0  - Hemoglobin A1c    Mixed hyperlipidemia  Will need fasting labs at some time as TG were quite high. LDL added today to labs.     Class 1 obesity due to excess calories with serious comorbidity and body mass index (BMI) of 32.0 to 32.9 in adult  Weight is down, continue titration of moujaro  - tirzepatide (MOUNJARO) 5 MG/0.5ML SOAJ auto-injector pen  Dispense: 2 mL; Refill: 0    HFrEF (heart failure with reduced ejection fraction) (H)  Fluid status is stable. Breathing is stable. Monitor.   - Comprehensive metabolic panel (BMP + Alb, Alk Phos, ALT, AST, Total. Bili, TP)  - tirzepatide (MOUNJARO) 5 MG/0.5ML SOAJ auto-injector pen  Dispense: 2 mL; Refill: 0  - Comprehensive metabolic panel (BMP + Alb, Alk Phos, ALT, AST, Total. Bili, TP)    Coronary artery disease involving native coronary artery of native heart without angina pectoris / S/P CABG (coronary artery bypass graft)  Asymptomatic.     BMI  Estimated body mass index is 30.79 kg/m  as calculated from the following:    Height as of this encounter: 1.829 m (6').    Weight as of this encounter: 103 kg (227 lb).         Follow-up  No follow-ups on file.    Claribel Macias is a 70 year old, presenting for the following health issues:  Hypertension, Diabetes, and Lipids        5/21/2025    10:10 AM   Additional Questions   Roomed by Zoe Brito     History of Present Illness       Reason for visit:  NA   He is taking medications regularly.        Diabetes Follow-up    How often are you checking your blood sugar? Not at all  What concerns do you have today about your diabetes?  None   Do you have any of these symptoms? (Select all that apply)  No numbness or tingling in feet.  No redness, sores or blisters on feet.  No complaints of excessive thirst.  No reports of blurry vision.  No significant changes to weight.          Hyperlipidemia Follow-Up    Are you regularly taking any medication or supplement to lower your cholesterol?   Yes- Lipitor 80 mg  Are you having muscle aches or other side effects that you think could be caused by your cholesterol lowering medication?  No    Hypertension Follow-up    Do you check your blood pressure regularly outside of the clinic? Yes   Are you following a low salt diet? Yes  Are your blood pressures ever more than 140 on the top number (systolic) OR more   than 90 on the bottom number (diastolic), for example 140/90? Yes    BP Readings from Last 2 Encounters:   05/21/25 122/78   04/29/25 136/85     Hemoglobin A1C (%)   Date Value   01/27/2025 7.0 (H)   10/07/2024 7.2 (H)   02/15/2021 6.5 (H)     LDL Cholesterol Calculated   Date Value   01/27/2025      Comment:     Cannot estimate LDL when triglyceride exceeds 400 mg/dL   10/07/2024 45 mg/dL   08/21/2020 68 mg/dL   10/02/2019 97 mg/dL         Review of Systems  Constitutional, HEENT, cardiovascular, pulmonary, gi and gu systems are negative, except as otherwise noted.      Objective    /78 (BP Location: Left arm, Patient Position: Sitting, Cuff Size: Adult Regular)   Pulse 66   Temp 97.7  F (36.5  C) (Tympanic)   Resp 16   Ht 1.829 m (6')   Wt 103 kg (227 lb)   SpO2 97%   BMI 30.79 kg/m    Body mass index is 30.79 kg/m .  Physical Exam   GENERAL: alert and no distress  RESP: lungs clear to auscultation - no rales, rhonchi or wheezes  CV: regular rates and rhythm, normal S1 S2, no S3 or S4, no murmur, click or rub, and no peripheral edema  MS: no gross musculoskeletal defects noted, no edema  PSYCH: mentation appears normal, affect normal/bright    Results for orders placed or performed in  visit on 05/21/25   Hemoglobin A1c     Status: Abnormal   Result Value Ref Range    Estimated Average Glucose 137 (H) <117 mg/dL    Hemoglobin A1C 6.4 (H) <5.7 %   Comprehensive metabolic panel (BMP + Alb, Alk Phos, ALT, AST, Total. Bili, TP)     Status: Abnormal   Result Value Ref Range    Sodium 138 135 - 145 mmol/L    Potassium 4.3 3.4 - 5.3 mmol/L    Carbon Dioxide (CO2) 24 22 - 29 mmol/L    Anion Gap 12 7 - 15 mmol/L    Urea Nitrogen 15.3 8.0 - 23.0 mg/dL    Creatinine 1.03 0.67 - 1.17 mg/dL    GFR Estimate 77 >60 mL/min/1.73m2    Calcium 10.1 8.8 - 10.4 mg/dL    Chloride 102 98 - 107 mmol/L    Glucose 106 (H) 70 - 99 mg/dL    Alkaline Phosphatase 119 40 - 150 U/L    AST 22 0 - 45 U/L    ALT 17 0 - 70 U/L    Protein Total 7.0 6.4 - 8.3 g/dL    Albumin 4.2 3.5 - 5.2 g/dL    Bilirubin Total 0.8 <=1.2 mg/dL           Signed Electronically by: Sandy Burks MD    The longitudinal plan of care for the diagnosis(es)/condition(s) as documented were addressed during this visit. Due to the added complexity in care, I will continue to support Gilberto in the subsequent management and with ongoing continuity of care.

## 2025-05-21 ENCOUNTER — RESULTS FOLLOW-UP (OUTPATIENT)
Dept: FAMILY MEDICINE | Facility: OTHER | Age: 70
End: 2025-05-21

## 2025-05-21 ENCOUNTER — OFFICE VISIT (OUTPATIENT)
Dept: FAMILY MEDICINE | Facility: OTHER | Age: 70
End: 2025-05-21
Attending: FAMILY MEDICINE
Payer: COMMERCIAL

## 2025-05-21 VITALS
HEIGHT: 72 IN | TEMPERATURE: 97.7 F | HEART RATE: 66 BPM | BODY MASS INDEX: 30.75 KG/M2 | DIASTOLIC BLOOD PRESSURE: 78 MMHG | WEIGHT: 227 LBS | RESPIRATION RATE: 16 BRPM | OXYGEN SATURATION: 97 % | SYSTOLIC BLOOD PRESSURE: 122 MMHG

## 2025-05-21 DIAGNOSIS — I50.20 HFREF (HEART FAILURE WITH REDUCED EJECTION FRACTION) (H): ICD-10-CM

## 2025-05-21 DIAGNOSIS — E11.9 TYPE 2 DIABETES MELLITUS WITHOUT COMPLICATION, WITHOUT LONG-TERM CURRENT USE OF INSULIN (H): Primary | ICD-10-CM

## 2025-05-21 DIAGNOSIS — I25.10 CORONARY ARTERY DISEASE INVOLVING NATIVE CORONARY ARTERY OF NATIVE HEART WITHOUT ANGINA PECTORIS: ICD-10-CM

## 2025-05-21 DIAGNOSIS — E66.811 CLASS 1 OBESITY DUE TO EXCESS CALORIES WITH SERIOUS COMORBIDITY AND BODY MASS INDEX (BMI) OF 32.0 TO 32.9 IN ADULT: ICD-10-CM

## 2025-05-21 DIAGNOSIS — E66.09 CLASS 1 OBESITY DUE TO EXCESS CALORIES WITH SERIOUS COMORBIDITY AND BODY MASS INDEX (BMI) OF 32.0 TO 32.9 IN ADULT: ICD-10-CM

## 2025-05-21 DIAGNOSIS — Z95.1 S/P CABG (CORONARY ARTERY BYPASS GRAFT): ICD-10-CM

## 2025-05-21 DIAGNOSIS — E78.2 MIXED HYPERLIPIDEMIA: ICD-10-CM

## 2025-05-21 LAB
ALBUMIN SERPL BCG-MCNC: 4.2 G/DL (ref 3.5–5.2)
ALP SERPL-CCNC: 119 U/L (ref 40–150)
ALT SERPL W P-5'-P-CCNC: 17 U/L (ref 0–70)
ANION GAP SERPL CALCULATED.3IONS-SCNC: 12 MMOL/L (ref 7–15)
AST SERPL W P-5'-P-CCNC: 22 U/L (ref 0–45)
BILIRUB SERPL-MCNC: 0.8 MG/DL
BUN SERPL-MCNC: 15.3 MG/DL (ref 8–23)
CALCIUM SERPL-MCNC: 10.1 MG/DL (ref 8.8–10.4)
CHLORIDE SERPL-SCNC: 102 MMOL/L (ref 98–107)
CREAT SERPL-MCNC: 1.03 MG/DL (ref 0.67–1.17)
EGFRCR SERPLBLD CKD-EPI 2021: 77 ML/MIN/1.73M2
EST. AVERAGE GLUCOSE BLD GHB EST-MCNC: 137 MG/DL
GLUCOSE SERPL-MCNC: 106 MG/DL (ref 70–99)
HBA1C MFR BLD: 6.4 %
HCO3 SERPL-SCNC: 24 MMOL/L (ref 22–29)
POTASSIUM SERPL-SCNC: 4.3 MMOL/L (ref 3.4–5.3)
PROT SERPL-MCNC: 7 G/DL (ref 6.4–8.3)
SODIUM SERPL-SCNC: 138 MMOL/L (ref 135–145)

## 2025-05-21 PROCEDURE — G0463 HOSPITAL OUTPT CLINIC VISIT: HCPCS | Mod: 25

## 2025-05-21 PROCEDURE — 83036 HEMOGLOBIN GLYCOSYLATED A1C: CPT | Mod: ZL | Performed by: FAMILY MEDICINE

## 2025-05-21 PROCEDURE — 80053 COMPREHEN METABOLIC PANEL: CPT | Mod: ZL | Performed by: FAMILY MEDICINE

## 2025-05-21 PROCEDURE — 36415 COLL VENOUS BLD VENIPUNCTURE: CPT | Mod: ZL | Performed by: FAMILY MEDICINE

## 2025-05-21 RX ORDER — ZINC GLUCONATE 50 MG
50 TABLET ORAL DAILY
COMMUNITY

## 2025-05-21 ASSESSMENT — PAIN SCALES - GENERAL: PAINLEVEL_OUTOF10: NO PAIN (0)

## 2025-05-21 ASSESSMENT — PATIENT HEALTH QUESTIONNAIRE - PHQ9
SUM OF ALL RESPONSES TO PHQ QUESTIONS 1-9: 0
SUM OF ALL RESPONSES TO PHQ QUESTIONS 1-9: 0

## 2025-06-04 ENCOUNTER — MYC REFILL (OUTPATIENT)
Dept: CARDIOLOGY | Facility: OTHER | Age: 70
End: 2025-06-04

## 2025-06-04 ENCOUNTER — MYC REFILL (OUTPATIENT)
Dept: FAMILY MEDICINE | Facility: OTHER | Age: 70
End: 2025-06-04

## 2025-06-04 DIAGNOSIS — Z95.2 S/P MVR (MITRAL VALVE REPLACEMENT): ICD-10-CM

## 2025-06-04 DIAGNOSIS — N39.41 URGE INCONTINENCE OF URINE: ICD-10-CM

## 2025-06-04 DIAGNOSIS — Z95.1 S/P CABG (CORONARY ARTERY BYPASS GRAFT): ICD-10-CM

## 2025-06-04 DIAGNOSIS — K21.9 GASTROESOPHAGEAL REFLUX DISEASE, UNSPECIFIED WHETHER ESOPHAGITIS PRESENT: ICD-10-CM

## 2025-06-04 DIAGNOSIS — I50.20 HFREF (HEART FAILURE WITH REDUCED EJECTION FRACTION) (H): ICD-10-CM

## 2025-06-04 RX ORDER — FUROSEMIDE 20 MG/1
20 TABLET ORAL DAILY
Qty: 30 TABLET | Refills: 1 | Status: SHIPPED | OUTPATIENT
Start: 2025-06-04

## 2025-06-04 RX ORDER — PANTOPRAZOLE SODIUM 40 MG/1
40 TABLET, DELAYED RELEASE ORAL DAILY
Qty: 90 TABLET | Refills: 3 | Status: SHIPPED | OUTPATIENT
Start: 2025-06-04

## 2025-06-04 RX ORDER — OXYBUTYNIN CHLORIDE 15 MG/1
15 TABLET, EXTENDED RELEASE ORAL DAILY
Qty: 90 TABLET | Refills: 3 | Status: SHIPPED | OUTPATIENT
Start: 2025-06-04

## 2025-06-04 NOTE — TELEPHONE ENCOUNTER
Disp Refills Start End MARLENI   ARIPiprazole (ABILIFY) 5 MG tablet 90 tablet 0 4/11/2025 -- No      Disp Refills Start End MARLENI   oxyBUTYnin ER (DITROPAN XL) 15 MG 24 hr tablet 90 tablet 0 4/11/2025 -- No      Disp Refills Start End MARLENI   pantoprazole (PROTONIX) 40 MG EC tablet 90 tablet 0 4/11/2025 -- No     Last Office Visit: 05/21/2025  Future Office visit:    Next 5 appointments (look out 90 days)      Jul 01, 2025 1:30 PM  (Arrive by 1:15 PM)  Return Visit with Sierra Velásquez DPM  [facility] ([facility] ) [address] [place] [address] [phone] [phone]     Aug 29, 2025 4:00 PM  (Arrive by 3:45 PM)  Provider Visit with Sandy Bruks MD  [facility] ([facility] ) [address] [place] [id number]  [phone]             Routing refill request to provider for review/approval because:

## 2025-06-04 NOTE — TELEPHONE ENCOUNTER
Antipsychotic Medications [id number]/04/2025 02:16 PM   Protocol Details Medication indicated for associated diagnosis

## 2025-06-05 RX ORDER — ARIPIPRAZOLE 5 MG/1
5 TABLET ORAL EVERY MORNING
Qty: 90 TABLET | Refills: 3 | Status: SHIPPED | OUTPATIENT
Start: 2025-06-05

## 2025-07-09 ENCOUNTER — OFFICE VISIT (OUTPATIENT)
Dept: PODIATRY | Facility: OTHER | Age: 70
End: 2025-07-09
Attending: PODIATRIST
Payer: COMMERCIAL

## 2025-07-09 VITALS
TEMPERATURE: 97.5 F | HEART RATE: 71 BPM | DIASTOLIC BLOOD PRESSURE: 85 MMHG | SYSTOLIC BLOOD PRESSURE: 126 MMHG | OXYGEN SATURATION: 95 %

## 2025-07-09 DIAGNOSIS — Z13.89 SCREENING FOR DIABETIC PERIPHERAL NEUROPATHY: ICD-10-CM

## 2025-07-09 DIAGNOSIS — L60.3 ONYCHODYSTROPHY: ICD-10-CM

## 2025-07-09 DIAGNOSIS — L84 CALLUS OF FOOT: ICD-10-CM

## 2025-07-09 DIAGNOSIS — E11.9 DIABETES MELLITUS TYPE 2, NONINSULIN DEPENDENT (H): ICD-10-CM

## 2025-07-09 DIAGNOSIS — E11.42 DIABETIC POLYNEUROPATHY ASSOCIATED WITH TYPE 2 DIABETES MELLITUS (H): Primary | ICD-10-CM

## 2025-07-09 DIAGNOSIS — I73.9 PERIPHERAL ARTERIAL DISEASE: ICD-10-CM

## 2025-07-09 PROCEDURE — G0463 HOSPITAL OUTPT CLINIC VISIT: HCPCS

## 2025-07-09 PROCEDURE — 11056 PARNG/CUTG B9 HYPRKR LES 2-4: CPT | Performed by: PODIATRIST

## 2025-07-09 PROCEDURE — 11721 DEBRIDE NAIL 6 OR MORE: CPT | Performed by: PODIATRIST

## 2025-07-09 ASSESSMENT — PAIN SCALES - GENERAL: PAINLEVEL_OUTOF10: NO PAIN (0)

## 2025-07-09 NOTE — PROGRESS NOTES
Chief complaint: Patient presents with:  RECHECK: nails      History of Present Illness: This 70 year old NIDDM II male is seen for follow-up management of elongated toenails. The toenails are difficult to reach and the nails are too thick for him to trim with regular nail clippers.     He sometimes feels tingling in the toes.    He broke his LEFT hip on 07/01/2023 and he had an ORIF. He has no current concerns with his walking.    Patients says he has not been applying lotion to his feet.     Lab Results   Component Value Date    A1C 6.4 05/21/2025    A1C 7.0 01/27/2025    A1C 7.2 10/07/2024    A1C 7.6 06/25/2024    A1C 7.0 03/15/2024    A1C 6.5 02/15/2021         Patient quit smoking around 2011.      /85   Pulse 71   Temp 97.5  F (36.4  C)   SpO2 95%      Patient Active Problem List   Diagnosis    Abdominal aortic aneurysm    Erectile dysfunction    ACP (advance care planning)    Essential hypertension    Mixed hyperlipidemia    Moderate major depression (H)    Morbid obesity (H)    Elevated prostate specific antigen (PSA)    Diabetes mellitus, type 2 (H)    S/P AAA repair    Tobacco dependence in remission    Mitral valve prolapse    Status post coronary angiogram    Coronary artery disease involving native heart, unspecified vessel or lesion type, unspecified whether angina present    S/P CABG (coronary artery bypass graft)    S/P mitral valve repair    Postoperative atrial fibrillation (H)    Peripheral arterial disease    Fall from standing, initial encounter    Complex sleep apnea syndrome    Recurrent major depressive disorder, in full remission    S/P total left hip arthroplasty    Chronic systolic congestive heart failure (H)    Ischemic cardiomyopathy    Osteoporosis with current pathological fracture       Past Surgical History:   Procedure Laterality Date    AAA REPAIR  06/17/2020    [facility]/ intravascular repair    APPENDECTOMY      BYPASS GRAFT ARTERY CORONARY, REPAIR VALVE MITRAL,  COMBINED N/A 01/18/2023    Procedure: MEDIAN STERNOTOMY, ON PUMP OXGENATION, Left endovascular saphaneous vein harvest, CORONARY ARTERY BYPASS GRAFT (CABG x 2), Mitral Valve Repair, transesophageal echocardiogram (TRENT) performed by anesthesia;  Surgeon: Linda Kim MD;  Location: [facility] OR    COLONOSCOPY  2013    COLONOSCOPY N/A 6/27/2024    Procedure: COLONOSCOPY;  Surgeon: Khoa Jacques MD;  Location: [facility]    CV CORONARY ANGIOGRAM N/A 12/09/2022    Procedure: Coronary Angiogram with possible intervention;  Surgeon: Porfirio Herrera MD;  Location:  [facility]    CV RIGHT HEART CATH MEASUREMENTS RECORDED N/A 12/09/2022    Procedure: Right Heart Cath;  Surgeon: Porfirio Herrera MD;  Location:  [facility]    ESOPHAGOGASTRODUODENOSCOPY  2013    INCISION AND DRAINAGE CHEST WASHOUT, COMBINED N/A 01/19/2023    Procedure: chest washout, Re-do sternotomy, repair of vein graft;  Surgeon: Linda Kim MD;  Location: [facility] OR    left total hip arthroplasty         Current Outpatient Medications   Medication Sig Dispense Refill    albuterol (PROAIR HFA/PROVENTIL HFA/VENTOLIN HFA) 108 (90 Base) MCG/ACT inhaler 2 puffs every 4 hours as needed for shortness of breath or wheezing.      ARIPiprazole (ABILIFY) 5 MG tablet Take 1 tablet (5 mg) by mouth every morning. 90 tablet 3    aspirin (ASA) 81 MG chewable tablet Take 162 mg by mouth      atorvastatin (LIPITOR) 80 MG tablet Take 1 tablet (80 mg) by mouth every morning. 90 tablet 0    blood glucose (ACCU-CHEK GUIDE) test strip Use to test blood sugar 1 daily. 100 strip 1    blood glucose monitoring (SOFTCLIX) lancets Use to test blood sugar 1 times daily. 100 each 1    Blood Glucose Monitoring Suppl (ACCU-CHEK GUIDE) w/Device KIT USE TO TEST BLOOD GLUCOSE ONCE DAILY OR AS DIRECTED.      buPROPion (WELLBUTRIN XL) 300 MG 24 hr tablet Take 1 tablet (300 mg) by mouth every morning. 90 tablet 0    furosemide (LASIX) 20  MG tablet Take 1 tablet (20 mg) by mouth daily. 30 tablet 1    loratadine (CLARITIN) 10 MG tablet Take 10 mg by mouth every morning.      MOUNJARO 5 MG/0.5ML SOAJ auto-injector pen INJECT 0.5 MLS (5 MG) UNDERTHE SKIN ONCE WEEKLY 2 mL 0    multivitamin, therapeutic (THERA-VIT) TABS tablet Take 1 tablet by mouth or Feeding Tube every morning. 90 tablet 0    oxyBUTYnin ER (DITROPAN XL) 15 MG 24 hr tablet Take 1 tablet (15 mg) by mouth daily. 90 tablet 3    pantoprazole (PROTONIX) 40 MG EC tablet Take 1 tablet (40 mg) by mouth daily. 90 tablet 3    vitamin C (ASCORBIC ACID) 1000 MG TABS Take 1,000 mg by mouth every morning      Vitamin D3 (CHOLECALCIFEROL) 25 mcg (1000 units) tablet       zinc gluconate 50 MG tablet Take 50 mg by mouth daily.       No current facility-administered medications for this visit.        No Known Allergies    Family History   Problem Relation Age of Onset    C.A.D. Father         CABG in late 40's    C.A.D. Brother         MI in 50's       Social History     Socioeconomic History    Marital status:      Spouse name: None    Number of children: None    Years of education: None    Highest education level: None   Occupational History    None   Tobacco Use    Smoking status: Former Smoker     Packs/day: 0.00     Types: Cigarettes    Smokeless tobacco: Never Used    Tobacco comment: Feb. 2014   Substance and Sexual Activity    Alcohol use: Yes     Comment: occasional    Drug use: Yes     Types: Marijuana    Sexual activity: Yes     Partners: Female   Other Topics Concern    Parent/sibling w/ CABG, MI or angioplasty before 65F 55M? Yes     Comment: dad had heart attack before 55. brother also had heart attack before 55.   Social History Narrative    .      Social Determinants of Health     Financial Resource Strain:     Difficulty of Paying Living Expenses:    Food Insecurity:     Worried About Running Out of Food in the Last Year:     Ran Out of Food in the Last Year:     Transportation Needs:     Lack of Transportation (Medical):     Lack of Transportation (Non-Medical):    Physical Activity:     Days of Exercise per Week:     Minutes of Exercise per Session:    Stress:     Feeling of Stress :    Social Connections:     Frequency of Communication with Friends and Family:     Frequency of Social Gatherings with Friends and Family:     Attends [employer] Services:     Active Member of Clubs or Organizations:     Attends Club or Organization Meetings:     Marital Status:    Intimate Partner Violence:     Fear of Current or Ex-Partner:     Emotionally Abused:     Physically Abused:     Sexually Abused:        ROS: 10 point ROS neg other than the symptoms noted above in the HPI.  EXAM  Constitutional: healthy, alert and no distress    Psychiatric: mentation appears normal and affect normal/bright    VASCULAR:  -Dorsalis pedis pulse +1/4 b/l  -Posterior tibial pulse +1/4 b/l  -Capillary refill time < 3 seconds to b/l hallux  -Hair growth Absent to b/l anterior legs and ankles  NEURO:  -Positive for paresthesias to bilateral foot  -Epicritic and protective sensation diminished to the bilateral foot  DERM:  -Skin temperature cool to bilateral foot  -Skin dry, flaking to bilateral plantar foot  -Toenails elongated, thickened, dystrophic and discolored x 10  ---Bilateral hallux toenails significantly dystrophic  -Hyperkeratotic lesion on the bilateral plantar fifth metatarsal head    MSK:  -Muscle strength of ankles +5/5 for dorsiflexion, plantarflexion, ABDUction and ADDuction b/l    ============================================================    ASSESSMENT:    (E11.42) Diabetic polyneuropathy associated with type 2 diabetes mellitus (H)  (primary encounter diagnosis)    (L60.3) Onychodystrophy    (E11.9) Diabetes mellitus type 2, noninsulin dependent (H)    (I73.9) Peripheral arterial disease    (Z13.89) Screening for diabetic peripheral neuropathy      PLAN:  -Patient evaluated and  examined. Treatment options discussed with no educational barriers noted.  -High risk toenail debridement x 10 toenails without incident    -Callus pared x 2 to the bilateral plantar fifth metatarsal head without incident  ---Patient reminded that the callus will likely return due to the underlying, prominent bone causing the callus while the patient is walking.    -Diabetic Foot Education provided. This included checking the feet daily looking for new new blisters or wounds, wearing shoes at all times when walking including around the house, and avoiding lotion application between the toes. If there are any signs of infection, the patient should present to the ED as soon as possible. Infections of the foot can be life threatening or lead to amputations of the foot or leg.     -Vascular appointment -- most recent appointment was 09/19/2024 at [facility]. He follows up with them once a year.    Diabetes Mellitus: Patient's DM is managed by their PCP. The DM appears to be stable. Patient's last HbA1C was 6.40% on 05/21/2025.    -Patient in agreement with the above treatment plan and all of patient's questions were answered.      Return to clinic three months for diabetic foot exam and high risk nail debridement         Sierra Velásquez DPM

## 2025-07-13 DIAGNOSIS — E11.9 TYPE 2 DIABETES MELLITUS WITHOUT COMPLICATION, WITHOUT LONG-TERM CURRENT USE OF INSULIN (H): Primary | ICD-10-CM

## 2025-07-14 RX ORDER — MULTIVIT,CALC,MINS/IRON/FOLIC 9MG-400MCG
TABLET ORAL
Qty: 90 TABLET | Refills: 3 | Status: SHIPPED | OUTPATIENT
Start: 2025-07-14

## 2025-07-17 DIAGNOSIS — I50.20 HFREF (HEART FAILURE WITH REDUCED EJECTION FRACTION) (H): ICD-10-CM

## 2025-07-17 RX ORDER — FUROSEMIDE 20 MG/1
20 TABLET ORAL DAILY
Qty: 30 TABLET | Refills: 0 | Status: SHIPPED | OUTPATIENT
Start: 2025-07-17

## 2025-07-26 DIAGNOSIS — E66.811 CLASS 1 OBESITY DUE TO EXCESS CALORIES WITH SERIOUS COMORBIDITY AND BODY MASS INDEX (BMI) OF 32.0 TO 32.9 IN ADULT: ICD-10-CM

## 2025-07-26 DIAGNOSIS — E78.2 MIXED HYPERLIPIDEMIA: ICD-10-CM

## 2025-07-26 DIAGNOSIS — Z95.1 S/P CABG (CORONARY ARTERY BYPASS GRAFT): ICD-10-CM

## 2025-07-26 DIAGNOSIS — E11.9 TYPE 2 DIABETES MELLITUS WITHOUT COMPLICATION, WITHOUT LONG-TERM CURRENT USE OF INSULIN (H): ICD-10-CM

## 2025-07-26 DIAGNOSIS — E66.09 CLASS 1 OBESITY DUE TO EXCESS CALORIES WITH SERIOUS COMORBIDITY AND BODY MASS INDEX (BMI) OF 32.0 TO 32.9 IN ADULT: ICD-10-CM

## 2025-07-26 DIAGNOSIS — I25.10 CORONARY ARTERY DISEASE INVOLVING NATIVE CORONARY ARTERY OF NATIVE HEART WITHOUT ANGINA PECTORIS: ICD-10-CM

## 2025-07-26 DIAGNOSIS — I50.20 HFREF (HEART FAILURE WITH REDUCED EJECTION FRACTION) (H): ICD-10-CM

## 2025-07-28 DIAGNOSIS — Z95.1 S/P CABG (CORONARY ARTERY BYPASS GRAFT): ICD-10-CM

## 2025-07-28 DIAGNOSIS — E66.811 CLASS 1 OBESITY DUE TO EXCESS CALORIES WITH SERIOUS COMORBIDITY AND BODY MASS INDEX (BMI) OF 32.0 TO 32.9 IN ADULT: ICD-10-CM

## 2025-07-28 DIAGNOSIS — I25.10 CORONARY ARTERY DISEASE INVOLVING NATIVE CORONARY ARTERY OF NATIVE HEART WITHOUT ANGINA PECTORIS: ICD-10-CM

## 2025-07-28 DIAGNOSIS — I50.20 HFREF (HEART FAILURE WITH REDUCED EJECTION FRACTION) (H): ICD-10-CM

## 2025-07-28 DIAGNOSIS — E66.09 CLASS 1 OBESITY DUE TO EXCESS CALORIES WITH SERIOUS COMORBIDITY AND BODY MASS INDEX (BMI) OF 32.0 TO 32.9 IN ADULT: ICD-10-CM

## 2025-07-28 DIAGNOSIS — E78.2 MIXED HYPERLIPIDEMIA: ICD-10-CM

## 2025-07-28 DIAGNOSIS — E11.9 TYPE 2 DIABETES MELLITUS WITHOUT COMPLICATION, WITHOUT LONG-TERM CURRENT USE OF INSULIN (H): ICD-10-CM

## 2025-07-28 RX ORDER — TIRZEPATIDE 7.5 MG/.5ML
INJECTION, SOLUTION SUBCUTANEOUS
Qty: 2 ML | Refills: 0 | OUTPATIENT
Start: 2025-07-28

## 2025-08-09 DIAGNOSIS — F33.42 RECURRENT MAJOR DEPRESSIVE DISORDER, IN FULL REMISSION: ICD-10-CM

## 2025-08-11 RX ORDER — BUPROPION HYDROCHLORIDE 300 MG/1
300 TABLET ORAL EVERY MORNING
Qty: 90 TABLET | Refills: 0 | Status: SHIPPED | OUTPATIENT
Start: 2025-08-11

## 2025-08-14 ENCOUNTER — TELEPHONE (OUTPATIENT)
Dept: FAMILY MEDICINE | Facility: OTHER | Age: 70
End: 2025-08-14

## 2025-08-20 DIAGNOSIS — Z95.1 S/P CABG (CORONARY ARTERY BYPASS GRAFT): ICD-10-CM

## 2025-08-20 DIAGNOSIS — I50.20 HFREF (HEART FAILURE WITH REDUCED EJECTION FRACTION) (H): ICD-10-CM

## 2025-08-20 RX ORDER — FUROSEMIDE 20 MG/1
20 TABLET ORAL DAILY
Qty: 30 TABLET | Refills: 3 | Status: SHIPPED | OUTPATIENT
Start: 2025-08-20

## 2025-08-20 RX ORDER — ATORVASTATIN CALCIUM 80 MG/1
80 TABLET, FILM COATED ORAL EVERY MORNING
Qty: 90 TABLET | Refills: 3 | Status: SHIPPED | OUTPATIENT
Start: 2025-08-20

## 2025-08-22 DIAGNOSIS — Z95.1 S/P CABG (CORONARY ARTERY BYPASS GRAFT): ICD-10-CM

## 2025-08-22 DIAGNOSIS — E66.09 CLASS 1 OBESITY DUE TO EXCESS CALORIES WITH SERIOUS COMORBIDITY AND BODY MASS INDEX (BMI) OF 32.0 TO 32.9 IN ADULT: ICD-10-CM

## 2025-08-22 DIAGNOSIS — E66.811 CLASS 1 OBESITY DUE TO EXCESS CALORIES WITH SERIOUS COMORBIDITY AND BODY MASS INDEX (BMI) OF 32.0 TO 32.9 IN ADULT: ICD-10-CM

## 2025-08-22 DIAGNOSIS — E11.9 TYPE 2 DIABETES MELLITUS WITHOUT COMPLICATION, WITHOUT LONG-TERM CURRENT USE OF INSULIN (H): ICD-10-CM

## 2025-08-22 DIAGNOSIS — I50.20 HFREF (HEART FAILURE WITH REDUCED EJECTION FRACTION) (H): ICD-10-CM

## 2025-08-22 DIAGNOSIS — E78.2 MIXED HYPERLIPIDEMIA: ICD-10-CM

## 2025-08-22 DIAGNOSIS — I25.10 CORONARY ARTERY DISEASE INVOLVING NATIVE CORONARY ARTERY OF NATIVE HEART WITHOUT ANGINA PECTORIS: ICD-10-CM

## 2025-08-25 RX ORDER — TIRZEPATIDE 7.5 MG/.5ML
INJECTION, SOLUTION SUBCUTANEOUS
Qty: 2 ML | Refills: 0 | OUTPATIENT
Start: 2025-08-25

## 2025-08-27 ENCOUNTER — PATIENT OUTREACH (OUTPATIENT)
Dept: CARE COORDINATION | Facility: OTHER | Age: 70
End: 2025-08-27

## 2025-08-29 ENCOUNTER — OFFICE VISIT (OUTPATIENT)
Dept: FAMILY MEDICINE | Facility: OTHER | Age: 70
End: 2025-08-29
Attending: FAMILY MEDICINE
Payer: COMMERCIAL

## 2025-08-29 VITALS
RESPIRATION RATE: 15 BRPM | HEIGHT: 72 IN | OXYGEN SATURATION: 96 % | HEART RATE: 63 BPM | TEMPERATURE: 97.8 F | DIASTOLIC BLOOD PRESSURE: 73 MMHG | SYSTOLIC BLOOD PRESSURE: 130 MMHG | BODY MASS INDEX: 28.96 KG/M2 | WEIGHT: 213.8 LBS

## 2025-08-29 DIAGNOSIS — E11.9 TYPE 2 DIABETES MELLITUS WITHOUT COMPLICATION, WITHOUT LONG-TERM CURRENT USE OF INSULIN (H): ICD-10-CM

## 2025-08-29 DIAGNOSIS — F33.42 RECURRENT MAJOR DEPRESSIVE DISORDER, IN FULL REMISSION: ICD-10-CM

## 2025-08-29 DIAGNOSIS — K21.9 GASTROESOPHAGEAL REFLUX DISEASE, UNSPECIFIED WHETHER ESOPHAGITIS PRESENT: ICD-10-CM

## 2025-08-29 DIAGNOSIS — I25.10 CORONARY ARTERY DISEASE INVOLVING NATIVE HEART, UNSPECIFIED VESSEL OR LESION TYPE, UNSPECIFIED WHETHER ANGINA PRESENT: Primary | ICD-10-CM

## 2025-08-29 DIAGNOSIS — Z95.2 S/P MVR (MITRAL VALVE REPLACEMENT): ICD-10-CM

## 2025-08-29 DIAGNOSIS — I10 ESSENTIAL HYPERTENSION: ICD-10-CM

## 2025-08-29 DIAGNOSIS — Z95.1 S/P CABG (CORONARY ARTERY BYPASS GRAFT): ICD-10-CM

## 2025-08-29 LAB
ANION GAP SERPL CALCULATED.3IONS-SCNC: 12 MMOL/L (ref 7–15)
BUN SERPL-MCNC: 20.7 MG/DL (ref 8–23)
CALCIUM SERPL-MCNC: 9.9 MG/DL (ref 8.8–10.4)
CHLORIDE SERPL-SCNC: 101 MMOL/L (ref 98–107)
CREAT SERPL-MCNC: 1.09 MG/DL (ref 0.67–1.17)
EGFRCR SERPLBLD CKD-EPI 2021: 72 ML/MIN/1.73M2
EST. AVERAGE GLUCOSE BLD GHB EST-MCNC: 128 MG/DL
GLUCOSE SERPL-MCNC: 98 MG/DL (ref 70–99)
HBA1C MFR BLD: 6.1 %
HCO3 SERPL-SCNC: 27 MMOL/L (ref 22–29)
POTASSIUM SERPL-SCNC: 3.6 MMOL/L (ref 3.4–5.3)
SODIUM SERPL-SCNC: 140 MMOL/L (ref 135–145)

## 2025-08-29 PROCEDURE — G0463 HOSPITAL OUTPT CLINIC VISIT: HCPCS

## 2025-08-29 PROCEDURE — 80048 BASIC METABOLIC PNL TOTAL CA: CPT | Mod: ZL | Performed by: FAMILY MEDICINE

## 2025-08-29 PROCEDURE — 83036 HEMOGLOBIN GLYCOSYLATED A1C: CPT | Mod: ZL | Performed by: FAMILY MEDICINE

## 2025-08-29 PROCEDURE — 36415 COLL VENOUS BLD VENIPUNCTURE: CPT | Mod: ZL | Performed by: FAMILY MEDICINE

## 2025-08-29 RX ORDER — PANTOPRAZOLE SODIUM 20 MG/1
20 TABLET, DELAYED RELEASE ORAL DAILY
Qty: 90 TABLET | Refills: 3 | Status: SHIPPED | OUTPATIENT
Start: 2025-08-29

## 2025-08-29 RX ORDER — ARIPIPRAZOLE 2 MG/1
2 TABLET ORAL DAILY
Qty: 90 TABLET | Refills: 1 | Status: SHIPPED | OUTPATIENT
Start: 2025-08-29

## 2025-08-29 ASSESSMENT — ANXIETY QUESTIONNAIRES
GAD7 TOTAL SCORE: 0
GAD7 TOTAL SCORE: 0
4. TROUBLE RELAXING: NOT AT ALL
5. BEING SO RESTLESS THAT IT IS HARD TO SIT STILL: NOT AT ALL
1. FEELING NERVOUS, ANXIOUS, OR ON EDGE: NOT AT ALL
IF YOU CHECKED OFF ANY PROBLEMS ON THIS QUESTIONNAIRE, HOW DIFFICULT HAVE THESE PROBLEMS MADE IT FOR YOU TO DO YOUR WORK, TAKE CARE OF THINGS AT HOME, OR GET ALONG WITH OTHER PEOPLE: NOT DIFFICULT AT ALL
3. WORRYING TOO MUCH ABOUT DIFFERENT THINGS: NOT AT ALL
7. FEELING AFRAID AS IF SOMETHING AWFUL MIGHT HAPPEN: NOT AT ALL
2. NOT BEING ABLE TO STOP OR CONTROL WORRYING: NOT AT ALL
8. IF YOU CHECKED OFF ANY PROBLEMS, HOW DIFFICULT HAVE THESE MADE IT FOR YOU TO DO YOUR WORK, TAKE CARE OF THINGS AT HOME, OR GET ALONG WITH OTHER PEOPLE?: NOT DIFFICULT AT ALL
6. BECOMING EASILY ANNOYED OR IRRITABLE: NOT AT ALL
GAD7 TOTAL SCORE: 0
7. FEELING AFRAID AS IF SOMETHING AWFUL MIGHT HAPPEN: NOT AT ALL

## 2025-08-29 ASSESSMENT — PAIN SCALES - GENERAL: PAINLEVEL_OUTOF10: NO PAIN (0)

## 2025-09-02 ENCOUNTER — PATIENT OUTREACH (OUTPATIENT)
Dept: CARDIOLOGY | Facility: OTHER | Age: 70
End: 2025-09-02

## 2025-09-02 PROBLEM — E66.01 MORBID OBESITY (H): Status: RESOLVED | Noted: 2020-08-21 | Resolved: 2025-09-02

## (undated) DEVICE — SU PROLENE 7-0 BV175-6 24" 8735H

## (undated) DEVICE — ESU HOLSTER PLASTIC DISP E2400

## (undated) DEVICE — SU MONOCRYL 4-0 PS-2 27" UND Y426H

## (undated) DEVICE — SU ETHIBOND 2-0 SHDA 30" X563H

## (undated) DEVICE — SU PLEDGET SOFT TFE 13MMX7MMX1.5MM D7044

## (undated) DEVICE — SU VICRYL 3-0 FS-1 27" J442H

## (undated) DEVICE — SPONGE RAY-TEC 4X8" 7318

## (undated) DEVICE — Device

## (undated) DEVICE — ESU ELEC BLADE E-SEP INSULATED NEPTUNE 70MM 0703-070-002

## (undated) DEVICE — PREP POVIDONE IODINE SOLUTION 10% 4OZ BOTTLE 29906-004

## (undated) DEVICE — SUCTION IRR STRYKERFLOW II W/TIP 250-070-520

## (undated) DEVICE — BLADE SAW STERNAL 20X30MM KM-32

## (undated) DEVICE — LINEN TOWEL PACK X6 WHITE 5487

## (undated) DEVICE — SU PROLENE 5-0 RB-2DA 30" 8710H

## (undated) DEVICE — KIT ENDO VASOVIEW HEMOPRO 2 VH-4000

## (undated) DEVICE — SU STEEL MYO/WIRE II STERNOTOMY 8 BE-1 3X14" 048-217

## (undated) DEVICE — SU PROLENE 4-0 RB-1DA 36" 8557H

## (undated) DEVICE — TOURNIQUET VASCULAR KIT 7 1/2" 79012

## (undated) DEVICE — SU RETRACT-O-TAPE 1041

## (undated) DEVICE — CLIP HORIZON LG ORANGE 004200

## (undated) DEVICE — TIES BANDING T50R

## (undated) DEVICE — TOURNIQUET VASCULAR KIT ARGYLE 8888-585000

## (undated) DEVICE — SU PROLENE 4-0 SHDA 36" 8521H

## (undated) DEVICE — SU PLEDGET SOFT TFE 3/8"X3/26"X1/16" PCP40

## (undated) DEVICE — SU SILK 0 TIE 6X30" A306H

## (undated) DEVICE — SOL NACL 0.9% IRRIG 3000ML BAG 2B7477

## (undated) DEVICE — SU PROLENE 6-0 C-1DA 30" 8706H

## (undated) DEVICE — TUBING SUCTION DRAINAGE PLEURAL DUAL 8884714200

## (undated) DEVICE — SUCTION MANIFOLD NEPTUNE 2 SYS 1 PORT 702-025-000

## (undated) DEVICE — SUCTION CATH AIRLIFE TRI-FLO W/CONTROL PORT 14FR  T60C

## (undated) DEVICE — BLADE CLIPPER SGL USE 9680

## (undated) DEVICE — GLOVE BIOGEL PI MICRO SZ 7.5 48575

## (undated) DEVICE — LINEN TOWEL PACK X30 5481

## (undated) DEVICE — CLIP SPRING FOGARTY SOFTJAW CSOFT6

## (undated) DEVICE — SYR 01ML 27GA 0.5" NDL TBC 309623

## (undated) DEVICE — SUCTION DRY CHEST DRAIN OASIS 3600-100

## (undated) DEVICE — CLIP HORIZON MED BLUE 002200

## (undated) DEVICE — SU ETHIBOND 0 CT-1 CR 8X18" CX21D

## (undated) DEVICE — DECANTER BAG 2002S

## (undated) DEVICE — BLADE SAW OSCILLATING STRK 53X32X0.38MM 5400-134-282

## (undated) DEVICE — BONE WAX OSTENE 1.0GM BW-05

## (undated) DEVICE — ESU PENCIL W/COATED BLADE E2450H

## (undated) DEVICE — SU PROLENE 6-0 C-1DA 4X24" M8726

## (undated) DEVICE — BLOWER/MISTER CLEARVIEW 22150

## (undated) DEVICE — BLADE KNIFE BEAVER MICROSHARP ORANGE 7511

## (undated) DEVICE — SU VICRYL 2-0 CT-1 27" UND J259H

## (undated) DEVICE — ADH SKIN CLOSURE PREMIERPRO EXOFIN 1.0ML 3470

## (undated) DEVICE — SU SNTH BRD NABSB 20MM SH-2 GRN WHT STRL PXX82N

## (undated) DEVICE — PREP SCRUB SOL EXIDINE 4% CHG 4OZ 29002-404

## (undated) DEVICE — FASTENER CATH BALLOON CLAMPX2 STATLOCK 0684-00-493

## (undated) DEVICE — DRSG DRAIN 4X4" 7086

## (undated) DEVICE — SOL NACL 0.9% 10ML VIAL 0409-4888-02

## (undated) DEVICE — PREP SKIN SCRUB TRAY 4461A

## (undated) DEVICE — LEAD PACER MYOCARDIAL BIPOLAR TEMPORARY 53CM 6495F

## (undated) DEVICE — SLEEVE TR BAND RADIAL COMPRESSION DEVICE 24CM TRB24-REG

## (undated) DEVICE — CANISTER SUCTION MEDI-VAC GUARDIAN 2000ML 90D 65651-220

## (undated) DEVICE — PACK HEART LEFT CUSTOM

## (undated) DEVICE — DRSG TELFA 3X8" 1238

## (undated) DEVICE — ESU GROUND PAD ADULT W/CORD E7507

## (undated) DEVICE — SU STEEL 6 CCS 4X18" M654G

## (undated) DEVICE — SU PROLENE 7-0 BV-1DA 24" 8702H

## (undated) DEVICE — CLIP HORIZON SM RED WIDE SLOT 001201

## (undated) DEVICE — DRSG TELFA 3"X8" NON25720

## (undated) DEVICE — CONNECTOR BLAKE DRAIN SGL BCC1

## (undated) DEVICE — BLADE KNIFE BEAVER MICROSHARP GREEN 377515

## (undated) DEVICE — RX SURGIFLO HEMOSTATIC MATRIX W/THROMBIN 8ML 2994

## (undated) DEVICE — SOL NACL 0.9% INJ 1000ML BAG 2B1324X

## (undated) DEVICE — TUBING PRESSURE 30"

## (undated) DEVICE — CONNECTOR ERBEFLO 2 PORT 20325-215

## (undated) DEVICE — TAPE MEDIPORE 4"X2YD 2864

## (undated) DEVICE — INTRO SHEATH 7FRX10CM PINNACLE RSS702

## (undated) DEVICE — DRSG ABDOMINAL 07 1/2X8" 7197D

## (undated) DEVICE — PROTECTOR ARM ONE-STEP TRENDELENBURG 40418

## (undated) DEVICE — BLADE KNIFE BEAVER MINI STR BEAVER6900

## (undated) DEVICE — DEFIB PRO-PADZ LVP LQD GEL ADULT 8900-2105-01

## (undated) DEVICE — DRAPE FLUID WARMING 52 X 60" ORS-321

## (undated) DEVICE — KIT HAND CONTROL ACIST 016795

## (undated) DEVICE — MANIFOLD KIT ANGIO AUTOMATED 014613

## (undated) DEVICE — PACK ADULT HEART UMMC PV15CG92D

## (undated) DEVICE — PREP POVIDONE-IODINE 7.5% SCRUB 4OZ BOTTLE MDS093945

## (undated) DEVICE — WIPES FOLEY CARE SURESTEP PROVON DFC100

## (undated) DEVICE — SU VICRYL 0 CTX 36" J370H

## (undated) DEVICE — TUBING SUCTION 20FT N620A

## (undated) DEVICE — SHTH INTRO 0.021IN ID 6FR DIA

## (undated) DEVICE — SU ETHIBOND 3-0 BBDA 36" X588H

## (undated) DEVICE — SOL NACL 0.9% IRRIG 1000ML BOTTLE 2F7124

## (undated) DEVICE — SPECIMEN CONTAINER 5OZ STERILE 2600SA

## (undated) DEVICE — PUNCH AORTIC 4.0MMX8" RCB40

## (undated) DEVICE — SU PROLENE 7-0 BV-1DA 4X24" M8702

## (undated) DEVICE — BNDG ELASTIC 6"X5YDS STERILE 6611-6S

## (undated) DEVICE — DRAIN CHEST TUBE 32FR STR 8032

## (undated) DEVICE — SUCTION MANIFOLD NEPTUNE 2 SYS 4 PORT 0702-020-000

## (undated) DEVICE — SU ETHIBOND 2-0 V-5 EXC 30" PXX82

## (undated) DEVICE — SOL WATER IRRIG 1000ML BOTTLE 2F7114

## (undated) DEVICE — CANNULA VESSEL DLP  30001

## (undated) DEVICE — ANTIFOG SOLUTION W/FOAM PAD 31142527

## (undated) DEVICE — PREP CHLORAPREP 26ML TINTED HI-LITE ORANGE 930815

## (undated) DEVICE — DRAIN CHEST TUBE RIGHT ANGLED 32FR 8132

## (undated) DEVICE — BNDG ELASTIC 4"X5YDS STERILE 6611-4S

## (undated) DEVICE — SURGICEL HEMOSTAT 4X8" 1952

## (undated) DEVICE — BLADE KNIFE SURG 11 371111

## (undated) DEVICE — SU PROLENE 3-0 SHDA 36" 8522H

## (undated) DEVICE — BLADE SAW STRK STERNAL 6207-97-101

## (undated) DEVICE — CONNECTOR SIMS TUBING FOR CHEST TUBES 361

## (undated) DEVICE — NDL COUNTER 40CT  31142311

## (undated) DEVICE — ESU PENCIL SMOKE EVAC W/ROCKER SWITCH 0703-047-000

## (undated) DEVICE — DRAPE IOBAN INCISE 23X17" 6650EZ

## (undated) RX ORDER — CHLORHEXIDINE GLUCONATE ORAL RINSE 1.2 MG/ML
SOLUTION DENTAL
Status: DISPENSED
Start: 2023-01-17

## (undated) RX ORDER — CEFAZOLIN SODIUM 1 G/3ML
INJECTION, POWDER, FOR SOLUTION INTRAMUSCULAR; INTRAVENOUS
Status: DISPENSED
Start: 2023-01-19

## (undated) RX ORDER — ACETAMINOPHEN 325 MG/1
TABLET ORAL
Status: DISPENSED
Start: 2023-01-17

## (undated) RX ORDER — FENTANYL CITRATE 50 UG/ML
INJECTION, SOLUTION INTRAMUSCULAR; INTRAVENOUS
Status: DISPENSED
Start: 2023-01-19

## (undated) RX ORDER — GABAPENTIN 100 MG/1
CAPSULE ORAL
Status: DISPENSED
Start: 2023-01-17

## (undated) RX ORDER — HEPARIN SODIUM 1000 [USP'U]/ML
INJECTION, SOLUTION INTRAVENOUS; SUBCUTANEOUS
Status: DISPENSED
Start: 2022-12-09

## (undated) RX ORDER — FAMOTIDINE 20 MG/1
TABLET, FILM COATED ORAL
Status: DISPENSED
Start: 2023-01-17

## (undated) RX ORDER — PROPOFOL 10 MG/ML
INJECTION, EMULSION INTRAVENOUS
Status: DISPENSED
Start: 2023-01-19

## (undated) RX ORDER — CEFAZOLIN SODIUM/WATER 2 G/20 ML
SYRINGE (ML) INTRAVENOUS
Status: DISPENSED
Start: 2023-01-17

## (undated) RX ORDER — PAPAVERINE HYDROCHLORIDE 30 MG/ML
INJECTION INTRAMUSCULAR; INTRAVENOUS
Status: DISPENSED
Start: 2023-01-18

## (undated) RX ORDER — FENTANYL CITRATE 50 UG/ML
INJECTION, SOLUTION INTRAMUSCULAR; INTRAVENOUS
Status: DISPENSED
Start: 2023-01-18

## (undated) RX ORDER — CEFAZOLIN SODIUM 1 G/3ML
INJECTION, POWDER, FOR SOLUTION INTRAMUSCULAR; INTRAVENOUS
Status: DISPENSED
Start: 2023-01-18

## (undated) RX ORDER — LIDOCAINE HYDROCHLORIDE 20 MG/ML
SOLUTION OROPHARYNGEAL
Status: DISPENSED
Start: 2022-11-11

## (undated) RX ORDER — PROPOFOL 10 MG/ML
INJECTION, EMULSION INTRAVENOUS
Status: DISPENSED
Start: 2024-06-27

## (undated) RX ORDER — SODIUM CHLORIDE 9 MG/ML
INJECTION, SOLUTION INTRAVENOUS
Status: DISPENSED
Start: 2022-12-09

## (undated) RX ORDER — ASPIRIN 325 MG
TABLET ORAL
Status: DISPENSED
Start: 2022-12-09

## (undated) RX ORDER — NICARDIPINE HCL-0.9% SOD CHLOR 1 MG/10 ML
SYRINGE (ML) INTRAVENOUS
Status: DISPENSED
Start: 2022-12-09

## (undated) RX ORDER — CHLORHEXIDINE GLUCONATE ORAL RINSE 1.2 MG/ML
SOLUTION DENTAL
Status: DISPENSED
Start: 2023-01-18

## (undated) RX ORDER — FAMOTIDINE 20 MG/1
TABLET, FILM COATED ORAL
Status: DISPENSED
Start: 2023-01-18

## (undated) RX ORDER — ACETAMINOPHEN 500 MG
TABLET ORAL
Status: DISPENSED
Start: 2023-01-18

## (undated) RX ORDER — NITROGLYCERIN 5 MG/ML
VIAL (ML) INTRAVENOUS
Status: DISPENSED
Start: 2022-12-09

## (undated) RX ORDER — HEPARIN SODIUM 1000 [USP'U]/ML
INJECTION, SOLUTION INTRAVENOUS; SUBCUTANEOUS
Status: DISPENSED
Start: 2023-01-18

## (undated) RX ORDER — FENTANYL CITRATE 50 UG/ML
INJECTION, SOLUTION INTRAMUSCULAR; INTRAVENOUS
Status: DISPENSED
Start: 2022-11-11

## (undated) RX ORDER — ASPIRIN 81 MG/1
TABLET, CHEWABLE ORAL
Status: DISPENSED
Start: 2023-01-18

## (undated) RX ORDER — LIDOCAINE HYDROCHLORIDE 10 MG/ML
INJECTION, SOLUTION EPIDURAL; INFILTRATION; INTRACAUDAL; PERINEURAL
Status: DISPENSED
Start: 2022-12-09

## (undated) RX ORDER — CEFAZOLIN SODIUM/WATER 2 G/20 ML
SYRINGE (ML) INTRAVENOUS
Status: DISPENSED
Start: 2023-01-18

## (undated) RX ORDER — GABAPENTIN 100 MG/1
CAPSULE ORAL
Status: DISPENSED
Start: 2023-01-18

## (undated) RX ORDER — FENTANYL CITRATE 50 UG/ML
INJECTION, SOLUTION INTRAMUSCULAR; INTRAVENOUS
Status: DISPENSED
Start: 2022-12-09

## (undated) RX ORDER — FENTANYL CITRATE 50 UG/ML
INJECTION, SOLUTION INTRAMUSCULAR; INTRAVENOUS
Status: DISPENSED
Start: 2023-01-17

## (undated) RX ORDER — PROPOFOL 10 MG/ML
INJECTION, EMULSION INTRAVENOUS
Status: DISPENSED
Start: 2023-01-18